# Patient Record
Sex: MALE | Race: WHITE | NOT HISPANIC OR LATINO | Employment: OTHER | URBAN - NONMETROPOLITAN AREA
[De-identification: names, ages, dates, MRNs, and addresses within clinical notes are randomized per-mention and may not be internally consistent; named-entity substitution may affect disease eponyms.]

---

## 2017-01-31 ENCOUNTER — COMMUNICATION - GICH (OUTPATIENT)
Dept: FAMILY MEDICINE | Facility: OTHER | Age: 62
End: 2017-01-31

## 2017-01-31 DIAGNOSIS — F51.01 PRIMARY INSOMNIA: ICD-10-CM

## 2017-02-24 ENCOUNTER — HISTORY (OUTPATIENT)
Dept: EMERGENCY MEDICINE | Facility: OTHER | Age: 62
End: 2017-02-24

## 2017-03-01 ENCOUNTER — COMMUNICATION - GICH (OUTPATIENT)
Dept: FAMILY MEDICINE | Facility: OTHER | Age: 62
End: 2017-03-01

## 2017-03-01 DIAGNOSIS — F51.01 PRIMARY INSOMNIA: ICD-10-CM

## 2017-03-25 ENCOUNTER — COMMUNICATION - GICH (OUTPATIENT)
Dept: FAMILY MEDICINE | Facility: OTHER | Age: 62
End: 2017-03-25

## 2017-03-25 DIAGNOSIS — I10 ESSENTIAL (PRIMARY) HYPERTENSION: ICD-10-CM

## 2017-03-30 ENCOUNTER — HISTORY (OUTPATIENT)
Dept: FAMILY MEDICINE | Facility: OTHER | Age: 62
End: 2017-03-30

## 2017-03-30 ENCOUNTER — OFFICE VISIT - GICH (OUTPATIENT)
Dept: FAMILY MEDICINE | Facility: OTHER | Age: 62
End: 2017-03-30

## 2017-03-30 DIAGNOSIS — M10.9 GOUT: ICD-10-CM

## 2017-03-30 DIAGNOSIS — G51.0 BELL'S PALSY: ICD-10-CM

## 2017-03-30 LAB — ERYTHROCYTE [SEDIMENTATION RATE] IN BLOOD: 12 MM/HR

## 2017-04-13 ENCOUNTER — HISTORY (OUTPATIENT)
Dept: FAMILY MEDICINE | Facility: OTHER | Age: 62
End: 2017-04-13

## 2017-04-13 ENCOUNTER — OFFICE VISIT - GICH (OUTPATIENT)
Dept: FAMILY MEDICINE | Facility: OTHER | Age: 62
End: 2017-04-13

## 2017-04-13 DIAGNOSIS — G51.0 BELL'S PALSY: ICD-10-CM

## 2017-06-28 ENCOUNTER — COMMUNICATION - GICH (OUTPATIENT)
Dept: FAMILY MEDICINE | Facility: OTHER | Age: 62
End: 2017-06-28

## 2017-07-05 ENCOUNTER — COMMUNICATION - GICH (OUTPATIENT)
Dept: FAMILY MEDICINE | Facility: OTHER | Age: 62
End: 2017-07-05

## 2017-07-05 DIAGNOSIS — F51.01 PRIMARY INSOMNIA: ICD-10-CM

## 2017-09-10 ENCOUNTER — HISTORY (OUTPATIENT)
Dept: EMERGENCY MEDICINE | Facility: OTHER | Age: 62
End: 2017-09-10

## 2017-09-11 ENCOUNTER — AMBULATORY - GICH (OUTPATIENT)
Dept: RESPIRATORY THERAPY | Facility: OTHER | Age: 62
End: 2017-09-11

## 2017-09-14 ENCOUNTER — OFFICE VISIT - GICH (OUTPATIENT)
Dept: FAMILY MEDICINE | Facility: OTHER | Age: 62
End: 2017-09-14

## 2017-09-14 ENCOUNTER — HISTORY (OUTPATIENT)
Dept: FAMILY MEDICINE | Facility: OTHER | Age: 62
End: 2017-09-14

## 2017-09-14 DIAGNOSIS — G51.0 BELL'S PALSY: ICD-10-CM

## 2017-09-14 DIAGNOSIS — R55 SYNCOPE AND COLLAPSE: ICD-10-CM

## 2017-09-14 DIAGNOSIS — Z23 ENCOUNTER FOR IMMUNIZATION: ICD-10-CM

## 2017-09-14 DIAGNOSIS — G47.00 INSOMNIA: ICD-10-CM

## 2017-09-14 DIAGNOSIS — I10 ESSENTIAL (PRIMARY) HYPERTENSION: ICD-10-CM

## 2017-09-14 DIAGNOSIS — C73 MALIGNANT NEOPLASM OF THYROID GLAND (H): ICD-10-CM

## 2017-09-14 LAB
ABSOLUTE BASOPHILS - HISTORICAL: 0 THOU/CU MM
ABSOLUTE EOSINOPHILS - HISTORICAL: 0 THOU/CU MM
ABSOLUTE IMMATURE GRANULOCYTES(METAS,MYELOS,PROS) - HISTORICAL: 0.2 THOU/CU MM
ABSOLUTE LYMPHOCYTES - HISTORICAL: 3.9 THOU/CU MM (ref 0.9–2.9)
ABSOLUTE MONOCYTES - HISTORICAL: 2.2 THOU/CU MM
ABSOLUTE NEUTROPHILS - HISTORICAL: 14.5 THOU/CU MM (ref 1.7–7)
ANION GAP - HISTORICAL: 16 (ref 5–18)
BASOPHILS # BLD AUTO: 0.1 %
BUN SERPL-MCNC: 29 MG/DL (ref 7–25)
BUN/CREAT RATIO - HISTORICAL: 25
CALCIUM SERPL-MCNC: 10.3 MG/DL (ref 8.6–10.3)
CHLORIDE SERPLBLD-SCNC: 99 MMOL/L (ref 98–107)
CO2 SERPL-SCNC: 23 MMOL/L (ref 21–31)
CREAT SERPL-MCNC: 1.16 MG/DL (ref 0.7–1.3)
EOSINOPHIL NFR BLD AUTO: 0 %
ERYTHROCYTE [DISTWIDTH] IN BLOOD BY AUTOMATED COUNT: 15 % (ref 11.5–15.5)
GFR IF NOT AFRICAN AMERICAN - HISTORICAL: >60 ML/MIN/1.73M2
GLUCOSE SERPL-MCNC: 96 MG/DL (ref 70–105)
HCT VFR BLD AUTO: 48 % (ref 37–53)
HEMOGLOBIN: 16.7 G/DL (ref 13.5–17.5)
IMMATURE GRANULOCYTES(METAS,MYELOS,PROS) - HISTORICAL: 0.9 %
LYMPHOCYTES NFR BLD AUTO: 18.8 % (ref 20–44)
MCH RBC QN AUTO: 31.6 PG (ref 26–34)
MCHC RBC AUTO-ENTMCNC: 34.8 G/DL (ref 32–36)
MCV RBC AUTO: 91 FL (ref 80–100)
MONOCYTES NFR BLD AUTO: 10.7 %
NEUTROPHILS NFR BLD AUTO: 69.5 % (ref 42–72)
PLATELET # BLD AUTO: 470 THOU/CU MM (ref 140–440)
PMV BLD: 9.5 FL (ref 6.5–11)
POTASSIUM SERPL-SCNC: 3.8 MMOL/L (ref 3.5–5.1)
RED BLOOD COUNT - HISTORICAL: 5.29 MIL/CU MM (ref 4.3–5.9)
SODIUM SERPL-SCNC: 138 MMOL/L (ref 133–143)
WHITE BLOOD COUNT - HISTORICAL: 20.9 THOU/CU MM (ref 4.5–11)

## 2017-09-19 ENCOUNTER — OFFICE VISIT - GICH (OUTPATIENT)
Dept: INTERNAL MEDICINE | Facility: OTHER | Age: 62
End: 2017-09-19

## 2017-09-19 ENCOUNTER — HISTORY (OUTPATIENT)
Dept: INTERNAL MEDICINE | Facility: OTHER | Age: 62
End: 2017-09-19

## 2017-09-19 DIAGNOSIS — R55 SYNCOPE AND COLLAPSE: ICD-10-CM

## 2017-09-19 DIAGNOSIS — C73 MALIGNANT NEOPLASM OF THYROID GLAND (H): ICD-10-CM

## 2017-09-19 DIAGNOSIS — E89.0 POSTPROCEDURAL HYPOTHYROIDISM: ICD-10-CM

## 2017-09-19 DIAGNOSIS — D72.829 ELEVATED WHITE BLOOD CELL COUNT: ICD-10-CM

## 2017-09-19 DIAGNOSIS — G47.00 INSOMNIA: ICD-10-CM

## 2017-09-19 DIAGNOSIS — G51.0 BELL'S PALSY: ICD-10-CM

## 2017-09-19 DIAGNOSIS — I10 ESSENTIAL (PRIMARY) HYPERTENSION: ICD-10-CM

## 2017-09-19 LAB
% RETIC: 2.6 % (ref 0.5–2)
ABSOLUTE BASOPHILS - HISTORICAL: 0 THOU/CU MM
ABSOLUTE EOSINOPHILS - HISTORICAL: 0 THOU/CU MM
ABSOLUTE IMMATURE GRANULOCYTES(METAS,MYELOS,PROS) - HISTORICAL: 0.4 THOU/CU MM
ABSOLUTE LYMPHOCYTES - HISTORICAL: 3.3 THOU/CU MM (ref 0.9–2.9)
ABSOLUTE MONOCYTES - HISTORICAL: 1.7 THOU/CU MM
ABSOLUTE NEUTROPHILS - HISTORICAL: 16.6 THOU/CU MM (ref 1.7–7)
BASOPHILS # BLD AUTO: 0.2 %
EOSINOPHIL NFR BLD AUTO: 0 %
ERYTHROCYTE [DISTWIDTH] IN BLOOD BY AUTOMATED COUNT: 15.7 % (ref 11.5–15.5)
ERYTHROCYTE [SEDIMENTATION RATE] IN BLOOD: 6 MM/HR
HCT VFR BLD AUTO: 46.3 % (ref 37–53)
HEMOGLOBIN: 16 G/DL (ref 13.5–17.5)
IMMATURE GRANULOCYTES(METAS,MYELOS,PROS) - HISTORICAL: 1.6 %
LYMPHOCYTES NFR BLD AUTO: 14.8 % (ref 20–44)
MCH RBC QN AUTO: 31.7 PG (ref 26–34)
MCHC RBC AUTO-ENTMCNC: 34.6 G/DL (ref 32–36)
MCV RBC AUTO: 92 FL (ref 80–100)
MONOCYTES NFR BLD AUTO: 7.7 %
NEUTROPHILS NFR BLD AUTO: 75.7 % (ref 42–72)
PLATELET # BLD AUTO: 490 THOU/CU MM (ref 140–440)
PMV BLD: 9.8 FL (ref 6.5–11)
RED BLOOD COUNT - HISTORICAL: 5.04 MIL/CU MM (ref 4.3–5.9)
RETIC (ABSOLUTE) GIH - HISTORICAL: 0.13 MIL/UL
T4 FREE SERPL-MCNC: 1.23 NG/DL (ref 0.58–1.64)
WHITE BLOOD COUNT - HISTORICAL: 21.9 THOU/CU MM (ref 4.5–11)

## 2017-09-19 ASSESSMENT — PATIENT HEALTH QUESTIONNAIRE - PHQ9: SUM OF ALL RESPONSES TO PHQ QUESTIONS 1-9: 0

## 2017-09-20 ENCOUNTER — COMMUNICATION - GICH (OUTPATIENT)
Dept: FAMILY MEDICINE | Facility: OTHER | Age: 62
End: 2017-09-20

## 2017-09-20 DIAGNOSIS — C73 MALIGNANT NEOPLASM OF THYROID GLAND (H): ICD-10-CM

## 2017-09-20 DIAGNOSIS — I10 ESSENTIAL (PRIMARY) HYPERTENSION: ICD-10-CM

## 2017-09-21 LAB
ANA INTERPRETATION: NEGATIVE
LYME SCREEN W/REFLEX WEST BLOT - HISTORICAL: NEGATIVE

## 2017-10-11 ENCOUNTER — COMMUNICATION - GICH (OUTPATIENT)
Dept: INTERNAL MEDICINE | Facility: OTHER | Age: 62
End: 2017-10-11

## 2017-10-11 DIAGNOSIS — M54.81 OCCIPITAL NEURALGIA: ICD-10-CM

## 2017-10-11 DIAGNOSIS — G51.0 BELL'S PALSY: ICD-10-CM

## 2017-10-13 ENCOUNTER — HOSPITAL ENCOUNTER (OUTPATIENT)
Dept: RADIOLOGY | Facility: OTHER | Age: 62
End: 2017-10-13
Attending: INTERNAL MEDICINE

## 2017-10-13 DIAGNOSIS — G51.0 BELL'S PALSY: ICD-10-CM

## 2017-10-13 DIAGNOSIS — M54.81 OCCIPITAL NEURALGIA: ICD-10-CM

## 2017-10-19 ENCOUNTER — COMMUNICATION - GICH (OUTPATIENT)
Dept: INTERNAL MEDICINE | Facility: OTHER | Age: 62
End: 2017-10-19

## 2017-10-19 DIAGNOSIS — G47.00 INSOMNIA: ICD-10-CM

## 2017-10-23 ENCOUNTER — COMMUNICATION - GICH (OUTPATIENT)
Dept: INTERNAL MEDICINE | Facility: OTHER | Age: 62
End: 2017-10-23

## 2017-10-23 DIAGNOSIS — G51.0 BELL'S PALSY: ICD-10-CM

## 2017-10-23 DIAGNOSIS — D72.829 ELEVATED WHITE BLOOD CELL COUNT: ICD-10-CM

## 2017-11-01 ENCOUNTER — COMMUNICATION - GICH (OUTPATIENT)
Dept: FAMILY MEDICINE | Facility: OTHER | Age: 62
End: 2017-11-01

## 2017-11-01 DIAGNOSIS — F51.01 PRIMARY INSOMNIA: ICD-10-CM

## 2017-11-07 ENCOUNTER — COMMUNICATION - GICH (OUTPATIENT)
Dept: INTERNAL MEDICINE | Facility: OTHER | Age: 62
End: 2017-11-07

## 2017-11-08 ENCOUNTER — COMMUNICATION - GICH (OUTPATIENT)
Dept: INTERNAL MEDICINE | Facility: OTHER | Age: 62
End: 2017-11-08

## 2017-11-10 ENCOUNTER — AMBULATORY - GICH (OUTPATIENT)
Dept: SCHEDULING | Facility: OTHER | Age: 62
End: 2017-11-10

## 2017-11-10 ENCOUNTER — AMBULATORY - GICH (OUTPATIENT)
Dept: INTERNAL MEDICINE | Facility: OTHER | Age: 62
End: 2017-11-10

## 2017-11-17 ENCOUNTER — AMBULATORY - GICH (OUTPATIENT)
Dept: INTERNAL MEDICINE | Facility: OTHER | Age: 62
End: 2017-11-17

## 2017-11-20 ENCOUNTER — COMMUNICATION - GICH (OUTPATIENT)
Dept: INTERNAL MEDICINE | Facility: OTHER | Age: 62
End: 2017-11-20

## 2017-12-04 ENCOUNTER — AMBULATORY - GICH (OUTPATIENT)
Dept: SCHEDULING | Facility: OTHER | Age: 62
End: 2017-12-04

## 2017-12-04 DIAGNOSIS — G52.9 CRANIAL NERVE DISORDER: ICD-10-CM

## 2017-12-12 ENCOUNTER — COMMUNICATION - GICH (OUTPATIENT)
Dept: NEUROLOGY | Facility: OTHER | Age: 62
End: 2017-12-12

## 2017-12-12 ENCOUNTER — AMBULATORY - GICH (OUTPATIENT)
Dept: LAB | Facility: OTHER | Age: 62
End: 2017-12-12

## 2017-12-12 ENCOUNTER — HOSPITAL ENCOUNTER (OUTPATIENT)
Dept: RADIOLOGY | Facility: OTHER | Age: 62
End: 2017-12-12
Attending: PSYCHIATRY & NEUROLOGY

## 2017-12-12 DIAGNOSIS — G52.9 CRANIAL NERVE DISORDER: ICD-10-CM

## 2017-12-12 LAB
CELLS COUNTED: 0
CLARITY, FLUID: CLEAR
COLOR CSF: COLORLESS
INR - HISTORICAL: 1
Lab: 0 /CU MM
Lab: 10 /CU MM
Lab: ABNORMAL
PLATELET # BLD AUTO: 355 THOU/CU MM (ref 140–440)
PMV BLD: 9.5 FL (ref 6.5–11)
PROTEIN TOTAL CSF: 64 MG/DL (ref 15–45)
PROTIME - HISTORICAL: 12.3 SEC (ref 11.9–15.2)

## 2017-12-13 LAB — Lab: NORMAL

## 2017-12-14 LAB
Lab: 3.86 MG/DL
Lab: 36.1 MG/DL (ref 14–35)
Lab: NEGATIVE
Lab: NORMAL
PROTEIN TOTAL CSF: 49 MG/DL (ref 15–40)
SPECIMEN SOURCE - HISTORICAL: NORMAL

## 2017-12-15 LAB
IGG - HISTORICAL: 1160 MG/DL (ref 767–1590)
Lab: 0.43 (ref 0.3–0.6)
Lab: 3.86 MG/DL
Lab: 36.1 MG/DL (ref 14–35)
Lab: 4710 MG/DL (ref 3500–5200)
Lab: <0 MG/DAY
Lab: ABNORMAL
Lab: ABNORMAL
PROTEIN TOTAL CSF: 49 MG/DL (ref 15–40)

## 2017-12-17 LAB
CULTURE - HISTORICAL: NORMAL
GRAM STAIN: NORMAL

## 2017-12-21 ENCOUNTER — COMMUNICATION - GICH (OUTPATIENT)
Dept: INTERNAL MEDICINE | Facility: OTHER | Age: 62
End: 2017-12-21

## 2017-12-21 ENCOUNTER — COMMUNICATION - GICH (OUTPATIENT)
Dept: FAMILY MEDICINE | Facility: OTHER | Age: 62
End: 2017-12-21

## 2017-12-21 DIAGNOSIS — G47.30 SLEEP APNEA: ICD-10-CM

## 2017-12-21 DIAGNOSIS — G47.00 INSOMNIA: ICD-10-CM

## 2017-12-28 ENCOUNTER — COMMUNICATION - GICH (OUTPATIENT)
Dept: FAMILY MEDICINE | Facility: OTHER | Age: 62
End: 2017-12-28

## 2017-12-28 DIAGNOSIS — I10 ESSENTIAL (PRIMARY) HYPERTENSION: ICD-10-CM

## 2017-12-28 NOTE — TELEPHONE ENCOUNTER
Patient Information     Patient Name MRN Sex Selvin Duenas [id number] Male 1955      Telephone Encounter by Mary Ellen Canales at 2017  9:11 AM     Author:  Mary Ellen Canales Service:  (none) Author Type:  (none)     Filed:  2017  9:13 AM Encounter Date:  2017 Status:  Signed     :  Mary Ellen Canales            Patient has had lyme since February . His primary is out . Would another provider place referral for Cantu /Lyme disease .

## 2017-12-28 NOTE — ADDENDUM NOTE
Patient Information     Patient Name MRN Selvin Love [id number] Male 1955      Addendum Note by Mitali Ramirez MD at 2017  9:37 AM     Author:  Mitali Ramirez MD Service:  (none) Author Type:  Physician     Filed:  2017  9:37 AM Encounter Date:  2017 Status:  Signed     :  Mitali Ramirez MD (Physician)       Addended by: MITALI RAMIREZ on: 2017 09:37 AM        Modules accepted: Orders

## 2017-12-28 NOTE — TELEPHONE ENCOUNTER
Patient Information     Patient Name MRN Selvin Love [id number] Male 1955      Telephone Encounter by Jael Rhodes RN at 2017  1:37 PM     Author:  Jael Rhodes RN Service:  (none) Author Type:  NURS- Registered Nurse     Filed:  2017  1:38 PM Encounter Date:  2017 Status:  Signed     :  Jael Rhodes RN (NURS- Registered Nurse)            Primary provider will be back on Monday. Will leave for him to address.  Jael Rhodes RN........2017 1:38 PM

## 2017-12-28 NOTE — TELEPHONE ENCOUNTER
Patient Information     Patient Name MRN Selvin Love [id number] Male 1955      Telephone Encounter by Milagro Mata RN at 11/3/2017 10:43 AM     Author:  Milagro Mata RN Service:  (none) Author Type:  NURS- Registered Nurse     Filed:  11/3/2017 10:50 AM Encounter Date:  2017 Status:  Signed     :  Milagro Mata RN (NURS- Registered Nurse)            [facility] and pt request a refill Rx for mirtazapine (Remerone).    Rx was discontinued on 17.  This RN is refusing / declining Rx refill request.    Unable to complete prescription refill per RN Medication Refill Policy.................... Milagro Mata RN ....................  11/3/2017   10:49 AM

## 2017-12-28 NOTE — TELEPHONE ENCOUNTER
Patient Information     Patient Name MRN Selvin Love [id number] Male 1955      Telephone Encounter by Tri Boudreaux at 2017 11:08 AM     Author:  Tri Boudreaux Service:  (none) Author Type:  (none)     Filed:  2017 11:12 AM Encounter Date:  2017 Status:  Signed     :  Tri Boudreaux            Call placed to patient re: referral will he call back.   Tri Bodureaux LPN..............2017 11:11 AM

## 2017-12-28 NOTE — PROGRESS NOTES
Patient Information     Patient Name MRN Sex Selvin Duenas [id number] Male 1955      Progress Notes by Uriel Ramirez MD at 2017  7:45 AM     Author:  Uriel Ramirez MD  Service:  (none) Author Type:  Physician     Filed:  2017  9:37 AM  Encounter Date:  2017 Status:  Addendum     :  Uriel Ramirez MD (Physician)        Related Notes: Original Note by Uriel Ramirez MD (Physician) filed at 2017  9:35 AM            Nursing Notes:   Aissatou Bryan  2017  8:04 AM  Signed  Patient presents to the clinic for a follow up from the ED, he had an episode of syncope.    Aissatou GRANADOChelsey Bryan LPN....................2017 7:50 AM    Selvin An is a 62 y.o. male who presents for   Chief Complaint     Patient presents with       Syncope      Follow up ED     HPI: Mr. An has complex history in that he had syncopal episode this past Saturday; he was out in the yard doing light activity that did involve bending some. Hehas HYPERTENSION and had nifedipine added to his medications to help control it late July at [place].He is also on atenolol 150 mg daily, spironalacton/ hyrdrochlorothiazide 25/25 , losartan 100 mg for HYPERTENSION/ BP has been in 140/92 range at home before and after syncope. His BP did improve some with addition of nifedipine. He has no prior history of syncope. His creatinine and BUN were high and have not been prior so this may have been BP med + dehydration related. No rhythm disturbance noted but he has ZIO patch on now. H ehas history of [place] palsy this past January and had noted some odd sensations the past week of tightening of the facial ms. This was thought to be a recurrance of [place] palsy and he was put on acyclovir and prednisone in EMERGENCY DEPARTMENT. He has felt fine since. Odd sensations in face continue.   Past Medical History:     Diagnosis  Date     Bilateral carpal tunnel syndrome      Hyperlipidemia      Hypertension       Hypothyroidism      Papillary carcinoma of thyroid (HC) 1994     Sleep apnea      Thyrotoxicosis without mention of goiter or other cause, without mention of thyrotoxic crisis or storm     patient had thyroid remove because of cancer      Unspecified essential hypertension      Past Surgical History:      Procedure  Laterality Date     CARPAL TUNNEL RELEASE  12/2011     pending R CTS        COLONOSCOPY  2004,2009    polyps both       COLONOSCOPY  9/2014    Normal - follow up 10 years       Cystoscopy with stent pull Left 10/14/14    Dr. Lana LEHMAN       EYE MUSCLE SURGERY  2010    strabismus        SPLENECTOMY       THYROIDECTOMY  1994    thyroid ca       TONSILLECTOMY  2004    T & A > 13yo       Ureteroscopy Laser Left 10/06/14    Dr. Lana LEHMAN       VASECTOMY       Family History       Problem   Relation Age of Onset     Heart Disease  Mother      MI - SCD       Heart Disease  Father      MI - SCD       Heart Disease  Other      Hypertension  Other      Current Outpatient Prescriptions       Medication  Sig Dispense Refill     acyclovir (ZOVIRAX) 800 mg tablet Take 1 tablet by mouth 5 times daily for 10 days. 50 tablet 0     allopurinol (ZYLOPRIM) 300 mg tablet Take 1 tablet by mouth once daily. 90 tablet 3     aspirin (ECOTRIN) 81 mg enteric coated tablet Take 81 mg by mouth once daily with a meal.       atenolol (TENORMIN) 100 mg tablet Take 1 tablet by mouth once daily. Take 1 1/4 tablet by using pil;l splitter 135 tablet 3     [DISCONTINUED] ATENOLOL 50 MG ORAL TAB take 1 tablet (50mg) by oral route once daily  0     atorvastatin (LIPITOR) 20 mg tablet Take 20 mg by mouth once daily.       clonazePAM (KLONOPIN) 0.5 mg tablet Take 1 tablet by mouth at bedtime. 30 tablet 3     doxycycline (VIBRAMYCIN) 100 mg tablet Take 1 tablet by mouth 2 times daily. 28 tablet 0     doxycycline monohydrate (MONODOX) 100 mg capsule Take 1 capsule by mouth 2 times daily for 14 days. 28 capsule 0     levothyroxine  "(SYNTHROID) 200 mcg tablet Take 1 tablet by mouth before breakfast. 90 tablet 3     [DISCONTINUED] LEVOTHYROXINE SODIUM 25 MCG ORAL TAB take 1 tablet (25mcg) by oral route once daily  0     losartan (COZAAR) 100 mg tablet TAKE ONE TABLET BY MOUTH ONE TIME DAILY 90 tablet 1     mirtazapine (REMERON) 30 mg tablet Take 1 tablet by mouth at bedtime. 30 tablet 11     NIFEdipine (PROCARDIA XL) 30 mg Extended-Release tablet Take 30 mg by mouth once daily before a meal.  3     predniSONE (DELTASONE) 10 mg tablet 60 mg po daily x 3 days, then 50 mg po daily x 3 days, then 40 mg po daily x 3 days, then 30 mg po daily x 3 days, then 20 mg po daily x 3 days and finally 10 mg po daily until finished 73 tablet 0     spironolacton-hydrochlorothiaze, 25-25 mg, (ALDACTAZIDE) tablet TAKE ONE TABLET BY MOUTH TWICE DAILY 180 tablet 2     No current facility-administered medications for this visit.      Medications have been reviewed by me and are current to the best of my knowledge and ability.    No Known Allergies    EXAM:   Vitals:      09/14/17 0750 09/14/17 0755   BP: 158/94 142/92   Temp: 98.2  F (36.8  C)    TempSrc: Temporal    Weight: 128.5 kg (283 lb 6.4 oz)    Height: 1.83 m (6' 0.05\")      General Appearance: Pleasant, alert, appropriate appearance for age. No acute distress  Head Exam: Normocephalic, without obvious abnormality.  Ear Exam: Normal TM's bilaterally. Normal auditory canals and external ears. Non-tender.  OroPharynx Exam: Dental hygiene adequate. Normal buccal mucosa. Normal pharynx.  Neck Exam: Normal.  Chest/Respiratory Exam: Normal chest wall and respirations. Clear to auscultation.  Cardiovascular Exam: Regular rate and rhythm. S1, S2, no murmur, click, gallop, or rubs.  Lymphatic Exam: Non-palpable nodes in neck, clavicular, axillary, or inguinal regions.  Neurologic Exam: Nonfocal; symmetric DTRs, normal gross motor movement, tone, and coordination. No tremor.  ASSESSMENT AND PLAN:  1. Essential " hypertension  syncope  - atenolol (TENORMIN) 100 mg tablet; Take 1 tablet by mouth once daily. Take 1 1/4 tablet by using pil;l splitter  Dispense: 135 tablet; Refill: 3  - spironolacton-hydrochlorothiaze, 25-25 mg, (ALDACTAZIDE) tablet; 1 tablet twice a day  Dispense: 180 tablet; Refill: 3  - losartan (COZAAR) 100 mg tablet; Take 1 tablet by mouth once daily.  Dispense: 90 tablet; Refill: 3    2. CARCINOMA, THYROID GLAND, PAPILLARY    - levothyroxine (SYNTHROID) 200 mcg tablet; Take 1 tablet by mouth before breakfast.  Dispense: 90 tablet; Refill: 3    3. HYPERTENSION  controlled  - atenolol (TENORMIN) 100 mg tablet; Take 1 tablet by mouth once daily. Take 1 1/4 tablet by using pil;l splitter  Dispense: 135 tablet; Refill: 3  - spironolacton-hydrochlorothiaze, 25-25 mg, (ALDACTAZIDE) tablet; 1 tablet twice a day  Dispense: 180 tablet; Refill: 3  - losartan (COZAAR) 100 mg tablet; Take 1 tablet by mouth once daily.  Dispense: 90 tablet; Refill: 3    4. Need for meningococcal vaccination  given  - OMNI MENINGOCOCCAL (MENACTRA) VACC IM  - [place] ADMIN VACC INITIAL  - [place] ADMIN EA ADDL VACC  - SEROGROUP B MENINGOCOCCAL (AGE 10 >= YEARS)    5. Bell's palsy  Having symptoms that seem to be related to it    6. Syncope, unspecified syncope type  Likely vagal  7 Elevated WBC- 10294 today with mild elevation in platelets will repeat and if consistent will need hematology opinion; was elevated in 2015 because of pneumonia     8 chronic insomnia- trial zolpidem

## 2017-12-28 NOTE — TELEPHONE ENCOUNTER
Patient Information     Patient Name MRN Selvin Love [id number] Male 1955      Telephone Encounter by Mikala Clarke at 2017  1:16 PM     Author:  Mikala Clarke Service:  (none) Author Type:  (none)     Filed:  2017  1:18 PM Encounter Date:  2017 Status:  Signed     :  Mikala Clarke            Spoke with patient.   Referral was sent down to [facility] on 10.25.2017.  They do their own scheduling.   He was given the contact information to call and have the appt set up.  Mikala Clarke ....................  2017   1:16 PM

## 2017-12-28 NOTE — TELEPHONE ENCOUNTER
Patient Information     Patient Name MRN Sex Selvin Duenas [id number] Male 1955      Telephone Encounter by Izabella Villavicencio LPN at 2017  1:39 PM     Author:  Izabella Villavicencio LPN Service:  (none) Author Type:  NURS- Licensed Practical Nurse     Filed:  2017  1:43 PM Encounter Date:  2017 Status:  Signed     :  Izabella Villavicencio LPN (NURS- Licensed Practical Nurse)            Contacted the patients wife and let her know that it was faxed but she was transferred up front to scheduling to have them refax the referral and paper work.  Izabella Villavicencio LPN......2017  1:42 PM

## 2017-12-28 NOTE — TELEPHONE ENCOUNTER
Patient Information     Patient Name MRN Selvin Love [id number] Male 1955      Telephone Encounter by Aissatou Bryan at 2017  8:15 AM     Author:  Aissatou Bryan Service:  (none) Author Type:  (none)     Filed:  2017  8:16 AM Encounter Date:  2017 Status:  Signed     :  Aissatou Bryan            Message left for patient to make an appointment.  Aissatou Bryan LPN....................2017 8:15 AM

## 2017-12-28 NOTE — TELEPHONE ENCOUNTER
Patient Information     Patient Name MRN Selvin Love [id number] Male 1955      Telephone Encounter by Uriel Ramirez MD at 7/3/2017  7:47 AM     Author:  Uriel Ramirez MD Service:  (none) Author Type:  Physician     Filed:  7/3/2017  7:48 AM Encounter Date:  2017 Status:  Signed     :  Uriel Ramirez MD (Physician)            He will need to be seen fro me to refer

## 2017-12-28 NOTE — TELEPHONE ENCOUNTER
Patient Information     Patient Name MRN Sex Selvin Duenas [id number] Male 1955      Telephone Encounter by Tabitha Jerry at 10/23/2017 11:07 AM     Author:  Tabitha Jerry Service:  (none) Author Type:  (none)     Filed:  10/23/2017 11:17 AM Encounter Date:  10/23/2017 Status:  Signed     :  Tabitha Jerry            Patient EC states wondering if prescription will be approved today. Notified is pending.      Also at appointment with Juan C Bess MD was discussed could have referrals placed. They wondering if can have both referrals to neurologist and rheumatologist.  Tabitha Jerry LPN .............10/23/2017  11:11 AM

## 2017-12-28 NOTE — TELEPHONE ENCOUNTER
"Patient Information     Patient Name MRN Selvin Love [id number] Male 1955      Telephone Encounter by Elza Cooley RN at 10/23/2017 11:12 AM     Author:  Elza Cooley RN Service:  (none) Author Type:  NURS- Registered Nurse     Filed:  10/23/2017 11:19 AM Encounter Date:  10/19/2017 Status:  Signed     :  Elza Cooley RN (NURS- Registered Nurse)            LORazepam (ATIVAN) 1 mg tablet  TAKE 1 TABLET BY MOUTH AT BEDTIME IF NEEDED       Disp: 30 tablet Refills: 0    Class: eRx Start: 10/19/2017    For: Insomnia, unspecified type  Originally ordered: 1 month ago by Kailey Moncada MD  Last refill:2017  To pharmacy: Not to exceed 5 additional fills before 2018  To be filled at: [facility] [address] [place] [address] [address].Phone: [phone]      Last visit with KAILEY MONCADA was on: 2017 in [facility]  PCP:  Uriel Ramirez MD  Controlled Substance Agreement:  None noted     Per LOV note on 17  \"Trial of lorazepam for his insomnia as the Ambien is not helping. He really thinks she'll feel a lot better if he is just able to get some sleep.\"     Called patient and patient states that he is taking the Lorazepam in addition the Ambien and this is working well.    Will route to Dr. Moncada for review.    Unable to complete prescription refill per RN Medication Refill Policy.................... ELZA COOLEY RN ....................  10/23/2017   11:12 AM            "

## 2017-12-28 NOTE — TELEPHONE ENCOUNTER
Patient Information     Patient Name MRN Sex Selvin Duenas [id number] Male 1955      Telephone Encounter by Milagro Mata RN at 2017  9:53 AM     Author:  Milagro Mata RN Service:  (none) Author Type:  NURS- Registered Nurse     Filed:  2017  9:57 AM Encounter Date:  2017 Status:  Signed     :  Milagro Mata RN (NURS- Registered Nurse)            [facility] and pt request refill Rxs for levothyroxine (Synthroid) and losartan (Cozaar).    Hypothyroidism    Office visit in the past 12 months or per provider note.    Last visit with MITALI JHA was on: 2017 in [facility]  Next visit with MITALI JHA is on: No future appointment listed with this provider  Next visit with Family Practice is on: No future appointment listed in this department    Lab testing requirements:  TSH annually  TSH (uIU/mL)    Date Value   09/10/2017 0.92       Max refill for 12 months from last office visit or per provider note.    Angiotensin Receptor Blockers (ARB)    Office visit in the past 12 months or per provider note.    Last visit with MITALI JHA was on: 2017 in [facility]  Next visit with MITALI JHA is on: No future appointment listed with this provider  Next visit with Family Practice is on: No future appointment listed in this department    Lab test requirements:  Creatinine and Potassium annually, if ordering lab, order BMP.  CREATININE (mg/dL)    Date Value   2017 1.16     POTASSIUM (mmol/L)    Date Value   2017 3.8       Max refill for 12 months from last office visit or per provider note    Prescription refilled per RN Medication Refill Policy.................... Milagro Mata RN ....................  2017   9:56 AM

## 2017-12-30 NOTE — NURSING NOTE
Patient Information     Patient Name MRN Selvin Love [id number] Male 1955      Nursing Note by Aissatou Bryan at 2017  7:45 AM     Author:  Aissatou Bryan Service:  (none) Author Type:  (none)     Filed:  2017  8:04 AM Encounter Date:  2017 Status:  Signed     :  Aissatou Bryan            Patient presents to the clinic for a follow up from the ED, he had an episode of syncope.    Aissatou Bryan LPN....................2017 7:50 AM

## 2017-12-30 NOTE — NURSING NOTE
Patient Information     Patient Name MRN Selvin Love [id number] Male 1955      Nursing Note by Izabella Villavicencio LPN at 2017  3:00 PM     Author:  Izabella Villavicencio LPN Service:  (none) Author Type:  NURS- Licensed Practical Nurse     Filed:  2017  3:00 PM Encounter Date:  2017 Status:  Signed     :  Izabella Villavicencio LPN (NURS- Licensed Practical Nurse)            The patient is here today to have a consult.  Izabella Villavicencio LPN......2017  2:54 PM

## 2018-01-03 NOTE — TELEPHONE ENCOUNTER
Patient Information     Patient Name MRN Selvin Love [id number] Male 1955      Telephone Encounter by Jael Rhodes RN at 2017  1:33 PM     Author:  Jael Rhodes RN Service:  (none) Author Type:  NURS- Registered Nurse     Filed:  2017  1:34 PM Encounter Date:  2017 Status:  Signed     :  Jael Rhodes RN (NURS- Registered Nurse)            In clinical absence of patient's primary, Uriel Ramirez MD, patient is requesting that this message be sent to the primary provider's Teamlet for consideration please.    Jael Rhodes RN........2017 1:33 PM

## 2018-01-03 NOTE — TELEPHONE ENCOUNTER
Patient Information     Patient Name MRN Selvin Love [id number] Male 1955      Telephone Encounter by Jael Rhodes RN at 3/1/2017 10:51 AM     Author:  Jael Rhodes RN Service:  (none) Author Type:  NURS- Registered Nurse     Filed:  3/1/2017 10:55 AM Encounter Date:  3/1/2017 Status:  Signed     :  Jael Rhodes RN (NURS- Registered Nurse)            This is a Refill request from: [facility]   Name of Medication: Clonazepam  Quantity requested: 30  Last fill date: 2017  Due for refill: 3/4/2017  Last visit with URIEL RAMIREZ was on: 10/10/2016 in [facility]  PCP:  Uriel Ramirez MD  Controlled Substance Agreement:  none   Diagnosis r/t this medication request: Primary insomnia     Unable to complete prescription refill per RN Medication Refill Policy.................... Jael Rhodes RN ....................  3/1/2017   10:52 AM          
no abrasions, no jaundice, no lesions, no pruritis, and no rashes.

## 2018-01-03 NOTE — TELEPHONE ENCOUNTER
Patient Information     Patient Name MRN Selvin Love [id number] Male 1955      Telephone Encounter by Jael Rhodes RN at 2017  3:45 PM     Author:  Jael Rhodes RN Service:  (none) Author Type:  NURS- Registered Nurse     Filed:  2017  3:47 PM Encounter Date:  2017 Status:  Signed     :  Jael Rhodes RN (NURS- Registered Nurse)            This is a Refill request from: [facility]   Name of Medication: Clonazepam  Quantity requested: 30  Last fill date: 2017  Due for refill: 2017  Last visit with URIEL RAMIREZ was on: 10/10/2016 in [facility]  PCP: Uriel Ramirez MD  Controlled Substance Agreement: none  Diagnosis r/t this medication request: Primary insomnia    Unable to complete prescription refill per RN Medication Refill Policy.................... Jael Rhodes RN ....................  2017   3:47 PM

## 2018-01-04 NOTE — NURSING NOTE
Patient Information     Patient Name MRN Selvin Love [id number] Male 1955      Nursing Note by Aissatou Brayn at 2017  7:45 AM     Author:  Aissatou Bryan Service:  (none) Author Type:  (none)     Filed:  2017  8:02 AM Encounter Date:  2017 Status:  Signed     :  Aisstaou Bryan            Patient presents to the clinic for a follow up on bells palsy.  Aissatou Bryan LPN....................2017 7:39 AM

## 2018-01-04 NOTE — NURSING NOTE
Patient Information     Patient Name MRN Sex Selvin Duenas [id number] Male 1955      Nursing Note by Aissatou Bryan at 3/30/2017  3:15 PM     Author:  Aissatou Bryan Service:  (none) Author Type:  (none)     Filed:  3/30/2017  3:18 PM Encounter Date:  3/30/2017 Status:  Signed     :  Aissatou Bryan            Patient presents to the clinic for a follow up on [place] Palsy.  He was in the ED recently.  Given Prednisone and an antibiotic.  He finished both.  Not sure if he needs refills.  Aissatou Bryan LPN....................3/30/2017 3:10 PM

## 2018-01-04 NOTE — PROGRESS NOTES
Patient Information     Patient Name MRN Sex Selvin Duenas [id number] Male 1955      Progress Notes by Uriel Ramirez MD at 2017  7:45 AM     Author:  Uriel Ramirez MD Service:  (none) Author Type:  Physician     Filed:  2017  8:10 AM Encounter Date:  2017 Status:  Signed     :  Uriel Ramirez MD (Physician)            Nursing Notes:   Aissatou Bryan  2017  8:02 AM  Signed  Patient presents to the clinic for a follow up on bells palsy.  Aissatou Bryan LPN....................2017 7:39 AM    Selvin An is a 61 y.o. male who presents for   Chief Complaint     Patient presents with       Infectious Disease      Lyme disease follow up, bells palsy     HPI: Mr. An comes in follow up to [place] palsy felt to be related to Lyme- not clear whether this may have been present longer term or this Spring but this Spring most likely  Past Medical History:     Diagnosis  Date     Bilateral carpal tunnel syndrome      Hyperlipidemia      Hypertension      Hypothyroidism      Papillary carcinoma of thyroid (HC)      Sleep apnea      Thyrotoxicosis without mention of goiter or other cause, without mention of thyrotoxic crisis or storm     patient had thyroid remove because of cancer      Unspecified essential hypertension      Past Surgical History:      Procedure  Laterality Date     CARPAL TUNNEL RELEASE  2011     pending R CTS        COLONOSCOPY  ,    polyps both       COLONOSCOPY  2014    Normal - follow up 10 years       Cystoscopy with stent pull Left 10/14/14    Dr. Lana LEHMAN       EYE MUSCLE SURGERY  2010    strabismus        SPLENECTOMY       THYROIDECTOMY      thyroid ca       TONSILLECTOMY  2004    T & A > 13yo       Ureteroscopy Laser Left 10/06/14    Dr. Lana LEHMAN       VASECTOMY       Family History       Problem   Relation Age of Onset     Heart Disease  Mother      MI - SCD       Heart Disease  Father      MI - SCD       Heart  "Disease  Other      Hypertension  Other      Current Outpatient Prescriptions       Medication  Sig Dispense Refill     allopurinol (ZYLOPRIM) 300 mg tablet Take 1 tablet by mouth once daily. 90 tablet 3     aspirin (ECOTRIN) 81 mg enteric coated tablet Take 81 mg by mouth once daily with a meal.       atenolol (TENORMIN) 100 mg tablet Take 1 tablet by mouth once daily. Take 1 1/4 tablet by using pil;l splitter 135 tablet 3     [DISCONTINUED] ATENOLOL 50 MG ORAL TAB take 1 tablet (50mg) by oral route once daily  0     clonazePAM (KLONOPIN) 0.5 mg tablet Take 1 tablet by mouth at bedtime. 30 tablet 3     doxycycline (VIBRAMYCIN) 100 mg tablet Take 1 tablet by mouth 2 times daily. 28 tablet 0     indomethacin (INDOCIN) 50 mg capsule Take 50 mg by mouth every 8 hours if needed for Gout Pain.       levothyroxine (SYNTHROID) 200 mcg tablet Take 1 tablet by mouth before breakfast. 90 tablet 3     [DISCONTINUED] LEVOTHYROXINE SODIUM 25 MCG ORAL TAB take 1 tablet (25mcg) by oral route once daily  0     losartan (COZAAR) 100 mg tablet TAKE ONE TABLET BY MOUTH ONE TIME DAILY 90 tablet 1     mirtazapine (REMERON) 30 mg tablet Take 1 tablet by mouth at bedtime. 30 tablet 11     spironolacton-hydrochlorothiaze, 25-25 mg, (ALDACTAZIDE) tablet TAKE ONE TABLET BY MOUTH TWICE DAILY 180 tablet 2     No current facility-administered medications for this visit.      Medications have been reviewed by me and are current to the best of my knowledge and ability.    No Known Allergies       EXAM:   Vitals:     04/13/17 0739   BP: 130/78   Weight: (!) 137.1 kg (302 lb 3.2 oz)   Height: 1.85 m (6' 0.83\")     General Appearance: Pleasant, alert, appropriate appearance for age. No acute distress  Neurologic Exam: left peripheral BElls palsy; ESR was back to normal  ASSESSMENT AND PLAN:  1. Bell's palsy  Improving slowly at this time                 "

## 2018-01-04 NOTE — TELEPHONE ENCOUNTER
Patient Information     Patient Name MRN Selvin Love [id number] Male 1955      Telephone Encounter by Jael Rhodes RN at 3/27/2017  9:25 AM     Author:  Jael Rhodes RN Service:  (none) Author Type:  NURS- Registered Nurse     Filed:  3/27/2017  9:30 AM Encounter Date:  3/25/2017 Status:  Signed     :  Jael Rhodes RN (NURS- Registered Nurse)            Angiotensin Receptor Blockers (ARB)    Office visit in the past 12 months or per provider note.    Last visit with MITALI JHA was on: 10/10/2016 in [facility]  Next visit with MITALI JHA is on: No future appointment listed with this provider  Next visit with Family Practice is on: No future appointment listed in this department    Lab test requirements:  Creatinine and Potassium annually, if ordering lab, order BMP.  CREATININE (mg/dL)    Date Value   2017 1.18     POTASSIUM (mmol/L)    Date Value   2017 3.6       Max refill for 12 months from last office visit or per provider note    Prescription refilled per RN Medication Refill Policy.................... Jael Rhodes RN ....................  3/27/2017   9:29 AM

## 2018-01-04 NOTE — PROGRESS NOTES
Patient Information     Patient Name MRN Sex Selvin Duenas [id number] Male 1955      Progress Notes by Uriel Ramirez MD at 3/30/2017  3:15 PM     Author:  Uriel Ramirez MD Service:  (none) Author Type:  Physician     Filed:  3/30/2017  3:43 PM Encounter Date:  3/30/2017 Status:  Signed     :  Uriel Ramirez MD (Physician)            Nursing Notes:   Aissatou Bryan  3/30/2017  3:18 PM  Signed  Patient presents to the clinic for a follow up on Parker Palsy.  He was in the ED recently.  Given Prednisone and an antibiotic.  He finished both.  Not sure if he needs refills.  Aissatou Bryan LPN....................3/30/2017 3:10 PM    Selvin An is a 61 y.o. male who presents for   Chief Complaint     Patient presents with       Facial Injury      Parker palsy     HPI: Mr. An comes in in follow up to EMERGENCY DEPARTMENT diagnosis of Parker palsy acute start on 17. H ewas dx'd after extensive work up. He had been ill he states with achy , tired, weak feeling since sometiime last Spring; at that time he was fouind to be Vit D deficiient and ESR was elevated- he felt a little better but followed up with me Oct when I stopped his statin with plan to follow up after stopping this; Lyme test was neg. JOID enext came in to Ed with symptoms of left facial droop an dwas started on doxycycline + prednisone with instructions to follow up for possible more med if not better after 2 weeks; he has had some minimal improvement- has not seen eye Dr as instructed; he thinks it is 40 %c improved and he can drink with straw and speak better. NNo improvement in generalized achiness; going off statin he reports did not help. No assoc rash prior to onset Parker Palsy   Past Medical History      Diagnosis   Date     Bilateral carpal tunnel syndrome       Hyperlipidemia       Hypertension       Hypothyroidism       Papillary carcinoma of thyroid (HC)       Sleep apnea       Thyrotoxicosis without mention  of goiter or other cause, without mention of thyrotoxic crisis or storm       patient had thyroid remove because of cancer      Unspecified essential hypertension       Past Surgical History       Procedure   Laterality Date     Splenectomy        Thyroidectomy   1994     thyroid ca       Tonsillectomy   2004     T & A > 13yo       Vasectomy        Eye muscle surgery   2010     strabismus        Carpal tunnel release   12/2011      pending R CTS        Colonoscopy   2004,2009     polyps both       Colonoscopy   9/2014     Normal - follow up 10 years       Ureteroscopy laser  Left 10/06/14     Dr. Lana LEHMAN       Cystoscopy with stent pull  Left 10/14/14     Dr. Lana LEHMAN       Family History       Problem   Relation Age of Onset     Heart Disease  Mother      MI - SCD       Heart Disease  Father      MI - SCD       Heart Disease  Other      Hypertension  Other      Current Outpatient Prescriptions       Medication  Sig Dispense Refill     allopurinol (ZYLOPRIM) 300 mg tablet Take 1 tablet by mouth once daily. 90 tablet 0     aspirin (ECOTRIN) 81 mg enteric coated tablet Take 81 mg by mouth once daily with a meal.       atenolol (TENORMIN) 100 mg tablet Take 1 tablet by mouth once daily. Take 1 1/4 tablet by using pil;l splitter 135 tablet 3     [DISCONTINUED] ATENOLOL 50 MG ORAL TAB take 1 tablet (50mg) by oral route once daily  0     clonazePAM (KLONOPIN) 0.5 mg tablet Take 1 tablet by mouth at bedtime. 30 tablet 3     indomethacin (INDOCIN) 50 mg capsule Take 50 mg by mouth every 8 hours if needed for Gout Pain.       levothyroxine (SYNTHROID) 200 mcg tablet Take 1 tablet by mouth before breakfast. 90 tablet 3     [DISCONTINUED] LEVOTHYROXINE SODIUM 25 MCG ORAL TAB take 1 tablet (25mcg) by oral route once daily  0     losartan (COZAAR) 100 mg tablet TAKE ONE TABLET BY MOUTH ONE TIME DAILY 90 tablet 1     mirtazapine (REMERON) 30 mg tablet Take 1 tablet by mouth at bedtime. 30 tablet 11      "spironolacton-hydrochlorothiaze, 25-25 mg, (ALDACTAZIDE) tablet TAKE ONE TABLET BY MOUTH TWICE DAILY 180 tablet 2     No current facility-administered medications for this visit.      Medications have been reviewed by me and are current to the best of my knowledge and ability.    No Known Allergies     EXAM:   Vitals:     03/30/17 1511   BP: 110/76   Weight: (!) 137.2 kg (302 lb 6.4 oz)   Height: 1.85 m (6' 0.83\")     General Appearance: Pleasant, alert, appropriate appearance for age. No acute distress  Neurologic Exam: Nonfocal; symmetric DTRs, normal gross motor movement, tone, and coordination. No tremor.Peripheral marked  &th CN palsy likely related to Lyme / history of elevated AESR that I will recehck now as statins were not cause of aches; may need Neurology follow up- stressd importance of follow up here 1-2 weeks  ASSESSMENT AND PLAN:  1. Gout, unspecified cause, unspecified chronicity, unspecified site    - allopurinol (ZYLOPRIM) 300 mg tablet; Take 1 tablet by mouth once daily.  Dispense: 90 tablet; Refill: 3    2. Bell's palsy  Refill doxy                 "

## 2018-01-11 ENCOUNTER — HISTORY (OUTPATIENT)
Dept: FAMILY MEDICINE | Facility: OTHER | Age: 63
End: 2018-01-11

## 2018-01-11 ENCOUNTER — OFFICE VISIT - GICH (OUTPATIENT)
Dept: FAMILY MEDICINE | Facility: OTHER | Age: 63
End: 2018-01-11

## 2018-01-11 DIAGNOSIS — G47.30 SLEEP APNEA: ICD-10-CM

## 2018-01-15 ENCOUNTER — AMBULATORY - GICH (OUTPATIENT)
Dept: SCHEDULING | Facility: OTHER | Age: 63
End: 2018-01-15

## 2018-01-17 PROBLEM — G51.0 BELL'S PALSY: Status: ACTIVE | Noted: 2017-03-30

## 2018-01-17 PROBLEM — G56.02 CARPAL TUNNEL SYNDROME OF LEFT WRIST: Status: ACTIVE | Noted: 2017-11-02

## 2018-01-17 RX ORDER — NIFEDIPINE 30 MG/1
30 TABLET, EXTENDED RELEASE ORAL
COMMUNITY
Start: 2017-08-19 | End: 2021-01-29

## 2018-01-17 RX ORDER — ASPIRIN 81 MG/1
81 TABLET ORAL
COMMUNITY
End: 2021-01-29

## 2018-01-17 RX ORDER — ZOLPIDEM TARTRATE 10 MG/1
10 TABLET ORAL
COMMUNITY
Start: 2017-09-14 | End: 2018-02-26

## 2018-01-17 RX ORDER — SPIRONOLACTONE AND HYDROCHLOROTHIAZIDE 25; 25 MG/1; MG/1
1 TABLET ORAL 2 TIMES DAILY
COMMUNITY
Start: 2017-09-14 | End: 2021-01-29

## 2018-01-17 RX ORDER — LOSARTAN POTASSIUM 100 MG/1
100 TABLET ORAL DAILY
COMMUNITY
Start: 2017-09-22 | End: 2021-01-29

## 2018-01-17 RX ORDER — ATENOLOL 100 MG/1
150 TABLET ORAL DAILY
COMMUNITY
Start: 2017-09-14 | End: 2021-01-29

## 2018-01-17 RX ORDER — LEVOTHYROXINE SODIUM 200 UG/1
150 TABLET ORAL
COMMUNITY
Start: 2017-09-22 | End: 2021-01-29

## 2018-01-17 RX ORDER — ALLOPURINOL 300 MG/1
300 TABLET ORAL DAILY
COMMUNITY
Start: 2017-03-30 | End: 2018-04-14

## 2018-01-17 RX ORDER — HYDROCODONE BITARTRATE AND ACETAMINOPHEN 5; 325 MG/1; MG/1
1-2 TABLET ORAL EVERY 4 HOURS PRN
COMMUNITY
Start: 2017-11-16 | End: 2021-01-29

## 2018-01-17 RX ORDER — LORAZEPAM 1 MG/1
1 TABLET ORAL
COMMUNITY
Start: 2017-12-26 | End: 2018-05-24

## 2018-01-17 RX ORDER — ATORVASTATIN CALCIUM 20 MG/1
20 TABLET, FILM COATED ORAL DAILY
COMMUNITY
Start: 2017-07-19 | End: 2021-01-29

## 2018-01-26 ENCOUNTER — HOSPITAL ENCOUNTER (OUTPATIENT)
Dept: RADIOLOGY | Facility: OTHER | Age: 63
End: 2018-01-26

## 2018-01-26 ENCOUNTER — AMBULATORY - GICH (OUTPATIENT)
Dept: RADIOLOGY | Facility: OTHER | Age: 63
End: 2018-01-26

## 2018-01-26 DIAGNOSIS — M54.9 DORSALGIA: ICD-10-CM

## 2018-01-27 VITALS
DIASTOLIC BLOOD PRESSURE: 88 MMHG | WEIGHT: 286 LBS | SYSTOLIC BLOOD PRESSURE: 134 MMHG | BODY MASS INDEX: 38.26 KG/M2 | HEART RATE: 64 BPM

## 2018-01-27 VITALS
HEIGHT: 72 IN | TEMPERATURE: 98.2 F | WEIGHT: 283.4 LBS | BODY MASS INDEX: 38.39 KG/M2 | SYSTOLIC BLOOD PRESSURE: 142 MMHG | DIASTOLIC BLOOD PRESSURE: 92 MMHG

## 2018-01-27 VITALS
HEIGHT: 73 IN | SYSTOLIC BLOOD PRESSURE: 110 MMHG | DIASTOLIC BLOOD PRESSURE: 76 MMHG | WEIGHT: 302.4 LBS | BODY MASS INDEX: 40.08 KG/M2

## 2018-01-27 VITALS
SYSTOLIC BLOOD PRESSURE: 130 MMHG | HEIGHT: 73 IN | WEIGHT: 302.2 LBS | BODY MASS INDEX: 40.05 KG/M2 | DIASTOLIC BLOOD PRESSURE: 78 MMHG

## 2018-01-30 ASSESSMENT — PATIENT HEALTH QUESTIONNAIRE - PHQ9: SUM OF ALL RESPONSES TO PHQ QUESTIONS 1-9: 0

## 2018-02-09 VITALS
DIASTOLIC BLOOD PRESSURE: 88 MMHG | HEIGHT: 72 IN | BODY MASS INDEX: 39.93 KG/M2 | SYSTOLIC BLOOD PRESSURE: 136 MMHG | WEIGHT: 294.8 LBS

## 2018-02-12 NOTE — PROGRESS NOTES
Patient Information     Patient Name MRN Sex Selvin Duenas [id number] Male 1955      Progress Notes by Corrine Hankins RN at 2017 11:06 AM     Author:  Corrine Hankins RN Service:  (none) Author Type:  NURS- Registered Nurse     Filed:  2017 11:07 AM Date of Service:  2017 11:06 AM Status:  Signed     :  Corrine Hankins RN (NURS- Registered Nurse)            Discharge Note    Data:  Selvin An has been discharged home at 1100 via ambulatory accompanied by Registered Nurse and Family.      Action:  Written discharge/follow-up instructions were provided to patient and Spouse. Prescriptions : None.  Belongings sent with patient. Medications from home sent with patient/family: Not Applicable  Equipment none .     Response:  Patient and spouse verbalized understanding of discharge instructions, reason for discharge, and necessary follow-up appointments.

## 2018-02-12 NOTE — TELEPHONE ENCOUNTER
Patient Information     Patient Name MRN Sex Selvin Duenas [id number] Male 1955      Telephone Encounter by Geri Mirza at 2017  3:02 PM     Author:  Geri Mirza Service:  (none) Author Type:  (none)     Filed:  2017  3:05 PM Encounter Date:  2017 Status:  Signed     :  Geri Mirza            Needs new C-pap machine.  Per [facility] they do not repair them.  Needs Rx for a new one sent to [facility].  Geri Mirza LPN..........2017  3:04 PM

## 2018-02-12 NOTE — TELEPHONE ENCOUNTER
Patient Information     Patient Name MRN Selvin Love [id number] Male 1955      Telephone Encounter by Milagro Baez RN at 2018  2:03 PM     Author:  Milagro Baez RN Service:  (none) Author Type:  NURS- Registered Nurse     Filed:  2018  2:06 PM Encounter Date:  2017 Status:  Signed     :  Milagro Baez RN (NURS- Registered Nurse)            Redundant Refill Request refused: Too Soon.    Medication:atenolol (TENORMIN) 100 mg tablet    Qty:135 tablet  Ref:3  Start:2017   End:              Route:Oral                  ANDREW:No   Class:eRx    Sig:Take 1.5 tablets by mouth once daily.    Pharmacy:[facility] [id number] [place] [address]    Unable to complete prescription refill per RN Medication Refill Policy.................... Milagro Baez RN ....................  2018   2:05 PM

## 2018-02-12 NOTE — TELEPHONE ENCOUNTER
Patient Information     Patient Name MRN Selvin Love [id number] Male 1955      Telephone Encounter by Aissatou Bryan at 2018 10:17 AM     Author:  Aissatou Bryan Service:  (none) Author Type:  (none)     Filed:  2018 10:17 AM Encounter Date:  2017 Status:  Signed     :  Aissatou Bryan            Order is ready for signature  Aissatou Bryan LPN....................2018 10:17 AM

## 2018-02-12 NOTE — TELEPHONE ENCOUNTER
Patient Information     Patient Name MRN Sex Selvin Duenas [id number] Male 1955      Telephone Encounter by Uriel Ramirez MD at 2017  3:32 PM     Author:  Uriel Ramirez MD Service:  (none) Author Type:  Physician     Filed:  2017  3:33 PM Encounter Date:  2017 Status:  Signed     :  Uriel Ramirez MD (Physician)            Please transesophogeal echocardiography up

## 2018-02-12 NOTE — TELEPHONE ENCOUNTER
Patient Information     Patient Name MRN Sex Selvin Duenas [id number] Male 1955      Telephone Encounter by Milagro Baez RN at 2017  4:17 PM     Author:  Milagro Baez RN Service:  (none) Author Type:  NURS- Registered Nurse     Filed:  2017  4:22 PM Encounter Date:  2017 Status:  Signed     :  Milagro Baez RN (NURS- Registered Nurse)            PLEASE REVIEW, SIGN AND SEND AS APPROPRIATE: THANK YOU.    This is a Refill request from: [facility]  Name of Medication: lorazepam (ATIVAN)  Quantity requested: 30 tabs  Last fill date: 10/23/2017 (#30, R-0)  Due for refill: 2017  Last visit with KAILEY MONCADA was on: 2017 in [facility]  PCP:  Uriel Ramirez MD  Controlled Substance Agreement:  None for this medication  Diagnosis r/t this medication request: Insomnia     Unable to complete prescription refill per RN Medication Refill Policy.................... Milagro Baez RN ....................  2017   4:20 PM

## 2018-02-12 NOTE — PROGRESS NOTES
Patient Information     Patient Name MRN Sex Selvin Duenas [id number] Male 1955      Progress Notes by Pat Pryor at 2017 11:09 AM     Author:  Pat Pryor Service:  (none) Author Type:  Other Clinical Staff     Filed:  2017 11:11 AM Date of Service:  2017 11:09 AM Status:  Signed     :  Pat Pryor (Other Clinical Staff)            Falls Risk Criteria:    Age 65 and older or under age 4        Sensory deficits    Poor vision    Use of ambulatory aides    Impaired judgment    Unable to walk independently    Meets High Risk criteria for falls:  No    Universal Protocol    A. Pre-procedure verification complete yes  1-relevant information / documentation available, reviewed and properly matched to the patient; 2-consent accurate and complete, 3-equipment and supplies available    B. Site marking complete N/A  Site marked if not in continuous attendance with patient    C. TIME OUT completed yes  Time Out was conducted just prior to starting procedure to verify the eight required elements: 1-patient identity, 2-consent accurate and complete, 3-position, 4-correct side/site marked (if applicable), 5-procedure, 6-relevant images / results properly labeled and displayed (if applicable), 7-antibiotics / irrigation fluids (if applicable), 8-safety precautions.      10 cc's of CSF collected and completed by 0945

## 2018-02-12 NOTE — TELEPHONE ENCOUNTER
Patient Information     Patient Name MRN Selvin Love [id number] Male 1955      Telephone Encounter by Uriel Ramirez MD at 2018 10:18 AM     Author:  Uriel Ramirez MD Service:  (none) Author Type:  Physician     Filed:  2018 10:18 AM Encounter Date:  2017 Status:  Signed     :  Uriel Ramirez MD (Physician)            I do not know his pressure setting

## 2018-02-13 NOTE — NURSING NOTE
Patient Information     Patient Name MRN Selvin Love [id number] Male 1955      Nursing Note by Aissatou Bryan at 2018 11:30 AM     Author:  Aissatou Bryan Service:  (none) Author Type:  (none)     Filed:  2018 11:44 AM Encounter Date:  2018 Status:  Signed     :  Aissatou Bryan            Patient presents to the clinic to get a written prescription for a CPAP machine to bring to [place].  Aissatou Bryan LPN....................2018 11:38 AM

## 2018-02-13 NOTE — PROGRESS NOTES
Patient Information     Patient Name MRN Sex Selvin Duenas [id number] Male 1955      Progress Notes by Uriel Ramirez MD at 2018 11:30 AM     Author:  Uriel Ramirez MD Service:  (none) Author Type:  Physician     Filed:  2018 12:02 PM Encounter Date:  2018 Status:  Signed     :  Uriel Ramirez MD (Physician)            Nursing Notes:   Aissatou Bryan  2018 11:44 AM  Signed  Patient presents to the clinic to get a written prescription for a CPAP machine to bring to [place].  Aissatou LOYChelsey Bryan LPN....................2018 11:38 AM    Selvin An is a 62 y.o. male who presents for   Chief Complaint     Patient presents with       Refill Request      CPAP     HPI: Mr. An comes in to discuss CPAP; he has sleep apnea and has for 18 years with diagnosis in  at [facility] where he had original sleep study that confirmed the diagnosis. He uses CPAP all night every night with excellent results. He wishes to continue it. He sleeps much better without stating he cannot stay asleep without it. He is having trouble with his insurance company related to rules set forth. He had a breakdown with his old machine and thus needs a new one.   Past Medical History:     Diagnosis  Date     Bilateral carpal tunnel syndrome      Hyperlipidemia      Hypertension      Hypothyroidism      Papillary carcinoma of thyroid (HC)      Sleep apnea      Past Surgical History:      Procedure  Laterality Date     CARPAL TUNNEL RELEASE Right 2011     COLONOSCOPY  2014    Normal - follow up 10 years       Cystoscopy with stent pull Left 10/14/14    Dr. Lana LEHMAN       ENDOSCOPIC RELEASE TRANSVERSE CARPAL LIGAMENT OF HAND Left 2017     EYE MUSCLE SURGERY Left     strabismus        SPLENECTOMY       THYROIDECTOMY      thyroid ca       TONSILLECTOMY  2004    T & A > 10yo       Ureteroscopy Laser Left 10/06/14    Dr. Lana LEHMAN       VASECTOMY       Family History        Problem   Relation Age of Onset     Heart Disease  Mother      MI - SCD       Heart Disease  Father      MI - SCD       Current Outpatient Prescriptions       Medication  Sig Dispense Refill     allopurinol (ZYLOPRIM) 300 mg tablet Take 1 tablet by mouth once daily. 90 tablet 3     aspirin (ECOTRIN) 81 mg enteric coated tablet Take 81 mg by mouth once daily with a meal.       atenolol (TENORMIN) 100 mg tablet Take 1.5 tablets by mouth once daily. 135 tablet 3     [DISCONTINUED] ATENOLOL 50 MG ORAL TAB take 1 tablet (50mg) by oral route once daily  0     atorvastatin (LIPITOR) 20 mg tablet Take 20 mg by mouth once daily.       CPAP CPAP machine for home use at pressure: 8, with supplies:  humidifier, chamber, mask, tubing 1 unit 0     HYDROcodone-acetaminophen, 5-325 mg, (NORCO) per tablet Take 1-2 tablets by mouth every 4 hours if needed  for Pain Max acetaminophen dose: 4000 mg in 24 hrs. 15 tablet 0     levothyroxine (SYNTHROID) 200 mcg tablet TAKE 1 TABLET BY MOUTH BEFORE BREAKFAST. 90 tablet 3     [DISCONTINUED] LEVOTHYROXINE SODIUM 25 MCG ORAL TAB take 1 tablet (25mcg) by oral route once daily  0     LORazepam (ATIVAN) 1 mg tablet TAKE 1 TABLET BY MOUTH AT BEDTIME IF NEEDED 30 tablet 4     losartan (COZAAR) 100 mg tablet TAKE ONE TABLET BY MOUTH ONE TIME DAILY 90 tablet 3     NIFEdipine (PROCARDIA XL) 30 mg Extended-Release tablet Take 30 mg by mouth once daily before a meal.  3     spironolacton-hydrochlorothiaze, 25-25 mg, (ALDACTAZIDE) tablet 1 tablet twice a day 180 tablet 3     zolpidem (AMBIEN) 10 mg tablet Take 1 tablet by mouth at bedtime if needed for Sleep. 30 tablet 5     No current facility-administered medications for this visit.      Medications have been reviewed by me and are current to the best of my knowledge and ability.    No Known Allergies    EXAM:   Vitals:     01/11/18 1138   BP: 136/88   Cuff Site: Right Arm   Position: Sitting   Cuff Size: Adult Large   Weight: 133.7 kg (294 lb  "12.8 oz)   Height: 1.83 m (6' 0.05\")     General Appearance: Pleasant, alert, appropriate appearance for age. No acute distress  Chest/Respiratory Exam: Normal chest wall and respirations. Clear to auscultation.  Cardiovascular Exam: Regular rate and rhythm. S1, S2, no murmur, click, gallop, or rubs.  ASSESSMENT AND PLAN:  1. Sleep apnea, unspecified type  He benefits from CPAP and uses it every night. He sleeps very poorly without it.   - CPAP; CPAP machine for home use at pressure: 10, with supplies:  humidifier, chamber, mask, tubing  Dispense: 1 unit; Refill: 0                 "

## 2018-02-26 ENCOUNTER — OFFICE VISIT (OUTPATIENT)
Dept: FAMILY MEDICINE | Facility: OTHER | Age: 63
End: 2018-02-26
Attending: FAMILY MEDICINE
Payer: COMMERCIAL

## 2018-02-26 VITALS
SYSTOLIC BLOOD PRESSURE: 140 MMHG | BODY MASS INDEX: 38.54 KG/M2 | DIASTOLIC BLOOD PRESSURE: 80 MMHG | HEIGHT: 73 IN | TEMPERATURE: 98.3 F | WEIGHT: 290.8 LBS | HEART RATE: 64 BPM

## 2018-02-26 DIAGNOSIS — G47.33 OBSTRUCTIVE SLEEP APNEA SYNDROME: Primary | ICD-10-CM

## 2018-02-26 DIAGNOSIS — M47.16 OSTEOARTHRITIS OF SPINE WITH MYELOPATHY, LUMBOSACRAL REGION: ICD-10-CM

## 2018-02-26 PROCEDURE — G0463 HOSPITAL OUTPT CLINIC VISIT: HCPCS

## 2018-02-26 PROCEDURE — 99213 OFFICE O/P EST LOW 20 MIN: CPT | Performed by: FAMILY MEDICINE

## 2018-02-26 RX ORDER — IBUPROFEN 800 MG/1
800 TABLET, FILM COATED ORAL EVERY 8 HOURS PRN
Qty: 90 TABLET | Refills: 1 | Status: SHIPPED | OUTPATIENT
Start: 2018-02-26 | End: 2021-01-29

## 2018-02-26 RX ORDER — ZOLPIDEM TARTRATE 10 MG/1
10 TABLET ORAL
Qty: 30 TABLET | Refills: 3 | Status: SHIPPED | OUTPATIENT
Start: 2018-02-26 | End: 2018-07-12

## 2018-02-26 ASSESSMENT — PAIN SCALES - GENERAL: PAINLEVEL: MODERATE PAIN (4)

## 2018-02-26 NOTE — PATIENT INSTRUCTIONS
Continue to use your CPAP every night/ take 1000 mg tylenol with the 800 mg ibuprofen 3 times daily as needed for back pain

## 2018-02-26 NOTE — PROGRESS NOTES
"  SUBJECTIVE:   Selvin An is a 62 year old male who presents to clinic today for the following health issues: comes in stating he has need of CPAP visit to discuss usage of his CPAP; he is using it every night for 8 1/2 hours w excellent improvement in sleep hygiene and level of well being. I received documentation that he is using it daily from [employer]. / he was told that              Problem list and histories reviewed & adjusted, as indicated.  Additional history: as documented        Reviewed and updated as needed this visit by clinical staff  Tobacco  Allergies  Meds  Med Hx  Surg Hx  Fam Hx  Soc Hx      Reviewed and updated as needed this visit by Provider             OBJECTIVE:     /80 (BP Location: Right arm, Patient Position: Sitting, Cuff Size: Adult Large)  Pulse 64  Temp 98.3  F (36.8  C) (Temporal)  Ht 6' 0.5\" (1.842 m)  Wt 290 lb 12.8 oz (131.9 kg)  BMI 38.9 kg/m2  Body mass index is 38.9 kg/(m^2).  GENERAL: healthy, alert and no distress  NECK: no adenopathy, no asymmetry, masses, or scars and thyroid normal to palpation  RESP: lungs clear to auscultation - no rales, rhonchi or wheezes  CV: regular rate and rhythm, normal S1 S2, no S3 or S4, no murmur, click or rub, no peripheral edema and peripheral pulses strong  ABDOMEN: soft, nontender, no hepatosplenomegaly, no masses and bowel sounds normal  MS: no gross musculoskeletal defects noted, no edema      ASSESSMENT/PLAN:       Obstructive Sleep Apnea/ copy of usage report sent to [phone] along w this progress note      See Patient Instructions    MITALI JHA MD  [facility] [place]  "

## 2018-02-26 NOTE — MR AVS SNAPSHOT
"              After Visit Summary   2018    Selvin An    MRN: [id number]           Patient Information     Date Of Birth          1955        Visit Information        Provider Department      2018 9:45 AM Uriel Ramirez MD [facility]        Today's Diagnoses     Obstructive sleep apnea syndrome    -  1    Osteoarthritis of spine with myelopathy, lumbosacral region          Care Instructions    Continue to use your CPAP every night          Follow-ups after your visit        Who to contact     If you have questions or need follow up information about today's clinic visit or your schedule please contact [facility] directly at [phone].  Normal or non-critical lab and imaging results will be communicated to you by TeamPatenthart, letter or phone within 4 business days after the clinic has received the results. If you do not hear from us within 7 days, please contact the clinic through TeamPatenthart or phone. If you have a critical or abnormal lab result, we will notify you by phone as soon as possible.  Submit refill requests through Byliner or call your pharmacy and they will forward the refill request to us. Please allow 3 business days for your refill to be completed.          Additional Information About Your Visit        MyChart Information     Byliner lets you send messages to your doctor, view your test results, renew your prescriptions, schedule appointments and more. To sign up, go to [email or web address] . Click on \"Log in\" on the left side of the screen, which will take you to the Welcome page. Then click on \"Sign up Now\" on the right side of the page.     You will be asked to enter the access code listed below, as well as some personal information. Please follow the directions to create your username and password.     Your access code is: [email or web address]  Expires: 2018 10:24 AM     Your access code will  in 90 days. If you need help or a " "new code, please call your [place] clinic or [phone].        Care EveryWhere ID     This is your Care EveryWhere ID. This could be used by other organizations to access your [place] medical records  [id number]        Your Vitals Were     Pulse Temperature Height BMI (Body Mass Index)          64 98.3  F (36.8  C) (Temporal) 6' 0.5\" (1.842 m) 38.9 kg/m2         Blood Pressure from Last 3 Encounters:   02/26/18 140/80   01/11/18 136/88   09/19/17 134/88    Weight from Last 3 Encounters:   02/26/18 290 lb 12.8 oz (131.9 kg)   01/11/18 294 lb 12.8 oz (133.7 kg)   09/19/17 286 lb (129.7 kg)              Today, you had the following     No orders found for display         Today's Medication Changes          These changes are accurate as of 2/26/18 10:24 AM.  If you have any questions, ask your nurse or doctor.               Start taking these medicines.        Dose/Directions    ibuprofen 800 MG tablet   Commonly known as:  ADVIL/MOTRIN   Used for:  Osteoarthritis of spine with myelopathy, lumbosacral region   Started by:  Uriel Ramirez MD        Dose:  800 mg   Take 1 tablet (800 mg) by mouth every 8 hours as needed for moderate pain   Quantity:  90 tablet   Refills:  1            Where to get your medicines      These medications were sent to [id number] - [place], [place] - [address]  [address], [facility] [address]     Phone:  [phone]     ibuprofen 800 MG tablet         Some of these will need a paper prescription and others can be bought over the counter.  Ask your nurse if you have questions.     Bring a paper prescription for each of these medications     zolpidem 10 MG tablet                Primary Care Provider Office Phone # Fax #    Uriel Ramirez [phone] [address] [facility] [address]        Equal Access to Services     JUICE DIAZ AH: Hadii olga ku hadasho Soomaali, waaxda luqadaha, qaybta kaalmada " aj solaresursula heardaan ah. So [facility] [phone].    ATENCIÓN: Si isabelle mott, tiene a torres disposición servicios gratuitos de asistencia lingüística. Anthony al [phone].    We comply with applicable federal civil rights laws and [place] laws. We do not discriminate on the basis of race, color, national origin, age, disability, sex, sexual orientation, or gender identity.            Thank you!     Thank you for choosing [facility]  for your care. Our goal is always to provide you with excellent care. Hearing back from our patients is one way we can continue to improve our services. Please take a few minutes to complete the written survey that you may receive in the mail after your visit with us. Thank you!             Your Updated Medication List - Protect others around you: Learn how to safely use, store and throw away your medicines at www.disposemymeds.org.          This list is accurate as of 2/26/18 10:24 AM.  Always use your most recent med list.                   Brand Name Dispense Instructions for use Diagnosis    allopurinol 300 MG tablet    ZYLOPRIM     Take 300 mg by mouth daily        aspirin EC 81 MG EC tablet      Take 81 mg by mouth daily with food        atenolol 100 MG tablet    TENORMIN     Take 150 mg by mouth daily        atorvastatin 20 MG tablet    LIPITOR     Take 20 mg by mouth daily        HYDROcodone-acetaminophen 5-325 MG per tablet    NORCO     Take 1-2 tablets by mouth every 4 hours as needed for pain Max acetaminophen dose: 4000mg in 24 hours.        ibuprofen 800 MG tablet    ADVIL/MOTRIN    90 tablet    Take 1 tablet (800 mg) by mouth every 8 hours as needed for moderate pain    Osteoarthritis of spine with myelopathy, lumbosacral region       levothyroxine 200 MCG tablet    SYNTHROID/LEVOTHROID     Take 200 mcg by mouth every morning (before breakfast)        LORazepam 1 MG tablet    ATIVAN     Take 1 tablet by mouth nightly as  needed        losartan 100 MG tablet    COZAAR     Take 100 mg by mouth daily        NIFEdipine ER osmotic 30 MG 24 hr tablet    PROCARDIA XL     Take 30 mg by mouth Once daily before a meal.        order for DME      CPAP machine for home use at pressure: 10, with supplies:  humidifier, chamber, mask, tubing        spironolactone-hydrochlorothiazide 25-25 MG per tablet    ALDACTAZIDE     Take 1 tablet by mouth 2 times daily        zolpidem 10 MG tablet    AMBIEN    30 tablet    Take 1 tablet (10 mg) by mouth nightly as needed for sleep    Obstructive sleep apnea syndrome

## 2018-03-14 ENCOUNTER — TRANSFERRED RECORDS (OUTPATIENT)
Dept: HEALTH INFORMATION MANAGEMENT | Facility: OTHER | Age: 63
End: 2018-03-14

## 2018-04-14 DIAGNOSIS — M10.9 GOUT: Primary | ICD-10-CM

## 2018-04-14 NOTE — LETTER
April 19, 2018      Selvin An  [address]  [facility] [address]        Dear Selvin,     This letter is to remind you that you are due for your updated uric acid level lab.    A LIMITED refill of     Allopurinol (ZYLOPRIM) 300 mg tablet    has been called into your pharmacy. Additional refills require you to complete this appointment.    Please call the clinic at [phone] to schedule your LAB ONLY appointment.    If you should require additional refills before your scheduled appointment, please contact your pharmacy and we will refill your medication until the date of your appointment.    If you are no longer seeing Uriel Ramirez for primary care, please call to let us know. Doing so will remove you from our call/contact list.      Thank you for choosing [facility] for your health care needs.    Sincerely,    Refill RN  [facility]

## 2018-04-19 RX ORDER — ALLOPURINOL 300 MG/1
TABLET ORAL
Qty: 90 TABLET | Refills: 0 | Status: SHIPPED | OUTPATIENT
Start: 2018-04-19 | End: 2021-01-29

## 2018-04-19 NOTE — TELEPHONE ENCOUNTER
Allopurinol failed the [facility] refill protocol for the following reasons:      No CBC on file in the past 12 months, however this was last done on 9/19/17.    No uric acid level on file in the past 12 months; last done 6/13/13.    No ALT on file in the past 12 months, however this was last checked on 9/10/17 and was WNL.    Uric acid level ordered and reminder letter sent to come in for lab only appointment.    Prescription approved per [facility] Refill Protocol for 90-day blanca refill.    Milagro Baez RN .............. 4/19/2018  9:16 AM

## 2018-07-12 DIAGNOSIS — G47.33 OBSTRUCTIVE SLEEP APNEA SYNDROME: ICD-10-CM

## 2018-07-16 RX ORDER — ZOLPIDEM TARTRATE 10 MG/1
TABLET ORAL
Qty: 30 TABLET | Refills: 0 | Status: SHIPPED | OUTPATIENT
Start: 2018-07-16 | End: 2021-01-29

## 2018-07-23 NOTE — PROGRESS NOTES
Patient Information     Patient Name  Selvin An MRN  [id number] Sex  Male   1955      Letter by Juan C Bess MD at      Author:  Juan C Bess MD Service:  (none) Author Type:  (none)    Filed:   Encounter Date:  2017 Status:  (Other)           Selvin An  [address] [place] [id number]          2017    Dear Mr. An:    Following are the tests completed during your last clinic visit:    Results for orders placed or performed in visit on 17      LYME SCREEN W/REFLEX      Result  Value Ref Range    LYME SCREEN W/REFLEX WEST BLOT Negative Negative   SEDIMENTATION RATE      Result  Value Ref Range    SEDIMENTATION RATE        6 <21 mm/hr   AVERY TEST      Result  Value Ref Range    ANTINUCLEAR ANTIBODY (AVERY) Negative Negative   T4,FREE      Result  Value Ref Range    T4,FREE 1.23 0.58 - 1.64 ng/dL   CBC WITH AUTO DIFFERENTIAL      Result  Value Ref Range    WHITE BLOOD COUNT         21.9 (H) 4.5 - 11.0 thou/cu mm    RED BLOOD COUNT           5.04 4.30 - 5.90 mil/cu mm    HEMOGLOBIN                16.0 13.5 - 17.5 g/dL    HEMATOCRIT                46.3 37.0 - 53.0 %    MCV                       92 80 - 100 fL    MCH                       31.7 26.0 - 34.0 pg    MCHC                      34.6 32.0 - 36.0 g/dL    RDW                       15.7 (H) 11.5 - 15.5 %    PLATELET COUNT            490 (H) 140 - 440 thou/cu mm    MPV                       9.8 6.5 - 11.0 fL    NEUTROPHILS               75.7 (H) 42.0 - 72.0 %    LYMPHOCYTES               14.8 (L) 20.0 - 44.0 %    MONOCYTES                 7.7 <12.0 %    EOSINOPHILS               0.0 <8.0 %    BASOPHILS                 0.2 <3.0 %    IMMATURE GRANULOCYTES(METAS,MYELOS,PROS) 1.6 %    ABSOLUTE NEUTROPHILS      16.6 (H) 1.7 - 7.0 thou/cu mm    ABSOLUTE LYMPHOCYTES      3.3 (H) 0.9 - 2.9 thou/cu mm    ABSOLUTE MONOCYTES        1.7 (H) <0.9 thou/cu mm    ABSOLUTE EOSINOPHILS      0.0 <0.5 thou/cu mm    ABSOLUTE BASOPHILS         0.0 <0.3 thou/cu mm    ABSOLUTE IMMATURE GRANULOCYTES(METAS,MYELOS,PROS) 0.4 (H) <=0.3 thou/cu mm   RETICULOCYTES      Result  Value Ref Range    RETIC%                    2.6 (H) 0.5 - 2.0 %    RETIC (ABSOLUTE) 0.131 >0.018 mil/uL         I am finding no abnormalities with your blood other than the fact that your white blood count is high. Part of this was due to the prednisone therapy that you were taking. Otherwise everything looks fine. I will let you know the result of your MRI when I get that report.    Sincerely,      Juan C Bess MD  Internal Medicine  [facility]

## 2018-07-24 NOTE — PROGRESS NOTES
Patient Information     Patient Name  Selvin An MRN  [id number] Sex  Male   1955      Letter by Juan C Bess MD at      Author:  Juan C Bess MD Service:  (none) Author Type:  (none)    Filed:   Encounter Date:  2017 Status:  (Other)           Selvin An  [address] [place] [address]          2017    Dear Mr. An:    Your heart monitor has been found. For some reason, this was never sent to us by the company. I apologize for that. Overall, your heart monitor was normal. You did have one episode of an irregular ventricular heartbeat that lasted for a couple of seconds, but the remainder of the monitor was really quite fine. I think it would be a good idea to have you come back for a visit with me so that we can review the findings of the neurologist and the rheumatologist and talk about whether or not any further heart testing is necessary for you.    Again, please accept my apologies for the substantial delay in getting these results to you.      Sincerely,      Juan C Bess MD  Internal Medicine  [facility]

## 2018-10-08 ENCOUNTER — TRANSFERRED RECORDS (OUTPATIENT)
Dept: HEALTH INFORMATION MANAGEMENT | Facility: OTHER | Age: 63
End: 2018-10-08

## 2018-10-16 RX ORDER — LEVOTHYROXINE SODIUM 200 UG/1
TABLET ORAL
Qty: 360 TABLET | Refills: 0 | OUTPATIENT
Start: 2018-10-16

## 2018-10-16 NOTE — TELEPHONE ENCOUNTER
Refill request from [facility] for:  levothyroxine (SYNTHROID/LEVOTHROID) 200 MCG tablet    Noted patient is now under care of Dr. Tracy Car at [place], [place].      Contacted Christine and they will send request to [facility] to complete prescription refill per RN Medication Refill Policy.................... Maria Luisa Delgado ....................  10/16/2018   10:13 AM

## 2018-11-23 ENCOUNTER — TELEPHONE (OUTPATIENT)
Dept: FAMILY MEDICINE | Facility: OTHER | Age: 63
End: 2018-11-23

## 2018-11-23 NOTE — TELEPHONE ENCOUNTER
Spoke to patient.  Let patient know that we had gotten forms for a CPAP/Bilevel CMN and let patient know that he had to be seen to complete the forms.  Patient stated he had those forms completed a year ago and he had already been approved.  May Su LPN 11/23/2018   2:17 PM

## 2020-08-06 NOTE — TELEPHONE ENCOUNTER
Patient Information     Patient Name MRN Selvin Love [id number] Male 1955      Telephone Encounter by Jael Rhodes RN at 2017  9:31 AM     Author:  Jael Rhodes RN Service:  (none) Author Type:  NURS- Registered Nurse     Filed:  2017  9:38 AM Encounter Date:  2017 Status:  Signed     :  Jael Rhodes RN (NURS- Registered Nurse)            This is a Refill request from: [facility]   Name of Medication: Clonazepam  Quantity requested: 30  Last fill date: 3/2/2017  Due for refill: 2017  Last visit with URIEL RAMIREZ was on: 2017 in [facility]  PCP:  Uriel Ramirez MD  Controlled Substance Agreement:  none   Diagnosis r/t this medication request: Primary insomnia    Unable to complete prescription refill per RN Medication Refill Policy.................... Jael Rhodes RN ....................  2017   9:33 AM                 [Sclera] : the sclera and conjunctiva were normal [Extraocular Movements] : extraocular movements were intact [Jugular Venous Distention Increased] : there was no jugular-venous distention [Neck Cervical Mass (___cm)] : no neck mass was observed [] : no respiratory distress [Respiration, Rhythm And Depth] : normal respiratory rhythm and effort [Auscultation Breath Sounds / Voice Sounds] : lungs were clear to auscultation bilaterally [Exaggerated Use Of Accessory Muscles For Inspiration] : no accessory muscle use [Heart Rate And Rhythm] : heart rate was normal and rhythm regular [Diminished Respiratory Excursion] : normal chest expansion [Bowel Sounds] : normal bowel sounds [Abdomen Tenderness] : non-tender [Abdomen Soft] : soft [Cervical Lymph Nodes Enlarged Posterior Bilaterally] : posterior cervical [Cervical Lymph Nodes Enlarged Anterior Bilaterally] : anterior cervical [Supraclavicular Lymph Nodes Enlarged Bilaterally] : supraclavicular [Abnormal Walk] : normal gait [Oriented To Time, Place, And Person] : oriented to person, place, and time [No Focal Deficits] : no focal deficits [Skin Color & Pigmentation] : normal skin color and pigmentation [Impaired Insight] : insight and judgment were intact [Mood] : the mood was normal [Affect] : the affect was normal [FreeTextEntry1] : Mediastinoscopy incision healing well. No sign of infection or bleeding.

## 2020-11-02 ENCOUNTER — OFFICE VISIT (OUTPATIENT)
Dept: FAMILY MEDICINE | Facility: OTHER | Age: 65
End: 2020-11-02
Attending: NURSE PRACTITIONER
Payer: MEDICARE

## 2020-11-02 VITALS
RESPIRATION RATE: 20 BRPM | WEIGHT: 297.5 LBS | OXYGEN SATURATION: 93 % | HEIGHT: 72 IN | SYSTOLIC BLOOD PRESSURE: 136 MMHG | BODY MASS INDEX: 40.29 KG/M2 | DIASTOLIC BLOOD PRESSURE: 82 MMHG | TEMPERATURE: 98.4 F | HEART RATE: 84 BPM

## 2020-11-02 DIAGNOSIS — R53.81 MALAISE AND FATIGUE: ICD-10-CM

## 2020-11-02 DIAGNOSIS — Z90.81 HISTORY OF SPLENECTOMY: ICD-10-CM

## 2020-11-02 DIAGNOSIS — E66.01 MORBID OBESITY (H): ICD-10-CM

## 2020-11-02 DIAGNOSIS — G51.0 BELL'S PALSY: ICD-10-CM

## 2020-11-02 DIAGNOSIS — L03.811 ABSCESS OR CELLULITIS OF SCALP: Primary | ICD-10-CM

## 2020-11-02 DIAGNOSIS — R53.83 MALAISE AND FATIGUE: ICD-10-CM

## 2020-11-02 PROCEDURE — 99214 OFFICE O/P EST MOD 30 MIN: CPT | Performed by: INTERNAL MEDICINE

## 2020-11-02 PROCEDURE — G0463 HOSPITAL OUTPT CLINIC VISIT: HCPCS

## 2020-11-02 RX ORDER — DICLOXACILLIN SODIUM 500 MG
500 CAPSULE ORAL 3 TIMES DAILY
Qty: 21 CAPSULE | Refills: 0 | Status: SHIPPED | OUTPATIENT
Start: 2020-11-02 | End: 2021-09-24

## 2020-11-02 RX ORDER — CEPHALEXIN 500 MG/1
500 CAPSULE ORAL 3 TIMES DAILY
Qty: 21 CAPSULE | Refills: 0 | Status: SHIPPED | OUTPATIENT
Start: 2020-11-02 | End: 2021-09-24

## 2020-11-02 ASSESSMENT — ENCOUNTER SYMPTOMS
SORE THROAT: 0
FATIGUE: 1
CONFUSION: 0
HEMATURIA: 0
COUGH: 0
CHILLS: 0
SHORTNESS OF BREATH: 0
TROUBLE SWALLOWING: 0
FEVER: 0
ABDOMINAL PAIN: 0

## 2020-11-02 ASSESSMENT — MIFFLIN-ST. JEOR: SCORE: 2167.45

## 2020-11-02 ASSESSMENT — PAIN SCALES - GENERAL: PAINLEVEL: MODERATE PAIN (5)

## 2020-11-02 NOTE — NURSING NOTE
Chief Complaint   Patient presents with     Ear Problem     Patient is here for swelling behind his left ear that started as a small lump above the ear a week ago and has since spread. Patient states he does have some pain in the ear and throat. Patient has been taking Tylenol with some relief of pain.     Initial /82   Pulse 84   Temp 98.4  F (36.9  C) (Tympanic)   Resp 20   Ht 1.829 m (6')   Wt 134.9 kg (297 lb 8 oz)   SpO2 93%   BMI 40.35 kg/m   Estimated body mass index is 40.35 kg/m  as calculated from the following:    Height as of this encounter: 1.829 m (6').    Weight as of this encounter: 134.9 kg (297 lb 8 oz).  Medication Reconciliation: complete    Yuliya Tee LPN

## 2020-11-02 NOTE — PROGRESS NOTES
Nursing Notes:   Yuliya Tee LPN  11/2/2020 10:21 AM  Signed  Chief Complaint   Patient presents with     Ear Problem     Patient is here for swelling behind his left ear that started as a small lump above the ear a week ago and has since spread. Patient states he does have some pain in the ear and throat. Patient has been taking Tylenol with some relief of pain.     Initial /82   Pulse 84   Temp 98.4  F (36.9  C) (Tympanic)   Resp 20   Ht 1.829 m (6')   Wt 134.9 kg (297 lb 8 oz)   SpO2 93%   BMI 40.35 kg/m   Estimated body mass index is 40.35 kg/m  as calculated from the following:    Height as of this encounter: 1.829 m (6').    Weight as of this encounter: 134.9 kg (297 lb 8 oz).  Medication Reconciliation: complete    uYliya Tee LPN    Nursing note reviewed with patient.  Accuracy and completeness verified.  Mr. An is a 65 year old male who presents to [facility]:  Patient presents with:  Ear Problem      ICD-10-CM    1. Abscess or cellulitis of scalp  L03.811 dicloxacillin (DYNAPEN) 500 MG capsule     cephALEXin (KEFLEX) 500 MG capsule   2. History of splenectomy  Z90.81    3. Morbid obesity (H)  E66.01    4. Bell's palsy  G51.0    5. Malaise and fatigue  R53.81     R53.83      HPI  Has about a 1 week history of gradually worsening symptoms.  Started as a small lump above his red ear and now has gotten much larger spreading redness and tenderness of the skin and lateral scalp on the right side.  Has gotten some relief with Tylenol.    He does not have a spleen.    Has been having some glandular tenderness in the preauricular area on the right face with some mild tenderness in the anterior cervical nodes on the right side.    No trismus.  No throat issues.  No neck rigidity.    Review of Systems   Constitutional: Positive for fatigue. Negative for chills and fever.   HENT: Negative for sore throat and trouble swallowing.    Respiratory: Negative for cough and shortness of breath.     Cardiovascular: Negative for chest pain.   Gastrointestinal: Negative for abdominal pain.   Genitourinary: Negative for hematuria.   Musculoskeletal: Negative for gait problem.   Skin: Positive for rash (With swelling redness and tenderness above the right ear).   Neurological: Negative for syncope.   Psychiatric/Behavioral: Negative for confusion.      Reviewed and updated as needed this visit by Provider   Tobacco  Allergies  Meds  Problems  Med Hx  Surg Hx  Fam Hx          EXAM:   Vitals:    11/02/20 1019   BP: 136/82   Pulse: 84   Resp: 20   Temp: 98.4  F (36.9  C)   TempSrc: Tympanic   SpO2: 93%   Weight: 134.9 kg (297 lb 8 oz)   Height: 1.829 m (6')       Current Pain Score: Moderate Pain (5)     BP Readings from Last 3 Encounters:   11/02/20 136/82   02/26/18 140/80   01/11/18 136/88      Wt Readings from Last 3 Encounters:   11/02/20 134.9 kg (297 lb 8 oz)   02/26/18 131.9 kg (290 lb 12.8 oz)   01/11/18 133.7 kg (294 lb 12.8 oz)      Estimated body mass index is 40.35 kg/m  as calculated from the following:    Height as of this encounter: 1.829 m (6').    Weight as of this encounter: 134.9 kg (297 lb 8 oz).     Physical Exam  Constitutional:       General: He is not in acute distress.     Appearance: He is obese.   HENT:      Head: Normocephalic and atraumatic.      Right Ear: External ear normal.      Left Ear: Tympanic membrane, ear canal and external ear normal.   Eyes:      General: No scleral icterus.     Conjunctiva/sclera: Conjunctivae normal.   Neck:      Musculoskeletal: No neck rigidity.   Cardiovascular:      Rate and Rhythm: Normal rate.      Pulses: Normal pulses.   Pulmonary:      Effort: Pulmonary effort is normal.   Abdominal:      Comments: Abdominal obesity   Musculoskeletal:         General: No deformity.   Skin:     Findings: Erythema (Above the right ear, has swelling with redness warmth and tenderness, appearance of a pustule over the middle of the elevated area, with some  preauricular adenopathy on the right) present.   Neurological:      Mental Status: He is alert.      Comments: Mild drooping of the right upper eyelid   Psychiatric:         Mood and Affect: Mood normal.         Behavior: Behavior normal.          Procedures   INVESTIGATIONS:  - Labs reviewed in Epic     ASSESSMENT AND PLAN:  Problem List Items Addressed This Visit        Nervous and Auditory    Bell's palsy       Digestive    Morbid obesity (H)      Other Visit Diagnoses     Abscess or cellulitis of scalp    -  Primary    Relevant Medications    dicloxacillin (DYNAPEN) 500 MG capsule    cephALEXin (KEFLEX) 500 MG capsule    History of splenectomy        Malaise and fatigue            -- Expected clinical course discussed    -- Medications and their side effects discussed    Patient Instructions   1. Abscess or cellulitis of scalp  START:   - dicloxacillin (DYNAPEN) 500 MG capsule; Take 1 capsule (500 mg) by mouth 3 times daily for 7 days  Dispense: 21 capsule; Refill: 0    After 2-3 days, if not getting better, add in:   - cephALEXin (KEFLEX) 500 MG capsule; Take 1 capsule (500 mg) by mouth 3 times daily for 7 days  Dispense: 21 capsule; Refill: 0      Start daily pro-biotic.. or active culture yogurt to help protect your intestinal bacteria.     -- Try a Super-B-Complex with B12 -- every other day -- to help energy / mood and balance.     -- Consider Under the tongue / Liquid. ([facility])     -- Take about 1000 mcg of B12 -- 3 times weekly.     Return as needed for follow-up for new / worsening symptoms.    Clinic : [phone]  Appointment line: [phone]      Matt Whitmore MD   Internal Medicine  [facility]     Portions of this note were dictated using speech recognition software. The note has been proofread but errors in the text may have been overlooked. Please contact me if there are any concerns regarding the accuracy of the dictation.

## 2020-11-02 NOTE — PATIENT INSTRUCTIONS
1. Abscess or cellulitis of scalp  START:   - dicloxacillin (DYNAPEN) 500 MG capsule; Take 1 capsule (500 mg) by mouth 3 times daily for 7 days  Dispense: 21 capsule; Refill: 0    After 2-3 days, if not getting better, add in:   - cephALEXin (KEFLEX) 500 MG capsule; Take 1 capsule (500 mg) by mouth 3 times daily for 7 days  Dispense: 21 capsule; Refill: 0      Start daily pro-biotic.. or active culture yogurt to help protect your intestinal bacteria.     -- Try a Super-B-Complex with B12 -- every other day -- to help energy / mood and balance.     -- Consider Under the tongue / Liquid. ([facility])     -- Take about 1000 mcg of B12 -- 3 times weekly.     Return as needed for follow-up for new / worsening symptoms.    Clinic : [phone]  Appointment line: [phone]

## 2021-01-20 ENCOUNTER — TRANSFERRED RECORDS (OUTPATIENT)
Dept: HEALTH INFORMATION MANAGEMENT | Facility: OTHER | Age: 66
End: 2021-01-20

## 2021-01-24 ENCOUNTER — TRANSFERRED RECORDS (OUTPATIENT)
Dept: HEALTH INFORMATION MANAGEMENT | Facility: OTHER | Age: 66
End: 2021-01-24

## 2021-01-29 ENCOUNTER — OFFICE VISIT (OUTPATIENT)
Dept: INTERNAL MEDICINE | Facility: OTHER | Age: 66
End: 2021-01-29
Attending: INTERNAL MEDICINE
Payer: MEDICARE

## 2021-01-29 VITALS
BODY MASS INDEX: 41.83 KG/M2 | TEMPERATURE: 99 F | WEIGHT: 308.4 LBS | DIASTOLIC BLOOD PRESSURE: 88 MMHG | HEART RATE: 66 BPM | RESPIRATION RATE: 18 BRPM | OXYGEN SATURATION: 96 % | SYSTOLIC BLOOD PRESSURE: 138 MMHG

## 2021-01-29 DIAGNOSIS — E89.0 POSTOPERATIVE HYPOTHYROIDISM: ICD-10-CM

## 2021-01-29 DIAGNOSIS — Z98.890 HISTORY OF CARPAL TUNNEL SURGERY: ICD-10-CM

## 2021-01-29 DIAGNOSIS — F51.01 PRIMARY INSOMNIA: ICD-10-CM

## 2021-01-29 DIAGNOSIS — G93.32 CHRONIC FATIGUE SYNDROME: ICD-10-CM

## 2021-01-29 DIAGNOSIS — E66.01 MORBID OBESITY (H): ICD-10-CM

## 2021-01-29 DIAGNOSIS — R73.9 STEROID-INDUCED HYPERGLYCEMIA: ICD-10-CM

## 2021-01-29 DIAGNOSIS — G47.33 OSA ON CPAP: ICD-10-CM

## 2021-01-29 DIAGNOSIS — R60.0 BILATERAL LOWER EXTREMITY EDEMA: ICD-10-CM

## 2021-01-29 DIAGNOSIS — Z86.19 HISTORY OF LYME DISEASE: ICD-10-CM

## 2021-01-29 DIAGNOSIS — I10 BENIGN ESSENTIAL HYPERTENSION: ICD-10-CM

## 2021-01-29 DIAGNOSIS — Z85.51 PERSONAL HISTORY OF MALIGNANT NEOPLASM OF BLADDER: ICD-10-CM

## 2021-01-29 DIAGNOSIS — Z87.39 HISTORY OF POLYMYALGIA RHEUMATICA: ICD-10-CM

## 2021-01-29 DIAGNOSIS — E53.8 VITAMIN B12 DEFICIENCY: ICD-10-CM

## 2021-01-29 DIAGNOSIS — E55.9 VITAMIN D DEFICIENCY: ICD-10-CM

## 2021-01-29 DIAGNOSIS — T38.0X5A STEROID-INDUCED HYPERGLYCEMIA: ICD-10-CM

## 2021-01-29 DIAGNOSIS — E78.2 MIXED HYPERLIPIDEMIA: Primary | ICD-10-CM

## 2021-01-29 PROBLEM — G51.0 BELL'S PALSY: Status: RESOLVED | Noted: 2017-03-30 | Resolved: 2021-01-29

## 2021-01-29 PROBLEM — R73.03 PREDIABETES: Status: ACTIVE | Noted: 2021-01-29

## 2021-01-29 PROBLEM — R73.03 PREDIABETES: Status: RESOLVED | Noted: 2021-01-29 | Resolved: 2021-01-29

## 2021-01-29 PROCEDURE — G0463 HOSPITAL OUTPT CLINIC VISIT: HCPCS

## 2021-01-29 PROCEDURE — 96372 THER/PROPH/DIAG INJ SC/IM: CPT | Performed by: INTERNAL MEDICINE

## 2021-01-29 PROCEDURE — 99215 OFFICE O/P EST HI 40 MIN: CPT | Performed by: INTERNAL MEDICINE

## 2021-01-29 PROCEDURE — G0463 HOSPITAL OUTPT CLINIC VISIT: HCPCS | Mod: 25

## 2021-01-29 PROCEDURE — 250N000011 HC RX IP 250 OP 636: Performed by: INTERNAL MEDICINE

## 2021-01-29 RX ORDER — CYANOCOBALAMIN 1000 UG/ML
1000 INJECTION, SOLUTION INTRAMUSCULAR; SUBCUTANEOUS ONCE
Status: COMPLETED | OUTPATIENT
Start: 2021-01-29 | End: 2021-01-29

## 2021-01-29 RX ORDER — SPIRONOLACTONE AND HYDROCHLOROTHIAZIDE 25; 25 MG/1; MG/1
1 TABLET ORAL 2 TIMES DAILY
Qty: 180 TABLET | Refills: 3 | Status: SHIPPED | OUTPATIENT
Start: 2021-01-29 | End: 2021-06-08

## 2021-01-29 RX ORDER — INFLUENZA A VIRUS A/MICHIGAN/45/2015 X-275 (H1N1) ANTIGEN (FORMALDEHYDE INACTIVATED), INFLUENZA A VIRUS A/SINGAPORE/INFIMH-16-0019/2016 IVR-186 (H3N2) ANTIGEN (FORMALDEHYDE INACTIVATED), INFLUENZA B VIRUS B/PHUKET/3073/2013 ANTIGEN (FORMALDEHYDE INACTIVATED), AND INFLUENZA B VIRUS B/MARYLAND/15/2016 BX-69A ANTIGEN (FORMALDEHYDE INACTIVATED) 60; 60; 60; 60 UG/.7ML; UG/.7ML; UG/.7ML; UG/.7ML
INJECTION, SUSPENSION INTRAMUSCULAR
COMMUNITY
Start: 2020-10-13 | End: 2021-01-29

## 2021-01-29 RX ORDER — LEVOTHYROXINE SODIUM 150 UG/1
150 TABLET ORAL DAILY
COMMUNITY
Start: 2020-12-08 | End: 2021-09-01

## 2021-01-29 RX ADMIN — CYANOCOBALAMIN 1000 MCG: 1000 INJECTION, SOLUTION INTRAMUSCULAR at 10:47

## 2021-01-29 ASSESSMENT — ENCOUNTER SYMPTOMS
DYSURIA: 0
AGITATION: 0
VOMITING: 0
NAUSEA: 0
MYALGIAS: 0
ARTHRALGIAS: 0
APNEA: 1
WOUND: 0
CONFUSION: 0
COUGH: 0
DIARRHEA: 0
FEVER: 0
LIGHT-HEADEDNESS: 0
HEMATURIA: 0
FATIGUE: 1
DIZZINESS: 0
ABDOMINAL PAIN: 0
BRUISES/BLEEDS EASILY: 0
WHEEZING: 0
CHILLS: 0
PALPITATIONS: 0

## 2021-01-29 ASSESSMENT — PAIN SCALES - GENERAL: PAINLEVEL: MODERATE PAIN (5)

## 2021-01-29 NOTE — NURSING NOTE
"Patient presents to clinic with wife looking to establish care. He is concerned with how many medications he has been prescribed over the years and wants a \"reset\". He has quit taking all but two of his medications on his list. He has complaints of fatigue and soreness with day to day activities.  Chief Complaint   Patient presents with     Establish Care       Initial BP (!) 142/90 (BP Location: Right arm, Patient Position: Sitting, Cuff Size: Adult Large)   Pulse 66   Temp 99  F (37.2  C) (Tympanic)   Resp 18   Wt 139.9 kg (308 lb 6.4 oz)   SpO2 96%   BMI 41.83 kg/m   Estimated body mass index is 41.83 kg/m  as calculated from the following:    Height as of 11/2/20: 1.829 m (6').    Weight as of this encounter: 139.9 kg (308 lb 6.4 oz).  Medication Reconciliation: complete    Estela Lange LPN    "

## 2021-01-29 NOTE — PROGRESS NOTES
Mr. An is a 65 year old male who presents to the clinic for evaluation of the following problems:      ICD-10-CM    1. Mixed hyperlipidemia  E78.2    2. Benign essential hypertension  I10 spironolactone-HCTZ (ALDACTAZIDE) 25-25 MG tablet   3. Postoperative hypothyroidism  E89.0    4. History of carpal tunnel surgery  Z98.890    5. KYLAH on CPAP -- Uses nightly, finds helpful and beneficial  G47.33     Z99.89    6. Steroid-induced hyperglycemia  R73.9     T38.0X5A    7. Vitamin D deficiency  E55.9 Cholecalciferol (VITAMIN D-3) 125 MCG (5000 UT) TABS   8. Vitamin B12 deficiency  E53.8 cyanocobalamin injection 1,000 mcg   9. Bilateral lower extremity edema  R60.0 spironolactone-HCTZ (ALDACTAZIDE) 25-25 MG tablet   10. Morbid obesity (H)  E66.01    11. Chronic fatigue syndrome  R53.82    12. History of Lyme disease  Z86.19    13. Primary insomnia  F51.01    14. History of polymyalgia rheumatica  Z87.39    15. Personal history of malignant neoplasm of bladder -- s/p TURBT x2 in 3/2018 and 4/2018 for high-grade non-muscle invasive bladder cancer, now s/p BCG treatment - last cystoscopy 1/20/2021 was normal  Z85.51      HPI  Patient presents for follow-up of multiple issues.  Patient is new to my clinic.  He was seen once in [facility] for a scalp infection.  He would like to have of care.    Mixed hyperlipidemia, had labs earlier this fall with [facility] in [place].  LDL was 98, HDL was 40, total cholesterol 187, triglycerides 246.  Currently diet controlled.  He was on statins but stopped taking most of the medications recently.  Previously Lipitor 20 mg daily.  Plan will be to recheck this again in the near future and consider restarting statins and labs.    Hypertension, blood pressures at home in 1 range.  Has only been taking 1 tablet acetaminophen hydrochlorothiazide daily.  At one point was previously told to take 2 tablets daily.  He is having considerable amount of leg swelling.  Recommend increasing dose up to  2 tablets daily 1 in the morning 1 in the afternoon.  His weight is also up, per his wife's report.    Leg swelling and shortness of breath and hypertension, see above.  Increase spironolactone-hydrochlorothiazide - up to 1 tablet twice daily.    History of carpal tunnel, status post surgery.    Sleep apnea, ongoing, chronic.  Continues to use CPAP nightly.  Finds helpful and beneficial.    Steroid-induced hyperglycemia.  He was on tapering high-dose steroids.  He had his hemoglobin A1c checked in October 2020 measuring 5.8%.  He is now off steroids.  He would like to get his labs rechecked in a few months.    Vitamin D deficiency, currently taking 4000 IU daily.  His vitamin D level was 43 when last checked.  Recommend increasing up to 5000 IU daily, this should hopefully increase his vitamin D level into the low 50 range.    Vitamin B12 deficiency, noted with previous lab work.  He did start some oral B complex and found this quite helpful for some of his fatigue and malaise however he is also not sleeping well.  He has insomnia issues.  Trial of B12 shot today.  Can continue B12 shots in the future if desired otherwise consider adding 2500 mcg sublingual B12 daily or every other day.    Obesity, discussed need for reduced oral caloric intake.  Regular exercise.  Reduce carbohydrate intake.  Information printed and reviewed.    Seems to have developed chronic fatigue syndrome since being diagnosed with Lyme disease.  His wife states that he has not been the same since that time.    Primary insomnia, recommend starting scheduled dosing of melatonin 5 to 10 mg -- 2 or 3 hours before bedtime and additional 5 mg at bedtime.    Functional Capacity: > 4 METS.   Review of Systems   Constitutional: Positive for fatigue. Negative for chills and fever.   HENT: Negative for congestion and hearing loss.    Eyes: Negative for visual disturbance.   Respiratory: Positive for apnea (uses CPAP nightly) and shortness of breath.  Negative for cough and wheezing.    Cardiovascular: Positive for leg swelling. Negative for chest pain and palpitations.   Gastrointestinal: Negative for abdominal pain, diarrhea, nausea and vomiting.   Endocrine: Negative for cold intolerance and heat intolerance.        + Diaphoretic with exertion, little energy   Genitourinary: Negative for dysuria and hematuria.   Musculoskeletal: Positive for back pain (Low back pain). Negative for arthralgias and myalgias.   Skin: Negative for rash and wound.   Allergic/Immunologic: Negative for immunocompromised state.   Neurological: Negative for dizziness and light-headedness.   Hematological: Does not bruise/bleed easily.   Psychiatric/Behavioral: Positive for sleep disturbance. Negative for agitation and confusion.      Reviewed and updated as needed this visit by Provider   Tobacco  Allergies  Meds  Problems  Med Hx  Surg Hx  Fam Hx          EXAM:   Vitals:    01/29/21 1007   BP: 138/88   BP Location: Right arm   Patient Position: Sitting   Cuff Size: Adult Large   Pulse: 66   Resp: 18   Temp: 99  F (37.2  C)   TempSrc: Tympanic   SpO2: 96%   Weight: 139.9 kg (308 lb 6.4 oz)       Current Pain Score: Moderate Pain (5)     BP Readings from Last 3 Encounters:   01/29/21 138/88   11/02/20 136/82   02/26/18 140/80      Wt Readings from Last 3 Encounters:   01/29/21 139.9 kg (308 lb 6.4 oz)   11/02/20 134.9 kg (297 lb 8 oz)   02/26/18 131.9 kg (290 lb 12.8 oz)      Estimated body mass index is 41.83 kg/m  as calculated from the following:    Height as of 11/2/20: 1.829 m (6').    Weight as of this encounter: 139.9 kg (308 lb 6.4 oz).     Physical Exam  Constitutional:       General: He is not in acute distress.     Appearance: He is well-developed. He is obese. He is not diaphoretic.   HENT:      Head: Normocephalic and atraumatic.   Eyes:      General: No scleral icterus.     Conjunctiva/sclera: Conjunctivae normal.   Neck:      Musculoskeletal: Neck supple.    Cardiovascular:      Rate and Rhythm: Normal rate and regular rhythm.   Pulmonary:      Effort: Pulmonary effort is normal.      Breath sounds: Normal breath sounds.   Abdominal:      Palpations: Abdomen is soft.      Tenderness: There is no abdominal tenderness.   Musculoskeletal:         General: No deformity.      Right lower le+ Pitting Edema present.      Left lower le+ Pitting Edema present.   Lymphadenopathy:      Cervical: No cervical adenopathy.   Skin:     General: Skin is warm and dry.      Findings: No rash.   Neurological:      Mental Status: He is alert and oriented to person, place, and time. Mental status is at baseline.   Psychiatric:         Mood and Affect: Mood normal.         Behavior: Behavior normal.        Procedures   INVESTIGATIONS:  - Labs reviewed in Epic   - Outside records requested / some labs brought with today and reviewed.     ASSESSMENT AND PLAN:  Problem List Items Addressed This Visit        Nervous and Auditory    Chronic fatigue syndrome       Respiratory    KYLAH on CPAP -- Uses nightly, finds helpful and beneficial       Digestive    Vitamin D deficiency    Relevant Medications    Cholecalciferol (VITAMIN D-3) 125 MCG (5000 UT) TABS    Morbid obesity (H)    Vitamin B12 deficiency       Endocrine    Mixed hyperlipidemia - Primary    Postoperative hypothyroidism    Relevant Medications    levothyroxine (SYNTHROID/LEVOTHROID) 150 MCG tablet    Steroid-induced hyperglycemia       Circulatory    Benign essential hypertension    Relevant Medications    spironolactone-HCTZ (ALDACTAZIDE) 25-25 MG tablet       Musculoskeletal and Integumentary    Bilateral lower extremity edema    Relevant Medications    spironolactone-HCTZ (ALDACTAZIDE) 25-25 MG tablet       Other    History of carpal tunnel surgery    History of Lyme disease    Primary insomnia    History of polymyalgia rheumatica    Personal history of malignant neoplasm of bladder -- s/p TURBT x2 in 3/2018 and 2018 for  high-grade non-muscle invasive bladder cancer, now s/p BCG treatment - last cystoscopy 1/20/2021 was normal        reviewed diet, exercise and weight control, recommended sodium restriction, cardiovascular risk and specific lipid/LDL goals reviewed  -- Expected clinical course discussed    -- Medications and their side effects discussed    Total time personally spent on the day of service: 40 minutes.      Patient Instructions   Increase Vitamin D3 -- up to 5000 international unit(s) daily (dose of 1000 more than currently)   -- [facility] Levels were at 36.5 on 10/05/2020.       Vitamin B12 deficiency:    Vitamin B12 deficiency can cause numerous symptoms.  Fatigue and malaise, low energy levels, low blood counts or anemia, poor mood or depression, neuropathy or pins and needles/burning sensation of the hands and feet.    -- trial of B12 shot today.    -- If B12 shot improves your energy and your blood counts, we can do B12 shots every 3-4 weeks up to every 2 or 3 months if needed.    -- Omeprazole (Proton Pump Inhibitors in general) and metformin can lower B12 levels (inhibits absorption).    -- Some people are able to take and absorb oral B12 or B complex tablets.   -- Some people are NOT able to absorb oral B12 very well.    If you decide to proceed with oral B12 replacement, but your B12 levels do not improve, you likely will need to use B12 shots instead.      -- Call with update on this B12 shot in 2 to 4 weeks.     -- If it didn't help, we won't do anymore.    -- If it was helpful, we can order more.     -- Consider trying a Super-B-Complex with B12 -- Daily or Every other day -- to help energy / mood and balance.     -- Consider Under the tongue / Liquid.  ([employer])      Sleep Difficulties:  -- No caffeine after 3pm   -- No screens the hour before bed (including TV, cell phone, tablets, E-readers, laptops, etc)   -- No TVs in bedroom   -- Keep the same bed time and wake time 7 days a week   -- No naps   --  Relaxing routine before bed   -- Lay down in bed when tired, and go to sleep   -- Exercise daily    -- Start Melatonin 10 mg tablet -- take 2 or 3 hours prior to bedtime for about 3 to 4 weeks, if still having sleep problems, can increase to 12 or 15 mg -- for another 3 to 4 weeks.       - maintain a regular bedtime, routine and wake up time  - include a warm beverage and/or hot bath 1-2 hours before bedtime  - avoid naps  - do not watch the clock  - avoid caffeine, alcohol and nicotine for at least 4-6 hours prior to bed  - exercise daily but avoid exercising right before bed  - no screen time within hour of bed  - keep your bedroom quiet, dark,comfortable and cool  - If you haven t been able to get to sleep after about 20 minutes or more, get up and do something calming or boring until you feel sleepy, then return to bed and try again. Avoid doing anything that is too stimulating or interesting, as this will wake you up even more.  - use bed only for sleep - do not use it as a place to watch TV, eat, read, work on your laptop, pay bills, and other things     Matt Whitmore MD   Internal Medicine  [facility] and [facility]     Portions of this note were dictated using speech recognition software. The note has been proofread but errors in the text may have been overlooked. Please contact me if there are any concerns regarding the accuracy of the dictation.

## 2021-01-29 NOTE — PATIENT INSTRUCTIONS
Increase Vitamin D3 -- up to 5000 international unit(s) daily (dose of 1000 more than currently)   -- [facility]'s Levels were at 36.5 on 10/05/2020.       Vitamin B12 deficiency:    Vitamin B12 deficiency can cause numerous symptoms.  Fatigue and malaise, low energy levels, low blood counts or anemia, poor mood or depression, neuropathy or pins and needles/burning sensation of the hands and feet.    -- trial of B12 shot today.    -- If B12 shot improves your energy and your blood counts, we can do B12 shots every 3-4 weeks up to every 2 or 3 months if needed.    -- Omeprazole (Proton Pump Inhibitors in general) and metformin can lower B12 levels (inhibits absorption).    -- Some people are able to take and absorb oral B12 or B complex tablets.   -- Some people are NOT able to absorb oral B12 very well.    If you decide to proceed with oral B12 replacement, but your B12 levels do not improve, you likely will need to use B12 shots instead.      -- Call with update on this B12 shot in 2 to 4 weeks.     -- If it didn't help, we won't do anymore.    -- If it was helpful, we can order more.     -- Consider trying a Super-B-Complex with B12 -- Daily or Every other day -- to help energy / mood and balance.     -- Consider Under the tongue / Liquid.  ([employer])      Sleep Difficulties:  -- No caffeine after 3pm   -- No screens the hour before bed (including TV, cell phone, tablets, E-readers, laptops, etc)   -- No TVs in bedroom   -- Keep the same bed time and wake time 7 days a week   -- No naps   -- Relaxing routine before bed   -- Lay down in bed when tired, and go to sleep   -- Exercise daily    -- Start Melatonin 10 mg tablet -- take 2 or 3 hours prior to bedtime for about 3 to 4 weeks, if still having sleep problems, can increase to 12 or 15 mg -- for another 3 to 4 weeks.       - maintain a regular bedtime, routine and wake up time  - include a warm beverage and/or hot bath 1-2 hours before bedtime  - avoid naps  - do  not watch the clock  - avoid caffeine, alcohol and nicotine for at least 4-6 hours prior to bed  - exercise daily but avoid exercising right before bed  - no screen time within hour of bed  - keep your bedroom quiet, dark,comfortable and cool  - If you haven t been able to get to sleep after about 20 minutes or more, get up and do something calming or boring until you feel sleepy, then return to bed and try again. Avoid doing anything that is too stimulating or interesting, as this will wake you up even more.  - use bed only for sleep - do not use it as a place to watch TV, eat, read, work on your laptop, pay bills, and other things

## 2021-01-31 PROBLEM — Z85.51 PERSONAL HISTORY OF MALIGNANT NEOPLASM OF BLADDER: Status: ACTIVE | Noted: 2021-01-31

## 2021-01-31 PROBLEM — Z87.39 HISTORY OF POLYMYALGIA RHEUMATICA: Status: ACTIVE | Noted: 2021-01-31

## 2021-01-31 PROBLEM — F51.01 PRIMARY INSOMNIA: Status: ACTIVE | Noted: 2021-01-31

## 2021-01-31 ASSESSMENT — ENCOUNTER SYMPTOMS
SHORTNESS OF BREATH: 1
BACK PAIN: 1
SLEEP DISTURBANCE: 1

## 2021-02-12 ENCOUNTER — OFFICE VISIT (OUTPATIENT)
Dept: INTERNAL MEDICINE | Facility: OTHER | Age: 66
End: 2021-02-12
Attending: INTERNAL MEDICINE
Payer: MEDICARE

## 2021-02-12 VITALS
TEMPERATURE: 97.3 F | SYSTOLIC BLOOD PRESSURE: 129 MMHG | BODY MASS INDEX: 41.23 KG/M2 | HEART RATE: 84 BPM | OXYGEN SATURATION: 94 % | WEIGHT: 304 LBS | RESPIRATION RATE: 16 BRPM | DIASTOLIC BLOOD PRESSURE: 85 MMHG

## 2021-02-12 DIAGNOSIS — E78.2 MIXED HYPERLIPIDEMIA: ICD-10-CM

## 2021-02-12 DIAGNOSIS — I10 BENIGN ESSENTIAL HYPERTENSION: Primary | ICD-10-CM

## 2021-02-12 DIAGNOSIS — Z12.5 SCREENING PSA (PROSTATE SPECIFIC ANTIGEN): ICD-10-CM

## 2021-02-12 DIAGNOSIS — E66.01 MORBID OBESITY (H): ICD-10-CM

## 2021-02-12 DIAGNOSIS — E53.8 VITAMIN B12 DEFICIENCY: ICD-10-CM

## 2021-02-12 DIAGNOSIS — R73.9 ELEVATED RANDOM BLOOD GLUCOSE LEVEL: ICD-10-CM

## 2021-02-12 PROCEDURE — 96372 THER/PROPH/DIAG INJ SC/IM: CPT | Performed by: INTERNAL MEDICINE

## 2021-02-12 PROCEDURE — 250N000011 HC RX IP 250 OP 636: Performed by: INTERNAL MEDICINE

## 2021-02-12 PROCEDURE — 99214 OFFICE O/P EST MOD 30 MIN: CPT | Performed by: INTERNAL MEDICINE

## 2021-02-12 PROCEDURE — G0463 HOSPITAL OUTPT CLINIC VISIT: HCPCS

## 2021-02-12 RX ORDER — CYANOCOBALAMIN 1000 UG/ML
1000 INJECTION, SOLUTION INTRAMUSCULAR; SUBCUTANEOUS ONCE
Status: COMPLETED | OUTPATIENT
Start: 2021-02-12 | End: 2021-02-12

## 2021-02-12 RX ORDER — PHENOL 1.4 %
20 AEROSOL, SPRAY (ML) MUCOUS MEMBRANE AT BEDTIME
COMMUNITY

## 2021-02-12 RX ORDER — UREA 10 %
500 LOTION (ML) TOPICAL DAILY
COMMUNITY
End: 2021-02-12

## 2021-02-12 RX ORDER — MULTIVITAMIN WITH IRON
1 TABLET ORAL DAILY
COMMUNITY

## 2021-02-12 RX ORDER — LORATADINE 10 MG/1
10 TABLET ORAL DAILY
COMMUNITY
End: 2023-01-07

## 2021-02-12 RX ADMIN — CYANOCOBALAMIN 1000 MCG: 1000 INJECTION, SOLUTION INTRAMUSCULAR at 14:51

## 2021-02-12 ASSESSMENT — ENCOUNTER SYMPTOMS
LIGHT-HEADEDNESS: 0
WOUND: 0
VOMITING: 0
ARTHRALGIAS: 0
PALPITATIONS: 0
WHEEZING: 0
ABDOMINAL PAIN: 0
BACK PAIN: 1
CHILLS: 0
DYSURIA: 0
FATIGUE: 1
AGITATION: 0
COUGH: 1
BRUISES/BLEEDS EASILY: 0
FEVER: 0
MYALGIAS: 0
DIARRHEA: 0
CONFUSION: 0
APNEA: 1
NAUSEA: 0
SHORTNESS OF BREATH: 1
DIZZINESS: 0
SLEEP DISTURBANCE: 1
HEMATURIA: 0

## 2021-02-12 ASSESSMENT — PAIN SCALES - GENERAL: PAINLEVEL: MODERATE PAIN (5)

## 2021-02-12 NOTE — NURSING NOTE
Chief Complaint   Patient presents with     Follow Up     hypertension and B12       Medication Reconciliation complete.   Marlen Soto LPN  ..................2/12/2021   1:37 PM

## 2021-02-12 NOTE — PATIENT INSTRUCTIONS
Blood pressure is well controlled.   Dose increase of the blood pressure medication has helped.     Immunization History   Administered Date(s) Administered     Flu, Unspecified 12/06/2007, 10/16/2009, 10/26/2010     Influenza (High Dose) 3 valent vaccine 10/28/2019     Influenza (IIV3) PF 11/14/2002, 09/26/2012, 11/12/2014     Influenza Vaccine IM > 6 months Valent IIV4 10/30/2017, 11/01/2017, 11/20/2017, 10/23/2018     Influenza, Quad, High Dose, Pf, 65yr + 10/13/2020     Meningococcal (Menactra ) 07/14/2017, 09/14/2017     Pedvax-hib 07/14/2017     Pneumo Conj 13-V (2010&after) 05/27/2016     Pneumococcal 23 valent 04/13/2004, 06/12/2017, 07/14/2017     Td (Adult), Adsorbed 04/13/2004     Tdap (Adult) Unspecified Formulation 08/13/2014     Zoster vaccine, live 06/12/2017, 07/14/2017        -- Get the 2 shot COVID-19 Vaccination series (1st shot when available and 2nd shot 17 to 21 days later) -- (No vaccines for 2 weeks prior to Covid Vaccine)    -- Get your tetanus shot updated - from one of the local pharmacies, at your convenience. --- After 8/12/2024.     -- Get the new shingles shot (2 in series) (Shingrix) - from one of the local pharmacies, at your convenience. -- Check cost/coverage. -- Get after done with COVID-19 vaccine.     Pneumococcal Pneumonia vaccines (PCV 23)   Pneumococcal 23 - Valent Vaccine -- Two doses (One before age 65 and One After)    -- repeat every 5 years in certain patient populations.

## 2021-02-12 NOTE — PROGRESS NOTES
Mr. An is a 65 year old male who presents to the clinic for evaluation of the following problems:      ICD-10-CM    1. Benign essential hypertension  I10 CBC with platelets     Comprehensive metabolic panel     TSH Reflex GH   2. Mixed hyperlipidemia  E78.2 Lipid Profile   3. Vitamin B12 deficiency  E53.8 cyanocobalamin 2500 MCG TABS     cyanocobalamin injection 1,000 mcg   4. Morbid obesity (H)  E66.01    5. Elevated random blood glucose level  R73.09 Hemoglobin A1c   6. Screening PSA (prostate specific antigen)  Z12.5 PSA Screen GH     HPI  Patient presents for follow-up of multiple issues.    Hypertension, initial blood pressure in the clinic today was 146/100.  3 blood pressure measurement average, up to 129/84 with pulse of 84.  Overall seems to be doing better with current medication regimen.  Currently taking spironolactone and hydrochlorothiazide 1 tablet twice daily.  Doing well with the dose increase.  Check labs.    Mixed hyperlipidemia, currently diet controlled.  Check lipid panel.    Vitamin B12 deficiency, feels that the vitamin B12 supplements has helped some however he had a significant improvement in energy and overall felt better after his last B12 shot.  Recommend dose increase of his oral B12 tablets.  Prescription updated.  B12 shot today as well.    Elevated random glucose, he would like to check for diabetes with next lab draw.  Orders placed.    Screening PSA, denies any significant urinary symptoms at this time.  Check PSA with next lab draw.    Functional Capacity: > 4 METS.   Review of Systems   Constitutional: Positive for fatigue. Negative for chills and fever.   HENT: Negative for congestion and hearing loss.    Eyes: Negative for visual disturbance.   Respiratory: Positive for apnea (uses CPAP nightly), cough (intermittent, dry) and shortness of breath. Negative for wheezing.    Cardiovascular: Positive for leg swelling. Negative for chest pain and palpitations.   Gastrointestinal:  Negative for abdominal pain, diarrhea, nausea and vomiting.   Endocrine: Negative for cold intolerance and heat intolerance.        + Diaphoretic with exertion, little energy   Genitourinary: Negative for dysuria and hematuria.   Musculoskeletal: Positive for back pain (Low back pain). Negative for arthralgias and myalgias.   Skin: Negative for rash and wound.   Allergic/Immunologic: Negative for immunocompromised state.   Neurological: Negative for dizziness and light-headedness.   Hematological: Does not bruise/bleed easily.   Psychiatric/Behavioral: Positive for sleep disturbance. Negative for agitation and confusion.        Reviewed and updated as needed this visit by Provider   Tobacco  Allergies  Meds  Problems  Med Hx  Surg Hx  Fam Hx          EXAM:   Vitals:    02/12/21 1346 02/12/21 1348 02/12/21 1349 02/12/21 1351   BP: 136/85 127/85 125/85 129/85   BP Location: Right arm Right arm Right arm Right arm   Patient Position: Sitting Sitting Sitting Sitting   Cuff Size: Adult Large Adult Large Adult Large Adult Large   Pulse: 85 83 83 84   Resp:       Temp:       TempSrc:       SpO2: 93% 94% 94%    Weight:           Current Pain Score: Moderate Pain (5)     BP Readings from Last 3 Encounters:   02/12/21 129/85   01/29/21 138/88   11/02/20 136/82      Wt Readings from Last 3 Encounters:   02/12/21 137.9 kg (304 lb)   01/29/21 139.9 kg (308 lb 6.4 oz)   11/02/20 134.9 kg (297 lb 8 oz)      Estimated body mass index is 41.23 kg/m  as calculated from the following:    Height as of 11/2/20: 1.829 m (6').    Weight as of this encounter: 137.9 kg (304 lb).     Physical Exam  Constitutional:       General: He is not in acute distress.     Appearance: He is well-developed. He is obese. He is not diaphoretic.   HENT:      Head: Normocephalic and atraumatic.   Eyes:      General: No scleral icterus.     Conjunctiva/sclera: Conjunctivae normal.   Neck:      Musculoskeletal: Neck supple.   Cardiovascular:      Rate  and Rhythm: Normal rate and regular rhythm.   Pulmonary:      Effort: Pulmonary effort is normal.      Breath sounds: Normal breath sounds.   Abdominal:      Palpations: Abdomen is soft.      Tenderness: There is no abdominal tenderness.   Musculoskeletal:         General: No deformity.      Right lower le+ Pitting Edema present.      Left lower le+ Pitting Edema present.   Lymphadenopathy:      Cervical: No cervical adenopathy.   Skin:     General: Skin is warm and dry.      Findings: No rash.   Neurological:      Mental Status: He is alert and oriented to person, place, and time. Mental status is at baseline.   Psychiatric:         Mood and Affect: Mood normal.         Behavior: Behavior normal.        Procedures   INVESTIGATIONS:  - Labs reviewed in [facility]     ASSESSMENT AND PLAN:  Problem List Items Addressed This Visit        Digestive    Morbid obesity (H)    Vitamin B12 deficiency    Relevant Medications    cyanocobalamin 2500 MCG TABS    cyanocobalamin injection 1,000 mcg (Completed)       Endocrine    Mixed hyperlipidemia    Relevant Orders    Lipid Profile       Circulatory    Benign essential hypertension - Primary    Relevant Orders    CBC with platelets    Comprehensive metabolic panel    TSH Reflex       Other Visit Diagnoses     Elevated random blood glucose level        Relevant Orders    Hemoglobin A1c    Screening PSA (prostate specific antigen)        Relevant Orders    PSA Screen         reviewed diet, exercise and weight control, recommended sodium restriction, cardiovascular risk and specific lipid/LDL goals reviewed  -- Expected clinical course discussed    -- Medications and their side effects discussed    Patient Instructions     Blood pressure is well controlled.   Dose increase of the blood pressure medication has helped.     Immunization History   Administered Date(s) Administered     Flu, Unspecified 2007, 10/16/2009, 10/26/2010     Influenza (High Dose) 3 valent vaccine  10/28/2019     Influenza (IIV3) PF 11/14/2002, 09/26/2012, 11/12/2014     Influenza Vaccine IM > 6 months Valent IIV4 10/30/2017, 11/01/2017, 11/20/2017, 10/23/2018     Influenza, Quad, High Dose, Pf, 65yr + 10/13/2020     Meningococcal (Menactra ) 07/14/2017, 09/14/2017     Pedvax-hib 07/14/2017     Pneumo Conj 13-V (2010&after) 05/27/2016     Pneumococcal 23 valent 04/13/2004, 06/12/2017, 07/14/2017     Td (Adult), Adsorbed 04/13/2004     Tdap (Adult) Unspecified Formulation 08/13/2014     Zoster vaccine, live 06/12/2017, 07/14/2017        -- Get the 2 shot COVID-19 Vaccination series (1st shot when available and 2nd shot 17 to 21 days later) -- (No vaccines for 2 weeks prior to Covid Vaccine)    -- Get your tetanus shot updated - from one of the local pharmacies, at your convenience. --- After 8/12/2024.     -- Get the new shingles shot (2 in series) (Shingrix) - from one of the local pharmacies, at your convenience. -- Check cost/coverage. -- Get after done with COVID-19 vaccine.     Pneumococcal Pneumonia vaccines (PCV 23)   Pneumococcal 23 - Valent Vaccine -- Two doses (One before age 65 and One After)    -- repeat every 5 years in certain patient populations.          Matt Whitmore MD   Internal Medicine  [facility] and [facility]     Portions of this note were dictated using speech recognition software. The note has been proofread but errors in the text may have been overlooked. Please contact me if there are any concerns regarding the accuracy of the dictation.

## 2021-03-12 DIAGNOSIS — I10 BENIGN ESSENTIAL HYPERTENSION: ICD-10-CM

## 2021-03-12 DIAGNOSIS — M54.16 LUMBAR RADICULOPATHY: Primary | ICD-10-CM

## 2021-03-12 DIAGNOSIS — E83.52 HYPERCALCEMIA: Primary | ICD-10-CM

## 2021-03-12 DIAGNOSIS — E78.2 MIXED HYPERLIPIDEMIA: ICD-10-CM

## 2021-03-12 DIAGNOSIS — R73.9 ELEVATED RANDOM BLOOD GLUCOSE LEVEL: ICD-10-CM

## 2021-03-12 DIAGNOSIS — Z12.5 SCREENING PSA (PROSTATE SPECIFIC ANTIGEN): ICD-10-CM

## 2021-03-12 LAB
ALBUMIN SERPL-MCNC: 4.1 G/DL (ref 3.5–5.7)
ALP SERPL-CCNC: 65 U/L (ref 34–104)
ALT SERPL W P-5'-P-CCNC: 26 U/L (ref 7–52)
ANION GAP SERPL CALCULATED.3IONS-SCNC: 12 MMOL/L (ref 3–14)
AST SERPL W P-5'-P-CCNC: 26 U/L (ref 13–39)
BILIRUB SERPL-MCNC: 0.5 MG/DL (ref 0.3–1)
BUN SERPL-MCNC: 20 MG/DL (ref 7–25)
CALCIUM SERPL-MCNC: 10.6 MG/DL (ref 8.6–10.3)
CHLORIDE SERPL-SCNC: 98 MMOL/L (ref 98–107)
CHOLEST SERPL-MCNC: 224 MG/DL
CO2 SERPL-SCNC: 29 MMOL/L (ref 21–31)
CREAT SERPL-MCNC: 1.18 MG/DL (ref 0.7–1.3)
ERYTHROCYTE [DISTWIDTH] IN BLOOD BY AUTOMATED COUNT: 14 % (ref 10–15)
GFR SERPL CREATININE-BSD FRML MDRD: 62 ML/MIN/{1.73_M2}
GLUCOSE SERPL-MCNC: 141 MG/DL (ref 70–105)
HBA1C MFR BLD: 5.9 % (ref 4–6)
HCT VFR BLD AUTO: 48.4 % (ref 40–53)
HDLC SERPL-MCNC: 33 MG/DL (ref 23–92)
HGB BLD-MCNC: 16.7 G/DL (ref 13.3–17.7)
LDLC SERPL CALC-MCNC: ABNORMAL MG/DL
MCH RBC QN AUTO: 31.6 PG (ref 26.5–33)
MCHC RBC AUTO-ENTMCNC: 34.5 G/DL (ref 31.5–36.5)
MCV RBC AUTO: 92 FL (ref 78–100)
NONHDLC SERPL-MCNC: 191 MG/DL
PLATELET # BLD AUTO: 424 10E9/L (ref 150–450)
POTASSIUM SERPL-SCNC: 3.7 MMOL/L (ref 3.5–5.1)
PROT SERPL-MCNC: 7.9 G/DL (ref 6.4–8.9)
PSA SERPL-ACNC: 1.01 NG/ML
PTH-INTACT SERPL-MCNC: 25 PG/ML (ref 12–88)
RBC # BLD AUTO: 5.28 10E12/L (ref 4.4–5.9)
SODIUM SERPL-SCNC: 139 MMOL/L (ref 134–144)
TRIGL SERPL-MCNC: 410 MG/DL
TSH SERPL DL<=0.05 MIU/L-ACNC: 4.59 IU/ML (ref 0.34–5.6)
WBC # BLD AUTO: 11 10E9/L (ref 4–11)

## 2021-03-12 PROCEDURE — G0103 PSA SCREENING: HCPCS | Mod: ZL | Performed by: INTERNAL MEDICINE

## 2021-03-12 PROCEDURE — 83036 HEMOGLOBIN GLYCOSYLATED A1C: CPT | Mod: ZL | Performed by: INTERNAL MEDICINE

## 2021-03-12 PROCEDURE — 85027 COMPLETE CBC AUTOMATED: CPT | Mod: ZL | Performed by: INTERNAL MEDICINE

## 2021-03-12 PROCEDURE — 80053 COMPREHEN METABOLIC PANEL: CPT | Mod: ZL | Performed by: INTERNAL MEDICINE

## 2021-03-12 PROCEDURE — 36415 COLL VENOUS BLD VENIPUNCTURE: CPT | Mod: ZL | Performed by: INTERNAL MEDICINE

## 2021-03-12 PROCEDURE — 84443 ASSAY THYROID STIM HORMONE: CPT | Mod: ZL | Performed by: INTERNAL MEDICINE

## 2021-03-12 PROCEDURE — 83970 ASSAY OF PARATHORMONE: CPT | Mod: ZL | Performed by: INTERNAL MEDICINE

## 2021-03-12 PROCEDURE — 80061 LIPID PANEL: CPT | Mod: ZL | Performed by: INTERNAL MEDICINE

## 2021-04-14 ENCOUNTER — OFFICE VISIT (OUTPATIENT)
Dept: INTERNAL MEDICINE | Facility: OTHER | Age: 66
End: 2021-04-14
Attending: INTERNAL MEDICINE
Payer: MEDICARE

## 2021-04-14 VITALS
OXYGEN SATURATION: 96 % | RESPIRATION RATE: 16 BRPM | TEMPERATURE: 98.2 F | WEIGHT: 298.4 LBS | HEART RATE: 79 BPM | BODY MASS INDEX: 40.47 KG/M2 | DIASTOLIC BLOOD PRESSURE: 78 MMHG | SYSTOLIC BLOOD PRESSURE: 126 MMHG

## 2021-04-14 DIAGNOSIS — M54.9 TRIGGER POINT WITH BACK PAIN: Primary | ICD-10-CM

## 2021-04-14 DIAGNOSIS — M53.3 SACROILIAC JOINT PAIN: ICD-10-CM

## 2021-04-14 DIAGNOSIS — M47.816 OSTEOARTHRITIS OF LUMBAR SPINE, UNSPECIFIED SPINAL OSTEOARTHRITIS COMPLICATION STATUS: ICD-10-CM

## 2021-04-14 DIAGNOSIS — M25.511 TRIGGER POINT OF RIGHT SHOULDER REGION: ICD-10-CM

## 2021-04-14 PROCEDURE — 99214 OFFICE O/P EST MOD 30 MIN: CPT | Performed by: INTERNAL MEDICINE

## 2021-04-14 PROCEDURE — G0463 HOSPITAL OUTPT CLINIC VISIT: HCPCS

## 2021-04-14 RX ORDER — CYCLOBENZAPRINE HCL 10 MG
5-10 TABLET ORAL 3 TIMES DAILY PRN
Qty: 90 TABLET | Refills: 1 | Status: SHIPPED | OUTPATIENT
Start: 2021-04-14 | End: 2021-10-27

## 2021-04-14 ASSESSMENT — ENCOUNTER SYMPTOMS
COUGH: 1
FATIGUE: 1
BRUISES/BLEEDS EASILY: 0
MYALGIAS: 0
NAUSEA: 0
VOMITING: 0
ARTHRALGIAS: 0
DYSURIA: 0
WHEEZING: 0
SLEEP DISTURBANCE: 1
FEVER: 0
SHORTNESS OF BREATH: 1
CHILLS: 0
LIGHT-HEADEDNESS: 0
APNEA: 1
AGITATION: 0
DIARRHEA: 0
ABDOMINAL PAIN: 0
BACK PAIN: 1
DIZZINESS: 0
HEMATURIA: 0
PALPITATIONS: 0
WOUND: 0
CONFUSION: 0

## 2021-04-14 ASSESSMENT — PAIN SCALES - GENERAL: PAINLEVEL: SEVERE PAIN (7)

## 2021-04-14 NOTE — PATIENT INSTRUCTIONS
Paraspinal Muscle Spasm Instructions  Heat packs to affected areas 20 min on, 20 min off as tolerated.  Take ibuprofen and tylenol as needed for mild-moderate pain.  Consider Muscle Relaxer -- such as Flexeril at bedtime as needed for muscle spasms.     Start massage to the affected areas.    Consider home TENS unit (by [employer]) -- sold at [employer].     Consider Topical muscle rubs - Icy Hot, Aspercreme (Advanced) with or without lidocaine, Biofreeze, Lidocaine gel, Diclofenac gel / Voltaren gel, Giovanni Emu from [employer], or TheraWorks spray.    Consider Capsaicin cream -- for arthritis  Consider warm water soaks / paraffin wax dips for your hands.     Can also consider trying the Two Old Goats - Essential Oil Lotion (from Online or from [employer]).     Get a Theracane -- or other muscle massage tool to massage the trigger points (painful muscle spots).     Trigger Point Therapy  Your exam shows your pain is coming from one or more trigger points. These points are places where the muscles of the neck, shoulder, back, and legs are tender totouch and cause pain, spasm, and fatigue. This condition can cause headaches, painful neck spasms, and low back pain. Trigger points can be thought of as weak areas of muscle that spasm and form hard knots when under chronic strain or stress. Treatment of trigger points may include medicines to reduce pain and inflammation and relaxthe muscles.  Physical therapy can include several techniques. Deep friction massage over the tender areas can often help relieve pain and stiffness. Relaxation exercises and stretching may be helpful; cervical and lumbartraction can be used in severe cases. Cold therapy and hot packs have both proven helpful. Injection therapy with saline, anesthetics, or cortisone medicines, has also been very successful at reducing symptoms. Two or moreinjections are often needed over several weeks to gain the greatest benefit. Call your doctor to arrange for further  treatment as recommended.       1. Trigger point with back pain  - PHYSICAL THERAPY REFERRAL; Future    Physical therapy referral sent  - they will call with date/time of appointment.    -- Start Graston / Myofascial therapy.      2. Trigger point of right shoulder region    START:   - cyclobenzaprine (FLEXERIL) 10 MG tablet; Take 0.5-1 tablets (5-10 mg) by mouth 3 times daily as needed for muscle spasms (-- No driving after use)  Dispense: 90 tablet; Refill: 1    3. Sacroiliac joint pain - left  - PHYSICAL THERAPY REFERRAL; Future    - XR Sacroiliac Therapeutic Injection Left; Future  - they will call with date/time of appointment.      - MR Lumbar Spine w/o Contrast; Future   - MR Sacrum and Coccyx w/o Contrast; Future  - Orthopedic & Spine  Referral; Future -- Dr. Armenta.     4. Osteoarthritis of lumbar spine, unspecified spinal osteoarthritis complication status  - MR Lumbar Spine w/o Contrast; Future    Return as needed for follow-up with Dr. Whitmore.    Clinic : [phone]  Appointment line: [phone]

## 2021-04-14 NOTE — PROGRESS NOTES
Mr. An is a 65 year old male who presents to the clinic for evaluation of the following problems:      ICD-10-CM    1. Trigger point with back pain  M54.9 PHYSICAL THERAPY REFERRAL     cyclobenzaprine (FLEXERIL) 10 MG tablet   2. Trigger point of right shoulder region  M25.511 PHYSICAL THERAPY REFERRAL     cyclobenzaprine (FLEXERIL) 10 MG tablet   3. Sacroiliac joint pain - left  M53.3 PHYSICAL THERAPY REFERRAL     XR Sacroiliac Therapeutic Injection Left     MR Lumbar Spine w/o Contrast     MR Sacrum and Coccyx w/o Contrast     Orthopedic & Spine  Referral   4. Osteoarthritis of lumbar spine, unspecified spinal osteoarthritis complication status  M47.816 MR Lumbar Spine w/o Contrast     HPI  Patient comes in with his wife for follow-up of multiple issues.    Patient was previously on prednisone to help with polymyalgia rheumatica.  He did take 1 or 2 tablets yesterday and it did provide some relief for some of his pain but he does not wish to go back on that and really does not have polymyalgia at this time.    On examination he has trigger points in a couple of locations with back pain.  Mostly right-sided shoulder and upper back muscles with rather severe trigger points with pain and some range of motion issues because of this.  Direct pressure in a couple of the trigger points did not significantly improve his shoulder pain.  Recommend physical therapy, [place] technique.  Start some Flexeril as needed.  No driving after use.    Left-sided sacroiliac pain.  Rather painful.  Periodically will be very sharp and stabbing almost takes his leg strength away.  Does have some muscle spasms almost a charley horse type process in his posterior left leg.  At times will most forced him to have to sit down.  Does occasionally have some pain that radiates from his left low back down to the ankle.    [facility]'s specialty providers in the past and was told he had osteoarthritis, does not have rheumatoid arthritis,  according to the paperwork his wife brought with him today.    He has been dealing with sacroiliac joint pain for years.    He would like to proceed with a left-sided sacroiliac joint steroid injection.  Would also like to meet with spine surgery, Dr. Armenta.  Lumbar MRI and sacral MRI ordered.  Orthopedic referral sent.   - they will call with date/time of appointment.      Functional Capacity: about 4 METS. + limited due to left low back / SI joint pain.   Review of Systems   Constitutional: Positive for fatigue. Negative for chills and fever.   HENT: Negative for congestion and hearing loss.    Eyes: Negative for visual disturbance.   Respiratory: Positive for apnea (uses CPAP nightly), cough (intermittent, dry) and shortness of breath. Negative for wheezing.    Cardiovascular: Positive for leg swelling. Negative for chest pain and palpitations.   Gastrointestinal: Negative for abdominal pain, diarrhea, nausea and vomiting.   Endocrine: Negative for cold intolerance and heat intolerance.        + Diaphoretic with exertion, little energy   Genitourinary: Negative for dysuria and hematuria.   Musculoskeletal: Positive for back pain (Low back pain). Negative for arthralgias and myalgias.   Skin: Negative for rash and wound.   Allergic/Immunologic: Negative for immunocompromised state.   Neurological: Negative for dizziness and light-headedness.   Hematological: Does not bruise/bleed easily.   Psychiatric/Behavioral: Positive for sleep disturbance. Negative for agitation and confusion.      Reviewed and updated as needed this visit by Provider   Tobacco  Allergies  Meds  Problems  Med Hx  Surg Hx  Fam Hx          EXAM:   Vitals:    04/14/21 0755   BP: 126/78   BP Location: Right arm   Patient Position: Sitting   Cuff Size: Adult Large   Pulse: 79   Resp: 16   Temp: 98.2  F (36.8  C)   TempSrc: Tympanic   SpO2: 96%   Weight: 135.4 kg (298 lb 6.4 oz)       Current Pain Score: Severe Pain (7)     BP Readings from  Last 3 Encounters:   21 126/78   21 129/85   21 138/88      Wt Readings from Last 3 Encounters:   21 135.4 kg (298 lb 6.4 oz)   21 137.9 kg (304 lb)   21 139.9 kg (308 lb 6.4 oz)      Estimated body mass index is 40.47 kg/m  as calculated from the following:    Height as of 20: 1.829 m (6').    Weight as of this encounter: 135.4 kg (298 lb 6.4 oz).     Physical Exam  Constitutional:       General: He is not in acute distress.     Appearance: He is well-developed. He is obese. He is not diaphoretic.   HENT:      Head: Normocephalic and atraumatic.   Eyes:      General: No scleral icterus.     Conjunctiva/sclera: Conjunctivae normal.   Neck:      Musculoskeletal: Neck supple.   Cardiovascular:      Rate and Rhythm: Normal rate and regular rhythm.   Pulmonary:      Effort: Pulmonary effort is normal.      Breath sounds: Normal breath sounds.   Abdominal:      Palpations: Abdomen is soft.      Tenderness: There is no abdominal tenderness.   Musculoskeletal:         General: Tenderness (right upper back/shoulder and left SI joint) present. No deformity.      Right lower le+ Pitting Edema present.      Left lower le+ Pitting Edema present.   Lymphadenopathy:      Cervical: No cervical adenopathy.   Skin:     General: Skin is warm and dry.      Findings: No rash.   Neurological:      Mental Status: He is alert and oriented to person, place, and time. Mental status is at baseline.   Psychiatric:         Mood and Affect: Mood normal.         Behavior: Behavior normal.        Procedures   INVESTIGATIONS:  - Labs reviewed in Epic     ASSESSMENT AND PLAN:  Problem List Items Addressed This Visit        Nervous and Auditory    Trigger point with back pain - Primary    Relevant Medications    cyclobenzaprine (FLEXERIL) 10 MG tablet    Other Relevant Orders    PHYSICAL THERAPY REFERRAL    Trigger point of right shoulder region    Relevant Medications    cyclobenzaprine (FLEXERIL) 10  MG tablet    Other Relevant Orders    PHYSICAL THERAPY REFERRAL    Sacroiliac joint pain - left    Relevant Medications    cyclobenzaprine (FLEXERIL) 10 MG tablet    Other Relevant Orders    PHYSICAL THERAPY REFERRAL    XR Sacroiliac Therapeutic Injection Left    MR Lumbar Spine w/o Contrast    MR Sacrum and Coccyx w/o Contrast    Orthopedic & Spine  Referral       Musculoskeletal and Integumentary    Osteoarthritis of lumbar spine, unspecified spinal osteoarthritis complication status    Relevant Medications    cyclobenzaprine (FLEXERIL) 10 MG tablet    Other Relevant Orders    MR Lumbar Spine w/o Contrast        reviewed diet, exercise and weight control  -- Expected clinical course discussed    -- Medications and their side effects discussed    The 10-year ASCVD risk score (Gaeldebora DARNELL JrChelsey, et al., 2013) is: 17.9%    Values used to calculate the score:      Age: 65 years      Sex: Male      Is Non- : No      Diabetic: No      Tobacco smoker: No      Systolic Blood Pressure: 126 mmHg      Is BP treated: No      HDL Cholesterol: 33 mg/dL      Total Cholesterol: 224 mg/dL    Patient Instructions   Paraspinal Muscle Spasm Instructions  Heat packs to affected areas 20 min on, 20 min off as tolerated.  Take ibuprofen and tylenol as needed for mild-moderate pain.  Consider Muscle Relaxer -- such as Flexeril at bedtime as needed for muscle spasms.     Start massage to the affected areas.    Consider home TENS unit (by [employer]) -- sold at [employer].     Consider Topical muscle rubs - Icy Hot, Aspercreme (Advanced) with or without lidocaine, Biofreeze, Lidocaine gel, Diclofenac gel / Voltaren gel, Giovanni Emu from [employer], or TheraWorks spray.    Consider Capsaicin cream -- for arthritis  Consider warm water soaks / paraffin wax dips for your hands.     Can also consider trying the Two Old Goats - Essential Oil Lotion (from Online or from [employer]).     Get a Theracane -- or other muscle massage  tool to massage the trigger points (painful muscle spots).     Trigger Point Therapy  Your exam shows your pain is coming from one or more trigger points. These points are places where the muscles of the neck, shoulder, back, and legs are tender totouch and cause pain, spasm, and fatigue. This condition can cause headaches, painful neck spasms, and low back pain. Trigger points can be thought of as weak areas of muscle that spasm and form hard knots when under chronic strain or stress. Treatment of trigger points may include medicines to reduce pain and inflammation and relaxthe muscles.  Physical therapy can include several techniques. Deep friction massage over the tender areas can often help relieve pain and stiffness. Relaxation exercises and stretching may be helpful; cervical and lumbartraction can be used in severe cases. Cold therapy and hot packs have both proven helpful. Injection therapy with saline, anesthetics, or cortisone medicines, has also been very successful at reducing symptoms. Two or moreinjections are often needed over several weeks to gain the greatest benefit. Call your doctor to arrange for further treatment as recommended.       1. Trigger point with back pain  - PHYSICAL THERAPY REFERRAL; Future    Physical therapy referral sent  - they will call with date/time of appointment.    -- Start Graston / Myofascial therapy.      2. Trigger point of right shoulder region    START:   - cyclobenzaprine (FLEXERIL) 10 MG tablet; Take 0.5-1 tablets (5-10 mg) by mouth 3 times daily as needed for muscle spasms (-- No driving after use)  Dispense: 90 tablet; Refill: 1    3. Sacroiliac joint pain - left  - PHYSICAL THERAPY REFERRAL; Future    - XR Sacroiliac Therapeutic Injection Left; Future  - they will call with date/time of appointment.      - MR Lumbar Spine w/o Contrast; Future   - MR Sacrum and Coccyx w/o Contrast; Future  - Orthopedic & Spine  Referral; Future -- Dr. Armenta.     4.  Osteoarthritis of lumbar spine, unspecified spinal osteoarthritis complication status  - MR Lumbar Spine w/o Contrast; Future    Return as needed for follow-up with Dr. Whitmore.    Clinic : [phone]  Appointment line: [phone]     Matt Whitmore MD   Internal Medicine  [facility]

## 2021-04-14 NOTE — Clinical Note
Just wanted to double check that I got everything Dr. Armenta requires before seeing people.... Lumbar MRI, sacral MRI. I put in a referral.

## 2021-04-16 ENCOUNTER — HOSPITAL ENCOUNTER (OUTPATIENT)
Dept: MRI IMAGING | Facility: OTHER | Age: 66
End: 2021-04-16
Attending: INTERNAL MEDICINE
Payer: MEDICARE

## 2021-04-16 DIAGNOSIS — M53.3 SACROILIAC JOINT PAIN: ICD-10-CM

## 2021-04-16 DIAGNOSIS — M47.816 OSTEOARTHRITIS OF LUMBAR SPINE, UNSPECIFIED SPINAL OSTEOARTHRITIS COMPLICATION STATUS: ICD-10-CM

## 2021-04-16 PROCEDURE — 72148 MRI LUMBAR SPINE W/O DYE: CPT | Mod: ME

## 2021-04-16 PROCEDURE — 72195 MRI PELVIS W/O DYE: CPT | Mod: MG

## 2021-04-16 PROCEDURE — G1004 CDSM NDSC: HCPCS

## 2021-04-21 ENCOUNTER — HOSPITAL ENCOUNTER (OUTPATIENT)
Dept: PHYSICAL THERAPY | Facility: OTHER | Age: 66
Setting detail: THERAPIES SERIES
End: 2021-04-21
Attending: INTERNAL MEDICINE
Payer: MEDICARE

## 2021-04-21 DIAGNOSIS — M25.511 TRIGGER POINT OF RIGHT SHOULDER REGION: ICD-10-CM

## 2021-04-21 DIAGNOSIS — M53.3 SACROILIAC JOINT PAIN: ICD-10-CM

## 2021-04-21 DIAGNOSIS — M54.9 TRIGGER POINT WITH BACK PAIN: ICD-10-CM

## 2021-04-21 PROCEDURE — 97110 THERAPEUTIC EXERCISES: CPT | Mod: GP

## 2021-04-21 PROCEDURE — 97161 PT EVAL LOW COMPLEX 20 MIN: CPT | Mod: GP

## 2021-04-22 ENCOUNTER — HOSPITAL ENCOUNTER (OUTPATIENT)
Dept: GENERAL RADIOLOGY | Facility: OTHER | Age: 66
Discharge: HOME | End: 2021-04-22
Attending: INTERNAL MEDICINE | Admitting: INTERNAL MEDICINE
Payer: MEDICARE

## 2021-04-22 DIAGNOSIS — M53.3 SACROILIAC JOINT PAIN: ICD-10-CM

## 2021-04-22 PROCEDURE — 27096 INJECT SACROILIAC JOINT: CPT | Mod: LT

## 2021-04-22 PROCEDURE — 250N000011 HC RX IP 250 OP 636: Performed by: RADIOLOGY

## 2021-04-22 PROCEDURE — 255N000002 HC RX 255 OP 636: Performed by: RADIOLOGY

## 2021-04-22 PROCEDURE — 250N000009 HC RX 250: Performed by: RADIOLOGY

## 2021-04-22 RX ORDER — LIDOCAINE HYDROCHLORIDE 10 MG/ML
2 INJECTION, SOLUTION INFILTRATION; PERINEURAL ONCE
Status: COMPLETED | OUTPATIENT
Start: 2021-04-22 | End: 2021-04-22

## 2021-04-22 RX ORDER — BUPIVACAINE HYDROCHLORIDE 5 MG/ML
3 INJECTION, SOLUTION EPIDURAL; INTRACAUDAL ONCE
Status: COMPLETED | OUTPATIENT
Start: 2021-04-22 | End: 2021-04-22

## 2021-04-22 RX ORDER — TRIAMCINOLONE ACETONIDE 40 MG/ML
40 INJECTION, SUSPENSION INTRA-ARTICULAR; INTRAMUSCULAR ONCE
Status: COMPLETED | OUTPATIENT
Start: 2021-04-22 | End: 2021-04-22

## 2021-04-22 RX ADMIN — TRIAMCINOLONE ACETONIDE 40 MG: 40 INJECTION, SUSPENSION INTRA-ARTICULAR; INTRAMUSCULAR at 16:16

## 2021-04-22 RX ADMIN — BUPIVACAINE HYDROCHLORIDE 3 ML: 5 INJECTION, SOLUTION EPIDURAL; INTRACAUDAL at 16:16

## 2021-04-22 RX ADMIN — IOHEXOL 2 ML: 240 INJECTION, SOLUTION INTRATHECAL; INTRAVASCULAR; INTRAVENOUS; ORAL at 16:16

## 2021-04-22 RX ADMIN — LIDOCAINE HYDROCHLORIDE 2 ML: 10 INJECTION, SOLUTION INFILTRATION; PERINEURAL at 16:16

## 2021-04-22 NOTE — PROGRESS NOTES
[facility]          OUTPATIENT PHYSICAL THERAPY ORTHOPEDIC EVALUATION  PLAN OF TREATMENT FOR OUTPATIENT REHABILITATION  (COMPLETE FOR INITIAL CLAIMS ONLY)  Patient's Last Name, First Name, M.I.  YOB: 1955  Selvin An    Provider s Name:  [facility]   Medical Record No.  [id number]   Start of Care Date:  04/21/21   Onset Date:  04/14/21   Type:     _X__PT   ___OT   ___SLP Medical Diagnosis:  Trigger point with back pain M54.9,  Trigger point of right shoulder region M25.511,  Sacroiliac joint pain - left M53.3      PT Diagnosis:  Impaired mobility, decreased strength and endurance   Visits from SOC:  1      _________________________________________________________________________________  Plan of Treatment/Functional Goals:  gait training, joint mobilization, manual therapy, neuromuscular re-education, ROM, strengthening, stretching     Cryotherapy, Hot packs, Electrical stimulation, Traction, Ultrasound     Goals  Goal Identifier: Reaching  Goal Description: Patient will be able to complete all overhead and out to the side movements with no increase in shoulder pain in order to improve his overall mobility  Target Date: 06/16/21    Goal Identifier: Walking  Goal Description: Patient will be able to ambulate for longer than 15 minutes with no LE symptoms and no pain greater than 3/10 consistently in order to improve his mobility in the community  Target Date: 06/16/21    Goal Identifier: Household tasks  Goal Description: Patient will be able to complete all household tasks, such as laundry, dishes, and sweeping/vacuuming, with pain no great than 3/10 in his shoulder and low back in order to improve his function at home  Target Date: 06/16/21    Goal Identifier: Standing  Goal Description: Patient will be able to  one spot for longer than 10 minutes with pain no greater than 3/10 consistently in order to improve his overall  function  Target Date: 06/16/21    Therapy Frequency:  other (see comments)(1-2 times per week)  Predicted Duration of Therapy Intervention:  8 weeks    Carmine Long, AKIKO                 I CERTIFY THE NEED FOR THESE SERVICES FURNISHED UNDER        THIS PLAN OF TREATMENT AND WHILE UNDER MY CARE     (Physician co-signature of this document indicates review and certification of the therapy plan).                       Certification Date From:  04/21/21   Certification Date To:  06/16/21    Referring Provider:  Matt Whitmore MD    Initial Assessment        See Epic Evaluation Start of Care Date: 04/21/21

## 2021-04-22 NOTE — PROGRESS NOTES
"   04/21/21 1500   General Information   Type of Visit Initial OP Ortho PT Evaluation   Start of Care Date 04/21/21   Referring Physician Matt Whitmore MD   Patient/Family Goals Statement to reduce his pain   Orders Evaluate and Treat   Orders Comment myofascial therapy - Elisa   Date of Order 04/14/21   Certification Required? Yes   Medical Diagnosis Trigger point with back pain M54.9,  Trigger point of right shoulder region M25.511,  Sacroiliac joint pain - left M53.3    Surgical/Medical history reviewed Yes   Precautions/Limitations no known precautions/limitations       Present No   Body Part(s)   Body Part(s) Lumbar Spine/SI;Shoulder   Presentation and Etiology   Pertinent history of current problem (include personal factors and/or comorbidities that impact the POC) Patient is a 65 year old male referred to physical therapy with low back and right shoulder pain. He reports taht there was no injury with either body area but have gotten worse over time. In regards to the right shoulder, he has pain in the upper back area/shoulder that aches down the arm. He denies numbness or tingling. Did [facility]. Surgery was done in [place]. He reports recovering pretty well from that without much issue. He got pretty much full function back in the shoulder. In regards to the back, his pain is in more of the left side. Standing and walking are the two most difficult things. Standing still is difficult and he can walk but when he walks too much everything will \"lock up\" and gets miserable. This feels like a lesser version of the sciatica that he would normally get. He has had that a few times in the past years. His pain goes down the left leg and into the thigh just above the knee. The pain is more in the back. He is scheduled for an injection tomorrow. He also has an appointment with Dr. Armenta at [facility] in a few weeks.    Impairments A. Pain;E. " Decreased flexibility   Functional Limitations perform activities of daily living;perform desired leisure / sports activities   Symptom Location right shoulder and left low back   How/Where did it occur From insidious onset   Onset date of current episode/exacerbation 04/14/21   Chronicity Chronic   Pain rating (0-10 point scale) Best (/10);Worst (/10)   Best (/10) 3   Worst (/10) 7   Pain quality A. Sharp;E. Shooting;G. Cramping   Frequency of pain/symptoms A. Constant   Pain/symptoms are: Worse during the day   Pain/symptoms exacerbated by B. Walking;K. Home tasks   Pain/symptoms eased by A. Sitting   Progression of symptoms since onset: Unchanged   Prior Level of Function   Prior Level of Function-Mobility Independent   Prior Level of Function-ADLs Independent   Current Level of Function   Current Community Support Family/friend caregiver   Patient role/employment history F. Retired   Living environment House/townhome   Home/community accessibility Denies issues getting up and downs stairs   Current equipment-Gait/Locomotion None   Current equipment-ADL None   Fall Risk Screen   Fall screen completed by PT   Have you fallen 2 or more times in the past year? No   Have you fallen and had an injury in the past year? No   Is patient a fall risk? No   Abuse Screen (yes response referral indicated)   Feels Unsafe at Home or Work/School no   Feels Threatened by Someone no   Does Anyone Try to Keep You From Having Contact with Others or Doing Things Outside Your Home? no   Physical Signs of Abuse Present no   System Outcome Measures   Outcome Measures Low Back Pain (see Oswestry and Landon)   Lumbar Spine/SI Objective Findings   Observation Patient resting comfortably in chair.  No apparent distress.   Integumentary No significant findings   Posture Slight protraction of shoulders   Gait/Locomotion Slight antalgic gait when initiating walking out of a chair.  This improves as he gets going   Flexion ROM WFL with pulling  on the left side   Extension ROM WFL   Right Side Bending ROM WFL   Left Side Bending ROM Min limited secondary to pain on the left   Lumbar ROM Comment Rotation: WFL   Hip Flexion (L2) Strength 4/5    Hip Abduction Strength 5/5   Hip Adduction Strength 5/5   Knee Flexion Strength 5/5   Knee Extension (L3) Strength 3/5 - unable to achieve full knee extension due to dural tighness   Ankle Dorsiflexion (L4) Strength 5/5   Hamstring Flexibility Min limited   Hip Flexor Flexibility Min limited   Piriformis Flexibility Min limited   SLR Not tested   Slump Test Positive - increased symptoms on left   Segmental Mobility Tightness noted in lower lumbar spine with PA glides   Sensation Testing Intact to light touch   Neurological Testing Comments Sciatic symptoms present with tensioning on left   Palpation Discomfort noted in lower lumbar paraspinals and gluteus medius on left   Shoulder Objective Findings   Side (if bilateral, select both right and left) Right   Observation See above   Integumentary  No significant findings   Posture See above   Cervical Screen (ROM, quadrant) WFL   Neer's Test Negative   Ling-Luis Test Slightly psitive   Elkhart's Test Negative   Sulcus Test Negative   Crossover Test Slight discomfort   Palpation Discomfort noted with palpation to supraspinatus, teres minor and infraspinatus   Right Shoulder Flexion AROM WFL   Right Shoulder Flexion PROM WFL   Right Shoulder Abduction AROM WFL   Right Shoulder Abduction PROM WFL   Right Shoulder ER AROM Able to reach behind his head   Right Shoulder IR AROM Able to reach his belt loop   Right Shoulder Flexion Strength 5/5   Right Shoulder Abduction Strength 5/5   Right Shoulder ER Strength 4/5   Right Shoulder IR Strength 5/5   Planned Therapy Interventions   Planned Therapy Interventions gait training;joint mobilization;manual therapy;neuromuscular re-education;ROM;strengthening;stretching   Planned Modality Interventions   Planned Modality  Interventions Cryotherapy;Hot packs;Electrical stimulation;Traction;Ultrasound   Clinical Impression   Criteria for Skilled Therapeutic Interventions Met yes, treatment indicated   PT Diagnosis Impaired mobility, decreased strength and endurance   Influenced by the following impairments Tightness/stiffness, pain, weakness   Functional limitations due to impairments BProlonged walking, prolonged standing, lifting   Clinical Presentation Stable/Uncomplicated   Clinical Presentation Rationale Clinical judgment   Clinical Decision Making (Complexity) Low complexity   Therapy Frequency other (see comments)  (1-2 times per week)   Predicted Duration of Therapy Intervention (days/wks) 8 weeks   Risk & Benefits of therapy have been explained Yes   Patient, Family & other staff in agreement with plan of care Yes   Clinical Impression Comments Signs and symptoms consistent with right shoulder pain and left low back pain with radicular symptoms.  In regards to his shoulder it appears to have some rotator cuff involvement.  Most discomfort with strength testing to the rotational muscles.  Palpation elicits discomfort in the supraspinatus and infraspinatus.  Has near full range of motion.  In regards to his low back he has most discomfort on the left side in the paraspinal muscles.  Complains of pain down the left leg that appears to be sciatic pattern.  Positive slump test.  He would benefit from skilled physical therapy services in order to reduce his pain and improve his overall mobility, strength, and endurance.   Education Assessment   Barriers to Learning No barriers   ORTHO GOALS   PT Ortho Eval Goals 1;2;3;4   Ortho Goal 1   Goal Identifier Reaching   Goal Description Patient will be able to complete all overhead and out to the side movements with no increase in shoulder pain in order to improve his overall mobility   Target Date 06/16/21   Ortho Goal 2   Goal Identifier Walking   Goal Description Patient will be able to  ambulate for longer than 15 minutes with no LE symptoms and no pain greater than 3/10 consistently in order to improve his mobility in the community   Target Date 06/16/21   Ortho Goal 3   Goal Identifier Household tasks   Goal Description Patient will be able to complete all household tasks, such as laundry, dishes, and sweeping/vacuuming, with pain no great than 3/10 in his shoulder and low back in order to improve his function at home   Target Date 06/16/21   Ortho Goal 4   Goal Identifier Standing   Goal Description Patient will be able to  one spot for longer than 10 minutes with pain no greater than 3/10 consistently in order to improve his overall function   Target Date 06/16/21   Total Evaluation Time   PT Eval, Low Complexity Minutes (32119) 40   Therapy Certification   Certification date from 04/21/21   Certification date to 06/16/21   Medical Diagnosis Trigger point with back pain M54.9,  Trigger point of right shoulder region M25.511,  Sacroiliac joint pain - left M53.3

## 2021-04-27 DIAGNOSIS — M48.061 NEUROFORAMINAL STENOSIS OF LUMBAR SPINE: ICD-10-CM

## 2021-04-27 DIAGNOSIS — M54.10 RADICULAR LOW BACK PAIN: Primary | ICD-10-CM

## 2021-04-28 ENCOUNTER — HOSPITAL ENCOUNTER (OUTPATIENT)
Dept: PHYSICAL THERAPY | Facility: OTHER | Age: 66
Setting detail: THERAPIES SERIES
End: 2021-04-28
Attending: INTERNAL MEDICINE
Payer: MEDICARE

## 2021-04-28 PROCEDURE — 97140 MANUAL THERAPY 1/> REGIONS: CPT | Mod: GP

## 2021-04-28 PROCEDURE — 97110 THERAPEUTIC EXERCISES: CPT | Mod: GP

## 2021-05-04 ENCOUNTER — HOSPITAL ENCOUNTER (OUTPATIENT)
Dept: PHYSICAL THERAPY | Facility: OTHER | Age: 66
Setting detail: THERAPIES SERIES
End: 2021-05-04
Attending: INTERNAL MEDICINE
Payer: MEDICARE

## 2021-05-04 PROCEDURE — 97140 MANUAL THERAPY 1/> REGIONS: CPT | Mod: GP

## 2021-05-04 PROCEDURE — 97110 THERAPEUTIC EXERCISES: CPT | Mod: GP

## 2021-05-06 ENCOUNTER — HOSPITAL ENCOUNTER (OUTPATIENT)
Dept: GENERAL RADIOLOGY | Facility: OTHER | Age: 66
End: 2021-05-06
Attending: STUDENT IN AN ORGANIZED HEALTH CARE EDUCATION/TRAINING PROGRAM
Payer: MEDICARE

## 2021-05-06 ENCOUNTER — OFFICE VISIT (OUTPATIENT)
Dept: ORTHOPEDICS | Facility: OTHER | Age: 66
End: 2021-05-06
Attending: INTERNAL MEDICINE
Payer: MEDICARE

## 2021-05-06 ENCOUNTER — HOSPITAL ENCOUNTER (OUTPATIENT)
Dept: PHYSICAL THERAPY | Facility: OTHER | Age: 66
Setting detail: THERAPIES SERIES
End: 2021-05-06
Attending: INTERNAL MEDICINE
Payer: MEDICARE

## 2021-05-06 VITALS — WEIGHT: 286 LBS | HEIGHT: 72 IN | BODY MASS INDEX: 38.74 KG/M2

## 2021-05-06 DIAGNOSIS — M54.16 LUMBAR RADICULOPATHY: ICD-10-CM

## 2021-05-06 DIAGNOSIS — M53.3 SACROILIAC JOINT PAIN: ICD-10-CM

## 2021-05-06 PROCEDURE — 99203 OFFICE O/P NEW LOW 30 MIN: CPT | Performed by: STUDENT IN AN ORGANIZED HEALTH CARE EDUCATION/TRAINING PROGRAM

## 2021-05-06 PROCEDURE — G0463 HOSPITAL OUTPT CLINIC VISIT: HCPCS | Mod: 25 | Performed by: STUDENT IN AN ORGANIZED HEALTH CARE EDUCATION/TRAINING PROGRAM

## 2021-05-06 PROCEDURE — 97140 MANUAL THERAPY 1/> REGIONS: CPT | Mod: GP

## 2021-05-06 PROCEDURE — 97110 THERAPEUTIC EXERCISES: CPT | Mod: GP

## 2021-05-06 PROCEDURE — 72100 X-RAY EXAM L-S SPINE 2/3 VWS: CPT

## 2021-05-06 ASSESSMENT — MIFFLIN-ST. JEOR: SCORE: 2115.29

## 2021-05-06 ASSESSMENT — PAIN SCALES - GENERAL: PAINLEVEL: MILD PAIN (3)

## 2021-05-06 NOTE — NURSING NOTE
Chief Complaint   Patient presents with     Consult     Left Lower Back       Patient presents to the clinic today for left lower back pain that radiates down his left leg.     Initial Ht 1.829 m (6')   Wt 129.7 kg (286 lb)   BMI 38.79 kg/m   Estimated body mass index is 38.79 kg/m  as calculated from the following:    Height as of this encounter: 1.829 m (6').    Weight as of this encounter: 129.7 kg (286 lb).  Medication Reconciliation: complete    Liv Guajardo LPN

## 2021-05-06 NOTE — PROGRESS NOTES
Visit Date: 05/06/2021    HISTORY OF PRESENT ILLNESS:  Henry is a 65-year-old male who presents for evaluation of left-sided SI joint dysfunction.  He has been dealing with this for some time and has been recently started in Physical Therapy and received his first SI injection approximately 1 week ago.  This has taken his pain scale from approximately an 8-9 down to a 3, and his pain is controllable in clinic today.  He reports that his pain is localized to the left posterior superior iliac spine, which confirms with the Leona finger test, and this occasionally radiates into the hip and down the left leg in more of an SI distribution than a true radicular distribution.    PHYSICAL EXAMINATION:    GENERAL:  Well-appearing, well-nourished.    MUSCULOSKELETAL/NEUROLOGIC:  He has 5/5 strength in bilateral upper and lower extremities, including deltoid, biceps, triceps, wrist extension/flexion, hand , hand intrinsics, hip flexion, knee extension/flexion, ankle dorsiflexion, plantarflexion, EHL.  He has normal sensation to light touch throughout bilateral upper and lower extremities.  He has a tender to palpation left posterior superior iliac spine that is positive for 2/5 provocative SI joint testing procedures today given his recent injection including compression and Gaenslen's, but absent for worsening pain with thigh thrust, compression and ROBERT's.      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  He has flexion/extension films of the lumbar spine, which show a grade 1 retrolisthesis of the L5 on S1  vertebral bodies in the extended position.    ASSESSMENT AND PLAN:  Henry is a 65-year-old male with what appears to be a left-sided sacroiliac joint dysfunction as opposed to a left-sided sacroiliac radiculopathy, given his significant improvement in pain with an injection and beginning physical therapy.  He is going to continue 6 weeks of physical therapy and get his second sacroiliac injection should his pain return and return to clinic for more further discussions about permanent treatment with an sacroiliac fusion should his pain return after the aforementioned treatment strategies.    This clinic visit took approximately 30 minutes.    Vic Armenta MD        D: 2021   T: 2021   MT: VANIA    Name:     PITER ROTHMAN  MRN:      [id number]        Account:    [id number]   :      1955           Visit Date: 2021     Document: [id number]

## 2021-05-10 ENCOUNTER — HOSPITAL ENCOUNTER (OUTPATIENT)
Dept: PHYSICAL THERAPY | Facility: OTHER | Age: 66
Setting detail: THERAPIES SERIES
End: 2021-05-10
Attending: INTERNAL MEDICINE
Payer: MEDICARE

## 2021-05-10 ENCOUNTER — HOSPITAL ENCOUNTER (OUTPATIENT)
Dept: GENERAL RADIOLOGY | Facility: OTHER | Age: 66
Discharge: HOME | End: 2021-05-10
Attending: INTERNAL MEDICINE | Admitting: INTERNAL MEDICINE
Payer: MEDICARE

## 2021-05-10 DIAGNOSIS — M54.10 RADICULAR LOW BACK PAIN: ICD-10-CM

## 2021-05-10 DIAGNOSIS — M48.061 NEUROFORAMINAL STENOSIS OF LUMBAR SPINE: ICD-10-CM

## 2021-05-10 PROCEDURE — 255N000002 HC RX 255 OP 636: Performed by: RADIOLOGY

## 2021-05-10 PROCEDURE — 250N000009 HC RX 250: Performed by: RADIOLOGY

## 2021-05-10 PROCEDURE — 62323 NJX INTERLAMINAR LMBR/SAC: CPT

## 2021-05-10 PROCEDURE — 250N000011 HC RX IP 250 OP 636: Performed by: RADIOLOGY

## 2021-05-10 PROCEDURE — 97140 MANUAL THERAPY 1/> REGIONS: CPT | Mod: GP

## 2021-05-10 RX ORDER — METHYLPREDNISOLONE ACETATE 80 MG/ML
80 INJECTION, SUSPENSION INTRA-ARTICULAR; INTRALESIONAL; INTRAMUSCULAR; SOFT TISSUE ONCE
Status: COMPLETED | OUTPATIENT
Start: 2021-05-10 | End: 2021-05-10

## 2021-05-10 RX ORDER — LIDOCAINE HYDROCHLORIDE 10 MG/ML
2 INJECTION, SOLUTION INFILTRATION; PERINEURAL ONCE
Status: COMPLETED | OUTPATIENT
Start: 2021-05-10 | End: 2021-05-10

## 2021-05-10 RX ORDER — LIDOCAINE HYDROCHLORIDE 10 MG/ML
2 INJECTION, SOLUTION EPIDURAL; INFILTRATION; INTRACAUDAL; PERINEURAL ONCE
Status: COMPLETED | OUTPATIENT
Start: 2021-05-10 | End: 2021-05-10

## 2021-05-10 RX ADMIN — IOHEXOL 2 ML: 240 INJECTION, SOLUTION INTRATHECAL; INTRAVASCULAR; INTRAVENOUS; ORAL at 12:19

## 2021-05-10 RX ADMIN — LIDOCAINE HYDROCHLORIDE 2 ML: 10 INJECTION, SOLUTION INFILTRATION; PERINEURAL at 12:19

## 2021-05-10 RX ADMIN — LIDOCAINE HYDROCHLORIDE 2 ML: 10 INJECTION, SOLUTION EPIDURAL; INFILTRATION; INTRACAUDAL; PERINEURAL at 12:15

## 2021-05-10 RX ADMIN — METHYLPREDNISOLONE ACETATE 80 MG: 80 INJECTION, SUSPENSION INTRA-ARTICULAR; INTRALESIONAL; INTRAMUSCULAR; SOFT TISSUE at 12:20

## 2021-05-26 ENCOUNTER — HOSPITAL ENCOUNTER (OUTPATIENT)
Dept: PHYSICAL THERAPY | Facility: OTHER | Age: 66
Setting detail: THERAPIES SERIES
End: 2021-05-26
Attending: INTERNAL MEDICINE
Payer: MEDICARE

## 2021-05-26 PROCEDURE — 97140 MANUAL THERAPY 1/> REGIONS: CPT | Mod: GP

## 2021-05-26 PROCEDURE — 97110 THERAPEUTIC EXERCISES: CPT | Mod: GP

## 2021-06-02 ENCOUNTER — HOSPITAL ENCOUNTER (OUTPATIENT)
Dept: PHYSICAL THERAPY | Facility: OTHER | Age: 66
Setting detail: THERAPIES SERIES
End: 2021-06-02
Attending: INTERNAL MEDICINE
Payer: MEDICARE

## 2021-06-02 PROCEDURE — 97110 THERAPEUTIC EXERCISES: CPT | Mod: GP

## 2021-06-08 ENCOUNTER — OFFICE VISIT (OUTPATIENT)
Dept: INTERNAL MEDICINE | Facility: OTHER | Age: 66
End: 2021-06-08
Attending: INTERNAL MEDICINE
Payer: MEDICARE

## 2021-06-08 VITALS
HEART RATE: 96 BPM | OXYGEN SATURATION: 97 % | SYSTOLIC BLOOD PRESSURE: 138 MMHG | DIASTOLIC BLOOD PRESSURE: 86 MMHG | WEIGHT: 293.6 LBS | TEMPERATURE: 97.4 F | RESPIRATION RATE: 20 BRPM | BODY MASS INDEX: 39.82 KG/M2

## 2021-06-08 DIAGNOSIS — E55.9 VITAMIN D DEFICIENCY: ICD-10-CM

## 2021-06-08 DIAGNOSIS — E83.52 HYPERCALCEMIA: ICD-10-CM

## 2021-06-08 DIAGNOSIS — G93.32 CHRONIC FATIGUE SYNDROME WITH FIBROMYALGIA: ICD-10-CM

## 2021-06-08 DIAGNOSIS — I10 BENIGN ESSENTIAL HYPERTENSION: Primary | ICD-10-CM

## 2021-06-08 DIAGNOSIS — R60.0 BILATERAL LOWER EXTREMITY EDEMA: ICD-10-CM

## 2021-06-08 DIAGNOSIS — E53.8 VITAMIN B12 DEFICIENCY: ICD-10-CM

## 2021-06-08 DIAGNOSIS — M79.7 CHRONIC FATIGUE SYNDROME WITH FIBROMYALGIA: ICD-10-CM

## 2021-06-08 LAB
ANION GAP SERPL CALCULATED.3IONS-SCNC: 13 MMOL/L (ref 3–14)
BUN SERPL-MCNC: 38 MG/DL (ref 7–25)
CALCIUM SERPL-MCNC: 10 MG/DL (ref 8.6–10.3)
CHLORIDE SERPL-SCNC: 99 MMOL/L (ref 98–107)
CO2 SERPL-SCNC: 25 MMOL/L (ref 21–31)
CREAT SERPL-MCNC: 1.65 MG/DL (ref 0.7–1.3)
DEPRECATED CALCIDIOL+CALCIFEROL SERPL-MC: 52.4 NG/ML
GFR SERPL CREATININE-BSD FRML MDRD: 42 ML/MIN/{1.73_M2}
GLUCOSE SERPL-MCNC: 98 MG/DL (ref 70–105)
POTASSIUM SERPL-SCNC: 3.9 MMOL/L (ref 3.5–5.1)
SODIUM SERPL-SCNC: 137 MMOL/L (ref 134–144)
VIT B12 SERPL-MCNC: >1500 PG/ML (ref 180–914)

## 2021-06-08 PROCEDURE — 82306 VITAMIN D 25 HYDROXY: CPT | Mod: ZL | Performed by: INTERNAL MEDICINE

## 2021-06-08 PROCEDURE — 82607 VITAMIN B-12: CPT | Mod: ZL | Performed by: INTERNAL MEDICINE

## 2021-06-08 PROCEDURE — 36415 COLL VENOUS BLD VENIPUNCTURE: CPT | Mod: ZL | Performed by: INTERNAL MEDICINE

## 2021-06-08 PROCEDURE — G0463 HOSPITAL OUTPT CLINIC VISIT: HCPCS | Performed by: INTERNAL MEDICINE

## 2021-06-08 PROCEDURE — 80048 BASIC METABOLIC PNL TOTAL CA: CPT | Mod: ZL | Performed by: INTERNAL MEDICINE

## 2021-06-08 PROCEDURE — 99214 OFFICE O/P EST MOD 30 MIN: CPT | Performed by: INTERNAL MEDICINE

## 2021-06-08 RX ORDER — SPIRONOLACTONE AND HYDROCHLOROTHIAZIDE 25; 25 MG/1; MG/1
1 TABLET ORAL 2 TIMES DAILY
Qty: 180 TABLET | Refills: 3 | Status: SHIPPED | OUTPATIENT
Start: 2021-06-08 | End: 2021-08-04

## 2021-06-08 ASSESSMENT — ENCOUNTER SYMPTOMS
BACK PAIN: 1
WHEEZING: 0
DYSURIA: 0
ARTHRALGIAS: 0
WOUND: 0
APNEA: 1
SHORTNESS OF BREATH: 1
FEVER: 0
DIZZINESS: 0
NAUSEA: 0
CHILLS: 0
BRUISES/BLEEDS EASILY: 0
LIGHT-HEADEDNESS: 0
ABDOMINAL PAIN: 0
SLEEP DISTURBANCE: 1
DIARRHEA: 0
MYALGIAS: 0
VOMITING: 0
CONFUSION: 0
HEMATURIA: 0
AGITATION: 0
FATIGUE: 1
COUGH: 1
PALPITATIONS: 0

## 2021-06-08 ASSESSMENT — PAIN SCALES - GENERAL: PAINLEVEL: NO PAIN (1)

## 2021-06-08 NOTE — PROGRESS NOTES
Mr. An is a 66 year old male who presents to the clinic for evaluation of the following problems:      ICD-10-CM    1. Benign essential hypertension  I10 spironolactone-HCTZ (ALDACTAZIDE) 25-25 MG tablet   2. Vitamin D deficiency  E55.9 Vitamin D Total   3. Chronic fatigue syndrome with fibromyalgia  R53.82     M79.7    4. Bilateral lower extremity edema  R60.0 spironolactone-HCTZ (ALDACTAZIDE) 25-25 MG tablet   5. Vitamin B12 deficiency  E53.8 Vitamin B12     Vitamin B12   6. Hypercalcemia  E83.52 Basic Metabolic Panel     Basic Metabolic Panel     HPI  Hypertension, now well controlled. Previously on 3 anti-hypertensives... now just on BID dosing of spironolactone and hydrochlorothiazide.  Doing well with current dosing.  Needs refills.    Vitamin D and vitamin B12 deficiency, check labs.  Continues on oral replacement.    Bilateral lower extremity edema, improved with twice daily spironolactone-hydrochlorothiazide.  No changes for now.  Check labs.    Hypercalcemia, noted with previous lab work.  Recheck labs.    Functional Capacity: > or about 4 METS.   Review of Systems   Constitutional: Positive for fatigue. Negative for chills and fever.        + Chronic Fatigue Syndrome since Lyme Disease - in Fall 2016.    HENT: Negative for congestion and hearing loss.    Eyes: Negative for visual disturbance.   Respiratory: Positive for apnea (uses CPAP nightly), cough (intermittent, dry) and shortness of breath (Fine for 1st 10-15 minutes, then chronic fatigue, needs to take a break). Negative for wheezing.    Cardiovascular: Positive for leg swelling. Negative for chest pain and palpitations.   Gastrointestinal: Negative for abdominal pain, diarrhea, nausea and vomiting.   Endocrine: Negative for cold intolerance and heat intolerance.        + Diaphoretic with exertion, little energy   Genitourinary: Negative for dysuria and hematuria.   Musculoskeletal: Positive for back pain (Low back pain - SI joint pain was very  helpful). Negative for arthralgias and myalgias.   Skin: Negative for rash and wound.   Allergic/Immunologic: Negative for immunocompromised state.   Neurological: Negative for dizziness and light-headedness.   Hematological: Does not bruise/bleed easily.   Psychiatric/Behavioral: Positive for sleep disturbance. Negative for agitation and confusion.      Reviewed and updated as needed this visit by Provider   Tobacco  Allergies  Meds  Problems  Med Hx  Surg Hx  Fam Hx          EXAM:   Vitals:    06/08/21 1113   BP: 138/86   Pulse: 96   Resp: 20   Temp: 97.4  F (36.3  C)   TempSrc: Tympanic   SpO2: 97%   Weight: 133.2 kg (293 lb 9.6 oz)       Current Pain Score: No Pain (1)     BP Readings from Last 3 Encounters:   06/08/21 138/86   04/14/21 126/78   02/12/21 129/85      Wt Readings from Last 3 Encounters:   06/08/21 133.2 kg (293 lb 9.6 oz)   05/06/21 129.7 kg (286 lb)   04/14/21 135.4 kg (298 lb 6.4 oz)      Estimated body mass index is 39.82 kg/m  as calculated from the following:    Height as of 5/6/21: 1.829 m (6').    Weight as of this encounter: 133.2 kg (293 lb 9.6 oz).     Physical Exam     Procedures   INVESTIGATIONS:  - Labs reviewed in [facility]     ASSESSMENT AND PLAN:  Problem List Items Addressed This Visit        Nervous and Auditory    Chronic fatigue syndrome with fibromyalgia       Digestive    Vitamin D deficiency    Relevant Orders    Vitamin D Total    Vitamin B12 deficiency    Relevant Orders    Vitamin B12 (Completed)       Endocrine    Hypercalcemia    Relevant Orders    Basic Metabolic Panel (Completed)       Circulatory    Benign essential hypertension - Primary    Relevant Medications    spironolactone-HCTZ (ALDACTAZIDE) 25-25 MG tablet       Musculoskeletal and Integumentary    Bilateral lower extremity edema    Relevant Medications    spironolactone-HCTZ (ALDACTAZIDE) 25-25 MG tablet        reviewed diet, exercise and weight control, recommended sodium restriction, cardiovascular risk  and specific lipid/LDL goals reviewed  -- Expected clinical course discussed    -- Medications and their side effects discussed    The 10-year ASCVD risk score (Gael DARNELL Jr., et al., 2013) is: 21.8%    Values used to calculate the score:      Age: 66 years      Sex: Male      Is Non- : No      Diabetic: No      Tobacco smoker: No      Systolic Blood Pressure: 138 mmHg      Is BP treated: No      HDL Cholesterol: 33 mg/dL      Total Cholesterol: 224 mg/dL    Patient Instructions     Blood pressure is well controlled.     Your Body mass index (BMI) is:  Estimated body mass index is 39.82 kg/m  as calculated from the following:    Height as of 5/6/21: 1.829 m (6').    Weight as of this encounter: 133.2 kg (293 lb 9.6 oz).   (BMI ranges: Normal 18.5 - 25, Overweight 25 - 30, Obesity greater than 30)     Facts about losing weight:   -- Overweight and Obesity increase your risk for developing diabetes, high blood pressure and stroke, and shorten your life.   -- 90% of weight loss comes from dietary changes, only 10% from exercise   -- For every 1 pound of of weight loss -- this is equal to about 8 to 10 pounds of weight off of your knees.   -- there are about 3500 calories in 1 pound of body weight.     -- Goal Caloric intake -- 1200 to 1500 calories daily.     Get out and exercise, bike ride, walk for 10 to 15 minutes after each meal -- this can significantly lowers the spike in blood sugar after eating.     To help with weight loss and improve blood sugar control....    -- Try to avoid Carbohydrates as much as possible -- breads, pasta, baked goods, cakes, oatmeal, cold cereal, potatoes.   -- Eat more lean meats, proteins, eggs, nuts, vegetables.    -- Start or Continue regular daily exercise.      -- Eat more lean meats, proteins, eggs, nuts, vegetables.      What have successful people done to lose large amounts of weight, and keep it off?   -- Used both diet and exercise to lose weight   -- Ate  a healthy breakfast every day   -- Exercised an hour per day   -- Watched less than 10 hours of TV per week   -- Weighed themselves weekly   -- Drink a large glass of water 10-15 minutes before your meals.   -- Use smaller dinner plates and don't go back for seconds.     What should I do?   -- Work on 5-10% weight loss   -- Focus on a few healthy dietary changes   -- Eat more fresh fruits and vegetables, and fewer carbohydrates (http://myplate.gov/)   -- Exercise by some means -- every day   -- Weigh yourself once a week    Weight Management   Weight Watchers     Meets Mondays 9:30, 11:45, or 5:30    [facility], [address], [place], [place]     Contact Sara [phone] [email or web address]  Weight Watchers [email or web address]    -- Consider My Fitness Pal on your smart phone  [email or web address]          Immunization History   Administered Date(s) Administered     COVID-19,PF,Moderna 02/17/2021, 03/17/2021     Flu, Unspecified 12/06/2007, 10/16/2009, 10/26/2010     Influenza (High Dose) 3 valent vaccine 10/28/2019     Influenza (IIV3) PF 11/14/2002, 09/26/2012, 11/12/2014     Influenza Vaccine IM > 6 months Valent IIV4 10/30/2017, 11/01/2017, 11/20/2017, 10/23/2018     Influenza, Quad, High Dose, Pf, 65yr + 10/13/2020     Meningococcal (Menactra ) 07/14/2017, 09/14/2017     Pedvax-hib 07/14/2017     Pneumo Conj 13-V (2010&after) 05/27/2016     Pneumococcal 23 valent 04/13/2004, 06/12/2017, 07/14/2017     Td (Adult), Adsorbed 04/13/2004     Tdap (Adult) Unspecified Formulation 08/13/2014     Zoster vaccine, live 06/12/2017, 07/14/2017        -- Get the 2 shot COVID-19 Vaccination series (1st shot when available and 2nd shot 17 to 21 days later)    -- Consider the **Pfizer** vaccine --    -- Consider Annual Flu / Influenza vaccination - Every Fall (Starting about October 1st)    -- Get your tetanus shot updated - from one of the local pharmacies, at your convenience. -- September 2024.     -- Get the  new shingles shot (2 in series) (Shingrix) - from one of the local pharmacies, at your convenience. -- Check cost/coverage.   -- or -- If these are very expensive, go to the Local Public Health Department       Sleep Difficulties:  -- No caffeine after 3pm   -- No screens the hour before bed (including TV, cell phone, tablets, E-readers, laptops, etc)   -- No TVs in bedroom   -- Keep the same bed time and wake time 7 days a week   -- No naps   -- Relaxing routine before bed   -- Lay down in bed when tired, and go to sleep   -- Exercise daily    -- Adjust Melatonin dose:   -- to help initiate sleep -- take anywhere from 30 minutes to 3 hours prior to bedtime for about 3 to 4 weeks.     -- take at bedtime, to help maintain sleep.     - maintain a regular bedtime, routine and wake up time  - include a warm beverage and/or hot bath 1-2 hours before bedtime  - avoid naps  - do not watch the clock  - avoid caffeine, alcohol and nicotine for at least 4-6 hours prior to bed  - exercise daily but avoid exercising right before bed  - no screen time within hour of bed  - keep your bedroom quiet, dark,comfortable and cool  - If you haven t been able to get to sleep after about 20 minutes or more, get up and do something calming or boring until you feel sleepy, then return to bed and try again. Avoid doing anything that is too stimulating or interesting, as this will wake you up even more.  - use bed only for sleep - do not use it as a place to watch TV, eat, read, work on your laptop, pay bills, and other things       Return as needed for follow-up with Dr. Whitmore.    Clinic : [phone]  Appointment line: [phone]    Electronically signed by:  Matt Whitmore MD on 6/8/2021  Internal Medicine  [facility]

## 2021-06-08 NOTE — PATIENT INSTRUCTIONS
Blood pressure is well controlled.     Your Body mass index (BMI) is:  Estimated body mass index is 39.82 kg/m  as calculated from the following:    Height as of 5/6/21: 1.829 m (6').    Weight as of this encounter: 133.2 kg (293 lb 9.6 oz).   (BMI ranges: Normal 18.5 - 25, Overweight 25 - 30, Obesity greater than 30)     Facts about losing weight:   -- Overweight and Obesity increase your risk for developing diabetes, high blood pressure and stroke, and shorten your life.   -- 90% of weight loss comes from dietary changes, only 10% from exercise   -- For every 1 pound of of weight loss -- this is equal to about 8 to 10 pounds of weight off of your knees.   -- there are about 3500 calories in 1 pound of body weight.     -- Goal Caloric intake -- 1200 to 1500 calories daily.     Get out and exercise, bike ride, walk for 10 to 15 minutes after each meal -- this can significantly lowers the spike in blood sugar after eating.     To help with weight loss and improve blood sugar control....    -- Try to avoid Carbohydrates as much as possible -- breads, pasta, baked goods, cakes, oatmeal, cold cereal, potatoes.   -- Eat more lean meats, proteins, eggs, nuts, vegetables.    -- Start or Continue regular daily exercise.      -- Eat more lean meats, proteins, eggs, nuts, vegetables.      What have successful people done to lose large amounts of weight, and keep it off?   -- Used both diet and exercise to lose weight   -- Ate a healthy breakfast every day   -- Exercised an hour per day   -- Watched less than 10 hours of TV per week   -- Weighed themselves weekly   -- Drink a large glass of water 10-15 minutes before your meals.   -- Use smaller dinner plates and don't go back for seconds.     What should I do?   -- Work on 5-10% weight loss   -- Focus on a few healthy dietary changes   -- Eat more fresh fruits and vegetables, and fewer carbohydrates (http://myplate.gov/)   -- Exercise by some means -- every day   -- Weigh  yourself once a week    Weight Management   Weight Watchers     Meets Mondays 9:30, 11:45, or 5:30    [facility], [address], [place], [place]     Contact Sara [phone] [email or web address]  Weight Watchers [email or web address]    -- Consider My Fitness Pal on your smart phone  [email or web address]          Immunization History   Administered Date(s) Administered     COVID-19,PF,Moderna 02/17/2021, 03/17/2021     Flu, Unspecified 12/06/2007, 10/16/2009, 10/26/2010     Influenza (High Dose) 3 valent vaccine 10/28/2019     Influenza (IIV3) PF 11/14/2002, 09/26/2012, 11/12/2014     Influenza Vaccine IM > 6 months Valent IIV4 10/30/2017, 11/01/2017, 11/20/2017, 10/23/2018     Influenza, Quad, High Dose, Pf, 65yr + 10/13/2020     Meningococcal (Menactra ) 07/14/2017, 09/14/2017     Pedvax-hib 07/14/2017     Pneumo Conj 13-V (2010&after) 05/27/2016     Pneumococcal 23 valent 04/13/2004, 06/12/2017, 07/14/2017     Td (Adult), Adsorbed 04/13/2004     Tdap (Adult) Unspecified Formulation 08/13/2014     Zoster vaccine, live 06/12/2017, 07/14/2017        -- Get the 2 shot COVID-19 Vaccination series (1st shot when available and 2nd shot 17 to 21 days later)    -- Consider the **Pfizer** vaccine --    -- Consider Annual Flu / Influenza vaccination - Every Fall (Starting about October 1st)    -- Get your tetanus shot updated - from one of the local pharmacies, at your convenience. -- September 2024.     -- Get the new shingles shot (2 in series) (Shingrix) - from one of the local pharmacies, at your convenience. -- Check cost/coverage.   -- or -- If these are very expensive, go to the Local Public Health Department       Sleep Difficulties:  -- No caffeine after 3pm   -- No screens the hour before bed (including TV, cell phone, tablets, E-readers, laptops, etc)   -- No TVs in bedroom   -- Keep the same bed time and wake time 7 days a week   -- No naps   -- Relaxing routine before bed   -- Lay down in bed when  tired, and go to sleep   -- Exercise daily    -- Adjust Melatonin dose:   -- to help initiate sleep -- take anywhere from 30 minutes to 3 hours prior to bedtime for about 3 to 4 weeks.     -- take at bedtime, to help maintain sleep.     - maintain a regular bedtime, routine and wake up time  - include a warm beverage and/or hot bath 1-2 hours before bedtime  - avoid naps  - do not watch the clock  - avoid caffeine, alcohol and nicotine for at least 4-6 hours prior to bed  - exercise daily but avoid exercising right before bed  - no screen time within hour of bed  - keep your bedroom quiet, dark,comfortable and cool  - If you haven t been able to get to sleep after about 20 minutes or more, get up and do something calming or boring until you feel sleepy, then return to bed and try again. Avoid doing anything that is too stimulating or interesting, as this will wake you up even more.  - use bed only for sleep - do not use it as a place to watch TV, eat, read, work on your laptop, pay bills, and other things       Return as needed for follow-up with Dr. Whitmore.    Clinic : [phone]  Appointment line: [phone]

## 2021-06-09 ENCOUNTER — MYC MEDICAL ADVICE (OUTPATIENT)
Dept: INTERNAL MEDICINE | Facility: OTHER | Age: 66
End: 2021-06-09

## 2021-07-19 ENCOUNTER — MYC MEDICAL ADVICE (OUTPATIENT)
Dept: INTERNAL MEDICINE | Facility: OTHER | Age: 66
End: 2021-07-19

## 2021-07-21 NOTE — PROGRESS NOTES
Outpatient Physical Therapy Discharge Note     Patient: Selvin An  : 1955    Beginning/End Dates of Reporting Period:  2021 to 2021    Referring Provider: Matt Whitmore MD    Therapy Diagnosis: Impaired mobility, decreased strength and endurance     Client Self Report: Doing well today. Feels that he hasn't had any flare ups recently and is doing well at home with all activities. He is wanting to cancel the remaining visits that he has because he feels he does not need them. Will hold chart open a few weeks while patient trials independent management    Objective Measurements:  Objective Measure: Subjective pain rating  Details: 1/10       Goals:  Goal Identifier Reaching   Goal Description Patient will be able to complete all overhead and out to the side movements with no increase in shoulder pain in order to improve his overall mobility   Target Date 21   Date Met      Progress (detail required for progress note):     Goal Identifier Walking   Goal Description Patient will be able to ambulate for longer than 15 minutes with no LE symptoms and no pain greater than 3/10 consistently in order to improve his mobility in the community   Target Date 21   Date Met      Progress (detail required for progress note):     Goal Identifier Household tasks   Goal Description Patient will be able to complete all household tasks, such as laundry, dishes, and sweeping/vacuuming, with pain no great than 3/10 in his shoulder and low back in order to improve his function at home   Target Date 21   Date Met      Progress (detail required for progress note):     Goal Identifier Standing   Goal Description Patient will be able to  one spot for longer than 10 minutes with pain no greater than 3/10 consistently in order to improve his overall function   Target Date 21   Date Met      Progress (detail required for progress note):     *Unable to assess progress towards goals as  discharge is unplanned.     Plan:  Discharge from therapy.    Discharge:    Reason for Discharge: Did not call to schedule more visits.     Equipment Issued: none    Discharge Plan: Follow up with physician as needed.

## 2021-08-04 ENCOUNTER — OFFICE VISIT (OUTPATIENT)
Dept: INTERNAL MEDICINE | Facility: OTHER | Age: 66
End: 2021-08-04
Attending: INTERNAL MEDICINE
Payer: MEDICARE

## 2021-08-04 VITALS
HEART RATE: 88 BPM | DIASTOLIC BLOOD PRESSURE: 88 MMHG | WEIGHT: 293.4 LBS | SYSTOLIC BLOOD PRESSURE: 126 MMHG | TEMPERATURE: 98.4 F | OXYGEN SATURATION: 94 % | BODY MASS INDEX: 39.79 KG/M2 | RESPIRATION RATE: 22 BRPM

## 2021-08-04 DIAGNOSIS — M35.3 POLYMYALGIA RHEUMATICA (H): Primary | ICD-10-CM

## 2021-08-04 DIAGNOSIS — M79.7 CHRONIC FATIGUE SYNDROME WITH FIBROMYALGIA: ICD-10-CM

## 2021-08-04 DIAGNOSIS — R73.9 ELEVATED RANDOM BLOOD GLUCOSE LEVEL: ICD-10-CM

## 2021-08-04 DIAGNOSIS — E66.01 MORBID OBESITY (H): ICD-10-CM

## 2021-08-04 DIAGNOSIS — E89.0 POSTOPERATIVE HYPOTHYROIDISM: ICD-10-CM

## 2021-08-04 DIAGNOSIS — Z90.81 POST-SPLENECTOMY: ICD-10-CM

## 2021-08-04 DIAGNOSIS — E78.2 MIXED HYPERLIPIDEMIA: ICD-10-CM

## 2021-08-04 DIAGNOSIS — R79.89 LOW SERUM TESTOSTERONE LEVEL IN MALE: ICD-10-CM

## 2021-08-04 DIAGNOSIS — N52.9 ERECTILE DYSFUNCTION, UNSPECIFIED ERECTILE DYSFUNCTION TYPE: ICD-10-CM

## 2021-08-04 DIAGNOSIS — I10 BENIGN ESSENTIAL HYPERTENSION: ICD-10-CM

## 2021-08-04 DIAGNOSIS — R53.83 MALAISE AND FATIGUE: ICD-10-CM

## 2021-08-04 DIAGNOSIS — G93.32 CHRONIC FATIGUE SYNDROME WITH FIBROMYALGIA: ICD-10-CM

## 2021-08-04 DIAGNOSIS — R68.82 DECREASED LIBIDO: ICD-10-CM

## 2021-08-04 DIAGNOSIS — R53.81 MALAISE AND FATIGUE: ICD-10-CM

## 2021-08-04 LAB
ALBUMIN SERPL-MCNC: 4.6 G/DL (ref 3.5–5.7)
ALP SERPL-CCNC: 49 U/L (ref 34–104)
ALT SERPL W P-5'-P-CCNC: 23 U/L (ref 7–52)
ANION GAP SERPL CALCULATED.3IONS-SCNC: 13 MMOL/L (ref 3–14)
AST SERPL W P-5'-P-CCNC: 26 U/L (ref 13–39)
BILIRUB SERPL-MCNC: 0.4 MG/DL (ref 0.3–1)
BUN SERPL-MCNC: 29 MG/DL (ref 7–25)
CALCIUM SERPL-MCNC: 10.6 MG/DL (ref 8.6–10.3)
CHLORIDE BLD-SCNC: 99 MMOL/L (ref 98–107)
CHOLEST SERPL-MCNC: 268 MG/DL
CO2 SERPL-SCNC: 26 MMOL/L (ref 21–31)
CREAT SERPL-MCNC: 1.4 MG/DL (ref 0.7–1.3)
ERYTHROCYTE [DISTWIDTH] IN BLOOD BY AUTOMATED COUNT: 15.7 % (ref 10–15)
FASTING STATUS PATIENT QL REPORTED: ABNORMAL
GFR SERPL CREATININE-BSD FRML MDRD: 52 ML/MIN/1.73M2
GLUCOSE BLD-MCNC: 107 MG/DL (ref 70–105)
HBA1C MFR BLD: 5.6 % (ref 4–6.2)
HCT VFR BLD AUTO: 44.4 % (ref 40–53)
HDLC SERPL-MCNC: 35 MG/DL (ref 23–92)
HGB BLD-MCNC: 15.4 G/DL (ref 13.3–17.7)
LDLC SERPL CALC-MCNC: ABNORMAL MG/DL
MCH RBC QN AUTO: 33.3 PG (ref 26.5–33)
MCHC RBC AUTO-ENTMCNC: 34.7 G/DL (ref 31.5–36.5)
MCV RBC AUTO: 96 FL (ref 78–100)
NONHDLC SERPL-MCNC: 233 MG/DL
PLATELET # BLD AUTO: 465 10E3/UL (ref 150–450)
POTASSIUM BLD-SCNC: 3.9 MMOL/L (ref 3.5–5.1)
PROT SERPL-MCNC: 7.9 G/DL (ref 6.4–8.9)
RBC # BLD AUTO: 4.62 10E6/UL (ref 4.4–5.9)
SODIUM SERPL-SCNC: 138 MMOL/L (ref 134–144)
TESTOST SERPL-MCNC: 143 NG/DL (ref 240–950)
TRIGL SERPL-MCNC: 540 MG/DL
TSH SERPL DL<=0.005 MIU/L-ACNC: 3.61 MU/L (ref 0.4–4)
WBC # BLD AUTO: 13.3 10E3/UL (ref 4–11)

## 2021-08-04 PROCEDURE — 82040 ASSAY OF SERUM ALBUMIN: CPT | Mod: ZL | Performed by: INTERNAL MEDICINE

## 2021-08-04 PROCEDURE — 84443 ASSAY THYROID STIM HORMONE: CPT | Mod: ZL | Performed by: INTERNAL MEDICINE

## 2021-08-04 PROCEDURE — 84403 ASSAY OF TOTAL TESTOSTERONE: CPT | Mod: ZL | Performed by: INTERNAL MEDICINE

## 2021-08-04 PROCEDURE — 85027 COMPLETE CBC AUTOMATED: CPT | Mod: ZL | Performed by: INTERNAL MEDICINE

## 2021-08-04 PROCEDURE — 99214 OFFICE O/P EST MOD 30 MIN: CPT | Performed by: INTERNAL MEDICINE

## 2021-08-04 PROCEDURE — 83036 HEMOGLOBIN GLYCOSYLATED A1C: CPT | Mod: ZL | Performed by: INTERNAL MEDICINE

## 2021-08-04 PROCEDURE — G0463 HOSPITAL OUTPT CLINIC VISIT: HCPCS

## 2021-08-04 PROCEDURE — 80061 LIPID PANEL: CPT | Mod: ZL | Performed by: INTERNAL MEDICINE

## 2021-08-04 PROCEDURE — 36415 COLL VENOUS BLD VENIPUNCTURE: CPT | Mod: ZL | Performed by: INTERNAL MEDICINE

## 2021-08-04 RX ORDER — PREDNISONE 5 MG/1
TABLET ORAL
Qty: 310 TABLET | Refills: 0 | Status: SHIPPED | OUTPATIENT
Start: 2021-08-04 | End: 2021-10-27

## 2021-08-04 RX ORDER — DEXTROAMPHETAMINE SACCHARATE, AMPHETAMINE ASPARTATE MONOHYDRATE, DEXTROAMPHETAMINE SULFATE AND AMPHETAMINE SULFATE 5; 5; 5; 5 MG/1; MG/1; MG/1; MG/1
20 CAPSULE, EXTENDED RELEASE ORAL DAILY
Qty: 30 CAPSULE | Refills: 0 | Status: SHIPPED | OUTPATIENT
Start: 2021-08-04 | End: 2021-09-01

## 2021-08-04 ASSESSMENT — ENCOUNTER SYMPTOMS
DYSURIA: 0
ARTHRALGIAS: 0
DIZZINESS: 0
SLEEP DISTURBANCE: 1
LIGHT-HEADEDNESS: 0
CONFUSION: 0
BRUISES/BLEEDS EASILY: 0
MYALGIAS: 0
FEVER: 0
NAUSEA: 0
WHEEZING: 0
APNEA: 1
FATIGUE: 1
ABDOMINAL PAIN: 0
SHORTNESS OF BREATH: 1
WOUND: 0
PALPITATIONS: 0
BACK PAIN: 1
VOMITING: 0
AGITATION: 0
CHILLS: 0
HEMATURIA: 0
DIARRHEA: 0
COUGH: 1

## 2021-08-04 ASSESSMENT — PAIN SCALES - GENERAL: PAINLEVEL: MODERATE PAIN (4)

## 2021-08-04 NOTE — LETTER
August 7, 2021      Selvin An  [address]   [facility] [id number]        Dear ,    We are writing to inform you of your test results.    Testosterone levels are low.  Urology referral placed with Dr. Schwartz  - they will call with date/time of appointment.     Cholesterol levels are quite high.  Goal LDL is less than 100.  --We should consider starting or increasing dose of statin therapy to help reduce heart attack risk.    There are a few other minor lab abnormalities that we can discuss at your follow-up appointment.    Otherwise - continue current medications.     Electronically signed by:  Matt Whitmore MD  on August 7, 2021     Resulted Orders   Testosterone, Total   Result Value Ref Range    Testosterone Total 143 (L) 240 - 950 ng/dL      Comment:        MALE:  0 to 5 months:  ng/dL  6 months to 9 years: <2-20 ng/dL  10 to 11 years: <2-130 ng/dL  12 to 13 years: <2-800 ng/dL  14 years: <2-1200 ng/dL  15 to 16 years: 100-1200 ng/dL  17 to 18 years: 300-1200 ng/dL  19 years and older: 240-950 ng/dL    FEMALE:  0 to 5 months: 20-80 ng/dL  6 months to 9 years: <2-20 ng/dL  10 to 11 years: <2-44 ng/dL  12 to 16 years: <2-75 ng/dL  17 to 18 years: 20-75 ng/dL  19 years and older: 8-60 ng/dL    Values by Roque Stage:  Stage I (prepubertal)  Male: <2 to 20 ng/dL  Female: <2 to 20 ng/dL    Stage II  Male: 8 to 66 ng/dL  Female: <2 to 47 ng/dL    Stage III  Male: 26 to 800 ng/dL  Female: 17 to 75 ng/dL    Stage IV  Male: 85 to 1200 ng/dL  Female: 20 to 75 ng/dL    Stage V (young adult)  Male: 300 to 950 ng/dL  Female: 12 to 60 ng/dL    Puberty onset (transition from Roque Stage I to Roque Stage II) occurs for boys at a median age of 11.5 years and for girls at median age of 10.5 years. There is evidence that it may occur up to 1 year  earlier in obese girls and in  girls. For boys there is no definite proven relationship between puberty onset and body weight or ethnic origin. Progression  through Roque stages is variable. Roque Stage V (young adult) should be reached by age 18.       Comprehensive metabolic panel   Result Value Ref Range    Sodium 138 134 - 144 mmol/L    Potassium 3.9 3.5 - 5.1 mmol/L    Chloride 99 98 - 107 mmol/L    Carbon Dioxide (CO2) 26 21 - 31 mmol/L    Anion Gap 13 3 - 14 mmol/L    Urea Nitrogen 29 (H) 7 - 25 mg/dL    Creatinine 1.40 (H) 0.70 - 1.30 mg/dL    Calcium 10.6 (H) 8.6 - 10.3 mg/dL    Glucose 107 (H) 70 - 105 mg/dL    Alkaline Phosphatase 49 34 - 104 U/L    AST 26 13 - 39 U/L    ALT 23 7 - 52 U/L    Protein Total 7.9 6.4 - 8.9 g/dL    Albumin 4.6 3.5 - 5.7 g/dL    Bilirubin Total 0.4 0.3 - 1.0 mg/dL    GFR Estimate 52 (L) >60 mL/min/1.73m2      Comment:      As of July 11, 2021, eGFR is calculated by the CKD-EPI creatinine equation, without race adjustment. eGFR can be influenced by muscle mass, exercise, and diet. The reported eGFR is an estimation only and is only applicable if the renal function is stable.   CBC with platelets   Result Value Ref Range    WBC Count 13.3 (H) 4.0 - 11.0 10e3/uL    RBC Count 4.62 4.40 - 5.90 10e6/uL    Hemoglobin 15.4 13.3 - 17.7 g/dL    Hematocrit 44.4 40.0 - 53.0 %    MCV 96 78 - 100 fL    MCH 33.3 (H) 26.5 - 33.0 pg    MCHC 34.7 31.5 - 36.5 g/dL    RDW 15.7 (H) 10.0 - 15.0 %    Platelet Count 465 (H) 150 - 450 10e3/uL   Lipid Profile   Result Value Ref Range    Cholesterol 268 (H) <200 mg/dL      Comment:      Age 0-19 years  Desirable: <170 mg/dL  Borderline high:  170-199 mg/dl  High:            >199 mg/dl    Age 20 years and older  Desirable: <200 mg/dL    Triglycerides 540 (H) <150 mg/dL      Comment:      0-9 years:  Normal:    Less than 75 mg/dL  Borderline high:  75-99 mg/dL  High:             Greater than or equal to 100 mg/dL    0-19 years:  Normal:    Less than 90 mg/dL  Borderline high:   mg/dL  High:             Greater than or equal to 130 mg/dL    20 years and older:  Normal:    Less than 150 mg/dL  Borderline  high:  150-199 mg/dL  High:             200-499 mg/dL  Very high:   Greater than or equal to 500 mg/dL    Direct Measure HDL 35 23 - 92 mg/dL      Comment:      0-19 years:       Greater than or equal to 45 mg/dL   Low: Less than 40 mg/dL   Borderline low: 40-44 mg/dL     20 years and older:   Female: Greater than or equal to 50 mg/dL   Male:   Greater than or equal to 40 mg/dL         LDL Cholesterol Calculated        Comment:      Cannot estimate LDL when triglyceride exceeds 400 mg/dL  Age 0-19 years:  Desirable: 0-110 mg/dL   Borderline high: 110-129 mg/dL   High: >= 130 mg/dL    Age 20 years and older:  Desirable: <100mg/dL  Above desirable: 100-129 mg/dL   Borderline high: 130-159 mg/dL   High: 160-189 mg/dL   Very high: >= 190 mg/dL    Non HDL Cholesterol 233 (H) <130 mg/dL      Comment:      0-19 years:  Desirable:          Less than 120 mg/dL  Borderline high:   120-144 mg/dL  High:                   Greater than or equal to 145 mg/dL    20 years and older:  Desirable:          130 mg/dL  Above Desirable: 130-159 mg/dL  Borderline high:   160-189 mg/dL  High:               190-219 mg/dL  Very high:     Greater than or equal to 220 mg/dL    Patient Fasting > 8hrs? Unknown    Hemoglobin A1c   Result Value Ref Range    Hemoglobin A1C 5.6 4.0 - 6.2 %   TSH with free T4 reflex   Result Value Ref Range    TSH 3.61 0.40 - 4.00 mU/L       If you have any questions or concerns, please call the clinic at the number listed above.       Sincerely,      Matt Whitmore MD

## 2021-08-04 NOTE — PROGRESS NOTES
Mr. An is a 66 year old male who presents to the clinic for evaluation of the following problems:      ICD-10-CM    1. Polymyalgia rheumatica (H)  M35.3 predniSONE (DELTASONE) 5 MG tablet     amphetamine-dextroamphetamine (ADDERALL XR) 20 MG 24 hr capsule     TSH with free T4 reflex     TSH with free T4 reflex     TSH with free T4 reflex   2. Malaise and fatigue  R53.81 amphetamine-dextroamphetamine (ADDERALL XR) 20 MG 24 hr capsule    R53.83 TSH with free T4 reflex     TSH with free T4 reflex     TSH with free T4 reflex   3. Postoperative hypothyroidism  E89.0 TSH with free T4 reflex     TSH with free T4 reflex   4. Post-splenectomy  Z90.81    5. Morbid obesity (H)  E66.01 amphetamine-dextroamphetamine (ADDERALL XR) 20 MG 24 hr capsule     TSH with free T4 reflex     TSH with free T4 reflex     TSH with free T4 reflex   6. Chronic fatigue syndrome with fibromyalgia  R53.82 amphetamine-dextroamphetamine (ADDERALL XR) 20 MG 24 hr capsule    M79.7 TSH with free T4 reflex     TSH with free T4 reflex     TSH with free T4 reflex   7. Decreased libido  R68.82 Testosterone, Total     Testosterone, Total     Adult Urology Referral   8. Erectile dysfunction, unspecified erectile dysfunction type  N52.9 Testosterone, Total     Testosterone, Total     Adult Urology Referral   9. Benign essential hypertension  I10 Comprehensive metabolic panel     CBC with platelets     TSH with free T4 reflex     TSH with free T4 reflex     TSH with free T4 reflex     CANCELED: TSH Reflex GH   10. Mixed hyperlipidemia  E78.2 Lipid Profile   11. Elevated random blood glucose level  R73.09 Hemoglobin A1c   12. Low serum testosterone level in male  R79.89 Adult Urology Referral     HPI  Patient presents for follow-up of multiple issues.  He is having a lot of systemic symptoms and is worried there may be some underlying systemic issue to his parents.    Initially, he is having significant amounts of shoulder and pelvic girdle pain.  A lot of  proximal muscle pain.  Previously diagnosed with polymyalgia rheumatica.  Has never really had elevated inflammatory markers.  Prednisone made a significant improvement in essentially took away his pain in the past.  He was unfortunately treated with very high prednisone dosing, 60 mg daily, plus Cymbalta.  Ended up with a GI bleed.  Medications were stopped after that.  For the last 5 years or so he has been having a lot of similar pain symptoms.  He restarted prednisone but at a much lower dose, now at 10 mg daily.  He reports symptoms are about 50% better in regards to pain but he is still having significant amount of the penile reduction, + erectile dysfunction.  Testosterone levels were checked today and him back low.   Urology referral placed.    Fatigue and malaise, recommend further evaluation, check lab work.  Testosterone, TSH.      He is morbidly obese, with his obesity and fatigue and malaise, discussed the possibility of a 1 month trial of 20 mg Adderall.  He is willing to try this.  30 tablets sent to pharmacy.    Previously diagnosed with chronic fatigue syndrome with fibromyalgia.  This seems to occur after getting Lyme disease.    Hypertension, blood pressure is currently controlled.  Continue current medications.  Check labs.    Mixed hyperlipidemia, currently diet controlled.  Check labs.    Elevated glucose, check hemoglobin.     Low testosterone, urology referral placed.  PSA was normal earlier this year.     Close follow-up advised.     Functional Capacity: less than 4 METS.   Review of Systems   Constitutional: Positive for fatigue. Negative for chills and fever.        + Chronic Fatigue Syndrome since Lyme Disease - in Fall 2016.    HENT: Negative for congestion and hearing loss.    Eyes: Negative for visual disturbance.   Respiratory: Positive for apnea (uses CPAP nightly), cough (intermittent, dry) and shortness of breath (Fine for 1st 10-15 minutes, then chronic fatigue, needs to take a  break). Negative for wheezing.    Cardiovascular: Positive for leg swelling. Negative for chest pain and palpitations.   Gastrointestinal: Negative for abdominal pain, diarrhea, nausea and vomiting.   Endocrine: Negative for cold intolerance and heat intolerance.        + Diaphoretic with exertion, little energy   Genitourinary: Negative for dysuria and hematuria.        + ED and reduced libido   Musculoskeletal: Positive for back pain (Low back pain - SI joint pain was very helpful). Negative for arthralgias and myalgias.   Skin: Negative for rash and wound.   Allergic/Immunologic: Negative for immunocompromised state.   Neurological: Negative for dizziness and light-headedness.   Hematological: Does not bruise/bleed easily.   Psychiatric/Behavioral: Positive for sleep disturbance. Negative for agitation and confusion.      Reviewed and updated as needed this visit by Provider   Tobacco  Allergies  Meds  Problems  Med Hx  Surg Hx  Fam Hx          EXAM:   Vitals:    08/04/21 1444   BP: 126/88   Pulse: 88   Resp: 22   Temp: 98.4  F (36.9  C)   TempSrc: Tympanic   SpO2: 94%   Weight: 133.1 kg (293 lb 6.4 oz)       Current Pain Score: Moderate Pain (4)     BP Readings from Last 3 Encounters:   08/04/21 126/88   06/08/21 138/86   04/14/21 126/78      Wt Readings from Last 3 Encounters:   08/04/21 133.1 kg (293 lb 6.4 oz)   06/08/21 133.2 kg (293 lb 9.6 oz)   05/06/21 129.7 kg (286 lb)      Estimated body mass index is 39.79 kg/m  as calculated from the following:    Height as of 5/6/21: 1.829 m (6').    Weight as of this encounter: 133.1 kg (293 lb 6.4 oz).     Physical Exam  Constitutional:       General: He is not in acute distress.     Appearance: He is well-developed. He is obese. He is not diaphoretic.   HENT:      Head: Normocephalic and atraumatic.   Eyes:      General: No scleral icterus.     Conjunctiva/sclera: Conjunctivae normal.   Cardiovascular:      Rate and Rhythm: Normal rate and regular rhythm.       Pulses: Normal pulses.   Pulmonary:      Effort: Pulmonary effort is normal.      Breath sounds: Normal breath sounds.   Abdominal:      Palpations: Abdomen is soft.      Tenderness: There is no abdominal tenderness.   Musculoskeletal:         General: Tenderness (right upper back/shoulder and left SI joint) present. No deformity.      Cervical back: Neck supple.      Right lower le+ Pitting Edema present.      Left lower le+ Pitting Edema present.   Lymphadenopathy:      Cervical: No cervical adenopathy.   Skin:     General: Skin is warm and dry.      Findings: No rash.   Neurological:      Mental Status: He is alert and oriented to person, place, and time. Mental status is at baseline.   Psychiatric:         Mood and Affect: Mood normal.         Behavior: Behavior normal.        Procedures   INVESTIGATIONS:  - Labs reviewed in Epic   Results for orders placed or performed in visit on 21   Testosterone, Total     Status: Abnormal   Result Value Ref Range    Testosterone Total 143 (L) 240 - 950 ng/dL   Comprehensive metabolic panel     Status: Abnormal   Result Value Ref Range    Sodium 138 134 - 144 mmol/L    Potassium 3.9 3.5 - 5.1 mmol/L    Chloride 99 98 - 107 mmol/L    Carbon Dioxide (CO2) 26 21 - 31 mmol/L    Anion Gap 13 3 - 14 mmol/L    Urea Nitrogen 29 (H) 7 - 25 mg/dL    Creatinine 1.40 (H) 0.70 - 1.30 mg/dL    Calcium 10.6 (H) 8.6 - 10.3 mg/dL    Glucose 107 (H) 70 - 105 mg/dL    Alkaline Phosphatase 49 34 - 104 U/L    AST 26 13 - 39 U/L    ALT 23 7 - 52 U/L    Protein Total 7.9 6.4 - 8.9 g/dL    Albumin 4.6 3.5 - 5.7 g/dL    Bilirubin Total 0.4 0.3 - 1.0 mg/dL    GFR Estimate 52 (L) >60 mL/min/1.73m2   CBC with platelets     Status: Abnormal   Result Value Ref Range    WBC Count 13.3 (H) 4.0 - 11.0 10e3/uL    RBC Count 4.62 4.40 - 5.90 10e6/uL    Hemoglobin 15.4 13.3 - 17.7 g/dL    Hematocrit 44.4 40.0 - 53.0 %    MCV 96 78 - 100 fL    MCH 33.3 (H) 26.5 - 33.0 pg    MCHC 34.7 31.5 -  36.5 g/dL    RDW 15.7 (H) 10.0 - 15.0 %    Platelet Count 465 (H) 150 - 450 10e3/uL   Lipid Profile     Status: Abnormal   Result Value Ref Range    Cholesterol 268 (H) <200 mg/dL    Triglycerides 540 (H) <150 mg/dL    Direct Measure HDL 35 23 - 92 mg/dL    LDL Cholesterol Calculated      Non HDL Cholesterol 233 (H) <130 mg/dL    Patient Fasting > 8hrs? Unknown    Hemoglobin A1c     Status: Normal   Result Value Ref Range    Hemoglobin A1C 5.6 4.0 - 6.2 %   TSH with free T4 reflex     Status: Normal   Result Value Ref Range    TSH 3.61 0.40 - 4.00 mU/L      ASSESSMENT AND PLAN:  Problem List Items Addressed This Visit        Nervous and Auditory    Chronic fatigue syndrome with fibromyalgia    Relevant Medications    predniSONE (DELTASONE) 5 MG tablet    amphetamine-dextroamphetamine (ADDERALL XR) 20 MG 24 hr capsule    Other Relevant Orders    TSH with free T4 reflex    TSH with free T4 reflex (Completed)    Polymyalgia rheumatica (H) - Primary    Relevant Medications    predniSONE (DELTASONE) 5 MG tablet    amphetamine-dextroamphetamine (ADDERALL XR) 20 MG 24 hr capsule    Other Relevant Orders    TSH with free T4 reflex    TSH with free T4 reflex (Completed)       Digestive    Morbid obesity (H)    Relevant Medications    amphetamine-dextroamphetamine (ADDERALL XR) 20 MG 24 hr capsule    Other Relevant Orders    TSH with free T4 reflex    TSH with free T4 reflex (Completed)       Endocrine    Mixed hyperlipidemia    Postoperative hypothyroidism    Relevant Orders    TSH with free T4 reflex (Completed)       Circulatory    Benign essential hypertension    Relevant Orders    TSH with free T4 reflex    TSH with free T4 reflex (Completed)       Other    Post-splenectomy    Malaise and fatigue    Relevant Medications    amphetamine-dextroamphetamine (ADDERALL XR) 20 MG 24 hr capsule    Other Relevant Orders    TSH with free T4 reflex    TSH with free T4 reflex (Completed)      Other Visit Diagnoses     Decreased  libido        Relevant Medications    amphetamine-dextroamphetamine (ADDERALL XR) 20 MG 24 hr capsule    Other Relevant Orders    Testosterone, Total (Completed)    Adult Urology Referral    Erectile dysfunction, unspecified erectile dysfunction type        Relevant Orders    Testosterone, Total (Completed)    Adult Urology Referral    Elevated random blood glucose level        Low serum testosterone level in male        Relevant Orders    Adult Urology Referral        reviewed diet, exercise and weight control, recommended sodium restriction  -- Expected clinical course discussed    -- Medications and their side effects discussed    The 10-year ASCVD risk score (Gael CARIE Jr., et al., 2013) is: 20.4%    Values used to calculate the score:      Age: 66 years      Sex: Male      Is Non- : No      Diabetic: No      Tobacco smoker: No      Systolic Blood Pressure: 126 mmHg      Is BP treated: No      HDL Cholesterol: 35 mg/dL      Total Cholesterol: 268 mg/dL    Patient Instructions   1. Polymyalgia rheumatica (H)    Use Prednisone -- lowest effective dose -- for PMR pain.   START:   - predniSONE (DELTASONE) 5 MG tablet; Take 4 tablets (20 mg) by mouth daily for 10 days, THEN 3 tablets (15 mg) daily.  Dispense: 310 tablet; Refill: 0      2. Malaise and fatigue  5. Morbid obesity (H)  6. Chronic fatigue syndrome with fibromyalgia    START:   - amphetamine-dextroamphetamine (ADDERALL XR) 20 MG 24 hr capsule; Take 1 capsule (20 mg) by mouth daily  Dispense: 30 capsule; Refill: 0      Return in approximately 1 month(s), or sooner as needed for follow-up with Dr. Whitmore.  -- follow-up PMR and Fatigue    Clinic : [phone]  Appointment line: [phone]     Electronically signed by:  Matt Whitmore MD on 8/4/2021  Internal Medicine  [facility]

## 2021-08-04 NOTE — PATIENT INSTRUCTIONS
1. Polymyalgia rheumatica (H)    Use Prednisone -- lowest effective dose -- for PMR pain.   START:   - predniSONE (DELTASONE) 5 MG tablet; Take 4 tablets (20 mg) by mouth daily for 10 days, THEN 3 tablets (15 mg) daily.  Dispense: 310 tablet; Refill: 0      2. Malaise and fatigue  5. Morbid obesity (H)  6. Chronic fatigue syndrome with fibromyalgia    START:   - amphetamine-dextroamphetamine (ADDERALL XR) 20 MG 24 hr capsule; Take 1 capsule (20 mg) by mouth daily  Dispense: 30 capsule; Refill: 0      Return in approximately 1 month(s), or sooner as needed for follow-up with Dr. Whitmore.  -- follow-up PMR and Fatigue    Clinic : [phone]  Appointment line: [phone]

## 2021-08-04 NOTE — NURSING NOTE
Chief Complaint   Patient presents with     Pain     Generalized       Initial /88   Pulse 88   Temp 98.4  F (36.9  C) (Tympanic)   Resp 22   Wt 133.1 kg (293 lb 6.4 oz)   SpO2 94%   BMI 39.79 kg/m   Estimated body mass index is 39.79 kg/m  as calculated from the following:    Height as of 5/6/21: 1.829 m (6').    Weight as of this encounter: 133.1 kg (293 lb 6.4 oz).  FOOD SECURITY SCREENING QUESTIONS  Hunger Vital Signs:  Within the past 12 months we worried whether our food would run out before we got money to buy more. Never  Within the past 12 months the food we bought just didn't last and we didn't have money to get more. Never        Thad Mai, GENIAN

## 2021-08-11 ENCOUNTER — OFFICE VISIT (OUTPATIENT)
Dept: UROLOGY | Facility: OTHER | Age: 66
End: 2021-08-11
Attending: INTERNAL MEDICINE
Payer: MEDICARE

## 2021-08-11 VITALS
DIASTOLIC BLOOD PRESSURE: 88 MMHG | BODY MASS INDEX: 40.69 KG/M2 | HEART RATE: 83 BPM | SYSTOLIC BLOOD PRESSURE: 124 MMHG | RESPIRATION RATE: 22 BRPM | WEIGHT: 300 LBS | OXYGEN SATURATION: 94 %

## 2021-08-11 DIAGNOSIS — R68.82 DECREASED LIBIDO: ICD-10-CM

## 2021-08-11 DIAGNOSIS — R79.89 LOW SERUM TESTOSTERONE LEVEL IN MALE: ICD-10-CM

## 2021-08-11 DIAGNOSIS — N52.9 ERECTILE DYSFUNCTION, UNSPECIFIED ERECTILE DYSFUNCTION TYPE: ICD-10-CM

## 2021-08-11 PROCEDURE — 99203 OFFICE O/P NEW LOW 30 MIN: CPT | Performed by: UROLOGY

## 2021-08-11 PROCEDURE — G0463 HOSPITAL OUTPT CLINIC VISIT: HCPCS | Performed by: UROLOGY

## 2021-08-11 ASSESSMENT — PAIN SCALES - GENERAL: PAINLEVEL: MILD PAIN (3)

## 2021-08-11 NOTE — NURSING NOTE
Pt presents to clinic for decreased libido/ED follow up    Review of Systems:    Weight loss:    No     Recent fever/chills:  No   Night sweats:   No  Current skin rash:  No   Recent hair loss:  No  Heat intolerance:  No   Cold intolerance:  No  Chest pain:   No   Palpitations:   No  Shortness of breath:  No   Wheezing:   No  Constipation:    No   Diarrhea:   No   Nausea:   No   Vomiting:   No   Kidney/side pain:  No   Back pain:   Yes  Frequent headaches:  No   Dizziness:     No  Leg swelling:   No   Calf pain:    Yes

## 2021-08-11 NOTE — PROGRESS NOTES
Type of Visit  NPV    Chief Complaint  Erectile dysfunction  Low testosterone    HPI  Mr. An is a 66 year old male with low testosterone and ED.  The patient has a 3 to 5-year history of diminishing exercise tolerance.  He states that he is unable to complete the tasks that he sets out to due to diminished energy and focus.  He is concerned about his testosterone level and underwent a lab draw.  He has never received testosterone in the past.    He also has ED.      Past Medical History  He  has a past medical history of Bell's palsy (3/30/2017), Bilateral carpal tunnel syndrome, Essential (primary) hypertension, Gout (6/3/2013), Hyperlipidemia, Hypothyroidism, Malignant neoplasm of thyroid gland (H), and Sleep apnea.  Patient Active Problem List   Diagnosis     History of carpal tunnel surgery     Psychosexual dysfunction with inhibited sexual excitement     Mixed hyperlipidemia     Benign essential hypertension     Postoperative hypothyroidism     Post-splenectomy     History of kidney stones     KYLAH on CPAP -- Uses nightly, finds helpful and beneficial     Vitamin D deficiency     Morbid obesity (H)     Steroid-induced hyperglycemia     Bilateral lower extremity edema     Vitamin B12 deficiency     Chronic fatigue syndrome with fibromyalgia     History of Lyme disease     Primary insomnia     Polymyalgia rheumatica (H)     Personal history of malignant neoplasm of bladder -- s/p TURBT x2 in 3/2018 and 4/2018 for high-grade non-muscle invasive bladder cancer, now s/p BCG treatment - last cystoscopy 1/20/2021 was normal     Hypercalcemia     Trigger point with back pain     Trigger point of right shoulder region     Sacroiliac joint pain - left     Osteoarthritis of lumbar spine, unspecified spinal osteoarthritis complication status     Radicular low back pain     Neuroforaminal stenosis of lumbar spine     Malaise and fatigue       Past Surgical History  He  has a past surgical history that includes other  surgical history; Thyroidectomy; Tonsillectomy; Vasectomy; other surgical history; Release carpal tunnel; Colonoscopy (09/09/2014); other surgical history; other surgical history; and other surgical history.    Medications  He has a current medication list which includes the following prescription(s): amphetamine-dextroamphetamine, vitamin b complex w/vitamin c, vitamin d-3, cyanocobalamin, cyclobenzaprine, levothyroxine, loratadine, magnesium, melatonin, multiple vitamins-minerals, order for dme, prednisone, and probiotic product.    Allergies  Allergies   Allergen Reactions     Valsartan Cough     Started with losartan, worsened with high dose valsartan       Social History  He  reports that he has never smoked. He has never used smokeless tobacco. He reports that he does not drink alcohol and does not use drugs.  No drug abuse.    Family History  Family History   Problem Relation Age of Onset     Heart Disease Mother         Heart Disease,MI - SCD     Heart Disease Father         Heart Disease,MI - SCD       Review of Systems  I personally reviewed the ROS with the patient.    Nursing Notes:   Jahaira Galvez LPN  8/11/2021  2:03 PM  Addendum  Pt presents to clinic for decreased libido/ED follow up    Review of Systems:    Weight loss:    No     Recent fever/chills:  No   Night sweats:   No  Current skin rash:  No   Recent hair loss:  No  Heat intolerance:  No   Cold intolerance:  No  Chest pain:   No   Palpitations:   No  Shortness of breath:  No   Wheezing:   No  Constipation:    No   Diarrhea:   No   Nausea:   No   Vomiting:   No   Kidney/side pain:  No   Back pain:   Yes  Frequent headaches:  No   Dizziness:     No  Leg swelling:   No   Calf pain:    Yes            Physical Exam  Vitals:    08/11/21 1403   BP: 124/88   BP Location: Right arm   Patient Position: Sitting   Cuff Size: Adult Large   Pulse: 83   Resp: 22   SpO2: 94%   Weight: 136.1 kg (300 lb)     Constitutional: No acute distress.  Alert and  cooperative   Head: NCAT  Eyes: Conjunctivae normal  Cardiovascular: Regular rate  Pulmonary/Chest: Respirations are even and non-labored bilaterally, no audible wheezing  Abdominal: Soft. No distension, tenderness, masses or guarding.   Neurological: A + O x 3.  Cranial Nerves II-XII grossly intact.  Extremities: SANAZ x 4, Warm. No clubbing.  No cyanosis.    Skin: Pink, warm and dry.  No visible rashes noted.  Psychiatric:  Normal mood and affect  Back:  No left CVA tenderness.  No right CVA tenderness.  Genitourinary:  Nonpalpable bladder  Normal male phallus without discharge or lesions, normal pubic hair distribution.  Testicles descended bilaterally.     Labs  Results for RANDALL AN (MRN [id number]) as of 8/11/2021 14:09   8/4/2021 15:23   Testosterone Total 143 (L)     Results for RANDALL AN (MRN [id number]) as of 8/11/2021 14:09   3/12/2021 10:44   PSA 1.013     Assessment & Plan  Mr. An is a 66 year old male with turns regarding low testosterone.  I recommended a repeat draw in the morning before 9 AM.  We discussed the risks and pitfalls of testosterone supplementation.  He will consider these limitations and risks and after the next lab draw he will follow-up in clinic to continue the conversation about testosterone replacement.

## 2021-08-13 ENCOUNTER — LAB (OUTPATIENT)
Dept: LAB | Facility: OTHER | Age: 66
End: 2021-08-13
Attending: UROLOGY
Payer: MEDICARE

## 2021-08-13 DIAGNOSIS — E66.01 MORBID OBESITY (H): ICD-10-CM

## 2021-08-13 DIAGNOSIS — M79.7 CHRONIC FATIGUE SYNDROME WITH FIBROMYALGIA: ICD-10-CM

## 2021-08-13 DIAGNOSIS — I10 BENIGN ESSENTIAL HYPERTENSION: ICD-10-CM

## 2021-08-13 DIAGNOSIS — R79.89 LOW SERUM TESTOSTERONE LEVEL IN MALE: ICD-10-CM

## 2021-08-13 DIAGNOSIS — R53.81 MALAISE AND FATIGUE: ICD-10-CM

## 2021-08-13 DIAGNOSIS — M35.3 POLYMYALGIA RHEUMATICA (H): ICD-10-CM

## 2021-08-13 DIAGNOSIS — G93.32 CHRONIC FATIGUE SYNDROME WITH FIBROMYALGIA: ICD-10-CM

## 2021-08-13 DIAGNOSIS — R53.83 MALAISE AND FATIGUE: ICD-10-CM

## 2021-08-13 DIAGNOSIS — R68.82 DECREASED LIBIDO: ICD-10-CM

## 2021-08-13 LAB
HOLD SPECIMEN: NORMAL
TESTOST SERPL-MCNC: 170 NG/DL (ref 240–950)

## 2021-08-13 PROCEDURE — 36415 COLL VENOUS BLD VENIPUNCTURE: CPT | Mod: ZL

## 2021-08-13 PROCEDURE — 84403 ASSAY OF TOTAL TESTOSTERONE: CPT | Mod: ZL

## 2021-08-18 ENCOUNTER — OFFICE VISIT (OUTPATIENT)
Dept: INTERNAL MEDICINE | Facility: OTHER | Age: 66
End: 2021-08-18
Attending: INTERNAL MEDICINE
Payer: MEDICARE

## 2021-08-18 VITALS
WEIGHT: 300 LBS | HEIGHT: 72 IN | OXYGEN SATURATION: 95 % | SYSTOLIC BLOOD PRESSURE: 138 MMHG | TEMPERATURE: 98.9 F | BODY MASS INDEX: 40.63 KG/M2 | RESPIRATION RATE: 18 BRPM | HEART RATE: 84 BPM | DIASTOLIC BLOOD PRESSURE: 88 MMHG

## 2021-08-18 DIAGNOSIS — Z01.818 PREOP GENERAL PHYSICAL EXAM: Primary | ICD-10-CM

## 2021-08-18 DIAGNOSIS — I10 BENIGN ESSENTIAL HYPERTENSION: ICD-10-CM

## 2021-08-18 DIAGNOSIS — E66.01 MORBID OBESITY (H): ICD-10-CM

## 2021-08-18 DIAGNOSIS — H25.013 CORTICAL AGE-RELATED CATARACT OF BOTH EYES: ICD-10-CM

## 2021-08-18 DIAGNOSIS — E78.1 HIGH BLOOD TRIGLYCERIDES: ICD-10-CM

## 2021-08-18 DIAGNOSIS — G47.33 OSA ON CPAP: ICD-10-CM

## 2021-08-18 DIAGNOSIS — E78.2 MIXED HYPERLIPIDEMIA: ICD-10-CM

## 2021-08-18 DIAGNOSIS — R79.89 LOW TESTOSTERONE LEVEL IN MALE: ICD-10-CM

## 2021-08-18 DIAGNOSIS — M35.3 POLYMYALGIA RHEUMATICA (H): ICD-10-CM

## 2021-08-18 DIAGNOSIS — E89.0 POSTOPERATIVE HYPOTHYROIDISM: ICD-10-CM

## 2021-08-18 PROBLEM — R53.81 MALAISE AND FATIGUE: Status: RESOLVED | Noted: 2021-08-04 | Resolved: 2021-08-18

## 2021-08-18 PROBLEM — R53.83 MALAISE AND FATIGUE: Status: RESOLVED | Noted: 2021-08-04 | Resolved: 2021-08-18

## 2021-08-18 PROCEDURE — G0463 HOSPITAL OUTPT CLINIC VISIT: HCPCS

## 2021-08-18 PROCEDURE — 99214 OFFICE O/P EST MOD 30 MIN: CPT | Performed by: INTERNAL MEDICINE

## 2021-08-18 RX ORDER — FENOFIBRATE 145 MG/1
145 TABLET, COATED ORAL DAILY
Qty: 90 TABLET | Refills: 3 | Status: SHIPPED | OUTPATIENT
Start: 2021-08-18 | End: 2022-06-03

## 2021-08-18 ASSESSMENT — ENCOUNTER SYMPTOMS
FEVER: 0
BRUISES/BLEEDS EASILY: 0
DIZZINESS: 0
VOMITING: 0
BACK PAIN: 1
ARTHRALGIAS: 0
ABDOMINAL PAIN: 0
APNEA: 1
MYALGIAS: 0
LIGHT-HEADEDNESS: 0
WOUND: 0
AGITATION: 0
COUGH: 1
DIARRHEA: 0
DYSURIA: 0
HEMATURIA: 0
WHEEZING: 0
CHILLS: 0
PALPITATIONS: 0
SHORTNESS OF BREATH: 1
CONFUSION: 0
FATIGUE: 1
NAUSEA: 0
SLEEP DISTURBANCE: 1

## 2021-08-18 ASSESSMENT — MIFFLIN-ST. JEOR: SCORE: 2173.79

## 2021-08-18 ASSESSMENT — PAIN SCALES - GENERAL: PAINLEVEL: MILD PAIN (3)

## 2021-08-18 NOTE — PROGRESS NOTES
[facility] AND [facility]  [address]  [place] [address]  Phone: [phone]  Primary Provider: Martha Liang  Pre-op Performing Provider: MARTHA LIANG    PREOPERATIVE EVALUATION:  Today's date: 8/18/2021    Selvin An is a 66 year old male who presents for a preoperative evaluation.    Surgical Information:  Surgery/Procedure: bilateral cataract  Surgery Location: [facility]  Surgeon: Kevin  Surgery Date: 8/24/21, 9/15/21  Time of Surgery: TBD  Where patient plans to recover: At home with family  Fax number for surgical facility: [phone]    Type of Anesthesia Anticipated: to be determined    Assessment & Plan     The proposed surgical procedure is considered LOW risk.      ICD-10-CM    1. Preop general physical exam  Z01.818    2. Cortical age-related cataract of both eyes  H25.013    3. Mixed hyperlipidemia  E78.2 fenofibrate (TRICOR) 145 MG tablet   4. High blood triglycerides  E78.1 fenofibrate (TRICOR) 145 MG tablet   5. Benign essential hypertension  I10    6. KYLAH on CPAP -- Uses nightly, finds helpful and beneficial  G47.33     Z99.89    7. Morbid obesity (H)  E66.01    8. Polymyalgia rheumatica (H)  M35.3    9. Postoperative hypothyroidism  E89.0    10. Low testosterone level in male  R79.89    HPI related to upcoming procedure: Patient has been found to have bilateral cataracts and is now scheduled for staged cataract surgery.  When I followed by the next eye later.    Mixed hyperlipidemia, mostly elevated triglyceride levels.  We discussed treatment options.  Start TriCor/fenofibrate 145 mg daily.  Plan to check labs again in 2 to 3 months.    Hypertension, blood pressure initially was elevated.  Recheck improved.  We did stop 50 mg of spironolactone at his last appointment.  Advised to monitor at home.  If blood pressure remains elevated we will need to restart spironolactone at lower dose.  He was having some symptoms of low blood pressure or  overdiuresis.  Needs ongoing monitoring.    Sleep apnea, chronic.  Still using regularly.  Finds helpful and beneficial.  Encouraged continued use.    Obesity, discussed need for reduced oral caloric intake.  Regular exercise.  Reduce carbohydrate intake.  Information printed and reviewed.    Polymyalgia rheumatica, slowly tapering prednisone.  Now down to 15 mg daily.  Continue current dosing.    Postoperative hypothyroidism, taking oral replacement.  Seems to be doing well with current dosing.  No changes for now.    Low testosterone in male.  Saw urology recently.  Repeat a.m. testosterone was checked and came back still low.  They have follow-up with urology in the next week or 2 to discuss replacement options.    -- Surgical team will need to coordinate the appropriate COVID-19 testing prior to surgery --      Risks and Recommendations:  The patient has the following additional risks and recommendations for perioperative complications:   - No identified additional risk factors other than previously addressed    Medication Instructions:  Patient is to take all scheduled medications on the day of surgery    RECOMMENDATION:  APPROVAL GIVEN to proceed with proposed procedure, without further diagnostic evaluation.    Subjective     Preop Questions 8/18/2021   1. Have you ever had a heart attack or stroke? No   2. Have you ever had surgery on your heart or blood vessels, such as a stent placement, a coronary artery bypass, or surgery on an artery in your head, neck, heart, or legs? No   3. Do you have chest pain with activity? No   4. Do you have a history of  heart failure? No   5. Do you currently have a cold, bronchitis or symptoms of other infection? No   6. Do you have a cough, shortness of breath, or wheezing? No   7. Do you or anyone in your family have previous history of blood clots? No   8. Do you or does anyone in your family have a serious bleeding problem such as prolonged bleeding following surgeries  or cuts? No   9. Have you ever had problems with anemia or been told to take iron pills? No   10. Have you had any abnormal blood loss such as black, tarry or bloody stools? No   11. Have you ever had a blood transfusion? YES -    11a. Have you ever had a transfusion reaction? No   12. Are you willing to have a blood transfusion if it is medically needed before, during, or after your surgery? Yes   13. Have you or any of your relatives ever had problems with anesthesia? No   14. Do you have sleep apnea, excessive snoring or daytime drowsiness? YES - Uses CPAP   14a. Do you have a CPAP machine? Yes   15. Do you have any artifical heart valves or other implanted medical devices like a pacemaker, defibrillator, or continuous glucose monitor? No   16. Do you have artificial joints? No   17. Are you allergic to latex? No       Health Care Directive:  Patient does not have a Health Care Directive or Living Will: Discussed advance care planning with patient; information given to patient to review.    Preoperative Review of :   reviewed - controlled substances reflected in medication list.    Review of Systems   Constitutional: Positive for fatigue. Negative for chills and fever.        + Chronic Fatigue Syndrome since Lyme Disease - in Fall 2016.    HENT: Negative for congestion and hearing loss.    Eyes: Negative for visual disturbance.   Respiratory: Positive for apnea (uses CPAP nightly), cough (intermittent, dry) and shortness of breath (+ improving, doing more physical activity). Negative for wheezing.    Cardiovascular: Positive for leg swelling. Negative for chest pain and palpitations.   Gastrointestinal: Negative for abdominal pain, diarrhea, nausea and vomiting.   Endocrine: Negative for cold intolerance and heat intolerance.        + Diaphoretic with exertion, little energy   Genitourinary: Negative for dysuria and hematuria.        + ED and reduced libido   Musculoskeletal: Positive for back pain (Low  back pain - SI joint pain was very helpful). Negative for arthralgias and myalgias.   Skin: Negative for rash and wound.   Allergic/Immunologic: Negative for immunocompromised state.   Neurological: Negative for dizziness and light-headedness.   Hematological: Does not bruise/bleed easily.   Psychiatric/Behavioral: Positive for sleep disturbance. Negative for agitation and confusion.      Patient Active Problem List    Diagnosis Date Noted     Low testosterone level in male 08/18/2021     Priority: Medium     Radicular low back pain 04/27/2021     Priority: Medium     Neuroforaminal stenosis of lumbar spine 04/27/2021     Priority: Medium     Trigger point with back pain 04/14/2021     Priority: Medium     Trigger point of right shoulder region 04/14/2021     Priority: Medium     Sacroiliac joint pain - left 04/14/2021     Priority: Medium     Osteoarthritis of lumbar spine, unspecified spinal osteoarthritis complication status 04/14/2021     Priority: Medium     Hypercalcemia 03/12/2021     Priority: Medium     Primary insomnia 01/31/2021     Priority: Medium     Polymyalgia rheumatica (H) 01/31/2021     Priority: Medium     Personal history of malignant neoplasm of bladder -- s/p TURBT x2 in 3/2018 and 4/2018 for high-grade non-muscle invasive bladder cancer, now s/p BCG treatment - last cystoscopy 1/20/2021 was normal 01/31/2021     Priority: Medium     Steroid-induced hyperglycemia 01/29/2021     Priority: Medium     Bilateral lower extremity edema 01/29/2021     Priority: Medium     Vitamin B12 deficiency 01/29/2021     Priority: Medium     Chronic fatigue syndrome with fibromyalgia 01/29/2021     Priority: Medium     History of Lyme disease 01/29/2021     Priority: Medium     Morbid obesity (H) 11/02/2020     Priority: Medium     History of carpal tunnel surgery 11/02/2017     Priority: Medium     Vitamin D deficiency 10/10/2016     Priority: Medium     Postoperative hypothyroidism 01/11/2011     Priority:  Medium     Mixed hyperlipidemia 07/12/2010     Priority: Medium     Psychosexual dysfunction with inhibited sexual excitement 02/17/2010     Priority: Medium     Benign essential hypertension 02/17/2010     Priority: Medium     History of kidney stones 02/17/2010     Priority: Medium     KYLAH on CPAP -- Uses nightly, finds helpful and beneficial 02/17/2010     Priority: Medium     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     Priority: Medium      Past Medical History:   Diagnosis Date     Bell's palsy 3/30/2017     Bilateral carpal tunnel syndrome     No Comments Provided     Essential (primary) hypertension     No Comments Provided     Gout 6/3/2013     Hyperlipidemia     No Comments Provided     Hypothyroidism     No Comments Provided     Malignant neoplasm of thyroid gland (H)     1994     Sleep apnea     No Comments Provided     Past Surgical History:   Procedure Laterality Date     COLONOSCOPY  09/09/2014 9/2014,Normal - follow up 10 years     OTHER SURGICAL HISTORY      ,SPLENECTOMY     OTHER SURGICAL HISTORY      2010,66103,EYE MUSCLE SURGERY,Left,strabismus     OTHER SURGICAL HISTORY      10/06/14,[id number],OTHER,Left,Dr. Lana LEHMAN     OTHER SURGICAL HISTORY      10/14/14,[id number],OTHER,Left,Dr. Lana LEHMAN     OTHER SURGICAL HISTORY      11/16/2017,[id number],ENDOSCOPIC RELEASE TRANSVERSE CARPAL LIGAMENT OF HAND,Left     RELEASE CARPAL TUNNEL      12/2011     THYROIDECTOMY      1994,thyroid ca     TONSILLECTOMY      2004,T & A > 11yo     VASECTOMY      No Comments Provided     Current Outpatient Medications   Medication Sig Dispense Refill     amphetamine-dextroamphetamine (ADDERALL XR) 20 MG 24 hr capsule Take 1 capsule (20 mg) by mouth daily 30 capsule 0     B Complex-C (VITAMIN B COMPLEX W/VITAMIN C) TABS tablet Take 1 tablet by mouth daily       Cholecalciferol (VITAMIN D-3) 125 MCG (5000 UT) TABS Take 5,000 Units by mouth daily 100 tablet 3     cyanocobalamin 2500 MCG TABS Take 2,500 mcg by mouth daily -- or  every other day -- OTC okay 90 tablet 3     cyclobenzaprine (FLEXERIL) 10 MG tablet Take 0.5-1 tablets (5-10 mg) by mouth 3 times daily as needed for muscle spasms (-- No driving after use) 90 tablet 1     fenofibrate (TRICOR) 145 MG tablet Take 1 tablet (145 mg) by mouth daily - for high triglycerides 90 tablet 3     levothyroxine (SYNTHROID/LEVOTHROID) 150 MCG tablet Take 150 mcg by mouth daily       loratadine (CLARITIN) 10 MG tablet Take 10 mg by mouth daily       magnesium 250 MG tablet Take 1 tablet by mouth daily       Melatonin 10 MG TABS tablet Take 20 mg by mouth 2 times daily        Multiple Vitamins-Minerals (CENTRUM ADULTS PO) Take 1 tablet by mouth daily       order for DME CPAP machine for home use at pressure: 10, with supplies:  humidifier, chamber, mask, tubing       predniSONE (DELTASONE) 5 MG tablet Take 4 tablets (20 mg) by mouth daily for 10 days, THEN 3 tablets (15 mg) daily. 310 tablet 0     Probiotic Product (PROBIOTIC-10 PO) Take 1 tablet by mouth daily         Allergies   Allergen Reactions     Valsartan Cough     Started with losartan, worsened with high dose valsartan        Social History     Tobacco Use     Smoking status: Never Smoker     Smokeless tobacco: Never Used   Substance Use Topics     Alcohol use: No     Comment: Alcoholic Drinks/day: socially     Family History   Problem Relation Age of Onset     Heart Disease Mother         Heart Disease,MI - SCD     Heart Disease Father         Heart Disease,MI - SCD     History   Drug Use No         Objective     /88 (BP Location: Left arm, Patient Position: Sitting, Cuff Size: Adult Large)   Pulse 84   Temp 98.9  F (37.2  C) (Tympanic)   Resp 18   Ht 1.829 m (6')   Wt 136.1 kg (300 lb)   SpO2 95%   BMI 40.69 kg/m      BP Readings from Last 6 Encounters:   08/18/21 138/88   08/11/21 124/88   08/04/21 126/88   06/08/21 138/86   04/14/21 126/78   02/12/21 129/85      Physical Exam  Constitutional:       General: He is not in  acute distress.     Appearance: He is well-developed. He is obese. He is not diaphoretic.   HENT:      Head: Normocephalic and atraumatic.   Eyes:      General: No scleral icterus.     Conjunctiva/sclera: Conjunctivae normal.   Cardiovascular:      Rate and Rhythm: Normal rate and regular rhythm.      Pulses: Normal pulses.   Pulmonary:      Effort: Pulmonary effort is normal.      Breath sounds: Normal breath sounds.   Abdominal:      Palpations: Abdomen is soft.      Tenderness: There is no abdominal tenderness.   Musculoskeletal:         General: Tenderness (right upper back/shoulder and left SI joint) present. No deformity.      Cervical back: Neck supple.      Right lower leg: Edema (trace) present.      Left lower leg: Edema (trace) present.   Lymphadenopathy:      Cervical: No cervical adenopathy.   Skin:     General: Skin is warm and dry.      Findings: No rash.   Neurological:      Mental Status: He is alert and oriented to person, place, and time. Mental status is at baseline.   Psychiatric:         Mood and Affect: Mood normal.         Behavior: Behavior normal.        Recent Labs   Lab Test 08/04/21  1523 06/08/21  1147 03/12/21  1044   HGB 15.4  --  16.7   *  --  424    137 139   POTASSIUM 3.9 3.9 3.7   CR 1.40* 1.65* 1.18   A1C 5.6  --  5.9        Diagnostics:  No labs were ordered during this visit.   No EKG required for low risk surgery (cataract, skin procedure, breast biopsy, etc).    Revised Cardiac Risk Index (RCRI):  The patient has the following serious cardiovascular risks for perioperative complications:   - No serious cardiac risks = 0 points     RCRI Interpretation: 0 points: Class I (very low risk - 0.4% complication rate)           Signed Electronically by: Matt Whitmore MD  Copy of this evaluation report is provided to requesting physician.

## 2021-08-18 NOTE — PATIENT INSTRUCTIONS
"  Before Your Procedure or Hospital Admission  Testing for COVID-19 (Coronavirus)  Thank you for choosing [facility] for your health care needs. This is a very challenging time for everyone. The World Health Organization and the [place] of [place] have declared the COVID-19 virus a pandemic.   Our goal is to keep you and our team here at [facility] safe and healthy. We've taken several steps to make this happen. For example:    We screen our staff, care teams and patients for COVID-19.    Everyone at [facility] must wear a mask and stay 6 feet apart.    We are limiting hospital and clinic visitors.  Before you come in  All patients must get tested for COVID-19. Your test needs to happen 2 to 4 days before you check in to the hospital or surgery site.   A clinic scheduler will call about a week in advance to set up a testing time at one of our labs where we'll take a swab of your nose or throat.  Note: If you go to a clinic or pharmacy like [facility] or [facility] for your test, make sure it's a \"RT-PCR\" test, not a \"rapid\" COVID-19 test. (See Questions and Answers below.)  After the test, please stay at home and stay out of contact with other people. It will be harder for you to recover if you get COVID-19 before your treatment.  Please follow all current safety guidelines, including:    Limit trips outside your home.    Limit the number of people you see.    Always wear a mask outside your home.    Use social distancing. (Stay 6 feet away from others whenever you can.)    Wash your hands often.  If your test shows you have COVID-19  If your test is positive, we'll let you know. A positive test means that you have the virus.   We'll probably have to postpone your admission, surgery or procedure. Your doctor will discuss this with you. After that, we'll let you know what to do and when you can reschedule.   We may need to cancel your treatment on short notice for other reasons, too.  If your test shows " you DON'T have COVID-19  Even if your test is negative, you may still get COVID-19. It's rare but, sometimes, the test result is wrong. You could also catch the virus after taking the test.   There's a very small chance that you could catch COVID-19 in the hospital or surgery center. [facility] has taken many steps to prevent this from happening.   Day of your surgery or procedure    Please come wearing a mask or something else that covers both your nose and mouth.    When you arrive, we'll ask you some questions to find out if you have any signs or symptoms of COVID-19.    Ask your care team if you can have visitors. All visitors must wear masks and will be screened for signs of COVID-19.  ? Even if no visitors are allowed, you can still have with you:    Your legal guardian or legal decision maker    A parent and one other visitor, if you are younger than 18 years old    A partner and a , if you are in labor  ? We might need to teach you about taking care of yourself after surgery. If so, a visitor can come into the hospital to learn about it, too.  ? The rules for visitors change often, depending on how much the virus is spreading. To learn more, see Visiting a Loved One in the Hospital during the COVID-19 Outbreak.  Please call your care team, hospital or surgery center if you have any questions. We thank you for your understanding and for choosing [facility] for your care.   Questions and Answers  Does it matter where I get tested for COVID-19?  Yes. We urge you to get tested at one of our [facility] COVID-19 testing sites. We process these tests in our lab and can get the results quickly. Your [facility] care team needs to get your results before you check in.  What should I do if I can't get tested at [facility]?  You can get tested somewhere else, but you'll need to take these extra steps:  1. Contact your family doctor or clinic to arrange your test.  2. Take the test  "within 4 days of your surgery or procedure. We can't accept tests older than 4 days.  3. Make sure your doctor or clinic faxes your results to [facility] at [phone].  If we don't get your results in time, we may have to postpone or cancel your treatment.  Ask if you're getting a \"RT-PCR\" COVID-19 test. It should NOT be a \"rapid\" COVID-19 test. Many drug stores use \"rapid\" tests, but they may not be as accurate. We don't accept the results of \"rapid\" tests.  For informational purposes only. Not to replace the advice of your health care provider. Copyright   2020 [facility]. All rights reserved. Clinically reviewed by Infection Prevention and the [facility] COVID-19 Clinical Team. i[employer] 761972 - REV 10/20.    "

## 2021-08-18 NOTE — NURSING NOTE
Patient presents to clinic today for pre-op exam for bilateral cataracts.  Medication reconciliation completed.    ACP on file? No, form provided    FOOD SECURITY SCREENING QUESTIONS  Hunger Vital Signs:  Within the past 12 months we worried whether our food would run out before we got money to buy more. Never  Within the past 12 months the food we bought just didn't last and we didn't have money to get more. Never    Jen Trammell CMA([facility])..................8/18/2021   2:10 PM

## 2021-08-23 ENCOUNTER — OFFICE VISIT (OUTPATIENT)
Dept: UROLOGY | Facility: OTHER | Age: 66
End: 2021-08-23
Attending: UROLOGY
Payer: MEDICARE

## 2021-08-23 VITALS
HEART RATE: 87 BPM | BODY MASS INDEX: 40.9 KG/M2 | SYSTOLIC BLOOD PRESSURE: 138 MMHG | RESPIRATION RATE: 16 BRPM | DIASTOLIC BLOOD PRESSURE: 84 MMHG | WEIGHT: 301.6 LBS | OXYGEN SATURATION: 94 %

## 2021-08-23 DIAGNOSIS — N52.9 ERECTILE DYSFUNCTION, UNSPECIFIED ERECTILE DYSFUNCTION TYPE: Primary | ICD-10-CM

## 2021-08-23 DIAGNOSIS — R68.82 DECREASED LIBIDO: ICD-10-CM

## 2021-08-23 DIAGNOSIS — E29.1 HYPOGONADISM IN MALE: ICD-10-CM

## 2021-08-23 PROCEDURE — 99214 OFFICE O/P EST MOD 30 MIN: CPT | Performed by: UROLOGY

## 2021-08-23 PROCEDURE — G0463 HOSPITAL OUTPT CLINIC VISIT: HCPCS

## 2021-08-23 RX ORDER — TESTOSTERONE 1.62 MG/G
2 GEL TRANSDERMAL EVERY MORNING
Qty: 75 G | Refills: 5 | Status: SHIPPED | OUTPATIENT
Start: 2021-08-23 | End: 2022-03-16

## 2021-08-23 RX ORDER — SILDENAFIL 100 MG/1
TABLET, FILM COATED ORAL
Qty: 10 TABLET | Refills: 11 | Status: SHIPPED | OUTPATIENT
Start: 2021-08-23 | End: 2022-08-17

## 2021-08-23 ASSESSMENT — PAIN SCALES - GENERAL: PAINLEVEL: MILD PAIN (2)

## 2021-08-23 NOTE — PROGRESS NOTES
Type of Visit  EST    Chief Complaint  Erectile dysfunction  Low testosterone    HPI  Mr. An is a 66 year old male with low testosterone and ED.  The patient has a 3 to 5-year history of diminishing exercise tolerance.  He states that he is unable to complete the tasks that he sets out to due to diminished energy and focus.  He is concerned about his testosterone level and underwent a lab draw.  He has never received testosterone in the past.  Because of the symptoms which are stable from the last visit I had recommended a repeat set of labs completed before 9 AM.  He underwent those labs earlier today and follows up to discuss results.    In addition the patient has ED.  He has issues with both achieving and maintaining an erection.  He has never been treated before in the past.  He is motivated for treatment.  He denies taking nitroglycerin based products.      Review of Systems  I personally reviewed the ROS with the patient.    Nursing Notes:   Kenia Tovar LPN  8/23/2021  2:22 PM  Signed  Chief Complaint   Patient presents with     Follow Up     decreased libido/ erectile dysfunction   Patient presents to the clinic today for a follow up for decreased libido and erectile dysfunction.    Review of Systems:    Weight loss:    No     Recent fever/chills:  No   Night sweats:   No  Current skin rash:  No   Recent hair loss:  No  Heat intolerance:  No   Cold intolerance:  No  Chest pain:   No   Palpitations:   No  Shortness of breath:  No   Wheezing:   No  Constipation:    No   Diarrhea:   No   Nausea:   No   Vomiting:   No   Kidney/side pain:  No   Back pain:   yes  Frequent headaches:  No   Dizziness:     No  Leg swelling:   No   Calf pain:    No          Medication Reconciliation: completed   Kenia Tovar LPN  8/23/2021 2:14 PM       Physical Exam  Vitals:    08/23/21 1415   BP: 138/84   BP Location: Right arm   Patient Position: Sitting   Cuff Size: Adult Large   Pulse: 87   Resp: 16   SpO2: 94%   Weight:  136.8 kg (301 lb 9.6 oz)     Constitutional: No acute distress.  Alert and cooperative   Head: NCAT  Eyes: Conjunctivae normal  Cardiovascular: Regular rate  Pulmonary/Chest: Respirations are even and non-labored bilaterally, no audible wheezing  Abdominal: Soft. No distension, tenderness, masses or guarding.   Neurological: A + O x 3.  Cranial Nerves II-XII grossly intact.  Extremities: SANAZ x 4, Warm. No clubbing.  No cyanosis.    Skin: Pink, warm and dry.  No visible rashes noted.  Psychiatric:  Normal mood and affect  Back:  No left CVA tenderness.  No right CVA tenderness.  Genitourinary:  Nonpalpable bladder  Normal male phallus without discharge or lesions, normal pubic hair distribution.  Testicles descended bilaterally.     Labs  Results for RANDALL AN (MRN [id number]) as of 8/23/2021 14:22   8/13/2021 08:40   Testosterone Total 170 (L)     Results for RANDALL AN (MRN [id number]) as of 8/11/2021 14:09   8/4/2021 15:23   Testosterone Total 143 (L)     Results for RANDALL AN (MRN [id number]) as of 8/11/2021 14:09   3/12/2021 10:44   PSA 1.013     Assessment  Mr. An is a 66 year old male with turns regarding low testosterone and ED    Regarding testosterone we discussed the potential value for supplementation.  We discussed the risks and benefits of testosterone supplementation for patients with symptomatic hypogonadism.  We discussed the potential for suppression of endogenous production.  In spite of the risks we discussed he would like to proceed with a trial.    He was informed of the most common side effects of Viagra such as headache, flushing, nausea and visual changes (such as blurred vision).    Plan  Start sildenafil 50-100mg by mouth 1 hour prior to sexual activity (empty stomach).  Start AndroGel 1.62%, 2 pumps every morning  Follow-up in 2 to 3 weeks with labs, T and HCT

## 2021-08-23 NOTE — NURSING NOTE
Chief Complaint   Patient presents with     Follow Up     decreased libido/ erectile dysfunction   Patient presents to the clinic today for a follow up for decreased libido and erectile dysfunction.    Review of Systems:    Weight loss:    No     Recent fever/chills:  No   Night sweats:   No  Current skin rash:  No   Recent hair loss:  No  Heat intolerance:  No   Cold intolerance:  No  Chest pain:   No   Palpitations:   No  Shortness of breath:  No   Wheezing:   No  Constipation:    No   Diarrhea:   No   Nausea:   No   Vomiting:   No   Kidney/side pain:  No   Back pain:   yes  Frequent headaches:  No   Dizziness:     No  Leg swelling:   No   Calf pain:    No          Medication Reconciliation: completed   Kenia Tovar LPN  8/23/2021 2:14 PM

## 2021-08-24 ENCOUNTER — TELEPHONE (OUTPATIENT)
Dept: UROLOGY | Facility: OTHER | Age: 66
End: 2021-08-24

## 2021-08-24 DIAGNOSIS — E29.1 HYPOGONADISM IN MALE: Primary | ICD-10-CM

## 2021-08-24 NOTE — TELEPHONE ENCOUNTER
Nati, patient's spouse called and asked that the script for Androgel should be sent to [facility] pharmacy on [address] [facility].      Thank you,  Magaly Sierra on 8/24/2021 at 2:08 PM

## 2021-08-25 RX ORDER — TESTOSTERONE 1.62 MG/G
2 GEL TRANSDERMAL EVERY MORNING
Qty: 75 G | Refills: 5 | Status: SHIPPED | OUTPATIENT
Start: 2021-08-25 | End: 2022-03-16

## 2021-09-01 ENCOUNTER — OFFICE VISIT (OUTPATIENT)
Dept: INTERNAL MEDICINE | Facility: OTHER | Age: 66
End: 2021-09-01
Attending: INTERNAL MEDICINE
Payer: MEDICARE

## 2021-09-01 VITALS
TEMPERATURE: 98.8 F | OXYGEN SATURATION: 95 % | SYSTOLIC BLOOD PRESSURE: 138 MMHG | WEIGHT: 290 LBS | HEART RATE: 84 BPM | DIASTOLIC BLOOD PRESSURE: 88 MMHG | BODY MASS INDEX: 39.33 KG/M2 | RESPIRATION RATE: 18 BRPM

## 2021-09-01 DIAGNOSIS — E89.0 POSTOPERATIVE HYPOTHYROIDISM: ICD-10-CM

## 2021-09-01 DIAGNOSIS — M35.3 POLYMYALGIA RHEUMATICA (H): Primary | ICD-10-CM

## 2021-09-01 DIAGNOSIS — G93.32 CHRONIC FATIGUE SYNDROME WITH FIBROMYALGIA: ICD-10-CM

## 2021-09-01 DIAGNOSIS — M79.7 CHRONIC FATIGUE SYNDROME WITH FIBROMYALGIA: ICD-10-CM

## 2021-09-01 DIAGNOSIS — Z71.85 VACCINE COUNSELING: ICD-10-CM

## 2021-09-01 DIAGNOSIS — I10 BENIGN ESSENTIAL HYPERTENSION: ICD-10-CM

## 2021-09-01 DIAGNOSIS — E66.01 MORBID OBESITY (H): ICD-10-CM

## 2021-09-01 PROCEDURE — G0463 HOSPITAL OUTPT CLINIC VISIT: HCPCS

## 2021-09-01 PROCEDURE — 99214 OFFICE O/P EST MOD 30 MIN: CPT | Performed by: INTERNAL MEDICINE

## 2021-09-01 RX ORDER — DEXTROAMPHETAMINE SACCHARATE, AMPHETAMINE ASPARTATE MONOHYDRATE, DEXTROAMPHETAMINE SULFATE AND AMPHETAMINE SULFATE 5; 5; 5; 5 MG/1; MG/1; MG/1; MG/1
20 CAPSULE, EXTENDED RELEASE ORAL DAILY
Qty: 30 CAPSULE | Refills: 0 | Status: SHIPPED | OUTPATIENT
Start: 2021-09-01 | End: 2021-10-27

## 2021-09-01 RX ORDER — DEXTROAMPHETAMINE SACCHARATE, AMPHETAMINE ASPARTATE MONOHYDRATE, DEXTROAMPHETAMINE SULFATE AND AMPHETAMINE SULFATE 5; 5; 5; 5 MG/1; MG/1; MG/1; MG/1
20 CAPSULE, EXTENDED RELEASE ORAL DAILY
Qty: 30 CAPSULE | Refills: 0 | Status: SHIPPED | OUTPATIENT
Start: 2021-09-29 | End: 2021-10-27

## 2021-09-01 RX ORDER — PREDNISONE 1 MG/1
TABLET ORAL
Qty: 300 TABLET | Refills: 0 | Status: SHIPPED | OUTPATIENT
Start: 2021-09-01 | End: 2021-10-27

## 2021-09-01 RX ORDER — LEVOTHYROXINE SODIUM 150 UG/1
150 TABLET ORAL DAILY
Qty: 90 TABLET | Refills: 3 | Status: SHIPPED | OUTPATIENT
Start: 2021-09-01 | End: 2021-10-27

## 2021-09-01 ASSESSMENT — PAIN SCALES - GENERAL: PAINLEVEL: MILD PAIN (2)

## 2021-09-01 ASSESSMENT — ENCOUNTER SYMPTOMS
APNEA: 1
MYALGIAS: 0
AGITATION: 0
WOUND: 0
FEVER: 0
WHEEZING: 0
LIGHT-HEADEDNESS: 0
DYSURIA: 0
NAUSEA: 0
CHILLS: 0
CONFUSION: 0
DIARRHEA: 0
BACK PAIN: 1
HEMATURIA: 0
COUGH: 1
SHORTNESS OF BREATH: 1
PALPITATIONS: 0
SLEEP DISTURBANCE: 1
DIZZINESS: 0
VOMITING: 0
ABDOMINAL PAIN: 0
BRUISES/BLEEDS EASILY: 0
ARTHRALGIAS: 0
FATIGUE: 1

## 2021-09-01 NOTE — PATIENT INSTRUCTIONS
Immunization History   Administered Date(s) Administered     COVID-19,PF,Moderna 02/17/2021, 03/17/2021     Flu, Unspecified 12/06/2007, 10/16/2009, 10/26/2010     Influenza (High Dose) 3 valent vaccine 10/28/2019     Influenza (IIV3) PF 11/14/2002, 09/26/2012, 11/12/2014     Influenza Vaccine IM > 6 months Valent IIV4 10/30/2017, 11/01/2017, 11/20/2017, 10/23/2018     Influenza, Quad, High Dose, Pf, 65yr + 10/13/2020     Meningococcal (Menactra ) 07/14/2017, 09/14/2017     Pedvax-hib 07/14/2017     Pneumo Conj 13-V (2010&after) 05/27/2016     Pneumococcal 23 valent 04/13/2004, 06/12/2017, 07/14/2017     Td (Adult), Adsorbed 04/13/2004     Tdap (Adult) Unspecified Formulation 08/13/2014     Zoster vaccine, live 06/12/2017, 07/14/2017      -- Booster shots for COVID-19 are coming.    -- Moderna and Pfizer booster shot   - If immunocompromised, can get 3rd shot (booster shot) 28+ days from date of 2nd shot.   - Otherwise - (Tentative) vaccine schedule is to get 3rd shot (booster shot) 8+ months from date of 2nd shot.   -- Dante and Dante booster shot   - Likely on the way, but people should not get a Booster from Moderna or Pfizer if they got Dante and Dante shot.     -- Consider Annual Flu / Influenza vaccination - Every Fall (Starting about October 1st)      -- Get the new shingles shot (2 in series) (Shingrix) - from one of the local pharmacies, at your convenience. -- Check cost/coverage.   -- or -- If these are very expensive, go to the Local Public Health Department     [facility] -- [address] [address]    ---- Shot clinic is the FIRST Thursday of Each Month -- from 2 to 6 PM, by Appointment only.     Call for an appointment -- [phone]         3rd COVID shot -- check on dosing today.     Slow taper of prednisone... try drop to 12.5 mg once daily for now... and then slowly down to 10 mg daily... with goal of about 1mg drop of  prednisone monthly.       Return in approximately 8 weeks from today, or sooner as needed for follow-up with Dr. Whitmore.  -- Med Refills - Controlled RX    - Bring your remaining pills / pill bottles with to: Each of your Med Management/refill  appointments for pill counts.   - Urine drug screens for controlled medications will be completed every 1 to 12 months.   - Random pill counts and urine drug testing may occur.   - No illicit drug use or pill sharing will be tolerated.     Clinic : [phone]  Appointment line: [phone]

## 2021-09-01 NOTE — NURSING NOTE
Patient is here for a follow up and concerns of fatigue.     Medication Reconciliation: complete    Tabitha Jerry LPN  9/1/2021 3:12 PM    FOOD SECURITY SCREENING QUESTIONS  Hunger Vital Signs:  Within the past 12 months we worried whether our food would run out before we got money to buy more. Never  Within the past 12 months the food we bought just didn't last and we didn't have money to get more. Never  Tabitha Jerry LPN 9/1/2021 3:12 PM

## 2021-09-01 NOTE — PROGRESS NOTES
Mr. An is a 66 year old male who presents to the clinic for evaluation of the following problems:      ICD-10-CM    1. Polymyalgia rheumatica (H)  M35.3 amphetamine-dextroamphetamine (ADDERALL XR) 20 MG 24 hr capsule     predniSONE (DELTASONE) 1 MG tablet     amphetamine-dextroamphetamine (ADDERALL XR) 20 MG 24 hr capsule   2. Morbid obesity (H)  E66.01 amphetamine-dextroamphetamine (ADDERALL XR) 20 MG 24 hr capsule     predniSONE (DELTASONE) 1 MG tablet     amphetamine-dextroamphetamine (ADDERALL XR) 20 MG 24 hr capsule   3. Chronic fatigue syndrome with fibromyalgia  R53.82 amphetamine-dextroamphetamine (ADDERALL XR) 20 MG 24 hr capsule    M79.7 predniSONE (DELTASONE) 1 MG tablet     amphetamine-dextroamphetamine (ADDERALL XR) 20 MG 24 hr capsule   4. Postoperative hypothyroidism  E89.0 levothyroxine (SYNTHROID/LEVOTHROID) 150 MCG tablet   5. Benign essential hypertension  I10    6. Vaccine counseling  Z71.89      HPI  Patient comes in with his wife for follow-up of multiple issues.  He was having very significant diffuse aches and pains especially in the shoulder and pelvic girdle.  Polymyalgia rheumatica was identified and he is now on tapering dosing of prednisone.  Was at 20 mg daily about 2 weeks ago he dropped down to 15 mg daily.  Did initially notice increasing aches and pains but this seems to have improved.  Going forward recommend trying to drop down to 12.5 mg daily for 2 to 4 weeks and then 10 mg daily if possible.  Thereafter recommend dose reduction of prednisone by approximately 1 mg/day, dropping about every 2 to 4 weeks.  Prednisone 10 mg daily for a month and 9 mg daily for a month, etc.    Chronic fatigue syndrome with fibromyalgia.  Morbid obesity.  BMI was greater than 40.  He has lost many pounds since starting on Adderall 20 mg daily.  He is feeling much better he is eating less and getting out and moving more.  His pain is also much better due to the prednisone for his polymyalgia.  Wife  is quite happy with things improving as they are.  Continue current medications for another 2 months.   website reviewed, no chronic opioid use.  No benzodiazepine use.  Prescriptions refilled as noted.    Postoperative hypothyroidism, taking oral replacement.  Needs refills.    Hypertension, blood pressure initially elevated, recheck was improved.  He does check his blood pressures at home and they have also been a little high.  Recommend monitoring closely.  We may need to restart some blood pressure medications if they remain elevated.    Vaccine counseling completed.  Recommend flu vaccine this fall and consider shingles vaccine anytime.  COVID-19 booster shot when available.    Functional Capacity: about 4 METS.   Review of Systems   Constitutional: Positive for fatigue. Negative for chills and fever.        + Chronic Fatigue Syndrome since Lyme Disease - in Fall 2016.    HENT: Negative for congestion and hearing loss.    Eyes: Negative for visual disturbance.   Respiratory: Positive for apnea (uses CPAP nightly), cough (intermittent, dry) and shortness of breath (+ improving, doing more physical activity). Negative for wheezing.    Cardiovascular: Positive for leg swelling. Negative for chest pain and palpitations.   Gastrointestinal: Negative for abdominal pain, diarrhea, nausea and vomiting.   Endocrine: Negative for cold intolerance and heat intolerance.        + Diaphoretic with exertion, little energy   Genitourinary: Negative for dysuria and hematuria.        + ED and reduced libido   Musculoskeletal: Positive for back pain (Low back pain - SI joint pain was very helpful). Negative for arthralgias and myalgias.   Skin: Negative for rash and wound.   Allergic/Immunologic: Negative for immunocompromised state.   Neurological: Negative for dizziness and light-headedness.   Hematological: Does not bruise/bleed easily.   Psychiatric/Behavioral: Positive for sleep disturbance. Negative for agitation and  confusion.      Reviewed and updated as needed this visit by Provider   Tobacco  Allergies  Meds  Problems  Med Hx  Surg Hx  Fam Hx          EXAM:   Vitals:    09/01/21 1509 09/01/21 1544   BP: (!) 178/108 138/88   BP Location:  Left arm   Patient Position:  Sitting   Cuff Size:  Adult Large   Pulse: 84    Resp: 18    Temp: 98.8  F (37.1  C)    TempSrc: Tympanic    SpO2: 95%    Weight: 131.5 kg (290 lb)        Current Pain Score: Mild Pain (2)     BP Readings from Last 3 Encounters:   09/01/21 138/88   08/23/21 138/84   08/18/21 138/88      Wt Readings from Last 3 Encounters:   09/01/21 131.5 kg (290 lb)   08/23/21 136.8 kg (301 lb 9.6 oz)   08/18/21 136.1 kg (300 lb)      Estimated body mass index is 39.33 kg/m  as calculated from the following:    Height as of 8/18/21: 1.829 m (6').    Weight as of this encounter: 131.5 kg (290 lb).     Physical Exam  Constitutional:       General: He is not in acute distress.     Appearance: He is well-developed. He is obese. He is not diaphoretic.   HENT:      Head: Normocephalic and atraumatic.   Eyes:      General: No scleral icterus.     Conjunctiva/sclera: Conjunctivae normal.   Cardiovascular:      Rate and Rhythm: Normal rate and regular rhythm.      Pulses: Normal pulses.   Pulmonary:      Effort: Pulmonary effort is normal.      Breath sounds: Normal breath sounds.   Abdominal:      Palpations: Abdomen is soft.      Tenderness: There is no abdominal tenderness.   Musculoskeletal:         General: Tenderness (right upper back/shoulder and left SI joint) present. No deformity.      Cervical back: Neck supple.      Right lower leg: Edema (trace) present.      Left lower leg: Edema (trace) present.   Lymphadenopathy:      Cervical: No cervical adenopathy.   Skin:     General: Skin is warm and dry.      Findings: No rash.   Neurological:      Mental Status: He is alert and oriented to person, place, and time. Mental status is at baseline.   Psychiatric:         Mood  and Affect: Mood normal.         Behavior: Behavior normal.        Procedures   INVESTIGATIONS:  - Labs reviewed in Epic     ASSESSMENT AND PLAN:  Problem List Items Addressed This Visit        Nervous and Auditory    Chronic fatigue syndrome with fibromyalgia    Relevant Medications    amphetamine-dextroamphetamine (ADDERALL XR) 20 MG 24 hr capsule    predniSONE (DELTASONE) 1 MG tablet    amphetamine-dextroamphetamine (ADDERALL XR) 20 MG 24 hr capsule (Start on 9/29/2021)    Polymyalgia rheumatica (H) - Primary    Relevant Medications    amphetamine-dextroamphetamine (ADDERALL XR) 20 MG 24 hr capsule    predniSONE (DELTASONE) 1 MG tablet    amphetamine-dextroamphetamine (ADDERALL XR) 20 MG 24 hr capsule (Start on 9/29/2021)       Digestive    Morbid obesity (H)    Relevant Medications    amphetamine-dextroamphetamine (ADDERALL XR) 20 MG 24 hr capsule    predniSONE (DELTASONE) 1 MG tablet    amphetamine-dextroamphetamine (ADDERALL XR) 20 MG 24 hr capsule (Start on 9/29/2021)       Endocrine    Postoperative hypothyroidism    Relevant Medications    levothyroxine (SYNTHROID/LEVOTHROID) 150 MCG tablet       Circulatory    Benign essential hypertension      Other Visit Diagnoses     Vaccine counseling            reviewed diet, exercise and weight control, recommended sodium restriction  -- Expected clinical course discussed    -- Medications and their side effects discussed    The 10-year ASCVD risk score (Suitland DC Jr., et al., 2013) is: 23.5%    Values used to calculate the score:      Age: 66 years      Sex: Male      Is Non- : No      Diabetic: No      Tobacco smoker: No      Systolic Blood Pressure: 138 mmHg      Is BP treated: No      HDL Cholesterol: 35 mg/dL      Total Cholesterol: 268 mg/dL    Patient Instructions     Immunization History   Administered Date(s) Administered     COVID-19,PF,Moderna 02/17/2021, 03/17/2021     Flu, Unspecified 12/06/2007, 10/16/2009, 10/26/2010     Influenza  (High Dose) 3 valent vaccine 10/28/2019     Influenza (IIV3) PF 11/14/2002, 09/26/2012, 11/12/2014     Influenza Vaccine IM > 6 months Valent IIV4 10/30/2017, 11/01/2017, 11/20/2017, 10/23/2018     Influenza, Quad, High Dose, Pf, 65yr + 10/13/2020     Meningococcal (Menactra ) 07/14/2017, 09/14/2017     Pedvax-hib 07/14/2017     Pneumo Conj 13-V (2010&after) 05/27/2016     Pneumococcal 23 valent 04/13/2004, 06/12/2017, 07/14/2017     Td (Adult), Adsorbed 04/13/2004     Tdap (Adult) Unspecified Formulation 08/13/2014     Zoster vaccine, live 06/12/2017, 07/14/2017      -- Booster shots for COVID-19 are coming.    -- Moderna and Pfizer booster shot   - If immunocompromised, can get 3rd shot (booster shot) 28+ days from date of 2nd shot.   - Otherwise - (Tentative) vaccine schedule is to get 3rd shot (booster shot) 8+ months from date of 2nd shot.   -- Dante and Dante booster shot   - Likely on the way, but people should not get a Booster from Moderna or Pfizer if they got Dante and Dante shot.     -- Consider Annual Flu / Influenza vaccination - Every Fall (Starting about October 1st)      -- Get the new shingles shot (2 in series) (Shingrix) - from one of the local pharmacies, at your convenience. -- Check cost/coverage.   -- or -- If these are very expensive, go to the Local Public Health Department     [facility] -- [facility]   [address] [place] [id number]    ---- Shot clinic is the FIRST Thursday of Each Month -- from 2 to 6 PM, by Appointment only.     Call for an appointment -- [phone]         3rd COVID shot -- check on dosing today.     Slow taper of prednisone... try drop to 12.5 mg once daily for now... and then slowly down to 10 mg daily... with goal of about 1mg drop of prednisone monthly.       Return in approximately 8 weeks from today, or sooner as needed for follow-up with Dr. Whitmore.  -- Med Refills - Controlled RX    - Bring your  remaining pills / pill bottles with to: Each of your Med Management/refill  appointments for pill counts.   - Urine drug screens for controlled medications will be completed every 1 to 12 months.   - Random pill counts and urine drug testing may occur.   - No illicit drug use or pill sharing will be tolerated.     Clinic : [phone]  Appointment line: [phone]     Electronically signed by:  Matt Whitmore MD on 9/1/2021  Internal Medicine  [facility]

## 2021-09-02 ENCOUNTER — OFFICE VISIT (OUTPATIENT)
Dept: FAMILY MEDICINE | Facility: OTHER | Age: 66
End: 2021-09-02
Attending: FAMILY MEDICINE
Payer: MEDICARE

## 2021-09-02 DIAGNOSIS — Z23 HIGH PRIORITY FOR 2019 NOVEL CORONAVIRUS VACCINATION: Primary | ICD-10-CM

## 2021-09-02 PROCEDURE — 0003A PR COVID VAC PFIZER DIL RECON 30 MCG/0.3 ML IM: CPT

## 2021-09-15 ENCOUNTER — OFFICE VISIT (OUTPATIENT)
Dept: UROLOGY | Facility: OTHER | Age: 66
End: 2021-09-15
Attending: UROLOGY
Payer: MEDICARE

## 2021-09-15 ENCOUNTER — LAB (OUTPATIENT)
Dept: LAB | Facility: OTHER | Age: 66
End: 2021-09-15
Attending: UROLOGY
Payer: MEDICARE

## 2021-09-15 ENCOUNTER — MYC MEDICAL ADVICE (OUTPATIENT)
Dept: INTERNAL MEDICINE | Facility: OTHER | Age: 66
End: 2021-09-15

## 2021-09-15 VITALS
WEIGHT: 294.2 LBS | RESPIRATION RATE: 16 BRPM | BODY MASS INDEX: 39.9 KG/M2 | HEART RATE: 80 BPM | DIASTOLIC BLOOD PRESSURE: 110 MMHG | SYSTOLIC BLOOD PRESSURE: 180 MMHG | OXYGEN SATURATION: 97 %

## 2021-09-15 DIAGNOSIS — R73.9 ELEVATED RANDOM BLOOD GLUCOSE LEVEL: ICD-10-CM

## 2021-09-15 DIAGNOSIS — E29.1 HYPOGONADISM IN MALE: Primary | ICD-10-CM

## 2021-09-15 DIAGNOSIS — I10 BENIGN ESSENTIAL HYPERTENSION: ICD-10-CM

## 2021-09-15 DIAGNOSIS — G93.32 CHRONIC FATIGUE SYNDROME WITH FIBROMYALGIA: ICD-10-CM

## 2021-09-15 DIAGNOSIS — M79.7 CHRONIC FATIGUE SYNDROME WITH FIBROMYALGIA: ICD-10-CM

## 2021-09-15 DIAGNOSIS — M35.3 POLYMYALGIA RHEUMATICA (H): ICD-10-CM

## 2021-09-15 DIAGNOSIS — R53.83 MALAISE AND FATIGUE: ICD-10-CM

## 2021-09-15 DIAGNOSIS — E78.2 MIXED HYPERLIPIDEMIA: ICD-10-CM

## 2021-09-15 DIAGNOSIS — R53.81 MALAISE AND FATIGUE: ICD-10-CM

## 2021-09-15 DIAGNOSIS — E66.01 MORBID OBESITY (H): ICD-10-CM

## 2021-09-15 DIAGNOSIS — E29.1 HYPOGONADISM IN MALE: ICD-10-CM

## 2021-09-15 DIAGNOSIS — R60.0 BILATERAL LOWER EXTREMITY EDEMA: ICD-10-CM

## 2021-09-15 LAB
ALBUMIN SERPL-MCNC: 4.2 G/DL (ref 3.5–5.7)
ALP SERPL-CCNC: 42 U/L (ref 34–104)
ALT SERPL W P-5'-P-CCNC: 18 U/L (ref 7–52)
ANION GAP SERPL CALCULATED.3IONS-SCNC: 8 MMOL/L (ref 3–14)
AST SERPL W P-5'-P-CCNC: 16 U/L (ref 13–39)
BILIRUB SERPL-MCNC: 0.5 MG/DL (ref 0.3–1)
BUN SERPL-MCNC: 22 MG/DL (ref 7–25)
CALCIUM SERPL-MCNC: 10.1 MG/DL (ref 8.6–10.3)
CHLORIDE BLD-SCNC: 103 MMOL/L (ref 98–107)
CHOLEST SERPL-MCNC: 214 MG/DL
CO2 SERPL-SCNC: 29 MMOL/L (ref 21–31)
CREAT SERPL-MCNC: 1.34 MG/DL (ref 0.7–1.3)
ERYTHROCYTE [DISTWIDTH] IN BLOOD BY AUTOMATED COUNT: 14.4 % (ref 10–15)
FASTING STATUS PATIENT QL REPORTED: ABNORMAL
GFR SERPL CREATININE-BSD FRML MDRD: 54 ML/MIN/1.73M2
GLUCOSE BLD-MCNC: 83 MG/DL (ref 70–105)
HBA1C MFR BLD: 5.5 % (ref 4–6.2)
HCT VFR BLD AUTO: 45.5 % (ref 40–53)
HDLC SERPL-MCNC: 43 MG/DL (ref 23–92)
HGB BLD-MCNC: 15.2 G/DL (ref 13.3–17.7)
LDLC SERPL CALC-MCNC: 143 MG/DL
MCH RBC QN AUTO: 32.5 PG (ref 26.5–33)
MCHC RBC AUTO-ENTMCNC: 33.4 G/DL (ref 31.5–36.5)
MCV RBC AUTO: 97 FL (ref 78–100)
NONHDLC SERPL-MCNC: 171 MG/DL
PLATELET # BLD AUTO: 459 10E3/UL (ref 150–450)
POTASSIUM BLD-SCNC: 3.9 MMOL/L (ref 3.5–5.1)
PROT SERPL-MCNC: 7.6 G/DL (ref 6.4–8.9)
RBC # BLD AUTO: 4.68 10E6/UL (ref 4.4–5.9)
SODIUM SERPL-SCNC: 140 MMOL/L (ref 134–144)
T4 FREE SERPL-MCNC: 0.77 NG/DL (ref 0.6–1.6)
TESTOST SERPL-MCNC: 300 NG/DL (ref 240–950)
TRIGL SERPL-MCNC: 140 MG/DL
TSH SERPL DL<=0.005 MIU/L-ACNC: 11.84 MU/L (ref 0.4–4)
WBC # BLD AUTO: 16.1 10E3/UL (ref 4–11)

## 2021-09-15 PROCEDURE — 84403 ASSAY OF TOTAL TESTOSTERONE: CPT | Mod: ZL

## 2021-09-15 PROCEDURE — G0463 HOSPITAL OUTPT CLINIC VISIT: HCPCS

## 2021-09-15 PROCEDURE — 84439 ASSAY OF FREE THYROXINE: CPT | Mod: ZL

## 2021-09-15 PROCEDURE — 85014 HEMATOCRIT: CPT | Mod: ZL

## 2021-09-15 PROCEDURE — 80053 COMPREHEN METABOLIC PANEL: CPT | Mod: ZL

## 2021-09-15 PROCEDURE — 83036 HEMOGLOBIN GLYCOSYLATED A1C: CPT | Mod: ZL

## 2021-09-15 PROCEDURE — 99214 OFFICE O/P EST MOD 30 MIN: CPT | Performed by: UROLOGY

## 2021-09-15 PROCEDURE — 80061 LIPID PANEL: CPT | Mod: ZL

## 2021-09-15 PROCEDURE — 36415 COLL VENOUS BLD VENIPUNCTURE: CPT | Mod: ZL

## 2021-09-15 PROCEDURE — 84443 ASSAY THYROID STIM HORMONE: CPT | Mod: ZL

## 2021-09-15 RX ORDER — SPIRONOLACTONE AND HYDROCHLOROTHIAZIDE 25; 25 MG/1; MG/1
1 TABLET ORAL DAILY
Qty: 180 TABLET | Refills: 3 | COMMUNITY
Start: 2021-09-15 | End: 2021-10-27

## 2021-09-15 ASSESSMENT — PAIN SCALES - GENERAL: PAINLEVEL: NO PAIN (0)

## 2021-09-15 NOTE — TELEPHONE ENCOUNTER
Please see [employer] message. I did send him a reply back requesting more blood pressure reading with the days and times.    Orquidea Sawyer LPN on 9/15/2021 at 12:11 PM

## 2021-09-15 NOTE — PROGRESS NOTES
Type of Visit  EST    Chief Complaint  Erectile dysfunction  Hypogonadism    HPI  Mr. An is a 66 year old male with hypogonadism and ED.    The patient has a 3 to 5-year history of diminishing exercise tolerance.  He states that he is unable to complete the tasks that he sets out to due to diminished energy and focus.  At the last visit 3 weeks ago I had recommended the patient start topical testosterone, 2 pumps daily.  He follows up today with labs.  He did apply the medication as instructed on a daily basis.  He denies any side effects.  He does not feel improvement in symptoms at this point.  His wife also is present today and she voices concern about possible exposure to her.    In addition the patient has ED.  He has issues with both achieving and maintaining an erection.  He was prescribed sildenafil at the last visit.  He states he has not had a chance to try the medication yet.      Review of Systems  I personally reviewed the ROS with the patient.    Nursing Notes:   Kenia Tovar LPN  9/15/2021 10:21 AM  Signed  Chief Complaint   Patient presents with     Follow Up     hypogonadism   Patient presents to the clinic today for a follow up for hypogonadism    Review of Systems:    Weight loss:    No     Recent fever/chills:  No   Night sweats:   No  Current skin rash:  No   Recent hair loss:  No  Heat intolerance:  No   Cold intolerance:  No  Chest pain:   No   Palpitations:   No  Shortness of breath:  No   Wheezing:   No  Constipation:    No   Diarrhea:   No   Nausea:   No   Vomiting:   No   Kidney/side pain:  No   Back pain:   No  Frequent headaches:  No   Dizziness:     No  Leg swelling:   No   Calf pain:    No        Medication Reconciliation: completed   Kenia Tovar LPN  9/15/2021 10:21 AM       Physical Exam  Vitals:    09/15/21 1024 09/15/21 1030   BP: (!) 180/120 (!) 180/110   BP Location: Right arm Right arm   Patient Position: Sitting Sitting   Cuff Size: Adult Large Adult Large   Pulse: 80     Resp: 16    SpO2: 97%    Weight: 133.4 kg (294 lb 3.2 oz)      Constitutional: No acute distress.  Alert and cooperative   Head: NCAT  Eyes: Conjunctivae normal  Cardiovascular: Regular rate  Pulmonary/Chest: Respirations are even and non-labored bilaterally, no audible wheezing  Abdominal: Soft. No distension, tenderness, masses or guarding.   Neurological: A + O x 3.  Cranial Nerves II-XII grossly intact.  Extremities: SANAZ x 4, Warm. No clubbing.  No cyanosis.    Skin: Pink, warm and dry.  No visible rashes noted.  Psychiatric:  Normal mood and affect  Back:  No left CVA tenderness.  No right CVA tenderness.  Genitourinary:  Nonpalpable bladder  Normal male phallus without discharge or lesions, normal pubic hair distribution.  Testicles descended bilaterally.     Labs  Results for RANDALL AN (MRN [id number]) as of 9/15/2021 12:05   9/15/2021 08:19   Testosterone Total 300     Results for RANDALL AN (MRN [id number]) as of 9/15/2021 10:37   9/15/2021 08:24   Hematocrit 45.5     ^^^^^^^^^^^^^^^^^^^^^^^^^^^^^^^^^^^^    Results for RANDALL AN (MRN [id number]) as of 8/23/2021 14:22   8/13/2021 08:40   Testosterone Total 170 (L)     Results for RANDALL AN (MRN [id number]) as of 8/11/2021 14:09   8/4/2021 15:23   Testosterone Total 143 (L)     Results for RANDALL AN (MRN [id number]) as of 8/11/2021 14:09   3/12/2021 10:44   PSA 1.013     Assessment  Mr. An is a 66 year old male with hypogonadism and ED.  He recently started topical testosterone and his value from today reflects the fact that he is absorbing the testosterone.  It is a bit early to assess for symptomatic improvement.    Regarding ED he has not attempted the sildenafil that was previously prescribed.    In addition he presents today with his wife who is greatly concerned about her potential exposure of topical testosterone.  I reviewed the appropriate way to apply testosterone in the morning after a shower on skin that is typically covered  by his shirt.  Following application of the medication he should wash his hands then wait for the skin to dry.  I explained that there is minimal to no exposure when dry.    Plan  Start sildenafil 50-100mg by mouth 1 hour prior to sexual activity (empty stomach).  Continue AndroGel 1.62%, 2 pumps every morning  Follow-up in 6 months with labs: T and hematocrit        A total of 30 minutes was spent with the patient, reviewing records, tests, ordering medications, tests or procedures, counseling regarding the above described medical concern, recommendations regarding management and documenting clinical information in the EHR.

## 2021-09-15 NOTE — Clinical Note
Please let the patient know that his testosterone returned at 300 which is a significant increase from before the medication.  He should continue the topical product as we discussed

## 2021-09-20 ENCOUNTER — TRANSFERRED RECORDS (OUTPATIENT)
Dept: HEALTH INFORMATION MANAGEMENT | Facility: OTHER | Age: 66
End: 2021-09-20

## 2021-09-23 ENCOUNTER — MYC MEDICAL ADVICE (OUTPATIENT)
Dept: INTERNAL MEDICINE | Facility: OTHER | Age: 66
End: 2021-09-23

## 2021-09-23 DIAGNOSIS — L03.811 ABSCESS OR CELLULITIS OF SCALP: ICD-10-CM

## 2021-09-24 ENCOUNTER — MEDICAL CORRESPONDENCE (OUTPATIENT)
Dept: HEALTH INFORMATION MANAGEMENT | Facility: OTHER | Age: 66
End: 2021-09-24

## 2021-09-24 RX ORDER — CEPHALEXIN 500 MG/1
500 CAPSULE ORAL 3 TIMES DAILY
Qty: 21 CAPSULE | Refills: 0 | Status: SHIPPED | OUTPATIENT
Start: 2021-09-24 | End: 2021-10-27

## 2021-09-24 RX ORDER — DICLOXACILLIN SODIUM 500 MG
500 CAPSULE ORAL 3 TIMES DAILY
Qty: 21 CAPSULE | Refills: 0 | Status: SHIPPED | OUTPATIENT
Start: 2021-09-24 | End: 2021-09-29

## 2021-09-28 DIAGNOSIS — L03.811 ABSCESS OR CELLULITIS OF SCALP: ICD-10-CM

## 2021-09-29 DIAGNOSIS — G93.32 CHRONIC FATIGUE SYNDROME WITH FIBROMYALGIA: ICD-10-CM

## 2021-09-29 DIAGNOSIS — M35.3 POLYMYALGIA RHEUMATICA (H): ICD-10-CM

## 2021-09-29 DIAGNOSIS — M79.7 CHRONIC FATIGUE SYNDROME WITH FIBROMYALGIA: ICD-10-CM

## 2021-09-29 DIAGNOSIS — E66.01 MORBID OBESITY (H): ICD-10-CM

## 2021-09-29 RX ORDER — DICLOXACILLIN SODIUM 500 MG
500 CAPSULE ORAL 3 TIMES DAILY
Qty: 21 CAPSULE | Refills: 0 | Status: SHIPPED | OUTPATIENT
Start: 2021-09-29 | End: 2021-10-03

## 2021-09-29 NOTE — TELEPHONE ENCOUNTER
Routing refill request to provider for review/approval because:  Drug not on the [facility] refill protocol     LOV: 9/1/2021    Elza Howell RN on 9/29/2021 at 2:05 PM

## 2021-10-03 DIAGNOSIS — L03.811 ABSCESS OR CELLULITIS OF SCALP: ICD-10-CM

## 2021-10-03 RX ORDER — DEXTROAMPHETAMINE SACCHARATE, AMPHETAMINE ASPARTATE MONOHYDRATE, DEXTROAMPHETAMINE SULFATE AND AMPHETAMINE SULFATE 5; 5; 5; 5 MG/1; MG/1; MG/1; MG/1
20 CAPSULE, EXTENDED RELEASE ORAL DAILY
Qty: 30 CAPSULE | Refills: 0 | OUTPATIENT
Start: 2021-10-03

## 2021-10-03 RX ORDER — PENICILLIN V POTASSIUM 500 MG/1
500 TABLET, FILM COATED ORAL 3 TIMES DAILY
Qty: 21 TABLET | Refills: 0 | Status: SHIPPED | OUTPATIENT
Start: 2021-10-03 | End: 2021-10-10

## 2021-10-04 NOTE — TELEPHONE ENCOUNTER
There was a separate prescription for Adderall -- for 9/29.     Please contact patient / pharmacy.     These are not able to be refilled like other prescriptions.   They need separate prescriptions each month.     Electronically signed by:  Matt Whitmore MD  on October 3, 2021 7:47 PM

## 2021-10-05 NOTE — TELEPHONE ENCOUNTER
Spoke to pharmacy and they can fill the 9/29/21 prescription.   Jen Trammell CMA([facility])..................10/5/2021   10:32 AM

## 2021-10-27 ENCOUNTER — OFFICE VISIT (OUTPATIENT)
Dept: INTERNAL MEDICINE | Facility: OTHER | Age: 66
End: 2021-10-27
Attending: INTERNAL MEDICINE
Payer: MEDICARE

## 2021-10-27 VITALS
SYSTOLIC BLOOD PRESSURE: 138 MMHG | WEIGHT: 294.4 LBS | OXYGEN SATURATION: 93 % | BODY MASS INDEX: 39.93 KG/M2 | RESPIRATION RATE: 16 BRPM | TEMPERATURE: 98.6 F | DIASTOLIC BLOOD PRESSURE: 88 MMHG | HEART RATE: 95 BPM

## 2021-10-27 DIAGNOSIS — R60.0 BILATERAL LOWER EXTREMITY EDEMA: ICD-10-CM

## 2021-10-27 DIAGNOSIS — E89.0 POSTOPERATIVE HYPOTHYROIDISM: ICD-10-CM

## 2021-10-27 DIAGNOSIS — G93.32 CHRONIC FATIGUE SYNDROME WITH FIBROMYALGIA: ICD-10-CM

## 2021-10-27 DIAGNOSIS — I10 BENIGN ESSENTIAL HYPERTENSION: ICD-10-CM

## 2021-10-27 DIAGNOSIS — Z71.85 VACCINE COUNSELING: ICD-10-CM

## 2021-10-27 DIAGNOSIS — Z00.00 ENCOUNTER FOR MEDICARE ANNUAL WELLNESS EXAM: ICD-10-CM

## 2021-10-27 DIAGNOSIS — M79.7 CHRONIC FATIGUE SYNDROME WITH FIBROMYALGIA: ICD-10-CM

## 2021-10-27 DIAGNOSIS — E66.01 MORBID OBESITY (H): ICD-10-CM

## 2021-10-27 DIAGNOSIS — M35.3 POLYMYALGIA RHEUMATICA (H): Primary | ICD-10-CM

## 2021-10-27 DIAGNOSIS — N18.31 STAGE 3A CHRONIC KIDNEY DISEASE (H): ICD-10-CM

## 2021-10-27 PROBLEM — N18.30 CHRONIC KIDNEY DISEASE, STAGE 3 (H): Status: ACTIVE | Noted: 2021-10-27

## 2021-10-27 PROCEDURE — 99214 OFFICE O/P EST MOD 30 MIN: CPT | Mod: 25 | Performed by: INTERNAL MEDICINE

## 2021-10-27 PROCEDURE — G0463 HOSPITAL OUTPT CLINIC VISIT: HCPCS

## 2021-10-27 PROCEDURE — G0439 PPPS, SUBSEQ VISIT: HCPCS | Performed by: INTERNAL MEDICINE

## 2021-10-27 RX ORDER — PREDNISONE 5 MG/1
TABLET ORAL
Qty: 118 TABLET | Refills: 0 | Status: SHIPPED | OUTPATIENT
Start: 2021-10-27 | End: 2022-03-24

## 2021-10-27 RX ORDER — PREDNISONE 1 MG/1
TABLET ORAL
Qty: 140 TABLET | Refills: 1 | Status: SHIPPED | OUTPATIENT
Start: 2021-10-27 | End: 2022-03-24

## 2021-10-27 RX ORDER — LEVOTHYROXINE SODIUM 200 UG/1
200 TABLET ORAL DAILY
Qty: 90 TABLET | Refills: 1 | Status: SHIPPED | OUTPATIENT
Start: 2021-10-27 | End: 2021-12-23

## 2021-10-27 RX ORDER — SPIRONOLACTONE AND HYDROCHLOROTHIAZIDE 25; 25 MG/1; MG/1
1 TABLET ORAL
Qty: 180 TABLET | Refills: 3 | Status: SHIPPED | OUTPATIENT
Start: 2021-10-27 | End: 2022-10-12

## 2021-10-27 ASSESSMENT — ENCOUNTER SYMPTOMS
APNEA: 1
ARTHRALGIAS: 0
WHEEZING: 0
HEMATURIA: 0
COUGH: 0
CONFUSION: 0
DIZZINESS: 0
BACK PAIN: 1
ABDOMINAL PAIN: 0
DIARRHEA: 0
NAUSEA: 0
LIGHT-HEADEDNESS: 0
CHILLS: 0
BRUISES/BLEEDS EASILY: 0
WOUND: 0
SHORTNESS OF BREATH: 0
MYALGIAS: 0
DYSURIA: 0
SLEEP DISTURBANCE: 1
FATIGUE: 1
FEVER: 0
VOMITING: 0
PALPITATIONS: 0
AGITATION: 0

## 2021-10-27 ASSESSMENT — PAIN SCALES - GENERAL: PAINLEVEL: NO PAIN (0)

## 2021-10-27 NOTE — PROGRESS NOTES
Medication Reconciliation: complete   Advance Care Directive Reviewed    FOOD SECURITY SCREENING QUESTIONS  Hunger Vital Signs:  Within the past 12 months we worried whether our food would run out before we got money to buy more. Never  Within the past 12 months the food we bought just didn't last and we didn't have money to get more. Never  Marlen Soto LPN .............10/27/2021  3:16 PM

## 2021-10-27 NOTE — PROGRESS NOTES
"SUBJECTIVE:   Selvin An is a 66 year old male who presents for Preventive Visit.  Patient has been advised of split billing requirements and indicates understanding: Yes  Are you in the first 12 months of your Medicare Part B coverage?  No    Physical Health:    In general, how would you rate your overall physical health? good    Outside of work, how many days during the week do you exercise? none    Outside of work, approximately how many minutes a day do you exercise?not applicable    If you drink alcohol do you typically have >3 drinks per day or >7 drinks per week? No    Do you usually eat at least 4 servings of fruit and vegetables a day, include whole grains & fiber and avoid regularly eating high fat or \"junk\" foods? Yes    Do you have any problems taking medications regularly?  No    Do you have any side effects from medications? none    Needs assistance for the following daily activities: no assistance needed    Which of the following safety concerns are present in your home?  lack of grab bars in the bathroom     Hearing impairment: Yes, Has hearing aides    In the past 6 months, have you been bothered by leaking of urine? no    Mental Health:    In general, how would you rate your overall mental or emotional health? good  PHQ-2 Score: 0    Do you feel safe in your environment? Yes    Have you ever done Advance Care Planning? (For example, a Health Directive, POLST, or a discussion with a medical provider or your loved ones about your wishes): No, advance care planning information given to patient to review.  Patient plans to discuss their wishes with loved ones or provider.      Fall risk:  Fallen 2 or more times in the past year?: No  Any fall with injury in the past year?: No    Cognitive Screenin) Repeat 3 items (Leader, Season, Table)    2) Clock draw: NORMAL  3) 3 item recall: Recalls 2 objects   Results: NORMAL clock, 1-2 items recalled: COGNITIVE IMPAIRMENT LESS LIKELY    Mini-CogTM " Copyright ESTEFANÍA Corbin. Licensed by the author for use in [facility]; reprinted with permission (tisha@[facility]). All rights reserved.      Do you have sleep apnea, excessive snoring or daytime drowsiness?: yes  -- uses CPAP nightly. Finds helpful and beneficial    Reviewed and updated as needed this visit by clinical staff  Tobacco  Allergies  Meds  Problems  Med Hx  Surg Hx  Fam Hx  Soc Hx          Reviewed and updated as needed this visit by Provider  Tobacco  Allergies  Meds  Problems  Med Hx  Surg Hx  Fam Hx         Social History     Tobacco Use     Smoking status: Never Smoker     Smokeless tobacco: Never Used   Substance Use Topics     Alcohol use: No     Comment: Alcoholic Drinks/day: socially                           Current providers sharing in care for this patient include:   Patient Care Team:  Matt Whitmore MD as PCP - General (Internal Medicine)  Matt Whitmore MD as Assigned PCP  Maximo Schwartz MD as Assigned Surgical Provider    The following health maintenance items are reviewed in Epic and correct as of today:  Health Maintenance   Topic Date Due     ZOSTER IMMUNIZATION (2 of 3) 09/08/2017     Pneumococcal Vaccine: 65+ Years (2 of 2 - PPSV23) 07/14/2022     MEDICARE ANNUAL WELLNESS VISIT  10/27/2022     FALL RISK ASSESSMENT  10/27/2022     DTAP/TDAP/TD IMMUNIZATION (2 - Td or Tdap) 08/13/2024     COLORECTAL CANCER SCREENING  09/09/2024     LIPID  09/15/2026     ADVANCE CARE PLANNING  10/27/2026     PHQ-2  Completed     INFLUENZA VACCINE  Completed     AORTIC ANEURYSM SCREENING (SYSTEM ASSIGNED)  Completed     COVID-19 Vaccine  Completed     HEPATITIS C SCREENING  Addressed     IPV IMMUNIZATION  Aged Out     MENINGITIS IMMUNIZATION  Aged Out     HEPATITIS B IMMUNIZATION  Aged Out     Review of Systems   Constitutional: Positive for fatigue. Negative for chills and fever.        + Chronic Fatigue Syndrome since Lyme Disease - in Fall 2016.    HENT: Negative for congestion  and hearing loss.    Eyes: Negative for visual disturbance.   Respiratory: Positive for apnea (uses CPAP nightly). Negative for cough (intermittent, dry), shortness of breath and wheezing.    Cardiovascular: Negative for chest pain and palpitations.   Gastrointestinal: Negative for abdominal pain, diarrhea, nausea and vomiting.   Endocrine: Negative for cold intolerance and heat intolerance.        + Diaphoretic with exertion, little energy   Genitourinary: Negative for dysuria and hematuria.        + ED and reduced libido - following with Dr. Schwartz / Urology   Musculoskeletal: Positive for back pain (Hx of Low back pain - SI joint injection was very helpful ). Negative for arthralgias and myalgias.   Skin: Negative for rash and wound.   Allergic/Immunologic: Negative for immunocompromised state.   Neurological: Negative for dizziness and light-headedness.   Hematological: Does not bruise/bleed easily.   Psychiatric/Behavioral: Positive for sleep disturbance. Negative for agitation and confusion.      OBJECTIVE:   /88 (BP Location: Left arm, Patient Position: Sitting, Cuff Size: Adult Large)   Pulse 95   Temp 98.6  F (37  C) (Tympanic)   Resp 16   Wt 133.5 kg (294 lb 6.4 oz)   SpO2 93%   BMI 39.93 kg/m   Estimated body mass index is 39.93 kg/m  as calculated from the following:    Height as of 8/18/21: 1.829 m (6').    Weight as of this encounter: 133.5 kg (294 lb 6.4 oz).    Wt Readings from Last 4 Encounters:   10/27/21 133.5 kg (294 lb 6.4 oz)   09/15/21 133.4 kg (294 lb 3.2 oz)   09/01/21 131.5 kg (290 lb)   08/23/21 136.8 kg (301 lb 9.6 oz)      Physical Exam  Constitutional:       General: He is not in acute distress.     Appearance: He is well-developed. He is obese. He is not diaphoretic.   HENT:      Head: Normocephalic and atraumatic.   Eyes:      General: No scleral icterus.     Conjunctiva/sclera: Conjunctivae normal.   Cardiovascular:      Rate and Rhythm: Normal rate and regular rhythm.       Pulses: Normal pulses.   Pulmonary:      Effort: Pulmonary effort is normal.      Breath sounds: Normal breath sounds.   Abdominal:      Palpations: Abdomen is soft.      Tenderness: There is no abdominal tenderness.   Musculoskeletal:         General: Tenderness (right upper back/shoulder and left SI joint) present. No deformity.      Cervical back: Neck supple.      Right lower leg: Edema (trace) present.      Left lower leg: Edema (trace) present.   Lymphadenopathy:      Cervical: No cervical adenopathy.   Skin:     General: Skin is warm and dry.      Findings: No rash.   Neurological:      Mental Status: He is alert and oriented to person, place, and time. Mental status is at baseline.   Psychiatric:         Mood and Affect: Mood normal.         Behavior: Behavior normal.       Diagnostic Test Results:  Labs reviewed in [facility]  Recent Labs   Lab Test 09/15/21  0824 08/04/21  1523 06/08/21  1147 03/12/21  1044 03/12/21  1044   A1C 5.5 5.6  --   --  5.9   *  --   --   --  Cannot estimate LDL when triglyceride exceeds 400 mg/dL   HDL 43 35  --   --  33   TRIG 140 540*  --   --  410*   ALT 18 23  --   --  26   CR 1.34* 1.40* 1.65*   < > 1.18   GFRESTIMATED 54* 52* 42*   < > 62   GFRESTBLACK  --   --  51*  --  75   POTASSIUM 3.9 3.9 3.9   < > 3.7   TSH 11.84* 3.61  --   --   --     < > = values in this interval not displayed.   Hemoglobin A1c is normal.  LDL is not at goal.  Creatinine has improved slightly.  TSH is not at goal.  Potassium is normal.  HDL is at goal.  ALT is normal.    ASSESSMENT / PLAN:       ICD-10-CM    1. Polymyalgia rheumatica (H)  M35.3 predniSONE (DELTASONE) 5 MG tablet     predniSONE (DELTASONE) 1 MG tablet   2. Stage 3a chronic kidney disease (H)  N18.31 CBC W PLT No Diff     Comprehensive Metabolic Panel   3. Encounter for Medicare annual wellness exam  Z00.00    4. Postoperative hypothyroidism  E89.0 levothyroxine (SYNTHROID/LEVOTHROID) 200 MCG tablet     TSH with free T4 reflex   5.  Morbid obesity (H)  E66.01 predniSONE (DELTASONE) 1 MG tablet   6. Chronic fatigue syndrome with fibromyalgia  R53.82 predniSONE (DELTASONE) 1 MG tablet    M79.7    7. Benign essential hypertension  I10 spironolactone-HCTZ (ALDACTAZIDE) 25-25 MG tablet     CBC W PLT No Diff     Comprehensive Metabolic Panel   8. Bilateral lower extremity edema  R60.0 spironolactone-HCTZ (ALDACTAZIDE) 25-25 MG tablet   9. Vaccine counseling  Z71.85    Patient presents for annual Medicare wellness visit as well as follow-up multiple issues.    Polymyalgia rheumatica.  Currently taking 13 mg of prednisone daily.  Pain is now resolved/gone.  Starting on Monday we will drop down to 10 mg daily and then reduce prednisone by 1 mg every 2 weeks rather than 1 mg monthly.  Prescription sent to pharmacy.    Stage III chronic kidney disease.  Recheck lab work.  Orders placed.    Postoperative hypothyroidism, taking oral replacement.  TSH was recently checked and was quite high.  Previously on 200 mg of Synthroid daily for approximately 15 years.  He would like to go back to previous dosing.  Plan to recheck labs in 2 to 3 months.  Orders placed.    Chronic fatigue syndrome with fibromyalgia.  Reports a lot of his symptoms have improved.  His pain currently is rated at 0.  Taper off prednisone as noted above.    Hypertension, blood pressure initially was high.  Recheck improved.  Advised to monitor at home.  Plan to increase spironolactone-hydrochlorothiazide up to 2 tablets daily.  Updated prescription sent to pharmacy.  Recheck lab work, orders placed.    Bilateral lower extremity edema.  Likely associated with the reduced dose of spironolactone.  Increase dose as noted above.  Check labs.    -- will try stopping Adderall at this time. Has 5 days left. Advised to watch for lethargy, weight gain. Call with updates as needed.     Obesity, discussed need for reduced oral caloric intake.  Regular exercise.  Reduce carbohydrate intake.  Information  printed and reviewed.    Vaccine counseling completed. Consider Shingles vaccine.     Patient has been advised of split billing requirements and indicates understanding: Yes    COUNSELING:  Reviewed preventive health counseling, as reflected in patient instructions  Special attention given to:       Consider AAA screening for ages 65-75 and smoking history       Regular exercise       Healthy diet/nutrition       Vision screening       Hearing screening       Dental care       Bladder control       Fall risk prevention       Immunizations    Estimated body mass index is 39.93 kg/m  as calculated from the following:    Height as of 8/18/21: 1.829 m (6').    Weight as of this encounter: 133.5 kg (294 lb 6.4 oz).    Weight management plan: Discussed healthy diet and exercise guidelines    He reports that he has never smoked. He has never used smokeless tobacco.    Appropriate preventive services were discussed with this patient, including applicable screening as appropriate for cardiovascular disease, diabetes, osteopenia/osteoporosis, and glaucoma.  As appropriate for age/gender, discussed screening for colorectal cancer, prostate cancer, breast cancer, and cervical cancer. Checklist reviewing preventive services available has been given to the patient.    Reviewed patients plan of care and provided an AVS. The Complex Care Plan (for patients with higher acuity and needing more deliberate coordination of services) for Selvin meets the Care Plan requirement. This Care Plan has been established and reviewed with the Patient and spouse.    Counseling Resources:  ATP IV Guidelines  Pooled Cohorts Equation Calculator  Breast Cancer Risk Calculator  BRCA-Related Cancer Risk Assessment: FHS-7 Tool  FRAX Risk Assessment  ICSI Preventive Guidelines  Dietary Guidelines for Americans, 2010  USDA's MyPlate  ASA Prophylaxis  Lung CA Screening    Matt Whitmore MD  [facility]

## 2021-10-27 NOTE — PATIENT INSTRUCTIONS
Patient Education   Personalized Prevention Plan  You are due for the preventive services outlined below.  Your care team is available to assist you in scheduling these services.  If you have already completed any of these items, please share that information with your care team to update in your medical record.  Health Maintenance Due   Topic Date Due     Zoster (Shingles) Vaccine (2 of 3) 09/08/2017     Preventive Health Recommendations  See your health care provider every year to    Review health changes.     Discuss preventive care.      Review your medicines if your doctor has prescribed any.    Talk with your health care provider about whether you should have a test to screen for prostate cancer (PSA).    Every 3 years, have a diabetes test (fasting glucose). If you are at risk for diabetes, you should have this test more often.    Every 5 years, have a cholesterol test. Have this test more often if you are at risk for high cholesterol or heart disease.     Every 10 years, have a colonoscopy. Or, have a yearly FIT test (stool test). These exams will check for colon cancer.    Talk to with your health care provider about screening for Abdominal Aortic Aneurysm if you have a family history of AAA or have a history of smoking.    Shots:     Get a flu shot each year.     Get a tetanus shot every 10 years.     Talk to your doctor about your pneumonia vaccines. There are now two you should receive - Pneumovax (PPSV 23) and Prevnar (PCV 13).    Talk to your pharmacist about a shingles vaccine.     Talk to your doctor about the hepatitis B vaccine.    Nutrition:     Eat at least 5 servings of fruits and vegetables each day.     Eat whole-grain bread, whole-wheat pasta and brown rice instead of white grains and rice.     Get adequate Calcium and Vitamin D.     Lifestyle    Exercise for at least 150 minutes a week (30 minutes a day, 5 days a week). This will help you control your weight and prevent disease.     Limit  alcohol to one drink per day.     No smoking.     Wear sunscreen to prevent skin cancer.     See your dentist every six months for an exam and cleaning.     See your eye doctor every 1 to 2 years to screen for conditions such as glaucoma, macular degeneration and cataracts.    Personalized Prevention Plan  You are due for the preventive services outlined below.  Your care team is available to assist you in scheduling these services.  If you have already completed any of these items, please share that information with your care team to update in your medical record.  Health Maintenance   Topic Date Due     ZOSTER IMMUNIZATION (2 of 3) 09/08/2017     Pneumococcal Vaccine: 65+ Years (2 of 2 - PPSV23) 07/14/2022     FALL RISK ASSESSMENT  08/18/2022     MEDICARE ANNUAL WELLNESS VISIT  10/27/2022     DTAP/TDAP/TD IMMUNIZATION (2 - Td or Tdap) 08/13/2024     COLORECTAL CANCER SCREENING  09/09/2024     ADVANCE CARE PLANNING  02/12/2026     LIPID  09/15/2026     PHQ-2  Completed     INFLUENZA VACCINE  Completed     AORTIC ANEURYSM SCREENING (SYSTEM ASSIGNED)  Completed     COVID-19 Vaccine  Completed     HEPATITIS C SCREENING  Addressed     IPV IMMUNIZATION  Aged Out     MENINGITIS IMMUNIZATION  Aged Out     HEPATITIS B IMMUNIZATION  Aged Out         Immunization History   Administered Date(s) Administered     COVID-19,PF,Moderna 02/17/2021, 03/17/2021     COVID-19,PF,Pfizer 09/02/2021     FLUAD -HD 65+ QUAD ([facility] ONLY) 10/12/2021     Flu, Unspecified 12/06/2007, 10/16/2009, 10/26/2010     Influenza (High Dose) 3 valent vaccine 10/28/2019     Influenza (IIV3) PF 11/14/2002, 09/26/2012, 11/12/2014     Influenza Vaccine IM > 6 months Valent IIV4 (Alfuria,Fluzone) 10/30/2017, 11/01/2017, 11/20/2017, 10/23/2018     Influenza, Quad, High Dose, Pf, 65yr+ (Fluzone HD) 10/13/2020     Meningococcal (Menactra ) 07/14/2017, 09/14/2017     Pedvax-hib 07/14/2017     Pneumo Conj 13-V (2010&after) 05/27/2016     Pneumococcal 23  valent 04/13/2004, 06/12/2017, 07/14/2017     Td (Adult), Adsorbed 04/13/2004     Tdap (Adult) Unspecified Formulation 08/13/2014     Zoster vaccine, live 06/12/2017, 07/14/2017      -- Consider Annual Flu / Influenza vaccination - Every Fall (Starting about October 1st)    -- Pneumonia / Pneumococcal PCV 23 vaccines are done / completed.     -- Get your tetanus shot updated - from one of the local pharmacies, at your convenience or the Local Public Health Department. --- After 8/13/2024.     -- Get the new shingles shot (2 in series) (Shingrix) - from one of the local pharmacies, at your convenience. -- Check cost/coverage.   -- or -- If these are very expensive, go to the Local Public Health Department     [facility] -- [place] [address]    ---- Shot clinic is the FIRST Thursday of Each Month -- from 2 to 6 PM, by Appointment only.     Call for an appointment -- [phone]      Blood pressure was a little high initially, recheck was better.    -- see med changes.     Medications refilled.     Check labs prior clinic follow-up.     Return in approximately 2 month(s), or sooner as needed for follow-up with Dr. Whitmore.  --- Follow-up Thyroid and PMR    Clinic : [phone]  Appointment line: [phone]

## 2021-12-14 DIAGNOSIS — E29.1 HYPOGONADISM IN MALE: Primary | ICD-10-CM

## 2021-12-14 NOTE — PROGRESS NOTES
"Per last office visit  9/15/21 with Maximo Schwartz MD \"Plan  Start sildenafil 50-100mg by mouth 1 hour prior to sexual activity (empty stomach).  Continue AndroGel 1.62%, 2 pumps every morning  Follow-up in 6 months with labs: T and hematocrit\"        Orders Placed    "

## 2021-12-22 ENCOUNTER — LAB (OUTPATIENT)
Dept: LAB | Facility: OTHER | Age: 66
End: 2021-12-22
Attending: INTERNAL MEDICINE
Payer: MEDICARE

## 2021-12-22 DIAGNOSIS — N18.31 STAGE 3A CHRONIC KIDNEY DISEASE (H): ICD-10-CM

## 2021-12-22 DIAGNOSIS — I10 BENIGN ESSENTIAL HYPERTENSION: ICD-10-CM

## 2021-12-22 DIAGNOSIS — E89.0 POSTOPERATIVE HYPOTHYROIDISM: ICD-10-CM

## 2021-12-22 LAB
ALBUMIN SERPL-MCNC: 4.4 G/DL (ref 3.5–5.7)
ALP SERPL-CCNC: 39 U/L (ref 34–104)
ALT SERPL W P-5'-P-CCNC: 25 U/L (ref 7–52)
ANION GAP SERPL CALCULATED.3IONS-SCNC: 8 MMOL/L (ref 3–14)
AST SERPL W P-5'-P-CCNC: 23 U/L (ref 13–39)
BILIRUB SERPL-MCNC: 0.5 MG/DL (ref 0.3–1)
BUN SERPL-MCNC: 29 MG/DL (ref 7–25)
CALCIUM SERPL-MCNC: 10.8 MG/DL (ref 8.6–10.3)
CHLORIDE BLD-SCNC: 99 MMOL/L (ref 98–107)
CO2 SERPL-SCNC: 30 MMOL/L (ref 21–31)
CREAT SERPL-MCNC: 1.63 MG/DL (ref 0.7–1.3)
ERYTHROCYTE [DISTWIDTH] IN BLOOD BY AUTOMATED COUNT: 15.2 % (ref 10–15)
GFR SERPL CREATININE-BSD FRML MDRD: 46 ML/MIN/1.73M2
GLUCOSE BLD-MCNC: 113 MG/DL (ref 70–105)
HCT VFR BLD AUTO: 49.8 % (ref 40–53)
HGB BLD-MCNC: 17.2 G/DL (ref 13.3–17.7)
MCH RBC QN AUTO: 31.8 PG (ref 26.5–33)
MCHC RBC AUTO-ENTMCNC: 34.5 G/DL (ref 31.5–36.5)
MCV RBC AUTO: 92 FL (ref 78–100)
PLATELET # BLD AUTO: 464 10E3/UL (ref 150–450)
POTASSIUM BLD-SCNC: 3.9 MMOL/L (ref 3.5–5.1)
PROT SERPL-MCNC: 7.7 G/DL (ref 6.4–8.9)
RBC # BLD AUTO: 5.41 10E6/UL (ref 4.4–5.9)
SODIUM SERPL-SCNC: 137 MMOL/L (ref 134–144)
TSH SERPL DL<=0.005 MIU/L-ACNC: 1.2 MU/L (ref 0.4–4)
WBC # BLD AUTO: 12.7 10E3/UL (ref 4–11)

## 2021-12-22 PROCEDURE — 36415 COLL VENOUS BLD VENIPUNCTURE: CPT | Mod: ZL

## 2021-12-22 PROCEDURE — 84443 ASSAY THYROID STIM HORMONE: CPT | Mod: ZL

## 2021-12-22 PROCEDURE — 85027 COMPLETE CBC AUTOMATED: CPT | Mod: ZL

## 2021-12-22 PROCEDURE — 80053 COMPREHEN METABOLIC PANEL: CPT | Mod: ZL

## 2021-12-23 ENCOUNTER — OFFICE VISIT (OUTPATIENT)
Dept: INTERNAL MEDICINE | Facility: OTHER | Age: 66
End: 2021-12-23
Attending: INTERNAL MEDICINE
Payer: MEDICARE

## 2021-12-23 VITALS
OXYGEN SATURATION: 95 % | RESPIRATION RATE: 16 BRPM | DIASTOLIC BLOOD PRESSURE: 88 MMHG | WEIGHT: 304.2 LBS | SYSTOLIC BLOOD PRESSURE: 138 MMHG | HEART RATE: 85 BPM | TEMPERATURE: 98.1 F | BODY MASS INDEX: 41.26 KG/M2

## 2021-12-23 DIAGNOSIS — E79.0 HYPERURICEMIA: ICD-10-CM

## 2021-12-23 DIAGNOSIS — E89.0 POSTOPERATIVE HYPOTHYROIDISM: ICD-10-CM

## 2021-12-23 DIAGNOSIS — E66.01 MORBID OBESITY (H): ICD-10-CM

## 2021-12-23 DIAGNOSIS — M79.7 CHRONIC FATIGUE SYNDROME WITH FIBROMYALGIA: ICD-10-CM

## 2021-12-23 DIAGNOSIS — G93.32 CHRONIC FATIGUE SYNDROME WITH FIBROMYALGIA: ICD-10-CM

## 2021-12-23 DIAGNOSIS — M35.3 POLYMYALGIA RHEUMATICA (H): Primary | ICD-10-CM

## 2021-12-23 PROCEDURE — 99214 OFFICE O/P EST MOD 30 MIN: CPT | Performed by: INTERNAL MEDICINE

## 2021-12-23 PROCEDURE — G0463 HOSPITAL OUTPT CLINIC VISIT: HCPCS

## 2021-12-23 RX ORDER — LEVOTHYROXINE SODIUM 200 UG/1
200 TABLET ORAL DAILY
Qty: 90 TABLET | Refills: 1 | Status: SHIPPED | OUTPATIENT
Start: 2021-12-23 | End: 2022-03-17

## 2021-12-23 RX ORDER — ALLOPURINOL 300 MG/1
1 TABLET ORAL DAILY
Qty: 90 TABLET | Refills: 3 | Status: SHIPPED | OUTPATIENT
Start: 2021-12-23 | End: 2022-11-11

## 2021-12-23 RX ORDER — DEXTROAMPHETAMINE SACCHARATE, AMPHETAMINE ASPARTATE MONOHYDRATE, DEXTROAMPHETAMINE SULFATE AND AMPHETAMINE SULFATE 5; 5; 5; 5 MG/1; MG/1; MG/1; MG/1
20 CAPSULE, EXTENDED RELEASE ORAL DAILY
Qty: 30 CAPSULE | Refills: 0 | Status: SHIPPED | OUTPATIENT
Start: 2022-01-20 | End: 2022-03-17

## 2021-12-23 RX ORDER — DEXTROAMPHETAMINE SACCHARATE, AMPHETAMINE ASPARTATE MONOHYDRATE, DEXTROAMPHETAMINE SULFATE AND AMPHETAMINE SULFATE 5; 5; 5; 5 MG/1; MG/1; MG/1; MG/1
20 CAPSULE, EXTENDED RELEASE ORAL DAILY
Qty: 30 CAPSULE | Refills: 0 | Status: SHIPPED | OUTPATIENT
Start: 2021-12-23 | End: 2022-03-17

## 2021-12-23 RX ORDER — DEXTROAMPHETAMINE SACCHARATE, AMPHETAMINE ASPARTATE MONOHYDRATE, DEXTROAMPHETAMINE SULFATE AND AMPHETAMINE SULFATE 5; 5; 5; 5 MG/1; MG/1; MG/1; MG/1
20 CAPSULE, EXTENDED RELEASE ORAL DAILY
Qty: 30 CAPSULE | Refills: 0 | Status: SHIPPED | OUTPATIENT
Start: 2022-02-17 | End: 2022-03-17

## 2021-12-23 ASSESSMENT — PAIN SCALES - GENERAL: PAINLEVEL: NO PAIN (0)

## 2021-12-23 NOTE — NURSING NOTE
Chief Complaint   Patient presents with     Follow Up     labs       FOOD SECURITY SCREENING QUESTIONS  Hunger Vital Signs:  Within the past 12 months we worried whether our food would run out before we got money to buy more. Never  Within the past 12 months the food we bought just didn't last and we didn't have money to get more. Never  Emeli Chaney LPN 12/23/2021 11:33 AM      Initial BP (!) 158/90 (BP Location: Right arm, Patient Position: Sitting, Cuff Size: Adult Large)   Pulse 85   Temp 98.1  F (36.7  C) (Tympanic)   Resp 16   Wt 138 kg (304 lb 3.2 oz)   SpO2 95%   BMI 41.26 kg/m   Estimated body mass index is 41.26 kg/m  as calculated from the following:    Height as of 8/18/21: 1.829 m (6').    Weight as of this encounter: 138 kg (304 lb 3.2 oz).  Medication Reconciliation: complete    Emeli Chaney LPN

## 2021-12-23 NOTE — PATIENT INSTRUCTIONS
1. Polymyalgia rheumatica (H)  2. Morbid obesity (H)  3. Chronic fatigue syndrome with fibromyalgia    Restart:  - amphetamine-dextroamphetamine (ADDERALL XR) 20 MG 24 hr capsule; Take 1 capsule (20 mg) by mouth daily  Dispense: 30 capsule; Refill: 0  - amphetamine-dextroamphetamine (ADDERALL XR) 20 MG 24 hr capsule; Take 1 capsule (20 mg) by mouth daily  Dispense: 30 capsule; Refill: 0  - amphetamine-dextroamphetamine (ADDERALL XR) 20 MG 24 hr capsule; Take 1 capsule (20 mg) by mouth daily  Dispense: 30 capsule; Refill: 0    4. Hyperuricemia    Continue:   - allopurinol (ZYLOPRIM) 300 MG tablet; Take 1 tablet (300 mg) by mouth daily - for gout prevention  Dispense: 90 tablet; Refill: 3    5. Postoperative hypothyroidism    Continue:   - levothyroxine (SYNTHROID/LEVOTHROID) 200 MCG tablet; Take 1 tablet (200 mcg) by mouth daily  Dispense: 90 tablet; Refill: 1      Return in approximately 3 month(s), or sooner as needed for follow-up with Dr. Whitmore.  - controlled med refills    Clinic : [phone]  Appointment line: [phone]

## 2021-12-23 NOTE — PROGRESS NOTES
Assessment & Plan       ICD-10-CM    1. Polymyalgia rheumatica (H)  M35.3 amphetamine-dextroamphetamine (ADDERALL XR) 20 MG 24 hr capsule     amphetamine-dextroamphetamine (ADDERALL XR) 20 MG 24 hr capsule     amphetamine-dextroamphetamine (ADDERALL XR) 20 MG 24 hr capsule   2. Morbid obesity (H)  E66.01 amphetamine-dextroamphetamine (ADDERALL XR) 20 MG 24 hr capsule     amphetamine-dextroamphetamine (ADDERALL XR) 20 MG 24 hr capsule     amphetamine-dextroamphetamine (ADDERALL XR) 20 MG 24 hr capsule   3. Chronic fatigue syndrome with fibromyalgia  R53.82 amphetamine-dextroamphetamine (ADDERALL XR) 20 MG 24 hr capsule    M79.7 amphetamine-dextroamphetamine (ADDERALL XR) 20 MG 24 hr capsule     amphetamine-dextroamphetamine (ADDERALL XR) 20 MG 24 hr capsule   4. Hyperuricemia  E79.0 allopurinol (ZYLOPRIM) 300 MG tablet   5. Postoperative hypothyroidism  E89.0 levothyroxine (SYNTHROID/LEVOTHROID) 200 MCG tablet     Patient comes in with his wife for follow-up of multiple issues.    Polymyalgia rheumatica.  Reports pain on 30 mg of prednisone daily was about a 0.  He dropped it down to 10 mg daily and pain increased to about a 2 out of 10.  Has been using 1/2 weeks.  Currently at 7 mg daily he plans to drop down to 6 mg daily on Monday 12/27.  Advised if his pain starts to escalate significantly then we will need to try reducing dose more slowly, possibly 1 mg monthly.  Otherwise seems to be doing quite well.  Pain is minimal at this time.  No changes for now.  Continue tapering prednisone 1 mg every 2 weeks, adjust dose as noted if needed.    Obesity, discussed need for reduced oral caloric intake.  Regular exercise.  Reduce carbohydrate intake.  Information printed and reviewed.    Chronic fatigue syndrome with fibromyalgia.  Significant reduction in functional capacity and alertness over the last month since stopping Adderall.  His wife is with him states that he has really dragging, much more than he was when  taking the Adderall.  He had been doing very well and with his pain being very minimal at this time it is frustrating to him that he is still fatigued and tired.  His B12 levels were low and are now replaced but he remains tired.  His testosterone levels were very low and is now replaced and yet remains very tired.  He is not anemic.  Treatment options reviewed and discussed.  He would like to restart Adderall.  Continue previous dosing at 20 mg once daily.  1 month prescription with 2 additional refill sent to pharmacy.   website reviewed.  No opiate use.  No benzodiazepine use.  Proper medication use and misuse reviewed, patient has been using medications properly.    Hyperuricemia.  Chronic.  Doing well with allopurinol.  Taking 300 mg daily.  No recent gout attacks since starting regular use.  Needs refills.    Hypothyroidism, postoperative.  Doing well with current dosing of Synthroid.  We increased his dose at his last appointment up to 200 mcg daily.  Do much better with this.  TSH is now very close to goal range of 1.0.  Continue current dosing and plan to recheck in 2 to 4 months.     Work on weight loss and regular exercise    Return in about 12 weeks (around 3/17/2022) for - controlled med refills.    Matt Whitmore MD  [facility] AND [facility]   Henry is a 66 year old who presents for the following health issues     HPI     Review of Systems   Constitutional: Positive for fatigue (+ much more fatigue since stopping adderall). Negative for chills and fever.        + Chronic Fatigue Syndrome since Lyme Disease - in Fall 2016.    HENT: Negative for congestion and hearing loss.    Eyes: Negative for visual disturbance.   Respiratory: Positive for apnea (uses CPAP nightly). Negative for cough (intermittent, dry), shortness of breath and wheezing.    Cardiovascular: Negative for chest pain and palpitations.   Gastrointestinal: Negative for abdominal pain, diarrhea, nausea and vomiting.    Endocrine: Negative for cold intolerance and heat intolerance.        + Diaphoretic with exertion, little energy   Genitourinary: Negative for dysuria and hematuria.        + ED and reduced libido - following with Dr. Schwartz / Urology   Musculoskeletal: Positive for back pain (Hx of Low back pain - SI joint injection was very helpful ) and myalgias (Mild, pelvic/shoulder girdle 2/10). Negative for arthralgias.   Skin: Negative for rash and wound.   Allergic/Immunologic: Negative for immunocompromised state.   Neurological: Negative for dizziness and light-headedness.   Hematological: Does not bruise/bleed easily.   Psychiatric/Behavioral: Positive for sleep disturbance. Negative for agitation and confusion.          Objective    /88 (BP Location: Left arm, Patient Position: Sitting, Cuff Size: Adult Large)   Pulse 85   Temp 98.1  F (36.7  C) (Tympanic)   Resp 16   Wt 138 kg (304 lb 3.2 oz)   SpO2 95%   BMI 41.26 kg/m    Body mass index is 41.26 kg/m .  Physical Exam  Constitutional:       General: He is not in acute distress.     Appearance: He is well-developed. He is obese. He is not diaphoretic.   HENT:      Head: Normocephalic and atraumatic.   Eyes:      General: No scleral icterus.     Conjunctiva/sclera: Conjunctivae normal.   Cardiovascular:      Rate and Rhythm: Normal rate and regular rhythm.      Pulses: Normal pulses.   Pulmonary:      Effort: Pulmonary effort is normal.      Breath sounds: Normal breath sounds.   Abdominal:      Palpations: Abdomen is soft.      Tenderness: There is no abdominal tenderness.   Musculoskeletal:         General: Tenderness (upper back/shoulders and left SI joint ) present. No deformity.      Cervical back: Neck supple.      Right lower leg: Edema (trace) present.      Left lower leg: Edema (trace) present.   Lymphadenopathy:      Cervical: No cervical adenopathy.   Skin:     General: Skin is warm and dry.      Findings: No rash.   Neurological:      Mental  Status: He is alert and oriented to person, place, and time. Mental status is at baseline.   Psychiatric:         Mood and Affect: Mood normal.         Behavior: Behavior normal.

## 2021-12-24 ASSESSMENT — ENCOUNTER SYMPTOMS
CONFUSION: 0
ARTHRALGIAS: 0
SHORTNESS OF BREATH: 0
WHEEZING: 0
MYALGIAS: 1
AGITATION: 0
DIZZINESS: 0
DIARRHEA: 0
FATIGUE: 1
BRUISES/BLEEDS EASILY: 0
COUGH: 0
SLEEP DISTURBANCE: 1
FEVER: 0
DYSURIA: 0
HEMATURIA: 0
NAUSEA: 0
CHILLS: 0
APNEA: 1
VOMITING: 0
LIGHT-HEADEDNESS: 0
BACK PAIN: 1
ABDOMINAL PAIN: 0
PALPITATIONS: 0
WOUND: 0

## 2022-01-10 ENCOUNTER — TRANSFERRED RECORDS (OUTPATIENT)
Dept: HEALTH INFORMATION MANAGEMENT | Facility: OTHER | Age: 67
End: 2022-01-10
Payer: MEDICARE

## 2022-01-19 ENCOUNTER — TRANSFERRED RECORDS (OUTPATIENT)
Dept: HEALTH INFORMATION MANAGEMENT | Facility: OTHER | Age: 67
End: 2022-01-19
Payer: MEDICARE

## 2022-02-01 ENCOUNTER — TRANSFERRED RECORDS (OUTPATIENT)
Dept: HEALTH INFORMATION MANAGEMENT | Facility: OTHER | Age: 67
End: 2022-02-01
Payer: MEDICARE

## 2022-02-15 ENCOUNTER — MYC MEDICAL ADVICE (OUTPATIENT)
Dept: INTERNAL MEDICINE | Facility: OTHER | Age: 67
End: 2022-02-15
Payer: MEDICARE

## 2022-02-15 DIAGNOSIS — L03.811 ABSCESS OR CELLULITIS OF SCALP: ICD-10-CM

## 2022-02-15 RX ORDER — DICLOXACILLIN SODIUM 500 MG
500 CAPSULE ORAL 3 TIMES DAILY
Qty: 21 CAPSULE | Refills: 0 | Status: SHIPPED | OUTPATIENT
Start: 2022-02-15 | End: 2022-03-17

## 2022-02-15 RX ORDER — CEPHALEXIN 500 MG/1
500 CAPSULE ORAL 3 TIMES DAILY
Qty: 21 CAPSULE | Refills: 0 | Status: SHIPPED | OUTPATIENT
Start: 2022-02-15 | End: 2022-03-17

## 2022-02-15 NOTE — TELEPHONE ENCOUNTER
Pended refill of previous prescriptions.  Jen Trammell CMA([facility])..................2/15/2022   12:07 PM

## 2022-03-02 ENCOUNTER — OFFICE VISIT (OUTPATIENT)
Dept: FAMILY MEDICINE | Facility: OTHER | Age: 67
End: 2022-03-02
Attending: FAMILY MEDICINE
Payer: MEDICARE

## 2022-03-02 VITALS
BODY MASS INDEX: 40.63 KG/M2 | TEMPERATURE: 97.7 F | HEART RATE: 84 BPM | DIASTOLIC BLOOD PRESSURE: 88 MMHG | WEIGHT: 300 LBS | HEIGHT: 72 IN | RESPIRATION RATE: 18 BRPM | OXYGEN SATURATION: 94 % | SYSTOLIC BLOOD PRESSURE: 136 MMHG

## 2022-03-02 DIAGNOSIS — G47.33 OSA ON CPAP: ICD-10-CM

## 2022-03-02 DIAGNOSIS — Z01.818 PREOP GENERAL PHYSICAL EXAM: Primary | ICD-10-CM

## 2022-03-02 DIAGNOSIS — N18.31 STAGE 3A CHRONIC KIDNEY DISEASE (H): ICD-10-CM

## 2022-03-02 DIAGNOSIS — Z86.69 HX OF BELL'S PALSY: ICD-10-CM

## 2022-03-02 DIAGNOSIS — E89.0 POSTOPERATIVE HYPOTHYROIDISM: ICD-10-CM

## 2022-03-02 DIAGNOSIS — I10 BENIGN ESSENTIAL HYPERTENSION: ICD-10-CM

## 2022-03-02 PROBLEM — M75.101 ROTATOR CUFF SYNDROME OF RIGHT SHOULDER: Status: ACTIVE | Noted: 2018-04-23

## 2022-03-02 PROBLEM — C67.9 BLADDER CARCINOMA (H): Status: ACTIVE | Noted: 2018-03-15

## 2022-03-02 PROBLEM — E66.9 OBESITY (BMI 30-39.9): Status: ACTIVE | Noted: 2018-03-15

## 2022-03-02 PROBLEM — H81.03 MENIERE DISEASE, BILATERAL: Status: ACTIVE | Noted: 2018-06-01

## 2022-03-02 PROBLEM — R91.8 LUNG MASS: Status: ACTIVE | Noted: 2018-03-15

## 2022-03-02 LAB
ANION GAP SERPL CALCULATED.3IONS-SCNC: 7 MMOL/L (ref 3–14)
ATRIAL RATE - MUSE: 73 BPM
BUN SERPL-MCNC: 28 MG/DL (ref 7–25)
CALCIUM SERPL-MCNC: 10.5 MG/DL (ref 8.6–10.3)
CHLORIDE BLD-SCNC: 101 MMOL/L (ref 98–107)
CO2 SERPL-SCNC: 30 MMOL/L (ref 21–31)
CREAT SERPL-MCNC: 1.51 MG/DL (ref 0.7–1.3)
DIASTOLIC BLOOD PRESSURE - MUSE: NORMAL MMHG
ERYTHROCYTE [DISTWIDTH] IN BLOOD BY AUTOMATED COUNT: 15.1 % (ref 10–15)
GFR SERPL CREATININE-BSD FRML MDRD: 50 ML/MIN/1.73M2
GLUCOSE BLD-MCNC: 101 MG/DL (ref 70–105)
HCT VFR BLD AUTO: 49.1 % (ref 40–53)
HGB BLD-MCNC: 16.6 G/DL (ref 13.3–17.7)
INTERPRETATION ECG - MUSE: NORMAL
MCH RBC QN AUTO: 31.3 PG (ref 26.5–33)
MCHC RBC AUTO-ENTMCNC: 33.8 G/DL (ref 31.5–36.5)
MCV RBC AUTO: 93 FL (ref 78–100)
P AXIS - MUSE: 29 DEGREES
PLATELET # BLD AUTO: 495 10E3/UL (ref 150–450)
POTASSIUM BLD-SCNC: 3.6 MMOL/L (ref 3.5–5.1)
PR INTERVAL - MUSE: 222 MS
QRS DURATION - MUSE: 100 MS
QT - MUSE: 378 MS
QTC - MUSE: 416 MS
R AXIS - MUSE: 18 DEGREES
RBC # BLD AUTO: 5.3 10E6/UL (ref 4.4–5.9)
SODIUM SERPL-SCNC: 138 MMOL/L (ref 134–144)
SYSTOLIC BLOOD PRESSURE - MUSE: NORMAL MMHG
T AXIS - MUSE: 43 DEGREES
VENTRICULAR RATE- MUSE: 73 BPM
WBC # BLD AUTO: 13.3 10E3/UL (ref 4–11)

## 2022-03-02 PROCEDURE — 85027 COMPLETE CBC AUTOMATED: CPT | Mod: ZL | Performed by: FAMILY MEDICINE

## 2022-03-02 PROCEDURE — 93010 ELECTROCARDIOGRAM REPORT: CPT | Performed by: INTERNAL MEDICINE

## 2022-03-02 PROCEDURE — G0463 HOSPITAL OUTPT CLINIC VISIT: HCPCS

## 2022-03-02 PROCEDURE — 36415 COLL VENOUS BLD VENIPUNCTURE: CPT | Mod: ZL | Performed by: FAMILY MEDICINE

## 2022-03-02 PROCEDURE — 99214 OFFICE O/P EST MOD 30 MIN: CPT | Performed by: FAMILY MEDICINE

## 2022-03-02 PROCEDURE — 93005 ELECTROCARDIOGRAM TRACING: CPT | Performed by: FAMILY MEDICINE

## 2022-03-02 PROCEDURE — 80048 BASIC METABOLIC PNL TOTAL CA: CPT | Mod: ZL | Performed by: FAMILY MEDICINE

## 2022-03-02 PROCEDURE — G0463 HOSPITAL OUTPT CLINIC VISIT: HCPCS | Mod: 25

## 2022-03-02 ASSESSMENT — PAIN SCALES - GENERAL: PAINLEVEL: NO PAIN (0)

## 2022-03-02 NOTE — NURSING NOTE
Patient presents to the clinic for pre-op exam.    FOOD SECURITY SCREENING QUESTIONS:     The next two questions are to help us understand your food security.  If you are feeling you need any assistance in this area, we have resources available to support you today.    Hunger Vital Signs:  Within the past 12 months we worried whether our food would run out before we got money to buy more. Never  Within the past 12 months the food we bought just didn't last and we didn't have money to get more. Never    Advance Care Directive on file? no  Advance Care Directive provided to patient? Declined.  Chief Complaint   Patient presents with     Pre-Op Exam       Initial /88 (BP Location: Right arm, Patient Position: Sitting, Cuff Size: Adult Large)   Pulse 84   Temp 97.7  F (36.5  C) (Temporal)   Resp 18   Ht 1.829 m (6')   Wt 136.1 kg (300 lb)   SpO2 94%   BMI 40.69 kg/m   Estimated body mass index is 40.69 kg/m  as calculated from the following:    Height as of this encounter: 1.829 m (6').    Weight as of this encounter: 136.1 kg (300 lb).  Medication Reconciliation: complete        Radha George LPN

## 2022-03-02 NOTE — PROGRESS NOTES
[facility] AND \[facility]\""  [address]  [place] [address]  Phone: [phone]  Fax: [phone]  Primary Provider: Matt Whitmore  Pre-op Performing Provider: BALJINDER VAZ      PREOPERATIVE EVALUATION:  Today's date: 3/2/2022    Selvin An is a 66 year old male who presents for a preoperative evaluation.    Surgical Information:  Surgery/Procedure: Cataract Removal  Surgery Location: [facility]  Surgeon: Dr. Mil Coronado  Surgery Date: 3-8-2022 (left) and 3- (right)  Time of Surgery: unknown  Where patient plans to recover: At home with family  Fax number for surgical facility: [phone]    Type of Anesthesia Anticipated: Local and Topical    Assessment & Plan     The proposed surgical procedure is considered LOW risk.      ICD-10-CM    1. Preop general physical exam  Z01.818    2. Benign essential hypertension  I10 Basic Metabolic Panel     CBC W PLT No Diff     EKG 12-lead, tracing only     Basic Metabolic Panel     CBC W PLT No Diff   3. Stage 3a chronic kidney disease (H)  N18.31    4. Postoperative hypothyroidism  E89.0               Risks and Recommendations:  The patient has the following additional risks and recommendations for perioperative complications:   - Morbid obesity (BMI >40)  Obstructive Sleep Apnea:   On CPAP    Medication Instructions:  Hold spironolactone/hctz morning of surgery    RECOMMENDATION:  APPROVAL GIVEN to proceed with proposed procedure, without further diagnostic evaluation.      Baljinder Vaz MD       Subjective     HPI related to upcoming procedure: 66 year old male with KYLAH, hypertension, CKD, hypothyroidism, stimulant use here for preop clearance prior to cataract surgeries    On Adderall for chronic fatigue from polymyalgia rheumatica. Off prednisone. Overall status is stable. He can climb a flight of stairs without SOB.    Preop Questions 3/2/2022   1. Have you ever had a heart attack or stroke? No   2. Have you ever had surgery  on your heart or blood vessels, such as a stent placement, a coronary artery bypass, or surgery on an artery in your head, neck, heart, or legs? No   3. Do you have chest pain with activity? No   4. Do you have a history of  heart failure? No   5. Do you currently have a cold, bronchitis or symptoms of other infection? No   6. Do you have a cough, shortness of breath, or wheezing? No   7. Do you or anyone in your family have previous history of blood clots? No   8. Do you or does anyone in your family have a serious bleeding problem such as prolonged bleeding following surgeries or cuts? No   9. Have you ever had problems with anemia or been told to take iron pills? No   10. Have you had any abnormal blood loss such as black, tarry or bloody stools? No   11. Have you ever had a blood transfusion? YES    11a. Have you ever had a transfusion reaction? No   12. Are you willing to have a blood transfusion if it is medically needed before, during, or after your surgery? Yes   13. Have you or any of your relatives ever had problems with anesthesia? No   14. Do you have sleep apnea, excessive snoring or daytime drowsiness? YES    14a. Do you have a CPAP machine? Yes   15. Do you have any artifical heart valves or other implanted medical devices like a pacemaker, defibrillator, or continuous glucose monitor? No   16. Do you have artificial joints? No   17. Are you allergic to latex? No     Health Care Directive:  Patient does not have a Health Care Directive or Living Will: Discussed advance care planning with patient; information given to patient to review.    Preoperative Review of :   reviewed - controlled substances reflected in medication list.      Review of Systems  General: Denies general constitutional problems  Ears/Nose/Throat: Denies problems  Cardiovascular: Denies problems  Respiratory: Denies problems  Gastrointestinal: Denies problems  Neurologic: Denies problems  Psychiatric: Denies  problems      Patient Active Problem List    Diagnosis Date Noted     Hyperuricemia 12/23/2021     Priority: Medium     Stage 3a chronic kidney disease (H) 10/27/2021     Priority: Medium     Low testosterone level in male 08/18/2021     Priority: Medium     Radicular low back pain 04/27/2021     Priority: Medium     Neuroforaminal stenosis of lumbar spine 04/27/2021     Priority: Medium     Trigger point with back pain 04/14/2021     Priority: Medium     Trigger point of right shoulder region 04/14/2021     Priority: Medium     Sacroiliac joint pain - left 04/14/2021     Priority: Medium     Osteoarthritis of lumbar spine, unspecified spinal osteoarthritis complication status 04/14/2021     Priority: Medium     Hypercalcemia 03/12/2021     Priority: Medium     Primary insomnia 01/31/2021     Priority: Medium     Polymyalgia rheumatica (H) 01/31/2021     Priority: Medium     Personal history of malignant neoplasm of bladder -- s/p TURBT x2 in 3/2018 and 4/2018 for high-grade non-muscle invasive bladder cancer, now s/p BCG treatment - last cystoscopy 1/20/2021 was normal 01/31/2021     Priority: Medium     Steroid-induced hyperglycemia 01/29/2021     Priority: Medium     Bilateral lower extremity edema 01/29/2021     Priority: Medium     Vitamin B12 deficiency 01/29/2021     Priority: Medium     Chronic fatigue syndrome with fibromyalgia 01/29/2021     Priority: Medium     History of Lyme disease 01/29/2021     Priority: Medium     Morbid obesity (H) 11/02/2020     Priority: Medium     Meniere disease, bilateral 06/01/2018     Priority: Medium     Rotator cuff syndrome of right shoulder 04/23/2018     Priority: Medium     Bladder carcinoma (H) 03/15/2018     Priority: Medium     Formatting of this note might be different from the original.  s/after resection with positive margins, s/after second resection with negative margins 4/2018 at [facility] in [place], [place].  He will be doing 6 weeks of chemotherapy.       Lung  mass 03/15/2018     Priority: Medium     Obesity (BMI 30-39.9) 03/15/2018     Priority: Medium     History of carpal tunnel surgery 11/02/2017     Priority: Medium     Vitamin D deficiency 10/10/2016     Priority: Medium     Postoperative hypothyroidism 01/11/2011     Priority: Medium     Hypothyroidism 01/11/2011     Priority: Medium     Formatting of this note might be different from the original.  s/after thyroidectomy for carcinoma.       Mixed hyperlipidemia 07/12/2010     Priority: Medium     Psychosexual dysfunction with inhibited sexual excitement 02/17/2010     Priority: Medium     Benign essential hypertension 02/17/2010     Priority: Medium     History of kidney stones 02/17/2010     Priority: Medium     KYLAH on CPAP -- Uses nightly, finds helpful and beneficial 02/17/2010     Priority: Medium     Resistant hypertension 02/17/2010     Priority: Medium     History of thyroid cancer 02/17/1994     Priority: Medium     Formatting of this note might be different from the original.  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     Priority: Medium      Past Medical History:   Diagnosis Date     Bell's palsy 3/30/2017     Bilateral carpal tunnel syndrome     No Comments Provided     Essential (primary) hypertension     No Comments Provided     Gout 6/3/2013     Hyperlipidemia     No Comments Provided     Hypothyroidism     No Comments Provided     Malignant neoplasm of thyroid gland (H)     1994     Sleep apnea     No Comments Provided     Past Surgical History:   Procedure Laterality Date     COLONOSCOPY  09/09/2014 9/2014,Normal - follow up 10 years     CYSTOSCOPY      bladder cancer     OTHER SURGICAL HISTORY      ,SPLENECTOMY     OTHER SURGICAL HISTORY      2010,[id number],EYE MUSCLE SURGERY,Left,strabismus     OTHER SURGICAL HISTORY      10/06/14,[id number],OTHER,Left,Dr. Lana LEHMAN     OTHER SURGICAL HISTORY      10/14/14,527202,OTHER,Left,Dr. Lana LEHMAN     OTHER SURGICAL HISTORY       11/16/2017,APS116,ENDOSCOPIC RELEASE TRANSVERSE CARPAL LIGAMENT OF HAND,Left     RELEASE CARPAL TUNNEL      12/2011     RETINAL DETACHMENT SURGERY Right      STRABISMUS SURGERY Left      THYROIDECTOMY      1994,thyroid ca     TONSILLECTOMY      2004,T & A > 11yo     VASECTOMY      No Comments Provided     Current Outpatient Medications   Medication Sig Dispense Refill     allopurinol (ZYLOPRIM) 300 MG tablet Take 1 tablet (300 mg) by mouth daily - for gout prevention 90 tablet 3     amphetamine-dextroamphetamine (ADDERALL XR) 20 MG 24 hr capsule Take 1 capsule (20 mg) by mouth daily 30 capsule 0     amphetamine-dextroamphetamine (ADDERALL XR) 20 MG 24 hr capsule Take 1 capsule (20 mg) by mouth daily 30 capsule 0     amphetamine-dextroamphetamine (ADDERALL XR) 20 MG 24 hr capsule Take 1 capsule (20 mg) by mouth daily 30 capsule 0     B Complex-C (VITAMIN B COMPLEX W/VITAMIN C) TABS tablet Take 1 tablet by mouth daily       cephALEXin (KEFLEX) 500 MG capsule Take 1 capsule (500 mg) by mouth 3 times daily 21 capsule 0     Cholecalciferol (VITAMIN D-3) 125 MCG (5000 UT) TABS Take 5,000 Units by mouth daily 100 tablet 3     cyanocobalamin 2500 MCG TABS Take 2,500 mcg by mouth daily -- or every other day -- OTC okay 90 tablet 3     dicloxacillin (DYNAPEN) 500 MG capsule Take 1 capsule (500 mg) by mouth 3 times daily 21 capsule 0     fenofibrate (TRICOR) 145 MG tablet Take 1 tablet (145 mg) by mouth daily - for high triglycerides 90 tablet 3     levothyroxine (SYNTHROID/LEVOTHROID) 200 MCG tablet Take 1 tablet (200 mcg) by mouth daily 90 tablet 1     loratadine (CLARITIN) 10 MG tablet Take 10 mg by mouth daily       magnesium 250 MG tablet Take 1 tablet by mouth daily       Melatonin 10 MG TABS tablet Take 20 mg by mouth 2 times daily        Multiple Vitamins-Minerals (CENTRUM ADULTS PO) Take 1 tablet by mouth daily       order for DME CPAP machine for home use at pressure: 10, with supplies:  humidifier, chamber, mask,  tubing       Probiotic Product (PROBIOTIC-10 PO) Take 1 tablet by mouth daily       sildenafil (VIAGRA) 100 MG tablet Take 1/2 to 1 tablet 1 hour prior to sexual activity 10 tablet 11     spironolactone-HCTZ (ALDACTAZIDE) 25-25 MG tablet Take 1 tablet by mouth 2 times daily -- AM and Afternoon -- for hypertension 180 tablet 3     testosterone (ANDROGEL 1.62% PUMP) 20.25 MG/ACT gel Place 2 Pump onto the skin every morning 75 g 5     testosterone (ANDROGEL 1.62% PUMP) 20.25 MG/ACT gel Place 2 Pump onto the skin every morning 75 g 5       Allergies   Allergen Reactions     Losartan Cough     Omeprazole Diarrhea     Pantoprazole Diarrhea     Valsartan Cough     Started with losartan, worsened with high dose valsartan        Social History     Tobacco Use     Smoking status: Never Smoker     Smokeless tobacco: Never Used   Substance Use Topics     Alcohol use: No     Comment: socially     Family History   Problem Relation Age of Onset     Heart Disease Mother         Heart Disease,MI - SCD     Heart Disease Father         Heart Disease,MI - SCD     History   Drug Use No         Objective     /88 (BP Location: Right arm, Patient Position: Sitting, Cuff Size: Adult Large)   Pulse 84   Temp 97.7  F (36.5  C) (Temporal)   Resp 18   Ht 1.829 m (6')   Wt 136.1 kg (300 lb)   SpO2 94%   BMI 40.69 kg/m      Physical Exam  General Appearance: Pleasant, alert, appropriate appearance for age. No acute distress  Head; Left facial droop from history of Bell's palsy  Ear Exam: Normal TM's bilaterally.   OroPharynx Exam: Normal buccal mucosa. Normal pharynx. Mallampati 4/4.  Neck Exam: Supple, no masses or nodes.  Chest/Respiratory Exam: Normal chest wall and respirations. Clear to auscultation.  Cardiovascular Exam: Regular rate and rhythm. S1, S2, no murmur, click, gallop, or rubs.  Extremities: 2 + pedal pulses.  No lower extremity edema.  Neurologic Exam: Nonfocal; symmetric DTRs, normal gross motor movement, tone, and  coordination. No tremor.   Psychiatric: Normal affect and mentation      Recent Labs   Lab Test 12/22/21  1035 09/15/21  0824 08/04/21  1523   HGB 17.2 15.2 15.4   * 459* 465*    140 138   POTASSIUM 3.9 3.9 3.9   CR 1.63* 1.34* 1.40*   A1C  --  5.5 5.6        Diagnostics:  Results for orders placed or performed in visit on 03/02/22   Basic Metabolic Panel     Status: Abnormal   Result Value Ref Range    Sodium 138 134 - 144 mmol/L    Potassium 3.6 3.5 - 5.1 mmol/L    Chloride 101 98 - 107 mmol/L    Carbon Dioxide (CO2) 30 21 - 31 mmol/L    Anion Gap 7 3 - 14 mmol/L    Urea Nitrogen 28 (H) 7 - 25 mg/dL    Creatinine 1.51 (H) 0.70 - 1.30 mg/dL    Calcium 10.5 (H) 8.6 - 10.3 mg/dL    Glucose 101 70 - 105 mg/dL    GFR Estimate 50 (L) >60 mL/min/1.73m2   CBC W PLT No Diff     Status: Abnormal   Result Value Ref Range    WBC Count 13.3 (H) 4.0 - 11.0 10e3/uL    RBC Count 5.30 4.40 - 5.90 10e6/uL    Hemoglobin 16.6 13.3 - 17.7 g/dL    Hematocrit 49.1 40.0 - 53.0 %    MCV 93 78 - 100 fL    MCH 31.3 26.5 - 33.0 pg    MCHC 33.8 31.5 - 36.5 g/dL    RDW 15.1 (H) 10.0 - 15.0 %    Platelet Count 495 (H) 150 - 450 10e3/uL   EKG 12-lead, tracing only     Status: None   Result Value Ref Range    Systolic Blood Pressure  mmHg    Diastolic Blood Pressure  mmHg    Ventricular Rate 73 BPM    Atrial Rate 73 BPM    WY Interval 222 ms    QRS Duration 100 ms     ms    QTc 416 ms    P Axis 29 degrees    R AXIS 18 degrees    T Axis 43 degrees    Interpretation ECG       Sinus rhythm with 1st degree A-V block  Otherwise normal ECG  No previous ECGs available  Confirmed by MD ANTWAN, KAILEY ([id number]) on 3/2/2022 12:30:48 PM           Revised Cardiac Risk Index (RCRI):  The patient has the following serious cardiovascular risks for perioperative complications:   - No serious cardiac risks = 0 points     RCRI Interpretation: 0 points: Class I (very low risk - 0.4% complication rate)           Signed Electronically by: Baljinder GTZ  MD Milind  Copy of this evaluation report is provided to requesting physician.

## 2022-03-08 ENCOUNTER — TRANSFERRED RECORDS (OUTPATIENT)
Dept: HEALTH INFORMATION MANAGEMENT | Facility: OTHER | Age: 67
End: 2022-03-08
Payer: MEDICARE

## 2022-03-14 ENCOUNTER — TRANSFERRED RECORDS (OUTPATIENT)
Dept: HEALTH INFORMATION MANAGEMENT | Facility: OTHER | Age: 67
End: 2022-03-14
Payer: MEDICARE

## 2022-03-16 ENCOUNTER — OFFICE VISIT (OUTPATIENT)
Dept: UROLOGY | Facility: OTHER | Age: 67
End: 2022-03-16
Attending: UROLOGY
Payer: MEDICARE

## 2022-03-16 ENCOUNTER — LAB (OUTPATIENT)
Dept: LAB | Facility: OTHER | Age: 67
End: 2022-03-16
Attending: UROLOGY
Payer: MEDICARE

## 2022-03-16 VITALS
HEART RATE: 103 BPM | DIASTOLIC BLOOD PRESSURE: 88 MMHG | RESPIRATION RATE: 16 BRPM | OXYGEN SATURATION: 94 % | SYSTOLIC BLOOD PRESSURE: 138 MMHG | WEIGHT: 300.2 LBS | BODY MASS INDEX: 40.71 KG/M2

## 2022-03-16 DIAGNOSIS — N52.9 ED (ERECTILE DYSFUNCTION) OF ORGANIC ORIGIN: ICD-10-CM

## 2022-03-16 DIAGNOSIS — E29.1 HYPOGONADISM IN MALE: Primary | ICD-10-CM

## 2022-03-16 DIAGNOSIS — E29.1 HYPOGONADISM IN MALE: ICD-10-CM

## 2022-03-16 DIAGNOSIS — R68.82 DECREASED LIBIDO: ICD-10-CM

## 2022-03-16 LAB
HCT VFR BLD AUTO: 50.1 % (ref 40–53)
TESTOST SERPL-MCNC: 234 NG/DL (ref 175–781)

## 2022-03-16 PROCEDURE — 36415 COLL VENOUS BLD VENIPUNCTURE: CPT | Mod: ZL

## 2022-03-16 PROCEDURE — 85014 HEMATOCRIT: CPT | Mod: ZL

## 2022-03-16 PROCEDURE — 84403 ASSAY OF TOTAL TESTOSTERONE: CPT | Mod: ZL

## 2022-03-16 PROCEDURE — G0463 HOSPITAL OUTPT CLINIC VISIT: HCPCS

## 2022-03-16 PROCEDURE — 99213 OFFICE O/P EST LOW 20 MIN: CPT | Performed by: UROLOGY

## 2022-03-16 RX ORDER — TESTOSTERONE 1.62 MG/G
2 GEL TRANSDERMAL EVERY MORNING
Qty: 75 G | Refills: 5 | Status: SHIPPED | OUTPATIENT
Start: 2022-03-16 | End: 2022-08-17

## 2022-03-16 ASSESSMENT — PAIN SCALES - GENERAL: PAINLEVEL: NO PAIN (0)

## 2022-03-16 NOTE — NURSING NOTE
Chief Complaint   Patient presents with     Follow Up     6 month follow up for hypogonadism   Patient presents to the clinic today for a 6 month follow up for hypogonadism    Review of Systems:    Weight loss:    Yes     Recent fever/chills:  No   Night sweats:   No  Current skin rash:  No   Recent hair loss:  No  Heat intolerance:  No   Cold intolerance:  No  Chest pain:   No   Palpitations:   No  Shortness of breath:  No   Wheezing:   No  Constipation:    No   Diarrhea:   No   Nausea:   No   Vomiting:   No   Kidney/side pain:  No   Back pain:   No  Frequent headaches:  No   Dizziness:     No  Leg swelling:   No   Calf pain:    No        Medication Reconciliation: completed   Kenia Tovar LPN  3/16/2022 10:27 AM

## 2022-03-16 NOTE — PROGRESS NOTES
Type of Visit  EST    Chief Complaint  Erectile dysfunction  Hypogonadism    HPI  Mr. An is a 66 year old male with hypogonadism and ED.    The patient had a 5-year history of diminishing exercise tolerance, poor focus, low energy.  His testosterone was significantly low upon work-up.  Because of this he started topical testosterone 1.62% 2 pumps daily.  Symptomatically he has noticed significant improvement but estimates he is about at 50% of his target.  He underwent labs earlier today however he ran out of medication about a week ago so the labs today do not reflect replacement values.    In addition the patient has ED.  He has been using sildenafil with moderate success.      Review of Systems  I personally reviewed the ROS with the patient.    Nursing Notes:   Kenia Tovar LPN  3/16/2022 10:31 AM  Addendum  Chief Complaint   Patient presents with     Follow Up     6 month follow up for hypogonadism   Patient presents to the clinic today for a 6 month follow up for hypogonadism    Review of Systems:    Weight loss:    Yes     Recent fever/chills:  No   Night sweats:   No  Current skin rash:  No   Recent hair loss:  No  Heat intolerance:  No   Cold intolerance:  No  Chest pain:   No   Palpitations:   No  Shortness of breath:  No   Wheezing:   No  Constipation:    No   Diarrhea:   No   Nausea:   No   Vomiting:   No   Kidney/side pain:  No   Back pain:   No  Frequent headaches:  No   Dizziness:     No  Leg swelling:   No   Calf pain:    No        Medication Reconciliation: completed   Kenia Tovar LPN  3/16/2022 10:27 AM       Physical Exam  Vitals:    03/16/22 1030   BP: 138/88   BP Location: Right arm   Patient Position: Sitting   Cuff Size: Adult Large   Pulse: 103   Resp: 16   SpO2: 94%   Weight: 136.2 kg (300 lb 3.2 oz)     Constitutional: No acute distress.  Alert and cooperative   Head: NCAT  Eyes: Conjunctivae normal  Cardiovascular: Regular rate  Pulmonary/Chest: Respirations are even and non-labored  bilaterally, no audible wheezing  Abdominal: Soft. No distension, tenderness, masses or guarding.   Neurological: A + O x 3.  Cranial Nerves II-XII grossly intact.  Extremities: SANAZ x 4, Warm. No clubbing.  No cyanosis.    Skin: Pink, warm and dry.  No visible rashes noted.  Psychiatric:  Normal mood and affect  Back:  No left CVA tenderness.  No right CVA tenderness.  Genitourinary:  Nonpalpable bladder  Normal male phallus without discharge or lesions, normal pubic hair distribution.  Testicles descended bilaterally.     Labs  Results for RANDALL AN (MRN [id number]) as of 3/16/2022 10:20   3/16/2022 08:33   Testosterone Total 234   Hematocrit 50.1     ^^^^^^^^^^^^^^^^^^^^^^^^^^^^^^^^^^^^    Results for RANDALL AN (MRN [id number]) as of 9/15/2021 12:05   9/15/2021 08:19   Testosterone Total 300     Results for RANDALL AN (MRN [id number]) as of 9/15/2021 10:37   9/15/2021 08:24   Hematocrit 45.5     ^^^^^^^^^^^^^^^^^^^^^^^^^^^^^^^^^^^^    Results for RANDALL AN (MRN [id number]) as of 8/23/2021 14:22   8/13/2021 08:40   Testosterone Total 170 (L)     Results for RANDALL AN (MRN [id number]) as of 8/11/2021 14:09   8/4/2021 15:23   Testosterone Total 143 (L)     Results for RANDALL AN (MRN [id number]) as of 8/11/2021 14:09   3/12/2021 10:44   PSA 1.013     Assessment  Mr. An is a 66 year old male with hypogonadism and ED.    I reviewed his labs today but explained that his testosterone level does not reflect the replacement value given he has not been using topical testosterone for the past 5 days or so.  He would like to increase the dose to 3 pumps per day which would be reasonable however I recommended against doing that at this time given we are unsure of his actual testosterone level at 2 pumps per day.    Plan  Continue sildenafil 50-100mg by mouth 1 hour prior to sexual activity (empty stomach).  Continue AndroGel 1.62%, 2 pumps every morning  Follow-up in 6 months with labs: T and  hematocrit

## 2022-03-17 ENCOUNTER — OFFICE VISIT (OUTPATIENT)
Dept: INTERNAL MEDICINE | Facility: OTHER | Age: 67
End: 2022-03-17
Attending: INTERNAL MEDICINE
Payer: MEDICARE

## 2022-03-17 VITALS
OXYGEN SATURATION: 95 % | TEMPERATURE: 98 F | BODY MASS INDEX: 40.82 KG/M2 | SYSTOLIC BLOOD PRESSURE: 138 MMHG | HEART RATE: 87 BPM | WEIGHT: 301 LBS | DIASTOLIC BLOOD PRESSURE: 88 MMHG | RESPIRATION RATE: 16 BRPM

## 2022-03-17 DIAGNOSIS — G93.32 CHRONIC FATIGUE SYNDROME WITH FIBROMYALGIA: ICD-10-CM

## 2022-03-17 DIAGNOSIS — E53.8 VITAMIN B12 DEFICIENCY: ICD-10-CM

## 2022-03-17 DIAGNOSIS — E66.01 MORBID OBESITY (H): ICD-10-CM

## 2022-03-17 DIAGNOSIS — M79.7 CHRONIC FATIGUE SYNDROME WITH FIBROMYALGIA: ICD-10-CM

## 2022-03-17 DIAGNOSIS — F51.01 PRIMARY INSOMNIA: ICD-10-CM

## 2022-03-17 DIAGNOSIS — M35.3 POLYMYALGIA RHEUMATICA (H): Primary | ICD-10-CM

## 2022-03-17 DIAGNOSIS — Z79.899 OTHER LONG TERM (CURRENT) DRUG THERAPY: ICD-10-CM

## 2022-03-17 DIAGNOSIS — Z71.85 VACCINE COUNSELING: ICD-10-CM

## 2022-03-17 DIAGNOSIS — E89.0 POSTOPERATIVE HYPOTHYROIDISM: ICD-10-CM

## 2022-03-17 PROBLEM — C67.9 BLADDER CARCINOMA (H): Status: RESOLVED | Noted: 2018-03-15 | Resolved: 2022-03-17

## 2022-03-17 LAB — CREAT UR-MCNC: 150 MG/DL

## 2022-03-17 PROCEDURE — 80307 DRUG TEST PRSMV CHEM ANLYZR: CPT | Mod: ZL | Performed by: INTERNAL MEDICINE

## 2022-03-17 PROCEDURE — 99214 OFFICE O/P EST MOD 30 MIN: CPT | Performed by: INTERNAL MEDICINE

## 2022-03-17 PROCEDURE — G0463 HOSPITAL OUTPT CLINIC VISIT: HCPCS

## 2022-03-17 RX ORDER — DEXTROAMPHETAMINE SACCHARATE, AMPHETAMINE ASPARTATE MONOHYDRATE, DEXTROAMPHETAMINE SULFATE AND AMPHETAMINE SULFATE 5; 5; 5; 5 MG/1; MG/1; MG/1; MG/1
20 CAPSULE, EXTENDED RELEASE ORAL DAILY
Qty: 30 CAPSULE | Refills: 0 | Status: SHIPPED | OUTPATIENT
Start: 2022-03-17 | End: 2022-06-03

## 2022-03-17 RX ORDER — DEXTROAMPHETAMINE SACCHARATE, AMPHETAMINE ASPARTATE MONOHYDRATE, DEXTROAMPHETAMINE SULFATE AND AMPHETAMINE SULFATE 5; 5; 5; 5 MG/1; MG/1; MG/1; MG/1
20 CAPSULE, EXTENDED RELEASE ORAL DAILY
Qty: 30 CAPSULE | Refills: 0 | Status: SHIPPED | OUTPATIENT
Start: 2022-04-14 | End: 2022-06-03

## 2022-03-17 RX ORDER — LEVOTHYROXINE SODIUM 200 UG/1
200 TABLET ORAL DAILY
Qty: 90 TABLET | Refills: 4 | Status: SHIPPED | OUTPATIENT
Start: 2022-03-17 | End: 2022-11-11

## 2022-03-17 RX ORDER — DEXTROAMPHETAMINE SACCHARATE, AMPHETAMINE ASPARTATE MONOHYDRATE, DEXTROAMPHETAMINE SULFATE AND AMPHETAMINE SULFATE 5; 5; 5; 5 MG/1; MG/1; MG/1; MG/1
20 CAPSULE, EXTENDED RELEASE ORAL DAILY
Qty: 30 CAPSULE | Refills: 0 | Status: SHIPPED | OUTPATIENT
Start: 2022-05-12 | End: 2022-06-03

## 2022-03-17 ASSESSMENT — ENCOUNTER SYMPTOMS
WHEEZING: 0
DYSURIA: 0
WOUND: 0
FATIGUE: 0
AGITATION: 0
LIGHT-HEADEDNESS: 0
CONFUSION: 0
SHORTNESS OF BREATH: 0
MYALGIAS: 0
CHILLS: 0
COUGH: 0
SLEEP DISTURBANCE: 1
BRUISES/BLEEDS EASILY: 0
HEMATURIA: 0
BACK PAIN: 1
DIARRHEA: 0
PALPITATIONS: 0
NAUSEA: 0
ABDOMINAL PAIN: 0
DIZZINESS: 0
VOMITING: 0
APNEA: 1
FEVER: 0
ARTHRALGIAS: 0

## 2022-03-17 ASSESSMENT — ANXIETY QUESTIONNAIRES
GAD7 TOTAL SCORE: 0
5. BEING SO RESTLESS THAT IT IS HARD TO SIT STILL: NOT AT ALL
3. WORRYING TOO MUCH ABOUT DIFFERENT THINGS: NOT AT ALL
1. FEELING NERVOUS, ANXIOUS, OR ON EDGE: NOT AT ALL
2. NOT BEING ABLE TO STOP OR CONTROL WORRYING: NOT AT ALL
7. FEELING AFRAID AS IF SOMETHING AWFUL MIGHT HAPPEN: NOT AT ALL
GAD7 TOTAL SCORE: 0
7. FEELING AFRAID AS IF SOMETHING AWFUL MIGHT HAPPEN: NOT AT ALL
GAD7 TOTAL SCORE: 0
6. BECOMING EASILY ANNOYED OR IRRITABLE: NOT AT ALL
4. TROUBLE RELAXING: NOT AT ALL

## 2022-03-17 ASSESSMENT — PATIENT HEALTH QUESTIONNAIRE - PHQ9
SUM OF ALL RESPONSES TO PHQ QUESTIONS 1-9: 1
SUM OF ALL RESPONSES TO PHQ QUESTIONS 1-9: 1
10. IF YOU CHECKED OFF ANY PROBLEMS, HOW DIFFICULT HAVE THESE PROBLEMS MADE IT FOR YOU TO DO YOUR WORK, TAKE CARE OF THINGS AT HOME, OR GET ALONG WITH OTHER PEOPLE: SOMEWHAT DIFFICULT

## 2022-03-17 ASSESSMENT — PAIN SCALES - GENERAL: PAINLEVEL: NO PAIN (0)

## 2022-03-17 NOTE — LETTER
[facility]  03/17/22  Patient: Selvin An  YOB: 1955  Medical Record Number: [id number]                                                                                  Non-Opioid Controlled Substance Agreement    This is an agreement between you and your provider regarding safe and appropriate use of controlled substances prescribed by your care team. Controlled substances are?medicines that can cause physical and mental dependence (abuse).     There are strict laws about having and using these medicines. We here at [facility] are  committed to working with you in your efforts to get better. To support you in this work, we'll help you schedule regular office appointments for medicine refills. If we must cancel or change your appointment for any reason, we'll make sure you have enough medicine to last until your next appointment.     As a Provider, I will:     Listen carefully to your concerns while treating you with respect.     Recommend a treatment plan that I believe is in your best interest and may involve therapies other than medicine.      Talk with you often about the possible benefits and the risk of harm of any medicine that we prescribe for you.    Assess the safety of this medicine and check how well it works.      Provide a plan on how to taper (discontinue or go off) using this medicine if the decision is made to stop its use.      ::  As a Patient, I understand controlled substances:       Are prescribed by my care provider to help me function or work and manage my condition(s).?    Are strong medicines and can cause serious side effects.       Need to be taken exactly as prescribed.?Combining controlled substances with certain medicines or chemicals (such as illegal drugs, alcohol, sedatives, sleeping pills, and benzodiazepines) can be dangerous or even fatal.? If I stop taking my medicines suddenly, I may have severe withdrawal symptoms.     The risks,  benefits, and side effects of these medicine(s) were explained to me. I agree that:    1. I will take part in other treatments as advised by my care team. This may be psychiatry or counseling, physical therapy, behavioral therapy, group treatment or a referral to specialist.    2. I will keep all my appointments and understand this is part of the monitoring of controlled substances.?My care team may require an office visit for EVERY controlled substance refill. If I miss appointments or don t follow instructions, my care team may stop my medicine    3. I will take my medicines as prescribed. I will not change the dose or schedule unless my care team tells me to. There will be no refills if I run out early.      4. I may be asked to come to the clinic and complete a urine drug test or complete a pill count. If I don t give a urine sample or participate in a pill count, the care team may stop my medicine.    5. I will only receive controlled substance prescriptions from this clinic. If I am treated by another provider, I will tell them that I am taking controlled substances and that I have a treatment agreement with this provider. I will inform my [facility] care team within one business day if I am given a prescription for any controlled substance by another healthcare provider. My [facility] care team can contact other providers and pharmacists about my use of any medicines.    6. It is up to me to make sure that I don't run out of my medicines on weekends or holidays.?If my care team is willing to refill my prescription without a visit, I must request refills only during office hours. Refills may take up to 3 business days to process. I will use one pharmacy to fill all my controlled substance prescriptions. I will notify the clinic about any changes to my insurance or medicine availability.    7. I am responsible for my prescriptions. If the medicine/prescription is lost, stolen or destroyed, it will  not be replaced.?I also agree not to share controlled substance medicines with anyone.     8. I am aware I should not use any illegal or recreational drugs. I agree not to drink alcohol unless my care team says I can.     9. If I enroll in the [place] Medical Cannabis program, I will tell my care team before my next refill.    10. I will tell my care team right away if I become pregnant, have a new medical problem treated outside of my regular clinic, or have a change in my medicines.     11. I understand that this medicine can affect my thinking, judgment and reaction time.? Alcohol and drugs affect the brain and body, which can affect the safety of my driving. Being under the influence of alcohol or drugs can affect my decision-making, behaviors, personal safety and the safety of others. Driving while impaired (DWI) can occur if a person is driving, operating or in physical control of a car, motorcycle, boat, snowmobile, ATV, motorbike, off-road vehicle or any other motor vehicle ([place] Statute 169A.20). I understand the risk if I choose to drive or operate any vehicle or machinery.    I understand that if I do not follow any of the conditions above, my prescriptions or treatment may be stopped or changed.   I agree that my provider, clinic care team and pharmacy may work with any city, state or federal law enforcement agency that investigates the misuse, sale or other diversion of my controlled medicine. I will allow my provider to discuss my care with, or share a copy of, this agreement with any other treating provider, pharmacy or emergency room where I receive care.     I have read this agreement and have asked questions about anything I did not understand.    ________________________________________________________  Patient Signature - Selvin An     ___________________                   Date     ________________________________________________________  Provider Signature - Matt Whitmore MD        ___________________                   Date     ________________________________________________________  Witness Signature (required if provider not present while patient signing)          ___________________                   Date

## 2022-03-17 NOTE — PROGRESS NOTES
Assessment & Plan       ICD-10-CM    1. Polymyalgia rheumatica (H)  M35.3 amphetamine-dextroamphetamine (ADDERALL XR) 20 MG 24 hr capsule     amphetamine-dextroamphetamine (ADDERALL XR) 20 MG 24 hr capsule     amphetamine-dextroamphetamine (ADDERALL XR) 20 MG 24 hr capsule   2. Chronic fatigue syndrome with fibromyalgia  R53.82 amphetamine-dextroamphetamine (ADDERALL XR) 20 MG 24 hr capsule    M79.7 amphetamine-dextroamphetamine (ADDERALL XR) 20 MG 24 hr capsule     amphetamine-dextroamphetamine (ADDERALL XR) 20 MG 24 hr capsule   3. Primary insomnia  F51.01    4. Other long term (current) drug therapy - Adderall - 3/17/2022  Z79.899 Drug Confirmation Panel Urine with Creatinine     Drug Confirmation Panel Urine with Creatinine   5. Morbid obesity (H)  E66.01 amphetamine-dextroamphetamine (ADDERALL XR) 20 MG 24 hr capsule     amphetamine-dextroamphetamine (ADDERALL XR) 20 MG 24 hr capsule     amphetamine-dextroamphetamine (ADDERALL XR) 20 MG 24 hr capsule   6. Vitamin B12 deficiency  E53.8 Cyanocobalamin 2500 MCG TABS   7. Postoperative hypothyroidism  E89.0 levothyroxine (SYNTHROID/LEVOTHROID) 200 MCG tablet   8. Vaccine counseling  Z71.85    Patient presents for follow-up of multiple issues.    Polymyalgia rheumatica.  Feels as though his pain is slowly improving but then once he got down to 10 mg of prednisone daily his pain flared up quite bad again.  He went back up to 30 mg daily.  Encouraged him to start working on dose reduction once again.  If he ends up having problems trying to reduce dose down to or below 10 mg prednisone daily again we will need to consider rheumatology consult and consider some type of steroid sparing agent.    Chronic fatigue syndrome with fibromyalgia.  Ongoing but has found significant improvement with 20 mg of Adderall daily.  Urine drug screen today.  Controlled substance agreement updated today.  Proper medication use and misuse reviewed.  Patient has been using medications  appropriately.  [facility] website reviewed.  No abnormal findings noted.  No benzodiazepine use.  No opiate use.  Prescriptions refilled x1 month with 2 refills.    Primary insomnia.    Chronic.  Encouraged consideration of melatonin at bedtime.    Vitamin B12 deficiency.  Continues with B12 replacement.  Needs refills.    Postoperative hypothyroidism.  Last labs were stable.  Continue current dosing.  Needs refills.    Vaccine counseling completed.  Encouraged consideration of shingles vaccination.    Encouraged weight loss and regular exercise.     Return in about 12 weeks (around 6/9/2022) for -- controlled med refills.    Matt Whitmore MD  [facility] AND [facility]      Subjective   Henry is a 66 year old who presents for the following health issues medication refills    History of Present Illness       Mental Health Follow-up:                    Today's PHQ-9         PHQ-9 Total Score: 1  PHQ-9 Q9 Thoughts of better off dead/self-harm past 2 weeks :   (P) Not at all    How difficult have these problems made it for you to do your work, take care of things at home, or get along with other people: Somewhat difficult    Today's AJITH-7 Score: 0    Reason for visit:  Med check  Symptom onset:  More than a month  Symptoms include:  Sleeplessness  Symptom intensity:  Mild  Symptom progression:  Staying the same  Had these symptoms before:  Yes  Has tried/received treatment for these symptoms:  Yes  Previous treatment was successful:  Yes  Prior treatment description:  Meds  What makes it worse:  No  What makes it better:  No    He eats 2-3 servings of fruits and vegetables daily.He consumes 1 sweetened beverage(s) daily.He exercises with enough effort to increase his heart rate 9 or less minutes per day.  He exercises with enough effort to increase his heart rate 3 or less days per week.   He is taking medications regularly.     Adderall use: CSA completed 3/17/2022   Last UDS: 3/17/2022     Review of Systems    Constitutional: Negative for chills, fatigue (Much better with Adderall and Prednisone for PMR) and fever.        + Chronic Fatigue Syndrome since Lyme Disease - in Fall 2016.    HENT: Negative for congestion and hearing loss.    Eyes: Negative for visual disturbance.   Respiratory: Positive for apnea (uses CPAP nightly). Negative for cough (intermittent, dry), shortness of breath and wheezing.    Cardiovascular: Negative for chest pain and palpitations.   Gastrointestinal: Negative for abdominal pain, diarrhea, nausea and vomiting.   Endocrine: Negative for cold intolerance and heat intolerance.        + Diaphoretic with exertion, little energy   Genitourinary: Negative for dysuria and hematuria.        + ED and reduced libido - following with Dr. Schwartz / Urology   Musculoskeletal: Positive for back pain (Hx of Low back pain - SI joint injection was very helpful ). Negative for arthralgias and myalgias (pelvic/shoulder girdle - much improved with Prednisone for PMR - Tapering dose, at 13 mg/day as of 3/17/2022).   Skin: Negative for rash and wound.   Allergic/Immunologic: Negative for immunocompromised state.   Neurological: Negative for dizziness and light-headedness.   Hematological: Does not bruise/bleed easily.   Psychiatric/Behavioral: Positive for sleep disturbance. Negative for agitation and confusion.          Objective    /88   Pulse 87   Temp 98  F (36.7  C) (Tympanic)   Resp 16   Wt 136.5 kg (301 lb)   SpO2 95%   BMI 40.82 kg/m    Body mass index is 40.82 kg/m .  Physical Exam  Constitutional:       General: He is not in acute distress.     Appearance: He is well-developed. He is obese. He is not diaphoretic.   HENT:      Head: Normocephalic and atraumatic.   Eyes:      General: No scleral icterus.     Conjunctiva/sclera: Conjunctivae normal.   Cardiovascular:      Rate and Rhythm: Normal rate and regular rhythm.      Pulses: Normal pulses.   Pulmonary:      Effort: Pulmonary effort is  normal.      Breath sounds: Normal breath sounds.   Abdominal:      Palpations: Abdomen is soft.      Tenderness: There is no abdominal tenderness.   Musculoskeletal:         General: Tenderness (upper back/shoulders and left SI joint ) present. No deformity.      Cervical back: Neck supple.      Right lower leg: Edema (trace) present.      Left lower leg: Edema (trace) present.   Lymphadenopathy:      Cervical: No cervical adenopathy.   Skin:     General: Skin is warm and dry.      Findings: No rash.   Neurological:      Mental Status: He is alert and oriented to person, place, and time. Mental status is at baseline.   Psychiatric:         Mood and Affect: Mood normal.         Behavior: Behavior normal.

## 2022-03-17 NOTE — PATIENT INSTRUCTIONS
Medications refilled.     UTOX today.     Check Blood pressure at home periodically.     Medications refilled.     Restart Prednisone taper... for PMR.   If needed, we can send consult for rheumatology to consider treatment with steroid sparing agents/medications.     Return in approximately 3 month(s), or sooner as needed for follow-up with Dr. Whitmore.  -- controlled med refills    Clinic : [phone]  Appointment line: [phone]

## 2022-03-18 ASSESSMENT — PATIENT HEALTH QUESTIONNAIRE - PHQ9: SUM OF ALL RESPONSES TO PHQ QUESTIONS 1-9: 1

## 2022-03-18 ASSESSMENT — ANXIETY QUESTIONNAIRES: GAD7 TOTAL SCORE: 0

## 2022-03-22 ENCOUNTER — TRANSFERRED RECORDS (OUTPATIENT)
Dept: HEALTH INFORMATION MANAGEMENT | Facility: OTHER | Age: 67
End: 2022-03-22
Payer: MEDICARE

## 2022-03-22 LAB
AMPHET UR CFM-MCNC: 2360 NG/ML
AMPHET/CREAT UR: 1573 NG/MG {CREAT}

## 2022-03-24 DIAGNOSIS — G93.32 CHRONIC FATIGUE SYNDROME WITH FIBROMYALGIA: ICD-10-CM

## 2022-03-24 DIAGNOSIS — E66.01 MORBID OBESITY (H): ICD-10-CM

## 2022-03-24 DIAGNOSIS — M35.3 POLYMYALGIA RHEUMATICA (H): Primary | ICD-10-CM

## 2022-03-24 DIAGNOSIS — M79.7 CHRONIC FATIGUE SYNDROME WITH FIBROMYALGIA: ICD-10-CM

## 2022-03-24 RX ORDER — PREDNISONE 1 MG/1
TABLET ORAL
Qty: 140 TABLET | Refills: 1 | Status: SHIPPED | OUTPATIENT
Start: 2022-03-24 | End: 2022-05-19

## 2022-03-24 RX ORDER — PREDNISONE 5 MG/1
5 TABLET ORAL DAILY
Qty: 90 TABLET | Refills: 1 | Status: SHIPPED | OUTPATIENT
Start: 2022-03-24 | End: 2022-06-03

## 2022-04-08 ENCOUNTER — TRANSFERRED RECORDS (OUTPATIENT)
Dept: HEALTH INFORMATION MANAGEMENT | Facility: OTHER | Age: 67
End: 2022-04-08
Payer: MEDICARE

## 2022-04-19 DIAGNOSIS — E78.2 MIXED HYPERLIPIDEMIA: ICD-10-CM

## 2022-04-19 DIAGNOSIS — E78.1 HIGH BLOOD TRIGLYCERIDES: ICD-10-CM

## 2022-04-20 RX ORDER — FENOFIBRATE 145 MG/1
TABLET, COATED ORAL
Qty: 90 TABLET | Refills: 3 | OUTPATIENT
Start: 2022-04-20

## 2022-04-20 NOTE — TELEPHONE ENCOUNTER
[facility] #[id number] sent Rx request for the following:      FENOFIBRATE 145MG TABLET      Last Prescription Date:   8/18/2021  Last Fill Qty/Refills:         90, R-3      Redundant refill request refused: Too soon:    Garrick Castrejon RN, BSN  ....................  4/20/2022   10:15 AM

## 2022-06-03 ENCOUNTER — OFFICE VISIT (OUTPATIENT)
Dept: INTERNAL MEDICINE | Facility: OTHER | Age: 67
End: 2022-06-03
Attending: INTERNAL MEDICINE
Payer: MEDICARE

## 2022-06-03 ENCOUNTER — MEDICAL CORRESPONDENCE (OUTPATIENT)
Dept: HEALTH INFORMATION MANAGEMENT | Facility: CLINIC | Age: 67
End: 2022-06-03

## 2022-06-03 VITALS
TEMPERATURE: 98.4 F | DIASTOLIC BLOOD PRESSURE: 74 MMHG | RESPIRATION RATE: 20 BRPM | HEIGHT: 73 IN | BODY MASS INDEX: 39.02 KG/M2 | WEIGHT: 294.4 LBS | OXYGEN SATURATION: 92 % | HEART RATE: 87 BPM | SYSTOLIC BLOOD PRESSURE: 136 MMHG

## 2022-06-03 DIAGNOSIS — E78.1 HIGH BLOOD TRIGLYCERIDES: ICD-10-CM

## 2022-06-03 DIAGNOSIS — E89.0 POSTOPERATIVE HYPOTHYROIDISM: ICD-10-CM

## 2022-06-03 DIAGNOSIS — M79.7 CHRONIC FATIGUE SYNDROME WITH FIBROMYALGIA: ICD-10-CM

## 2022-06-03 DIAGNOSIS — Z79.899 OTHER LONG TERM (CURRENT) DRUG THERAPY: ICD-10-CM

## 2022-06-03 DIAGNOSIS — Z86.19 HISTORY OF LYME DISEASE: ICD-10-CM

## 2022-06-03 DIAGNOSIS — N18.31 STAGE 3A CHRONIC KIDNEY DISEASE (H): ICD-10-CM

## 2022-06-03 DIAGNOSIS — E66.01 MORBID OBESITY (H): ICD-10-CM

## 2022-06-03 DIAGNOSIS — E78.2 MIXED HYPERLIPIDEMIA: ICD-10-CM

## 2022-06-03 DIAGNOSIS — M35.3 POLYMYALGIA RHEUMATICA (H): Primary | ICD-10-CM

## 2022-06-03 DIAGNOSIS — G47.33 OSA ON CPAP: ICD-10-CM

## 2022-06-03 DIAGNOSIS — G93.32 CHRONIC FATIGUE SYNDROME WITH FIBROMYALGIA: ICD-10-CM

## 2022-06-03 DIAGNOSIS — E66.9 OBESITY (BMI 30-39.9): ICD-10-CM

## 2022-06-03 PROCEDURE — G0463 HOSPITAL OUTPT CLINIC VISIT: HCPCS

## 2022-06-03 PROCEDURE — 99214 OFFICE O/P EST MOD 30 MIN: CPT | Performed by: INTERNAL MEDICINE

## 2022-06-03 RX ORDER — FENOFIBRATE 145 MG/1
145 TABLET, COATED ORAL DAILY
Qty: 90 TABLET | Refills: 4 | Status: SHIPPED | OUTPATIENT
Start: 2022-06-03 | End: 2022-09-23

## 2022-06-03 RX ORDER — PREDNISONE 5 MG/1
10 TABLET ORAL DAILY
Qty: 180 TABLET | Refills: 1 | Status: ON HOLD | OUTPATIENT
Start: 2022-06-03 | End: 2023-02-06

## 2022-06-03 RX ORDER — DEXTROAMPHETAMINE SACCHARATE, AMPHETAMINE ASPARTATE MONOHYDRATE, DEXTROAMPHETAMINE SULFATE AND AMPHETAMINE SULFATE 5; 5; 5; 5 MG/1; MG/1; MG/1; MG/1
20 CAPSULE, EXTENDED RELEASE ORAL DAILY
Qty: 30 CAPSULE | Refills: 0 | Status: SHIPPED | OUTPATIENT
Start: 2022-06-03 | End: 2022-08-30

## 2022-06-03 RX ORDER — DEXTROAMPHETAMINE SACCHARATE, AMPHETAMINE ASPARTATE MONOHYDRATE, DEXTROAMPHETAMINE SULFATE AND AMPHETAMINE SULFATE 5; 5; 5; 5 MG/1; MG/1; MG/1; MG/1
20 CAPSULE, EXTENDED RELEASE ORAL DAILY
Qty: 30 CAPSULE | Refills: 0 | Status: SHIPPED | OUTPATIENT
Start: 2022-07-29 | End: 2022-08-30

## 2022-06-03 RX ORDER — DEXTROAMPHETAMINE SACCHARATE, AMPHETAMINE ASPARTATE MONOHYDRATE, DEXTROAMPHETAMINE SULFATE AND AMPHETAMINE SULFATE 5; 5; 5; 5 MG/1; MG/1; MG/1; MG/1
20 CAPSULE, EXTENDED RELEASE ORAL DAILY
Qty: 30 CAPSULE | Refills: 0 | Status: SHIPPED | OUTPATIENT
Start: 2022-07-01 | End: 2022-08-30

## 2022-06-03 ASSESSMENT — ENCOUNTER SYMPTOMS
LIGHT-HEADEDNESS: 0
FEVER: 0
ABDOMINAL PAIN: 0
AGITATION: 0
DIZZINESS: 0
NAUSEA: 0
ARTHRALGIAS: 0
MYALGIAS: 0
APNEA: 1
SHORTNESS OF BREATH: 0
SLEEP DISTURBANCE: 1
VOMITING: 0
PALPITATIONS: 0
CHILLS: 0
FATIGUE: 0
WHEEZING: 0
COUGH: 0
WOUND: 0
DIARRHEA: 0
BACK PAIN: 1
HEMATURIA: 0
CONFUSION: 0
DYSURIA: 0
BRUISES/BLEEDS EASILY: 0

## 2022-06-03 ASSESSMENT — ANXIETY QUESTIONNAIRES
3. WORRYING TOO MUCH ABOUT DIFFERENT THINGS: NOT AT ALL
4. TROUBLE RELAXING: NOT AT ALL
5. BEING SO RESTLESS THAT IT IS HARD TO SIT STILL: NOT AT ALL
7. FEELING AFRAID AS IF SOMETHING AWFUL MIGHT HAPPEN: NOT AT ALL
7. FEELING AFRAID AS IF SOMETHING AWFUL MIGHT HAPPEN: NOT AT ALL
GAD7 TOTAL SCORE: 0
GAD7 TOTAL SCORE: 0
1. FEELING NERVOUS, ANXIOUS, OR ON EDGE: NOT AT ALL
2. NOT BEING ABLE TO STOP OR CONTROL WORRYING: NOT AT ALL
6. BECOMING EASILY ANNOYED OR IRRITABLE: NOT AT ALL
8. IF YOU CHECKED OFF ANY PROBLEMS, HOW DIFFICULT HAVE THESE MADE IT FOR YOU TO DO YOUR WORK, TAKE CARE OF THINGS AT HOME, OR GET ALONG WITH OTHER PEOPLE?: NOT DIFFICULT AT ALL
GAD7 TOTAL SCORE: 0

## 2022-06-03 ASSESSMENT — PAIN SCALES - GENERAL: PAINLEVEL: MILD PAIN (3)

## 2022-06-03 ASSESSMENT — PATIENT HEALTH QUESTIONNAIRE - PHQ9
10. IF YOU CHECKED OFF ANY PROBLEMS, HOW DIFFICULT HAVE THESE PROBLEMS MADE IT FOR YOU TO DO YOUR WORK, TAKE CARE OF THINGS AT HOME, OR GET ALONG WITH OTHER PEOPLE: VERY DIFFICULT
SUM OF ALL RESPONSES TO PHQ QUESTIONS 1-9: 4
SUM OF ALL RESPONSES TO PHQ QUESTIONS 1-9: 4

## 2022-06-03 NOTE — PROGRESS NOTES
Assessment & Plan       ICD-10-CM    1. Polymyalgia rheumatica (H)  M35.3 Adult Rheumatology  Referral     amphetamine-dextroamphetamine (ADDERALL XR) 20 MG 24 hr capsule     predniSONE (DELTASONE) 5 MG tablet     amphetamine-dextroamphetamine (ADDERALL XR) 20 MG 24 hr capsule     amphetamine-dextroamphetamine (ADDERALL XR) 20 MG 24 hr capsule   2. Postoperative hypothyroidism  E89.0    3. Obesity (BMI 30-39.9)  E66.9    4. KYLAH on CPAP -- Uses nightly, finds helpful and beneficial  G47.33     Z99.89    5. Stage 3a chronic kidney disease (H)  N18.31    6. History of Lyme disease  Z86.19    7. Chronic fatigue syndrome with fibromyalgia  R53.82 amphetamine-dextroamphetamine (ADDERALL XR) 20 MG 24 hr capsule    M79.7 amphetamine-dextroamphetamine (ADDERALL XR) 20 MG 24 hr capsule     amphetamine-dextroamphetamine (ADDERALL XR) 20 MG 24 hr capsule   8. Morbid obesity (H)  E66.01 amphetamine-dextroamphetamine (ADDERALL XR) 20 MG 24 hr capsule     amphetamine-dextroamphetamine (ADDERALL XR) 20 MG 24 hr capsule     amphetamine-dextroamphetamine (ADDERALL XR) 20 MG 24 hr capsule   9. Mixed hyperlipidemia  E78.2 fenofibrate (TRICOR) 145 MG tablet   10. High blood triglycerides  E78.1 fenofibrate (TRICOR) 145 MG tablet   11. Other long term (current) drug therapy - Adderall - 3/17/2022  Z79.899 amphetamine-dextroamphetamine (ADDERALL XR) 20 MG 24 hr capsule     amphetamine-dextroamphetamine (ADDERALL XR) 20 MG 24 hr capsule     amphetamine-dextroamphetamine (ADDERALL XR) 20 MG 24 hr capsule   Patient comes in with his wife for follow-up of multiple issues.    Polymyalgia rheumatica.  This is been worsening recently.  He was able to taper down from 12 or 13 mg of prednisone daily, dropping 1 mg every 2 weeks.  Now down to 8 mg/day and he is having a lot of problems.  He is not sleeping well again.  He is having a lot of aches and pains.  Was doing the best when he was on about 13 mg of prednisone daily.  He plans to go  back up to this dosing.  Recommend rheumatology consultation.  He may need alternative medication, possibly prednisone sparing agent to help with his polymyalgia.  He is starting to have some neuropathy with some pins-and-needles and burning in the feet.  Rheumatology referral requested.  Prednisone dosing increased back to 13 mg daily.  Prescription refilled.    Postoperative hypothyroidism.  Doing well with current dosing.  Last TSH was within goal range.  No changes for now.    Obesity, discussed need for reduced oral caloric intake.  Regular exercise.  Reduce carbohydrate intake.  Information printed and reviewed.    Obstructive sleep apnea.  Chronic, ongoing.  Uses CPAP nightly.  Finds helpful and beneficial.  Encouraged continued use.    Stage IIIa chronic kidney disease.  Kidney function had been slowly worsening but did improve slightly compared to last lab work.  Encouraged NSAID avoidance.    History of Lyme disease.  He is worried that he might have chronic Lyme disease or symptoms associated with post Lyme disease issues.  Has been having fibromyalgia type symptoms as well as some chronic fatigue.  Adderall initially was extremely effective and in combination with the prednisone for treatment of his polymyalgia, had significantly improved his quality of life.  Refill Adderall at previous dosing.  Controlled substance agreement is up-to-date.  Plan for follow-up in 3 months.  [facility] website reviewed.  No abnormal findings noted.    Mixed hyperlipidemia with high blood triglycerides, continues on Tricor.  Plan for lab recheck in about 3 months.  Refill sent to pharmacy.    Encouraged weight loss and regular exercise.     Return in about 3 months (around 8/30/2022) for -- Adderall refills.    Matt Whitmore MD  [facility] AND [facility]     Igor Figueroa is a 67 year old who presents for the following health issues Adderrall Refill  History of Present Illness       Reason for visit:  Follow  up  Symptom onset:  More than a month  Symptoms include:  Low energy  Symptom intensity:  Moderate  Symptom progression:  Staying the same  Had these symptoms before:  Yes  What makes it worse:  Working ,  doing things  What makes it better:  Rest    He eats 2-3 servings of fruits and vegetables daily.He consumes 1 sweetened beverage(s) daily.He exercises with enough effort to increase his heart rate 9 or less minutes per day.  He exercises with enough effort to increase his heart rate 3 or less days per week.   He is taking medications regularly.    Today's PHQ-9         PHQ-9 Total Score: 4    PHQ-9 Q9 Thoughts of better off dead/self-harm past 2 weeks :   Not at all    How difficult have these problems made it for you to do your work, take care of things at home, or get along with other people: Very difficult  Today's AJITH-7 Score: 0     Review of Systems   Constitutional: Negative for chills, fatigue (Was much better with Adderall and Prednisone for PMR - down to 8 mg prednisone and pain/suffering has worsened) and fever.        + Chronic Fatigue Syndrome since Lyme Disease - in Fall 2016.    HENT: Negative for congestion and hearing loss.    Eyes: Negative for visual disturbance (s/p cataract surgery).   Respiratory: Positive for apnea (uses CPAP nightly). Negative for cough (intermittent, dry), shortness of breath and wheezing.    Cardiovascular: Negative for chest pain and palpitations.   Gastrointestinal: Negative for abdominal pain, diarrhea, nausea and vomiting.   Endocrine: Negative for cold intolerance and heat intolerance.        + Diaphoretic with exertion, little energy   Genitourinary: Negative for dysuria and hematuria.        + ED and reduced libido - following with Dr. Schwartz / Urology   Musculoskeletal: Positive for back pain (Hx of Low back pain - SI joint injection was very helpful ). Negative for arthralgias and myalgias (pelvic/shoulder girdle - much improved with Prednisone for PMR - Tapering  "dose, at 13 mg/day as of 3/17/2022).   Skin: Negative for rash and wound.   Allergic/Immunologic: Negative for immunocompromised state.   Neurological: Negative for dizziness and light-headedness.   Hematological: Does not bruise/bleed easily.   Psychiatric/Behavioral: Positive for sleep disturbance. Negative for agitation and confusion.            Objective    /74 (BP Location: Right arm, Patient Position: Sitting, Cuff Size: Adult Large)   Pulse 87   Temp 98.4  F (36.9  C) (Temporal)   Resp 20   Ht 1.842 m (6' 0.5\")   Wt 133.5 kg (294 lb 6.4 oz)   SpO2 92%   BMI 39.38 kg/m    Body mass index is 39.38 kg/m .  Physical Exam  Constitutional:       General: He is not in acute distress.     Appearance: He is well-developed. He is obese. He is not diaphoretic.   HENT:      Head: Normocephalic and atraumatic.   Eyes:      General: No scleral icterus.     Conjunctiva/sclera: Conjunctivae normal.   Cardiovascular:      Rate and Rhythm: Normal rate and regular rhythm.      Pulses: Normal pulses.   Pulmonary:      Effort: Pulmonary effort is normal.      Breath sounds: Normal breath sounds.   Abdominal:      Palpations: Abdomen is soft.      Tenderness: There is no abdominal tenderness.   Musculoskeletal:         General: Tenderness (upper back/shoulders and left SI joint ) present. No deformity.      Cervical back: Neck supple.      Right lower leg: Edema (trace) present.      Left lower leg: Edema (trace) present.   Lymphadenopathy:      Cervical: No cervical adenopathy.   Skin:     General: Skin is warm and dry.      Findings: No rash.   Neurological:      Mental Status: He is alert and oriented to person, place, and time. Mental status is at baseline.   Psychiatric:         Mood and Affect: Mood normal.         Behavior: Behavior normal.          Recent Labs   Lab Test 03/02/22  1220 12/22/21  1035 09/15/21  0824 08/04/21  1523 08/04/21  1523 06/08/21  1147 03/12/21  1044   A1C  --   --  5.5  --  5.6  --  " 5.9   LDL  --   --  143*  --   --   --  Cannot estimate LDL when triglyceride exceeds 400 mg/dL   HDL  --   --  43  --  35  --  33   TRIG  --   --  140  --  540*  --  410*   ALT  --  25 18  --  23  --  26   CR 1.51* 1.63* 1.34*   < > 1.40* 1.65* 1.18   GFRESTIMATED 50* 46* 54*   < > 52* 42* 62   GFRESTBLACK  --   --   --   --   --  51* 75   POTASSIUM 3.6 3.9 3.9   < > 3.9 3.9 3.7   TSH  --  1.20 11.84*   < > 3.61  --   --     < > = values in this interval not displayed.   Last hemoglobin A1c was well controlled.  LDL was high and not at goal.  Creatinine had improved slightly.  Potassium was low normal.  TSH was normal.

## 2022-06-03 NOTE — PATIENT INSTRUCTIONS
Rheumatology referral  - they will call with date/time of appointment.      -- Go back up to 10 to 13 mg daily of prednisone.     Adderall refilled.     Return in approximately 3 month(s), or sooner as needed for follow-up with Dr. Whitmore.  -- Adderall refills    Clinic : [phone]  Appointment line: [phone]

## 2022-06-03 NOTE — NURSING NOTE
"Chief Complaint   Patient presents with     Adderall Refill / medication recheck         Initial /74 (BP Location: Right arm, Patient Position: Sitting, Cuff Size: Adult Large)   Pulse 87   Temp 98.4  F (36.9  C) (Temporal)   Resp 20   Ht 1.842 m (6' 0.5\")   Wt 133.5 kg (294 lb 6.4 oz)   SpO2 92%   BMI 39.38 kg/m   Estimated body mass index is 39.38 kg/m  as calculated from the following:    Height as of this encounter: 1.842 m (6' 0.5\").    Weight as of this encounter: 133.5 kg (294 lb 6.4 oz).       FOOD SECURITY SCREENING QUESTIONS:    The next two questions are to help us understand your food security.  If you are feeling you need any assistance in this area, we have resources available to support you today.    Hunger Vital Signs:  Within the past 12 months we worried whether our food would run out before we got money to buy more. Never  Within the past 12 months the food we bought just didn't last and we didn't have money to get more. Never    Advance Care Directive on file? no      Medication reconciliation complete.      Kevin Haynes,on 6/3/2022 at 10:57 AM        "

## 2022-06-03 NOTE — LETTER
[facility]  06/03/22  Patient: Selvin An  YOB: 1955  Medical Record Number: [id number]                                                                                  Non-Opioid Controlled Substance Agreement    This is an agreement between you and your provider regarding safe and appropriate use of controlled substances prescribed by your care team. Controlled substances are?medicines that can cause physical and mental dependence (abuse).     There are strict laws about having and using these medicines. We here at [facility] are  committed to working with you in your efforts to get better. To support you in this work, we'll help you schedule regular office appointments for medicine refills. If we must cancel or change your appointment for any reason, we'll make sure you have enough medicine to last until your next appointment.     As a Provider, I will:     Listen carefully to your concerns while treating you with respect.     Recommend a treatment plan that I believe is in your best interest and may involve therapies other than medicine.      Talk with you often about the possible benefits and the risk of harm of any medicine that we prescribe for you.    Assess the safety of this medicine and check how well it works.      Provide a plan on how to taper (discontinue or go off) using this medicine if the decision is made to stop its use.      ::  As a Patient, I understand controlled substances:       Are prescribed by my care provider to help me function or work and manage my condition(s).?    Are strong medicines and can cause serious side effects.       Need to be taken exactly as prescribed.?Combining controlled substances with certain medicines or chemicals (such as illegal drugs, alcohol, sedatives, sleeping pills, and benzodiazepines) can be dangerous or even fatal.? If I stop taking my medicines suddenly, I may have severe withdrawal symptoms.     The risks,  benefits, and side effects of these medicine(s) were explained to me. I agree that:    1. I will take part in other treatments as advised by my care team. This may be psychiatry or counseling, physical therapy, behavioral therapy, group treatment or a referral to specialist.    2. I will keep all my appointments and understand this is part of the monitoring of controlled substances.?My care team may require an office visit for EVERY controlled substance refill. If I miss appointments or don t follow instructions, my care team may stop my medicine    3. I will take my medicines as prescribed. I will not change the dose or schedule unless my care team tells me to. There will be no refills if I run out early.      4. I may be asked to come to the clinic and complete a urine drug test or complete a pill count. If I don t give a urine sample or participate in a pill count, the care team may stop my medicine.    5. I will only receive controlled substance prescriptions from this clinic. If I am treated by another provider, I will tell them that I am taking controlled substances and that I have a treatment agreement with this provider. I will inform my [facility] care team within one business day if I am given a prescription for any controlled substance by another healthcare provider. My [facility] care team can contact other providers and pharmacists about my use of any medicines.    6. It is up to me to make sure that I don't run out of my medicines on weekends or holidays.?If my care team is willing to refill my prescription without a visit, I must request refills only during office hours. Refills may take up to 3 business days to process. I will use one pharmacy to fill all my controlled substance prescriptions. I will notify the clinic about any changes to my insurance or medicine availability.    7. I am responsible for my prescriptions. If the medicine/prescription is lost, stolen or destroyed, it will  not be replaced.?I also agree not to share controlled substance medicines with anyone.     8. I am aware I should not use any illegal or recreational drugs. I agree not to drink alcohol unless my care team says I can.     9. If I enroll in the [place] Medical Cannabis program, I will tell my care team before my next refill.    10. I will tell my care team right away if I become pregnant, have a new medical problem treated outside of my regular clinic, or have a change in my medicines.     11. I understand that this medicine can affect my thinking, judgment and reaction time.? Alcohol and drugs affect the brain and body, which can affect the safety of my driving. Being under the influence of alcohol or drugs can affect my decision-making, behaviors, personal safety and the safety of others. Driving while impaired (DWI) can occur if a person is driving, operating or in physical control of a car, motorcycle, boat, snowmobile, ATV, motorbike, off-road vehicle or any other motor vehicle ([place] Statute 169A.20). I understand the risk if I choose to drive or operate any vehicle or machinery.    I understand that if I do not follow any of the conditions above, my prescriptions or treatment may be stopped or changed.   I agree that my provider, clinic care team and pharmacy may work with any city, state or federal law enforcement agency that investigates the misuse, sale or other diversion of my controlled medicine. I will allow my provider to discuss my care with, or share a copy of, this agreement with any other treating provider, pharmacy or emergency room where I receive care.     I have read this agreement and have asked questions about anything I did not understand.    ________________________________________________________  Patient Signature - Selvin An     ___________________                   Date     ________________________________________________________  Provider Signature - Matt Whitmore MD        ___________________                   Date     ________________________________________________________  Witness Signature (required if provider not present while patient signing)          ___________________                   Date

## 2022-06-13 ENCOUNTER — TRANSCRIBE ORDERS (OUTPATIENT)
Dept: OTHER | Age: 67
End: 2022-06-13
Payer: MEDICARE

## 2022-08-10 ENCOUNTER — LAB (OUTPATIENT)
Dept: LAB | Facility: OTHER | Age: 67
End: 2022-08-10
Attending: UROLOGY
Payer: MEDICARE

## 2022-08-10 DIAGNOSIS — E29.1 HYPOGONADISM IN MALE: ICD-10-CM

## 2022-08-10 DIAGNOSIS — R68.82 DECREASED LIBIDO: ICD-10-CM

## 2022-08-10 LAB — HCT VFR BLD AUTO: 50.8 % (ref 40–53)

## 2022-08-10 PROCEDURE — 85014 HEMATOCRIT: CPT | Mod: ZL

## 2022-08-10 PROCEDURE — 84403 ASSAY OF TOTAL TESTOSTERONE: CPT | Mod: ZL

## 2022-08-10 PROCEDURE — 36415 COLL VENOUS BLD VENIPUNCTURE: CPT | Mod: ZL

## 2022-08-12 LAB — TESTOST SERPL-MCNC: 476 NG/DL (ref 240–950)

## 2022-08-16 NOTE — TELEPHONE ENCOUNTER
NOTES Status Details   OFFICE NOTE from referring provider Internal 06.03.2022 Matt Whitmore MD   OFFICE NOTE from other specialist     DISCHARGE SUMMARY from hospital     DISCHARGE REPORT from the ER     MEDICATION LIST Internal    LABS (Any and all labs)      Internal    Biopsy/pathology (Anything related to diagnoses I.e. fluid aspirations, lip biopsy, muscle biopsy)               Imaging (All imaging related to diagnoses)     Echo     HRCT     CXR     EMG                    Scleroderma/Dermatomyositis diagnoses     Previous Cardiology notes      Previous Pulmonary notes     Previous Dermatology notes     Previous GI notes     Lupus diagnoses     Previous Nephrology notes     Previous Dermatology notes     Previous Cardiology notes

## 2022-08-17 ENCOUNTER — OFFICE VISIT (OUTPATIENT)
Dept: UROLOGY | Facility: OTHER | Age: 67
End: 2022-08-17
Attending: UROLOGY
Payer: MEDICARE

## 2022-08-17 VITALS
SYSTOLIC BLOOD PRESSURE: 142 MMHG | BODY MASS INDEX: 39.49 KG/M2 | RESPIRATION RATE: 16 BRPM | WEIGHT: 295.2 LBS | OXYGEN SATURATION: 93 % | HEART RATE: 88 BPM | DIASTOLIC BLOOD PRESSURE: 90 MMHG

## 2022-08-17 DIAGNOSIS — E29.1 HYPOGONADISM IN MALE: ICD-10-CM

## 2022-08-17 DIAGNOSIS — N40.1 BENIGN PROSTATIC HYPERPLASIA WITH URINARY FREQUENCY: Primary | ICD-10-CM

## 2022-08-17 DIAGNOSIS — R35.0 BENIGN PROSTATIC HYPERPLASIA WITH URINARY FREQUENCY: Primary | ICD-10-CM

## 2022-08-17 DIAGNOSIS — N52.9 ERECTILE DYSFUNCTION, UNSPECIFIED ERECTILE DYSFUNCTION TYPE: ICD-10-CM

## 2022-08-17 PROCEDURE — 99214 OFFICE O/P EST MOD 30 MIN: CPT | Performed by: UROLOGY

## 2022-08-17 PROCEDURE — G0463 HOSPITAL OUTPT CLINIC VISIT: HCPCS

## 2022-08-17 RX ORDER — TAMSULOSIN HYDROCHLORIDE 0.4 MG/1
0.4 CAPSULE ORAL EVERY EVENING
Qty: 90 CAPSULE | Refills: 3 | Status: SHIPPED | OUTPATIENT
Start: 2022-08-17 | End: 2022-12-23

## 2022-08-17 RX ORDER — TESTOSTERONE 1.62 MG/G
2 GEL TRANSDERMAL EVERY MORNING
Qty: 75 G | Refills: 5 | Status: SHIPPED | OUTPATIENT
Start: 2022-08-17 | End: 2023-02-24

## 2022-08-17 RX ORDER — SILDENAFIL 100 MG/1
TABLET, FILM COATED ORAL
Qty: 10 TABLET | Refills: 11 | Status: SHIPPED | OUTPATIENT
Start: 2022-08-17 | End: 2023-05-03

## 2022-08-17 ASSESSMENT — PAIN SCALES - GENERAL: PAINLEVEL: MODERATE PAIN (4)

## 2022-08-17 NOTE — PROGRESS NOTES
Type of Visit  EST    Chief Complaint  Erectile dysfunction  Hypogonadism  BPH with urinary frequency    HPI  Mr. An is a 66 year old male with hypogonadism, ED and urinary complaints.    The patient initially presented about 1 year ago with a 5-year history of diminishing exercise tolerance, poor focus and poor energy.  At that time he was found to have hypogonadism given his very low testosterone values in combination with his symptoms.  Because of this he started topical AndroGel 1.62%, 2 pumps per day.  Since starting this almost 1 year ago he reports improvement in focus and energy.  He denies side effects.    In addition the patient has ED.  He has been treated for a few years with sildenafil.  Sildenafil continues to be effective with minimal side effects.  He would like to continue the medication.    In addition he has new urinary complaints today.  He has developed frequency with weak stream progressively worsening over the last 5 to 10 years.  The bother has reached moderate and he is interested in treatment at this time.  He denies dysuria and gross hematuria.  He has never taken medication for urinary complaints in the past.      Review of Systems  I personally reviewed the ROS with the patient.    Nursing Notes:   Kenia Tovar LPN  8/17/2022  9:40 AM  Addendum  Chief Complaint   Patient presents with     Follow Up     6 month follow up for Hypogonadism     Patient presents to the clinic today for a 6 month follow up for hypogonadism    Review of Systems:    Weight loss:    No     Recent fever/chills:  No   Night sweats:   No  Current skin rash:  No   Recent hair loss:  No  Heat intolerance:  No   Cold intolerance:  No  Chest pain:   No   Palpitations:   No  Shortness of breath:  No   Wheezing:   No  Constipation:    No   Diarrhea:   No   Nausea:   No   Vomiting:   No   Kidney/side pain:  No   Back pain:   Yes  Frequent headaches:  No   Dizziness:     No  Leg swelling:   No   Calf pain:     No        Medication Reconciliation: completed   Kenia Tovar LPN  8/17/2022 9:35 AM       Physical Exam  Vitals:    08/17/22 0939 08/17/22 0941   BP: (!) 140/92 (!) 142/90   BP Location: Right arm Right arm   Patient Position: Sitting Sitting   Cuff Size: Adult Large Adult Large   Pulse: 88    Resp: 16    SpO2: 93%    Weight: 133.9 kg (295 lb 3.2 oz)      Constitutional: No acute distress.  Alert and cooperative   Head: NCAT  Eyes: Conjunctivae normal  Cardiovascular: Regular rate  Pulmonary/Chest: Respirations are even and non-labored bilaterally, no audible wheezing  Abdominal: Soft. No distension, tenderness, masses or guarding.   Extremities: SANAZ x 4, Warm. No clubbing.  No cyanosis.    Skin: Pink, warm and dry.  No visible rashes noted.  Back:  No left CVA tenderness.  No right CVA tenderness.  Genitourinary:  Nonpalpable bladder    Labs   03/16/22 08:33 08/10/22 08:21   Testosterone Total 234 476   Hematocrit 50.1 50.8     ^^^^^^^^^^^^^^^^^^^^^^^^^^^^^^^^^^^^    Results for RANDALL AN (MRN [id number]) as of 9/15/2021 12:05   9/15/2021 08:19   Testosterone Total 300     Results for RANDALL AN (MRN [id number]) as of 9/15/2021 10:37   9/15/2021 08:24   Hematocrit 45.5     ^^^^^^^^^^^^^^^^^^^^^^^^^^^^^^^^^^^^    Results for RANDALL AN (MRN [id number]) as of 8/23/2021 14:22   8/13/2021 08:40   Testosterone Total 170 (L)     Results for RANDALL AN (MRN [id number]) as of 8/11/2021 14:09   8/4/2021 15:23   Testosterone Total 143 (L)     Results for RANDALL AN (MRN [id number]) as of 8/11/2021 14:09   3/12/2021 10:44   PSA 1.013     Assessment  Mr. An is a 66 year old male with hypogonadism, ED and BPH with weak stream.  Reviewed labs and they are at goal regarding hypogonadism.    Discussed his ED treatment and he would like to continue with sildenafil.    Discussed his new diagnosis of BPH with weak stream.  His bother justify starting medication and I recommended Flomax given the low side effect  potential.    We discussed side effects of Flomax including, but not limited to, retrograde ejaculation, congestion and lightheadedness.    Plan  Start Flomax 0.4mg every evening  Continue sildenafil 50-100mg by mouth 1 hour prior to sexual activity (empty stomach).  Continue AndroGel 1.62%, 2 pumps every morning  Follow-up in 6 months with labs: T, hematocrit, PSA and PVR

## 2022-08-17 NOTE — NURSING NOTE
Chief Complaint   Patient presents with     Follow Up     6 month follow up for Hypogonadism     Patient presents to the clinic today for a 6 month follow up for hypogonadism    Review of Systems:    Weight loss:    No     Recent fever/chills:  No   Night sweats:   No  Current skin rash:  No   Recent hair loss:  No  Heat intolerance:  No   Cold intolerance:  No  Chest pain:   No   Palpitations:   No  Shortness of breath:  No   Wheezing:   No  Constipation:    No   Diarrhea:   No   Nausea:   No   Vomiting:   No   Kidney/side pain:  No   Back pain:   Yes  Frequent headaches:  No   Dizziness:     No  Leg swelling:   No   Calf pain:    No        Medication Reconciliation: completed   Kenia Tovar LPN  8/17/2022 9:35 AM

## 2022-08-19 ENCOUNTER — HOSPITAL ENCOUNTER (OUTPATIENT)
Dept: MRI IMAGING | Facility: OTHER | Age: 67
Discharge: HOME | End: 2022-08-19
Attending: STUDENT IN AN ORGANIZED HEALTH CARE EDUCATION/TRAINING PROGRAM | Admitting: STUDENT IN AN ORGANIZED HEALTH CARE EDUCATION/TRAINING PROGRAM
Payer: MEDICARE

## 2022-08-19 DIAGNOSIS — M54.50 LOW BACK PAIN: ICD-10-CM

## 2022-08-19 PROCEDURE — 72148 MRI LUMBAR SPINE W/O DYE: CPT

## 2022-08-30 ENCOUNTER — OFFICE VISIT (OUTPATIENT)
Dept: INTERNAL MEDICINE | Facility: OTHER | Age: 67
End: 2022-08-30
Attending: INTERNAL MEDICINE
Payer: MEDICARE

## 2022-08-30 VITALS
DIASTOLIC BLOOD PRESSURE: 70 MMHG | OXYGEN SATURATION: 96 % | SYSTOLIC BLOOD PRESSURE: 139 MMHG | WEIGHT: 297.6 LBS | RESPIRATION RATE: 20 BRPM | BODY MASS INDEX: 39.44 KG/M2 | TEMPERATURE: 97.3 F | HEIGHT: 73 IN | HEART RATE: 75 BPM

## 2022-08-30 DIAGNOSIS — M35.3 POLYMYALGIA RHEUMATICA (H): ICD-10-CM

## 2022-08-30 DIAGNOSIS — M48.061 NEUROFORAMINAL STENOSIS OF LUMBAR SPINE: ICD-10-CM

## 2022-08-30 DIAGNOSIS — M54.16 CHRONIC RADICULAR LOW BACK PAIN: Primary | ICD-10-CM

## 2022-08-30 DIAGNOSIS — N28.1 BILATERAL RENAL CYSTS: ICD-10-CM

## 2022-08-30 DIAGNOSIS — G89.29 CHRONIC RADICULAR LOW BACK PAIN: Primary | ICD-10-CM

## 2022-08-30 PROCEDURE — G0463 HOSPITAL OUTPT CLINIC VISIT: HCPCS

## 2022-08-30 PROCEDURE — 99214 OFFICE O/P EST MOD 30 MIN: CPT | Performed by: INTERNAL MEDICINE

## 2022-08-30 RX ORDER — GABAPENTIN 100 MG/1
100-200 CAPSULE ORAL 4 TIMES DAILY PRN
Qty: 240 CAPSULE | Refills: 1 | Status: SHIPPED | OUTPATIENT
Start: 2022-08-30 | End: 2022-11-04

## 2022-08-30 RX ORDER — DULOXETIN HYDROCHLORIDE 30 MG/1
CAPSULE, DELAYED RELEASE ORAL
Qty: 194 CAPSULE | Refills: 0 | Status: SHIPPED | OUTPATIENT
Start: 2022-08-30 | End: 2022-11-04

## 2022-08-30 ASSESSMENT — PATIENT HEALTH QUESTIONNAIRE - PHQ9
SUM OF ALL RESPONSES TO PHQ QUESTIONS 1-9: 6
SUM OF ALL RESPONSES TO PHQ QUESTIONS 1-9: 6
10. IF YOU CHECKED OFF ANY PROBLEMS, HOW DIFFICULT HAVE THESE PROBLEMS MADE IT FOR YOU TO DO YOUR WORK, TAKE CARE OF THINGS AT HOME, OR GET ALONG WITH OTHER PEOPLE: SOMEWHAT DIFFICULT

## 2022-08-30 ASSESSMENT — ENCOUNTER SYMPTOMS
PALPITATIONS: 0
NAUSEA: 0
WHEEZING: 0
SLEEP DISTURBANCE: 1
CONFUSION: 0
DIARRHEA: 0
FEVER: 0
COUGH: 0
BACK PAIN: 1
ABDOMINAL PAIN: 0
ARTHRALGIAS: 0
BRUISES/BLEEDS EASILY: 0
LIGHT-HEADEDNESS: 0
DIZZINESS: 0
HEMATURIA: 0
DYSURIA: 0
AGITATION: 0
WOUND: 0
FATIGUE: 1
SHORTNESS OF BREATH: 0
MYALGIAS: 0
VOMITING: 0
APNEA: 1
CHILLS: 0

## 2022-08-30 ASSESSMENT — PAIN SCALES - GENERAL: PAINLEVEL: MILD PAIN (3)

## 2022-08-30 NOTE — NURSING NOTE
"Chief Complaint   Patient presents with     Addreall Refill         Initial /70 (BP Location: Right arm, Patient Position: Sitting, Cuff Size: Adult Large)   Pulse 75   Temp 97.3  F (36.3  C) (Temporal)   Resp 20   Ht 1.842 m (6' 0.5\")   Wt 135 kg (297 lb 9.6 oz)   SpO2 96%   BMI 39.81 kg/m   Estimated body mass index is 39.81 kg/m  as calculated from the following:    Height as of this encounter: 1.842 m (6' 0.5\").    Weight as of this encounter: 135 kg (297 lb 9.6 oz).       FOOD SECURITY SCREENING QUESTIONS:    The next two questions are to help us understand your food security.  If you are feeling you need any assistance in this area, we have resources available to support you today.    Hunger Vital Signs:  Within the past 12 months we worried whether our food would run out before we got money to buy more. Never  Within the past 12 months the food we bought just didn't last and we didn't have money to get more. Never    Advance Care Directive on file? yes      Medication reconciliation complete.      Kevin Haynes,on 8/30/2022 at 10:36 AM        "

## 2022-08-30 NOTE — PROGRESS NOTES
Assessment & Plan   Selvin An is a 67 year old, presenting for the following health issues:      ICD-10-CM    1. Chronic radicular low back pain  M54.16 gabapentin (NEURONTIN) 100 MG capsule    G89.29 DULoxetine (CYMBALTA) 30 MG capsule   2. Bilateral renal cysts  N28.1 US Renal Complete   3. Neuroforaminal stenosis of lumbar spine  M48.061 gabapentin (NEURONTIN) 100 MG capsule     DULoxetine (CYMBALTA) 30 MG capsule   4. Polymyalgia rheumatica (H)  M35.3 gabapentin (NEURONTIN) 100 MG capsule     DULoxetine (CYMBALTA) 30 MG capsule     Patient presents for follow-up multiple issues.  He was having a lot of fatigue and malaise.  Was doing quite well with higher dosing of prednisone for his polymyalgia in combination with some Adderall.  Found the Adderall really was not doing anything anymore and subsequently stopped taking it.  These are now all removed from his medication list.  Initially we printed letter for updating his controlled substance agreement however he is no longer using and his med list was updated.  These were removed.  Continues with prednisone currently at 9 mg daily.  He has follow-up with rheumatology in about a month.  Advised that he continue at 9 mg daily for now.  He got down to 8 mg/day and he really started to have a lot of pain and mobility issues, it problems with ADLs.    To help with some of his chronic radicular low back pain, he does have follow-up with spine surgery clinic in a few weeks.  He is currently not too excited about the idea of any type of back surgery but is willing to try proceed with other interventions initially.  If necessary if surgery would be last on his list.  Advised that he follow-up with Dr. Armenta as scheduled or his anesthesia brother who also is able to help perform a number of different treatment options.  We discussed some treatment options currently including some medication options.  Start trial of gabapentin and Cymbalta.  Prescription sent to  pharmacy.  Titrate dosing as tolerated.    Bilateral renal cysts, partially noted on imaging of his low back recently.  He has history of benign cysts, he would like to get these repeat images completed.  Updated renal ultrasound orders placed.    Radicular low back pain, most of the left side.  Has neuroforaminal stenosis and nerve impingement.  Start gabapentin and duloxetine as noted.  Hopefully this will also help with some of his polymyalgia rheumatica pain.    Encouraged weight loss and regular exercise.     Return in about 2 months (around 10/30/2022) for -- follow-up back pain, new start Cymbalta and Gabapentin.    Matt Whitmore MD  [facility]     Subjective   Addreall Refill      History of Present Illness       Reason for visit:  Possible adderall refill  Symptom onset:  More than a month  Symptom intensity:  Mild  Symptom progression:  Improving    He eats 2-3 servings of fruits and vegetables daily.He consumes 1 sweetened beverage(s) daily.He exercises with enough effort to increase his heart rate 9 or less minutes per day.  He exercises with enough effort to increase his heart rate 3 or less days per week.   He is taking medications regularly.     Review of Systems   Constitutional: Positive for fatigue (Prednisone for PMR - down to 9 mg prednisone, was reducing every 2-4 weeks  ). Negative for chills and fever.        + Chronic Fatigue Syndrome since Lyme Disease - in Fall 2016.    HENT: Negative for congestion and hearing loss.    Eyes: Negative for visual disturbance (s/p cataract surgery).   Respiratory: Positive for apnea (uses CPAP nightly). Negative for cough (intermittent, dry), shortness of breath and wheezing.    Cardiovascular: Negative for chest pain and palpitations.   Gastrointestinal: Negative for abdominal pain, diarrhea, nausea and vomiting.   Endocrine: Negative for cold intolerance and heat intolerance.        + Diaphoretic with exertion, little energy  "  Genitourinary: Negative for dysuria and hematuria.        + ED and reduced libido - following with Dr. Schwartz / Urology   Musculoskeletal: Positive for back pain (Hx of Low back pain - SI joint injection was very helpful ). Negative for arthralgias and myalgias (pelvic/shoulder girdle - much improved with Prednisone for PMR - Tapering dose, at 13 mg/day as of 3/17/2022 --> down to 9 mg/day as of 8/30/2022).   Skin: Negative for rash and wound.   Allergic/Immunologic: Negative for immunocompromised state.   Neurological: Negative for dizziness and light-headedness.   Hematological: Does not bruise/bleed easily.   Psychiatric/Behavioral: Positive for sleep disturbance. Negative for agitation and confusion.          Objective    /70 (BP Location: Right arm, Patient Position: Sitting, Cuff Size: Adult Large)   Pulse 75   Temp 97.3  F (36.3  C) (Temporal)   Resp 20   Ht 1.842 m (6' 0.5\")   Wt 135 kg (297 lb 9.6 oz)   SpO2 96%   BMI 39.81 kg/m    Body mass index is 39.81 kg/m .  Physical Exam  Constitutional:       General: He is not in acute distress.     Appearance: He is well-developed. He is obese. He is not diaphoretic.   HENT:      Head: Normocephalic and atraumatic.   Eyes:      General: No scleral icterus.     Conjunctiva/sclera: Conjunctivae normal.   Cardiovascular:      Rate and Rhythm: Normal rate and regular rhythm.      Pulses: Normal pulses.   Pulmonary:      Effort: Pulmonary effort is normal.      Breath sounds: Normal breath sounds.   Abdominal:      Palpations: Abdomen is soft.      Tenderness: There is no abdominal tenderness.   Musculoskeletal:         General: No tenderness or deformity.      Cervical back: Neck supple.      Right lower leg: Edema (trace) present.      Left lower leg: Edema (trace) present.   Lymphadenopathy:      Cervical: No cervical adenopathy.   Skin:     General: Skin is warm and dry.      Findings: No rash.   Neurological:      Mental Status: He is alert and " oriented to person, place, and time. Mental status is at baseline.   Psychiatric:         Mood and Affect: Mood normal.         Behavior: Behavior normal.                  .  ..  Answers for HPI/ROS submitted by the patient on 8/30/2022  If you checked off any problems, how difficult have these problems made it for you to do your work, take care of things at home, or get along with other people?: Somewhat difficult  PHQ9 TOTAL SCORE: 6

## 2022-08-30 NOTE — PATIENT INSTRUCTIONS
1. Bilateral renal cysts  - US Renal Complete; Future    2. Neuroforaminal stenosis of lumbar spine  3. Chronic radicular low back pain  4. Polymyalgia rheumatica (H)    START:   - gabapentin (NEURONTIN) 100 MG capsule; Take 1-2 capsules (100-200 mg) by mouth 4 times daily as needed - for burning/shooting nerve pain  Dispense: 240 capsule; Refill: 1  - DULoxetine (CYMBALTA) 30 MG capsule; Take 1 capsule (30 mg) by mouth At Bedtime for 14 days, THEN 2 capsules (60 mg) At Bedtime for 90 days.  Dispense: 194 capsule; Refill: 0      Return in approximately 2 month(s), or sooner as needed for follow-up with Dr. Whitmore.  -- follow-up back pain, new start Cymbalta and Gabapentin    Clinic : [phone]  Appointment line: [phone]

## 2022-08-30 NOTE — LETTER
[facility]  08/30/22  Patient: Selvin An  YOB: 1955  Medical Record Number: [id number]                                                                                  Non-Opioid Controlled Substance Agreement    This is an agreement between you and your provider regarding safe and appropriate use of controlled substances prescribed by your care team. Controlled substances are?medicines that can cause physical and mental dependence (abuse).     There are strict laws about having and using these medicines. We here at [facility] are  committed to working with you in your efforts to get better. To support you in this work, we'll help you schedule regular office appointments for medicine refills. If we must cancel or change your appointment for any reason, we'll make sure you have enough medicine to last until your next appointment.     As a Provider, I will:     Listen carefully to your concerns while treating you with respect.     Recommend a treatment plan that I believe is in your best interest and may involve therapies other than medicine.      Talk with you often about the possible benefits and the risk of harm of any medicine that we prescribe for you.    Assess the safety of this medicine and check how well it works.      Provide a plan on how to taper (discontinue or go off) using this medicine if the decision is made to stop its use.      ::  As a Patient, I understand controlled substances:       Are prescribed by my care provider to help me function or work and manage my condition(s).?    Are strong medicines and can cause serious side effects.       Need to be taken exactly as prescribed.?Combining controlled substances with certain medicines or chemicals (such as illegal drugs, alcohol, sedatives, sleeping pills, and benzodiazepines) can be dangerous or even fatal.? If I stop taking my medicines suddenly, I may have severe withdrawal symptoms.     The risks,  benefits, and side effects of these medicine(s) were explained to me. I agree that:    1. I will take part in other treatments as advised by my care team. This may be psychiatry or counseling, physical therapy, behavioral therapy, group treatment or a referral to specialist.    2. I will keep all my appointments and understand this is part of the monitoring of controlled substances.?My care team may require an office visit for EVERY controlled substance refill. If I miss appointments or don t follow instructions, my care team may stop my medicine    3. I will take my medicines as prescribed. I will not change the dose or schedule unless my care team tells me to. There will be no refills if I run out early.      4. I may be asked to come to the clinic and complete a urine drug test or complete a pill count. If I don t give a urine sample or participate in a pill count, the care team may stop my medicine.    5. I will only receive controlled substance prescriptions from this clinic. If I am treated by another provider, I will tell them that I am taking controlled substances and that I have a treatment agreement with this provider. I will inform my [facility] care team within one business day if I am given a prescription for any controlled substance by another healthcare provider. My [facility] care team can contact other providers and pharmacists about my use of any medicines.    6. It is up to me to make sure that I don't run out of my medicines on weekends or holidays.?If my care team is willing to refill my prescription without a visit, I must request refills only during office hours. Refills may take up to 3 business days to process. I will use one pharmacy to fill all my controlled substance prescriptions. I will notify the clinic about any changes to my insurance or medicine availability.    7. I am responsible for my prescriptions. If the medicine/prescription is lost, stolen or destroyed, it will  not be replaced.?I also agree not to share controlled substance medicines with anyone.     8. I am aware I should not use any illegal or recreational drugs. I agree not to drink alcohol unless my care team says I can.     9. If I enroll in the [place] Medical Cannabis program, I will tell my care team before my next refill.    10. I will tell my care team right away if I become pregnant, have a new medical problem treated outside of my regular clinic, or have a change in my medicines.     11. I understand that this medicine can affect my thinking, judgment and reaction time.? Alcohol and drugs affect the brain and body, which can affect the safety of my driving. Being under the influence of alcohol or drugs can affect my decision-making, behaviors, personal safety and the safety of others. Driving while impaired (DWI) can occur if a person is driving, operating or in physical control of a car, motorcycle, boat, snowmobile, ATV, motorbike, off-road vehicle or any other motor vehicle ([place] Statute 169A.20). I understand the risk if I choose to drive or operate any vehicle or machinery.    I understand that if I do not follow any of the conditions above, my prescriptions or treatment may be stopped or changed.   I agree that my provider, clinic care team and pharmacy may work with any city, state or federal law enforcement agency that investigates the misuse, sale or other diversion of my controlled medicine. I will allow my provider to discuss my care with, or share a copy of, this agreement with any other treating provider, pharmacy or emergency room where I receive care.     I have read this agreement and have asked questions about anything I did not understand.    ________________________________________________________  Patient Signature - Selvin An     ___________________                   Date     ________________________________________________________  Provider Signature - Matt Whitmore MD        ___________________                   Date     ________________________________________________________  Witness Signature (required if provider not present while patient signing)          ___________________                   Date

## 2022-09-07 ENCOUNTER — TRANSFERRED RECORDS (OUTPATIENT)
Dept: HEALTH INFORMATION MANAGEMENT | Facility: OTHER | Age: 67
End: 2022-09-07

## 2022-09-15 ENCOUNTER — HOSPITAL ENCOUNTER (OUTPATIENT)
Dept: ULTRASOUND IMAGING | Facility: OTHER | Age: 67
Discharge: HOME | End: 2022-09-15
Attending: INTERNAL MEDICINE | Admitting: INTERNAL MEDICINE
Payer: MEDICARE

## 2022-09-15 DIAGNOSIS — N28.1 BILATERAL RENAL CYSTS: ICD-10-CM

## 2022-09-15 PROCEDURE — 76770 US EXAM ABDO BACK WALL COMP: CPT

## 2022-09-21 ENCOUNTER — LAB (OUTPATIENT)
Dept: LAB | Facility: CLINIC | Age: 67
End: 2022-09-21
Payer: MEDICARE

## 2022-09-21 ENCOUNTER — PRE VISIT (OUTPATIENT)
Dept: RHEUMATOLOGY | Facility: CLINIC | Age: 67
End: 2022-09-21

## 2022-09-21 ENCOUNTER — OFFICE VISIT (OUTPATIENT)
Dept: RHEUMATOLOGY | Facility: CLINIC | Age: 67
End: 2022-09-21
Attending: INTERNAL MEDICINE
Payer: MEDICARE

## 2022-09-21 VITALS
BODY MASS INDEX: 40.05 KG/M2 | DIASTOLIC BLOOD PRESSURE: 99 MMHG | SYSTOLIC BLOOD PRESSURE: 159 MMHG | WEIGHT: 295.7 LBS | HEIGHT: 72 IN | HEART RATE: 84 BPM

## 2022-09-21 DIAGNOSIS — M35.3 POLYMYALGIA RHEUMATICA (H): ICD-10-CM

## 2022-09-21 DIAGNOSIS — Z11.59 ENCOUNTER FOR SCREENING FOR OTHER VIRAL DISEASES: ICD-10-CM

## 2022-09-21 DIAGNOSIS — Z79.52 LONG TERM (CURRENT) USE OF SYSTEMIC STEROIDS: ICD-10-CM

## 2022-09-21 DIAGNOSIS — Z11.59 ENCOUNTER FOR SCREENING FOR OTHER VIRAL DISEASES: Primary | ICD-10-CM

## 2022-09-21 LAB
ALBUMIN SERPL BCG-MCNC: 4.5 G/DL (ref 3.5–5.2)
ALP SERPL-CCNC: 57 U/L (ref 40–129)
ALT SERPL W P-5'-P-CCNC: 23 U/L (ref 10–50)
ANION GAP SERPL CALCULATED.3IONS-SCNC: 16 MMOL/L (ref 7–15)
AST SERPL W P-5'-P-CCNC: 32 U/L (ref 10–50)
BASOPHILS # BLD AUTO: 0.1 10E3/UL (ref 0–0.2)
BASOPHILS NFR BLD AUTO: 1 %
BILIRUB SERPL-MCNC: 0.6 MG/DL
BUN SERPL-MCNC: 31.7 MG/DL (ref 8–23)
CALCIUM SERPL-MCNC: 11.2 MG/DL (ref 8.8–10.2)
CHLORIDE SERPL-SCNC: 101 MMOL/L (ref 98–107)
CK SERPL-CCNC: 417 U/L (ref 39–308)
CREAT SERPL-MCNC: 1.31 MG/DL (ref 0.67–1.17)
CRP SERPL-MCNC: 4.87 MG/L
DEPRECATED HCO3 PLAS-SCNC: 23 MMOL/L (ref 22–29)
EOSINOPHIL # BLD AUTO: 0.2 10E3/UL (ref 0–0.7)
EOSINOPHIL NFR BLD AUTO: 2 %
ERYTHROCYTE [DISTWIDTH] IN BLOOD BY AUTOMATED COUNT: 14.6 % (ref 10–15)
ERYTHROCYTE [SEDIMENTATION RATE] IN BLOOD BY WESTERGREN METHOD: 7 MM/HR (ref 0–20)
GFR SERPL CREATININE-BSD FRML MDRD: 59 ML/MIN/1.73M2
GLUCOSE SERPL-MCNC: 101 MG/DL (ref 70–99)
HBV CORE AB SERPL QL IA: NONREACTIVE
HBV SURFACE AG SERPL QL IA: NONREACTIVE
HCT VFR BLD AUTO: 53.5 % (ref 40–53)
HCV AB SERPL QL IA: NONREACTIVE
HGB BLD-MCNC: 18.2 G/DL (ref 13.3–17.7)
HIV 1+2 AB+HIV1 P24 AG SERPL QL IA: NONREACTIVE
IMM GRANULOCYTES # BLD: 0.1 10E3/UL
IMM GRANULOCYTES NFR BLD: 1 %
LYMPHOCYTES # BLD AUTO: 3.5 10E3/UL (ref 0.8–5.3)
LYMPHOCYTES NFR BLD AUTO: 25 %
MCH RBC QN AUTO: 31.4 PG (ref 26.5–33)
MCHC RBC AUTO-ENTMCNC: 34 G/DL (ref 31.5–36.5)
MCV RBC AUTO: 92 FL (ref 78–100)
MONOCYTES # BLD AUTO: 1.5 10E3/UL (ref 0–1.3)
MONOCYTES NFR BLD AUTO: 10 %
NEUTROPHILS # BLD AUTO: 8.5 10E3/UL (ref 1.6–8.3)
NEUTROPHILS NFR BLD AUTO: 61 %
NRBC # BLD AUTO: 0 10E3/UL
NRBC BLD AUTO-RTO: 0 /100
PLATELET # BLD AUTO: 464 10E3/UL (ref 150–450)
POTASSIUM SERPL-SCNC: 3.7 MMOL/L (ref 3.4–5.3)
PROT SERPL-MCNC: 8.3 G/DL (ref 6.4–8.3)
RBC # BLD AUTO: 5.79 10E6/UL (ref 4.4–5.9)
SODIUM SERPL-SCNC: 140 MMOL/L (ref 136–145)
WBC # BLD AUTO: 13.9 10E3/UL (ref 4–11)

## 2022-09-21 PROCEDURE — 86200 CCP ANTIBODY: CPT | Performed by: PATHOLOGY

## 2022-09-21 PROCEDURE — 82595 ASSAY OF CRYOGLOBULIN: CPT | Performed by: PATHOLOGY

## 2022-09-21 PROCEDURE — 87389 HIV-1 AG W/HIV-1&-2 AB AG IA: CPT | Performed by: PATHOLOGY

## 2022-09-21 PROCEDURE — 99000 SPECIMEN HANDLING OFFICE-LAB: CPT | Performed by: PATHOLOGY

## 2022-09-21 PROCEDURE — 82085 ASSAY OF ALDOLASE: CPT | Mod: 90 | Performed by: PATHOLOGY

## 2022-09-21 PROCEDURE — G0463 HOSPITAL OUTPT CLINIC VISIT: HCPCS

## 2022-09-21 PROCEDURE — 85025 COMPLETE CBC W/AUTO DIFF WBC: CPT | Performed by: PATHOLOGY

## 2022-09-21 PROCEDURE — 86481 TB AG RESPONSE T-CELL SUSP: CPT | Performed by: PATHOLOGY

## 2022-09-21 PROCEDURE — 86376 MICROSOMAL ANTIBODY EACH: CPT | Performed by: PATHOLOGY

## 2022-09-21 PROCEDURE — 36415 COLL VENOUS BLD VENIPUNCTURE: CPT | Performed by: PATHOLOGY

## 2022-09-21 PROCEDURE — 86704 HEP B CORE ANTIBODY TOTAL: CPT | Performed by: PATHOLOGY

## 2022-09-21 PROCEDURE — 86431 RHEUMATOID FACTOR QUANT: CPT | Performed by: PATHOLOGY

## 2022-09-21 PROCEDURE — 85652 RBC SED RATE AUTOMATED: CPT | Performed by: PATHOLOGY

## 2022-09-21 PROCEDURE — 86334 IMMUNOFIX E-PHORESIS SERUM: CPT | Performed by: PATHOLOGY

## 2022-09-21 PROCEDURE — 80053 COMPREHEN METABOLIC PANEL: CPT | Performed by: PATHOLOGY

## 2022-09-21 PROCEDURE — 86803 HEPATITIS C AB TEST: CPT | Performed by: PATHOLOGY

## 2022-09-21 PROCEDURE — 82784 ASSAY IGA/IGD/IGG/IGM EACH: CPT | Performed by: PATHOLOGY

## 2022-09-21 PROCEDURE — 82550 ASSAY OF CK (CPK): CPT | Performed by: PATHOLOGY

## 2022-09-21 PROCEDURE — 86140 C-REACTIVE PROTEIN: CPT | Performed by: PATHOLOGY

## 2022-09-21 PROCEDURE — 87340 HEPATITIS B SURFACE AG IA: CPT | Performed by: PATHOLOGY

## 2022-09-21 PROCEDURE — 99204 OFFICE O/P NEW MOD 45 MIN: CPT | Performed by: INTERNAL MEDICINE

## 2022-09-21 RX ORDER — PREDNISONE 5 MG/1
TABLET ORAL
Qty: 120 TABLET | Refills: 0 | Status: SHIPPED | OUTPATIENT
Start: 2022-09-21 | End: 2022-12-01

## 2022-09-21 RX ORDER — PREDNISONE 1 MG/1
TABLET ORAL
Qty: 300 TABLET | Refills: 0 | Status: SHIPPED | OUTPATIENT
Start: 2022-09-21 | End: 2023-01-31

## 2022-09-21 ASSESSMENT — PAIN SCALES - GENERAL: PAINLEVEL: MILD PAIN (3)

## 2022-09-21 NOTE — PROGRESS NOTES
"[facility] Outpatient Rheumatology Consultation  Date of service: September 21, 2022    Patient name: Selvin An  YOB: 1955  MRN: [id number]    Reason for consult: Evaluation and treatment of PMR    HPI: This is a 67 year old male with a past medical history of fibromyalgia, gout on allopurinol (2013), chronic radicular low back pain/SI joint pain, R rotator cuff repair 03/2019, chronic fatigue syndrome (2/2 Lyme disease diagnosed and treated spring 2017 after Bell's palsey), bladder cancer s/p TURBT 2018 for high grade non muscle invasive bladder cancer also s/p BCG following with urology, low testosterone, 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    In fall of 2016, had known tick bite. February 2017 had bell's palsy and treated with doxy for lyme. Around this time started to experience muscular aches and pains as well as extreme fatigue that he dealt with for years. On 06/26/2019 had sudden loss of vision later diagnosed as torn retina- out of concern for GCA, sounds like 60mg prednisone started. Patient remarks feeling \"like I was 9 years old again without any aches or pains,\" after that, treatment with prednisone tapers began. Initially did feel like prednisone was helping, though when he would taper to about 10mg, would start experiencing symptoms again, and would go back to on steroids to retaper. Most recently, started on 20mg about 6 months ago and has tapered down to 9mg per primary's instruction and told to hold there until seeing us. At this point, patient is not sure if prednisone is helping. Describes pain as aches, dull and diffuse. Located in all muscles, no one spot worse than another, not located in any joint. Not better or " worse throughout the day or with activity- just notes he gets more tired throughout the day. Nothing makes it better or worse. No trouble making a fist in the morning. Says fatigue is worse than the pains- will be able to do an activity (chopping wood, mowing lawn) for 5 min, then will need to sit down and rest. States his strength is the same, he just gets tired quickly. Able to get in and out of the car without problem, but thinks he would have a hard time if he was asked to do this many times in a row because of fatigue, not weakness. Denies SOB, CP. States he can easily rase his arms above his head.    Per note from Dr. Whitmore, he did mention that inflammatory markers and AVERY were negative in past, though unable to pull previous labs. Has been on several prednisone tapers and patient tends to notice symptoms around 10mg, has had several increases to dose with similar results when reaches ~10mg. Has not had ultrasound to look for subacromial/subdeltoid bursitis or tendosynovitis of long head of biceps tendon.    Does also sound like patient has chronic radicular low back pain and is set to have surgery soon, though these symptoms preexisted diffuse muscular aches.   On vitamin D, noted to be at sufficient level on 06/08/21.  Surgical hypothyroid 2/2 thyroid cancer and last TSH wnl on 03/12/2021 on synthroid.  B12 at one point was low and has since dima corrected.  Was recommended to try cymbalta by primary for possible fibromyalgia, has tried this in the past. Has not yet started this new prescription as he wanted to see if it was ok with us.  Low testosterone treated with testosterone gel, unclear if primary or secondary cause?  Is hypertensive today though he opted not to take his BP medication this morning because he came fasting for possible labs. Is normally  In 130s/80s at home while on spironolactone-hydrochlorothiazide.     14 point review of systems collected and negative if not documented  above.      Past Medical History:  Past Medical History:   Diagnosis Date     Bell's palsy 3/30/2017     Bilateral carpal tunnel syndrome     No Comments Provided     Essential (primary) hypertension     No Comments Provided     Gout 6/3/2013     Hyperlipidemia     No Comments Provided     Hypothyroidism     No Comments Provided     Malignant neoplasm of thyroid gland (H)     1994     Sleep apnea     No Comments Provided       Past Surgical History:  Past Surgical History:   Procedure Laterality Date     COLONOSCOPY  09/09/2014 9/2014,Normal - follow up 10 years     CYSTOSCOPY      bladder cancer     OTHER SURGICAL HISTORY      ,SPLENECTOMY     OTHER SURGICAL HISTORY      2010,59669,EYE MUSCLE SURGERY,Left,strabismus     OTHER SURGICAL HISTORY      10/06/14,[id number],OTHER,Left,Dr. Lana LEHMAN     OTHER SURGICAL HISTORY      10/14/14,[id number],OTHER,Left,Dr. Lana LEHMAN     OTHER SURGICAL HISTORY      11/16/2017,[id number],ENDOSCOPIC RELEASE TRANSVERSE CARPAL LIGAMENT OF HAND,Left     RELEASE CARPAL TUNNEL      12/2011     RETINAL DETACHMENT SURGERY Right      STRABISMUS SURGERY Left      THYROIDECTOMY      1994,thyroid ca     TONSILLECTOMY      2004,T & A > 13yo     VASECTOMY      No Comments Provided       Medications:  Current Outpatient Medications   Medication     allopurinol (ZYLOPRIM) 300 MG tablet     B Complex-C (VITAMIN B COMPLEX W/VITAMIN C) TABS tablet     Cholecalciferol (VITAMIN D-3) 125 MCG (5000 UT) TABS     Cyanocobalamin 2500 MCG TABS     DULoxetine (CYMBALTA) 30 MG capsule     fenofibrate (TRICOR) 145 MG tablet     gabapentin (NEURONTIN) 100 MG capsule     levothyroxine (SYNTHROID/LEVOTHROID) 200 MCG tablet     loratadine (CLARITIN) 10 MG tablet     magnesium 250 MG tablet     Melatonin 10 MG TABS tablet     Multiple Vitamins-Minerals (CENTRUM ADULTS PO)     order for DME     predniSONE (DELTASONE) 5 MG tablet     Probiotic Product (PROBIOTIC-10 PO)     sildenafil (VIAGRA) 100 MG tablet      spironolactone-HCTZ (ALDACTAZIDE) 25-25 MG tablet     tamsulosin (FLOMAX) 0.4 MG capsule     testosterone (ANDROGEL 1.62% PUMP) 20.25 MG/ACT gel     No current facility-administered medications for this visit.       Allergies:  Allergies   Allergen Reactions     Losartan Cough     Omeprazole Diarrhea     Pantoprazole Diarrhea     Valsartan Cough     Started with losartan, worsened with high dose valsartan       Family history:  No family history of autoimmune disease    Social History: , 2 kids,   ETOH: never  Smoking: never smoker  Drug use: none  Occupation: marketing, [employer], property management on [employer]    Objective:  BP (!) 159/99   Pulse 84   Ht 1.829 m (6')   Wt 134.1 kg (295 lb 11.2 oz)   BMI 40.10 kg/m    GEN: sitting up unassisted, NAD  HEENT: No facial rash, sclera clear, no oral or nasal ulcers, no inflammatory nasal bridge/external ear changes, good saliva pool  CV: RRR, no m/r/g  Pulm: CTAB no crackles wheezing ronchi  Abdomen: soft, non tender, not distended, no masses  Extremities: Full active and passive ROM of bilateral shoulders, elbows, wrists, MCPs, PIPs, DIPs, hips, knees, ankles. Able to make a full fist with good strength bilaterally. No synovitis of any joints. No dactylitis. No digital pitting. No nail changes. No sclerodactyly.  Skin: No acute cutaneous changes     WBC   Date Value Ref Range Status   03/12/2021 11.0 4.0 - 11.0 10e9/L Final     WBC Count   Date Value Ref Range Status   03/02/2022 13.3 (H) 4.0 - 11.0 10e3/uL Final     Hemoglobin   Date Value Ref Range Status   03/02/2022 16.6 13.3 - 17.7 g/dL Final   03/12/2021 16.7 13.3 - 17.7 g/dL Final     Platelet Count   Date Value Ref Range Status   03/02/2022 495 (H) 150 - 450 10e3/uL Final   03/12/2021 424 150 - 450 10e9/L Final     Creatinine   Date Value Ref Range Status   03/02/2022 1.51 (H) 0.70 - 1.30 mg/dL Final   06/08/2021 1.65 (H) 0.70 - 1.30 mg/dL Final     Lab Results   Component Value Date    ALKPHOS  39 12/22/2021    ALKPHOS 65 03/12/2021     AST   Date Value Ref Range Status   12/22/2021 23 13 - 39 U/L Final   03/12/2021 26 13 - 39 U/L Final     Lab Results   Component Value Date    ALT 25 12/22/2021    ALT 26 03/12/2021     Sed Rate   Date Value Ref Range Status   09/19/2017 6 <21 mm/hr      No results found for: CRP  UA RESULTS:  Recent Labs   Lab Test 09/10/17  2103   COLOR Yellow   URINEGLC Negative   URINEKETONE Trace*   SG 1.025   NITRITE Negative   LEUKEST Negative      No results found for: ANAIGG, ANAP1, DEVON, DNA, ENASMI, RNPIGG, ENASSA, ENASSB, C3COM, C4COM, CKTOTAL, ALDOLASE, RHF, CCPIGG, ANCA, MPOIGG, PR3IGG    A/P: 67 year old male is referred to rheumatology for evaluation and treatment of polymyalgia rheumatica. This is a somewhat cloudy picture that moving forward additional objective data (labs and, if still unclear, potentially imaging, may be necessary to tease out the underlying  of his symptoms). At this time I suspect that his PMR is currently quiet given 1. His exam 2. His current steroid dose 3. Normal/negative ESR and CRP drawn today.     Current prednisone dose is 9mg. Will continue for a total of 30 days on this dose then decrease by 1mg every 30 days. During taper, if he starts to experience symptoms consistent with prior diagnosis of PMR advised that our plan will be to repeat inflammatory markers to assess again for inflammation. Did discuss that my specialty is inflammatory diseases and thus if these labs are unremarkable, may need to consider other etiologies for non specific symptoms with steroid taper- adrenal insufficiency etc. I did consider that as combination of surgical hypothyroid with normal TSH on synthroid, low testosterone of unclear etiology, did have large GI bleed (question of jaimie's), also long term course of steroids with no labs for ACTH or cortisol, stage 3a CKD with normal sodium and potassium- overall a complex picture that obscures possible  hypopituitarism vs steroid included adrenal insuffiencey.    PLAN  1) Current prednisone dose is: 9mg  2) Will plan to decrease by 1mg per month, decreasing down to 8mg starting tomorrow  3) Will plan to redraw labs when patient experiences increase in symptoms during taper to assess for inflammatory origin  4) If determined inflammatory in origin and unable to taper well on steroids, will add on either HCQ or methotrexate  5) if not inflammatory will need to consider other etiologies of his non specific symptoms at that time  6) ESR, CRP, CBC, CMP, Hep B, C, HIV, quant gold today  7) If ongoing question, will consider US of bilateral upper extremities to eval for evidence of bilateral subacromial/subdeltoid bursitis and tenosynovitis of the long head of the biceps, especially if bilateral, can be supportive of the diagnosis of PMR   8) with list of many medications, will have patient do virtual visit with clinical pharm to assess for possible polypharmacy/side effects contributing to symptoms as well  9) follow-up in 4 months time. Will repeat labs again at that time or sooner with return of symptoms.     Patient was seen with attending, Dr Acosta. I acted as a scribe during this encounter.  Ita Mercado, MS4    Staff Addendum:  This patient was interviewed and examined in the presence of the medical student who was acting as a scribe, and this note personally edited by me reflects our mutual impression. I personally performed the history and physical exam. I personally reviewed available lab and imaging studies.      I spent a total of 45 minutes on the date of service on chart review, patient encounter, , documentation.     Phill Acosta MD  Rheumatology

## 2022-09-21 NOTE — LETTER
"9/21/2022       RE: Selvin An  [address]  [facility] [address]     Dear Colleague,    Thank you for referring your patient, Selvin An, to the [facility] [place] at [facility]. Please see a copy of my visit note below.    [facility] Outpatient Rheumatology Consultation  Date of service: September 21, 2022    Patient name: Selvin An  YOB: 1955  MRN: [id number]    Reason for consult: Evaluation and treatment of PMR    HPI: This is a 67 year old male with a past medical history of fibromyalgia, gout on allopurinol (2013), chronic radicular low back pain/SI joint pain, R rotator cuff repair 03/2019, chronic fatigue syndrome (2/2 Lyme disease diagnosed and treated spring 2017 after Bell's palsey), bladder cancer s/p TURBT 2018 for high grade non muscle invasive bladder cancer also s/p BCG following with urology, low testosterone, 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    In fall of 2016, had known tick bite. February 2017 had bell's palsy and treated with doxy for lyme. Around this time started to experience muscular aches and pains as well as extreme fatigue that he dealt with for years. On 06/26/2019 had sudden loss of vision later diagnosed as torn retina- out of concern for GCA, sounds like 60mg prednisone started. Patient remarks feeling \"like I was 9 years old again without any aches or pains,\" after that, treatment with prednisone tapers began. Initially did feel like prednisone was helping, though when he would taper to about 10mg, would start experiencing symptoms again, and would go back to on steroids to retaper. Most recently, started on 20mg about 6 " months ago and has tapered down to 9mg per primary's instruction and told to hold there until seeing us. At this point, patient is not sure if prednisone is helping. Describes pain as aches, dull and diffuse. Located in all muscles, no one spot worse than another, not located in any joint. Not better or worse throughout the day or with activity- just notes he gets more tired throughout the day. Nothing makes it better or worse. No trouble making a fist in the morning. Says fatigue is worse than the pains- will be able to do an activity (chopping wood, mowing lawn) for 5 min, then will need to sit down and rest. States his strength is the same, he just gets tired quickly. Able to get in and out of the car without problem, but thinks he would have a hard time if he was asked to do this many times in a row because of fatigue, not weakness. Denies SOB, CP. States he can easily rase his arms above his head.    Per note from Dr. Whitmore, he did mention that inflammatory markers and AVERY were negative in past, though unable to pull previous labs. Has been on several prednisone tapers and patient tends to notice symptoms around 10mg, has had several increases to dose with similar results when reaches ~10mg. Has not had ultrasound to look for subacromial/subdeltoid bursitis or tendosynovitis of long head of biceps tendon.    Does also sound like patient has chronic radicular low back pain and is set to have surgery soon, though these symptoms preexisted diffuse muscular aches.   On vitamin D, noted to be at sufficient level on 06/08/21.  Surgical hypothyroid 2/2 thyroid cancer and last TSH wnl on 03/12/2021 on synthroid.  B12 at one point was low and has since dima corrected.  Was recommended to try cymbalta by primary for possible fibromyalgia, has tried this in the past. Has not yet started this new prescription as he wanted to see if it was ok with us.  Low testosterone treated with testosterone gel, unclear if primary or  secondary cause?  Is hypertensive today though he opted not to take his BP medication this morning because he came fasting for possible labs. Is normally  In 130s/80s at home while on spironolactone-hydrochlorothiazide.     14 point review of systems collected and negative if not documented above.      Past Medical History:  Past Medical History:   Diagnosis Date     Bell's palsy 3/30/2017     Bilateral carpal tunnel syndrome     No Comments Provided     Essential (primary) hypertension     No Comments Provided     Gout 6/3/2013     Hyperlipidemia     No Comments Provided     Hypothyroidism     No Comments Provided     Malignant neoplasm of thyroid gland (H)     1994     Sleep apnea     No Comments Provided       Past Surgical History:  Past Surgical History:   Procedure Laterality Date     COLONOSCOPY  09/09/2014 9/2014,Normal - follow up 10 years     CYSTOSCOPY      bladder cancer     OTHER SURGICAL HISTORY      ,SPLENECTOMY     OTHER SURGICAL HISTORY      2010,08077,EYE MUSCLE SURGERY,Left,strabismus     OTHER SURGICAL HISTORY      10/06/14,[id number],OTHER,Left,Dr. Lana LEHMAN     OTHER SURGICAL HISTORY      10/14/14,[id number],OTHER,Left,Dr. Lana LEHMAN     OTHER SURGICAL HISTORY      11/16/2017,[id number],ENDOSCOPIC RELEASE TRANSVERSE CARPAL LIGAMENT OF HAND,Left     RELEASE CARPAL TUNNEL      12/2011     RETINAL DETACHMENT SURGERY Right      STRABISMUS SURGERY Left      THYROIDECTOMY      1994,thyroid ca     TONSILLECTOMY      2004,T & A > 13yo     VASECTOMY      No Comments Provided       Medications:  Current Outpatient Medications   Medication     allopurinol (ZYLOPRIM) 300 MG tablet     B Complex-C (VITAMIN B COMPLEX W/VITAMIN C) TABS tablet     Cholecalciferol (VITAMIN D-3) 125 MCG (5000 UT) TABS     Cyanocobalamin 2500 MCG TABS     DULoxetine (CYMBALTA) 30 MG capsule     fenofibrate (TRICOR) 145 MG tablet     gabapentin (NEURONTIN) 100 MG capsule     levothyroxine (SYNTHROID/LEVOTHROID) 200 MCG tablet      loratadine (CLARITIN) 10 MG tablet     magnesium 250 MG tablet     Melatonin 10 MG TABS tablet     Multiple Vitamins-Minerals (CENTRUM ADULTS PO)     order for DME     predniSONE (DELTASONE) 5 MG tablet     Probiotic Product (PROBIOTIC-10 PO)     sildenafil (VIAGRA) 100 MG tablet     spironolactone-HCTZ (ALDACTAZIDE) 25-25 MG tablet     tamsulosin (FLOMAX) 0.4 MG capsule     testosterone (ANDROGEL 1.62% PUMP) 20.25 MG/ACT gel     No current facility-administered medications for this visit.       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 Able to make a full fist with good strength bilaterally. No synovitis of any joints. No dactylitis. No digital pitting. No nail changes. No sclerodactyly.  Skin: No acute cutaneous changes     WBC   Date Value Ref Range Status   03/12/2021 11.0 4.0 - 11.0 10e9/L Final     WBC Count   Date Value Ref Range Status   03/02/2022 13.3 (H) 4.0 - 11.0 10e3/uL Final     Hemoglobin   Date Value Ref Range Status   03/02/2022 16.6 13.3 - 17.7 g/dL Final   03/12/2021 16.7 13.3 - 17.7 g/dL Final     Platelet Count   Date Value Ref  Range Status   03/02/2022 495 (H) 150 - 450 10e3/uL Final   03/12/2021 424 150 - 450 10e9/L Final     Creatinine   Date Value Ref Range Status   03/02/2022 1.51 (H) 0.70 - 1.30 mg/dL Final   06/08/2021 1.65 (H) 0.70 - 1.30 mg/dL Final     Lab Results   Component Value Date    ALKPHOS 39 12/22/2021    ALKPHOS 65 03/12/2021     AST   Date Value Ref Range Status   12/22/2021 23 13 - 39 U/L Final   03/12/2021 26 13 - 39 U/L Final     Lab Results   Component Value Date    ALT 25 12/22/2021    ALT 26 03/12/2021     Sed Rate   Date Value Ref Range Status   09/19/2017 6 <21 mm/hr      No results found for: CRP  UA RESULTS:  Recent Labs   Lab Test 09/10/17  2103   COLOR Yellow   URINEGLC Negative   URINEKETONE Trace*   SG 1.025   NITRITE Negative   LEUKEST Negative      No results found for: ANAIGG, ANAP1, DEVON, DNA, ENASMI, RNPIGG, ENASSA, ENASSB, C3COM, C4COM, CKTOTAL, ALDOLASE, RHF, CCPIGG, ANCA, MPOIGG, PR3IGG    A/P: 67 year old male is referred to rheumatology for evaluation and treatment of polymyalgia rheumatica. This is a somewhat cloudy picture that moving forward additional objective data (labs and, if still unclear, potentially imaging, may be necessary to tease out the underlying  of his symptoms). At this time I suspect that his PMR is currently quiet given 1. His exam 2. His current steroid dose 3. Normal/negative ESR and CRP drawn today.     Current prednisone dose is 9mg. Will continue for a total of 30 days on this dose then decrease by 1mg every 30 days. During taper, if he starts to experience symptoms consistent with prior diagnosis of PMR advised that our plan will be to repeat inflammatory markers to assess again for inflammation. Did discuss that my specialty is inflammatory diseases and thus if these labs are unremarkable, may need to consider other etiologies for non specific symptoms with steroid taper- adrenal insufficiency etc. I did consider that as combination of surgical hypothyroid with  normal TSH on synthroid, low testosterone of unclear etiology, did have large GI bleed (question of jiamie's), also long term course of steroids with no labs for ACTH or cortisol, stage 3a CKD with normal sodium and potassium- overall a complex picture that obscures possible hypopituitarism vs steroid included adrenal insuffiencey.    PLAN  1) Current prednisone dose is: 9mg  2) Will plan to decrease by 1mg per month, decreasing down to 8mg starting tomorrow  3) Will plan to redraw labs when patient experiences increase in symptoms during taper to assess for inflammatory origin  4) If determined inflammatory in origin and unable to taper well on steroids, will add on either HCQ or methotrexate  5) if not inflammatory will need to consider other etiologies of his non specific symptoms at that time  6) ESR, CRP, CBC, CMP, Hep B, C, HIV, quant gold today  7) If ongoing question, will consider US of bilateral upper extremities to eval for evidence of bilateral subacromial/subdeltoid bursitis and tenosynovitis of the long head of the biceps, especially if bilateral, can be supportive of the diagnosis of PMR   8) with list of many medications, will have patient do virtual visit with clinical pharm to assess for possible polypharmacy/side effects contributing to symptoms as well  9) follow-up in 4 months time. Will repeat labs again at that time or sooner with return of symptoms.     Patient was seen with attending, Dr Acosta. I acted as a scribe during this encounter.  Ita Mercado, MS4    Staff Addendum:  This patient was interviewed and examined in the presence of the medical student who was acting as a scribe, and this note personally edited by me reflects our mutual impression. I personally performed the history and physical exam. I personally reviewed available lab and imaging studies.      I spent a total of 45 minutes on the date of service on chart review, patient encounter, , documentation.      Phill Acosta MD  Rheumatology

## 2022-09-21 NOTE — NURSING NOTE
Chief Complaint   Patient presents with     Consult     Establish care with polymyalgia rheumatica     BP (!) 159/99   Pulse 84   Ht 1.829 m (6')   Wt 134.1 kg (295 lb 11.2 oz)   BMI 40.10 kg/m    Elza Escobedo CMA on 9/21/2022 at 8:57 AM

## 2022-09-21 NOTE — PATIENT INSTRUCTIONS
1) labs down stairs  2) follow-up in 4 months  3) Let me know if symptoms worsen as prednisone decreases.    Prednisone plan: starting tomorrow, decrease by 1mg every 30 days. Starting 8mg dose tomorrow.    Phill Acosta MD  Rheumatology

## 2022-09-22 LAB
CCP AB SER IA-ACNC: 1.3 U/ML
GAMMA INTERFERON BACKGROUND BLD IA-ACNC: 0.07 IU/ML
IGA SERPL-MCNC: 486 MG/DL (ref 84–499)
IGG SERPL-MCNC: 1183 MG/DL (ref 610–1616)
IGM SERPL-MCNC: 34 MG/DL (ref 35–242)
M TB IFN-G BLD-IMP: NEGATIVE
M TB IFN-G CD4+ BCKGRND COR BLD-ACNC: 9.93 IU/ML
MITOGEN IGNF BCKGRD COR BLD-ACNC: -0.01 IU/ML
MITOGEN IGNF BCKGRD COR BLD-ACNC: 0 IU/ML
PROT PATTERN SERPL IFE-IMP: NORMAL
QUANTIFERON MITOGEN: 10 IU/ML
QUANTIFERON NIL TUBE: 0.07 IU/ML
QUANTIFERON TB1 TUBE: 0.07 IU/ML
QUANTIFERON TB2 TUBE: 0.06
RHEUMATOID FACT SER NEPH-ACNC: <6 IU/ML
THYROPEROXIDASE AB SERPL-ACNC: 46 IU/ML

## 2022-09-23 ENCOUNTER — VIRTUAL VISIT (OUTPATIENT)
Dept: PHARMACY | Facility: CLINIC | Age: 67
End: 2022-09-23
Attending: INTERNAL MEDICINE
Payer: COMMERCIAL

## 2022-09-23 DIAGNOSIS — E89.0 POSTOPERATIVE HYPOTHYROIDISM: ICD-10-CM

## 2022-09-23 DIAGNOSIS — G93.32 CHRONIC FATIGUE SYNDROME WITH FIBROMYALGIA: ICD-10-CM

## 2022-09-23 DIAGNOSIS — I10 BENIGN ESSENTIAL HYPERTENSION: ICD-10-CM

## 2022-09-23 DIAGNOSIS — G44.209 TENSION-TYPE HEADACHE, NOT INTRACTABLE, UNSPECIFIED CHRONICITY PATTERN: ICD-10-CM

## 2022-09-23 DIAGNOSIS — M79.10 MUSCLE PAIN: ICD-10-CM

## 2022-09-23 DIAGNOSIS — Z78.9 TAKES DIETARY SUPPLEMENTS: ICD-10-CM

## 2022-09-23 DIAGNOSIS — R60.9 EDEMA, UNSPECIFIED TYPE: ICD-10-CM

## 2022-09-23 DIAGNOSIS — J30.2 SEASONAL ALLERGIC RHINITIS, UNSPECIFIED TRIGGER: ICD-10-CM

## 2022-09-23 DIAGNOSIS — E78.1 HYPERTRIGLYCERIDEMIA: ICD-10-CM

## 2022-09-23 DIAGNOSIS — R79.89 LOW TESTOSTERONE LEVEL IN MALE: ICD-10-CM

## 2022-09-23 DIAGNOSIS — M79.7 CHRONIC FATIGUE SYNDROME WITH FIBROMYALGIA: ICD-10-CM

## 2022-09-23 DIAGNOSIS — M10.9 GOUT, UNSPECIFIED CAUSE, UNSPECIFIED CHRONICITY, UNSPECIFIED SITE: ICD-10-CM

## 2022-09-23 DIAGNOSIS — N40.0 BENIGN PROSTATIC HYPERPLASIA, UNSPECIFIED WHETHER LOWER URINARY TRACT SYMPTOMS PRESENT: ICD-10-CM

## 2022-09-23 DIAGNOSIS — M79.7 FIBROMYALGIA: ICD-10-CM

## 2022-09-23 DIAGNOSIS — M35.3 POLYMYALGIA RHEUMATICA (H): Primary | ICD-10-CM

## 2022-09-23 DIAGNOSIS — G47.00 INSOMNIA, UNSPECIFIED TYPE: ICD-10-CM

## 2022-09-23 LAB — ALDOLASE SERPL-CCNC: 4.1 U/L

## 2022-09-23 PROCEDURE — 99605 MTMS BY PHARM NP 15 MIN: CPT

## 2022-09-23 PROCEDURE — 99607 MTMS BY PHARM ADDL 15 MIN: CPT

## 2022-09-23 RX ORDER — ICOSAPENT ETHYL 1 G/1
2 CAPSULE ORAL 2 TIMES DAILY
Qty: 120 CAPSULE | Refills: 11 | Status: SHIPPED | OUTPATIENT
Start: 2022-09-23 | End: 2022-11-04

## 2022-09-23 NOTE — PROGRESS NOTES
Medication Therapy Management (MTM) Encounter    ASSESSMENT:                            Medication Adherence/Access: No issues identified    Gout: Stable.    Polymyalgia rheumatica/Fibromyalgia/Muscle Pains: Provided education on duloxetine today including dosing, general administration, side effects (both common/serious), precautions, monitoring and time to efficacy. Recommended patient start duloxetine 30 mg x 14 days then 60 mg nightly as prescribed to help with ongoing nerve pain. Per [employer], medications that may contribute to muscle pains include: fenofibrate (myalgia (?3%)), allopurinol (rare), spironolactone (lower limb cramp), testosterone (musculoskeletal pain (2%)), and loratadine (myalgia (<2%)). Based on muscle pain worsening approximately a year ago fenofibrate may be contributing. See hyperlipidemia for recommendation.    Chronic fatigue: Per [employer], medications that may be contributing to fatigue include: gabapentin (11%), levothyroxine, and testosterone. Could consider reducing dose of gabapentin, however patient would prefer to wait until he starts duloxetine before considering reducing dose. See hypogonadism for testosterone.     Hyperlipidemia: Fenofibrate may be worsening muscle pains. Due to triglycerides being >500 mg/dL at diagnosis, would benefit from switching to a marine omega-3 like Vascepa that does not carry a side effect of muscle pain.    Hypertension/Edema: Unclear if elevated blood pressure readings are occurring at home or just at healthcare settings. Would benefit from taking blood pressure at home 1-2 times weekly until MTM follow up to ensure blood pressure medications are effective.    Hypothyroidism: Symptoms of hypo and hyperthyroidism likely coming from other conditions. Would benefit from repeat TSH and T4 to ensure dose is still effective.    BPH: Educated on mechanism, dosing, time to efficacy, and side effects (common and severe). Recommended patient start tamsulosin  0.4 mg daily to determine if increased urination is coming only from diuretics or if BPH symptoms are worsening urination symptoms. Patient would like to consider education information and possibly talk to other healthcare providers before starting.    Hypogonadism: Discussed several of patient's symptoms (muscle pain, fatigue, headaches) are all side effects of testosterone. Briefly gave overview of testosterone's effects. Would benefit from more in depth conversation with urologist to determine if testosterone is still providing expected benefit or if dose adjustment may be needed due to current symptoms/possible side effects.    Insomnia: Patient is still having insomnia symptoms with current medication. Would benefit from discontinuing melatonin due to ineffectiveness. Prednisone likely worsening insomnia symptoms currently. Per package information, duloxetine can cause drowsiness as a side effect and may aid in insomnia treatment when taken at night. Would benefit from taking 30-60 mg of duloxetine nightly as prescribed to determine if it causes drowsiness. If duloxetine is not effective, would benefit from starting diphenhydramine 12.5 mg nightly while on prednisone for sleep.    Chronic Headaches: Since headaches started fairly recently unlikely to be associated with any medication except prednisone. Headaches may resolve as prednisone dose decreases. If symptoms continue, would benefit from being evaluated by PCP or neurologist.    Allergies: Stable.    Supplements: Stable.      PLAN:                            1. I will talk to Dr. Whitmore about switching to Vascepa instead of fenofibrate to see if it improves your muscle pain.    2. Start duloxetine 30 mg daily x 2 weeks then 60 mg daily. This prescription was already sent into the pharmacy for you.     3. We discussed how tamsulosin works and the potential side effects. If you want to start this the prescription is at the pharmacy for you.    4. Discuss  your Testosterone with your urologist to see if it could be contributing to side effects or is improving your symptoms as expected.    5. Stop the melatonin. Try taking the duloxetine at night to see if it helps with sleep. If it does not you can take Benadryl 12.5 (half tablet) - 25 mg nightly to help with sleep while you are on the prednisone.    6. Take your blood pressure 1-2 times a week at home to see if your readings are elevated all the time or just at the clinic.    Follow-up: Return in about 4 weeks (around 10/21/2022) for MTM Pharmacist Visit.    SUBJECTIVE/OBJECTIVE:                          Selvin An is a 67 year old male called for an initial visit. He was referred to me from Dr. Acosta. Patient was accompanied by his wife today.     Reason for visit: Concerned medications may be contributing to fatigue/muscle aches    Allergies/ADRs: Reviewed in chart  Past Medical History: Reviewed in chart  Tobacco: He reports that he has never smoked. He has never used smokeless tobacco.  Alcohol: not currently using    Medication Adherence/Access:   Medication barriers: none.   The patient fills medications at [place]: NO, fills medications at [facility] in [place].    Gout: Currently taking allopurinol 300 mg daily. Patient reports no gout related pain concerns. Patient is experiencing the following medication side effects: none. Notes that his gout has not been an issue since he started taking this medication regularly.   Uric Acid   Date Value Ref Range Status   06/13/2013 6.2 3.5 - 7.2 mg/dL      Polymyalgia rheumatica/Fibromyalgia/Muscle Pains: Currently taking gabapentin 200 mg four times daily as needed (takes 200 mg three times daily), prednisone taper (currently on 8 mg daily - 8mg daily x30 days then 7mg daily x30 days then 6mg daily x30 days then 5mg daily x30 days). Was prescribed duloxetine 60 mg daily but never started due to concerns about possible side effects and not being  able to stop the medication if he started it. Reports he has flu like muscle pain all over his body and some pains that shoot and burn.     Chronic fatigue: Reports he has difficulty completing tasks throughout the entire day without feeling exhausted and needing a break. States he can work (chopping wood, yard work) for 5 minutes and then need a break. Reports this worsened about a year ago. Does note he had a tick bite in 2016 and Lyme disease that was treated with doxycycline in 2017 when the fatigue began.    Hyperlipidemia: Current therapy includes Fenofibrate 145 mg once daily.  Patient reports myalgias since on medication. Unsure if the muscle pains are from the medication but did seem to worsen about a year ago around when fenofibrate was started.  The 10-year ASCVD risk score (Gael DARNELL Jr., et al., 2013) is: 24.5%    Values used to calculate the score:      Age: 67 years      Sex: Male      Is Non- : No      Diabetic: No      Tobacco smoker: No      Systolic Blood Pressure: 158 mmHg      Is BP treated: No      HDL Cholesterol: 43 mg/dL      Total Cholesterol: 214 mg/dL  Recent Labs   Lab Test 09/15/21  0824 08/04/21  1523   CHOL 214* 268*   HDL 43 35   *  --    TRIG 140 540*     Hypertension/Edema: Current meds include spironolactone/hctz 25/25 mg twice daily. Side effects noted by patient include excessive urination and minor swelling in ankles. Does have a home BP cuff but rarely uses. Feels that his blood pressure readings are typically higher at the doctor's office.    Potassium   Date Value Ref Range Status   09/21/2022 3.7 3.4 - 5.3 mmol/L Final   03/02/2022 3.6 3.5 - 5.1 mmol/L Final   06/08/2021 3.9 3.5 - 5.1 mmol/L Final     BP Readings from Last 3 Encounters:   09/25/22 (!) 158/92   09/21/22 (!) 159/99   08/30/22 139/70     Hypothyroidism: Patient is taking levothyroxine 200 mcg daily. Patient is having the following symptoms: hypothyroidism -  myalgias, muscle  cramps, weakness and fatigue and hyperthyroidism -  insomnia.   TSH   Date Value Ref Range Status   12/22/2021 1.20 0.40 - 4.00 mU/L Final     BPH: Prescribed tamsulosin 0.4 mg nightly but has not been taking due to concerns about side effects. Feels the reason he urinates a lot is his blood pressure pill and is not sure that this will make it any better.    Hypogonadism: Currently taking testosterone 1.62% pump - 2 pumps daily. Feels this is working for his symptoms but is not really sure what he should be noticing to determine if it is effective or not. Unsure if any of his current symptoms may be side effects from this medication.    Insomnia: Current medications include: melatonin 40 mg daily. Patient reports trouble staying asleep and trouble falling asleep. Does not feel that the melatonin is helping at all. Reports he is fatigued all day long but when he goes to bed he can't sleep. Has gotten worse since being on prednisone.    Chronic Headaches: Reports he has headaches that feel like something is squeezing his head that have worsened over the last month. Unsure what could be causing these or if his medications could be contributing.    Allergies: Currently taking loratadine 10 mg daily as needed. No side effects or concerns.    Supplements: Currently taking Vitamin B/C complex daily, Vitamin D 5000 units daily, Vitamin B12 2500 mcg daily, magnesium 250 mg daily, multivitamin daily, probiotic daily. No reported issues at this time.    Today's Vitals: There were no vitals taken for this visit.  ----------------    I spent 58 minutes with this patient today. All changes were made via verbal approval with Phill Acosta MD and Matt Whitmore MD. A copy of the visit note was provided to the patient's provider(s).    The patient was sent via [employer] a summary of these recommendations.     Janet Quiles, Kalyan  Medication Therapy Management Pharmacist  [facility]  Phone:  [phone]    Telemedicine Visit Details  Type of service:  Telephone visit  Start Time: 10:00 AM  End Time: 10:58 AM  Originating Location (patient location): Home  Distant Location (provider location):  [facility]     Medication Therapy Recommendations  Benign prostatic hyperplasia, unspecified whether lower urinary tract symptoms present    Current Medication: tamsulosin (FLOMAX) 0.4 MG capsule   Rationale: Does not understand instructions - Adherence - Adherence   Recommendation: Provide Education   Status: Patient Agreed - Adherence/Education         Fibromyalgia    Current Medication: DULoxetine (CYMBALTA) 30 MG capsule   Rationale: Does not understand instructions - Adherence - Adherence   Recommendation: Provide Education   Status: Patient Agreed - Adherence/Education          Current Medication: gabapentin (NEURONTIN) 100 MG capsule   Rationale: Dose too high - Dosage too high - Safety   Recommendation: Decrease Frequency - gabapentin 100 MG capsule - 200 mg twice daily   Status: Declined per Patient         Hypertriglyceridemia    Current Medication: fenofibrate (TRICOR) 145 MG tablet (Discontinued)   Rationale: Undesirable effect - Adverse medication event - Safety   Recommendation: Change Medication - Vascepa 1 g Caps - 2 g twice daily   Status: Accepted per Provider         Insomnia, unspecified type    Current Medication: Melatonin 10 MG TABS tablet   Rationale: More effective medication available - Ineffective medication - Effectiveness   Recommendation: Discontinue Medication   Status: Accepted - no CPA Needed

## 2022-09-23 NOTE — PATIENT INSTRUCTIONS
"Recommendations from today's MTM visit:                                                    MTM (medication therapy management) is a service provided by a clinical pharmacist designed to help you get the most of out of your medicines.   Today we reviewed what your medicines are for, how to know if they are working, that your medicines are safe and how to make your medicine regimen as easy as possible.      1. I will talk to Dr. Whitmore about switching to Vascepa instead of fenofibrate to see if it improves your muscle pain.    2. Start duloxetine 30 mg daily x 2 weeks then 60 mg daily. This prescription was already sent into the pharmacy for you.     3. We discussed how tamsulosin works and the potential side effects. If you want to start this the prescription is at the pharmacy for you.    4. Discuss your Testosterone with your urologist to see if it could be contributing to side effects or is improving your symptoms as expected.    5. Stop the melatonin. Try taking the duloxetine at night to see if it helps with sleep. If it does not you can take Benadryl 12.5 (half tablet) - 25 mg nightly to help with sleep while you are on the prednisone.    6. Take your blood pressure 1-2 times a week at home to see if your readings are elevated all the time or just at the clinic.    Follow-up: Return in about 4 weeks (around 10/21/2022) for MTM Pharmacist Visit.    It was great speaking with you today.  I value your experience and would be very thankful for your time in providing feedback in our clinic survey. In the next few days, you may receive an email or text message from [employer] with a link to a survey related to your  clinical pharmacist.\"     To schedule another MTM appointment, please call the clinic directly or you may call the MTM scheduling line at [phone] or toll-free at [phone].     My Clinical Pharmacist's contact information:                                                      Please feel free to " contact me with any questions or concerns you have.      Janet Quiles, PharmD  Medication Therapy Management Pharmacist  [facility]  Phone: [phone]

## 2022-09-25 ENCOUNTER — OFFICE VISIT (OUTPATIENT)
Dept: FAMILY MEDICINE | Facility: OTHER | Age: 67
End: 2022-09-25
Attending: NURSE PRACTITIONER
Payer: MEDICARE

## 2022-09-25 ENCOUNTER — HOSPITAL ENCOUNTER (OUTPATIENT)
Dept: GENERAL RADIOLOGY | Facility: OTHER | Age: 67
Discharge: HOME | End: 2022-09-25
Attending: NURSE PRACTITIONER
Payer: MEDICARE

## 2022-09-25 ENCOUNTER — TELEPHONE (OUTPATIENT)
Dept: NURSING | Facility: CLINIC | Age: 67
End: 2022-09-25

## 2022-09-25 VITALS
BODY MASS INDEX: 40.4 KG/M2 | TEMPERATURE: 98.6 F | WEIGHT: 297.9 LBS | DIASTOLIC BLOOD PRESSURE: 92 MMHG | SYSTOLIC BLOOD PRESSURE: 158 MMHG | OXYGEN SATURATION: 95 % | RESPIRATION RATE: 20 BRPM | HEART RATE: 67 BPM

## 2022-09-25 DIAGNOSIS — R50.9 FEVER AND CHILLS: ICD-10-CM

## 2022-09-25 DIAGNOSIS — R05.9 COUGH IN ADULT: ICD-10-CM

## 2022-09-25 DIAGNOSIS — U07.1 INFECTION DUE TO 2019 NOVEL CORONAVIRUS: Primary | ICD-10-CM

## 2022-09-25 DIAGNOSIS — Z01.89 PATIENT REQUEST FOR DIAGNOSTIC TESTING: ICD-10-CM

## 2022-09-25 LAB
BASOPHILS # BLD AUTO: 0.1 10E3/UL (ref 0–0.2)
BASOPHILS NFR BLD AUTO: 1 %
EOSINOPHIL # BLD AUTO: 0 10E3/UL (ref 0–0.7)
EOSINOPHIL NFR BLD AUTO: 0 %
ERYTHROCYTE [DISTWIDTH] IN BLOOD BY AUTOMATED COUNT: 14.7 % (ref 10–15)
FLUAV RNA SPEC QL NAA+PROBE: NEGATIVE
FLUBV RNA RESP QL NAA+PROBE: NEGATIVE
HCT VFR BLD AUTO: 48.9 % (ref 40–53)
HGB BLD-MCNC: 16.7 G/DL (ref 13.3–17.7)
HOLD SPECIMEN: NORMAL
IMM GRANULOCYTES # BLD: 0 10E3/UL
IMM GRANULOCYTES NFR BLD: 0 %
LYMPHOCYTES # BLD AUTO: 2 10E3/UL (ref 0.8–5.3)
LYMPHOCYTES NFR BLD AUTO: 20 %
MCH RBC QN AUTO: 31.8 PG (ref 26.5–33)
MCHC RBC AUTO-ENTMCNC: 34.2 G/DL (ref 31.5–36.5)
MCV RBC AUTO: 93 FL (ref 78–100)
MONOCYTES # BLD AUTO: 1.4 10E3/UL (ref 0–1.3)
MONOCYTES NFR BLD AUTO: 14 %
NEUTROPHILS # BLD AUTO: 6.5 10E3/UL (ref 1.6–8.3)
NEUTROPHILS NFR BLD AUTO: 65 %
NRBC # BLD AUTO: 0 10E3/UL
NRBC BLD AUTO-RTO: 0 /100
PLATELET # BLD AUTO: 405 10E3/UL (ref 150–450)
RBC # BLD AUTO: 5.25 10E6/UL (ref 4.4–5.9)
RSV RNA SPEC NAA+PROBE: NEGATIVE
SARS-COV-2 RNA RESP QL NAA+PROBE: POSITIVE
WBC # BLD AUTO: 10 10E3/UL (ref 4–11)

## 2022-09-25 PROCEDURE — 71046 X-RAY EXAM CHEST 2 VIEWS: CPT

## 2022-09-25 PROCEDURE — G0463 HOSPITAL OUTPT CLINIC VISIT: HCPCS | Mod: 25

## 2022-09-25 PROCEDURE — 85025 COMPLETE CBC W/AUTO DIFF WBC: CPT | Mod: ZL | Performed by: NURSE PRACTITIONER

## 2022-09-25 PROCEDURE — G0463 HOSPITAL OUTPT CLINIC VISIT: HCPCS

## 2022-09-25 PROCEDURE — 36415 COLL VENOUS BLD VENIPUNCTURE: CPT | Mod: ZL | Performed by: NURSE PRACTITIONER

## 2022-09-25 PROCEDURE — C9803 HOPD COVID-19 SPEC COLLECT: HCPCS

## 2022-09-25 PROCEDURE — 99214 OFFICE O/P EST MOD 30 MIN: CPT | Mod: CS | Performed by: NURSE PRACTITIONER

## 2022-09-25 PROCEDURE — 87637 SARSCOV2&INF A&B&RSV AMP PRB: CPT | Mod: ZL | Performed by: NURSE PRACTITIONER

## 2022-09-25 ASSESSMENT — PAIN SCALES - GENERAL: PAINLEVEL: MODERATE PAIN (4)

## 2022-09-25 NOTE — NURSING NOTE
Pt here with wife for worsened body aches, fatigue since Thursday night.  Coughing started yesterday.  Yanely Colmenares CMA ([facility])......................9/25/2022  11:56 AM       Medication Reconciliation: complete    Yanely Colmneares CMA  9/25/2022 11:56 AM

## 2022-09-25 NOTE — TELEPHONE ENCOUNTER
Patient classified as COVID treatment eligible by Epic high risk algorithm:  Yes    Coronavirus (COVID-19) Notification    Reason for call  Notify of POSITIVE COVID-19 lab result, assess symptoms,  review [facility] recommendations    Lab Result   Lab test for 2019-nCoV rRt-PCR or SARS-COV-2 PCR  Oropharyngeal AND/OR nasopharyngeal swabs were POSITIVE for 2019-nCoV RNA [OR] SARS-COV-2 RNA (COVID-19) RNA     We have been unable to reach patient by phone at this time to notify of their Positive COVID-19 result.    Left voicemail message requesting a call back to [phone] [facility] for results.        A Positive COVID-19 letter will be sent via [employer] or the mail.    Amaya Giron

## 2022-09-25 NOTE — PROGRESS NOTES
ASSESSMENT/PLAN:     I have reviewed the nursing notes.  I have reviewed the findings, diagnosis, plan and need for follow up with the patient.      1. Patient request for diagnostic testing    - Symptomatic; Yes; 9/22/2022 Influenza A/B & SARS-CoV2 (COVID-19) Virus PCR Multiplex Nose    2. Fever and chills    - Symptomatic; Yes; 9/22/2022 Influenza A/B & SARS-CoV2 (COVID-19) Virus PCR Multiplex Nose  - CBC and Differential  - XR Chest 2 Views    May use over-the-counter Tylenol PRN    3. Cough in adult    - Symptomatic; Yes; 9/22/2022 Influenza A/B & SARS-CoV2 (COVID-19) Virus PCR Multiplex Nose  - CBC and Differential  - XR Chest 2 Views          4. Infection due to 2019 novel coronavirus    - nirmatrelvir and ritonavir (PAXLOVID) therapy pack; Take 2 tablets by mouth 2 times daily for 5 days  Dispense: 20 tablet; Refill: 0    Positive Covid PCR test  Negative RSV PCR test  Negative influenza PCR test  CBC - normal  CXR completed and reviewed, no obvious infiltrate, radiologist over read:  No acute cardiopulmonary disease.      Discussed with patient that symptoms and exam are consistent with viral illness.    No clinical indications for antibiotic treatment at this time.    Patient is interested in antiviral treatment.  Patient is an excellent candidate for antiviral treatment due to complex medical hx with multiple co-morbidities and hx of splenectomy.      Symptomatic treatment - Encouraged fluids, salt water gargles, honey, elevation, humidifier, saline nasal spray, sinus rinse/netti pot, lozenges, tea, soup, smoothies, popsicles, topical vapor rub, rest, etc     May use Coricidin HBP and Tylenol PRN    Paxlovid dosing renally reduced due to chronic mild renal insuffiencey with GFR of 42-59 and creatinine of 1.31 -1.63    Self quarantine for 5 days and until free fever and symptoms improving or longer if fevers or symptoms persisting.    Continue to mask for total of 10 days.    Instructed to limit movements  outside of home as much as possible, social distance, mask in public spaces, and monitor closely for COVID-19 symptoms     Recommend use of home oximeter, present to ER if oxygen saturation < 90%, breathing difficulty, persistent fevers, or any concerns.  Discussed warning signs/symptoms indicative of need to f/u    Follow up if symptoms persist or worsen or concerns      I explained my diagnostic considerations and recommendations to the patient, who voiced understanding and agreement with the treatment plan. All questions were answered. We discussed potential side effects of any prescribed or recommended therapies, as well as expectations for response to treatments.    Glenna Leal NP on 9/25/2022 at 1:06 PM    [facility] AND [facility]      SUBJECTIVE:   Selvin An is a 67 year old male who presents to clinic today for the following health issues:  Covid testing    HPI  Started 3 days ago with headaches, body aches, and chills.  Worsening fatigue and weakness the past 3 days (chronic but worsening).  Cough started yesterday.  Non productive cough.  Chest congestion.  No chest tightness or heaviness.  No shortness of breath.  No sore throat.  Mild nasal congestion, no drainage.  Headaches are generalized and persistent.  Mild nausea, no vomiting.  Decreased appetite.    He just seen rheumologist on 9/21/22 for extensive work up for chronic fatigue, chronic generalized body pain (chronic fatigue with fibromyalgia, polymyalgia rheumatic, hx of splenectomy, still awaiting results.   Taking Tylenol, Gabapentin, prednisone, Cymbalta.  Accompanied today by his spouse.          Past Medical History:   Diagnosis Date     Bell's palsy 3/30/2017     Bilateral carpal tunnel syndrome     No Comments Provided     Essential (primary) hypertension     No Comments Provided     Gout 6/3/2013     Hyperlipidemia     No Comments Provided     Hypothyroidism     No Comments Provided     Malignant neoplasm of thyroid  gland (H)     1994     Sleep apnea     No Comments Provided     Past Surgical History:   Procedure Laterality Date     COLONOSCOPY  09/09/2014 9/2014,Normal - follow up 10 years     CYSTOSCOPY      bladder cancer     OTHER SURGICAL HISTORY      ,SPLENECTOMY     OTHER SURGICAL HISTORY      2010,[id number],EYE MUSCLE SURGERY,Left,strabismus     OTHER SURGICAL HISTORY      10/06/14,[id number],OTHER,Left,Dr. Lana LEHMAN     OTHER SURGICAL HISTORY      10/14/14,[id number],OTHER,Left,Dr. Lana LEHMAN     OTHER SURGICAL HISTORY      11/16/2017,[id number],ENDOSCOPIC RELEASE TRANSVERSE CARPAL LIGAMENT OF HAND,Left     RELEASE CARPAL TUNNEL      12/2011     RETINAL DETACHMENT SURGERY Right      STRABISMUS SURGERY Left      THYROIDECTOMY      1994,thyroid ca     TONSILLECTOMY      2004,T & A > 13yo     VASECTOMY      No Comments Provided     Social History     Tobacco Use     Smoking status: Never Smoker     Smokeless tobacco: Never Used   Substance Use Topics     Alcohol use: No     Comment: none     Current Outpatient Medications   Medication Sig Dispense Refill     allopurinol (ZYLOPRIM) 300 MG tablet Take 1 tablet (300 mg) by mouth daily - for gout prevention 90 tablet 3     B Complex-C (VITAMIN B COMPLEX W/VITAMIN C) TABS tablet Take 1 tablet by mouth daily       Cholecalciferol (VITAMIN D-3) 125 MCG (5000 UT) TABS Take 5,000 Units by mouth daily 100 tablet 3     Cyanocobalamin 2500 MCG TABS Take 2,500 mcg by mouth daily -- or every other day -- OTC okay 90 tablet 4     gabapentin (NEURONTIN) 100 MG capsule Take 1-2 capsules (100-200 mg) by mouth 4 times daily as needed - for burning/shooting nerve pain 240 capsule 1     Icosapent Ethyl 1 g CAPS Take 2 g by mouth 2 times daily With meals 120 capsule 11     levothyroxine (SYNTHROID/LEVOTHROID) 200 MCG tablet Take 1 tablet (200 mcg) by mouth daily 90 tablet 4     magnesium 250 MG tablet Take 1 tablet by mouth daily       Melatonin 10 MG TABS tablet Take 20 mg by mouth 2 times daily         Multiple Vitamins-Minerals (CENTRUM ADULTS PO) Take 1 tablet by mouth daily       order for DME CPAP machine for home use at pressure: 10, with supplies:  humidifier, chamber, mask, tubing       predniSONE (DELTASONE) 1 MG tablet By mouth 8mg daily x30 days then 7mg daily x30 days then 6mg daily x30 days ten 5mg daily x30 days. Combine with 5mg tabs to make total daily dose. 300 tablet 0     predniSONE (DELTASONE) 5 MG tablet Take 2 tablets (10 mg) by mouth daily + 3 mg - total of 13 mg daily 180 tablet 1     Probiotic Product (PROBIOTIC-10 PO) Take 1 tablet by mouth daily       sildenafil (VIAGRA) 100 MG tablet Take 1/2 to 1 tablet 1 hour prior to sexual activity 10 tablet 11     spironolactone-HCTZ (ALDACTAZIDE) 25-25 MG tablet Take 1 tablet by mouth 2 times daily -- AM and Afternoon -- for hypertension 180 tablet 3     testosterone (ANDROGEL 1.62% PUMP) 20.25 MG/ACT gel Place 2 Pump onto the skin every morning 75 g 5     DULoxetine (CYMBALTA) 30 MG capsule Take 1 capsule (30 mg) by mouth At Bedtime for 14 days, THEN 2 capsules (60 mg) At Bedtime for 90 days. (Patient not taking: Reported on 9/21/2022) 194 capsule 0     loratadine (CLARITIN) 10 MG tablet Take 10 mg by mouth daily (Patient not taking: No sig reported)       predniSONE (DELTASONE) 5 MG tablet By mouth 8mg daily x30 days then 7mg daily x30 days then 6mg daily x30 days ten 5mg daily x30 days. Combine with 1mg tabs to make total daily dose. 120 tablet 0     tamsulosin (FLOMAX) 0.4 MG capsule Take 1 capsule (0.4 mg) by mouth every evening (Patient not taking: No sig reported) 90 capsule 3     Allergies   Allergen Reactions     Losartan Cough     Omeprazole Diarrhea     Pantoprazole Diarrhea     Valsartan Cough     Started with losartan, worsened with high dose valsartan         Past medical history, past surgical history, current medications and allergies reviewed and accurate to the best of my knowledge.        OBJECTIVE:     BP (!) 160/90 (BP  Location: Left arm, Patient Position: Sitting, Cuff Size: Adult Large)   Pulse 67   Temp 98.6  F (37  C) (Tympanic)   Resp 20   Wt 135.1 kg (297 lb 14.4 oz)   SpO2 95%   BMI 40.40 kg/m    Body mass index is 40.4 kg/m .     Physical Exam  General Appearance: Miserable/ill appearing adult male, non toxic, appropriate appearance for age. No acute distress  Eyes: conjunctivae normal without erythema or irritation, corneas clear, no drainage or crusting, no eyelid swelling, pupils equal   Orophayrnx: voice clear.    Nose:  Clear nasal drainage or congestion   Neck: supple without adenopathy  Respiratory: normal chest wall and respirations.  Normal effort.  Clear to auscultation bilaterally, no wheezing, crackles or rhonchi.  No increased work of breathing.  Occasional cough appreciated.  Cardiac: RRR with no murmurs.  No lower extremity edema.  Musculoskeletal:  Equal movement of bilateral upper extremities.  Equal movement of bilateral lower extremities.  Normal gait.    Psychological: normal affect, alert, oriented, and pleasant.       Imaging and Labs:  Results for orders placed or performed in visit on 09/25/22   XR Chest 2 Views     Status: None    Narrative    PROCEDURE:  XR CHEST 2 VIEWS    HISTORY:  Fever and chills; Cough in adult.     COMPARISON:  None.    FINDINGS:   The cardiac silhouette is normal in size. The pulmonary vasculature is  normal.  The lungs are clear. No pleural effusion or pneumothorax.      Impression    IMPRESSION:  No acute cardiopulmonary disease.      MYRA KATZ MD         SYSTEM ID:  [id number]   Symptomatic; Yes; 9/22/2022 Influenza A/B & SARS-CoV2 (COVID-19) Virus PCR Multiplex Nose     Status: Abnormal    Specimen: Nose; Swab   Result Value Ref Range    Influenza A PCR Negative Negative    Influenza B PCR Negative Negative    RSV PCR Negative Negative    SARS CoV2 PCR Positive (A) Negative    Narrative    Testing was performed using the Xpert Xpress CoV2/Flu/RSV Assay on the  [employer] GeneXpert Instrument. This test should be ordered for the detection of SARS-CoV-2 and influenza viruses in individuals who meet clinical and/or epidemiological criteria. Test performance is unknown in asymptomatic patients. This test is for in vitro diagnostic use under the FDA EUA for laboratories certified under CLIA to perform high or moderate complexity testing. This test has not been FDA cleared or approved. A negative result does not rule out the presence of PCR inhibitors in the specimen or target RNA in concentration below the limit of detection for the assay. If only one viral target is positive but coinfection with multiple targets is suspected, the sample should be re-tested with another FDA cleared, approved, or authorized test, if coinfection would change clinical management. This test was validated by the [facility] and [email or web address]. These laboratories are certified under the Clinical  Laboratory Improvement Amendments of 1988 (CLIA-88) as qualified to perform high complexity laboratory testing.   CBC with platelets and differential     Status: Abnormal   Result Value Ref Range    WBC Count 10.0 4.0 - 11.0 10e3/uL    RBC Count 5.25 4.40 - 5.90 10e6/uL    Hemoglobin 16.7 13.3 - 17.7 g/dL    Hematocrit 48.9 40.0 - 53.0 %    MCV 93 78 - 100 fL    MCH 31.8 26.5 - 33.0 pg    MCHC 34.2 31.5 - 36.5 g/dL    RDW 14.7 10.0 - 15.0 %    Platelet Count 405 150 - 450 10e3/uL    % Neutrophils 65 %    % Lymphocytes 20 %    % Monocytes 14 %    % Eosinophils 0 %    % Basophils 1 %    % Immature Granulocytes 0 %    NRBCs per 100 WBC 0 <1 /100    Absolute Neutrophils 6.5 1.6 - 8.3 10e3/uL    Absolute Lymphocytes 2.0 0.8 - 5.3 10e3/uL    Absolute Monocytes 1.4 (H) 0.0 - 1.3 10e3/uL    Absolute Eosinophils 0.0 0.0 - 0.7 10e3/uL    Absolute Basophils 0.1 0.0 - 0.2 10e3/uL    Absolute Immature Granulocytes 0.0 <=0.4 10e3/uL    Absolute NRBCs 0.0 10e3/uL   Extra Tube     Status: None    Narrative    The following  orders were created for panel order Extra Tube.  Procedure                               Abnormality         Status                     ---------                               -----------         ------                     Extra Serum Separator Tu...[[id number]]                      Final result                 Please view results for these tests on the individual orders.   Extra Serum Separator Tube (SST)     Status: None   Result Value Ref Range    Hold Specimen [facility]    CBC and Differential     Status: Abnormal    Narrative    The following orders were created for panel order CBC and Differential.  Procedure                               Abnormality         Status                     ---------                               -----------         ------                     CBC with platelets and d...[[id number]]  Abnormal            Final result                 Please view results for these tests on the individual orders.

## 2022-09-27 LAB — CRYOGLOB SER QL: NEGATIVE

## 2022-10-02 RX ORDER — FENOFIBRATE 145 MG/1
145 TABLET, COATED ORAL DAILY
COMMUNITY
End: 2022-10-02

## 2022-10-04 ENCOUNTER — NURSE TRIAGE (OUTPATIENT)
Dept: INTERNAL MEDICINE | Facility: OTHER | Age: 67
End: 2022-10-04

## 2022-10-04 NOTE — TELEPHONE ENCOUNTER
Patient has had covid for 2 weeks and took the medication right away---he is no better and has heavy chest the wife Nati is on acct to speak with --- any ideas what he can take or do please call today

## 2022-10-05 ENCOUNTER — OFFICE VISIT (OUTPATIENT)
Dept: FAMILY MEDICINE | Facility: OTHER | Age: 67
End: 2022-10-05
Attending: PHYSICIAN ASSISTANT
Payer: MEDICARE

## 2022-10-05 ENCOUNTER — TELEPHONE (OUTPATIENT)
Dept: FAMILY MEDICINE | Facility: OTHER | Age: 67
End: 2022-10-05

## 2022-10-05 ENCOUNTER — HOSPITAL ENCOUNTER (OUTPATIENT)
Dept: GENERAL RADIOLOGY | Facility: OTHER | Age: 67
Discharge: HOME | End: 2022-10-05
Attending: NURSE PRACTITIONER
Payer: MEDICARE

## 2022-10-05 VITALS
TEMPERATURE: 98.6 F | HEART RATE: 90 BPM | DIASTOLIC BLOOD PRESSURE: 110 MMHG | HEIGHT: 72 IN | RESPIRATION RATE: 16 BRPM | SYSTOLIC BLOOD PRESSURE: 145 MMHG | WEIGHT: 294 LBS | BODY MASS INDEX: 39.82 KG/M2 | OXYGEN SATURATION: 93 %

## 2022-10-05 DIAGNOSIS — J20.9 ACUTE BRONCHITIS, UNSPECIFIED ORGANISM: ICD-10-CM

## 2022-10-05 DIAGNOSIS — M79.10 MYALGIA: ICD-10-CM

## 2022-10-05 DIAGNOSIS — R05.1 ACUTE COUGH: ICD-10-CM

## 2022-10-05 DIAGNOSIS — I1A.0 RESISTANT HYPERTENSION: ICD-10-CM

## 2022-10-05 DIAGNOSIS — R53.83 OTHER FATIGUE: ICD-10-CM

## 2022-10-05 DIAGNOSIS — R06.09 DYSPNEA ON EXERTION: Primary | ICD-10-CM

## 2022-10-05 LAB
ANION GAP SERPL CALCULATED.3IONS-SCNC: 11 MMOL/L (ref 3–14)
BASOPHILS # BLD AUTO: 0.2 10E3/UL (ref 0–0.2)
BASOPHILS NFR BLD AUTO: 1 %
BUN SERPL-MCNC: 42 MG/DL (ref 7–25)
CALCIUM SERPL-MCNC: 11.5 MG/DL (ref 8.6–10.3)
CHLORIDE BLD-SCNC: 95 MMOL/L (ref 98–107)
CO2 SERPL-SCNC: 29 MMOL/L (ref 21–31)
CREAT SERPL-MCNC: 1.73 MG/DL (ref 0.7–1.3)
D DIMER PPP FEU-MCNC: 0.46 UG/ML FEU (ref 0–0.5)
EOSINOPHIL # BLD AUTO: 0.1 10E3/UL (ref 0–0.7)
EOSINOPHIL NFR BLD AUTO: 1 %
ERYTHROCYTE [DISTWIDTH] IN BLOOD BY AUTOMATED COUNT: 14 % (ref 10–15)
GFR SERPL CREATININE-BSD FRML MDRD: 42 ML/MIN/1.73M2
GLUCOSE BLD-MCNC: 128 MG/DL (ref 70–105)
HCT VFR BLD AUTO: 52.8 % (ref 40–53)
HGB BLD-MCNC: 18.7 G/DL (ref 13.3–17.7)
IMM GRANULOCYTES # BLD: 0.3 10E3/UL
IMM GRANULOCYTES NFR BLD: 2 %
LYMPHOCYTES # BLD AUTO: 3 10E3/UL (ref 0.8–5.3)
LYMPHOCYTES NFR BLD AUTO: 21 %
MCH RBC QN AUTO: 32.1 PG (ref 26.5–33)
MCHC RBC AUTO-ENTMCNC: 35.4 G/DL (ref 31.5–36.5)
MCV RBC AUTO: 91 FL (ref 78–100)
MONOCYTES # BLD AUTO: 1.7 10E3/UL (ref 0–1.3)
MONOCYTES NFR BLD AUTO: 12 %
NEUTROPHILS # BLD AUTO: 9.3 10E3/UL (ref 1.6–8.3)
NEUTROPHILS NFR BLD AUTO: 63 %
NRBC # BLD AUTO: 0 10E3/UL
NRBC BLD AUTO-RTO: 0 /100
NT-PROBNP SERPL-MCNC: 8 PG/ML (ref 0–100)
PLATELET # BLD AUTO: 507 10E3/UL (ref 150–450)
POTASSIUM BLD-SCNC: 4.1 MMOL/L (ref 3.5–5.1)
RBC # BLD AUTO: 5.82 10E6/UL (ref 4.4–5.9)
SODIUM SERPL-SCNC: 135 MMOL/L (ref 134–144)
TROPONIN I SERPL-MCNC: 16.4 PG/ML (ref 0–34)
WBC # BLD AUTO: 14.6 10E3/UL (ref 4–11)

## 2022-10-05 PROCEDURE — 80048 BASIC METABOLIC PNL TOTAL CA: CPT | Mod: ZL | Performed by: NURSE PRACTITIONER

## 2022-10-05 PROCEDURE — 36415 COLL VENOUS BLD VENIPUNCTURE: CPT | Mod: ZL | Performed by: NURSE PRACTITIONER

## 2022-10-05 PROCEDURE — 93010 ELECTROCARDIOGRAM REPORT: CPT | Performed by: INTERNAL MEDICINE

## 2022-10-05 PROCEDURE — 99214 OFFICE O/P EST MOD 30 MIN: CPT | Performed by: NURSE PRACTITIONER

## 2022-10-05 PROCEDURE — 85379 FIBRIN DEGRADATION QUANT: CPT | Mod: ZL | Performed by: NURSE PRACTITIONER

## 2022-10-05 PROCEDURE — 83880 ASSAY OF NATRIURETIC PEPTIDE: CPT | Mod: ZL | Performed by: NURSE PRACTITIONER

## 2022-10-05 PROCEDURE — G0463 HOSPITAL OUTPT CLINIC VISIT: HCPCS | Mod: 25

## 2022-10-05 PROCEDURE — 84484 ASSAY OF TROPONIN QUANT: CPT | Mod: ZL | Performed by: NURSE PRACTITIONER

## 2022-10-05 PROCEDURE — 71046 X-RAY EXAM CHEST 2 VIEWS: CPT

## 2022-10-05 PROCEDURE — 93005 ELECTROCARDIOGRAM TRACING: CPT | Performed by: NURSE PRACTITIONER

## 2022-10-05 PROCEDURE — G0463 HOSPITAL OUTPT CLINIC VISIT: HCPCS

## 2022-10-05 PROCEDURE — 85025 COMPLETE CBC W/AUTO DIFF WBC: CPT | Mod: ZL | Performed by: NURSE PRACTITIONER

## 2022-10-05 RX ORDER — AZITHROMYCIN 250 MG/1
TABLET, FILM COATED ORAL
Qty: 6 TABLET | Refills: 0 | Status: SHIPPED | OUTPATIENT
Start: 2022-10-05 | End: 2022-10-10

## 2022-10-05 RX ORDER — CARVEDILOL 3.12 MG/1
3.12 TABLET ORAL 2 TIMES DAILY WITH MEALS
Qty: 60 TABLET | Refills: 0 | Status: SHIPPED | OUTPATIENT
Start: 2022-10-05 | End: 2022-10-12

## 2022-10-05 RX ORDER — BENZONATATE 200 MG/1
200 CAPSULE ORAL 3 TIMES DAILY PRN
Qty: 30 CAPSULE | Refills: 0 | Status: SHIPPED | OUTPATIENT
Start: 2022-10-05 | End: 2022-11-04

## 2022-10-05 RX ORDER — NIRMATRELVIR AND RITONAVIR 150-100 MG
KIT ORAL
COMMUNITY
Start: 2022-09-25 | End: 2022-10-12

## 2022-10-05 ASSESSMENT — ENCOUNTER SYMPTOMS
SORE THROAT: 0
VERTIGO: 0
NUMBNESS: 0
JOINT SWELLING: 0
CHILLS: 0
COUGH: 1
VISUAL CHANGE: 0
NAUSEA: 0
WEAKNESS: 0
MYALGIAS: 1
FEVER: 0
CHANGE IN BOWEL HABIT: 0
NECK PAIN: 0
ANOREXIA: 0
FATIGUE: 1
SWOLLEN GLANDS: 0
ARTHRALGIAS: 1
DIAPHORESIS: 0
VOMITING: 0
HEADACHES: 1
ABDOMINAL PAIN: 0

## 2022-10-05 ASSESSMENT — PAIN SCALES - GENERAL: PAINLEVEL: MODERATE PAIN (4)

## 2022-10-05 NOTE — NURSING NOTE
No chief complaint on file.      Medication Review Completed: {Medication Reconciliation:116381}    FOOD SECURITY SCREENING QUESTIONS:    The next two questions are to help us understand your food security.  If you are feeling you need any assistance in this area, we have resources available to support you today.    Hunger Vital Signs:  Within the past 12 months we worried whether our food would run out before we got money to buy more. {food list:589548}  Within the past 12 months the food we bought just didn't last and we didn't have money to get more. {food list:832273}    Glory Shine LPN

## 2022-10-05 NOTE — PROGRESS NOTES
"Assessment & Plan   Selvin An is a 67 year old accompanied by his spouse, presenting for the following health issues:      ICD-10-CM    1. Dyspnea on exertion   R06.09 Brain Natriuretic Peptide (BNP)   2. Resistant hypertension  I10 Brain Natriuretic Peptide (BNP)     Troponin I     CBC and Differential     Basic Metabolic Panel     D dimer quantitative     EKG 12-lead complete w/read - Clinics     XR Chest 2 Views     carvedilol (COREG) 3.125 MG tablet     Adult Cardiology Eval  Referral   3. Other fatigue  R53.83    4. Myalgia  M79.10    5. Acute bronchitis, unspecified organism  J20.9 azithromycin (ZITHROMAX) 250 MG tablet   6. Acute cough  R05.1 benzonatate (TESSALON) 200 MG capsule     Patient presents with 2 week history of fatigue, chest pain, body aches, and headache. He tested positive for Covid-19 9/21/22 and completed a course of paxlovid. He felt better for \"a couple of days\" but then his symptoms came back, and worsened. He does have history of resistant hypertension and is currently taking Aldactazide 25-25 TWICE daily. He did not take this medication this morning, as he did not want to void frequently while waiting to be seen. He reports that his home BP's are usually in the 160's over 90's.  Diff diagnosis include: pneumonia, PE, rebound symptoms after paxlovid, or viral infection.        CXR is WNL, D dimer is WNL, EKG is abnormal, reviewed with IM and no acute infarct is seen. WBC's elevated with elevated neutrophils.     After discussion with IM, will treat with azithromycin as an acute bronchitis with close follow-up. If symptoms worsen, patient will need to follow up sooner.  Repeat /110 Cardiology consulted to discuss medication options start carvedilol 3.125 BID with meals. Check BP's in am. Cardiology would be happy to follow-up, patient was in agreement with cardiology referral.     Patient has follow-up after  appointment scheduled for 10/12/22. Next appointment with " PCP 11/4/22.     Encouraged weight loss and regular exercise.     Return in about 1 week (around 10/12/2022), or if symptoms worsen or fail to improve, for Return for follow up with PCP.    JOSE Meneses CNP  [facility] AND [facility]        Subjective   Henry is a 67 year old, presenting for the following health issues:  Cough and Fatigue (Follow up after covid - still sick)      Cough  Associated symptoms include chest pain, headaches and myalgias. Pertinent negatives include no chills and no sore throat.   Fatigue  This is a recurrent problem. Associated symptoms include arthralgias, chest pain, coughing, fatigue, headaches and myalgias. Pertinent negatives include no abdominal pain, anorexia, change in bowel habit, chills, congestion, diaphoresis, fever, joint swelling, nausea, neck pain, numbness, rash, sore throat, swollen glands, urinary symptoms, vertigo, visual change, vomiting or weakness. He has tried acetaminophen for the symptoms.   History of Present Illness                 Review of Systems   Constitutional: Positive for fatigue. Negative for chills, diaphoresis and fever.   HENT: Negative for congestion and sore throat.    Respiratory: Positive for cough.    Cardiovascular: Positive for chest pain.   Gastrointestinal: Negative for abdominal pain, anorexia, change in bowel habit, nausea and vomiting.   Musculoskeletal: Positive for arthralgias and myalgias. Negative for joint swelling and neck pain.   Skin: Negative for rash.   Neurological: Positive for headaches. Negative for vertigo, weakness and numbness.            Objective    BP (!) 145/110   Pulse 90   Temp 98.6  F (37  C) (Tympanic)   Resp 16   Ht 1.829 m (6')   Wt 133.4 kg (294 lb)   SpO2 93%   BMI 39.87 kg/m    Body mass index is 39.87 kg/m .  Physical Exam  Vitals and nursing note reviewed.   Constitutional:       Appearance: He is ill-appearing.   HENT:      Head: Normocephalic and atraumatic.      Nose: Nose normal.       Mouth/Throat:      Mouth: Mucous membranes are moist.      Pharynx: Oropharynx is clear.   Eyes:      Conjunctiva/sclera: Conjunctivae normal.   Cardiovascular:      Rate and Rhythm: Tachycardia present.   Pulmonary:      Effort: Pulmonary effort is normal. No respiratory distress.      Breath sounds: Normal breath sounds. No stridor. No wheezing or rhonchi.   Abdominal:      General: Abdomen is flat. Bowel sounds are normal.      Palpations: Abdomen is soft.   Musculoskeletal:         General: Normal range of motion.      Cervical back: Normal range of motion.   Lymphadenopathy:      Cervical: No cervical adenopathy.   Skin:     General: Skin is warm and dry.      Capillary Refill: Capillary refill takes less than 2 seconds.      Findings: No rash.   Neurological:      General: No focal deficit present.      Mental Status: He is oriented to person, place, and time.   Psychiatric:         Mood and Affect: Mood normal.         Behavior: Behavior normal.         Thought Content: Thought content normal.            Results for orders placed or performed in visit on 10/05/22   XR Chest 2 Views     Status: None    Narrative    PROCEDURE: XR CHEST 2 VIEWS 10/5/2022 1:41 PM    HISTORY: Resistant hypertension    COMPARISONS: 9/25/2022.    TECHNIQUE: 2 Views.    FINDINGS: Heart and pulmonary vasculature are normal. There is some  mild linear change at the left lung base, stable. There is no  confluent infiltrate or pleural effusion.    Degenerative changes seen in the thoracic spine.         Impression    IMPRESSION: No acute disease.    YAO JAIN MD         SYSTEM ID:  [id number]   Brain Natriuretic Peptide (BNP)     Status: Normal   Result Value Ref Range    N Terminal Pro BNP Outpatient 8 0 - 100 pg/mL   Troponin I     Status: Normal   Result Value Ref Range    Troponin I 16.4 0.0 - 34.0 pg/mL   Basic Metabolic Panel     Status: Abnormal   Result Value Ref Range    Sodium 135 134 - 144 mmol/L    Potassium 4.1  3.5 - 5.1 mmol/L    Chloride 95 (L) 98 - 107 mmol/L    Carbon Dioxide (CO2) 29 21 - 31 mmol/L    Anion Gap 11 3 - 14 mmol/L    Urea Nitrogen 42 (H) 7 - 25 mg/dL    Creatinine 1.73 (H) 0.70 - 1.30 mg/dL    Calcium 11.5 (H) 8.6 - 10.3 mg/dL    Glucose 128 (H) 70 - 105 mg/dL    GFR Estimate 42 (L) >60 mL/min/1.73m2   D dimer quantitative     Status: Normal   Result Value Ref Range    D-Dimer Quantitative 0.46 0.00 - 0.50 ug/mL FEU    Narrative    This D-dimer assay is intended for use in conjunction with a clinical pretest probability assessment model to exclude pulmonary embolism (PE) and deep venous thrombosis (DVT) in outpatients suspected of PE or DVT. The cut-off value is 0.50 ug/mL FEU.   CBC with platelets and differential     Status: Abnormal   Result Value Ref Range    WBC Count 14.6 (H) 4.0 - 11.0 10e3/uL    RBC Count 5.82 4.40 - 5.90 10e6/uL    Hemoglobin 18.7 (H) 13.3 - 17.7 g/dL    Hematocrit 52.8 40.0 - 53.0 %    MCV 91 78 - 100 fL    MCH 32.1 26.5 - 33.0 pg    MCHC 35.4 31.5 - 36.5 g/dL    RDW 14.0 10.0 - 15.0 %    Platelet Count 507 (H) 150 - 450 10e3/uL    % Neutrophils 63 %    % Lymphocytes 21 %    % Monocytes 12 %    % Eosinophils 1 %    % Basophils 1 %    % Immature Granulocytes 2 %    NRBCs per 100 WBC 0 <1 /100    Absolute Neutrophils 9.3 (H) 1.6 - 8.3 10e3/uL    Absolute Lymphocytes 3.0 0.8 - 5.3 10e3/uL    Absolute Monocytes 1.7 (H) 0.0 - 1.3 10e3/uL    Absolute Eosinophils 0.1 0.0 - 0.7 10e3/uL    Absolute Basophils 0.2 0.0 - 0.2 10e3/uL    Absolute Immature Granulocytes 0.3 <=0.4 10e3/uL    Absolute NRBCs 0.0 10e3/uL   EKG 12-lead complete w/read - Clinics     Status: None   Result Value Ref Range    Systolic Blood Pressure  mmHg    Diastolic Blood Pressure  mmHg    Ventricular Rate 77 BPM    Atrial Rate 77 BPM    SD Interval 198 ms    QRS Duration 100 ms     ms    QTc 411 ms    P Axis 17 degrees    R AXIS 18 degrees    T Axis 81 degrees    Interpretation ECG       Sinus rhythm  T wave  abnormality, consider lateral ischemia  Abnormal ECG  When compared with ECG of 02-MAR-2022 12:09,  T wave inversion now evident in Lateral leads  Confirmed by MD LIANG DARIN ([id number]) on 10/6/2022 6:16:28 PM     CBC and Differential     Status: Abnormal    Narrative    The following orders were created for panel order CBC and Differential.  Procedure                               Abnormality         Status                     ---------                               -----------         ------                     CBC with platelets and d...[[id number]]  Abnormal            Final result                 Please view results for these tests on the individual orders.

## 2022-10-05 NOTE — PATIENT INSTRUCTIONS
Results for orders placed or performed in visit on 10/05/22   XR Chest 2 Views     Status: None    Narrative    PROCEDURE: XR CHEST 2 VIEWS 10/5/2022 1:41 PM    HISTORY: Resistant hypertension    COMPARISONS: 9/25/2022.    TECHNIQUE: 2 Views.    FINDINGS: Heart and pulmonary vasculature are normal. There is some  mild linear change at the left lung base, stable. There is no  confluent infiltrate or pleural effusion.    Degenerative changes seen in the thoracic spine.         Impression    IMPRESSION: No acute disease.    YAO JAIN MD         SYSTEM ID:  [id number]   Brain Natriuretic Peptide (BNP)     Status: Normal   Result Value Ref Range    N Terminal Pro BNP Outpatient 8 0 - 100 pg/mL   Troponin I     Status: Normal   Result Value Ref Range    Troponin I 16.4 0.0 - 34.0 pg/mL   Basic Metabolic Panel     Status: Abnormal   Result Value Ref Range    Sodium 135 134 - 144 mmol/L    Potassium 4.1 3.5 - 5.1 mmol/L    Chloride 95 (L) 98 - 107 mmol/L    Carbon Dioxide (CO2) 29 21 - 31 mmol/L    Anion Gap 11 3 - 14 mmol/L    Urea Nitrogen 42 (H) 7 - 25 mg/dL    Creatinine 1.73 (H) 0.70 - 1.30 mg/dL    Calcium 11.5 (H) 8.6 - 10.3 mg/dL    Glucose 128 (H) 70 - 105 mg/dL    GFR Estimate 42 (L) >60 mL/min/1.73m2   D dimer quantitative     Status: Normal   Result Value Ref Range    D-Dimer Quantitative 0.46 0.00 - 0.50 ug/mL FEU    Narrative    This D-dimer assay is intended for use in conjunction with a clinical pretest probability assessment model to exclude pulmonary embolism (PE) and deep venous thrombosis (DVT) in outpatients suspected of PE or DVT. The cut-off value is 0.50 ug/mL FEU.   CBC with platelets and differential     Status: Abnormal   Result Value Ref Range    WBC Count 14.6 (H) 4.0 - 11.0 10e3/uL    RBC Count 5.82 4.40 - 5.90 10e6/uL    Hemoglobin 18.7 (H) 13.3 - 17.7 g/dL    Hematocrit 52.8 40.0 - 53.0 %    MCV 91 78 - 100 fL    MCH 32.1 26.5 - 33.0 pg    MCHC 35.4 31.5 - 36.5 g/dL    RDW 14.0 10.0 -  15.0 %    Platelet Count 507 (H) 150 - 450 10e3/uL    % Neutrophils 63 %    % Lymphocytes 21 %    % Monocytes 12 %    % Eosinophils 1 %    % Basophils 1 %    % Immature Granulocytes 2 %    NRBCs per 100 WBC 0 <1 /100    Absolute Neutrophils 9.3 (H) 1.6 - 8.3 10e3/uL    Absolute Lymphocytes 3.0 0.8 - 5.3 10e3/uL    Absolute Monocytes 1.7 (H) 0.0 - 1.3 10e3/uL    Absolute Eosinophils 0.1 0.0 - 0.7 10e3/uL    Absolute Basophils 0.2 0.0 - 0.2 10e3/uL    Absolute Immature Granulocytes 0.3 <=0.4 10e3/uL    Absolute NRBCs 0.0 10e3/uL   EKG 12-lead complete w/read - Clinics     Status: None (Preliminary result)   Result Value Ref Range    Systolic Blood Pressure  mmHg    Diastolic Blood Pressure  mmHg    Ventricular Rate 77 BPM    Atrial Rate 77 BPM    NE Interval 198 ms    QRS Duration 100 ms     ms    QTc 411 ms    P Axis 17 degrees    R AXIS 18 degrees    T Axis 81 degrees    Interpretation ECG       Sinus rhythm  T wave abnormality, consider lateral ischemia  Abnormal ECG  When compared with ECG of 02-MAR-2022 12:09,  T wave inversion now evident in Lateral leads     CBC and Differential     Status: Abnormal    Narrative    The following orders were created for panel order CBC and Differential.  Procedure                               Abnormality         Status                     ---------                               -----------         ------                     CBC with platelets and d...[[id number]]  Abnormal            Final result                 Please view results for these tests on the individual orders.         Monitor for any fevers or chills. Return in 7-10 days if not feeling better. Pleasecall clinic with any questions or concerns. Please take in a lot of fluids and get rest.     Push oral hydration - prevent dehydration.  Urine should be clear or light yellow.  Inhaler - use as needed for wheezing and shortness of breath.   -- take 1 to 2 puff every 4 hours as needed.   -- Pre-treat prior to  exposuresor exercise that have been exacerbating your cough/breathing problems.   -- Try to use 15 to 30 minutes before exposures.    Mucinex DM - helps thin the phlegm and settle the cough, without causing drowsiness.   -Maximum strength (buy the generic)  Use tessalon pearles as directed for cough.     May take tylenol as needed for sore throat. Treat symptomatically with warm salt water gargles. Lozenges, Tylenol, Advil orAleve as needed. Frequent swallows of cool liquid. Oatmeal coats the throat and some patients find it soothes the pain.     Follow up in clinic if:   You have a fever of at least 101 F or 38.4 C   Your throat pain is severe or does not start toimprove within 5 to 7 days  Call 9-1-1 or go to the emergency room if you:   Have trouble breathing   Are drooling because you cannot swallow your saliva   Have swelling of the neck or tongue   Cannot move your neck or have trouble opening your mouth    Maintain hydration by drinking small amounts of clear fluids frequently, then soft diet, and then advance diet as tolerated. May use OTC Immodium if desired for any diarrhea.  Call if symptoms worsen, high fever, severe weakness or fainting, increased abdominal pain, blood in stool or vomit, or failure to improve in 2-3 days.

## 2022-10-05 NOTE — Clinical Note
SAMI, patient seen in [facility]. Elevated WBCs, CXR negative. Started on carvedilol, as recommended by cardiology, and repeated high BP's in [facility].

## 2022-10-05 NOTE — TELEPHONE ENCOUNTER
S: Patient has had Covid symptoms over 2 weeks that are not improving.  B: Covid positive on 9/25/22 (9/24/22 [place]) Immune comprimised.   A: P: 71 at rest O2: 88%-92% on RA. SOB with dressing and showering, noticed SOB when conversing with patient. Continued fatigue.  Denies nausea, vomiting and diarrhea, a febrile.   R: Will go to rapid clinic. Encouraged fluids, and deep breathing with cough. Expected recovery course with Covid 19.   Reason for Disposition    MODERATE difficulty breathing (e.g., speaks in phrases, SOB even at rest, pulse 100-120)    Additional Information    Negative: Difficult to awaken or acting confused (e.g., disoriented, slurred speech)    Negative: SEVERE difficulty breathing (e.g., struggling for each breath, speaks in single words)    Negative: Bluish (or gray) lips or face now    Negative: Shock suspected (e.g., cold/pale/clammy skin, too weak to stand, low BP, rapid pulse)    Negative: Sounds like a life-threatening emergency to the triager    Negative: [1] Diagnosed or suspected COVID-19 AND [2] symptoms lasting 3 or more weeks    Negative: [1] COVID-19 exposure AND [2] no symptoms    Negative: COVID-19 vaccine reaction suspected (e.g., fever, headache, muscle aches) occurring 1 to 3 days after getting vaccine    Negative: COVID-19 vaccine, questions about    Negative: [1] Lives with someone known to have influenza (flu test positive) AND [2] flu-like symptoms (e.g., cough, runny nose, sore throat, SOB; with or without fever)    Negative: [1] Adult with possible COVID-19 symptoms AND [2] triager concerned about severity of symptoms or other causes    Negative: COVID-19 and breastfeeding, questions about    Negative: SEVERE or constant chest pain or pressure  (Exception: Mild central chest pain, present only when coughing.)    Negative: Oxygen level (e.g., pulse oximetry) 90 percent or lower    Negative: Headache and stiff neck (can't touch chin to chest)    Answer Assessment - Initial  "Assessment Questions  1. COVID-19 DIAGNOSIS: \"Who made your COVID-19 diagnosis?\" \"Was it confirmed by a positive lab test or self-test?\" If not diagnosed by a doctor (or NP/PA), ask \"Are there lots of cases (community spread) where you live?\" Note: See public health department website, if unsure.      9/25/22  2. COVID-19 EXPOSURE: \"Was there any known exposure to COVID before the symptoms began?\" CDC Definition of close contact: within 6 feet (2 meters) for a total of 15 minutes or more over a 24-hour period.       3. ONSET: \"When did the COVID-19 symptoms start?\"       Thursday 9/22/22  4. WORST SYMPTOM: \"What is your worst symptom?\" (e.g., cough, fever, shortness of breath, muscle aches)      SOB, muscle aches, fatigue, afebrile  5. COUGH: \"Do you have a cough?\" If Yes, ask: \"How bad is the cough?\"        Cough is mild  6. FEVER: \"Do you have a fever?\" If Yes, ask: \"What is your temperature, how was it measured, and when did it start?\"      None  7. RESPIRATORY STATUS: \"Describe your breathing?\" (e.g., shortness of breath, wheezing, unable to speak)       SOB with dressing and showering  8. BETTER-SAME-WORSE: \"Are you getting better, staying the same or getting worse compared to yesterday?\"  If getting worse, ask, \"In what way?\"      Staying the same  9. HIGH RISK DISEASE: \"Do you have any chronic medical problems?\" (e.g., asthma, heart or lung disease, weak immune system, obesity, etc.)      Yes, prednisone use,   10. VACCINE: \"Have you had the COVID-19 vaccine?\" If Yes, ask: \"Which one, how many shots, when did you get it?\"        Covid vaccine and 2 boosters  11. BOOSTER: \"Have you received your COVID-19 booster?\" If Yes, ask: \"Which one and when did you get it?\"        Last year  12. PREGNANCY: \"Is there any chance you are pregnant?\" \"When was your last menstrual period?\"        n/a  13. OTHER SYMPTOMS: \"Do you have any other symptoms?\"  (e.g., chills, fatigue, headache, loss of smell or taste, muscle pain, " "sore throat)        Chills, heahache, loss of taste and smell,   14. O2 SATURATION MONITOR:  \"Do you use an oxygen saturation monitor (pulse oximeter) at home?\" If Yes, ask \"What is your reading (oxygen level) today?\" \"What is your usual oxygen saturation reading?\" (e.g., 95%)        92-88% RA  Usual is 96%    Protocols used: CORONAVIRUS (COVID-19) DIAGNOSED OR [id number]  Arabella Matias RN on 10/5/2022 at 9:33 AM    "

## 2022-10-06 LAB
ATRIAL RATE - MUSE: 77 BPM
DIASTOLIC BLOOD PRESSURE - MUSE: NORMAL MMHG
INTERPRETATION ECG - MUSE: NORMAL
P AXIS - MUSE: 17 DEGREES
PR INTERVAL - MUSE: 198 MS
QRS DURATION - MUSE: 100 MS
QT - MUSE: 364 MS
QTC - MUSE: 411 MS
R AXIS - MUSE: 18 DEGREES
SYSTOLIC BLOOD PRESSURE - MUSE: NORMAL MMHG
T AXIS - MUSE: 81 DEGREES
VENTRICULAR RATE- MUSE: 77 BPM

## 2022-10-12 ENCOUNTER — OFFICE VISIT (OUTPATIENT)
Dept: INTERNAL MEDICINE | Facility: OTHER | Age: 67
End: 2022-10-12
Attending: NURSE PRACTITIONER
Payer: MEDICARE

## 2022-10-12 VITALS
RESPIRATION RATE: 16 BRPM | SYSTOLIC BLOOD PRESSURE: 138 MMHG | BODY MASS INDEX: 39.71 KG/M2 | OXYGEN SATURATION: 92 % | TEMPERATURE: 96.8 F | WEIGHT: 292.8 LBS | HEART RATE: 90 BPM | DIASTOLIC BLOOD PRESSURE: 100 MMHG

## 2022-10-12 DIAGNOSIS — E83.52 HYPERCALCEMIA: ICD-10-CM

## 2022-10-12 DIAGNOSIS — D58.2 ELEVATED HEMOGLOBIN (H): ICD-10-CM

## 2022-10-12 DIAGNOSIS — R60.0 BILATERAL LOWER EXTREMITY EDEMA: ICD-10-CM

## 2022-10-12 DIAGNOSIS — I1A.0 RESISTANT HYPERTENSION: ICD-10-CM

## 2022-10-12 DIAGNOSIS — N28.9 RENAL INSUFFICIENCY: Primary | ICD-10-CM

## 2022-10-12 DIAGNOSIS — I10 BENIGN ESSENTIAL HYPERTENSION: ICD-10-CM

## 2022-10-12 DIAGNOSIS — U07.1 INFECTION DUE TO 2019 NOVEL CORONAVIRUS: ICD-10-CM

## 2022-10-12 LAB
ANION GAP SERPL CALCULATED.3IONS-SCNC: 12 MMOL/L (ref 3–14)
BUN SERPL-MCNC: 34 MG/DL (ref 7–25)
CALCIUM SERPL-MCNC: 10.9 MG/DL (ref 8.6–10.3)
CHLORIDE BLD-SCNC: 99 MMOL/L (ref 98–107)
CO2 SERPL-SCNC: 26 MMOL/L (ref 21–31)
CREAT SERPL-MCNC: 1.84 MG/DL (ref 0.7–1.3)
ERYTHROCYTE [DISTWIDTH] IN BLOOD BY AUTOMATED COUNT: 14.5 % (ref 10–15)
GFR SERPL CREATININE-BSD FRML MDRD: 39 ML/MIN/1.73M2
GLUCOSE BLD-MCNC: 124 MG/DL (ref 70–105)
HCT VFR BLD AUTO: 50.6 % (ref 40–53)
HGB BLD-MCNC: 17.5 G/DL (ref 13.3–17.7)
MCH RBC QN AUTO: 32.1 PG (ref 26.5–33)
MCHC RBC AUTO-ENTMCNC: 34.6 G/DL (ref 31.5–36.5)
MCV RBC AUTO: 93 FL (ref 78–100)
PLATELET # BLD AUTO: 507 10E3/UL (ref 150–450)
POTASSIUM BLD-SCNC: 4.1 MMOL/L (ref 3.5–5.1)
RBC # BLD AUTO: 5.46 10E6/UL (ref 4.4–5.9)
SODIUM SERPL-SCNC: 137 MMOL/L (ref 134–144)
WBC # BLD AUTO: 14.7 10E3/UL (ref 4–11)

## 2022-10-12 PROCEDURE — 99214 OFFICE O/P EST MOD 30 MIN: CPT | Performed by: NURSE PRACTITIONER

## 2022-10-12 PROCEDURE — 82374 ASSAY BLOOD CARBON DIOXIDE: CPT | Mod: ZL | Performed by: NURSE PRACTITIONER

## 2022-10-12 PROCEDURE — 85027 COMPLETE CBC AUTOMATED: CPT | Mod: ZL | Performed by: NURSE PRACTITIONER

## 2022-10-12 PROCEDURE — 36415 COLL VENOUS BLD VENIPUNCTURE: CPT | Mod: ZL | Performed by: NURSE PRACTITIONER

## 2022-10-12 PROCEDURE — G0463 HOSPITAL OUTPT CLINIC VISIT: HCPCS

## 2022-10-12 RX ORDER — SPIRONOLACTONE AND HYDROCHLOROTHIAZIDE 25; 25 MG/1; MG/1
1 TABLET ORAL DAILY
Qty: 180 TABLET | Refills: 3
Start: 2022-10-12 | End: 2022-10-18

## 2022-10-12 RX ORDER — CARVEDILOL 3.12 MG/1
6.25 TABLET ORAL 2 TIMES DAILY WITH MEALS
Qty: 60 TABLET | Refills: 0
Start: 2022-10-12 | End: 2022-10-18

## 2022-10-12 ASSESSMENT — ENCOUNTER SYMPTOMS
HEMATURIA: 0
FATIGUE: 1
SHORTNESS OF BREATH: 0
DIFFICULTY URINATING: 0
FEVER: 0

## 2022-10-12 NOTE — PROGRESS NOTES
ICD-10-CM    1. Renal insufficiency  N28.9 CBC with platelets     Basic metabolic panel     Basic metabolic panel     CBC with platelets      2. Elevated hemoglobin (H)  D58.2 CBC with platelets     CBC with platelets      3. Hypercalcemia  E83.52 Basic metabolic panel     Basic metabolic panel      4. Infection due to 2019 novel coronavirus  U07.1       5. Benign essential hypertension  I10 spironolactone-HCTZ (ALDACTAZIDE) 25-25 MG tablet      6. Bilateral lower extremity edema  R60.0 spironolactone-HCTZ (ALDACTAZIDE) 25-25 MG tablet      7. Resistant hypertension  I10 carvedilol (COREG) 3.125 MG tablet        Plan:  Hemoglobin and hematocrit have improved.  White blood cell count is stable when compared to his baseline between 13-16 over the past year.  Respiratory symptoms improving since taking the Z-Brian.  Renal function has declined overall even from last week.  I suspect patient likely is dehydrated.  I am going to have him increase fluids and decrease Aldactazide from 2 pills daily down to 1 pill daily.  For hypertension control increase Coreg to 2 pills twice daily.  He will keep appointment with cardiology next week for follow-up of hypertension.  I would also ask that kidney function be rechecked at that visit.  All questions answered to their satisfactory.    Igor Figueroa is a 67 year old, presenting for the following health issues:  Follow Up (Covid /)      Patient is seen today to follow-up from urgent care evaluation of COVID-19 infection.  He was seen in urgent care center last week.  He has previous history of antiviral treatment.  At recent urgent care evaluation he was started on Z-Brian for acute bronchitis.  He tells me the antibiotic helped the cough, chest congestion and shortness of breath.  He tells me his main symptom at this time is that he is just so fatigued.  He has chronic fatigue but it has worsened since COVID-19 infection.  Does not seem to be any different than last week.   He has been seeing several different specialists including rheumatology to work-up the chronic fatigue.  By reviewing his lab work at urgent care visit hemoglobin and hematocrit were elevated, he had worsening stage III chronic kidney disease and worsening hypercalcemia that has been longstanding.  He had ultrasound of his kidneys earlier this year.  He has history of kidney stones in the past.  No current voiding symptoms.  He is voiding without difficulty.  No hematuria.  He has no worsening of chronic headache, COPD and chronic back pain.  He has KYLAH and wears CPAP nightly.  At urgent care appointment blood pressure was high.  He was started on Coreg twice daily.  He has an appoint with cardiology next week.  The fatigue is chronic but worse than COVID-19.  No carbon monoxide exposure.  They do have a carbon monoxide test in their home.    History of Present Illness       Back Pain:  He presents for follow up of back pain. Patient's back pain is a chronic problem.  Location of back pain:  Left lower back  Description of back pain: sharp and shooting  Back pain spreads: left thigh    Since patient first noticed back pain, pain is: unchanged  Does back pain interfere with his job:  Yes      COPD:  He presents for follow up of COPD.  Overall, COPD symptoms are stable since last visit. He has more than usual fatigue or shortness of breath with exertion and same as usual shortness of breath at rest.  He sometimes coughs and does not have change in sputum. Patient has had recent fever. He can walk less than 1 block without stopping to rest. He can not walk a flight of stairs without resting. The patient has had no ED, urgent care, or hospital admissions because of COPD since the last visit.     Hypertension: He presents for follow up of hypertension.  He does check blood pressure  regularly outside of the clinic. Outside blood pressures have been over 140/90. He does not follow a low salt diet.     Headaches:   Since  "the patient's last clinic visit, headaches are: worsened  The patient is getting headaches:  Daily  He is able to do normal daily activities when he has a migraine.  The patient is taking the following rescue/relief medications:  Tylenol and Excedrin   Patient states \"The relief is inconsistent\" from the rescue/relief medications.   The patient is taking the following medications to prevent migraines:  No medications to prevent migraines  In the past 4 weeks, the patient has gone to an Urgent Care or Emergency Room 0 times times due to headaches.              Review of Systems   Constitutional: Positive for fatigue. Negative for fever.        Appetite decreased since having COVID-19 infection.   Respiratory: Negative for shortness of breath.    Genitourinary: Negative for difficulty urinating and hematuria.            Objective    BP (!) 138/100   Pulse 90   Temp 96.8  F (36  C) (Tympanic)   Resp 16   Wt 132.8 kg (292 lb 12.8 oz)   SpO2 92%   BMI 39.71 kg/m    Body mass index is 39.71 kg/m .  Physical Exam   Pleasant morbidly obese gentleman no acute distress.  No respiratory distress noted.  Skin color pink.  Neck supple without adenopathy.  No thyromegaly.  Lung fields clear to auscultation throughout.  Cardiovascular regular rate and rhythm with no murmur or S3.    Recent urgent care visit notes, laboratory and diagnostic studies all reviewed and discussed.                      "

## 2022-10-12 NOTE — PATIENT INSTRUCTIONS
Decrease the Aldactazide to 1 pill daily due to reduced renal function.  Increase fluid intake.  Increase the coreg to 3.125 mg 2 pills twice daily  Recheck kidney tests next week at cardiology appt.

## 2022-10-12 NOTE — NURSING NOTE
Chief Complaint   Patient presents with     Follow Up     Covid          FOOD SECURITY SCREENING QUESTIONS  Hunger Vital Signs:  Within the past 12 months we worried whether our food would run out before we got money to buy more. Never  Within the past 12 months the food we bought just didn't last and we didn't have money to get more. Never  Emeli Chaney LPN 10/12/2022 2:36 PM      Initial BP (!) 152/90 (BP Location: Right arm, Patient Position: Sitting, Cuff Size: Adult Large)   Pulse 90   Temp 96.8  F (36  C) (Tympanic)   Resp 16   Wt 132.8 kg (292 lb 12.8 oz)   SpO2 92%   BMI 39.71 kg/m   Estimated body mass index is 39.71 kg/m  as calculated from the following:    Height as of 10/5/22: 1.829 m (6').    Weight as of this encounter: 132.8 kg (292 lb 12.8 oz).  Medication Reconciliation: complete    Emeli Chaney LPN

## 2022-10-18 ENCOUNTER — OFFICE VISIT (OUTPATIENT)
Dept: CARDIOLOGY | Facility: OTHER | Age: 67
End: 2022-10-18
Attending: NURSE PRACTITIONER
Payer: MEDICARE

## 2022-10-18 VITALS
OXYGEN SATURATION: 98 % | DIASTOLIC BLOOD PRESSURE: 84 MMHG | BODY MASS INDEX: 40.69 KG/M2 | SYSTOLIC BLOOD PRESSURE: 144 MMHG | HEART RATE: 78 BPM | WEIGHT: 300 LBS | RESPIRATION RATE: 17 BRPM

## 2022-10-18 DIAGNOSIS — R42 EPISODIC LIGHTHEADEDNESS: ICD-10-CM

## 2022-10-18 DIAGNOSIS — I25.119 ATHEROSCLEROSIS OF NATIVE CORONARY ARTERY OF NATIVE HEART WITH ANGINA PECTORIS (H): ICD-10-CM

## 2022-10-18 DIAGNOSIS — R06.09 DOE (DYSPNEA ON EXERTION): ICD-10-CM

## 2022-10-18 DIAGNOSIS — R61 DIAPHORESIS: ICD-10-CM

## 2022-10-18 DIAGNOSIS — I1A.0 RESISTANT HYPERTENSION: Primary | ICD-10-CM

## 2022-10-18 DIAGNOSIS — Z82.49 FAMILY HISTORY OF PREMATURE CAD: ICD-10-CM

## 2022-10-18 DIAGNOSIS — N18.32 STAGE 3B CHRONIC KIDNEY DISEASE (H): ICD-10-CM

## 2022-10-18 DIAGNOSIS — E78.2 MIXED DYSLIPIDEMIA: ICD-10-CM

## 2022-10-18 DIAGNOSIS — R68.89 ACTIVITY INTOLERANCE: ICD-10-CM

## 2022-10-18 DIAGNOSIS — M62.81 GENERALIZED MUSCLE WEAKNESS: ICD-10-CM

## 2022-10-18 DIAGNOSIS — Z82.41 FAMILY HISTORY OF SUDDEN CARDIAC DEATH (SCD): ICD-10-CM

## 2022-10-18 DIAGNOSIS — I25.10 CORONARY ARTERY CALCIFICATION: ICD-10-CM

## 2022-10-18 DIAGNOSIS — G47.33 OSA ON CPAP: ICD-10-CM

## 2022-10-18 LAB
ANION GAP SERPL CALCULATED.3IONS-SCNC: 7 MMOL/L (ref 3–14)
ATRIAL RATE - MUSE: 65 BPM
BUN SERPL-MCNC: 23 MG/DL (ref 7–25)
CALCIUM SERPL-MCNC: 10.4 MG/DL (ref 8.6–10.3)
CHLORIDE BLD-SCNC: 101 MMOL/L (ref 98–107)
CO2 SERPL-SCNC: 29 MMOL/L (ref 21–31)
CREAT SERPL-MCNC: 1.58 MG/DL (ref 0.7–1.3)
DIASTOLIC BLOOD PRESSURE - MUSE: NORMAL MMHG
GFR SERPL CREATININE-BSD FRML MDRD: 47 ML/MIN/1.73M2
GLUCOSE BLD-MCNC: 119 MG/DL (ref 70–105)
INTERPRETATION ECG - MUSE: NORMAL
P AXIS - MUSE: 43 DEGREES
POTASSIUM BLD-SCNC: 3.6 MMOL/L (ref 3.5–5.1)
PR INTERVAL - MUSE: 234 MS
QRS DURATION - MUSE: 98 MS
QT - MUSE: 392 MS
QTC - MUSE: 407 MS
R AXIS - MUSE: 17 DEGREES
SODIUM SERPL-SCNC: 137 MMOL/L (ref 134–144)
SYSTOLIC BLOOD PRESSURE - MUSE: NORMAL MMHG
T AXIS - MUSE: 26 DEGREES
VENTRICULAR RATE- MUSE: 65 BPM

## 2022-10-18 PROCEDURE — 99215 OFFICE O/P EST HI 40 MIN: CPT | Performed by: NURSE PRACTITIONER

## 2022-10-18 PROCEDURE — G0463 HOSPITAL OUTPT CLINIC VISIT: HCPCS | Mod: 25

## 2022-10-18 PROCEDURE — 93010 ELECTROCARDIOGRAM REPORT: CPT | Performed by: INTERNAL MEDICINE

## 2022-10-18 PROCEDURE — 80048 BASIC METABOLIC PNL TOTAL CA: CPT | Mod: ZL | Performed by: NURSE PRACTITIONER

## 2022-10-18 PROCEDURE — 93005 ELECTROCARDIOGRAM TRACING: CPT | Performed by: NURSE PRACTITIONER

## 2022-10-18 PROCEDURE — 36415 COLL VENOUS BLD VENIPUNCTURE: CPT | Mod: ZL | Performed by: NURSE PRACTITIONER

## 2022-10-18 RX ORDER — LIDOCAINE 40 MG/G
CREAM TOPICAL
Status: CANCELLED | OUTPATIENT
Start: 2022-10-18

## 2022-10-18 RX ORDER — AMLODIPINE BESYLATE 5 MG/1
5 TABLET ORAL DAILY
Qty: 90 TABLET | Refills: 3 | Status: SHIPPED | OUTPATIENT
Start: 2022-10-18 | End: 2022-11-04

## 2022-10-18 RX ORDER — ROSUVASTATIN CALCIUM 5 MG/1
5 TABLET, COATED ORAL AT BEDTIME
Qty: 90 TABLET | Refills: 3 | Status: SHIPPED | OUTPATIENT
Start: 2022-10-18 | End: 2022-11-11

## 2022-10-18 RX ORDER — CARVEDILOL 6.25 MG/1
6.25 TABLET ORAL 2 TIMES DAILY WITH MEALS
Qty: 180 TABLET | Refills: 1 | Status: SHIPPED | OUTPATIENT
Start: 2022-10-18 | End: 2022-11-11

## 2022-10-18 RX ORDER — POTASSIUM CHLORIDE 1500 MG/1
20 TABLET, EXTENDED RELEASE ORAL
Status: CANCELLED | OUTPATIENT
Start: 2022-10-18

## 2022-10-18 RX ORDER — FENOFIBRATE 145 MG/1
145 TABLET, COATED ORAL DAILY
COMMUNITY
Start: 2022-10-18 | End: 2022-11-11

## 2022-10-18 RX ORDER — POTASSIUM CHLORIDE 1500 MG/1
40 TABLET, EXTENDED RELEASE ORAL
Status: CANCELLED | OUTPATIENT
Start: 2022-10-18

## 2022-10-18 RX ORDER — SODIUM CHLORIDE 9 MG/ML
INJECTION, SOLUTION INTRAVENOUS CONTINUOUS
Status: CANCELLED | OUTPATIENT
Start: 2022-10-18

## 2022-10-18 ASSESSMENT — PAIN SCALES - GENERAL: PAINLEVEL: MILD PAIN (3)

## 2022-10-18 NOTE — NURSING NOTE
Chief Complaint   Patient presents with     Hypertension   Patient here for a consult for HTN.  Initial BP (!) 144/84   Pulse 78   Resp 17   Wt 136.1 kg (300 lb)   SpO2 98%   BMI 40.69 kg/m   Estimated body mass index is 40.69 kg/m  as calculated from the following:    Height as of 10/5/22: 1.829 m (6').    Weight as of this encounter: 136.1 kg (300 lb).  Medication Reconciliation: complete    FOOD SECURITY SCREENING QUESTIONS  Hunger Vital Signs:  Within the past 12 months we worried whether our food would run out before we got money to buy more. Never  Within the past 12 months the food we bought just didn't last and we didn't have money to get more. Never  Albina David LPN 10/18/2022 12:45 PM             Albina David LPN

## 2022-10-18 NOTE — PROGRESS NOTES
[facility] HEART CARE   CARDIOLOGY CONSULT     Selvin An   PO   [facility] [address]      Matt Whitmore     Chief Complaint   Patient presents with     Hypertension        HPI:   Mr. An is a 67-year-old male who presents for cardiology evaluation, referred for resistant hypertension.  Patient's past medical history includes hyperlipidemia, obesity, stage III CKD, insomnia, KYLAH on CPAP, polymyalgia rheumatica, history of malignant bladder tumor s/p TURBT x2 in 2018, fibromyalgia, B12 deficiency, history of thyroid cancer, postoperative hypothyroidism and history of splenectomy.    Patient reports a significant family history of premature CAD. His older brother had history of MI resulting in cardiac arrest, was resuscitated and underwent urgent 5V CABG at the age of 62. Both of his parents  as a result of sudden cardiac death MI in their early 70's yoa.     Patient had a CT of his chest in 2021 at [facility], noted findings of significant coronary calcification. He had been on Atorvastatin about 7 years ago and went off after late stage lyme infection with diffuse arthralgias and myalgias persisting. He never went back on statin following. He has been on Fenofibrate for triglycerides >500. Most recent lipid panel one year ago revealing total cholesterol 214, triglycerides improved to 140, HDL 43 and .    Patient admits to severe activity intolerance over the past couple of years which has been progressive. Patient describes minimal activity for about 15 minutes leads to feeling unwell, no energy, feeling weakness and lightheadedness. Admits that he will typically rest for 5-10 min and then can resume activity for a short while before he needs to rest again. Admits that this cycle continues with any minimal activity around his house, he had adjusted to slow progress on his projects around the house and feels that symptoms have worsened. He does not endorse any associated chest pain or  pressure, no pain in the arm, neck, jaw or back intrascapular region. Positive for chronic CAMPOS, does not exert himself much. This last summer he notes a new symptom, describes severe diaphoresis with any exertion or activity, describes dripping in sweat which is not typical for him. No associated nausea or vomiting. No palpitations. No near syncope or syncope. No increased edema, orthopnea or PND.    In regards to his longstanding HTN, patient was recently started on Carvedilol 3.125 mg BID on 10/5/22. At visit on 10/12/22 Carvedilol was increased to 6.25 mg BID.  He has continued on Spironolactone- hydrochlorothiazide, recently decreased to 25-25 mg once daily secondary to decline in renal function and concern for dehydration.       PAST MEDICAL HISTORY:   Past Medical History:   Diagnosis Date     Bell's palsy 3/30/2017     Bilateral carpal tunnel syndrome     No Comments Provided     Essential (primary) hypertension     No Comments Provided     Gout 6/3/2013     Hyperlipidemia     No Comments Provided     Hypothyroidism     No Comments Provided     Malignant neoplasm of thyroid gland (H)     1994     Sleep apnea     No Comments Provided          FAMILY HISTORY:   Family History   Problem Relation Age of Onset     Heart Disease Mother         Heart Disease,MI - SCD     Heart Disease Father         Heart Disease,MI - SCD          PAST SURGICAL HISTORY:   Past Surgical History:   Procedure Laterality Date     COLONOSCOPY  09/09/2014 9/2014,Normal - follow up 10 years     CYSTOSCOPY      bladder cancer     OTHER SURGICAL HISTORY      ,SPLENECTOMY     OTHER SURGICAL HISTORY      2010,05333,EYE MUSCLE SURGERY,Left,strabismus     OTHER SURGICAL HISTORY      10/06/14,[id number],OTHER,Left,Dr. Lana LEHMAN     OTHER SURGICAL HISTORY      10/14/14,[id number],OTHER,Left,Dr. Lana LEHMAN     OTHER SURGICAL HISTORY      11/16/2017,[id number],ENDOSCOPIC RELEASE TRANSVERSE CARPAL LIGAMENT OF HAND,Left     RELEASE CARPAL TUNNEL       12/2011     RETINAL DETACHMENT SURGERY Right      STRABISMUS SURGERY Left      THYROIDECTOMY      1994,thyroid ca     TONSILLECTOMY      2004,T & A > 13yo     VASECTOMY      No Comments Provided          SOCIAL HISTORY:   Social History     Socioeconomic History     Marital status:      Spouse name: None     Number of children: None     Years of education: None     Highest education level: None   Tobacco Use     Smoking status: Never     Smokeless tobacco: Never   Vaping Use     Vaping Use: Never used   Substance and Sexual Activity     Alcohol use: No     Comment: none     Drug use: No     Sexual activity: Yes     Partners: Female     Birth control/protection: None   Social History Narrative    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          CURRENT MEDICATIONS:   Prior to Admission medications    Medication Sig Start Date End Date Taking? Authorizing Provider   allopurinol (ZYLOPRIM) 300 MG tablet Take 1 tablet (300 mg) by mouth daily - for gout prevention 12/23/21  Yes Matt Whitmore MD   B Complex-C (VITAMIN B COMPLEX W/VITAMIN C) TABS tablet Take 1 tablet by mouth daily   Yes Reported, Patient   benzonatate (TESSALON) 200 MG capsule Take 1 capsule (200 mg) by mouth 3 times daily as needed for cough 10/5/22  Yes Carmen Lopez APRN CNP   carvedilol (COREG) 3.125 MG tablet Take 2 tablets (6.25 mg) by mouth 2 times daily (with meals) 10/12/22  Yes Lindsay Ronquillo, NP   Cholecalciferol (VITAMIN D-3) 125 MCG (5000 UT) TABS Take 5,000 Units by mouth daily 1/29/21  Yes Matt Whitmore MD   Cyanocobalamin 2500 MCG TABS Take 2,500 mcg by mouth daily -- or every other day -- OTC okay 3/17/22  Yes Matt Whitmore MD   DULoxetine (CYMBALTA) 30 MG capsule Take 1 capsule (30 mg) by mouth At Bedtime for 14 days, THEN 2 capsules (60 mg) At Bedtime for 90 days. 8/30/22 12/12/22 Yes Matt Whitmore MD   gabapentin (NEURONTIN) 100 MG capsule Take  1-2 capsules (100-200 mg) by mouth 4 times daily as needed - for burning/shooting nerve pain 8/30/22  Yes Matt Whitmore MD   Icosapent Ethyl 1 g CAPS Take 2 g by mouth 2 times daily With meals 9/23/22  Yes Matt Whitmore MD   levothyroxine (SYNTHROID/LEVOTHROID) 200 MCG tablet Take 1 tablet (200 mcg) by mouth daily 3/17/22  Yes Matt Whitmore MD   loratadine (CLARITIN) 10 MG tablet Take 10 mg by mouth daily   Yes Reported, Patient   magnesium 250 MG tablet Take 1 tablet by mouth daily   Yes Reported, Patient   Melatonin 10 MG TABS tablet Take 20 mg by mouth 2 times daily    Yes Reported, Patient   Multiple Vitamins-Minerals (CENTRUM ADULTS PO) Take 1 tablet by mouth daily   Yes Reported, Patient   order for DME CPAP machine for home use at pressure: 10, with supplies:  humidifier, chamber, mask, tubing 1/11/18  Yes Reported, Patient   predniSONE (DELTASONE) 1 MG tablet By mouth 8mg daily x30 days then 7mg daily x30 days then 6mg daily x30 days ten 5mg daily x30 days. Combine with 5mg tabs to make total daily dose. 9/21/22  Yes Phill Acosta MD   predniSONE (DELTASONE) 5 MG tablet By mouth 8mg daily x30 days then 7mg daily x30 days then 6mg daily x30 days ten 5mg daily x30 days. Combine with 1mg tabs to make total daily dose. 9/21/22  Yes Phill Acosta MD   predniSONE (DELTASONE) 5 MG tablet Take 2 tablets (10 mg) by mouth daily + 3 mg - total of 13 mg daily 6/3/22  Yes Matt Whitmore MD   Probiotic Product (PROBIOTIC-10 PO) Take 1 tablet by mouth daily   Yes Reported, Patient   sildenafil (VIAGRA) 100 MG tablet Take 1/2 to 1 tablet 1 hour prior to sexual activity 8/17/22  Yes Maximo Schwartz MD   spironolactone-HCTZ (ALDACTAZIDE) 25-25 MG tablet Take 1 tablet by mouth daily -- AM and Afternoon -- for hypertension 10/12/22  Yes Lindsay Ronquillo, NP   tamsulosin (FLOMAX) 0.4 MG capsule Take 1 capsule (0.4 mg) by mouth every evening 8/17/22  Yes Maximo Schwartz MD   testosterone (ANDROGEL  1.62% PUMP) 20.25 MG/ACT gel Place 2 Pump onto the skin every morning 8/17/22  Yes Maximo Schwartz MD          ALLERGIES:   Allergies   Allergen Reactions     Losartan Cough     Omeprazole Diarrhea     Pantoprazole Diarrhea     Valsartan Cough     Started with losartan, worsened with high dose valsartan          ROS:   CONSTITUTIONAL: No reported fever or chills. No changes in weight. Positive for activity intolerance.   ENT: No visual disturbance, ear ache, epistaxis or sore throat.   CARDIOVASCULAR: No chest pain, chest pressure or chest discomfort. No palpitations or lower extremity edema.   RESPIRATORY: Chronic dyspnea upon exertion, cough, wheezing or hemoptysis. No orthopnea or PND.   GI: No reported abdominal pain, melena or hematochezia.   : No reported hematuria or dysuria.  NEUROLOGICAL: Positive for exertional lightheadedness. No dizziness or syncope. Positive for generalized weakness.   HEMATOLOGIC: No history of anemia. No bleeding or excessive bruising. No history of blood clots.   MUSCULOSKELETAL: Positive for chronic joint pains and muscle aches following lyme disease (2017).  ENDOCRINOLOGIC: No temperature intolerance. No hair or skin changes.  SKIN: No abnormal rashes or sores, no unusual itching.  PSYCHIATRIC: No history of depression or anxiety. No changes in mood, feeling down or anxious. No changes in sleep.      PHYSICAL EXAM:   BP (!) 144/84   Pulse 78   Resp 17   Wt 136.1 kg (300 lb)   SpO2 98%   BMI 40.69 kg/m    GENERAL: The patient is a well-developed, well-nourished, in no apparent distress.  HEENT: Head is normocephalic and atraumatic. Eyes are symmetrical with normal visual tracking. No icterus, no xanthelasmas. Nares appeared normal without nasal drainage. Mucous membranes are moist, no cyanosis.  NECK: Supple, no cervical bruits, JVP not visible.   CHEST/ LUNGS: Lungs clear to auscultation, no rales, rhonchi or wheezes, no use of accessory muscles, no retractions, respirations  unlabored and normal respiratory rate.   CARDIO: Regular rate and rhythm normal with S1 and S2, no S3 or S4 and no murmur, click or rub. Unable to assess PMI.  ABD: Abdomen is distended.   EXTREMITIES: Trace LE edema present bilaterally.   MUSCULOSKELETAL: No visible joint swelling.   NEUROLOGIC: Alert and oriented X3. Normal speech, gait and affect. No focal neurologic deficits.   SKIN: No jaundice. No rashes or visible skin lesions present. No ecchymosis.     EKG:    SR with first degree AV block, otherwise normal ECG.     LAB RESULTS:   Office Visit on 10/12/2022   Component Date Value Ref Range Status     Sodium 10/12/2022 137  134 - 144 mmol/L Final     Potassium 10/12/2022 4.1  3.5 - 5.1 mmol/L Final     Chloride 10/12/2022 99  98 - 107 mmol/L Final     Carbon Dioxide (CO2) 10/12/2022 26  21 - 31 mmol/L Final     Anion Gap 10/12/2022 12  3 - 14 mmol/L Final     Urea Nitrogen 10/12/2022 34 (H)  7 - 25 mg/dL Final     Creatinine 10/12/2022 1.84 (H)  0.70 - 1.30 mg/dL Final     Calcium 10/12/2022 10.9 (H)  8.6 - 10.3 mg/dL Final     Glucose 10/12/2022 124 (H)  70 - 105 mg/dL Final     GFR Estimate 10/12/2022 39 (L)  >60 mL/min/1.73m2 Final     WBC Count 10/12/2022 14.7 (H)  4.0 - 11.0 10e3/uL Final     RBC Count 10/12/2022 5.46  4.40 - 5.90 10e6/uL Final     Hemoglobin 10/12/2022 17.5  13.3 - 17.7 g/dL Final     Hematocrit 10/12/2022 50.6  40.0 - 53.0 % Final     MCV 10/12/2022 93  78 - 100 fL Final     MCH 10/12/2022 32.1  26.5 - 33.0 pg Final     MCHC 10/12/2022 34.6  31.5 - 36.5 g/dL Final     RDW 10/12/2022 14.5  10.0 - 15.0 % Final     Platelet Count 10/12/2022 507 (H)  150 - 450 10e3/uL Final   Office Visit on 10/05/2022   Component Date Value Ref Range Status     N Terminal Pro BNP Outpatient 10/05/2022 8  0 - 100 pg/mL Final     Troponin I 10/05/2022 16.4  0.0 - 34.0 pg/mL Final     Sodium 10/05/2022 135  134 - 144 mmol/L Final     Potassium 10/05/2022 4.1  3.5 - 5.1 mmol/L Final     Chloride 10/05/2022  95 (L)  98 - 107 mmol/L Final     Carbon Dioxide (CO2) 10/05/2022 29  21 - 31 mmol/L Final     Anion Gap 10/05/2022 11  3 - 14 mmol/L Final     Urea Nitrogen 10/05/2022 42 (H)  7 - 25 mg/dL Final     Creatinine 10/05/2022 1.73 (H)  0.70 - 1.30 mg/dL Final     Calcium 10/05/2022 11.5 (H)  8.6 - 10.3 mg/dL Final     Glucose 10/05/2022 128 (H)  70 - 105 mg/dL Final     GFR Estimate 10/05/2022 42 (L)  >60 mL/min/1.73m2 Final     D-Dimer Quantitative 10/05/2022 0.46  0.00 - 0.50 ug/mL FEU Final     Ventricular Rate 10/05/2022 77  BPM Final     Atrial Rate 10/05/2022 77  BPM Final     MS Interval 10/05/2022 198  ms Final     QRS Duration 10/05/2022 100  ms Final     QT 10/05/2022 364  ms Final     QTc 10/05/2022 411  ms Final     P Axis 10/05/2022 17  degrees Final     R AXIS 10/05/2022 18  degrees Final     T Axis 10/05/2022 81  degrees Final     Interpretation ECG 10/05/2022    Final                    Value:Sinus rhythm  T wave abnormality, consider lateral ischemia  Abnormal ECG  When compared with ECG of 02-MAR-2022 12:09,  T wave inversion now evident in Lateral leads  Confirmed by MD GARTH, MARTHA ([id number]) on 10/6/2022 6:16:28 PM       WBC Count 10/05/2022 14.6 (H)  4.0 - 11.0 10e3/uL Final     RBC Count 10/05/2022 5.82  4.40 - 5.90 10e6/uL Final     Hemoglobin 10/05/2022 18.7 (H)  13.3 - 17.7 g/dL Final     Hematocrit 10/05/2022 52.8  40.0 - 53.0 % Final     MCV 10/05/2022 91  78 - 100 fL Final     MCH 10/05/2022 32.1  26.5 - 33.0 pg Final     MCHC 10/05/2022 35.4  31.5 - 36.5 g/dL Final     RDW 10/05/2022 14.0  10.0 - 15.0 % Final     Platelet Count 10/05/2022 507 (H)  150 - 450 10e3/uL Final     % Neutrophils 10/05/2022 63  % Final     % Lymphocytes 10/05/2022 21  % Final     % Monocytes 10/05/2022 12  % Final     % Eosinophils 10/05/2022 1  % Final     % Basophils 10/05/2022 1  % Final     % Immature Granulocytes 10/05/2022 2  % Final     NRBCs per 100 WBC 10/05/2022 0  <1 /100 Final     Absolute Neutrophils  10/05/2022 9.3 (H)  1.6 - 8.3 10e3/uL Final     Absolute Lymphocytes 10/05/2022 3.0  0.8 - 5.3 10e3/uL Final     Absolute Monocytes 10/05/2022 1.7 (H)  0.0 - 1.3 10e3/uL Final     Absolute Eosinophils 10/05/2022 0.1  0.0 - 0.7 10e3/uL Final     Absolute Basophils 10/05/2022 0.2  0.0 - 0.2 10e3/uL Final     Absolute Immature Granulocytes 10/05/2022 0.3  <=0.4 10e3/uL Final     Absolute NRBCs 10/05/2022 0.0  10e3/uL Final   Office Visit on 09/25/2022   Component Date Value Ref Range Status     Influenza A PCR 09/25/2022 Negative  Negative Final     Influenza B PCR 09/25/2022 Negative  Negative Final     RSV PCR 09/25/2022 Negative  Negative Final     SARS CoV2 PCR 09/25/2022 Positive (A)  Negative Final     WBC Count 09/25/2022 10.0  4.0 - 11.0 10e3/uL Final     RBC Count 09/25/2022 5.25  4.40 - 5.90 10e6/uL Final     Hemoglobin 09/25/2022 16.7  13.3 - 17.7 g/dL Final     Hematocrit 09/25/2022 48.9  40.0 - 53.0 % Final     MCV 09/25/2022 93  78 - 100 fL Final     MCH 09/25/2022 31.8  26.5 - 33.0 pg Final     MCHC 09/25/2022 34.2  31.5 - 36.5 g/dL Final     RDW 09/25/2022 14.7  10.0 - 15.0 % Final     Platelet Count 09/25/2022 405  150 - 450 10e3/uL Final     % Neutrophils 09/25/2022 65  % Final     % Lymphocytes 09/25/2022 20  % Final     % Monocytes 09/25/2022 14  % Final     % Eosinophils 09/25/2022 0  % Final     % Basophils 09/25/2022 1  % Final     % Immature Granulocytes 09/25/2022 0  % Final     NRBCs per 100 WBC 09/25/2022 0  <1 /100 Final     Absolute Neutrophils 09/25/2022 6.5  1.6 - 8.3 10e3/uL Final     Absolute Lymphocytes 09/25/2022 2.0  0.8 - 5.3 10e3/uL Final     Absolute Monocytes 09/25/2022 1.4 (H)  0.0 - 1.3 10e3/uL Final     Absolute Eosinophils 09/25/2022 0.0  0.0 - 0.7 10e3/uL Final     Absolute Basophils 09/25/2022 0.1  0.0 - 0.2 10e3/uL Final     Absolute Immature Granulocytes 09/25/2022 0.0  <=0.4 10e3/uL Final     Absolute NRBCs 09/25/2022 0.0  10e3/uL Final     Hold Specimen 09/25/2022 [facility]    Final   Lab on 09/21/2022   Component Date Value Ref Range Status     Erythrocyte Sedimentation Rate 09/21/2022 7  0 - 20 mm/hr Final     CRP Inflammation 09/21/2022 4.87  <5.00 mg/L Final     Sodium 09/21/2022 140  136 - 145 mmol/L Final     Potassium 09/21/2022 3.7  3.4 - 5.3 mmol/L Final     Chloride 09/21/2022 101  98 - 107 mmol/L Final     Carbon Dioxide (CO2) 09/21/2022 23  22 - 29 mmol/L Final     Anion Gap 09/21/2022 16 (H)  7 - 15 mmol/L Final     Urea Nitrogen 09/21/2022 31.7 (H)  8.0 - 23.0 mg/dL Final     Creatinine 09/21/2022 1.31 (H)  0.67 - 1.17 mg/dL Final     Calcium 09/21/2022 11.2 (H)  8.8 - 10.2 mg/dL Final     Glucose 09/21/2022 101 (H)  70 - 99 mg/dL Final     Alkaline Phosphatase 09/21/2022 57  40 - 129 U/L Final     AST 09/21/2022 32  10 - 50 U/L Final     ALT 09/21/2022 23  10 - 50 U/L Final     Protein Total 09/21/2022 8.3  6.4 - 8.3 g/dL Final     Albumin 09/21/2022 4.5  3.5 - 5.2 g/dL Final     Bilirubin Total 09/21/2022 0.6  <=1.2 mg/dL Final     GFR Estimate 09/21/2022 59 (L)  >60 mL/min/1.73m2 Final     Hepatitis B Core Antibody Total 09/21/2022 Nonreactive  Nonreactive Final     Hepatitis B Surface Antigen 09/21/2022 Nonreactive  Nonreactive Final     Hepatitis C Antibody 09/21/2022 Nonreactive  Nonreactive Final     HIV Antigen Antibody Combo 09/21/2022 Nonreactive  Nonreactive Final     Cryoglobulin Interpretation 09/21/2022 Negative  Negative Final     Thyroid Peroxidase Antibody 09/21/2022 46 (H)  <35 IU/mL Final     CK 09/21/2022 417 (H)  39 - 308 U/L Final     Aldolase 09/21/2022 4.1  1.2 - 7.6 U/L Final     Immunofixation ELP 09/21/2022 Small monoclonal IgG immunoglobulin of lambda light chain type. Pathologic significance requires clinical correlation. KIRIT Dumont M.D., Ph.D., Pathologist   Final     Rheumatoid Factor 09/21/2022 <6  <12 IU/mL Final     Cyclic Citrullinated Peptide Antib* 09/21/2022 1.3  <7.0 U/mL Final     Immunoglobulin A 09/21/2022 486  84 - 499 mg/dL  Final     Immunoglobulin G 09/21/2022 1,183  610 - 1,616 mg/dL Final     Immunoglobulin M 09/21/2022 34 (L)  35 - 242 mg/dL Final     WBC Count 09/21/2022 13.9 (H)  4.0 - 11.0 10e3/uL Final     RBC Count 09/21/2022 5.79  4.40 - 5.90 10e6/uL Final     Hemoglobin 09/21/2022 18.2 (H)  13.3 - 17.7 g/dL Final     Hematocrit 09/21/2022 53.5 (H)  40.0 - 53.0 % Final     MCV 09/21/2022 92  78 - 100 fL Final     MCH 09/21/2022 31.4  26.5 - 33.0 pg Final     MCHC 09/21/2022 34.0  31.5 - 36.5 g/dL Final     RDW 09/21/2022 14.6  10.0 - 15.0 % Final     Platelet Count 09/21/2022 464 (H)  150 - 450 10e3/uL Final     % Neutrophils 09/21/2022 61  % Final     % Lymphocytes 09/21/2022 25  % Final     % Monocytes 09/21/2022 10  % Final     % Eosinophils 09/21/2022 2  % Final     % Basophils 09/21/2022 1  % Final     % Immature Granulocytes 09/21/2022 1  % Final     NRBCs per 100 WBC 09/21/2022 0  <1 /100 Final     Absolute Neutrophils 09/21/2022 8.5 (H)  1.6 - 8.3 10e3/uL Final     Absolute Lymphocytes 09/21/2022 3.5  0.8 - 5.3 10e3/uL Final     Absolute Monocytes 09/21/2022 1.5 (H)  0.0 - 1.3 10e3/uL Final     Absolute Eosinophils 09/21/2022 0.2  0.0 - 0.7 10e3/uL Final     Absolute Basophils 09/21/2022 0.1  0.0 - 0.2 10e3/uL Final     Absolute Immature Granulocytes 09/21/2022 0.1  <=0.4 10e3/uL Final     Absolute NRBCs 09/21/2022 0.0  10e3/uL Final     Quantiferon Nil Tube 09/21/2022 0.07  IU/mL Final     Quantiferon TB1 Tube 09/21/2022 0.07  IU/mL Final     Quantiferon TB2 Tube 09/21/2022 0.06   Final     Quantiferon Mitogen 09/21/2022 10.00  IU/mL Final     Quantiferon-TB Gold Plus 09/21/2022 Negative  Negative Final     TB1 Ag minus Nil Value 09/21/2022 0.00  IU/mL Final     TB2 Ag minus Nil Value 09/21/2022 -0.01  IU/mL Final     Mitogen minus Nil Result 09/21/2022 9.93  IU/mL Final     Nil Result 09/21/2022 0.07  IU/mL Final   Lab on 08/10/2022   Component Date Value Ref Range Status     Testosterone Total 08/10/2022 381 286 -  950 ng/dL Final     Hematocrit 08/10/2022 50.8  40.0 - 53.0 % Final          ASSESSMENT:   Selvin An presents for cardiology evaluation, referred for resistant hypertension.  Patient's past medical history includes hyperlipidemia, obesity, stage III CKD, insomnia, KYLAH on CPAP, polymyalgia rheumatica, history of malignant bladder tumor s/p TURBT x2 in 2018, fibromyalgia, B12 deficiency, history of thyroid cancer, postoperative hypothyroidism and history of splenectomy.  He endorsed exertional activity intolerance, CAMPOS, episodic lightheadedness with minimal exertion and severe diaphoresis. Family history significant for premature CAD and sudden cardiac death MI suggestive of LM disease.     1. Resistant hypertension  2. Stage 3b chronic kidney disease (H)  3. Activity intolerance  4. CAMPOS (dyspnea on exertion)  5. Diaphoresis  6. Episodic lightheadedness  7. Generalized muscle weakness  8. Family history of premature CAD  9. Family history of sudden cardiac death (SCD)- fatal MI (both parents)  10. KYLAH on CPAP -- Uses nightly, finds helpful and beneficial  11. Mixed dyslipidemia  12. Coronary artery calcification    PLAN:   1. Patient with anginal equivalent symptoms and activity intolerance, high risk for CAD and significant family history. High risk for stress test, given his symptoms and FM history, he will proceed with coronary angiography at [facility]. He will start taking ASA 81mg daily at this time.   2. Start Crestor at 5 mg daily for dyslipidemia, increase to optimize LDL (<70mgdL) if stable without statin related myalgias- previously noted on Atorvastatin in the setting of lyme infection at that time.   3. Stop Spironolactone-hydrochlorothiazide at this time, renal function has mildly improved with recent dose reduction.   4. He will continue on carvedilol 6.25 mg BID and start Amlodipine 5 mg daily for HTN.   6. Return for repeat lab in one week to reassess renal function before cardiac cath.   Orders Placed  This Encounter   Procedures     Basic metabolic panel     EKG 12-lead complete w/read - Clinics     Case Request Cath Lab: Coronary Angiogram       Thank you for allowing me to participate in the care of your patient. Please do not hesitate to contact me if you have any questions.     Total time 86 min on date of ecounter spent reviewing records, face-to-face time obtaining HPI, physical exam, reviewing results of recent testing, angio teaching, counseling on the above diagnoses and recommended plan of care.    Sophia Chavira, APRN CNP [facility]

## 2022-10-18 NOTE — PATIENT INSTRUCTIONS
You were seen by  JOSE Thornton CNP      Stay on Carvedilol 6.25 mg twice daily.  Start Amlodipine 5 mg daily.   Stop Spironolactone- hydrochlorothiazide medication.   Start Crestor 5 mg every night.   Return for lab in one week ([facility])     Cardiology Providers you saw during your visit: JOSE Thornton CNP       Recommendations:    You will/have be scheduled for a coronary angiogram on at the [facility].     Address:   [address] [place] [id number]   Please arrive at the [facility] Waiting Room at 9:30 am on October 31st 2022    Instructions:   1. Please make arrangements to have a  as you will not be allowed to drive following the procedure.  is available at no additional cost in front of the building.   2. 8 hours prior to arrival stop all food, drink, milk and chewing tobacco.   3. Take a 81 mg aspirin starting today 10/18/22  and the day of your procedure  4. Make arrangements to have someone stay with you the night after your procedure.   5. You will need to take a home COVID test the day prior to your angiogram. Please take a picture of the results to show the staff.    You will be escorted back to the pre procedure area called 2A. Here they will insert an IV, draw labs, and obtain a short medical history. Please bring an updated list of your current medications.     A physician will come and talk with you about the procedure and obtain consent.     A nurse from the Cardiac Catheterization Lab will then escort you to the procedure area. You will be receiving sedation during the procedure so you will need someone to drive you to and from the hospital.     After the procedure you will recover for a short period (2 - 6 hours). You will be discharged with instructions for post procedure care.     However, depending on what the angiogram shows you may have to have stents placed. Most patient are able to go home the same day but sometimes it might require an  overnight stay. We ask that you bring a small bag of necessities for your comfort if you would need to stay overnight. DO NOT BRING ANY VALUABLES!          Thank you for your visit today!      Jen Kenny RN  [facility], Cardiology Department  [phone]     You will follow up with [facility]iology 1 week after angiogram, sooner if needed.       Please call the cardiology office with problems, questions, or concerns at [phone].    If you experience chest pain, chest pressure, chest tightness, shortness of breath, fainting, lightheadedness, nausea, vomiting, or other concerning symptoms, please report to the Emergency Department or call 911. These symptoms may be emergent, and best treated in the Emergency Department.     Cardiology Nurses  MONY Li LPN Amy, LPN  [facility] Cardiology (Unit 3C)  [phone]

## 2022-10-18 NOTE — NURSING NOTE
"Per JOSE Thornton CNP, pt to have COR Angiogram at [facility]. Reviewed allergies, labs, and medications.     -Pt will start Aspirin 81 mg daily today and take on morning of procedure.     -Pt will HOLD B-complex, vitamin D3, cyanocobalamin, probiotic, magnesium and multivitamin starting the day prior to procedure.    -Pt to do an at home Covid test 1-2 days prior to procedure.  If patient is positive they will notify [facility] and[facility]or the [facility]  as soon as possible.  If patient is negative they will take a picture of the test and show picture to staff at the [facility] when they arrive for their procedure.    Angiogram teaching done using the \"Coronary Angiogram, Angioplasty and Stent Placement\" patient guide provided by the [facility].  Packet given, instructions reviewed, questions answered.  Pt verbalizes understanding. Now scheduled for 10/31/22 with 9:30AM arrival.  Pt is agreeable to plan.    Jen Kenny RN......October 18, 2022...3:20 PM   "

## 2022-10-19 DIAGNOSIS — N18.32 STAGE 3B CHRONIC KIDNEY DISEASE (H): Primary | ICD-10-CM

## 2022-10-25 ENCOUNTER — LAB (OUTPATIENT)
Dept: LAB | Facility: OTHER | Age: 67
End: 2022-10-25
Attending: NURSE PRACTITIONER
Payer: MEDICARE

## 2022-10-25 DIAGNOSIS — N18.32 STAGE 3B CHRONIC KIDNEY DISEASE (H): ICD-10-CM

## 2022-10-25 LAB
ANION GAP SERPL CALCULATED.3IONS-SCNC: 7 MMOL/L (ref 3–14)
BUN SERPL-MCNC: 26 MG/DL (ref 7–25)
CALCIUM SERPL-MCNC: 9.5 MG/DL (ref 8.6–10.3)
CHLORIDE BLD-SCNC: 105 MMOL/L (ref 98–107)
CO2 SERPL-SCNC: 25 MMOL/L (ref 21–31)
CREAT SERPL-MCNC: 1.45 MG/DL (ref 0.7–1.3)
GFR SERPL CREATININE-BSD FRML MDRD: 52 ML/MIN/1.73M2
GLUCOSE BLD-MCNC: 117 MG/DL (ref 70–105)
POTASSIUM BLD-SCNC: 3.7 MMOL/L (ref 3.5–5.1)
SODIUM SERPL-SCNC: 137 MMOL/L (ref 134–144)

## 2022-10-25 PROCEDURE — 36415 COLL VENOUS BLD VENIPUNCTURE: CPT | Mod: ZL

## 2022-10-25 PROCEDURE — 80048 BASIC METABOLIC PNL TOTAL CA: CPT | Mod: ZL

## 2022-10-28 ENCOUNTER — TELEPHONE (OUTPATIENT)
Dept: CARDIOLOGY | Facility: OTHER | Age: 67
End: 2022-10-28

## 2022-10-28 ENCOUNTER — TELEPHONE (OUTPATIENT)
Dept: CARDIOLOGY | Facility: CLINIC | Age: 67
End: 2022-10-28

## 2022-10-28 RX ORDER — POTASSIUM CHLORIDE 1500 MG/1
40 TABLET, EXTENDED RELEASE ORAL
Status: CANCELLED | OUTPATIENT
Start: 2022-10-28

## 2022-10-28 RX ORDER — POTASSIUM CHLORIDE 1500 MG/1
20 TABLET, EXTENDED RELEASE ORAL
Status: CANCELLED | OUTPATIENT
Start: 2022-10-28

## 2022-10-28 RX ORDER — LIDOCAINE 40 MG/G
CREAM TOPICAL
Status: CANCELLED | OUTPATIENT
Start: 2022-10-28

## 2022-10-28 RX ORDER — ASPIRIN 81 MG/1
81 TABLET, CHEWABLE ORAL DAILY
COMMUNITY
End: 2023-01-07

## 2022-10-28 RX ORDER — SODIUM CHLORIDE 9 MG/ML
INJECTION, SOLUTION INTRAVENOUS CONTINUOUS
Status: CANCELLED | OUTPATIENT
Start: 2022-10-28

## 2022-10-28 NOTE — TELEPHONE ENCOUNTER
Called and spoke to Patient after verifying last name and date of birth. Called patient and verify that he was taking Aspirin 81 mg daily.  Patient states that he has been. JOSE Thornton CNP updated.     Mariana Servin RN on 10/28/2022 at 2:41 PM

## 2022-10-31 ENCOUNTER — APPOINTMENT (OUTPATIENT)
Dept: MEDSURG UNIT | Facility: CLINIC | Age: 67
End: 2022-10-31
Attending: INTERNAL MEDICINE
Payer: MEDICARE

## 2022-10-31 ENCOUNTER — HOSPITAL ENCOUNTER (OUTPATIENT)
Facility: CLINIC | Age: 67
Discharge: HOME | End: 2022-10-31
Attending: INTERNAL MEDICINE | Admitting: INTERNAL MEDICINE
Payer: MEDICARE

## 2022-10-31 ENCOUNTER — APPOINTMENT (OUTPATIENT)
Dept: LAB | Facility: CLINIC | Age: 67
End: 2022-10-31
Attending: INTERNAL MEDICINE
Payer: MEDICARE

## 2022-10-31 VITALS
WEIGHT: 300.93 LBS | OXYGEN SATURATION: 90 % | TEMPERATURE: 98.5 F | HEIGHT: 72 IN | RESPIRATION RATE: 16 BRPM | BODY MASS INDEX: 40.76 KG/M2 | DIASTOLIC BLOOD PRESSURE: 71 MMHG | SYSTOLIC BLOOD PRESSURE: 111 MMHG | HEART RATE: 65 BPM

## 2022-10-31 DIAGNOSIS — Z82.41 FAMILY HISTORY OF SUDDEN CARDIAC DEATH (SCD): ICD-10-CM

## 2022-10-31 DIAGNOSIS — R42 EPISODIC LIGHTHEADEDNESS: ICD-10-CM

## 2022-10-31 DIAGNOSIS — M62.81 GENERALIZED MUSCLE WEAKNESS: ICD-10-CM

## 2022-10-31 DIAGNOSIS — Z82.49 FAMILY HISTORY OF PREMATURE CAD: ICD-10-CM

## 2022-10-31 DIAGNOSIS — R06.09 DOE (DYSPNEA ON EXERTION): ICD-10-CM

## 2022-10-31 DIAGNOSIS — R61 DIAPHORESIS: ICD-10-CM

## 2022-10-31 DIAGNOSIS — E78.2 MIXED DYSLIPIDEMIA: ICD-10-CM

## 2022-10-31 DIAGNOSIS — N18.32 STAGE 3B CHRONIC KIDNEY DISEASE (H): ICD-10-CM

## 2022-10-31 DIAGNOSIS — R68.89 ACTIVITY INTOLERANCE: ICD-10-CM

## 2022-10-31 DIAGNOSIS — I51.89 OTHER ILL-DEFINED HEART DISEASES: ICD-10-CM

## 2022-10-31 DIAGNOSIS — I25.10 CORONARY ARTERY CALCIFICATION: ICD-10-CM

## 2022-10-31 DIAGNOSIS — I25.119 ATHEROSCLEROSIS OF NATIVE CORONARY ARTERY OF NATIVE HEART WITH ANGINA PECTORIS (H): ICD-10-CM

## 2022-10-31 DIAGNOSIS — I1A.0 RESISTANT HYPERTENSION: ICD-10-CM

## 2022-10-31 LAB
ANION GAP SERPL CALCULATED.3IONS-SCNC: 14 MMOL/L (ref 7–15)
BUN SERPL-MCNC: 22.3 MG/DL (ref 8–23)
CALCIUM SERPL-MCNC: 10.3 MG/DL (ref 8.8–10.2)
CHLORIDE SERPL-SCNC: 106 MMOL/L (ref 98–107)
CREAT SERPL-MCNC: 1.17 MG/DL (ref 0.67–1.17)
DEPRECATED HCO3 PLAS-SCNC: 22 MMOL/L (ref 22–29)
ERYTHROCYTE [DISTWIDTH] IN BLOOD BY AUTOMATED COUNT: 14.9 % (ref 10–15)
GFR SERPL CREATININE-BSD FRML MDRD: 68 ML/MIN/1.73M2
GLUCOSE SERPL-MCNC: 94 MG/DL (ref 70–99)
HCT VFR BLD AUTO: 46.7 % (ref 40–53)
HGB BLD-MCNC: 15.8 G/DL (ref 13.3–17.7)
MCH RBC QN AUTO: 31.2 PG (ref 26.5–33)
MCHC RBC AUTO-ENTMCNC: 33.8 G/DL (ref 31.5–36.5)
MCV RBC AUTO: 92 FL (ref 78–100)
PLATELET # BLD AUTO: 429 10E3/UL (ref 150–450)
POTASSIUM SERPL-SCNC: 3.6 MMOL/L (ref 3.4–5.3)
RBC # BLD AUTO: 5.06 10E6/UL (ref 4.4–5.9)
SODIUM SERPL-SCNC: 142 MMOL/L (ref 136–145)
WBC # BLD AUTO: 11.6 10E3/UL (ref 4–11)

## 2022-10-31 PROCEDURE — 999N000127 HC STATISTIC PERIPHERAL IV START W US GUIDANCE

## 2022-10-31 PROCEDURE — 250N000013 HC RX MED GY IP 250 OP 250 PS 637: Performed by: NURSE PRACTITIONER

## 2022-10-31 PROCEDURE — 999N000142 HC STATISTIC PROCEDURE PREP ONLY

## 2022-10-31 PROCEDURE — 93005 ELECTROCARDIOGRAM TRACING: CPT

## 2022-10-31 PROCEDURE — 999N000054 HC STATISTIC EKG NON-CHARGEABLE

## 2022-10-31 PROCEDURE — C1894 INTRO/SHEATH, NON-LASER: HCPCS | Performed by: INTERNAL MEDICINE

## 2022-10-31 PROCEDURE — 250N000009 HC RX 250: Performed by: INTERNAL MEDICINE

## 2022-10-31 PROCEDURE — 250N000011 HC RX IP 250 OP 636: Performed by: INTERNAL MEDICINE

## 2022-10-31 PROCEDURE — 36415 COLL VENOUS BLD VENIPUNCTURE: CPT | Performed by: INTERNAL MEDICINE

## 2022-10-31 PROCEDURE — 99152 MOD SED SAME PHYS/QHP 5/>YRS: CPT | Mod: GC | Performed by: INTERNAL MEDICINE

## 2022-10-31 PROCEDURE — 999N000134 HC STATISTIC PP CARE STAGE 3

## 2022-10-31 PROCEDURE — 93454 CORONARY ARTERY ANGIO S&I: CPT | Performed by: INTERNAL MEDICINE

## 2022-10-31 PROCEDURE — 258N000003 HC RX IP 258 OP 636: Performed by: NURSE PRACTITIONER

## 2022-10-31 PROCEDURE — 99152 MOD SED SAME PHYS/QHP 5/>YRS: CPT | Performed by: INTERNAL MEDICINE

## 2022-10-31 PROCEDURE — 93010 ELECTROCARDIOGRAM REPORT: CPT | Mod: 59 | Performed by: INTERNAL MEDICINE

## 2022-10-31 PROCEDURE — C1887 CATHETER, GUIDING: HCPCS | Performed by: INTERNAL MEDICINE

## 2022-10-31 PROCEDURE — 85027 COMPLETE CBC AUTOMATED: CPT | Performed by: INTERNAL MEDICINE

## 2022-10-31 PROCEDURE — 272N000001 HC OR GENERAL SUPPLY STERILE: Performed by: INTERNAL MEDICINE

## 2022-10-31 PROCEDURE — 93454 CORONARY ARTERY ANGIO S&I: CPT | Mod: 26 | Performed by: INTERNAL MEDICINE

## 2022-10-31 PROCEDURE — 99153 MOD SED SAME PHYS/QHP EA: CPT | Performed by: INTERNAL MEDICINE

## 2022-10-31 PROCEDURE — 82310 ASSAY OF CALCIUM: CPT | Performed by: INTERNAL MEDICINE

## 2022-10-31 RX ORDER — OXYCODONE HYDROCHLORIDE 5 MG/1
5 TABLET ORAL EVERY 4 HOURS PRN
Status: DISCONTINUED | OUTPATIENT
Start: 2022-10-31 | End: 2022-10-31 | Stop reason: HOSPADM

## 2022-10-31 RX ORDER — NALOXONE HYDROCHLORIDE 0.4 MG/ML
0.2 INJECTION, SOLUTION INTRAMUSCULAR; INTRAVENOUS; SUBCUTANEOUS
Status: DISCONTINUED | OUTPATIENT
Start: 2022-10-31 | End: 2022-10-31 | Stop reason: HOSPADM

## 2022-10-31 RX ORDER — NITROGLYCERIN 5 MG/ML
VIAL (ML) INTRAVENOUS
Status: DISCONTINUED | OUTPATIENT
Start: 2022-10-31 | End: 2022-10-31 | Stop reason: HOSPADM

## 2022-10-31 RX ORDER — FENTANYL CITRATE 50 UG/ML
INJECTION, SOLUTION INTRAMUSCULAR; INTRAVENOUS
Status: DISCONTINUED | OUTPATIENT
Start: 2022-10-31 | End: 2022-10-31 | Stop reason: HOSPADM

## 2022-10-31 RX ORDER — LIDOCAINE 40 MG/G
CREAM TOPICAL
Status: DISCONTINUED | OUTPATIENT
Start: 2022-10-31 | End: 2022-10-31 | Stop reason: HOSPADM

## 2022-10-31 RX ORDER — ONDANSETRON 2 MG/ML
INJECTION INTRAMUSCULAR; INTRAVENOUS
Status: DISCONTINUED | OUTPATIENT
Start: 2022-10-31 | End: 2022-10-31 | Stop reason: HOSPADM

## 2022-10-31 RX ORDER — HEPARIN SODIUM 1000 [USP'U]/ML
INJECTION, SOLUTION INTRAVENOUS; SUBCUTANEOUS
Status: DISCONTINUED | OUTPATIENT
Start: 2022-10-31 | End: 2022-10-31 | Stop reason: HOSPADM

## 2022-10-31 RX ORDER — FLUMAZENIL 0.1 MG/ML
0.2 INJECTION, SOLUTION INTRAVENOUS
Status: DISCONTINUED | OUTPATIENT
Start: 2022-10-31 | End: 2022-10-31 | Stop reason: HOSPADM

## 2022-10-31 RX ORDER — IOPAMIDOL 755 MG/ML
INJECTION, SOLUTION INTRAVASCULAR
Status: DISCONTINUED | OUTPATIENT
Start: 2022-10-31 | End: 2022-10-31 | Stop reason: HOSPADM

## 2022-10-31 RX ORDER — ATROPINE SULFATE 0.1 MG/ML
0.5 INJECTION INTRAVENOUS
Status: DISCONTINUED | OUTPATIENT
Start: 2022-10-31 | End: 2022-10-31 | Stop reason: HOSPADM

## 2022-10-31 RX ORDER — POTASSIUM CHLORIDE 750 MG/1
20 TABLET, EXTENDED RELEASE ORAL
Status: COMPLETED | OUTPATIENT
Start: 2022-10-31 | End: 2022-10-31

## 2022-10-31 RX ORDER — OXYCODONE HYDROCHLORIDE 10 MG/1
10 TABLET ORAL EVERY 4 HOURS PRN
Status: DISCONTINUED | OUTPATIENT
Start: 2022-10-31 | End: 2022-10-31 | Stop reason: HOSPADM

## 2022-10-31 RX ORDER — NALOXONE HYDROCHLORIDE 0.4 MG/ML
0.4 INJECTION, SOLUTION INTRAMUSCULAR; INTRAVENOUS; SUBCUTANEOUS
Status: DISCONTINUED | OUTPATIENT
Start: 2022-10-31 | End: 2022-10-31 | Stop reason: HOSPADM

## 2022-10-31 RX ORDER — POTASSIUM CHLORIDE 750 MG/1
40 TABLET, EXTENDED RELEASE ORAL
Status: DISCONTINUED | OUTPATIENT
Start: 2022-10-31 | End: 2022-10-31 | Stop reason: HOSPADM

## 2022-10-31 RX ORDER — SODIUM CHLORIDE 9 MG/ML
INJECTION, SOLUTION INTRAVENOUS CONTINUOUS
Status: DISCONTINUED | OUTPATIENT
Start: 2022-10-31 | End: 2022-10-31 | Stop reason: HOSPADM

## 2022-10-31 RX ORDER — FENTANYL CITRATE 50 UG/ML
25 INJECTION, SOLUTION INTRAMUSCULAR; INTRAVENOUS
Status: DISCONTINUED | OUTPATIENT
Start: 2022-10-31 | End: 2022-10-31 | Stop reason: HOSPADM

## 2022-10-31 RX ORDER — NICARDIPINE HYDROCHLORIDE 2.5 MG/ML
INJECTION INTRAVENOUS
Status: DISCONTINUED | OUTPATIENT
Start: 2022-10-31 | End: 2022-10-31 | Stop reason: HOSPADM

## 2022-10-31 RX ADMIN — SODIUM CHLORIDE: 9 INJECTION, SOLUTION INTRAVENOUS at 11:05

## 2022-10-31 RX ADMIN — POTASSIUM CHLORIDE 20 MEQ: 750 TABLET, EXTENDED RELEASE ORAL at 10:58

## 2022-10-31 ASSESSMENT — ACTIVITIES OF DAILY LIVING (ADL)
ADLS_ACUITY_SCORE: 35

## 2022-10-31 NOTE — PROGRESS NOTES
Patient arrived to , bay [address], via litter s/p coronary angiogram. TR band on right wrist with 12 ml of air in place with plan to begin deflation at 1445. He is alert and oriented x 4 and able to make needs known. Will continue to monitor.

## 2022-10-31 NOTE — PRE-PROCEDURE
GENERAL PRE-PROCEDURE:   Procedure:  Coronary angiogram with possible percutaneous coronary intervention  Date/Time:  10/31/2022 11:33 AM    Written consent obtained?: Yes    Risks and benefits: Risks, benefits and alternatives were discussed    Consent given by:  Patient  Patient states understanding of procedure being performed: Yes    Patient's understanding of procedure matches consent: Yes    Procedure consent matches procedure scheduled: Yes    Expected level of sedation:  Moderate  Appropriately NPO:  Yes  ASA Class:  2  Mallampati  :  Grade 2- soft palate, base of uvula, tonsillar pillars, and portion of posterior pharyngeal wall visible  Lungs:  Lungs clear with good breath sounds bilaterally  Heart:  Normal heart sounds and rate  History & Physical reviewed:  History and physical reviewed and no updates needed  Statement of review:  I have reviewed the lab findings, diagnostic data, medications, and the plan for sedation

## 2022-10-31 NOTE — PROGRESS NOTES
Arrived to Unit 2a for CORS procedure in CCL. AFVSS. Denies pain. Labs processing. Pt states he's been taking Aspirin 81mg for 7 days now; took a dose this AM. Pt states hx n/v with sedation/anesthesia. PIV attempted x3. Waiting for [facility] with US to place IV. ECG done. Contrast reviewed. Bilat pp & pt pulses +2, marked. Pt's wife, Nati, at bedside #[phone]. Ready for consent.

## 2022-10-31 NOTE — PROGRESS NOTES
Patient ready for discharge. He has tolerated a regular diet, fluids, ambulated, and voided without issue. He is alert and oriented x 4 and able to make needs known. Wife is at bedside. Right wrist site remains soft to palpation with no bleeding or hematoma. Patient and wife both verbalized understanding of all discharge instructions. PIV removed. Patient declined offer of wheelchair and ambulated to main entrance with staff, wife, and has all belongings. Patient and wife to take shuttle to hotel and then drive home tomorrow. Patient discharged to the care of his wife.

## 2022-10-31 NOTE — Clinical Note
dry, intact, no bleeding and no hematoma. RRA 6Fr- removed, TR band applied, 12cc air instilled, armboard.

## 2022-10-31 NOTE — DISCHARGE INSTRUCTIONS
Going Home after Coronary Angiogram  For 24 hours:        Have an adult stay with you for 24 hours.        Relax and take it easy.        Drink plenty of fluids.        You may eat your normal diet, unless your doctor tells you otherwise.        Do NOT make any important or legal decisions.        Do NOT drive or operate machines at home or at work.        Do NOT drink alcohol.   Do NOT smoke.     Medicines:        If you have begun Plavix (clopidogrel), Effient (prasugrel), or Brilinta (ticagrelor), do not stop taking it until you talk to your heart doctor (cardiologist).        If you are on metformin (Glucophage), do not restart it until you have blood tests (within 2 to 3 days after discharge). When your doctor tells you it is safe, you may restart the metformin.        If you have stopped any other medicines, check with your nurse or provider about when to restart them.    Care of wrist or arm site:        It is normal to have soreness at the puncture site and mild tingling in your hand for up to 3 days.        Remove the Band-Aid after 24 hours. If there is minor oozing, apply another Band-aid and remove it after 12 hours.         Do NOT take a bath, or use a hot tub or pool for the next 48 hours. You may shower tomorrow afternoon.        It is normal to have a small bruise.  There should not be a lump at the site.        Do not scrub the site.        Do not use lotion or powder near the puncture site for 3 days.        For 2 days, do not use your hand or arm to support your weight (such as rising from a chair) or bend your wrist (such as lifting a garage door).        For 2 days, do not lift more than 5 pounds or exercise your arm (tennis, golf or bowling).    If you start bleeding from the site in your arm: Sit down and press firmly on the site with your fingers for 10 minutes. Call your doctor as soon as you can.    Call 911 right away if you have bleeding that is heavy or does not stop.     Call your  doctor if:        You have a large or growing hard lump around the site.        The site is red, swollen, hot or tender.        Blood or fluid is draining from the site.        You have chills or a fever greater than 101 F (38 C).        Your arm turns bluish, feels numb or cool.        You have hives, a rash or unusual itching.       [facility] [place]:   [phone] (7 days a week)      Cardiology Fellow on call (24 hours per day) at [facility]:   [phone] (ask for Cardiology Fellow on call)

## 2022-11-02 LAB
ATRIAL RATE - MUSE: 66 BPM
DIASTOLIC BLOOD PRESSURE - MUSE: NORMAL MMHG
INTERPRETATION ECG - MUSE: NORMAL
P AXIS - MUSE: 27 DEGREES
PR INTERVAL - MUSE: 226 MS
QRS DURATION - MUSE: 94 MS
QT - MUSE: 400 MS
QTC - MUSE: 419 MS
R AXIS - MUSE: 14 DEGREES
SYSTOLIC BLOOD PRESSURE - MUSE: NORMAL MMHG
T AXIS - MUSE: 27 DEGREES
VENTRICULAR RATE- MUSE: 66 BPM

## 2022-11-04 ENCOUNTER — OFFICE VISIT (OUTPATIENT)
Dept: INTERNAL MEDICINE | Facility: OTHER | Age: 67
End: 2022-11-04
Attending: INTERNAL MEDICINE
Payer: MEDICARE

## 2022-11-04 VITALS
DIASTOLIC BLOOD PRESSURE: 78 MMHG | SYSTOLIC BLOOD PRESSURE: 126 MMHG | WEIGHT: 310.6 LBS | BODY MASS INDEX: 41.17 KG/M2 | HEIGHT: 73 IN | TEMPERATURE: 97.7 F | RESPIRATION RATE: 18 BRPM | HEART RATE: 70 BPM | OXYGEN SATURATION: 96 %

## 2022-11-04 DIAGNOSIS — E78.2 MIXED HYPERLIPIDEMIA: ICD-10-CM

## 2022-11-04 DIAGNOSIS — R73.9 ELEVATED RANDOM BLOOD GLUCOSE LEVEL: ICD-10-CM

## 2022-11-04 DIAGNOSIS — M48.061 NEUROFORAMINAL STENOSIS OF LUMBAR SPINE: ICD-10-CM

## 2022-11-04 DIAGNOSIS — R60.0 BILATERAL LOWER EXTREMITY EDEMA: ICD-10-CM

## 2022-11-04 DIAGNOSIS — M54.16 CHRONIC RADICULAR LOW BACK PAIN: Primary | ICD-10-CM

## 2022-11-04 DIAGNOSIS — R73.9 STEROID-INDUCED HYPERGLYCEMIA: ICD-10-CM

## 2022-11-04 DIAGNOSIS — I10 BENIGN ESSENTIAL HYPERTENSION: ICD-10-CM

## 2022-11-04 DIAGNOSIS — T38.0X5A STEROID-INDUCED HYPERGLYCEMIA: ICD-10-CM

## 2022-11-04 DIAGNOSIS — G89.29 CHRONIC RADICULAR LOW BACK PAIN: Primary | ICD-10-CM

## 2022-11-04 DIAGNOSIS — M35.3 POLYMYALGIA RHEUMATICA (H): ICD-10-CM

## 2022-11-04 PROCEDURE — G0463 HOSPITAL OUTPT CLINIC VISIT: HCPCS

## 2022-11-04 PROCEDURE — 99214 OFFICE O/P EST MOD 30 MIN: CPT | Performed by: INTERNAL MEDICINE

## 2022-11-04 RX ORDER — DULOXETIN HYDROCHLORIDE 30 MG/1
CAPSULE, DELAYED RELEASE ORAL
Qty: 194 CAPSULE | Refills: 0 | COMMUNITY
Start: 2022-11-04 | End: 2022-11-11

## 2022-11-04 RX ORDER — GABAPENTIN 100 MG/1
200 CAPSULE ORAL 2 TIMES DAILY
Qty: 360 CAPSULE | Refills: 1 | Status: SHIPPED | OUTPATIENT
Start: 2022-11-04 | End: 2022-12-23

## 2022-11-04 RX ORDER — SPIRONOLACTONE 25 MG/1
25 TABLET ORAL DAILY
Qty: 90 TABLET | Refills: 1 | Status: SHIPPED | OUTPATIENT
Start: 2022-11-04 | End: 2023-02-15

## 2022-11-04 ASSESSMENT — ENCOUNTER SYMPTOMS
BRUISES/BLEEDS EASILY: 0
APNEA: 1
AGITATION: 0
ARTHRALGIAS: 0
DYSURIA: 0
FATIGUE: 1
VOMITING: 0
BACK PAIN: 1
NAUSEA: 0
WHEEZING: 0
DIARRHEA: 0
SLEEP DISTURBANCE: 1
PALPITATIONS: 0
CONFUSION: 0
ABDOMINAL PAIN: 0
LIGHT-HEADEDNESS: 0
DIZZINESS: 0
COUGH: 0
CHILLS: 0
WOUND: 0
FEVER: 0
SHORTNESS OF BREATH: 0
MYALGIAS: 0
HEMATURIA: 0

## 2022-11-04 ASSESSMENT — PAIN SCALES - GENERAL: PAINLEVEL: MILD PAIN (3)

## 2022-11-04 ASSESSMENT — ANXIETY QUESTIONNAIRES
7. FEELING AFRAID AS IF SOMETHING AWFUL MIGHT HAPPEN: NOT AT ALL
5. BEING SO RESTLESS THAT IT IS HARD TO SIT STILL: NOT AT ALL
4. TROUBLE RELAXING: NEARLY EVERY DAY
GAD7 TOTAL SCORE: 3
IF YOU CHECKED OFF ANY PROBLEMS ON THIS QUESTIONNAIRE, HOW DIFFICULT HAVE THESE PROBLEMS MADE IT FOR YOU TO DO YOUR WORK, TAKE CARE OF THINGS AT HOME, OR GET ALONG WITH OTHER PEOPLE: VERY DIFFICULT
6. BECOMING EASILY ANNOYED OR IRRITABLE: NOT AT ALL
GAD7 TOTAL SCORE: 3
3. WORRYING TOO MUCH ABOUT DIFFERENT THINGS: NOT AT ALL
8. IF YOU CHECKED OFF ANY PROBLEMS, HOW DIFFICULT HAVE THESE MADE IT FOR YOU TO DO YOUR WORK, TAKE CARE OF THINGS AT HOME, OR GET ALONG WITH OTHER PEOPLE?: VERY DIFFICULT
7. FEELING AFRAID AS IF SOMETHING AWFUL MIGHT HAPPEN: NOT AT ALL
GAD7 TOTAL SCORE: 3
2. NOT BEING ABLE TO STOP OR CONTROL WORRYING: NOT AT ALL
1. FEELING NERVOUS, ANXIOUS, OR ON EDGE: NOT AT ALL

## 2022-11-04 ASSESSMENT — PATIENT HEALTH QUESTIONNAIRE - PHQ9
SUM OF ALL RESPONSES TO PHQ QUESTIONS 1-9: 6
10. IF YOU CHECKED OFF ANY PROBLEMS, HOW DIFFICULT HAVE THESE PROBLEMS MADE IT FOR YOU TO DO YOUR WORK, TAKE CARE OF THINGS AT HOME, OR GET ALONG WITH OTHER PEOPLE: VERY DIFFICULT
SUM OF ALL RESPONSES TO PHQ QUESTIONS 1-9: 6

## 2022-11-04 NOTE — PATIENT INSTRUCTIONS
1. Chronic radicular low back pain    Change to Morning:  - DULoxetine (CYMBALTA) 30 MG capsule; Take 1 capsule (30 mg) by mouth daily for 14 days, THEN 2 capsules (60 mg) daily for 90 days.  Dispense: 194 capsule; Refill: 0  - gabapentin (NEURONTIN) 100 MG capsule; Take 2 capsules (200 mg) by mouth 2 times daily - for burning/shooting nerve pain  Dispense: 360 capsule; Refill: 1    2. Neuroforaminal stenosis of lumbar spine  3. Polymyalgia rheumatica (H)    4. Steroid-induced hyperglycemia    5. Benign essential hypertension    START:   - spironolactone (ALDACTONE) 25 MG tablet; Take 1 tablet (25 mg) by mouth daily     -- STOP NORVASC --  Dispense: 90 tablet; Refill: 1      - Comprehensive Metabolic Panel; Future    6. Bilateral lower extremity edema  - spironolactone (ALDACTONE) 25 MG tablet; Take 1 tablet (25 mg) by mouth daily -- STOP NORVASC --  Dispense: 90 tablet; Refill: 1    7. Mixed hyperlipidemia  - Lipid Panel; Future    8. Elevated random blood glucose level  - Hemoglobin A1c; Future      -- Try some Tylenol PM, Melatonin and Gabapentin at bedtime.       Return in approximately 1 week(s), or sooner as needed for follow-up with Dr. Whitmore.  -- Follow-up Hypertension, get labs prior.     Clinic : [phone]  Appointment line: [phone]

## 2022-11-04 NOTE — NURSING NOTE
"Chief Complaint   Patient presents with     follow up back pain, medication         Initial /78 (BP Location: Right arm, Patient Position: Sitting, Cuff Size: Adult Large)   Pulse 70   Temp 97.7  F (36.5  C) (Temporal)   Resp 18   Ht 1.842 m (6' 0.5\")   Wt 140.9 kg (310 lb 9.6 oz)   SpO2 96%   BMI 41.55 kg/m   Estimated body mass index is 41.55 kg/m  as calculated from the following:    Height as of this encounter: 1.842 m (6' 0.5\").    Weight as of this encounter: 140.9 kg (310 lb 9.6 oz).       FOOD SECURITY SCREENING QUESTIONS:    The next two questions are to help us understand your food security.  If you are feeling you need any assistance in this area, we have resources available to support you today.    Hunger Vital Signs:  Within the past 12 months we worried whether our food would run out before we got money to buy more. Never  Within the past 12 months the food we bought just didn't last and we didn't have money to get more. Never    Advance Care Directive on file? no      Medication reconciliation complete.      Kevin Haynes,on 11/4/2022 at 10:24 AM        "

## 2022-11-04 NOTE — PROGRESS NOTES
Assessment & Plan   Selvin An is a 67 year old accompanied by his spouse, presenting for the following health issues:      ICD-10-CM    1. Chronic radicular low back pain  M54.16 DULoxetine (CYMBALTA) 30 MG capsule    G89.29 gabapentin (NEURONTIN) 100 MG capsule      2. Neuroforaminal stenosis of lumbar spine  M48.061 DULoxetine (CYMBALTA) 30 MG capsule     gabapentin (NEURONTIN) 100 MG capsule      3. Polymyalgia rheumatica (H)  M35.3 DULoxetine (CYMBALTA) 30 MG capsule     gabapentin (NEURONTIN) 100 MG capsule      4. Steroid-induced hyperglycemia  R73.9     T38.0X5A       5. Benign essential hypertension  I10 spironolactone (ALDACTONE) 25 MG tablet     Comprehensive Metabolic Panel      6. Bilateral lower extremity edema  R60.0 spironolactone (ALDACTONE) 25 MG tablet      7. Mixed hyperlipidemia  E78.2 Lipid Panel      8. Elevated random blood glucose level  R73.09 Hemoglobin A1c      Patient presents for follow-up of multiple issues.    Chronic radicular low back pain.  Took Cymbalta at night but it caused too much wakefulness.  Advised that he start trying in the morning.  Has found gabapentin extremely helpful.  Tolerating well.  Has problems with polymyalgia rheumatica, neuroforaminal stenosis of lumbar spine.  Needs refills.    Steroid-induced hyperglycemia, continues on prednisone due to his polymyalgia.  Check lab work.    Hypertension, blood pressure is controlled but he is not doing well with amlodipine that was recently started.  Currently taking carvedilol as well.  This is new.  Was taking spironolactone, he did not tolerate loop diuretics very well because he dropped his potassium levels so significantly.  He would like to do something to improve his fluid retention.  His weight is up almost 10 pounds.  He is very edematous.  Restart spironolactone but just 1 tablet or 25 mg daily rather than 25 mg twice daily like previous use.  Recheck metabolic panel in about a week.    - Stop  amlodipine.    Check lipid panel with next lab draw in about 1 week.    Encouraged regular exercise.      Return in about 1 week (around 11/11/2022) for Annual Medicare Wellness Visit, - Follow-up Hypertension.    Matt Whitmore MD  [facility]      Subjective   follow up back pain, medication      History of Present Illness       Back Pain:  He presents for follow up of back pain. Patient's back pain is a chronic problem.  Location of back pain:  Left lower back  Description of back pain: sharp, shooting and stabbing  Back pain spreads: left buttocks and left thigh    Since patient first noticed back pain, pain is: unchanged  Does back pain interfere with his job:  Not applicable      CKD: He uses over the counter pain medication, including tylenol, three times daily.    Hypertension: He presents for follow up of hypertension.  He does check blood pressure  regularly outside of the clinic. Outside blood pressures have been over 140/90. He does not follow a low salt diet.      Today's PHQ-9         PHQ-9 Total Score: 6    PHQ-9 Q9 Thoughts of better off dead/self-harm past 2 weeks :   Not at all    How difficult have these problems made it for you to do your work, take care of things at home, or get along with other people: Very difficult  Today's AJITH-7 Score: 3     Review of Systems   Constitutional: Positive for fatigue (Prednisone for PMR - down to 7 mg prednisone, was reducing every 2-4 weeks). Negative for chills and fever.        + Chronic Fatigue Syndrome since Lyme Disease - in Fall 2016.    HENT: Negative for congestion and hearing loss.    Eyes: Negative for visual disturbance (s/p cataract surgery).   Respiratory: Positive for apnea (uses CPAP nightly). Negative for cough (intermittent, dry), shortness of breath and wheezing.    Cardiovascular: Negative for chest pain and palpitations.   Gastrointestinal: Negative for abdominal pain, diarrhea, nausea and vomiting.   Endocrine:  "Negative for cold intolerance and heat intolerance.        + Diaphoretic with exertion, little energy   Genitourinary: Negative for dysuria and hematuria.        + ED and reduced libido - following with Dr. Schwartz / Urology   Musculoskeletal: Positive for back pain (Hx of Low back pain - SI joint injection was very helpful ). Negative for arthralgias and myalgias (pelvic/shoulder girdle - much improved with Prednisone for PMR - Tapering dose, at 13 mg/day as of 3/17/2022 --> down to 7 mg/day as of 11/4/2022).   Skin: Negative for rash and wound.   Allergic/Immunologic: Negative for immunocompromised state.   Neurological: Negative for dizziness and light-headedness.   Hematological: Does not bruise/bleed easily.   Psychiatric/Behavioral: Positive for sleep disturbance. Negative for agitation and confusion.          Objective    /78 (BP Location: Right arm, Patient Position: Sitting, Cuff Size: Adult Large)   Pulse 70   Temp 97.7  F (36.5  C) (Temporal)   Resp 18   Ht 1.842 m (6' 0.5\")   Wt 140.9 kg (310 lb 9.6 oz)   SpO2 96%   BMI 41.55 kg/m    Body mass index is 41.55 kg/m .  Physical Exam  Constitutional:       General: He is not in acute distress.     Appearance: He is well-developed. He is obese. He is not diaphoretic.   HENT:      Head: Normocephalic and atraumatic.   Eyes:      General: No scleral icterus.     Conjunctiva/sclera: Conjunctivae normal.   Cardiovascular:      Rate and Rhythm: Normal rate and regular rhythm.      Pulses: Normal pulses.   Pulmonary:      Effort: Pulmonary effort is normal.      Breath sounds: Normal breath sounds.   Abdominal:      Palpations: Abdomen is soft.      Tenderness: There is no abdominal tenderness.   Musculoskeletal:         General: No tenderness or deformity.      Cervical back: Neck supple.      Right lower leg: Edema (trace) present.      Left lower leg: Edema (trace) present.   Lymphadenopathy:      Cervical: No cervical adenopathy.   Skin:     General: " Skin is warm and dry.      Findings: No rash.   Neurological:      Mental Status: He is alert and oriented to person, place, and time. Mental status is at baseline.   Psychiatric:         Mood and Affect: Mood normal.         Behavior: Behavior normal.        Recent Labs   Lab Test 10/31/22  0929 10/25/22  1003 10/18/22  1347 10/12/22  1459 09/25/22  1235 09/21/22  1048 03/02/22  1220 12/22/21  1035 09/15/21  0824 08/04/21  1523 06/08/21  1147 03/12/21  1044   A1C  --   --   --   --   --   --   --   --  5.5 5.6  --  5.9   LDL  --   --   --   --   --   --   --   --  143*  --   --  Cannot estimate LDL when triglyceride exceeds 400 mg/dL   HDL  --   --   --   --   --   --   --   --  43 35  --  33   TRIG  --   --   --   --   --   --   --   --  140 540*  --  410*   ALT  --   --   --   --   --  23  --  25 18 23   < > 26   CR 1.17 1.45*   < > 1.84*   < > 1.31*   < > 1.63* 1.34* 1.40*   < > 1.18   GFRESTIMATED 68 52*   < > 39*   < > 59*   < > 46* 54* 52*   < > 62   POTASSIUM 3.6 3.7   < > 4.1   < > 3.7   < > 3.9 3.9 3.9   < > 3.7   TSH  --   --   --   --   --   --   --  1.20 11.84* 3.61   < >  --    T4  --   --   --   --   --   --   --   --  0.77  --   --   --    WBC 11.6*  --   --  14.7*   < > 13.9*   < > 12.7* 16.1* 13.3*   < > 11.0   HGB 15.8  --   --  17.5   < > 18.2*   < > 17.2 15.2 15.4   < > 16.7     --   --  507*   < > 464*   < > 464* 459* 465*   < > 424   ALBUMIN  --   --   --   --   --  4.5  --  4.4 4.2 4.6   < > 4.1    < > = values in this interval not displayed.     Creatinine has improved.  Potassium is low normal.  White blood cell count is minimally elevated.  Hemoglobin normal.  Platelet normal.

## 2022-11-08 ENCOUNTER — OFFICE VISIT (OUTPATIENT)
Dept: CARDIOLOGY | Facility: OTHER | Age: 67
End: 2022-11-08
Attending: NURSE PRACTITIONER
Payer: MEDICARE

## 2022-11-08 VITALS
HEIGHT: 72 IN | SYSTOLIC BLOOD PRESSURE: 144 MMHG | TEMPERATURE: 98.5 F | DIASTOLIC BLOOD PRESSURE: 90 MMHG | BODY MASS INDEX: 40.09 KG/M2 | WEIGHT: 296 LBS | HEART RATE: 81 BPM | OXYGEN SATURATION: 93 % | RESPIRATION RATE: 18 BRPM

## 2022-11-08 DIAGNOSIS — I25.10 CORONARY ARTERY CALCIFICATION: ICD-10-CM

## 2022-11-08 DIAGNOSIS — R68.89 ACTIVITY INTOLERANCE: ICD-10-CM

## 2022-11-08 DIAGNOSIS — R06.09 DOE (DYSPNEA ON EXERTION): ICD-10-CM

## 2022-11-08 DIAGNOSIS — Z86.19 HISTORY OF LYME DISEASE: ICD-10-CM

## 2022-11-08 DIAGNOSIS — G47.00 PERSISTENT INSOMNIA: ICD-10-CM

## 2022-11-08 DIAGNOSIS — E78.2 MIXED DYSLIPIDEMIA: ICD-10-CM

## 2022-11-08 DIAGNOSIS — Z85.850 HX OF THYROID CANCER: ICD-10-CM

## 2022-11-08 DIAGNOSIS — I10 ESSENTIAL HYPERTENSION: ICD-10-CM

## 2022-11-08 DIAGNOSIS — Z98.890 S/P CORONARY ANGIOGRAM: Primary | ICD-10-CM

## 2022-11-08 DIAGNOSIS — G47.33 OSA ON CPAP: ICD-10-CM

## 2022-11-08 DIAGNOSIS — M79.10 MYALGIA: ICD-10-CM

## 2022-11-08 LAB
ALBUMIN SERPL BCG-MCNC: 4.4 G/DL (ref 3.5–5.2)
ALP SERPL-CCNC: 58 U/L (ref 40–129)
ALT SERPL W P-5'-P-CCNC: 25 U/L (ref 10–50)
AST SERPL W P-5'-P-CCNC: 21 U/L (ref 10–50)
BILIRUB DIRECT SERPL-MCNC: <0.2 MG/DL (ref 0–0.3)
BILIRUB SERPL-MCNC: 0.4 MG/DL
CK SERPL-CCNC: 302 U/L (ref 39–308)
PROT SERPL-MCNC: 8.2 G/DL (ref 6.4–8.3)
TSH SERPL DL<=0.005 MIU/L-ACNC: 0.4 UIU/ML (ref 0.3–4.2)

## 2022-11-08 PROCEDURE — 84443 ASSAY THYROID STIM HORMONE: CPT | Mod: ZL | Performed by: NURSE PRACTITIONER

## 2022-11-08 PROCEDURE — 82550 ASSAY OF CK (CPK): CPT | Mod: ZL | Performed by: NURSE PRACTITIONER

## 2022-11-08 PROCEDURE — 99215 OFFICE O/P EST HI 40 MIN: CPT | Performed by: NURSE PRACTITIONER

## 2022-11-08 PROCEDURE — G0463 HOSPITAL OUTPT CLINIC VISIT: HCPCS

## 2022-11-08 PROCEDURE — 36415 COLL VENOUS BLD VENIPUNCTURE: CPT | Mod: ZL | Performed by: NURSE PRACTITIONER

## 2022-11-08 PROCEDURE — 80076 HEPATIC FUNCTION PANEL: CPT | Mod: ZL | Performed by: NURSE PRACTITIONER

## 2022-11-08 ASSESSMENT — PAIN SCALES - GENERAL: PAINLEVEL: MILD PAIN (3)

## 2022-11-08 NOTE — PATIENT INSTRUCTIONS
You were seen by  JOSE Thornton CNP      1. Echocardiogram has been ordered. You will be called to schedule this.  You will receive instructions for testing at that time.  You will be contacted with results.       2. Laboratory blood work has been ordered.  You will be notified by phone call or [employer] message when the results are available.      3. You will be called to schedule a treadmill stress ECG.    4. You were referred to infectious disease medicine and sleep study medicine.  You will get a call for appointments.      You will follow up with [facility] in as needed, sooner if needed.       Please call the cardiology office with problems, questions, or concerns at [phone].    If you experience chest pain, chest pressure, chest tightness, shortness of breath, fainting, lightheadedness, nausea, vomiting, or other concerning symptoms, please report to the Emergency Department or call 911. These symptoms may be emergent, and best treated in the Emergency Department.     Cardiology Nurses  MONY Li LPN Amy, LPN  [facility] (Unit 3C)  [phone]

## 2022-11-08 NOTE — NURSING NOTE
Patient come sin for follow up after angiogram.  Deborah Celeste LPN ....................11/8/2022   11:01 AM  Chief Complaint   Patient presents with     Follow Up     Angiogram-10-31-22       Initial BP (!) 150/98 (BP Location: Right arm, Patient Position: Sitting, Cuff Size: Adult Large)   Pulse 81   Temp 98.5  F (36.9  C) (Temporal)   Resp 18   Ht 1.829 m (6')   Wt 134.3 kg (296 lb)   SpO2 93%   BMI 40.14 kg/m   Estimated body mass index is 40.14 kg/m  as calculated from the following:    Height as of this encounter: 1.829 m (6').    Weight as of this encounter: 134.3 kg (296 lb).  Meds Reconciled: complete  Pt is on Aspirin  Pt is on a Statin  PHQ and/or AJITH reviewed. Pt referred to PCP/MH Provider as appropriate.    Deborah Celeste LPN    FOOD SECURITY SCREENING QUESTIONS  Hunger Vital Signs:  Within the past 12 months we worried whether our food would run out before we got money to buy more. Never  Within the past 12 months the food we bought just didn't last and we didn't have money to get more. Never  Deborah Celeste LPN 11/8/2022 11:02 AM

## 2022-11-08 NOTE — PROGRESS NOTES
[facility] HEART CARE   CARDIOLOGY PROGRESS NOTE    Selvin An   PO   [facility] [id number]      Matt Whitmore     Chief Complaint   Patient presents with     Follow Up     Angiogram-10-31-22        HPI:   Mr. An is a 67-year-old male who presents for cardiology follow-up s/p coronary angiography at [facility] on 10/31/22. Patient was evaluated by cardiology on 10/18/22 with resistant hypertension, elevated CT coronary calcium score, family hx significant for premature CAD and reports of CAMPOS and activity intolerance.  Patient's past medical history includes hyperlipidemia, obesity, stage III CKD, insomnia, KYLAH on CPAP, polymyalgia rheumatica, history of malignant bladder tumor s/p TURBT x2 in 2018, fibromyalgia, B12 deficiency, history of thyroid cancer, postoperative hypothyroidism and history of splenectomy.    Patient reports a significant family history of premature CAD. His older brother had history of MI resulting in cardiac arrest, was resuscitated and underwent urgent 5V CABG at the age of 62. Both of his parents  as a result of sudden cardiac death MI in their early 70's yoa.     Patient had a CT of his chest in 2021 at [facility], noted findings of significant coronary calcification. He had been on Atorvastatin about 7 years ago and went off after late stage lyme infection with diffuse arthralgias and myalgias persisting. He never went back on statin following. He has been on Fenofibrate for triglycerides >500. Most recent lipid panel one year ago revealing total cholesterol 214, triglycerides improved to 140, HDL 43 and .    At his last visit on 10/18/22, patient endorsed severe activity intolerance over the past couple of years which has been progressive. Patient described minimal activity after about 15 minutes leads to feeling unwell, no energy, feeling weakness and lightheadedness. Admits that he will typically rest for 5-10 min and then can resume activity for a short while  before he needs to rest again. Admits that this cycle continues with any minimal activity around his house, he had adjusted to slow progress on his projects around the house and feels that symptoms have worsened. He does not endorse any associated chest pain or pressure, no pain in the arm, neck, jaw or back intrascapular region. Positive for chronic CAMPOS, does not exert himself much. This last summer he notes a new symptom, describes severe diaphoresis with any exertion or activity, describes dripping in sweat which is not typical for him. No associated nausea or vomiting. No palpitations. No near syncope or syncope. No increased edema, orthopnea or PND.     In regards to his longstanding HTN, patient was recently started on Carvedilol 3.125 mg BID on 10/5/22. At visit on 10/12/22 Carvedilol was increased to 6.25 mg BID.  He has continued on Spironolactone- hydrochlorothiazide, recently decreased to 25-25 mg once daily secondary to decline in renal function and concern for dehydration.     Cardiac cath at [facility] on 10/31/22 was reviewed with patient today revealing mild non obstructive CAD. Distal RCA with mild 30% stenosis, no other arteries with measurable stenosis.    Patient is concerned largely due to continued activity intolerance, myalgias and arthralgias. Admits to history of lyme disease which was treated late. He presented with erythema migrans rash without treatment, later treated when he presented to ED with bells palsy. He suspects many of his symptoms including arthralgias and myalgias are related to chronic lyme syndrome. He is requesting referral to infectious disease. He does have a history of thyroid cancer with complete thyroidectomy. He has been evaluated by rheumatology due to concern for possible PMR given his symptoms. He is also concerned about his ongoing/persistent insomnia, requesting referral to sleep medicine for management of this.    PAST MEDICAL HISTORY:   Past Medical History:    Diagnosis Date     Bell's palsy 3/30/2017     Bilateral carpal tunnel syndrome     No Comments Provided     Essential (primary) hypertension     No Comments Provided     Gout 6/3/2013     Hyperlipidemia     No Comments Provided     Hypothyroidism     No Comments Provided     Malignant neoplasm of thyroid gland (H)     1994     Sleep apnea     No Comments Provided          FAMILY HISTORY:   Family History   Problem Relation Age of Onset     Heart Disease Mother         Heart Disease,MI - SCD     Heart Disease Father         Heart Disease,MI - SCD          PAST SURGICAL HISTORY:   Past Surgical History:   Procedure Laterality Date     COLONOSCOPY  09/09/2014 9/2014,Normal - follow up 10 years     CV CORONARY ANGIOGRAM N/A 10/31/2022    Procedure: Coronary Angiogram;  Surgeon: Ender Michaels MD;  Location: [facility] CARDIAC CATH LAB     CYSTOSCOPY      bladder cancer     OTHER SURGICAL HISTORY      ,SPLENECTOMY     OTHER SURGICAL HISTORY      2010,83729,EYE MUSCLE SURGERY,Left,strabismus     OTHER SURGICAL HISTORY      10/06/14,[id number],OTHER,Left,Dr. Lana LEHMAN     OTHER SURGICAL HISTORY      10/14/14,[id number],OTHER,Left,Dr. Lana LEHMAN     OTHER SURGICAL HISTORY      11/16/2017,[id number],ENDOSCOPIC RELEASE TRANSVERSE CARPAL LIGAMENT OF HAND,Left     RELEASE CARPAL TUNNEL      12/2011     RETINAL DETACHMENT SURGERY Right      STRABISMUS SURGERY Left      THYROIDECTOMY      1994,thyroid ca     TONSILLECTOMY      2004,T & A > 11yo     VASECTOMY      No Comments Provided          SOCIAL HISTORY:   Social History     Socioeconomic History     Marital status:      Spouse name: None     Number of children: None     Years of education: None     Highest education level: None   Tobacco Use     Smoking status: Never     Smokeless tobacco: Never   Vaping Use     Vaping Use: Never used   Substance and Sexual Activity     Alcohol use: No     Comment: none     Drug use: No     Sexual activity: Yes      Partners: Female     Birth control/protection: None   Social History Narrative    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          CURRENT MEDICATIONS:   Current Outpatient Medications   Medication     allopurinol (ZYLOPRIM) 300 MG tablet     aspirin (ASA) 81 MG chewable tablet     B Complex-C (VITAMIN B COMPLEX W/VITAMIN C) TABS tablet     carvedilol (COREG) 6.25 MG tablet     Cholecalciferol (VITAMIN D-3) 125 MCG (5000 UT) TABS     Cyanocobalamin 2500 MCG TABS     DULoxetine (CYMBALTA) 30 MG capsule     fenofibrate (TRICOR) 145 MG tablet     gabapentin (NEURONTIN) 100 MG capsule     levothyroxine (SYNTHROID/LEVOTHROID) 200 MCG tablet     loratadine (CLARITIN) 10 MG tablet     magnesium 250 MG tablet     Melatonin 10 MG TABS tablet     Multiple Vitamins-Minerals (CENTRUM ADULTS PO)     order for DME     predniSONE (DELTASONE) 1 MG tablet     predniSONE (DELTASONE) 5 MG tablet     predniSONE (DELTASONE) 5 MG tablet     Probiotic Product (PROBIOTIC-10 PO)     rosuvastatin (CRESTOR) 5 MG tablet     sildenafil (VIAGRA) 100 MG tablet     spironolactone (ALDACTONE) 25 MG tablet     tamsulosin (FLOMAX) 0.4 MG capsule     testosterone (ANDROGEL 1.62% PUMP) 20.25 MG/ACT gel     No current facility-administered medications for this visit.          ALLERGIES:   Allergies   Allergen Reactions     Losartan Cough     Omeprazole Diarrhea     Pantoprazole Diarrhea     Valsartan Cough     Started with losartan, worsened with high dose valsartan          ROS:   CONSTITUTIONAL: No reported fever or chills. No changes in weight. Positive for activity intolerance.   ENT: No visual disturbance, ear ache, epistaxis or sore throat.   CARDIOVASCULAR: No chest pain, chest pressure or chest discomfort. No palpitations or lower extremity edema.   RESPIRATORY: Chronic dyspnea upon exertion. No cough, wheezing or hemoptysis. No orthopnea or PND.   GI: No reported abdominal pain,  melena or hematochezia.   : No reported hematuria or dysuria.  NEUROLOGICAL: Positive for exertional lightheadedness. No dizziness or syncope. Positive for generalized weakness.   HEMATOLOGIC: No history of anemia. No bleeding or excessive bruising. No history of blood clots.   MUSCULOSKELETAL: Positive for chronic joint pains and muscle aches following lyme disease (2017).  ENDOCRINOLOGIC: No temperature intolerance. No hair or skin changes.  SKIN: No abnormal rashes or sores, no unusual itching.  PSYCHIATRIC: No history of depression or anxiety. No changes in mood, feeling down or anxious.  Positive for insomnia.    PHYSICAL EXAM:   BP (!) 144/90 (BP Location: Right arm, Patient Position: Sitting, Cuff Size: Adult Large)   Pulse 81   Temp 98.5  F (36.9  C) (Temporal)   Resp 18   Ht 1.829 m (6')   Wt 134.3 kg (296 lb)   SpO2 93%   BMI 40.14 kg/m    GENERAL: The patient is a well-developed, well-nourished, in no apparent distress.  HEENT: Head is normocephalic and atraumatic. Eyes are symmetrical with normal visual tracking. No icterus, no xanthelasmas. Nares appeared normal without nasal drainage. Mucous membranes are moist, no cyanosis.  NECK: Supple, no cervical bruits, JVP not visible.   CHEST/ LUNGS: Lungs clear to auscultation, no rales, rhonchi or wheezes, no use of accessory muscles, no retractions, respirations unlabored and normal respiratory rate.   CARDIO: Regular rate and rhythm normal with S1 and S2, no S3 or S4 and no murmur, click or rub.   ABD: Abdomen is distended.   EXTREMITIES: No LE edema.  MUSCULOSKELETAL: No visible joint swelling.   NEUROLOGIC: Alert and oriented X3. Normal speech, gait and affect. No focal neurologic deficits.   SKIN: No jaundice. No rashes or visible skin lesions present. No ecchymosis.  Right radial access site healed well with minimal bruising.      LAB RESULTS:   Admission on 10/31/2022, Discharged on 10/31/2022   Component Date Value Ref Range Status     WBC  Count 10/31/2022 11.6 (H)  4.0 - 11.0 10e3/uL Final     RBC Count 10/31/2022 5.06  4.40 - 5.90 10e6/uL Final     Hemoglobin 10/31/2022 15.8  13.3 - 17.7 g/dL Final     Hematocrit 10/31/2022 46.7  40.0 - 53.0 % Final     MCV 10/31/2022 92  78 - 100 fL Final     MCH 10/31/2022 31.2  26.5 - 33.0 pg Final     MCHC 10/31/2022 33.8  31.5 - 36.5 g/dL Final     RDW 10/31/2022 14.9  10.0 - 15.0 % Final     Platelet Count 10/31/2022 429  150 - 450 10e3/uL Final     Sodium 10/31/2022 142  136 - 145 mmol/L Final     Potassium 10/31/2022 3.6  3.4 - 5.3 mmol/L Final     Chloride 10/31/2022 106  98 - 107 mmol/L Final     Carbon Dioxide (CO2) 10/31/2022 22  22 - 29 mmol/L Final     Anion Gap 10/31/2022 14  7 - 15 mmol/L Final     Urea Nitrogen 10/31/2022 22.3  8.0 - 23.0 mg/dL Final     Creatinine 10/31/2022 1.17  0.67 - 1.17 mg/dL Final     Calcium 10/31/2022 10.3 (H)  8.8 - 10.2 mg/dL Final     Glucose 10/31/2022 94  70 - 99 mg/dL Final     GFR Estimate 10/31/2022 68  >60 mL/min/1.73m2 Final    Effective December 21, 2021 eGFRcr in adults is calculated using the 2021 CKD-EPI creatinine equation which includes age and gender (Karime et al., NEJ, DOI: 10.1056/QTLOwf1751884)     Ventricular Rate 10/31/2022 66  BPM Final     Atrial Rate 10/31/2022 66  BPM Final     OR Interval 10/31/2022 226  ms Final     QRS Duration 10/31/2022 94  ms Final     QT 10/31/2022 400  ms Final     QTc 10/31/2022 419  ms Final     P Axis 10/31/2022 27  degrees Final     R AXIS 10/31/2022 14  degrees Final     T Axis 10/31/2022 27  degrees Final     Interpretation ECG 10/31/2022    Final                    Value:Sinus rhythm with 1st degree A-V block  Otherwise normal ECG  When compared with ECG of 18-OCT-2022 12:54,  No significant change was found  Confirmed by MD JAVIER, KELSIE ([id number]) on 11/2/2022 3:02:30 PM           ASSESSMENT:   Selvin GRANADO Juve presents for cardiology follow-up s/p coronary angiography at [facility] on 10/31/22. Patient was evaluated by  cardiology on 10/18/22 with resistant hypertension, elevated CT coronary calcium score, family hx significant for premature CAD and reports of CAMPOS and activity intolerance.  Patient's past medical history includes hyperlipidemia, obesity, stage III CKD, insomnia, KYLAH on CPAP, polymyalgia rheumatica, history of malignant bladder tumor s/p TURBT x2 in 2018, fibromyalgia, B12 deficiency, history of thyroid cancer, postoperative hypothyroidism and history of splenectomy.  Patient is concerned largely due to continued activity intolerance, myalgias and arthralgias. Admits to history of lyme disease which was treated late. He presented with erythema migrans rash without treatment, later treated when he presented to ED with bells palsy. He suspects many of his symptoms including arthralgias and myalgias are related to chronic lyme syndrome. He is requesting referral to infectious disease. He does have a history of thyroid cancer with complete thyroidectomy. He has been evaluated by rheumatology due to concern for possible PMR given his symptoms. He is also concerned about his ongoing/persistent insomnia, requesting referral to sleep medicine for management of this.    1. S/P coronary angiogram (10/31/22 [facility])- no obstructing CAD  2. Activity intolerance  3. Mixed dyslipidemia  4. Essential hypertension  5. KYLAH on CPAP -- Uses nightly, finds helpful and beneficial  6. Coronary artery calcification  7. Myalgia  8. Hx of thyroid cancer  9. History of Lyme disease  10. CAMPOS (dyspnea on exertion)  11. Persistent insomnia    PLAN:   1. S/p cardiac cath at [facility] on 10/31/22 revealing mild non obstructive CAD. Distal RCA with mild 30% stenosis, no other coronary arteries with measurable stenosis.  2. Continue Crestor at 5 mg daily for dyslipidemia, increase to optimize LDL (<70mgdL). CK level and LFT's today.   3. Concern for continued activity intolerance despite stable coronary angiography. Recommended further evaluation with  echocardiogram and treadmill stress ECG.  3. Was started back on Spironolactone and taken off Amlodipine due to SE of extremity edema, this improved with going off Amlodipine. BP borderline today, continue to monitor.   4. He will continue on carvedilol 6.25 mg BID and Spironolactone 25 mg daily.   5. Requesting referral to infectious disease, to be reviewed with PCP. Additional referral to sleep medicine for insomnia. He is scheduled to see his PCP this Friday and will further discuss.     Total time 56 minutes on date of encounter spent reviewing records, face-to-face time obtaining HPI, physical exam, reviewing results of recent testing and counseling on the above diagnoses and recommended plan of care.    Sophia Chavira, JOSE CNP [facility]

## 2022-11-10 ENCOUNTER — TRANSCRIBE ORDERS (OUTPATIENT)
Dept: OTHER | Age: 67
End: 2022-11-10

## 2022-11-10 DIAGNOSIS — M79.10 MYALGIA: Primary | ICD-10-CM

## 2022-11-10 DIAGNOSIS — Z86.19 HISTORY OF LYME DISEASE: ICD-10-CM

## 2022-11-11 ENCOUNTER — OFFICE VISIT (OUTPATIENT)
Dept: INTERNAL MEDICINE | Facility: OTHER | Age: 67
End: 2022-11-11
Attending: INTERNAL MEDICINE
Payer: MEDICARE

## 2022-11-11 ENCOUNTER — LAB (OUTPATIENT)
Dept: LAB | Facility: OTHER | Age: 67
End: 2022-11-11
Attending: INTERNAL MEDICINE
Payer: MEDICARE

## 2022-11-11 VITALS
TEMPERATURE: 96.8 F | RESPIRATION RATE: 20 BRPM | WEIGHT: 298.2 LBS | HEIGHT: 73 IN | OXYGEN SATURATION: 93 % | DIASTOLIC BLOOD PRESSURE: 78 MMHG | HEART RATE: 69 BPM | BODY MASS INDEX: 39.52 KG/M2 | SYSTOLIC BLOOD PRESSURE: 138 MMHG

## 2022-11-11 DIAGNOSIS — E89.0 POSTOPERATIVE HYPOTHYROIDISM: ICD-10-CM

## 2022-11-11 DIAGNOSIS — G89.29 CHRONIC RADICULAR LOW BACK PAIN: ICD-10-CM

## 2022-11-11 DIAGNOSIS — N18.31 STAGE 3A CHRONIC KIDNEY DISEASE (H): ICD-10-CM

## 2022-11-11 DIAGNOSIS — Z00.00 ENCOUNTER FOR MEDICARE ANNUAL WELLNESS EXAM: ICD-10-CM

## 2022-11-11 DIAGNOSIS — M54.16 CHRONIC RADICULAR LOW BACK PAIN: ICD-10-CM

## 2022-11-11 DIAGNOSIS — R73.9 ELEVATED RANDOM BLOOD GLUCOSE LEVEL: ICD-10-CM

## 2022-11-11 DIAGNOSIS — E79.0 HYPERURICEMIA: ICD-10-CM

## 2022-11-11 DIAGNOSIS — E78.2 MIXED HYPERLIPIDEMIA: ICD-10-CM

## 2022-11-11 DIAGNOSIS — M48.061 NEUROFORAMINAL STENOSIS OF LUMBAR SPINE: ICD-10-CM

## 2022-11-11 DIAGNOSIS — G47.33 OSA ON CPAP: ICD-10-CM

## 2022-11-11 DIAGNOSIS — I50.32 CHRONIC HEART FAILURE WITH PRESERVED EJECTION FRACTION (HFPEF) (H): ICD-10-CM

## 2022-11-11 DIAGNOSIS — I10 BENIGN ESSENTIAL HYPERTENSION: ICD-10-CM

## 2022-11-11 DIAGNOSIS — Z23 NEED FOR 23-POLYVALENT PNEUMOCOCCAL POLYSACCHARIDE VACCINE: ICD-10-CM

## 2022-11-11 DIAGNOSIS — E66.9 OBESITY (BMI 30-39.9): ICD-10-CM

## 2022-11-11 DIAGNOSIS — E78.1 HYPERTRIGLYCERIDEMIA: ICD-10-CM

## 2022-11-11 DIAGNOSIS — E78.2 MIXED DYSLIPIDEMIA: ICD-10-CM

## 2022-11-11 DIAGNOSIS — Z71.85 VACCINE COUNSELING: ICD-10-CM

## 2022-11-11 DIAGNOSIS — M35.3 POLYMYALGIA RHEUMATICA (H): ICD-10-CM

## 2022-11-11 DIAGNOSIS — Z85.850 HISTORY OF THYROID CANCER: ICD-10-CM

## 2022-11-11 DIAGNOSIS — F51.01 PRIMARY INSOMNIA: ICD-10-CM

## 2022-11-11 DIAGNOSIS — I10 BENIGN ESSENTIAL HYPERTENSION: Primary | ICD-10-CM

## 2022-11-11 LAB
ALBUMIN SERPL BCG-MCNC: 4.2 G/DL (ref 3.5–5.2)
ALP SERPL-CCNC: 55 U/L (ref 40–129)
ALT SERPL W P-5'-P-CCNC: 23 U/L (ref 10–50)
ANION GAP SERPL CALCULATED.3IONS-SCNC: 9 MMOL/L (ref 7–15)
AST SERPL W P-5'-P-CCNC: 23 U/L (ref 10–50)
BILIRUB SERPL-MCNC: 0.6 MG/DL
BUN SERPL-MCNC: 20.8 MG/DL (ref 8–23)
CALCIUM SERPL-MCNC: 10.2 MG/DL (ref 8.8–10.2)
CHLORIDE SERPL-SCNC: 102 MMOL/L (ref 98–107)
CHOLEST SERPL-MCNC: 163 MG/DL
CREAT SERPL-MCNC: 1.27 MG/DL (ref 0.67–1.17)
DEPRECATED HCO3 PLAS-SCNC: 27 MMOL/L (ref 22–29)
GFR SERPL CREATININE-BSD FRML MDRD: 62 ML/MIN/1.73M2
GLUCOSE SERPL-MCNC: 86 MG/DL (ref 70–99)
HBA1C MFR BLD: 6 % (ref 4–6.2)
HDLC SERPL-MCNC: 41 MG/DL
LDLC SERPL CALC-MCNC: 105 MG/DL
NONHDLC SERPL-MCNC: 122 MG/DL
POTASSIUM SERPL-SCNC: 4 MMOL/L (ref 3.4–5.3)
PROT SERPL-MCNC: 7.5 G/DL (ref 6.4–8.3)
SODIUM SERPL-SCNC: 138 MMOL/L (ref 136–145)
TRIGL SERPL-MCNC: 87 MG/DL

## 2022-11-11 PROCEDURE — G0463 HOSPITAL OUTPT CLINIC VISIT: HCPCS | Mod: 25

## 2022-11-11 PROCEDURE — G0439 PPPS, SUBSEQ VISIT: HCPCS | Performed by: INTERNAL MEDICINE

## 2022-11-11 PROCEDURE — 36415 COLL VENOUS BLD VENIPUNCTURE: CPT | Mod: ZL

## 2022-11-11 PROCEDURE — 83036 HEMOGLOBIN GLYCOSYLATED A1C: CPT | Mod: ZL

## 2022-11-11 PROCEDURE — 99214 OFFICE O/P EST MOD 30 MIN: CPT | Mod: 25 | Performed by: INTERNAL MEDICINE

## 2022-11-11 PROCEDURE — 80061 LIPID PANEL: CPT | Mod: ZL

## 2022-11-11 PROCEDURE — 80053 COMPREHEN METABOLIC PANEL: CPT | Mod: ZL

## 2022-11-11 PROCEDURE — 90732 PPSV23 VACC 2 YRS+ SUBQ/IM: CPT

## 2022-11-11 PROCEDURE — G0009 ADMIN PNEUMOCOCCAL VACCINE: HCPCS

## 2022-11-11 RX ORDER — ROSUVASTATIN CALCIUM 5 MG/1
5-10 TABLET, COATED ORAL DAILY
Qty: 180 TABLET | Refills: 4 | Status: SHIPPED | OUTPATIENT
Start: 2022-11-11 | End: 2023-10-06

## 2022-11-11 RX ORDER — GABAPENTIN 800 MG/1
400-800 TABLET ORAL AT BEDTIME
Qty: 90 TABLET | Refills: 1 | Status: SHIPPED | OUTPATIENT
Start: 2022-11-11 | End: 2022-12-23

## 2022-11-11 RX ORDER — FENOFIBRATE 145 MG/1
145 TABLET, COATED ORAL DAILY
Qty: 90 TABLET | Refills: 4 | Status: SHIPPED | OUTPATIENT
Start: 2022-11-11 | End: 2023-11-09

## 2022-11-11 RX ORDER — ALLOPURINOL 300 MG/1
1 TABLET ORAL DAILY
Qty: 90 TABLET | Refills: 4 | Status: SHIPPED | OUTPATIENT
Start: 2022-11-11 | End: 2023-11-18

## 2022-11-11 RX ORDER — LEVOTHYROXINE SODIUM 200 UG/1
200 TABLET ORAL DAILY
Qty: 90 TABLET | Refills: 4 | Status: SHIPPED | OUTPATIENT
Start: 2022-11-11 | End: 2023-12-01

## 2022-11-11 RX ORDER — CARVEDILOL 6.25 MG/1
6.25-12.5 TABLET ORAL 2 TIMES DAILY WITH MEALS
Qty: 360 TABLET | Refills: 1 | Status: SHIPPED | OUTPATIENT
Start: 2022-11-11 | End: 2023-01-31

## 2022-11-11 ASSESSMENT — ENCOUNTER SYMPTOMS
WEAKNESS: 1
HEMATURIA: 0
JOINT SWELLING: 0
HEMATOCHEZIA: 0
COUGH: 0
MYALGIAS: 1
SORE THROAT: 0
CHILLS: 0
DYSURIA: 0
ARTHRALGIAS: 0
PALPITATIONS: 0
PARESTHESIAS: 0
EYE PAIN: 0
FREQUENCY: 0
DIZZINESS: 0
CONSTIPATION: 0
SHORTNESS OF BREATH: 0
HEADACHES: 0
NAUSEA: 0
FEVER: 0
NERVOUS/ANXIOUS: 0
HEARTBURN: 0
DIARRHEA: 0
ABDOMINAL PAIN: 0

## 2022-11-11 ASSESSMENT — ACTIVITIES OF DAILY LIVING (ADL): CURRENT_FUNCTION: NO ASSISTANCE NEEDED

## 2022-11-11 ASSESSMENT — PAIN SCALES - GENERAL: PAINLEVEL: MILD PAIN (3)

## 2022-11-11 NOTE — PATIENT INSTRUCTIONS
1. Benign essential hypertension  - carvedilol (COREG) 6.25 MG tablet; Take 1-2 tablets (6.25-12.5 mg) by mouth 2 times daily (with meals)  Dispense: 360 tablet; Refill: 1    2. Postoperative hypothyroidism  - levothyroxine (SYNTHROID/LEVOTHROID) 200 MCG tablet; Take 1 tablet (200 mcg) by mouth daily  Dispense: 90 tablet; Refill: 4    3. History of thyroid cancer    4. Mixed dyslipidemia  - rosuvastatin (CRESTOR) 5 MG tablet; Take 1-2 tablets (5-10 mg) by mouth daily -- Dose Increase 11/11/2022  Dispense: 180 tablet; Refill: 4  - fenofibrate (TRICOR) 145 MG tablet; Take 1 tablet (145 mg) by mouth daily  Dispense: 90 tablet; Refill: 4    5. Hyperuricemia  - allopurinol (ZYLOPRIM) 300 MG tablet; Take 1 tablet (300 mg) by mouth daily - for gout prevention  Dispense: 90 tablet; Refill: 4    6. Hypertriglyceridemia  - fenofibrate (TRICOR) 145 MG tablet; Take 1 tablet (145 mg) by mouth daily  Dispense: 90 tablet; Refill: 4    7. KYLAH on CPAP -- Uses nightly, finds helpful and beneficial  -- continue regular use.     8. Obesity (BMI 30-39.9)  - semaglutide (OZEMPIC) 2 MG/1.5ML SOPN pen; Inject 0.25 mg Subcutaneous every 7 days for 30 days, THEN 0.5 mg every 7 days for 30 days, THEN 1 mg every 7 days for 90 days.  Dispense: 6 mL; Refill: 4    9. Stage 3a chronic kidney disease (H)  - semaglutide (OZEMPIC) 2 MG/1.5ML SOPN pen; Inject 0.25 mg Subcutaneous every 7 days for 30 days, THEN 0.5 mg every 7 days for 30 days, THEN 1 mg every 7 days for 90 days.  Dispense: 6 mL; Refill: 4    10. Need for 23-polyvalent pneumococcal polysaccharide vaccine  - GH IMM-  PNEUMOCOCCAL VACCINE,ADULT,SQ OR IM    11. Neuroforaminal stenosis of lumbar spine  12. Chronic radicular low back pain  13. Polymyalgia rheumatica (H)  14. Primary insomnia  START:   - gabapentin (NEURONTIN) 800 MG tablet; Take 0.5-1 tablets (400-800 mg) by mouth At Bedtime  Dispense: 90 tablet; Refill: 1    15. Elevated random blood glucose level    16. Chronic heart  failure with preserved ejection fraction (HFpEF) (H)  START:   - semaglutide (OZEMPIC) 2 MG/1.5ML SOPN pen; Inject 0.25 mg Subcutaneous every 7 days for 30 days, THEN 0.5 mg every 7 days for 30 days, THEN 1 mg every 7 days for 90 days.  Dispense: 6 mL; Refill: 4    Return in approximately 6 week(s), or sooner as needed for follow-up with Dr. Whitmore.  -- follow-up Semaglutide / Pain / Insomnia / Hypertension    Clinic : [phone]  Appointment line: [phone]   Patient Education   Personalized Prevention Plan  You are due for the preventive services outlined below.  Your care team is available to assist you in scheduling these services.  If you have already completed any of these items, please share that information with your care team to update in your medical record.  Health Maintenance Due   Topic Date Due     Kidney Microalbumin Urine Test  Never done     COVID-19 Vaccine (4 - Booster for Moderna series) 10/28/2021     Your Health Risk Assessment indicates you feel you are not in good health    A healthy lifestyle helps keep the body fit and the mind alert. It helps protect you from disease, helps you fight disease, and helps prevent chronic disease (disease that doesn't go away) from getting worse. This is important as you get older and begin to notice twinges in muscles and joints and a decline in the strength and stamina you once took for granted. A healthy lifestyle includes good healthcare, good nutrition, weight control, recreation, and regular exercise. Avoid harmful substances and do what you can to keep safe. Another part of a healthy lifestyle is stay mentally active and socially involved.    Good healthcare     Have a wellness visit every year.     If you have new symptoms, let us know right away. Don't wait until the next checkup.     Take medicines exactly as prescribed and keep your medicines in a safe place. Tell us if your medicine causes problems.   Healthy diet and weight control      Eat 3 or 4 small, nutritious, low-fat, high-fiber meals a day. Include a variety of fruits, vegetables, and whole-grain foods.     Make sure you get enough calcium in your diet. Calcium, vitamin D, and exercise help prevent osteoporosis (bone thinning).     If you live alone, try eating with others when you can. That way you get a good meal and have company while you eat it.     Try to keep a healthy weight. If you eat more calories than your body uses for energy, it will be stored as fat and you will gain weight.     Recreation   Recreation is not limited to sports and team events. It includes any activity that provides relaxation, interest, enjoyment, and exercise. Recreation provides an outlet for physical, mental, and social energy. It can give a sense of worth and achievement. It can help you stay healthy.    Mental Exercise and Social Involvement  Mental and emotional health is as important as physical health. Keep in touch with friends and family. Stay as active as possible. Continue to learn and challenge yourself.   Things you can do to stay mentally active are:    Learn something new, like a foreign language or musical instrument.     Play SCRABBLE or do crossword puzzles. If you cannot find people to play these games with you at home, you can play them with others on your computer through the Internet.     Join a games club--anything from card games to chess or checkers or lawn bowling.     Start a new hobby.     Go back to school.     Volunteer.     Read.   Keep up with world events.    Exercise for a Healthier Heart  You may wonder how you can improve the health of your heart. If you re thinking about exercise, you re on the right track. You don t need to become an athlete. But you do need a certain amount of brisk exercise to help strengthen your heart. If you have been diagnosed with a heart condition, your healthcare provider may advise exercise to help stabilize your condition. To help make  exercise a habit, choose safe, fun activities.      Exercise with a friend. When activity is fun, you're more likely to stick with it.   Before you start  Check with your healthcare provider before starting an exercise program. This is especially important if you have not been active for a while. It's also important if you have a long-term (chronic) health problem such as heart disease, diabetes, or obesity. Or if you are at high risk for having these problems.   Why exercise?  Exercising regularly offers many healthy rewards. It can help you do all of the following:     Improve your blood cholesterol level to help prevent further heart trouble    Lower your blood pressure to help prevent a stroke or heart attack    Control diabetes, or reduce your risk of getting this disease    Improve your heart and lung function    Reach and stay at a healthy weight    Make your muscles stronger so you can stay active    Prevent falls and fractures by slowing the loss of bone mass (osteoporosis)    Manage stress better    Reduce your blood pressure    Improve your sense of self and your body image  Exercise tips      Ease into your routine. Set small goals. Then build on them. If you are not sure what your activity level should be, talk with your healthcare provider first before starting an exercise routine.    Exercise on most days. Aim for a total of 150 minutes (2 hours and 30 minutes) or more of moderate-intensity aerobic activity each week. Or 75 minutes (1 hour and 15 minutes) or more of vigorous-intensity aerobic activity each week. Or try for a combination of both. Moderate activity means that you breathe heavier and your heart rate increases but you can still talk. Think about doing 40 minutes of moderate exercise, 3 to 4 times a week. For best results, activity should last for about 40 minutes to lower blood pressure and cholesterol. It's OK to work up to the 40-minute period over time. Examples of moderate-intensity  activity are walking 1 mile in 15 minutes. Or doing 30 to 45 minutes of yard work.    Step up your daily activity level.  Along with your exercise program, try being more active the whole day. Walk instead of drive. Or park further away so that you take more steps each day. Do more household tasks or yard work. You may not be able to meet the advised mount of physical activity. But doing some moderate- or vigorous-intensity aerobic activity can help reduce your risk for heart disease. Your healthcare provider can help you figure out what is best for you.    Choose 1 or more activities you enjoy.  Walking is one of the easiest things you can do. You can also try swimming, riding a bike, dancing, or taking an exercise class.    When to call your healthcare provider  Call your healthcare provider if you have any of these:     Chest pain or feel dizzy or lightheaded    Burning, tightness, pressure, or heaviness in your chest, neck, shoulders, back, or arms    Abnormal shortness of breath    More joint or muscle pain    A very fast or irregular heartbeat (palpitations)  [employer] last reviewed this educational content on 7/1/2019 2000-2021 [employer]. All rights reserved. This information is not intended as a substitute for professional medical care. Always follow your healthcare professional's instructions.          Understanding USDA MyPlate  The USDA has guidelines to help you make healthy food choices. These are called MyPlate. MyPlate shows the food groups that make up healthy meals using the image of a place setting. Before you eat, think about the healthiest choices for what to put on your plate or in your cup or bowl. To learn more about building a healthy plate, visit www.choosemyplate.gov.    The food groups    Fruits. Any fruit or 100% fruit juice counts as part of the Fruit Group. Fruits may be fresh, canned, frozen, or dried, and may be whole, cut-up, or pureed. Make 1/2 of your plate fruits  and vegetables.    Vegetables. Any vegetable or 100% vegetable juice counts as a member of the Vegetable Group. Vegetables may be fresh, frozen, canned, or dried. They can be served raw or cooked and may be whole, cut-up, or mashed. Make 1/2 of your plate fruits and vegetables.    Grains. All foods made from grains are part of the Grains Group. These include wheat, rice, oats, cornmeal, and barley. Grains are often used to make foods such as bread, pasta, oatmeal, cereal, tortillas, and grits. Grains should be no more than 1/4 of your plate. At least half of your grains should be whole grains.    Protein. This group includes meat, poultry, seafood, beans and peas, eggs, processed soy products (such as tofu), nuts (including nut butters), and seeds. Make protein choices no more than 1/4 of your plate. Meat and poultry choices should be lean or low fat.    Dairy. The Dairy Group includes all fluid milk products and foods made from milk that contain calcium, such as yogurt and cheese. (Foods that have little calcium, such as cream, butter, and cream cheese, are not part of this group.) Most dairy choices should be low-fat or fat-free.    Oils. Oils aren't a food group, but they do contain essential nutrients. However it's important to watch your intake of oils. These are fats that are liquid at room temperature. They include canola, corn, olive, soybean, vegetable, and sunflower oil. Foods that are mainly oil include mayonnaise, certain salad dressings, and soft margarines. You likely already get your daily oil allowance from the foods you eat.  Things to limit  Eating healthy also means limiting these things in your diet:       Salt (sodium). Many processed foods have a lot of sodium. To keep sodium intake down, eat fresh vegetables, meats, poultry, and seafood when possible. Purchase low-sodium, reduced-sodium, or no-salt-added food products at the store. And don't add salt to your meals at home. Instead, season them  with herbs and spices such as dill, oregano, cumin, and paprika. Or try adding flavor with lemon or lime zest and juice.    Saturated fat. Saturated fats are most often found in animal products such as beef, pork, and chicken. They are often solid at room temperature, such as butter. To reduce your saturated fat intake, choose leaner cuts of meat and poultry. And try healthier cooking methods such as grilling, broiling, roasting, or baking. For a simple lower-fat swap, use plain nonfat yogurt instead of mayonnaise when making potato salad or macaroni salad.    Added sugars. These are sugars added to foods. They are in foods such as ice cream, candy, soda, fruit drinks, sports drinks, energy drinks, cookies, pastries, jams, and syrups. Cut down on added sugars by sharing sweet treats with a family member or friend. You can also choose fruit for dessert, and drink water or other unsweetened beverages.     [employer] last reviewed this educational content on 6/1/2020 2000-2021 [employer]. All rights reserved. This information is not intended as a substitute for professional medical care. Always follow your healthcare professional's instructions.          Signs of Hearing Loss      Hearing much better with one ear can be a sign of hearing loss.   Hearing loss is a problem shared by many people. In fact, it is one of the most common health problems, particularly as people age. Most people age 65 and older have some hearing loss. By age 80, almost everyone does. Hearing loss often occurs slowly over the years. So you may not realize your hearing has gotten worse.  Have your hearing checked  Call your healthcare provider if you:    Have to strain to hear normal conversation    Have to watch other people s faces very carefully to follow what they re saying    Need to ask people to repeat what they ve said    Often misunderstand what people are saying    Turn the volume of the television or radio up so high  that others complain    Feel that people are mumbling when they re talking to you    Find that the effort to hear leaves you feeling tired and irritated    Notice, when using the phone, that you hear better with one ear than the other  [employer] last reviewed this educational content on 1/1/2020 2000-2021 [employer]. All rights reserved. This information is not intended as a substitute for professional medical care. Always follow your healthcare professional's instructions.        Your Health Risk Assessment indicates you feel you are not in good emotional health.    Recreation   Recreation is not limited to sports and team events. It includes any activity that provides relaxation, interest, enjoyment, and exercise. Recreation provides an outlet for physical, mental, and social energy. It can give a sense of worth and achievement. It can help you stay healthy.    Mental Exercise and Social Involvement  Mental and emotional health is as important as physical health. Keep in touch with friends and family. Stay as active as possible. Continue to learn and challenge yourself.   Things you can do to stay mentally active are:    Learn something new, like a foreign language or musical instrument.     Play SCRABBLE or do crossword puzzles. If you cannot find people to play these games with you at home, you can play them with others on your computer through the Internet.     Join a games club--anything from card games to chess or checkers or lawn bowling.     Start a new hobby.     Go back to school.     Volunteer.     Read.   Keep up with world events.

## 2022-11-11 NOTE — PROGRESS NOTES
"SUBJECTIVE:   Henry is a 67 year old who presents for Preventive Visit.    Patient has been advised of split billing requirements and indicates understanding: Yes  Are you in the first 12 months of your Medicare coverage?  No    Healthy Habits:     In general, how would you rate your overall health?  Fair    Frequency of exercise:  None    Do you usually eat at least 4 servings of fruit and vegetables a day, include whole grains    & fiber and avoid regularly eating high fat or \"junk\" foods?  No    Taking medications regularly:  Yes    Barriers to taking medications:  None    Medication side effects:  None    Ability to successfully perform activities of daily living:  No assistance needed    Home Safety:  No safety concerns identified    Hearing Impairment:  Need to ask people to speak up or repeat themselves and difficulty understanding soft or whispered speech    In the past 6 months, have you been bothered by leaking of urine?  No    In general, how would you rate your overall mental or emotional health?  Fair      PHQ-2 Total Score: 0    Additional concerns today:  Yes    Do you feel safe in your environment? Yes    Have you ever done Advance Care Planning? (For example, a Health Directive, POLST, or a discussion with a medical provider or your loved ones about your wishes): No, advance care planning information given to patient to review.  Patient declined advance care planning discussion at this time.     Fall risk  Fallen 2 or more times in the past year?: No  Any fall with injury in the past year?: No    Cognitive Screening   1) Repeat 3 items (Leader, Season, Table)    2) Clock draw: NORMAL  3) 3 item recall: Recalls 3 objects  Results: 3 items recalled: COGNITIVE IMPAIRMENT LESS LIKELY    Mini-CogTM Copyright ESTEFANÍA Corbin. Licensed by the author for use in [facility]; reprinted with permission ([email or web address].[email or web address]). All rights reserved.      Do you have sleep apnea, excessive snoring or daytime " drowsiness?: yes    Reviewed and updated as needed this visit by clinical staff   Tobacco  Allergies  Meds  Problems  Med Hx  Surg Hx  Fam Hx  Soc   Hx      Review current opioid prescriptions    For a patient with a current opioid prescription:    Reviewed potential Opioid Use Disorder (OUD) risk factors: Yes     Evaluate their pain severity and current treatment plan:     Provide information on non-opioid treatment options:    Refer to a specialist, as appropriate:    Get more information on pain management in the [facility] Pain Management Best Practices Inter-agency Task Force Report    Screen for potential Substance Use Disorders (SUDs)    Reviewed the patient s potential risk factors for SUDs: Yes     Refer to treatment or specialist, as appropriate:     A screening tool isn t required but you may use one:  Find more information in the [employer] [place] on Drug Abuse Screening and Assessment Tools Chart    Reviewed and updated as needed this visit by Provider   Tobacco  Allergies  Meds  Problems  Med Hx  Surg Hx  Fam Hx         Social History     Tobacco Use     Smoking status: Never     Smokeless tobacco: Never   Substance Use Topics     Alcohol use: No     Comment: none     Alcohol Use 11/11/2022   Prescreen: >3 drinks/day or >7 drinks/week? Not Applicable     Current providers sharing in care for this patient include:   Patient Care Team:  Matt Whitmore MD as PCP - General (Internal Medicine)  Matt Whitmore MD as Assigned PCP  Maximo Schwartz MD as Assigned Surgical Provider  Janet Quiles RPH as Pharmacist (Pharmacist)  Janet Quiles RPH as Assigned MTM Pharmacist  Phill Acosta MD as Assigned Rheumatology Provider  Sophia Chavira APRN CNP as Assigned Heart and Vascular Provider    The following health maintenance items are reviewed in Epic and correct as of today:  Health Maintenance   Topic Date Due     MICROALBUMIN  Never done     COVID-19 Vaccine (4 - Booster for  "Moderna series) 10/28/2021     HEMOGLOBIN  10/31/2023     MEDICARE ANNUAL WELLNESS VISIT  11/11/2023     BMP  11/11/2023     LIPID  11/11/2023     FALL RISK ASSESSMENT  11/11/2023     DTAP/TDAP/TD IMMUNIZATION (2 - Td or Tdap) 08/13/2024     COLORECTAL CANCER SCREENING  09/09/2024     ADVANCE CARE PLANNING  11/11/2027     HEPATITIS C SCREENING  Completed     PHQ-2 (once per calendar year)  Completed     INFLUENZA VACCINE  Completed     Pneumococcal Vaccine: 65+ Years  Completed     URINALYSIS  Completed     ZOSTER IMMUNIZATION  Completed     AORTIC ANEURYSM SCREENING (SYSTEM ASSIGNED)  Completed     IPV IMMUNIZATION  Aged Out     MENINGITIS IMMUNIZATION  Aged Out     Review of Systems   Constitutional: Negative for chills and fever.   HENT: Positive for hearing loss. Negative for congestion, ear pain and sore throat.    Eyes: Negative for pain and visual disturbance.   Respiratory: Negative for cough and shortness of breath.    Cardiovascular: Negative for chest pain, palpitations and peripheral edema.   Gastrointestinal: Negative for abdominal pain, constipation, diarrhea, heartburn, hematochezia and nausea.   Genitourinary: Positive for impotence. Negative for dysuria, frequency, genital sores, hematuria, penile discharge and urgency.   Musculoskeletal: Positive for myalgias. Negative for arthralgias and joint swelling.   Skin: Negative for rash.   Allergic/Immunologic: Positive for immunocompromised state.   Neurological: Positive for weakness. Negative for dizziness, headaches and paresthesias.   Psychiatric/Behavioral: Negative for mood changes. The patient is not nervous/anxious.        OBJECTIVE:   /78   Pulse 69   Temp 96.8  F (36  C) (Temporal)   Resp 20   Ht 1.842 m (6' 0.5\")   Wt 135.3 kg (298 lb 3.2 oz)   SpO2 93%   BMI 39.89 kg/m   Estimated body mass index is 39.89 kg/m  as calculated from the following:    Height as of this encounter: 1.842 m (6' 0.5\").    Weight as of this encounter: " 135.3 kg (298 lb 3.2 oz).  Physical Exam  Constitutional:       General: He is not in acute distress.     Appearance: He is well-developed. He is obese. He is not diaphoretic.   HENT:      Head: Normocephalic and atraumatic.   Eyes:      General: No scleral icterus.     Conjunctiva/sclera: Conjunctivae normal.   Cardiovascular:      Rate and Rhythm: Normal rate and regular rhythm.      Pulses: Normal pulses.   Pulmonary:      Effort: Pulmonary effort is normal.      Breath sounds: Normal breath sounds.   Abdominal:      Palpations: Abdomen is soft.      Tenderness: There is no abdominal tenderness.   Musculoskeletal:         General: No tenderness or deformity.      Cervical back: Neck supple.      Right lower leg: No edema.      Left lower leg: No edema.   Lymphadenopathy:      Cervical: No cervical adenopathy.   Skin:     General: Skin is warm and dry.      Findings: No rash.   Neurological:      Mental Status: He is alert and oriented to person, place, and time. Mental status is at baseline.   Psychiatric:         Mood and Affect: Mood normal.         Behavior: Behavior normal.       Results for orders placed or performed in visit on 11/11/22   Hemoglobin A1c     Status: Normal   Result Value Ref Range    Hemoglobin A1C 6.0 4.0 - 6.2 %   Comprehensive Metabolic Panel     Status: Abnormal   Result Value Ref Range    Sodium 138 136 - 145 mmol/L    Potassium 4.0 3.4 - 5.3 mmol/L    Chloride 102 98 - 107 mmol/L    Carbon Dioxide (CO2) 27 22 - 29 mmol/L    Anion Gap 9 7 - 15 mmol/L    Urea Nitrogen 20.8 8.0 - 23.0 mg/dL    Creatinine 1.27 (H) 0.67 - 1.17 mg/dL    Calcium 10.2 8.8 - 10.2 mg/dL    Glucose 86 70 - 99 mg/dL    Alkaline Phosphatase 55 40 - 129 U/L    AST 23 10 - 50 U/L    ALT 23 10 - 50 U/L    Protein Total 7.5 6.4 - 8.3 g/dL    Albumin 4.2 3.5 - 5.2 g/dL    Bilirubin Total 0.6 <=1.2 mg/dL    GFR Estimate 62 >60 mL/min/1.73m2   Lipid Panel     Status: Abnormal   Result Value Ref Range    Cholesterol 163  <200 mg/dL    Triglycerides 87 <150 mg/dL    Direct Measure HDL 41 >=40 mg/dL    LDL Cholesterol Calculated 105 (H) <=100 mg/dL    Non HDL Cholesterol 122 <130 mg/dL    Narrative    Cholesterol  Desirable:  <200 mg/dL    Triglycerides  Normal:  Less than 150 mg/dL  Borderline High:  150-199 mg/dL  High:  200-499 mg/dL  Very High:  Greater than or equal to 500 mg/dL    Direct Measure HDL  Female:  Greater than or equal to 50 mg/dL   Male:  Greater than or equal to 40 mg/dL    LDL Cholesterol  Desirable:  <100mg/dL  Above Desirable:  100-129 mg/dL   Borderline High:  130-159 mg/dL   High:  160-189 mg/dL   Very High:  >= 190 mg/dL    Non HDL Cholesterol  Desirable:  130 mg/dL  Above Desirable:  130-159 mg/dL  Borderline High:  160-189 mg/dL  High:  190-219 mg/dL  Very High:  Greater than or equal to 220 mg/dL   LDL is high and not at goal.  Triglycerides are at goal.  HDL is at goal.  Creatinine is elevated.  Liver enzymes are normal.  Electrolyte levels are normal.  Hemoglobin A1c shows prediabetes.    ASSESSMENT / PLAN:       ICD-10-CM    1. Benign essential hypertension  I10 carvedilol (COREG) 6.25 MG tablet      2. Postoperative hypothyroidism  E89.0 levothyroxine (SYNTHROID/LEVOTHROID) 200 MCG tablet      3. History of thyroid cancer  Z85.850       4. Mixed dyslipidemia  E78.2 rosuvastatin (CRESTOR) 5 MG tablet     fenofibrate (TRICOR) 145 MG tablet      5. Hyperuricemia  E79.0 allopurinol (ZYLOPRIM) 300 MG tablet      6. Hypertriglyceridemia  E78.1 fenofibrate (TRICOR) 145 MG tablet      7. KYLAH on CPAP -- Uses nightly, finds helpful and beneficial  G47.33     Z99.89       8. Obesity (BMI 30-39.9)  E66.9 semaglutide (OZEMPIC) 2 MG/1.5ML SOPN pen      9. Stage 3a chronic kidney disease (H)  N18.31 semaglutide (OZEMPIC) 2 MG/1.5ML SOPN pen      10. Need for 23-polyvalent pneumococcal polysaccharide vaccine  Z23 GH IMM-  PNEUMOCOCCAL VACCINE,ADULT,SQ OR IM      11. Neuroforaminal stenosis of lumbar spine  M48.061        12. Chronic radicular low back pain  M54.16 gabapentin (NEURONTIN) 800 MG tablet    G89.29       13. Polymyalgia rheumatica (H)  M35.3 gabapentin (NEURONTIN) 800 MG tablet      14. Primary insomnia  F51.01 gabapentin (NEURONTIN) 800 MG tablet      15. Elevated random blood glucose level  R73.09       16. Chronic heart failure with preserved ejection fraction (HFpEF) (H)  I50.32 semaglutide (OZEMPIC) 2 MG/1.5ML SOPN pen      17. Vaccine counseling  Z71.85       18. Encounter for Medicare annual wellness exam  Z00.00       Patient presents for Medicare annual wellness visit as well as follow-up multiple issues.    Hypertension, blood pressure still remains elevated at home.  We will increase his carvedilol.  Recently has had a number of different medication dose adjustments.  See last clinic visit for details.  Blood pressures at home have been in the 140s systolic sometimes higher.  Continue monitoring at home.  Dose adjusted and increased today.    Postoperative hypothyroidism.  Continues with oral thyroid replacement.  History of thyroid cancer.  Continue with current medications.  Needs refills.    Mixed hyperlipidemia.  Currently taking combination of Crestor and fenofibrate for cholesterol management.  He did not tolerate higher dosing of Crestor.  Doing quite well with fenofibrate.  Goal will be to try increasing Crestor dosing if tolerated.  LDL is high and not at goal.  May need to consider alternating Crestor with alternative statin therapy.    Hyperuricemia, doing well with allopurinol.  No recent contacts.  Needs refills    Hypertriglyceridemia, see above.  Continues on fenofibrate.  Needs refills.  Sleep apnea, chronic, ongoing.  Uses CPAP nightly.  Finds helpful and beneficial.  Encouraged continued use.    Heart failure with preserved ejection fraction, recent fluid overload.  Possibly due to amlodipine.  After stopping amlodipine, restarting spironolactone, fluid retention issues have improved.  Also  "has stage IIIa chronic kidney disease.  We discussed some treatment options.  Start Ozempic injections.  Pneumococcal vaccination is due.  He would like this today.  Orders placed    Low back pain, chronic, ongoing.  Has neuroforaminal stenosis.  Encouraged healthy diet exercise and weight loss.    Chronic low back pain with radiculopathy, polymyalgia rheumatica.  Chronic insomnia.  Currently using low-dose gabapentin during the day but he is having a hard time sleeping at night due to the neuropathy pain.  Discussed treatment options.  Start 400 to 800 mg of gabapentin at bedtime.  Prescription sent to pharmacy.    Elevated random glucose, prediabetes and with lab work today.    Vaccine counseling completed.  Encouraged COVID vaccination, flu shot.  Consider hepatitis vaccination.    Patient has been advised of split billing requirements and indicates understanding: Yes      COUNSELING:  Reviewed preventive health counseling, as reflected in patient instructions  Special attention given to:       Consider AAA screening for ages 65-75 and smoking history       Regular exercise       Healthy diet/nutrition       Vision screening       Hearing screening       Dental care       Bladder control       Fall risk prevention       Immunizations    Estimated body mass index is 39.89 kg/m  as calculated from the following:    Height as of this encounter: 1.842 m (6' 0.5\").    Weight as of this encounter: 135.3 kg (298 lb 3.2 oz).    Weight management plan: Discussed healthy diet and exercise guidelines    He reports that he has never smoked. He has never used smokeless tobacco.      Appropriate preventive services were discussed with this patient, including applicable screening as appropriate for cardiovascular disease, diabetes, osteopenia/osteoporosis, and glaucoma.  As appropriate for age/gender, discussed screening for colorectal cancer, prostate cancer, breast cancer, and cervical cancer. Checklist reviewing preventive " services available has been given to the patient.    Reviewed patients plan of care and provided an AVS. The Complex Care Plan (for patients with higher acuity and needing more deliberate coordination of services) for Selvin meets the Care Plan requirement. This Care Plan has been established and reviewed with the Patient and spouse.    Counseling Resources:  ATP IV Guidelines  Pooled Cohorts Equation Calculator  Breast Cancer Risk Calculator  Breast Cancer: Medication to Reduce Risk  FRAX Risk Assessment  ICSI Preventive Guidelines  Dietary Guidelines for Americans, 2010  USDA's MyPlate  ASA Prophylaxis  Lung CA Screening    Matt Whitmore MD  [facility]    Identified Health Risks:    The patient was provided with suggestions to help him develop a healthy physical lifestyle.  He is at risk for lack of exercise and has been provided with information to increase physical activity for the benefit of his well-being.  The patient was counseled and encouraged to consider modifying their diet and eating habits. He was provided with information on recommended healthy diet options.  The patient was provided with written information regarding signs of hearing loss.  The patient was provided with suggestions to help him develop a healthy emotional lifestyle.

## 2022-11-11 NOTE — NURSING NOTE
"Chief Complaint   Patient presents with     Medicare Wellness Visit     Hypertension         Initial BP (!) 140/78 (BP Location: Right arm, Patient Position: Sitting, Cuff Size: Adult Large)   Pulse 69   Temp 96.8  F (36  C) (Temporal)   Resp 20   Ht 1.842 m (6' 0.5\")   Wt 135.3 kg (298 lb 3.2 oz)   SpO2 93%   BMI 39.89 kg/m   Estimated body mass index is 39.89 kg/m  as calculated from the following:    Height as of this encounter: 1.842 m (6' 0.5\").    Weight as of this encounter: 135.3 kg (298 lb 3.2 oz).       FOOD SECURITY SCREENING QUESTIONS:    The next two questions are to help us understand your food security.  If you are feeling you need any assistance in this area, we have resources available to support you today.    Hunger Vital Signs:  Within the past 12 months we worried whether our food would run out before we got money to buy more. Never  Within the past 12 months the food we bought just didn't last and we didn't have money to get more. Never    Advance Care Directive on file? no      Medication reconciliation complete.      Kevin Haynes,on 11/11/2022 at 9:05 AM        "

## 2022-11-15 ENCOUNTER — HOSPITAL ENCOUNTER (OUTPATIENT)
Dept: CARDIOLOGY | Facility: OTHER | Age: 67
Discharge: HOME | End: 2022-11-15
Attending: NURSE PRACTITIONER | Admitting: NURSE PRACTITIONER
Payer: MEDICARE

## 2022-11-15 DIAGNOSIS — R68.89 ACTIVITY INTOLERANCE: ICD-10-CM

## 2022-11-15 LAB — STRESS ECHO TARGET HR: 152

## 2022-11-15 PROCEDURE — 93017 CV STRESS TEST TRACING ONLY: CPT

## 2022-11-15 PROCEDURE — 93018 CV STRESS TEST I&R ONLY: CPT | Performed by: INTERNAL MEDICINE

## 2022-11-15 PROCEDURE — 93016 CV STRESS TEST SUPVJ ONLY: CPT | Performed by: INTERNAL MEDICINE

## 2022-11-15 NOTE — PROGRESS NOTES
0925 Patient arrived for an exercise stress test. The procedure was explained, medications, allergies and history reviewed. The patient was prepped for the stress test. Dr. August arrived, and the patient walked 6 minutes and 0 seconds. The patient tolerated the procedure. The patient left in stable condition. The patient was instructed that the ordering MD will call within one to two days with test results. Please see the test results, for a full report.

## 2022-11-20 ENCOUNTER — MEDICAL CORRESPONDENCE (OUTPATIENT)
Dept: HEALTH INFORMATION MANAGEMENT | Facility: OTHER | Age: 67
End: 2022-11-20

## 2022-11-21 ENCOUNTER — DOCUMENTATION ONLY (OUTPATIENT)
Dept: SLEEP MEDICINE | Facility: HOSPITAL | Age: 67
End: 2022-11-21

## 2022-11-22 NOTE — PROGRESS NOTES
Chart review prior to sleep testing.    Patient Summary:  67 year old yo male who is referred for history of KYLAH, sleep onset and maintenance insomnia.    Patient Active Problem List    Diagnosis Date Noted     Hypertriglyceridemia 11/11/2022     Priority: Medium     Coronary artery calcification 10/18/2022     Priority: Medium     Bilateral renal cysts 08/30/2022     Priority: Medium     Chronic radicular low back pain 08/30/2022     Priority: Medium     Hyperuricemia 12/23/2021     Priority: Medium     Stage 3a chronic kidney disease (H) 10/27/2021     Priority: Medium     Low testosterone level in male 08/18/2021     Priority: Medium     Radicular low back pain 04/27/2021     Priority: Medium     Neuroforaminal stenosis of lumbar spine 04/27/2021     Priority: Medium     Trigger point with back pain 04/14/2021     Priority: Medium     Trigger point of right shoulder region 04/14/2021     Priority: Medium     Sacroiliac joint pain - left 04/14/2021     Priority: Medium     Osteoarthritis of lumbar spine, unspecified spinal osteoarthritis complication status 04/14/2021     Priority: Medium     Hypercalcemia 03/12/2021     Priority: Medium     Primary insomnia 01/31/2021     Priority: Medium     Polymyalgia rheumatica (H) 01/31/2021     Priority: Medium     Personal history of malignant neoplasm of bladder -- s/p TURBT x2 in 3/2018 and 4/2018 for high-grade non-muscle invasive bladder cancer, now s/p BCG treatment - last cystoscopy 1/20/2021 was normal 01/31/2021     Priority: Medium     Steroid-induced hyperglycemia 01/29/2021     Priority: Medium     Bilateral lower extremity edema 01/29/2021     Priority: Medium     Vitamin B12 deficiency 01/29/2021     Priority: Medium     Chronic fatigue syndrome with fibromyalgia 01/29/2021     Priority: Medium     History of Lyme disease 01/29/2021     Priority: Medium     Morbid obesity (H) 11/02/2020     Priority: Medium     Meniere disease, bilateral 06/01/2018      Priority: Medium     Rotator cuff syndrome of right shoulder 04/23/2018     Priority: Medium     Lung mass 03/15/2018     Priority: Medium     Obesity (BMI 30-39.9) 03/15/2018     Priority: Medium     History of carpal tunnel surgery 11/02/2017     Priority: Medium     Vitamin D deficiency 10/10/2016     Priority: Medium     Postoperative hypothyroidism 01/11/2011     Priority: Medium     Mixed dyslipidemia 07/12/2010     Priority: Medium     Psychosexual dysfunction with inhibited sexual excitement 02/17/2010     Priority: Medium     Benign essential hypertension 02/17/2010     Priority: Medium     History of kidney stones 02/17/2010     Priority: Medium     KYLAH on CPAP -- Uses nightly, finds helpful and beneficial 02/17/2010     Priority: Medium     History of thyroid cancer 02/17/1994     Priority: Medium     Formatting of this note might be different from the original.  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     Priority: Medium       Current Outpatient Medications   Medication     allopurinol (ZYLOPRIM) 300 MG tablet     aspirin (ASA) 81 MG chewable tablet     B Complex-C (VITAMIN B COMPLEX W/VITAMIN C) TABS tablet     carvedilol (COREG) 6.25 MG tablet     Cholecalciferol (VITAMIN D-3) 125 MCG (5000 UT) TABS     Cyanocobalamin 2500 MCG TABS     fenofibrate (TRICOR) 145 MG tablet     gabapentin (NEURONTIN) 100 MG capsule     gabapentin (NEURONTIN) 800 MG tablet     levothyroxine (SYNTHROID/LEVOTHROID) 200 MCG tablet     loratadine (CLARITIN) 10 MG tablet     magnesium 250 MG tablet     Melatonin 10 MG TABS tablet     Multiple Vitamins-Minerals (CENTRUM ADULTS PO)     order for DME     predniSONE (DELTASONE) 1 MG tablet     predniSONE (DELTASONE) 5 MG tablet     predniSONE (DELTASONE) 5 MG tablet     Probiotic Product (PROBIOTIC-10 PO)     rosuvastatin (CRESTOR) 5 MG tablet     semaglutide (OZEMPIC) 2 MG/1.5ML SOPN pen     sildenafil (VIAGRA) 100 MG tablet     spironolactone (ALDACTONE) 25 MG  "tablet     tamsulosin (FLOMAX) 0.4 MG capsule     testosterone (ANDROGEL 1.62% PUMP) 20.25 MG/ACT gel     No current facility-administered medications for this visit.       Pertinent PMHx of chronic fatigue syndrome, fibromyalgia, KYLAH, lung mass, obesity, vitamin D and B12 deficiency, hypothyroidism with prior thyroid cancer, HLD, HTN, CKD stage 3a.    Recent reassuring cardiac stress testing, cardiac cath with mild non-obstructive CAD.    STOP-BANG score of 7, with unknown neck circumference.  Be[place] score of 0.  BMI of Estimated body mass index is 39.89 kg/m  as calculated from the following:    Height as of 11/11/22: 1.842 m (6' 0.5\").    Weight as of 11/11/22: 135.3 kg (298 lb 3.2 oz).     Per questionnaire: \"Unable to fall asleep, stay asleep\"    Yes for prior sleep testing at [facility], unknown date.  I could not find results for review.    Caffeine use:  No for 3+ per day.  No for within 6 hours of bed.    Tobacco use: No    Sleep pattern:  9pm - 7am, total sleep time 3 hours.  Time to fall asleep: ~3+ minutes.  Awakenings: 5+ times per night, 2 hours to return to sleep, awake for total of 5 hours per night.  Napping.  0 days per week, - hours per nap.    Sleep Habits:   Do you read in bed? No  Do you eat in bed? No  Do you watch TV in bed? No  Do you work in bed? No  Do you use a phone or computer in bed? No    No for RLS screen.  No for sleep walking.  No for dream enactment behavior.  No for bruxism.    No for morning headaches.  No for snoring.  Yes for observed apnea.  Yes for FHx of KYLAH.    SHx:  , lives with wife.  Retired, previously worked in management.    A/P:    1.)  History of KYLAH, unknown severity  - If unable to obtain prior sleep testing, would appear to be candidate for either home sleep testing or in-lab PSG.  - Co-morbid HTN, chronic fatigue, obesity    2.)  Sleep onset and maintenance insomnia  - Recommend maintaining sleep diaries for 2 weeks prior to visit.  - Did not report RLS " symptoms, unclear behavior / activity during reported prolonged awakenings.  - Strong suspicion for sleep-state misperception given reported total sleep time of 3 hours on average and Pag[place] score of 0.      ---  This note was written with the assistance of the Dragon voice-dictation technology software. The final document, although reviewed, may contain errors. For corrections, please contact the office.    Pino Gustafson MD    Sleep Medicine    [place], [place]  o Main Office: [phone]    [facility] [place], [place]  o [address], [address]  o Schedule visits: [phone]  o Main Office: [phone]  o Fax: [phone]

## 2022-11-22 NOTE — PROGRESS NOTES
STOP BANG       Name: Selvin An MRN# [id number]   Age: 67 year old YOB: 1955     Stop Bang questionnaire completed with a score of >3 to allow for HST     Have you been told you snore loudly (louder than talking or loud enough to be heard through doors)? NO    Do you often feel tired, fatigued, or sleepy during the daytime? YES    Has anyone observed you stop breathing during your sleep? YES    Do you have or are you being treated for high blood pressure? YES    Is your BMI greater than 35? YES    Is your neck size circumference 16 inches or greater? YES    Are you over 50 years old? YES    Stop Bang Score (# of yes): 7

## 2022-11-22 NOTE — PROGRESS NOTES
SLEEP HISTORY QUESTIONNAIRE    Please describe the main reason for your sleep appointment? Unable to fall asleep, stay asleep    How long has this been a problem? ?    Have you been diagnosed with a sleep problem in the past? YES    If so, what? Sleep apnea    What treatment was recommended? cpap    Have you had a sleep study in the past? YES    If yes, where and when? [facility]    Sleep Habits:   Do you read in bed? No  Do you eat in bed? No  Do you watch TV in bed? No  Do you work in bed? No  Do you use a phone or computer in bed? No    Is you sleep disturbed by:   Bed partner: No  Children: No  Noise: No   Pets: No  Other: no      On two or more nights per week, do you drink alcohol to help you fall asleep?NO    On two or more nights per week, do you take melatonin to help you fall asleep? YES    On two or more nights per week, do you take over the counter medicine to fall asleep?  YES    Do you take drinks with caffeine (coffee, tea, soda, energy drinks)? YES    Do you have 3 or more caffeine drinks in a day? NO    Do you have caffeine drinks within 6 hours of bedtime? NO    Do you smoke or use tobacco? NO    Do you exercise? NO    Sleep Routine:   Using a 24 Hour Clock    What time do you usually get into bed on workdays? 9pm    Weekend/non work days? 9pm    What time do you get out of bed on workdays? 7am      Weekend/non work days?7am    Do you work the evening or night shift or do your shifts rotate? ?    How long does it usually take to fall to sleep? 3+    How many times do you wake during the night? 5+    How much time do you feel that you are awake during the entire night? 5 hours    How long does it take for you to fall back to sleep after you wake up? 2 hours    Why do you think you wake up? No idea    What do you do when you wake up? Try to go back to sleep    How much sleep do you think you get on work nights? 3    How much sleep do you think you get on weekends/non work days? 3    How  much sleep do you think you need to feel your best? 8    How many days during a week do you take a nap on average? 0    What is the average length of your naps? n/a    Do you feel better after taking a nap? DOES NOT APPLY    If you could chose the best sleep schedule for you, what time would you go to bed? 9pm  What time would you get up? 7am    Do you read in bed? NO    Do you eat in bed? NO    Do you watch TV in bed? NO    Do you do work in bed? NO    Do you use a computer or phone in bed? NO    Sleep Disruptions?   Leg movements:  Do you ever have restless, crawling, aching or other unusual feelings in your legs? NO    Do you ever wake yourself by kicking your legs during the night? NO    Are the sheets and blankets messed up or tossed about when you get up? NO    Night-time behaviors:   Do you have nightmares or night terrors? NO   How often? n/a    Have you had times when you were sleep walking? NO    Have you been seen doing anything unusual while you sleep at nights? NO  What? n/a  How often? n/a    Have you ever hurt yourself or someone else while you were sleeping? NO  Please describe: n/a    Do you clench or grind your teeth during the night? no    Sleep Apnea (pauses in breathing during sleep):  Do you wake with a headache in the morning? NO  How often? n/a    Does your bed partner, family or friends ever say that you snore? NO  How many nights per week do you snore? n/a  Can snoring be heard outside the bedroom? n/a    Do you ever wake yourself up from snoring, gasping or choking? NO    Have you ever been told that you stop breathing or have pauses in your breathing? YES    Do you wake in the morning with a dry throat or mouth? NO    Do you have trouble breathing through your nose? NO    Do you have problems with heartburn, reflux or a hiatal hernia? NO    Which positions do you usually sleep in? (stomach, back, sides, all) back, sides    Do you use oxygen or any other medical equipment when you sleep?  YES    Do members of your family (related by blood) snore? YES    Have any members of your family been diagnosed with with sleep apnea? YES    Do other members of your family have restless leg? NO    Do other members of your family have sleep walking? NO    Have you ever had an accident, or near accident due to sleepiness while driving? NO    Does your sleepiness affect your work on the job or at school? NO    Do you ever fall asleep by accident while doing a task? NO    Have you had sudden muscle weakness when laughing, angry or surprised? NO    Have you ever been unable to move your body when falling asleep or waking up? NO    Do you ever have trouble  your dreams from real life events? NO  Please describe: n/a    Physical Health: (including illness and injury): During the past 30 days, on how many days was your physical health not good? 0/30 days     Mental Health: (including stress, depression, and problems with emotions): During the last 30 days, how may days was your mental health not good? 0/30 days.     During the past 30 days, on how many days did poor physical or mental health keep you from doing your usual activities? This might be self-care, work, or play? 0/30 days.     Social History:   Marital status:     Who lives in your home with you? wife    Mother (alive or dead)? dead If has , from what? Heart attack  Father (alive or dead)? dead If has , from what? Heart attack    Siblings: YES  Have any ? NO  If so, from what? n/a    Currently working? NO  If yes, work: n/a  Former jobs: mgmt     Sleepiness Scale:   Sitting and reading 0   Watching TV 0   Sitting in a public place 0   Riding in a car 0   Lying down to rest in the afternoon 0   Sitting and talking to someone 0   Sitting quietly after a lunch without alcohol 0   In a car, stopping for a few minutes in traffic 0       Surgical History:   Past Surgical History:   Procedure Laterality Date     COLONOSCOPY  2014     9/2014,Normal - follow up 10 years     CV CORONARY ANGIOGRAM N/A 10/31/2022    Procedure: Coronary Angiogram;  Surgeon: Ender Michaels MD;  Location:  [facility]     CYSTOSCOPY      bladder cancer     OTHER SURGICAL HISTORY      ,SPLENECTOMY     OTHER SURGICAL HISTORY      2010,76994,EYE MUSCLE SURGERY,Left,strabismus     OTHER SURGICAL HISTORY      10/06/14,[id number],OTHER,Left,Dr. Lana LEHMAN     OTHER SURGICAL HISTORY      10/14/14,[id number],OTHER,Left,Dr. Lana LEHMAN     OTHER SURGICAL HISTORY      11/16/2017,[id number],ENDOSCOPIC RELEASE TRANSVERSE CARPAL LIGAMENT OF HAND,Left     RELEASE CARPAL TUNNEL      12/2011     RETINAL DETACHMENT SURGERY Right      STRABISMUS SURGERY Left      THYROIDECTOMY      1994,thyroid ca     TONSILLECTOMY      2004,T & A > 12yo     VASECTOMY      No Comments Provided       Medical Conditions:   Past Medical History:   Diagnosis Date     Bell's palsy 3/30/2017     Bilateral carpal tunnel syndrome     No Comments Provided     Essential (primary) hypertension     No Comments Provided     Gout 6/3/2013     Hyperlipidemia     No Comments Provided     Hypothyroidism     No Comments Provided     Malignant neoplasm of thyroid gland (H)     1994     Sleep apnea     No Comments Provided       Medications:   Current Outpatient Medications   Medication Sig     allopurinol (ZYLOPRIM) 300 MG tablet Take 1 tablet (300 mg) by mouth daily - for gout prevention     aspirin (ASA) 81 MG chewable tablet Take 81 mg by mouth daily     B Complex-C (VITAMIN B COMPLEX W/VITAMIN C) TABS tablet Take 1 tablet by mouth daily     carvedilol (COREG) 6.25 MG tablet Take 1-2 tablets (6.25-12.5 mg) by mouth 2 times daily (with meals)     Cholecalciferol (VITAMIN D-3) 125 MCG (5000 UT) TABS Take 5,000 Units by mouth daily     Cyanocobalamin 2500 MCG TABS Take 2,500 mcg by mouth daily -- or every other day -- OTC okay     fenofibrate (TRICOR) 145 MG tablet Take 1 tablet (145 mg) by mouth  daily     gabapentin (NEURONTIN) 100 MG capsule Take 2 capsules (200 mg) by mouth 2 times daily - for burning/shooting nerve pain     gabapentin (NEURONTIN) 800 MG tablet Take 0.5-1 tablets (400-800 mg) by mouth At Bedtime     levothyroxine (SYNTHROID/LEVOTHROID) 200 MCG tablet Take 1 tablet (200 mcg) by mouth daily     loratadine (CLARITIN) 10 MG tablet Take 10 mg by mouth daily     magnesium 250 MG tablet Take 1 tablet by mouth daily     Melatonin 10 MG TABS tablet Take 20 mg by mouth 2 times daily      Multiple Vitamins-Minerals (CENTRUM ADULTS PO) Take 1 tablet by mouth daily     order for DME CPAP machine for home use at pressure: 10, with supplies:  humidifier, chamber, mask, tubing     predniSONE (DELTASONE) 1 MG tablet By mouth 8mg daily x30 days then 7mg daily x30 days then 6mg daily x30 days ten 5mg daily x30 days. Combine with 5mg tabs to make total daily dose.     predniSONE (DELTASONE) 5 MG tablet By mouth 8mg daily x30 days then 7mg daily x30 days then 6mg daily x30 days ten 5mg daily x30 days. Combine with 1mg tabs to make total daily dose.     predniSONE (DELTASONE) 5 MG tablet Take 2 tablets (10 mg) by mouth daily + 3 mg - total of 13 mg daily     Probiotic Product (PROBIOTIC-10 PO) Take 1 tablet by mouth daily     rosuvastatin (CRESTOR) 5 MG tablet Take 1-2 tablets (5-10 mg) by mouth daily -- Dose Increase 11/11/2022     semaglutide (OZEMPIC) 2 MG/1.5ML SOPN pen Inject 0.25 mg Subcutaneous every 7 days for 30 days, THEN 0.5 mg every 7 days for 30 days, THEN 1 mg every 7 days for 90 days.     sildenafil (VIAGRA) 100 MG tablet Take 1/2 to 1 tablet 1 hour prior to sexual activity     spironolactone (ALDACTONE) 25 MG tablet Take 1 tablet (25 mg) by mouth daily -- STOP NORVASC --     tamsulosin (FLOMAX) 0.4 MG capsule Take 1 capsule (0.4 mg) by mouth every evening     testosterone (ANDROGEL 1.62% PUMP) 20.25 MG/ACT gel Place 2 Pump onto the skin every morning     No current facility-administered  medications for this visit.       Are you currently having any of the following symptoms?   General:   Obvious weight gain or loss YES  Fever, chills or sweats NO  Drug allergies: no    Eyes:   Changes in vision NO  Blind spots NO  Double vision NO  Other no    Ear, Nose and Throat:   Ear pain NO  Sore throat NO  Sinus pain NO  Post-nasal drip NO  Runny nose NO  Bloody nose NO    Heart:   Rapid or irregular heart beat NO  Chest pain or pressure NO  Out of breath when lying down NO  Swelling in feet or legs NO  High blood pressure YES  Heart disease NO    Nervous system   Headaches NO  Weakness in arms or legs NO  Numbness in arms of legs NO  Other: no    Skin  Rashes NO  New moles or skin changes NO  Other no    Lungs  Shortness of breath at rest NO  Shortness of breath with activity NO  Dry cough NO  Coughing up mucous or phlegm NO  Coughing up blood NO  Wheezing when breathing NO    Lymph System  Swollen lymph nodes NO  New lumps or bumps NO  Changes in breasts or discharge NO    Digestive System   Nausea or vomiting NO  Loose or watery stools NO  Hard, dry stools (constipation) NO  Fat or grease in stools NO  Blood in stools NO  Stools are black or bloody NO  Abdominal (belly) pain NO    Urinary Tract   Pain when you urinate (pee) NO  Blood in your urine NO  Urinate (pee) more than normal NO  Irregular periods NO    Muscles and bones   Muscle pain YES  Joint or bone pain YES  Swollen joints NO  Other no    Glands  Increased thirst or urination NO  Diabetes NO  Morning glucose: no  Afternoon glucose: no    Mental Health  Depression NO  Anxiety NO  Other mental health issues: no

## 2022-11-29 ENCOUNTER — MYC MEDICAL ADVICE (OUTPATIENT)
Dept: RHEUMATOLOGY | Facility: CLINIC | Age: 67
End: 2022-11-29

## 2022-11-29 DIAGNOSIS — M35.3 POLYMYALGIA RHEUMATICA (H): ICD-10-CM

## 2022-11-29 DIAGNOSIS — M35.3 POLYMYALGIA RHEUMATICA (H): Primary | ICD-10-CM

## 2022-11-29 DIAGNOSIS — Z79.52 LONG TERM (CURRENT) USE OF SYSTEMIC STEROIDS: ICD-10-CM

## 2022-11-30 DIAGNOSIS — M35.3 POLYMYALGIA RHEUMATICA (H): ICD-10-CM

## 2022-11-30 RX ORDER — PREDNISONE 5 MG/1
10 TABLET ORAL DAILY
Qty: 180 TABLET | Refills: 1 | OUTPATIENT
Start: 2022-11-30

## 2022-11-30 NOTE — TELEPHONE ENCOUNTER
MyC message response to patient who had previously  updated he is updating on his symptoms starting to return (muscle aches/extreme fatigue from previous note) after decreasing Prednisone 6 mg and will be moving it down to 5 mg in 3 weeks and was questioning the start of medication with non prednisone side effects.    Message update to Dr. Acosta.

## 2022-11-30 NOTE — TELEPHONE ENCOUNTER
Spoke with patient who updates he has not requested a refill of the Prednisone, he continues on the taper dose, currently taking 5mg x30 days.

## 2022-12-01 RX ORDER — PREDNISONE 5 MG/1
TABLET ORAL
Qty: 120 TABLET | Refills: 0 | Status: ON HOLD | OUTPATIENT
Start: 2022-12-01 | End: 2023-02-06

## 2022-12-01 NOTE — TELEPHONE ENCOUNTER
MyC message update to patient per Dr. Acosta's message to repeat the labs and we will wait for the results on plan moving forward.    Nuria Gama RN  [facility]

## 2022-12-01 NOTE — TELEPHONE ENCOUNTER
[id number] of [place] sent Rx request for the following:      Requested Prescriptions   Pending Prescriptions Disp Refills     predniSONE (DELTASONE) 5 MG tablet [Pharmacy Med Name: PREDNISONE 5MG TABLET] 120 tablet 0     Sig: TAKE BY MOUTH 8MG DAILY X 30 DAYS THEN 7MG DAILY X 30DAYS THEN 6MG DAILY X 30 DAYS THEN 5MG DAILY X 30 DAYS. TAKE WITH 1MG TABS TOMAKE TOTAL DAILY DOSE   Last Prescription Date:   9/21/22  Last Fill Qty/Refills:         120, R-0    Last Office Visit:              11/11/22   Future Office visit:             Dec 23, 2022 11:20 AM  Office Visit with Matt Whitmore MD  [facility] ([facility] ) [address]  [place] [address]  [phone]     Per LOV note:  Return in about 6 weeks (around 12/23/2022) for -- follow-up Semaglutide / Pain / Insomnia / Hypertension.    Milagro Baez RN .............. 12/1/2022  3:45 PM

## 2022-12-02 ENCOUNTER — APPOINTMENT (OUTPATIENT)
Dept: LAB | Facility: OTHER | Age: 67
End: 2022-12-02
Attending: NURSE PRACTITIONER
Payer: MEDICARE

## 2022-12-02 ENCOUNTER — HOSPITAL ENCOUNTER (OUTPATIENT)
Dept: CARDIOLOGY | Facility: OTHER | Age: 67
Discharge: HOME | End: 2022-12-02
Attending: NURSE PRACTITIONER
Payer: MEDICARE

## 2022-12-02 DIAGNOSIS — R68.89 ACTIVITY INTOLERANCE: ICD-10-CM

## 2022-12-02 DIAGNOSIS — R06.09 DOE (DYSPNEA ON EXERTION): ICD-10-CM

## 2022-12-02 LAB
ALBUMIN SERPL BCG-MCNC: 3.9 G/DL (ref 3.5–5.2)
ALP SERPL-CCNC: 61 U/L (ref 40–129)
ALT SERPL W P-5'-P-CCNC: 27 U/L (ref 10–50)
ANION GAP SERPL CALCULATED.3IONS-SCNC: 8 MMOL/L (ref 7–15)
AST SERPL W P-5'-P-CCNC: 31 U/L (ref 10–50)
BASOPHILS # BLD AUTO: 0.1 10E3/UL (ref 0–0.2)
BASOPHILS NFR BLD AUTO: 1 %
BILIRUB SERPL-MCNC: 0.6 MG/DL
BUN SERPL-MCNC: 17.1 MG/DL (ref 8–23)
CALCIUM SERPL-MCNC: 10 MG/DL (ref 8.8–10.2)
CHLORIDE SERPL-SCNC: 103 MMOL/L (ref 98–107)
CREAT SERPL-MCNC: 1.23 MG/DL (ref 0.67–1.17)
CRP SERPL-MCNC: 3.64 MG/L
DEPRECATED HCO3 PLAS-SCNC: 27 MMOL/L (ref 22–29)
EOSINOPHIL # BLD AUTO: 0.3 10E3/UL (ref 0–0.7)
EOSINOPHIL NFR BLD AUTO: 2 %
ERYTHROCYTE [DISTWIDTH] IN BLOOD BY AUTOMATED COUNT: 15.4 % (ref 10–15)
ERYTHROCYTE [SEDIMENTATION RATE] IN BLOOD BY WESTERGREN METHOD: 5 MM/HR (ref 0–20)
GFR SERPL CREATININE-BSD FRML MDRD: 64 ML/MIN/1.73M2
GLUCOSE SERPL-MCNC: 87 MG/DL (ref 70–99)
HCT VFR BLD AUTO: 49.8 % (ref 40–53)
HGB BLD-MCNC: 16.7 G/DL (ref 13.3–17.7)
IMM GRANULOCYTES # BLD: 0.1 10E3/UL
IMM GRANULOCYTES NFR BLD: 1 %
LVEF ECHO: NORMAL
LYMPHOCYTES # BLD AUTO: 3.9 10E3/UL (ref 0.8–5.3)
LYMPHOCYTES NFR BLD AUTO: 29 %
MCH RBC QN AUTO: 31.7 PG (ref 26.5–33)
MCHC RBC AUTO-ENTMCNC: 33.5 G/DL (ref 31.5–36.5)
MCV RBC AUTO: 95 FL (ref 78–100)
MONOCYTES # BLD AUTO: 1.4 10E3/UL (ref 0–1.3)
MONOCYTES NFR BLD AUTO: 10 %
NEUTROPHILS # BLD AUTO: 7.6 10E3/UL (ref 1.6–8.3)
NEUTROPHILS NFR BLD AUTO: 57 %
NRBC # BLD AUTO: 0 10E3/UL
NRBC BLD AUTO-RTO: 0 /100
PLATELET # BLD AUTO: 392 10E3/UL (ref 150–450)
POTASSIUM SERPL-SCNC: 4.5 MMOL/L (ref 3.4–5.3)
PROT SERPL-MCNC: 7.6 G/DL (ref 6.4–8.3)
RBC # BLD AUTO: 5.26 10E6/UL (ref 4.4–5.9)
SODIUM SERPL-SCNC: 138 MMOL/L (ref 136–145)
WBC # BLD AUTO: 13.5 10E3/UL (ref 4–11)

## 2022-12-02 PROCEDURE — 93306 TTE W/DOPPLER COMPLETE: CPT

## 2022-12-02 PROCEDURE — 93306 TTE W/DOPPLER COMPLETE: CPT | Mod: 26 | Performed by: STUDENT IN AN ORGANIZED HEALTH CARE EDUCATION/TRAINING PROGRAM

## 2022-12-02 PROCEDURE — 82040 ASSAY OF SERUM ALBUMIN: CPT | Performed by: INTERNAL MEDICINE

## 2022-12-02 PROCEDURE — 85652 RBC SED RATE AUTOMATED: CPT | Performed by: INTERNAL MEDICINE

## 2022-12-02 PROCEDURE — 36415 COLL VENOUS BLD VENIPUNCTURE: CPT | Performed by: INTERNAL MEDICINE

## 2022-12-02 PROCEDURE — 85025 COMPLETE CBC W/AUTO DIFF WBC: CPT | Performed by: INTERNAL MEDICINE

## 2022-12-02 PROCEDURE — 80053 COMPREHEN METABOLIC PANEL: CPT | Performed by: INTERNAL MEDICINE

## 2022-12-02 PROCEDURE — 86140 C-REACTIVE PROTEIN: CPT | Performed by: INTERNAL MEDICINE

## 2022-12-07 NOTE — TELEPHONE ENCOUNTER
"Sent MyC message update to patient with Dr. Acosta's response and he is in agreement with the  orders from the PCP, Dr. Whitmore from yesterday for the Prednisone taper.    Also updating per Dr. Acosta's thoughts going forward;  \"with his normal inflammatory markers at this time, I would prefer that we increase his current dose (currently on 6mg daily) up toIf we hit a \"snag\" again, where symptoms return as taper down, then yes we will start a steroid sparing therapy. However, this is the first time he has run into any issues so I don't think we need to start anything just yet because he may be successful in tapering down without the need to start a long term therapy in addition to his prednisone.\"     Nuria Gama RN  [facility]  "

## 2022-12-12 ENCOUNTER — TRANSFERRED RECORDS (OUTPATIENT)
Dept: HEALTH INFORMATION MANAGEMENT | Facility: OTHER | Age: 67
End: 2022-12-12

## 2022-12-23 ENCOUNTER — OFFICE VISIT (OUTPATIENT)
Dept: INTERNAL MEDICINE | Facility: OTHER | Age: 67
End: 2022-12-23
Attending: INTERNAL MEDICINE
Payer: MEDICARE

## 2022-12-23 VITALS
WEIGHT: 306.2 LBS | TEMPERATURE: 97.1 F | SYSTOLIC BLOOD PRESSURE: 138 MMHG | HEIGHT: 73 IN | BODY MASS INDEX: 40.58 KG/M2 | RESPIRATION RATE: 20 BRPM | HEART RATE: 66 BPM | DIASTOLIC BLOOD PRESSURE: 78 MMHG | OXYGEN SATURATION: 93 %

## 2022-12-23 DIAGNOSIS — R60.0 BILATERAL LOWER EXTREMITY EDEMA: ICD-10-CM

## 2022-12-23 DIAGNOSIS — M53.3 SACROILIAC JOINT PAIN: ICD-10-CM

## 2022-12-23 DIAGNOSIS — Z01.818 PREOP GENERAL PHYSICAL EXAM: Primary | ICD-10-CM

## 2022-12-23 DIAGNOSIS — I10 BENIGN ESSENTIAL HYPERTENSION: ICD-10-CM

## 2022-12-23 DIAGNOSIS — M48.061 NEUROFORAMINAL STENOSIS OF LUMBAR SPINE: ICD-10-CM

## 2022-12-23 DIAGNOSIS — E89.0 POSTOPERATIVE HYPOTHYROIDISM: ICD-10-CM

## 2022-12-23 DIAGNOSIS — E78.2 MIXED DYSLIPIDEMIA: ICD-10-CM

## 2022-12-23 DIAGNOSIS — F51.01 PRIMARY INSOMNIA: ICD-10-CM

## 2022-12-23 DIAGNOSIS — G47.33 OSA ON CPAP: ICD-10-CM

## 2022-12-23 DIAGNOSIS — M35.3 POLYMYALGIA RHEUMATICA (H): ICD-10-CM

## 2022-12-23 DIAGNOSIS — M54.16 CHRONIC RADICULAR LOW BACK PAIN: ICD-10-CM

## 2022-12-23 DIAGNOSIS — N18.31 STAGE 3A CHRONIC KIDNEY DISEASE (H): ICD-10-CM

## 2022-12-23 DIAGNOSIS — E66.01 MORBID OBESITY (H): ICD-10-CM

## 2022-12-23 DIAGNOSIS — G89.29 CHRONIC RADICULAR LOW BACK PAIN: ICD-10-CM

## 2022-12-23 PROCEDURE — 99214 OFFICE O/P EST MOD 30 MIN: CPT | Performed by: INTERNAL MEDICINE

## 2022-12-23 PROCEDURE — G0463 HOSPITAL OUTPT CLINIC VISIT: HCPCS

## 2022-12-23 RX ORDER — GABAPENTIN 800 MG/1
400-800 TABLET ORAL AT BEDTIME
Qty: 90 TABLET | Refills: 1 | Status: SHIPPED | OUTPATIENT
Start: 2022-12-23 | End: 2023-01-30

## 2022-12-23 RX ORDER — GABAPENTIN 100 MG/1
200 CAPSULE ORAL 2 TIMES DAILY
Qty: 360 CAPSULE | Refills: 1 | Status: SHIPPED | OUTPATIENT
Start: 2022-12-23 | End: 2023-01-30

## 2022-12-23 ASSESSMENT — ENCOUNTER SYMPTOMS
BACK PAIN: 1
PARESTHESIAS: 0
DIZZINESS: 0
ARTHRALGIAS: 0
HEMATURIA: 0
SHORTNESS OF BREATH: 0
PALPITATIONS: 0
JOINT SWELLING: 0
FREQUENCY: 0
ABDOMINAL PAIN: 0
COUGH: 0
MYALGIAS: 1
DYSURIA: 0
HEMATOCHEZIA: 0
EYE PAIN: 0
HEADACHES: 0
HEARTBURN: 0
CHILLS: 0
DIARRHEA: 0
FEVER: 0
CONSTIPATION: 0
SORE THROAT: 0
NERVOUS/ANXIOUS: 0
WEAKNESS: 1
NAUSEA: 0

## 2022-12-23 ASSESSMENT — ANXIETY QUESTIONNAIRES
2. NOT BEING ABLE TO STOP OR CONTROL WORRYING: NOT AT ALL
4. TROUBLE RELAXING: NOT AT ALL
3. WORRYING TOO MUCH ABOUT DIFFERENT THINGS: NOT AT ALL
7. FEELING AFRAID AS IF SOMETHING AWFUL MIGHT HAPPEN: NOT AT ALL
7. FEELING AFRAID AS IF SOMETHING AWFUL MIGHT HAPPEN: NOT AT ALL
GAD7 TOTAL SCORE: 0
IF YOU CHECKED OFF ANY PROBLEMS ON THIS QUESTIONNAIRE, HOW DIFFICULT HAVE THESE PROBLEMS MADE IT FOR YOU TO DO YOUR WORK, TAKE CARE OF THINGS AT HOME, OR GET ALONG WITH OTHER PEOPLE: SOMEWHAT DIFFICULT
GAD7 TOTAL SCORE: 0
1. FEELING NERVOUS, ANXIOUS, OR ON EDGE: NOT AT ALL
8. IF YOU CHECKED OFF ANY PROBLEMS, HOW DIFFICULT HAVE THESE MADE IT FOR YOU TO DO YOUR WORK, TAKE CARE OF THINGS AT HOME, OR GET ALONG WITH OTHER PEOPLE?: SOMEWHAT DIFFICULT
6. BECOMING EASILY ANNOYED OR IRRITABLE: NOT AT ALL
5. BEING SO RESTLESS THAT IT IS HARD TO SIT STILL: NOT AT ALL
GAD7 TOTAL SCORE: 0

## 2022-12-23 ASSESSMENT — PATIENT HEALTH QUESTIONNAIRE - PHQ9
SUM OF ALL RESPONSES TO PHQ QUESTIONS 1-9: 4
10. IF YOU CHECKED OFF ANY PROBLEMS, HOW DIFFICULT HAVE THESE PROBLEMS MADE IT FOR YOU TO DO YOUR WORK, TAKE CARE OF THINGS AT HOME, OR GET ALONG WITH OTHER PEOPLE: SOMEWHAT DIFFICULT
SUM OF ALL RESPONSES TO PHQ QUESTIONS 1-9: 4

## 2022-12-23 ASSESSMENT — PAIN SCALES - GENERAL: PAINLEVEL: MILD PAIN (3)

## 2022-12-23 NOTE — NURSING NOTE
"Chief Complaint   Patient presents with     Pre-Op Exam     SI Joint  Fusion 01/03/2023 , [facility]         Initial /78 (BP Location: Right arm, Patient Position: Sitting, Cuff Size: Adult Large)   Pulse 66   Temp 97.1  F (36.2  C) (Temporal)   Resp 20   Ht 1.842 m (6' 0.5\")   Wt 138.9 kg (306 lb 3.2 oz)   SpO2 93%   BMI 40.96 kg/m   Estimated body mass index is 40.96 kg/m  as calculated from the following:    Height as of this encounter: 1.842 m (6' 0.5\").    Weight as of this encounter: 138.9 kg (306 lb 3.2 oz).       FOOD SECURITY SCREENING QUESTIONS:    The next two questions are to help us understand your food security.  If you are feeling you need any assistance in this area, we have resources available to support you today.    Hunger Vital Signs:  Within the past 12 months we worried whether our food would run out before we got money to buy more. Never  Within the past 12 months the food we bought just didn't last and we didn't have money to get more. Never    Advance Care Directive on file? yes      Medication reconciliation complete.      Kevin Haynes,on 12/23/2022 at 11:30 AM        "

## 2022-12-23 NOTE — PROGRESS NOTES
[facility] AND [facility]  [address]  [place] [address]  Phone: [phone]  Primary Provider: Martha Liang  Pre-op Performing Provider: MARTHA LIANG  PREOPERATIVE EVALUATION:  Today's date: 12/23/2022    Selvin An is a 67 year old male who presents for a preoperative evaluation.    Surgical Information:  Surgery/Procedure: SI joint fusion - Left  Surgery Location: [facility]  Surgeon: Dr. Armenta  Surgery Date: 01/03/2023  Time of Surgery: TBD  Where patient plans to recover: At home with family  Fax number for surgical facility: [facility]    Type of Anesthesia Anticipated: Choice    Assessment & Plan     The proposed surgical procedure is considered INTERMEDIATE risk.      ICD-10-CM    1. Preop general physical exam  Z01.818       2. Sacroiliac joint pain - left  M53.3       3. Stage 3a chronic kidney disease (H)  N18.31       4. KYLAH on CPAP -- Uses nightly, finds helpful and beneficial  G47.33     Z99.89       5. Morbid obesity (H)  E66.01       6. Bilateral lower extremity edema  R60.0       7. Benign essential hypertension  I10       8. Mixed dyslipidemia  E78.2       9. Polymyalgia rheumatica (H)  M35.3 gabapentin (NEURONTIN) 800 MG tablet     gabapentin (NEURONTIN) 100 MG capsule      10. Postoperative hypothyroidism  E89.0       11. Chronic radicular low back pain  M54.16 gabapentin (NEURONTIN) 800 MG tablet    G89.29 gabapentin (NEURONTIN) 100 MG capsule      12. Primary insomnia  F51.01 gabapentin (NEURONTIN) 800 MG tablet      13. Neuroforaminal stenosis of lumbar spine  M48.061 gabapentin (NEURONTIN) 100 MG capsule      HPI related to upcoming procedure: Progressive sacroiliac joint pain, left greater than right.  Now scheduled for sacroiliac joint fusion -left side.    Stage IIIa chronic kidney disease.  Kidney function has been slowly declining.  Encouraged NSAID avoidance.    Sleep apnea, chronic, ongoing.  Uses CPAP nightly.  Finds helpful  and beneficial.  Encouraged continued use.    Obesity, discussed need for reduced oral caloric intake.  Regular exercise.  Reduce carbohydrate intake.  Information printed and reviewed.  -Continues with semaglutide injections.  Currently on 0.25 mg once weekly on Tuesdays.    Hypertension, blood pressure is currently well controlled.  Lower extremity edema is stable.  Continue current medications.    Mixed hyperlipidemia.  Currently taking Crestor 5 mg.  Seems to be tolerating well.  No changes for now.    Polymyalgia rheumatica, ongoing.  Has chronic back pain and insomnia.  Combination of low-dose gabapentin during the day and 800 mg tablet at bedtime has been quite effective for his insomnia and radicular low back pain.  Needs refills.    Hypothyroidism, postsurgical.  Continues with oral thyroid replacement.  No changes for now.    Risks and Recommendations:  The patient has the following additional risks and recommendations for perioperative complications:   - No identified additional risk factors other than previously addressed    Medication Instructions:  Patient is to take all scheduled medications on the day of surgery    RECOMMENDATION:  APPROVAL GIVEN to proceed with proposed procedure, without further diagnostic evaluation.    Subjective     Preop Questions 12/23/2022   1. Have you ever had a heart attack or stroke? No   2. Have you ever had surgery on your heart or blood vessels, such as a stent placement, a coronary artery bypass, or surgery on an artery in your head, neck, heart, or legs? No   3. Do you have chest pain with activity? No   4. Do you have a history of  heart failure? No   5. Do you currently have a cold, bronchitis or symptoms of other infection? No   6. Do you have a cough, shortness of breath, or wheezing? No   7. Do you or anyone in your family have previous history of blood clots? No   8. Do you or does anyone in your family have a serious bleeding problem such as prolonged bleeding  following surgeries or cuts? No   9. Have you ever had problems with anemia or been told to take iron pills? No   10. Have you had any abnormal blood loss such as black, tarry or bloody stools? YES - 2-3 years ago with GI bleed   11. Have you ever had a blood transfusion? YES - 2-3 years ago with GI bleed   11a. Have you ever had a transfusion reaction? No   12. Are you willing to have a blood transfusion if it is medically needed before, during, or after your surgery? Yes   13. Have you or any of your relatives ever had problems with anesthesia? No   14. Do you have sleep apnea, excessive snoring or daytime drowsiness? YES - Uses CPAP   14a. Do you have a CPAP machine? Yes   15. Do you have any artifical heart valves or other implanted medical devices like a pacemaker, defibrillator, or continuous glucose monitor? No   16. Do you have artificial joints? No   17. Are you allergic to latex? No       Health Care Directive:  Patient does not have a Health Care Directive or Living Will: Discussed advance care planning with patient; however, patient declined at this time.    Preoperative Review of :   reviewed - controlled substances reflected in medication list.    Review of Systems   Constitutional: Negative for chills and fever.   HENT: Positive for hearing loss. Negative for congestion, ear pain and sore throat.    Eyes: Negative for pain and visual disturbance.   Respiratory: Negative for cough and shortness of breath.    Cardiovascular: Negative for chest pain, palpitations and peripheral edema.   Gastrointestinal: Negative for abdominal pain, constipation, diarrhea, heartburn, hematochezia and nausea.   Genitourinary: Positive for impotence. Negative for dysuria, frequency, genital sores, hematuria, penile discharge and urgency.   Musculoskeletal: Positive for back pain (Left SI joint pain) and myalgias. Negative for arthralgias and joint swelling.   Skin: Negative for rash.   Allergic/Immunologic: Positive  for immunocompromised state.   Neurological: Positive for weakness. Negative for dizziness, headaches and paresthesias.   Psychiatric/Behavioral: Negative for mood changes. The patient is not nervous/anxious.      Patient Active Problem List    Diagnosis Date Noted     Hypertriglyceridemia 11/11/2022     Priority: Medium     Coronary artery calcification 10/18/2022     Priority: Medium     Bilateral renal cysts 08/30/2022     Priority: Medium     Chronic radicular low back pain 08/30/2022     Priority: Medium     Hyperuricemia 12/23/2021     Priority: Medium     Stage 3a chronic kidney disease (H) 10/27/2021     Priority: Medium     Low testosterone level in male 08/18/2021     Priority: Medium     Radicular low back pain 04/27/2021     Priority: Medium     Neuroforaminal stenosis of lumbar spine 04/27/2021     Priority: Medium     Trigger point with back pain 04/14/2021     Priority: Medium     Trigger point of right shoulder region 04/14/2021     Priority: Medium     Sacroiliac joint pain - left 04/14/2021     Priority: Medium     Osteoarthritis of lumbar spine, unspecified spinal osteoarthritis complication status 04/14/2021     Priority: Medium     Hypercalcemia 03/12/2021     Priority: Medium     Primary insomnia 01/31/2021     Priority: Medium     Polymyalgia rheumatica (H) 01/31/2021     Priority: Medium     Personal history of malignant neoplasm of bladder -- s/p TURBT x2 in 3/2018 and 4/2018 for high-grade non-muscle invasive bladder cancer, now s/p BCG treatment - last cystoscopy 1/20/2021 was normal 01/31/2021     Priority: Medium     Steroid-induced hyperglycemia 01/29/2021     Priority: Medium     Bilateral lower extremity edema 01/29/2021     Priority: Medium     Vitamin B12 deficiency 01/29/2021     Priority: Medium     Chronic fatigue syndrome with fibromyalgia 01/29/2021     Priority: Medium     History of Lyme disease 01/29/2021     Priority: Medium     Morbid obesity (H) 11/02/2020     Priority:  Medium     Meniere disease, bilateral 06/01/2018     Priority: Medium     Rotator cuff syndrome of right shoulder 04/23/2018     Priority: Medium     Lung mass 03/15/2018     Priority: Medium     History of carpal tunnel surgery 11/02/2017     Priority: Medium     Vitamin D deficiency 10/10/2016     Priority: Medium     Postoperative hypothyroidism 01/11/2011     Priority: Medium     Mixed dyslipidemia 07/12/2010     Priority: Medium     Psychosexual dysfunction with inhibited sexual excitement 02/17/2010     Priority: Medium     Benign essential hypertension 02/17/2010     Priority: Medium     History of kidney stones 02/17/2010     Priority: Medium     KYLAH on CPAP -- Uses nightly, finds helpful and beneficial 02/17/2010     Priority: Medium     History of thyroid cancer 02/17/1994     Priority: Medium     Formatting of this note might be different from the original.  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     Priority: Medium      Past Medical History:   Diagnosis Date     Bell's palsy 3/30/2017     Bilateral carpal tunnel syndrome     No Comments Provided     Essential (primary) hypertension     No Comments Provided     Gout 6/3/2013     Hyperlipidemia     No Comments Provided     Hypothyroidism     No Comments Provided     Malignant neoplasm of thyroid gland (H)     1994     Sleep apnea     No Comments Provided     Past Surgical History:   Procedure Laterality Date     COLONOSCOPY  09/09/2014 9/2014,Normal - follow up 10 years     CV CORONARY ANGIOGRAM N/A 10/31/2022    Procedure: Coronary Angiogram;  Surgeon: Ender Michaels MD;  Location:  [facility]     CYSTOSCOPY      bladder cancer     OTHER SURGICAL HISTORY      ,SPLENECTOMY     OTHER SURGICAL HISTORY      2010,60696,EYE MUSCLE SURGERY,Left,strabismus     OTHER SURGICAL HISTORY      10/06/14,[id number],OTHER,Left,Dr. Lana LEHMAN     OTHER SURGICAL HISTORY      10/14/14,379935,OTHER,Left,Dr. Lana Smith  [facility]     OTHER SURGICAL HISTORY      11/16/2017,[id number],ENDOSCOPIC RELEASE TRANSVERSE CARPAL LIGAMENT OF HAND,Left     RELEASE CARPAL TUNNEL      12/2011     RETINAL DETACHMENT SURGERY Right      STRABISMUS SURGERY Left      THYROIDECTOMY      1994,thyroid ca     TONSILLECTOMY      2004,T & A > 13yo     VASECTOMY      No Comments Provided     Current Outpatient Medications   Medication Sig Dispense Refill     allopurinol (ZYLOPRIM) 300 MG tablet Take 1 tablet (300 mg) by mouth daily - for gout prevention 90 tablet 4     aspirin (ASA) 81 MG chewable tablet Take 81 mg by mouth daily       B Complex-C (VITAMIN B COMPLEX W/VITAMIN C) TABS tablet Take 1 tablet by mouth daily       carvedilol (COREG) 6.25 MG tablet Take 1-2 tablets (6.25-12.5 mg) by mouth 2 times daily (with meals) 360 tablet 1     Cholecalciferol (VITAMIN D-3) 125 MCG (5000 UT) TABS Take 5,000 Units by mouth daily 100 tablet 3     Cyanocobalamin 2500 MCG TABS Take 2,500 mcg by mouth daily -- or every other day -- OTC okay 90 tablet 4     fenofibrate (TRICOR) 145 MG tablet Take 1 tablet (145 mg) by mouth daily 90 tablet 4     gabapentin (NEURONTIN) 100 MG capsule Take 2 capsules (200 mg) by mouth 2 times daily - for burning/shooting nerve pain 360 capsule 1     gabapentin (NEURONTIN) 800 MG tablet Take 0.5-1 tablets (400-800 mg) by mouth At Bedtime 90 tablet 1     levothyroxine (SYNTHROID/LEVOTHROID) 200 MCG tablet Take 1 tablet (200 mcg) by mouth daily 90 tablet 4     loratadine (CLARITIN) 10 MG tablet Take 10 mg by mouth daily       magnesium 250 MG tablet Take 1 tablet by mouth daily       Melatonin 10 MG TABS tablet Take 20 mg by mouth 2 times daily        Multiple Vitamins-Minerals (CENTRUM ADULTS PO) Take 1 tablet by mouth daily       order for DME CPAP machine for home use at pressure: 10, with supplies:  humidifier, chamber, mask, tubing       predniSONE (DELTASONE) 1 MG tablet By mouth 8mg daily x30 days then 7mg daily x30 days then 6mg daily x30  "days ten 5mg daily x30 days. Combine with 5mg tabs to make total daily dose. 300 tablet 0     predniSONE (DELTASONE) 5 MG tablet TAKE BY MOUTH 8MG DAILY X 30 DAYS THEN 7MG DAILY X 30DAYS THEN 6MG DAILY X 30 DAYS THEN 5MG DAILY X 30 DAYS. TAKE WITH 1MG TABS TOMAKE TOTAL DAILY DOSE 120 tablet 0     rosuvastatin (CRESTOR) 5 MG tablet Take 1-2 tablets (5-10 mg) by mouth daily -- Dose Increase 11/11/2022 180 tablet 4     semaglutide (OZEMPIC) 2 MG/1.5ML SOPN pen Inject 0.25 mg Subcutaneous every 7 days for 30 days, THEN 0.5 mg every 7 days for 30 days, THEN 1 mg every 7 days for 90 days. 6 mL 4     spironolactone (ALDACTONE) 25 MG tablet Take 1 tablet (25 mg) by mouth daily -- STOP NORVASC -- 90 tablet 1     testosterone (ANDROGEL 1.62% PUMP) 20.25 MG/ACT gel Place 2 Pump onto the skin every morning 75 g 5     predniSONE (DELTASONE) 5 MG tablet Take 2 tablets (10 mg) by mouth daily + 3 mg - total of 13 mg daily 180 tablet 1     Probiotic Product (PROBIOTIC-10 PO) Take 1 tablet by mouth daily       sildenafil (VIAGRA) 100 MG tablet Take 1/2 to 1 tablet 1 hour prior to sexual activity 10 tablet 11       Allergies   Allergen Reactions     Losartan Cough     Omeprazole Diarrhea     Pantoprazole Diarrhea     Valsartan Cough     Started with losartan, worsened with high dose valsartan        Social History     Tobacco Use     Smoking status: Never     Smokeless tobacco: Never   Substance Use Topics     Alcohol use: No     Comment: none     Family History   Problem Relation Age of Onset     Heart Disease Mother         Heart Disease,MI - SCD     Heart Disease Father         Heart Disease,MI - SCD     History   Drug Use No         Objective     /78 (BP Location: Right arm, Patient Position: Sitting, Cuff Size: Adult Large)   Pulse 66   Temp 97.1  F (36.2  C) (Temporal)   Resp 20   Ht 1.842 m (6' 0.5\")   Wt 138.9 kg (306 lb 3.2 oz)   SpO2 93%   BMI 40.96 kg/m      Physical Exam  Constitutional:       General: He is " not in acute distress.     Appearance: He is well-developed. He is obese. He is not diaphoretic.   HENT:      Head: Normocephalic and atraumatic.   Eyes:      General: No scleral icterus.     Conjunctiva/sclera: Conjunctivae normal.   Cardiovascular:      Rate and Rhythm: Normal rate and regular rhythm.      Pulses: Normal pulses.   Pulmonary:      Effort: Pulmonary effort is normal.      Breath sounds: Normal breath sounds.   Abdominal:      Palpations: Abdomen is soft.      Tenderness: There is no abdominal tenderness.   Musculoskeletal:         General: No tenderness or deformity.      Cervical back: Neck supple.      Right lower leg: No edema.      Left lower leg: No edema.   Lymphadenopathy:      Cervical: No cervical adenopathy.   Skin:     General: Skin is warm and dry.      Findings: No rash.   Neurological:      Mental Status: He is alert and oriented to person, place, and time. Mental status is at baseline.   Psychiatric:         Mood and Affect: Mood normal.         Behavior: Behavior normal.       Recent Labs   Lab Test 12/02/22  1015 11/11/22  0755 10/31/22  0929 12/22/21  1035 09/15/21  0824   HGB 16.7  --  15.8   < > 15.2     --  429   < > 459*    138 142   < > 140   POTASSIUM 4.5 4.0 3.6   < > 3.9   CR 1.23* 1.27* 1.17   < > 1.34*   A1C  --  6.0  --   --  5.5    < > = values in this interval not displayed.        Diagnostics:  No labs were ordered during this visit.   EKG 10/31/2022 - sinus rhythm, rate 66, 1st degree AV block, otherwise normal EKG.     Revised Cardiac Risk Index (RCRI):  The patient has the following serious cardiovascular risks for perioperative complications:   - No serious cardiac risks = 0 points     RCRI Interpretation: 0 points: Class I (very low risk - 0.4% complication rate)           Signed Electronically by: Matt Whitmore MD  Copy of this evaluation report is provided to requesting physician.        Answers for HPI/ROS submitted by the patient on  12/23/2022  If you checked off any problems, how difficult have these problems made it for you to do your work, take care of things at home, or get along with other people?: Somewhat difficult  PHQ9 TOTAL SCORE: 4  AJITH 7 TOTAL SCORE: 0

## 2022-12-23 NOTE — PATIENT INSTRUCTIONS
For informational purposes only. Not to replace the advice of your health care provider. Copyright   2003,  [facility]. All rights reserved. Clinically reviewed by Nae Tam MD. [employer] [id number] - REV .  Preparing for Your Surgery  Getting started  A nurse will call you to review your health history and instructions. They will give you an arrival time based on your scheduled surgery time. Please be ready to share:  Your doctor's clinic name and phone number  Your medical, surgical, and anesthesia history  A list of allergies and sensitivities  A list of medicines, including herbal treatments and over-the-counter drugs  Whether the patient has a legal guardian (ask how to send us the papers in advance)  Please tell us if you're pregnant--or if there's any chance you might be pregnant. Some surgeries may injure a fetus (unborn baby), so they require a pregnancy test. Surgeries that are safe for a fetus don't always need a test, and you can choose whether to have one.   If you have a child who's having surgery, please ask for a copy of Preparing for Your Child's Surgery.    Preparing for surgery  Within 10 to 30 days of surgery: Have a pre-op exam (sometimes called an H&P, or History and Physical). This can be done at a clinic or pre-operative center.  If you're having a , you may not need this exam. Talk to your care team.  At your pre-op exam, talk to your care team about all medicines you take. If you need to stop any medicines before surgery, ask when to start taking them again.  We do this for your safety. Many medicines can make you bleed too much during surgery. Some change how well surgery (anesthesia) drugs work.  Call your insurance company to let them know you're having surgery. (If you don't have insurance, call [phone].)  Call your clinic if there's any change in your health. This includes signs of a cold or flu (sore throat, runny nose, cough, rash, fever). It  Mi's brother, Grupo Storey returned your call in regards to pt blood work he is requesting a call back at [phone] also includes a scrape or scratch near the surgery site.  If you have questions on the day of surgery, call your hospital or surgery center.  Eating and drinking guidelines  For your safety: Unless your surgeon tells you otherwise, follow the guidelines below.  Eat and drink as usual until 8 hours before you arrive for surgery. After that, no food or milk.  Drink clear liquids until 2 hours before you arrive. These are liquids you can see through, like water, Gatorade, and Propel Water. They also include plain black coffee and tea (no cream or milk), candy, and breath mints. You can spit out gum when you arrive.  If you drink alcohol: Stop drinking it the night before surgery.  If your care team tells you to take medicine on the morning of surgery, it's okay to take it with a sip of water.  Preventing infection  Shower or bathe the night before and morning of your surgery. Follow the instructions your clinic gave you. (If no instructions, use regular soap.)  Don't shave or clip hair near your surgery site. We'll remove the hair if needed.  Don't smoke or vape the morning of surgery. You may chew nicotine gum up to 2 hours before surgery. A nicotine patch is okay.  Note: Some surgeries require you to completely quit smoking and nicotine. Check with your surgeon.  Your care team will make every effort to keep you safe from infection. We will:  Clean our hands often with soap and water (or an alcohol-based hand rub).  Clean the skin at your surgery site with a special soap that kills germs.  Give you a special gown to keep you warm. (Cold raises the risk of infection.)  Wear special hair covers, masks, gowns and gloves during surgery.  Give antibiotic medicine, if prescribed. Not all surgeries need antibiotics.  What to bring on the day of surgery  Photo ID and insurance card  Copy of your health care directive, if you have one  Glasses and hearing aids (bring cases)  You can't wear contacts during surgery  Inhaler and  eye drops, if you use them (tell us about these when you arrive)  CPAP machine or breathing device, if you use them  A few personal items, if spending the night  If you have . . .  A pacemaker, ICD (cardiac defibrillator) or other implant: Bring the ID card.  An implanted stimulator: Bring the remote control.  A legal guardian: Bring a copy of the certified (court-stamped) guardianship papers.  Please remove any jewelry, including body piercings. Leave jewelry and other valuables at home.  If you're going home the day of surgery  You must have a responsible adult drive you home. They should stay with you overnight as well.  If you don't have someone to stay with you, and you aren't safe to go home alone, we may keep you overnight. Insurance often won't pay for this.  After surgery  If it's hard to control your pain or you need more pain medicine, please call your surgeon's office.  Questions?   If you have any questions for your care team, list them here: _________________________________________________________________________________________________________________________________________________________________________ ____________________________________ ____________________________________ ____________________________________    How to Take Your Medication Before Surgery  - Take all of your medications before surgery as usual

## 2022-12-23 NOTE — PROGRESS NOTES
Pre-op  Answers for HPI/ROS submitted by the patient on 12/23/2022  If you checked off any problems, how difficult have these problems made it for you to do your work, take care of things at home, or get along with other people?: Somewhat difficult  PHQ9 TOTAL SCORE: 4  AJITH 7 TOTAL SCORE: 0

## 2023-01-03 ENCOUNTER — TRANSFERRED RECORDS (OUTPATIENT)
Dept: HEALTH INFORMATION MANAGEMENT | Facility: OTHER | Age: 68
End: 2023-01-03

## 2023-01-04 ENCOUNTER — MYC MEDICAL ADVICE (OUTPATIENT)
Dept: INTERNAL MEDICINE | Facility: OTHER | Age: 68
End: 2023-01-04

## 2023-01-04 DIAGNOSIS — R79.81 LOW OXYGEN SATURATION: Primary | ICD-10-CM

## 2023-01-04 NOTE — TELEPHONE ENCOUNTER
He'll probably need PFTs and additional work-up.     PFTs ordered  - they will call with date/time of appointment.      Electronically signed by:  Matt Whitmore MD  on January 4, 2023 3:52 PM

## 2023-01-05 ENCOUNTER — TELEPHONE (OUTPATIENT)
Dept: ORTHOPEDICS | Facility: OTHER | Age: 68
End: 2023-01-05

## 2023-01-06 ENCOUNTER — HOSPITAL ENCOUNTER (INPATIENT)
Facility: OTHER | Age: 68
LOS: 2 days | Discharge: ANOTHER HOSPITAL | DRG: 907 | End: 2023-01-09
Attending: FAMILY MEDICINE | Admitting: SURGERY
Payer: MEDICARE

## 2023-01-06 ENCOUNTER — TRANSFERRED RECORDS (OUTPATIENT)
Dept: HEALTH INFORMATION MANAGEMENT | Facility: OTHER | Age: 68
End: 2023-01-06

## 2023-01-06 ENCOUNTER — APPOINTMENT (OUTPATIENT)
Dept: CT IMAGING | Facility: OTHER | Age: 68
DRG: 907 | End: 2023-01-06
Attending: FAMILY MEDICINE
Payer: MEDICARE

## 2023-01-06 DIAGNOSIS — M96.840 POSTOPERATIVE HEMATOMA OF MUSCULOSKELETAL STRUCTURE FOLLOWING MUSCULOSKELETAL PROCEDURE: Primary | ICD-10-CM

## 2023-01-06 DIAGNOSIS — L76.32 POSTOPERATIVE HEMATOMA OF SKIN FOLLOWING NON-DERMATOLOGIC PROCEDURE: ICD-10-CM

## 2023-01-06 DIAGNOSIS — S80.12XA HEMATOMA OF LEFT LOWER LEG: ICD-10-CM

## 2023-01-06 LAB
ABO/RH(D): NORMAL
ANION GAP SERPL CALCULATED.3IONS-SCNC: 11 MMOL/L (ref 7–15)
ANTIBODY SCREEN: NEGATIVE
BASOPHILS # BLD AUTO: 0.1 10E3/UL (ref 0–0.2)
BASOPHILS NFR BLD AUTO: 0 %
BUN SERPL-MCNC: 30.5 MG/DL (ref 8–23)
CALCIUM SERPL-MCNC: 9.3 MG/DL (ref 8.8–10.2)
CHLORIDE SERPL-SCNC: 103 MMOL/L (ref 98–107)
CREAT SERPL-MCNC: 1.19 MG/DL (ref 0.67–1.17)
DEPRECATED HCO3 PLAS-SCNC: 25 MMOL/L (ref 22–29)
EOSINOPHIL # BLD AUTO: 0.1 10E3/UL (ref 0–0.7)
EOSINOPHIL NFR BLD AUTO: 0 %
ERYTHROCYTE [DISTWIDTH] IN BLOOD BY AUTOMATED COUNT: 15.4 % (ref 10–15)
GFR SERPL CREATININE-BSD FRML MDRD: 67 ML/MIN/1.73M2
GLUCOSE SERPL-MCNC: 125 MG/DL (ref 70–99)
HCT VFR BLD AUTO: 30.7 % (ref 40–53)
HGB BLD-MCNC: 9.8 G/DL (ref 13.3–17.7)
HOLD SPECIMEN: NORMAL
IMM GRANULOCYTES # BLD: 0.2 10E3/UL
IMM GRANULOCYTES NFR BLD: 1 %
LYMPHOCYTES # BLD AUTO: 2.6 10E3/UL (ref 0.8–5.3)
LYMPHOCYTES NFR BLD AUTO: 13 %
MCH RBC QN AUTO: 32.2 PG (ref 26.5–33)
MCHC RBC AUTO-ENTMCNC: 31.9 G/DL (ref 31.5–36.5)
MCV RBC AUTO: 101 FL (ref 78–100)
MONOCYTES # BLD AUTO: 2.5 10E3/UL (ref 0–1.3)
MONOCYTES NFR BLD AUTO: 13 %
NEUTROPHILS # BLD AUTO: 14.2 10E3/UL (ref 1.6–8.3)
NEUTROPHILS NFR BLD AUTO: 73 %
NRBC # BLD AUTO: 0 10E3/UL
NRBC BLD AUTO-RTO: 0 /100
PLATELET # BLD AUTO: 290 10E3/UL (ref 150–450)
POTASSIUM SERPL-SCNC: 4.1 MMOL/L (ref 3.4–5.3)
RBC # BLD AUTO: 3.04 10E6/UL (ref 4.4–5.9)
SODIUM SERPL-SCNC: 139 MMOL/L (ref 136–145)
SPECIMEN EXPIRATION DATE: NORMAL
WBC # BLD AUTO: 19.6 10E3/UL (ref 4–11)

## 2023-01-06 PROCEDURE — 80048 BASIC METABOLIC PNL TOTAL CA: CPT | Performed by: FAMILY MEDICINE

## 2023-01-06 PROCEDURE — 99285 EMERGENCY DEPT VISIT HI MDM: CPT | Mod: 25 | Performed by: FAMILY MEDICINE

## 2023-01-06 PROCEDURE — 250N000011 HC RX IP 250 OP 636: Performed by: STUDENT IN AN ORGANIZED HEALTH CARE EDUCATION/TRAINING PROGRAM

## 2023-01-06 PROCEDURE — 86920 COMPATIBILITY TEST SPIN: CPT | Mod: 91 | Performed by: SURGERY

## 2023-01-06 PROCEDURE — 86901 BLOOD TYPING SEROLOGIC RH(D): CPT | Performed by: FAMILY MEDICINE

## 2023-01-06 PROCEDURE — 250N000009 HC RX 250: Performed by: STUDENT IN AN ORGANIZED HEALTH CARE EDUCATION/TRAINING PROGRAM

## 2023-01-06 PROCEDURE — 85025 COMPLETE CBC W/AUTO DIFF WBC: CPT | Performed by: FAMILY MEDICINE

## 2023-01-06 PROCEDURE — 86923 COMPATIBILITY TEST ELECTRIC: CPT | Performed by: SURGERY

## 2023-01-06 PROCEDURE — 10160 PNXR ASPIR ABSC HMTMA BULLA: CPT | Performed by: STUDENT IN AN ORGANIZED HEALTH CARE EDUCATION/TRAINING PROGRAM

## 2023-01-06 PROCEDURE — 86920 COMPATIBILITY TEST SPIN: CPT | Performed by: INTERNAL MEDICINE

## 2023-01-06 PROCEDURE — 258N000003 HC RX IP 258 OP 636: Performed by: FAMILY MEDICINE

## 2023-01-06 PROCEDURE — 36415 COLL VENOUS BLD VENIPUNCTURE: CPT | Performed by: FAMILY MEDICINE

## 2023-01-06 PROCEDURE — 96376 TX/PRO/DX INJ SAME DRUG ADON: CPT | Mod: XU | Performed by: FAMILY MEDICINE

## 2023-01-06 PROCEDURE — 250N000011 HC RX IP 250 OP 636: Performed by: FAMILY MEDICINE

## 2023-01-06 PROCEDURE — 96365 THER/PROPH/DIAG IV INF INIT: CPT | Mod: XU | Performed by: FAMILY MEDICINE

## 2023-01-06 PROCEDURE — 74177 CT ABD & PELVIS W/CONTRAST: CPT | Mod: MG

## 2023-01-06 PROCEDURE — 96375 TX/PRO/DX INJ NEW DRUG ADDON: CPT | Mod: XU | Performed by: FAMILY MEDICINE

## 2023-01-06 PROCEDURE — 0K9P3ZZ DRAINAGE OF LEFT HIP MUSCLE, PERCUTANEOUS APPROACH: ICD-10-PCS | Performed by: STUDENT IN AN ORGANIZED HEALTH CARE EDUCATION/TRAINING PROGRAM

## 2023-01-06 RX ORDER — KETOROLAC TROMETHAMINE 15 MG/ML
15 INJECTION, SOLUTION INTRAMUSCULAR; INTRAVENOUS ONCE
Status: COMPLETED | OUTPATIENT
Start: 2023-01-06 | End: 2023-01-06

## 2023-01-06 RX ORDER — ONDANSETRON 2 MG/ML
4 INJECTION INTRAMUSCULAR; INTRAVENOUS ONCE
Status: COMPLETED | OUTPATIENT
Start: 2023-01-06 | End: 2023-01-06

## 2023-01-06 RX ORDER — IOPAMIDOL 755 MG/ML
150 INJECTION, SOLUTION INTRAVASCULAR ONCE
Status: COMPLETED | OUTPATIENT
Start: 2023-01-06 | End: 2023-01-06

## 2023-01-06 RX ORDER — HYDROMORPHONE HYDROCHLORIDE 2 MG/ML
2 INJECTION, SOLUTION INTRAMUSCULAR; INTRAVENOUS; SUBCUTANEOUS ONCE
Status: COMPLETED | OUTPATIENT
Start: 2023-01-06 | End: 2023-01-06

## 2023-01-06 RX ORDER — SODIUM CHLORIDE 9 MG/ML
INJECTION, SOLUTION INTRAVENOUS CONTINUOUS
Status: DISCONTINUED | OUTPATIENT
Start: 2023-01-06 | End: 2023-01-07

## 2023-01-06 RX ORDER — LIDOCAINE HYDROCHLORIDE AND EPINEPHRINE 10; 10 MG/ML; UG/ML
1 INJECTION, SOLUTION INFILTRATION; PERINEURAL ONCE
Status: COMPLETED | OUTPATIENT
Start: 2023-01-06 | End: 2023-01-06

## 2023-01-06 RX ORDER — ACETAMINOPHEN 10 MG/ML
1000 INJECTION, SOLUTION INTRAVENOUS ONCE
Status: COMPLETED | OUTPATIENT
Start: 2023-01-06 | End: 2023-01-06

## 2023-01-06 RX ADMIN — ONDANSETRON 4 MG: 2 INJECTION INTRAMUSCULAR; INTRAVENOUS at 21:08

## 2023-01-06 RX ADMIN — ONDANSETRON 4 MG: 2 INJECTION INTRAMUSCULAR; INTRAVENOUS at 20:15

## 2023-01-06 RX ADMIN — KETOROLAC TROMETHAMINE 15 MG: 15 INJECTION, SOLUTION INTRAMUSCULAR; INTRAVENOUS at 21:58

## 2023-01-06 RX ADMIN — SODIUM CHLORIDE 1000 ML: 9 INJECTION, SOLUTION INTRAVENOUS at 19:02

## 2023-01-06 RX ADMIN — HYDROMORPHONE HYDROCHLORIDE 1 MG: 1 INJECTION, SOLUTION INTRAMUSCULAR; INTRAVENOUS; SUBCUTANEOUS at 23:58

## 2023-01-06 RX ADMIN — ACETAMINOPHEN 1000 MG: 10 INJECTION, SOLUTION INTRAVENOUS at 21:58

## 2023-01-06 RX ADMIN — IOPAMIDOL 150 ML: 755 INJECTION, SOLUTION INTRAVENOUS at 18:47

## 2023-01-06 RX ADMIN — HYDROMORPHONE HYDROCHLORIDE 2 MG: 2 INJECTION, SOLUTION INTRAMUSCULAR; INTRAVENOUS; SUBCUTANEOUS at 16:58

## 2023-01-06 RX ADMIN — HYDROMORPHONE HYDROCHLORIDE 1 MG: 1 INJECTION, SOLUTION INTRAMUSCULAR; INTRAVENOUS; SUBCUTANEOUS at 18:21

## 2023-01-06 RX ADMIN — LIDOCAINE HYDROCHLORIDE,EPINEPHRINE BITARTRATE 1 ML: 10; .01 INJECTION, SOLUTION INFILTRATION; PERINEURAL at 20:16

## 2023-01-06 ASSESSMENT — ACTIVITIES OF DAILY LIVING (ADL)
ADLS_ACUITY_SCORE: 35

## 2023-01-06 NOTE — ED PROVIDER NOTES
History     Chief Complaint   Patient presents with     Post-op Problem     The history is provided by the patient, the spouse and medical records.     Selvin An is a 67 year old male here with left sided low back pain. He had an SI fusion surgery done January 3 at [facility].  Operation note says that he had over 400 mL of blood loss during surgery due to a complication.  He has had persistent pain since surgery and has been in touch with both Dr Whitmore and with Dr Armenta about this. They finally recommended that he come to the ED.     Allergies:  Allergies   Allergen Reactions     Losartan Cough     Omeprazole Diarrhea     Pantoprazole Diarrhea     Valsartan Cough     Started with losartan, worsened with high dose valsartan       Problem List:    Patient Active Problem List    Diagnosis Date Noted     Low oxygen saturation - during surgery. 01/04/2023     Priority: Medium     Hypertriglyceridemia 11/11/2022     Priority: Medium     Coronary artery calcification 10/18/2022     Priority: Medium     Bilateral renal cysts 08/30/2022     Priority: Medium     Chronic radicular low back pain 08/30/2022     Priority: Medium     Hyperuricemia 12/23/2021     Priority: Medium     Stage 3a chronic kidney disease (H) 10/27/2021     Priority: Medium     Low testosterone level in male 08/18/2021     Priority: Medium     Radicular low back pain 04/27/2021     Priority: Medium     Neuroforaminal stenosis of lumbar spine 04/27/2021     Priority: Medium     Trigger point with back pain 04/14/2021     Priority: Medium     Trigger point of right shoulder region 04/14/2021     Priority: Medium     Sacroiliac joint pain - left 04/14/2021     Priority: Medium     Osteoarthritis of lumbar spine, unspecified spinal osteoarthritis complication status 04/14/2021     Priority: Medium     Hypercalcemia 03/12/2021     Priority: Medium     Primary insomnia 01/31/2021     Priority: Medium     Polymyalgia rheumatica (H) 01/31/2021      Priority: Medium     Personal history of malignant neoplasm of bladder -- s/p TURBT x2 in 3/2018 and 4/2018 for high-grade non-muscle invasive bladder cancer, now s/p BCG treatment - last cystoscopy 1/20/2021 was normal 01/31/2021     Priority: Medium     Steroid-induced hyperglycemia 01/29/2021     Priority: Medium     Bilateral lower extremity edema 01/29/2021     Priority: Medium     Vitamin B12 deficiency 01/29/2021     Priority: Medium     Chronic fatigue syndrome with fibromyalgia 01/29/2021     Priority: Medium     History of Lyme disease 01/29/2021     Priority: Medium     Morbid obesity (H) 11/02/2020     Priority: Medium     Meniere disease, bilateral 06/01/2018     Priority: Medium     Rotator cuff syndrome of right shoulder 04/23/2018     Priority: Medium     Lung mass 03/15/2018     Priority: Medium     History of carpal tunnel surgery 11/02/2017     Priority: Medium     Vitamin D deficiency 10/10/2016     Priority: Medium     Postoperative hypothyroidism 01/11/2011     Priority: Medium     Mixed dyslipidemia 07/12/2010     Priority: Medium     Psychosexual dysfunction with inhibited sexual excitement 02/17/2010     Priority: Medium     Benign essential hypertension 02/17/2010     Priority: Medium     History of kidney stones 02/17/2010     Priority: Medium     KYLAH on CPAP -- Uses nightly, finds helpful and beneficial 02/17/2010     Priority: Medium     History of thyroid cancer 02/17/1994     Priority: Medium     Formatting of this note might be different from the original.  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     Priority: Medium        Past Medical History:    Past Medical History:   Diagnosis Date     Bell's palsy 3/30/2017     Bilateral carpal tunnel syndrome      Essential (primary) hypertension      Gout 6/3/2013     Hyperlipidemia      Hypothyroidism      Malignant neoplasm of thyroid gland (H)      Sleep apnea        Past Surgical History:    Past Surgical History:    Procedure Laterality Date     COLONOSCOPY  09/09/2014 9/2014,Normal - follow up 10 years     CV CORONARY ANGIOGRAM N/A 10/31/2022    Procedure: Coronary Angiogram;  Surgeon: Ender Michaels MD;  Location:  [facility]ATH LAB     CYSTOSCOPY      bladder cancer     OTHER SURGICAL HISTORY      ,SPLENECTOMY     OTHER SURGICAL HISTORY      2010,18723,EYE MUSCLE SURGERY,Left,strabismus     OTHER SURGICAL HISTORY      10/06/14,[id number],OTHER,Left,Dr. Lana LEHMAN     OTHER SURGICAL HISTORY      10/14/14,[id number],OTHER,Left,Dr. Lana LEHMAN     OTHER SURGICAL HISTORY      11/16/2017,[id number],ENDOSCOPIC RELEASE TRANSVERSE CARPAL LIGAMENT OF HAND,Left     RELEASE CARPAL TUNNEL      12/2011     RETINAL DETACHMENT SURGERY Right      STRABISMUS SURGERY Left      THYROIDECTOMY      1994,thyroid ca     TONSILLECTOMY      2004,T & A > 11yo     VASECTOMY      No Comments Provided       Family History:    Family History   Problem Relation Age of Onset     Heart Disease Mother         Heart Disease,MI - SCD     Heart Disease Father         Heart Disease,MI - SCD       Social History:  Marital Status:   [2]  Social History     Tobacco Use     Smoking status: Never     Smokeless tobacco: Never   Vaping Use     Vaping Use: Never used   Substance Use Topics     Alcohol use: No     Comment: none     Drug use: No        Medications:    allopurinol (ZYLOPRIM) 300 MG tablet  aspirin (ASA) 81 MG chewable tablet  B Complex-C (VITAMIN B COMPLEX W/VITAMIN C) TABS tablet  carvedilol (COREG) 6.25 MG tablet  Cholecalciferol (VITAMIN D-3) 125 MCG (5000 UT) TABS  Cyanocobalamin 2500 MCG TABS  fenofibrate (TRICOR) 145 MG tablet  gabapentin (NEURONTIN) 100 MG capsule  gabapentin (NEURONTIN) 800 MG tablet  levothyroxine (SYNTHROID/LEVOTHROID) 200 MCG tablet  loratadine (CLARITIN) 10 MG tablet  magnesium 250 MG tablet  Melatonin 10 MG TABS tablet  Multiple Vitamins-Minerals (CENTRUM ADULTS PO)  order for DME  predniSONE  (DELTASONE) 1 MG tablet  predniSONE (DELTASONE) 5 MG tablet  predniSONE (DELTASONE) 5 MG tablet  Probiotic Product (PROBIOTIC-10 PO)  rosuvastatin (CRESTOR) 5 MG tablet  semaglutide (OZEMPIC) 2 MG/1.5ML SOPN pen  sildenafil (VIAGRA) 100 MG tablet  spironolactone (ALDACTONE) 25 MG tablet  testosterone (ANDROGEL 1.62% PUMP) 20.25 MG/ACT gel          Review of Systems   All other systems reviewed and are negative.      Physical Exam   BP: (!) 186/92  Pulse: 70  Temp: 97  F (36.1  C)  Resp: 20  Height: 182.9 cm (6')  Weight: 138.8 kg (306 lb)  SpO2: 94 %      Physical Exam  Vitals and nursing note reviewed.   Constitutional:       General: He is in acute distress.      Appearance: Normal appearance. He is ill-appearing. He is not toxic-appearing or diaphoretic.   Cardiovascular:      Rate and Rhythm: Normal rate and regular rhythm.      Pulses: Normal pulses.      Heart sounds: Normal heart sounds.      Comments: He has strong peripheral pulses in all four extremities  Pulmonary:      Effort: Pulmonary effort is normal.      Breath sounds: Normal breath sounds.   Skin:     General: Skin is warm and dry.      Findings: Bruising present.      Comments: Bruising of the left hip area is noted   Neurological:      General: No focal deficit present.      Mental Status: He is alert and oriented to person, place, and time.         Results for orders placed or performed during the hospital encounter of 01/06/23 (from the past 24 hour(s))   CBC with platelets differential    Narrative    The following orders were created for panel order CBC with platelets differential.  Procedure                               Abnormality         Status                     ---------                               -----------         ------                     CBC with platelets and d...[[id number]]  Abnormal            Final result                 Please view results for these tests on the individual orders.   Basic metabolic panel   Result Value  Ref Range    Sodium 139 136 - 145 mmol/L    Potassium 4.1 3.4 - 5.3 mmol/L    Chloride 103 98 - 107 mmol/L    Carbon Dioxide (CO2) 25 22 - 29 mmol/L    Anion Gap 11 7 - 15 mmol/L    Urea Nitrogen 30.5 (H) 8.0 - 23.0 mg/dL    Creatinine 1.19 (H) 0.67 - 1.17 mg/dL    Calcium 9.3 8.8 - 10.2 mg/dL    Glucose 125 (H) 70 - 99 mg/dL    GFR Estimate 67 >60 mL/min/1.73m2   CBC with platelets and differential   Result Value Ref Range    WBC Count 19.6 (H) 4.0 - 11.0 10e3/uL    RBC Count 3.04 (L) 4.40 - 5.90 10e6/uL    Hemoglobin 9.8 (L) 13.3 - 17.7 g/dL    Hematocrit 30.7 (L) 40.0 - 53.0 %     (H) 78 - 100 fL    MCH 32.2 26.5 - 33.0 pg    MCHC 31.9 31.5 - 36.5 g/dL    RDW 15.4 (H) 10.0 - 15.0 %    Platelet Count 290 150 - 450 10e3/uL    % Neutrophils 73 %    % Lymphocytes 13 %    % Monocytes 13 %    % Eosinophils 0 %    % Basophils 0 %    % Immature Granulocytes 1 %    NRBCs per 100 WBC 0 <1 /100    Absolute Neutrophils 14.2 (H) 1.6 - 8.3 10e3/uL    Absolute Lymphocytes 2.6 0.8 - 5.3 10e3/uL    Absolute Monocytes 2.5 (H) 0.0 - 1.3 10e3/uL    Absolute Eosinophils 0.1 0.0 - 0.7 10e3/uL    Absolute Basophils 0.1 0.0 - 0.2 10e3/uL    Absolute Immature Granulocytes 0.2 <=0.4 10e3/uL    Absolute NRBCs 0.0 10e3/uL   Extra Tube    Narrative    The following orders were created for panel order Extra Tube.  Procedure                               Abnormality         Status                     ---------                               -----------         ------                     Extra Green Top (Lithium...[[id number]]                      Final result                 Please view results for these tests on the individual orders.   Extra Tube    Narrative    The following orders were created for panel order Extra Tube.  Procedure                               Abnormality         Status                     ---------                               -----------         ------                     Extra Blue Top Tube[[id number]]              "                 Final result               Extra Serum Separator Tu...[[id number]]                      Final result                 Please view results for these tests on the individual orders.   Extra Green Top (Lithium Heparin) ON ICE   Result Value Ref Range    Hold Specimen JIC    Extra Blue Top Tube   Result Value Ref Range    Hold Specimen JIC    Extra Serum Separator Tube (SST)   Result Value Ref Range    Hold Specimen JIC    ABO/Rh type and screen    Narrative    The following orders were created for panel order ABO/Rh type and screen.  Procedure                               Abnormality         Status                     ---------                               -----------         ------                     Adult Type and Screen[[id number]]                            Edited Result - FINAL        Please view results for these tests on the individual orders.   Adult Type and Screen   Result Value Ref Range    ABO/RH(D) O POS     Antibody Screen Negative Negative    SPECIMEN EXPIRATION DATE [id number]    CT Abdomen Pelvis w Contrast    Narrative    CT ABDOMEN PELVIS W CONTRAST    CLINICAL HISTORY: Male, age 67 years,  pelvic pain after SI fusion  surgery- he had quite a bit of blood loss because of a \"knicked  artery\" in surgery;    Comparison:  MRI of the sacrum and coccyx 4/16/2021    TECHNIQUE:  CT was performed of the abdomen and pelvis with IV  contrast. Axial; sagittal and coronal MIP images were reviewed.     FINDINGS:  There are areas of atelectasis in the dependent portions of both  lungs. Visualized portions of the heart are unremarkable.    Stomach and duodenum: Unremarkable    Liver: Hepatic steatosis. No acute abnormality.    Gallbladder: Radiodense sludge. No acute abnormality.    Spleen: Small splenule. No acute abnormality.    Pancreas: Unremarkable.    Adrenal glands: Unremarkable    Kidneys: Bilateral cortical cysts.    Ureters: Unremarkable.    Urinary bladder: Unremarkable.    A large " and small bowel: No acute abnormality. Moderate volume of  stool in the proximal colon.    The appendix is not reliably identified. There are no secondary signs  of appendicitis.    The abdominal aorta and inferior vena cava demonstrate no acute  abnormality. There is no evidence of pathologic lymph node  enlargement.    Postoperative changes seen consistent with left SI joint fusion. There  is a large hematoma seen in the left sciatic region involving the left  gluteus medius muscle and the left piriformis muscle. In total, this  multiloculated hematoma measures approximately 16 cm in transverse  dimension by 8.5 to 9 cm in AP dimension by 10 cm in craniocaudal  dimension.    The hematoma and surrounding inflammatory changes obscure the left  sciatic nerve. There is no distinct evidence of active  extravasation/hemorrhage.    Bony structures demonstrate postoperative changes of the left SI joint  and mild degenerative changes of the lumbar spine.    Inguinal lymph nodes are normal.      Impression    IMPRESSION:   Large 16 x 10 x 9 cm intramuscular hematoma involving the left  gluteus medius and the left piriformis muscles, near the site of  recent SI joint fusion.    There is no evidence of active hemorrhage.    Inflammatory changes surrounding the hematoma appear to involve the  soft tissues surrounding the left sciatic nerve which is not well  seen.    Nonacute findings as described above including hepatic steatosis,  cholelithiasis and bilateral cortical cysts.    This facility minimizes radiation dose by adjusting the mA and/or kV  according to each patient size.    This CT scan was performed using one or more the following dose  reduction techniques:    -Automated exposure control,  -Adjustment of the mA and/or kV according to patient's size, and/or,  -Use of iterative reconstruction technique.    LORENZO HATCH MD         SYSTEM ID:  [id number]       Medications   0.9% sodium chloride BOLUS (1,000 mLs  Intravenous New Bag 1/6/23 1902)     Followed by   sodium chloride 0.9% infusion (has no administration in time range)   HYDROmorphone (DILAUDID) injection 1 mg (1 mg Intravenous Given 1/6/23 1821)   HYDROmorphone (DILAUDID) injection 2 mg (2 mg Intravenous Given 1/6/23 1658)   iopamidol (ISOVUE-370) solution 150 mL (150 mLs Intravenous Given 1/6/23 1847)       Assessments & Plan (with Medical Decision Making)  Selvin An is a 67 year old male here with left sided low back pain. He had an SI fusion surgery done January 3 at [facility].  Operation note says that he had over 400 mL of blood loss during surgery due to a complication.  He has had persistent pain since surgery and has been in touch with both Dr Whitmore and with Dr Armenta about this. They finally recommended that he come to the ED.  VS in the ED BP (!) 147/91   Pulse 60   Temp 97  F (36.1  C) (Tympanic)   Resp 20   Ht 1.829 m (6')   Wt 138.8 kg (306 lb)   SpO2 97%   BMI 41.50 kg/m    Exam shows a large area of bruising on the left hip area. We started an IV and gave dilaudid and a bolus of NS.  Labs show CBC with WBC 19,600, hgb 9.8, normal platelets, BMP with Cr 1.19 (improved). Type and screen done. CT shows a large 16 x 10 x 9 cm intramuscular hematoma involving the left gluteus medius and the left piriformis muscles, near the site of recent SI joint fusion.   7:20 PM  His pain is 8 of 10 right now.  I am signing out his care to Dr Lawson at change of shift.      I have reviewed the nursing notes.    Final diagnoses:   Hematoma of left lower leg - post surgical, left upper thigh       1/6/2023   [facility] AND [facility], Pedro Trevino MD  01/06/23 1922

## 2023-01-06 NOTE — ED TRIAGE NOTES
"Pt arrives via EMS. Pt has post-op pain. Pt had SI joint repair done, where \"they nicked and artery.\" Pt was observed. Pt on many home medications for pain and is not working.    Maine Alcaraz RN on 1/6/2023 at 4:34 PM.     Triage Assessment     Row Name 01/06/23 [id number]       Respiratory WDL    Respiratory WDL WDL       Skin Circulation/Temperature WDL    Skin Circulation/Temperature WDL WDL              "

## 2023-01-07 ENCOUNTER — ANESTHESIA EVENT (OUTPATIENT)
Dept: SURGERY | Facility: OTHER | Age: 68
DRG: 907 | End: 2023-01-07
Payer: MEDICARE

## 2023-01-07 ENCOUNTER — ANESTHESIA (OUTPATIENT)
Dept: SURGERY | Facility: OTHER | Age: 68
DRG: 907 | End: 2023-01-07
Payer: MEDICARE

## 2023-01-07 PROBLEM — S80.12XA HEMATOMA OF LEFT LOWER LEG: Status: ACTIVE | Noted: 2023-01-07

## 2023-01-07 PROBLEM — E66.01 MORBID OBESITY WITH BMI OF 40.0-44.9, ADULT (H): Status: ACTIVE | Noted: 2020-11-02

## 2023-01-07 PROBLEM — D62 ACUTE BLOOD LOSS ANEMIA: Status: ACTIVE | Noted: 2023-01-07

## 2023-01-07 PROBLEM — Q89.01 ASPLENIA: Status: ACTIVE | Noted: 2023-01-07

## 2023-01-07 PROBLEM — M96.840 POSTOPERATIVE HEMATOMA OF MUSCULOSKELETAL STRUCTURE FOLLOWING MUSCULOSKELETAL PROCEDURE: Status: ACTIVE | Noted: 2023-01-07

## 2023-01-07 LAB
BLD PROD TYP BPU: NORMAL
BLD PROD TYP BPU: NORMAL
BLOOD COMPONENT TYPE: NORMAL
BLOOD COMPONENT TYPE: NORMAL
CODING SYSTEM: NORMAL
CODING SYSTEM: NORMAL
CROSSMATCH: NORMAL
CROSSMATCH: NORMAL
ERYTHROCYTE [DISTWIDTH] IN BLOOD BY AUTOMATED COUNT: 15 % (ref 10–15)
GLUCOSE BLDC GLUCOMTR-MCNC: 154 MG/DL (ref 70–99)
HCT VFR BLD AUTO: 22.2 % (ref 40–53)
HGB BLD-MCNC: 7.4 G/DL (ref 13.3–17.7)
HGB BLD-MCNC: 8.9 G/DL (ref 13.3–17.7)
HGB BLD-MCNC: 9.6 G/DL (ref 13.3–17.7)
ISSUE DATE AND TIME: NORMAL
ISSUE DATE AND TIME: NORMAL
MCH RBC QN AUTO: 32 PG (ref 26.5–33)
MCHC RBC AUTO-ENTMCNC: 33.3 G/DL (ref 31.5–36.5)
MCV RBC AUTO: 96 FL (ref 78–100)
PLATELET # BLD AUTO: 233 10E3/UL (ref 150–450)
RBC # BLD AUTO: 2.31 10E6/UL (ref 4.4–5.9)
UNIT ABO/RH: NORMAL
UNIT ABO/RH: NORMAL
UNIT NUMBER: NORMAL
UNIT NUMBER: NORMAL
UNIT STATUS: NORMAL
UNIT STATUS: NORMAL
UNIT TYPE ISBT: 5100
UNIT TYPE ISBT: 5100
WBC # BLD AUTO: 19 10E3/UL (ref 4–11)

## 2023-01-07 PROCEDURE — 258N000001 HC RX 258: Performed by: SURGERY

## 2023-01-07 PROCEDURE — 200N000001 HC R&B ICU

## 2023-01-07 PROCEDURE — 272N000001 HC OR GENERAL SUPPLY STERILE: Performed by: SURGERY

## 2023-01-07 PROCEDURE — 36416 COLLJ CAPILLARY BLOOD SPEC: CPT | Performed by: SURGERY

## 2023-01-07 PROCEDURE — 258N000003 HC RX IP 258 OP 636: Performed by: NURSE ANESTHETIST, CERTIFIED REGISTERED

## 2023-01-07 PROCEDURE — 250N000013 HC RX MED GY IP 250 OP 250 PS 637: Performed by: STUDENT IN AN ORGANIZED HEALTH CARE EDUCATION/TRAINING PROGRAM

## 2023-01-07 PROCEDURE — 96376 TX/PRO/DX INJ SAME DRUG ADON: CPT | Performed by: FAMILY MEDICINE

## 2023-01-07 PROCEDURE — 36416 COLLJ CAPILLARY BLOOD SPEC: CPT | Performed by: STUDENT IN AN ORGANIZED HEALTH CARE EDUCATION/TRAINING PROGRAM

## 2023-01-07 PROCEDURE — 710N000010 HC RECOVERY PHASE 1, LEVEL 2, PER MIN: Performed by: SURGERY

## 2023-01-07 PROCEDURE — 99222 1ST HOSP IP/OBS MODERATE 55: CPT | Mod: 25 | Performed by: SURGERY

## 2023-01-07 PROCEDURE — 250N000011 HC RX IP 250 OP 636: Performed by: SURGERY

## 2023-01-07 PROCEDURE — 96375 TX/PRO/DX INJ NEW DRUG ADDON: CPT | Performed by: FAMILY MEDICINE

## 2023-01-07 PROCEDURE — 26990 DRAINAGE OF PELVIS LESION: CPT | Performed by: SURGERY

## 2023-01-07 PROCEDURE — 250N000011 HC RX IP 250 OP 636: Performed by: FAMILY MEDICINE

## 2023-01-07 PROCEDURE — 0K9P00Z DRAINAGE OF LEFT HIP MUSCLE WITH DRAINAGE DEVICE, OPEN APPROACH: ICD-10-PCS | Performed by: SURGERY

## 2023-01-07 PROCEDURE — 250N000009 HC RX 250: Performed by: NURSE ANESTHETIST, CERTIFIED REGISTERED

## 2023-01-07 PROCEDURE — P9016 RBC LEUKOCYTES REDUCED: HCPCS | Performed by: SURGERY

## 2023-01-07 PROCEDURE — 250N000013 HC RX MED GY IP 250 OP 250 PS 637: Performed by: SURGERY

## 2023-01-07 PROCEDURE — 30233N1 TRANSFUSION OF NONAUTOLOGOUS RED BLOOD CELLS INTO PERIPHERAL VEIN, PERCUTANEOUS APPROACH: ICD-10-PCS | Performed by: SURGERY

## 2023-01-07 PROCEDURE — 250N000011 HC RX IP 250 OP 636: Performed by: NURSE ANESTHETIST, CERTIFIED REGISTERED

## 2023-01-07 PROCEDURE — 85018 HEMOGLOBIN: CPT | Performed by: SURGERY

## 2023-01-07 PROCEDURE — 99223 1ST HOSP IP/OBS HIGH 75: CPT | Performed by: INTERNAL MEDICINE

## 2023-01-07 PROCEDURE — 272N000002 HC OR SUPPLY OTHER OPNP: Performed by: SURGERY

## 2023-01-07 PROCEDURE — 370N000017 HC ANESTHESIA TECHNICAL FEE, PER MIN: Performed by: SURGERY

## 2023-01-07 PROCEDURE — 36416 COLLJ CAPILLARY BLOOD SPEC: CPT | Performed by: NURSE ANESTHETIST, CERTIFIED REGISTERED

## 2023-01-07 PROCEDURE — 250N000011 HC RX IP 250 OP 636: Performed by: STUDENT IN AN ORGANIZED HEALTH CARE EDUCATION/TRAINING PROGRAM

## 2023-01-07 PROCEDURE — 26990 DRAINAGE OF PELVIS LESION: CPT | Performed by: NURSE ANESTHETIST, CERTIFIED REGISTERED

## 2023-01-07 PROCEDURE — 250N000009 HC RX 250: Performed by: SURGERY

## 2023-01-07 PROCEDURE — 85027 COMPLETE CBC AUTOMATED: CPT | Performed by: STUDENT IN AN ORGANIZED HEALTH CARE EDUCATION/TRAINING PROGRAM

## 2023-01-07 PROCEDURE — 99140 ANES COMP EMERGENCY COND: CPT | Performed by: NURSE ANESTHETIST, CERTIFIED REGISTERED

## 2023-01-07 PROCEDURE — 360N000075 HC SURGERY LEVEL 2, PER MIN: Performed by: SURGERY

## 2023-01-07 PROCEDURE — 250N000011 HC RX IP 250 OP 636: Performed by: INTERNAL MEDICINE

## 2023-01-07 PROCEDURE — 85018 HEMOGLOBIN: CPT | Performed by: NURSE ANESTHETIST, CERTIFIED REGISTERED

## 2023-01-07 RX ORDER — PREDNISONE 5 MG/1
5 TABLET ORAL DAILY
Status: DISCONTINUED | OUTPATIENT
Start: 2023-01-08 | End: 2023-01-09 | Stop reason: HOSPADM

## 2023-01-07 RX ORDER — LIDOCAINE 40 MG/G
CREAM TOPICAL
Status: DISCONTINUED | OUTPATIENT
Start: 2023-01-07 | End: 2023-01-08

## 2023-01-07 RX ORDER — ONDANSETRON 2 MG/ML
4 INJECTION INTRAMUSCULAR; INTRAVENOUS EVERY 6 HOURS PRN
Status: DISCONTINUED | OUTPATIENT
Start: 2023-01-07 | End: 2023-01-09 | Stop reason: HOSPADM

## 2023-01-07 RX ORDER — LIDOCAINE HYDROCHLORIDE 10 MG/ML
INJECTION, SOLUTION INFILTRATION; PERINEURAL PRN
Status: DISCONTINUED | OUTPATIENT
Start: 2023-01-07 | End: 2023-01-07

## 2023-01-07 RX ORDER — NALOXONE HYDROCHLORIDE 0.4 MG/ML
0.2 INJECTION, SOLUTION INTRAMUSCULAR; INTRAVENOUS; SUBCUTANEOUS
Status: DISCONTINUED | OUTPATIENT
Start: 2023-01-07 | End: 2023-01-07

## 2023-01-07 RX ORDER — ONDANSETRON 4 MG/1
4 TABLET, ORALLY DISINTEGRATING ORAL EVERY 6 HOURS PRN
Status: DISCONTINUED | OUTPATIENT
Start: 2023-01-07 | End: 2023-01-09 | Stop reason: HOSPADM

## 2023-01-07 RX ORDER — FENTANYL CITRATE 50 UG/ML
25 INJECTION, SOLUTION INTRAMUSCULAR; INTRAVENOUS EVERY 5 MIN PRN
Status: DISCONTINUED | OUTPATIENT
Start: 2023-01-07 | End: 2023-01-07 | Stop reason: HOSPADM

## 2023-01-07 RX ORDER — NALOXONE HYDROCHLORIDE 0.4 MG/ML
0.4 INJECTION, SOLUTION INTRAMUSCULAR; INTRAVENOUS; SUBCUTANEOUS
Status: DISCONTINUED | OUTPATIENT
Start: 2023-01-07 | End: 2023-01-09 | Stop reason: HOSPADM

## 2023-01-07 RX ORDER — HYDROMORPHONE HCL IN WATER/PF 6 MG/30 ML
0.4 PATIENT CONTROLLED ANALGESIA SYRINGE INTRAVENOUS EVERY 5 MIN PRN
Status: DISCONTINUED | OUTPATIENT
Start: 2023-01-07 | End: 2023-01-07 | Stop reason: HOSPADM

## 2023-01-07 RX ORDER — CEFAZOLIN SODIUM IN 0.9 % NACL 3 G/100 ML
3 INTRAVENOUS SOLUTION, PIGGYBACK (ML) INTRAVENOUS SEE ADMIN INSTRUCTIONS
Status: DISCONTINUED | OUTPATIENT
Start: 2023-01-07 | End: 2023-01-08

## 2023-01-07 RX ORDER — TRANEXAMIC ACID 10 MG/ML
1 INJECTION, SOLUTION INTRAVENOUS ONCE
Status: COMPLETED | OUTPATIENT
Start: 2023-01-07 | End: 2023-01-07

## 2023-01-07 RX ORDER — LEVOTHYROXINE SODIUM 100 UG/1
200 TABLET ORAL DAILY
Status: DISCONTINUED | OUTPATIENT
Start: 2023-01-08 | End: 2023-01-09 | Stop reason: HOSPADM

## 2023-01-07 RX ORDER — NALOXONE HYDROCHLORIDE 0.4 MG/ML
0.4 INJECTION, SOLUTION INTRAMUSCULAR; INTRAVENOUS; SUBCUTANEOUS
Status: DISCONTINUED | OUTPATIENT
Start: 2023-01-07 | End: 2023-01-07

## 2023-01-07 RX ORDER — ONDANSETRON 4 MG/1
4 TABLET, ORALLY DISINTEGRATING ORAL EVERY 30 MIN PRN
Status: DISCONTINUED | OUTPATIENT
Start: 2023-01-07 | End: 2023-01-07 | Stop reason: HOSPADM

## 2023-01-07 RX ORDER — ONDANSETRON 4 MG/1
4 TABLET, ORALLY DISINTEGRATING ORAL ONCE
Status: COMPLETED | OUTPATIENT
Start: 2023-01-07 | End: 2023-01-07

## 2023-01-07 RX ORDER — ONDANSETRON 2 MG/ML
4 INJECTION INTRAMUSCULAR; INTRAVENOUS EVERY 30 MIN PRN
Status: DISCONTINUED | OUTPATIENT
Start: 2023-01-07 | End: 2023-01-07 | Stop reason: HOSPADM

## 2023-01-07 RX ORDER — BUPIVACAINE HYDROCHLORIDE 7.5 MG/ML
INJECTION, SOLUTION INTRASPINAL
Status: COMPLETED | OUTPATIENT
Start: 2023-01-07 | End: 2023-01-07

## 2023-01-07 RX ORDER — GABAPENTIN 400 MG/1
800 CAPSULE ORAL AT BEDTIME
Status: DISCONTINUED | OUTPATIENT
Start: 2023-01-07 | End: 2023-01-09 | Stop reason: HOSPADM

## 2023-01-07 RX ORDER — NALOXONE HYDROCHLORIDE 0.4 MG/ML
0.2 INJECTION, SOLUTION INTRAMUSCULAR; INTRAVENOUS; SUBCUTANEOUS
Status: DISCONTINUED | OUTPATIENT
Start: 2023-01-07 | End: 2023-01-09 | Stop reason: HOSPADM

## 2023-01-07 RX ORDER — SODIUM CHLORIDE, SODIUM LACTATE, POTASSIUM CHLORIDE, CALCIUM CHLORIDE 600; 310; 30; 20 MG/100ML; MG/100ML; MG/100ML; MG/100ML
INJECTION, SOLUTION INTRAVENOUS CONTINUOUS PRN
Status: DISCONTINUED | OUTPATIENT
Start: 2023-01-07 | End: 2023-01-07

## 2023-01-07 RX ORDER — ACETAMINOPHEN 10 MG/ML
1000 INJECTION, SOLUTION INTRAVENOUS EVERY 6 HOURS
Status: DISCONTINUED | OUTPATIENT
Start: 2023-01-07 | End: 2023-01-09 | Stop reason: HOSPADM

## 2023-01-07 RX ORDER — BUPIVACAINE HYDROCHLORIDE 2.5 MG/ML
INJECTION, SOLUTION INFILTRATION; PERINEURAL PRN
Status: DISCONTINUED | OUTPATIENT
Start: 2023-01-07 | End: 2023-01-07 | Stop reason: HOSPADM

## 2023-01-07 RX ORDER — CEFAZOLIN SODIUM IN 0.9 % NACL 3 G/100 ML
3 INTRAVENOUS SOLUTION, PIGGYBACK (ML) INTRAVENOUS
Status: DISCONTINUED | OUTPATIENT
Start: 2023-01-07 | End: 2023-01-08

## 2023-01-07 RX ORDER — OXYCODONE HYDROCHLORIDE 5 MG/1
5 TABLET ORAL EVERY 4 HOURS PRN
Status: DISCONTINUED | OUTPATIENT
Start: 2023-01-07 | End: 2023-01-09

## 2023-01-07 RX ORDER — OXYCODONE HYDROCHLORIDE 5 MG/1
10 TABLET ORAL EVERY 4 HOURS PRN
Status: DISCONTINUED | OUTPATIENT
Start: 2023-01-07 | End: 2023-01-09

## 2023-01-07 RX ORDER — DIPHENHYDRAMINE HYDROCHLORIDE 50 MG/ML
50 INJECTION INTRAMUSCULAR; INTRAVENOUS ONCE
Status: COMPLETED | OUTPATIENT
Start: 2023-01-07 | End: 2023-01-07

## 2023-01-07 RX ORDER — HYDROMORPHONE HCL IN WATER/PF 6 MG/30 ML
0.4 PATIENT CONTROLLED ANALGESIA SYRINGE INTRAVENOUS EVERY 30 MIN PRN
Status: DISCONTINUED | OUTPATIENT
Start: 2023-01-07 | End: 2023-01-07

## 2023-01-07 RX ORDER — HYDROMORPHONE HCL IN WATER/PF 6 MG/30 ML
0.2 PATIENT CONTROLLED ANALGESIA SYRINGE INTRAVENOUS
Status: DISCONTINUED | OUTPATIENT
Start: 2023-01-07 | End: 2023-01-07

## 2023-01-07 RX ORDER — HYDROMORPHONE HCL IN WATER/PF 6 MG/30 ML
0.4 PATIENT CONTROLLED ANALGESIA SYRINGE INTRAVENOUS
Status: DISCONTINUED | OUTPATIENT
Start: 2023-01-07 | End: 2023-01-07

## 2023-01-07 RX ORDER — SODIUM CHLORIDE, SODIUM LACTATE, POTASSIUM CHLORIDE, CALCIUM CHLORIDE 600; 310; 30; 20 MG/100ML; MG/100ML; MG/100ML; MG/100ML
INJECTION, SOLUTION INTRAVENOUS CONTINUOUS
Status: DISCONTINUED | OUTPATIENT
Start: 2023-01-07 | End: 2023-01-07

## 2023-01-07 RX ORDER — FAMOTIDINE 20 MG/1
20 TABLET, FILM COATED ORAL 2 TIMES DAILY
Status: DISCONTINUED | OUTPATIENT
Start: 2023-01-07 | End: 2023-01-09 | Stop reason: HOSPADM

## 2023-01-07 RX ORDER — CEFAZOLIN SODIUM 1 G/3ML
INJECTION, POWDER, FOR SOLUTION INTRAMUSCULAR; INTRAVENOUS PRN
Status: DISCONTINUED | OUTPATIENT
Start: 2023-01-07 | End: 2023-01-07

## 2023-01-07 RX ORDER — HYDROMORPHONE HCL IN WATER/PF 6 MG/30 ML
0.5 PATIENT CONTROLLED ANALGESIA SYRINGE INTRAVENOUS
Status: DISCONTINUED | OUTPATIENT
Start: 2023-01-07 | End: 2023-01-07

## 2023-01-07 RX ORDER — CARVEDILOL 6.25 MG/1
6.25 TABLET ORAL 2 TIMES DAILY WITH MEALS
Status: DISCONTINUED | OUTPATIENT
Start: 2023-01-07 | End: 2023-01-09 | Stop reason: HOSPADM

## 2023-01-07 RX ORDER — HYDROMORPHONE HCL IN WATER/PF 6 MG/30 ML
0.2 PATIENT CONTROLLED ANALGESIA SYRINGE INTRAVENOUS EVERY 5 MIN PRN
Status: DISCONTINUED | OUTPATIENT
Start: 2023-01-07 | End: 2023-01-07 | Stop reason: HOSPADM

## 2023-01-07 RX ORDER — ACETAMINOPHEN 325 MG/1
650 TABLET ORAL EVERY 4 HOURS PRN
Status: DISCONTINUED | OUTPATIENT
Start: 2023-01-10 | End: 2023-01-08

## 2023-01-07 RX ORDER — MAGNESIUM HYDROXIDE 1200 MG/15ML
LIQUID ORAL PRN
Status: DISCONTINUED | OUTPATIENT
Start: 2023-01-07 | End: 2023-01-07 | Stop reason: HOSPADM

## 2023-01-07 RX ORDER — FENTANYL CITRATE-0.9 % NACL/PF 10 MCG/ML
100 PLASTIC BAG, INJECTION (ML) INTRAVENOUS EVERY 5 MIN PRN
Status: DISCONTINUED | OUTPATIENT
Start: 2023-01-07 | End: 2023-01-07

## 2023-01-07 RX ORDER — PROCHLORPERAZINE MALEATE 5 MG
5 TABLET ORAL EVERY 6 HOURS PRN
Status: DISCONTINUED | OUTPATIENT
Start: 2023-01-07 | End: 2023-01-09 | Stop reason: HOSPADM

## 2023-01-07 RX ORDER — KETAMINE HYDROCHLORIDE 10 MG/ML
INJECTION INTRAMUSCULAR; INTRAVENOUS PRN
Status: DISCONTINUED | OUTPATIENT
Start: 2023-01-07 | End: 2023-01-07

## 2023-01-07 RX ORDER — PROPOFOL 10 MG/ML
INJECTION, EMULSION INTRAVENOUS PRN
Status: DISCONTINUED | OUTPATIENT
Start: 2023-01-07 | End: 2023-01-07

## 2023-01-07 RX ORDER — FENTANYL CITRATE 50 UG/ML
50 INJECTION, SOLUTION INTRAMUSCULAR; INTRAVENOUS EVERY 5 MIN PRN
Status: DISCONTINUED | OUTPATIENT
Start: 2023-01-07 | End: 2023-01-07 | Stop reason: HOSPADM

## 2023-01-07 RX ORDER — AMOXICILLIN 250 MG
1 CAPSULE ORAL 2 TIMES DAILY
Status: DISCONTINUED | OUTPATIENT
Start: 2023-01-07 | End: 2023-01-09 | Stop reason: HOSPADM

## 2023-01-07 RX ORDER — ACETAMINOPHEN 325 MG/1
975 TABLET ORAL EVERY 8 HOURS
Status: DISCONTINUED | OUTPATIENT
Start: 2023-01-07 | End: 2023-01-07

## 2023-01-07 RX ORDER — GABAPENTIN 100 MG/1
200 CAPSULE ORAL 2 TIMES DAILY
Status: DISCONTINUED | OUTPATIENT
Start: 2023-01-07 | End: 2023-01-09

## 2023-01-07 RX ORDER — SODIUM CHLORIDE, SODIUM LACTATE, POTASSIUM CHLORIDE, CALCIUM CHLORIDE 600; 310; 30; 20 MG/100ML; MG/100ML; MG/100ML; MG/100ML
INJECTION, SOLUTION INTRAVENOUS CONTINUOUS
Status: DISCONTINUED | OUTPATIENT
Start: 2023-01-07 | End: 2023-01-07 | Stop reason: HOSPADM

## 2023-01-07 RX ORDER — HYDROMORPHONE HYDROCHLORIDE 2 MG/1
4 TABLET ORAL ONCE
Status: COMPLETED | OUTPATIENT
Start: 2023-01-07 | End: 2023-01-07

## 2023-01-07 RX ADMIN — HYDROMORPHONE HYDROCHLORIDE 0.4 MG: 0.2 INJECTION, SOLUTION INTRAMUSCULAR; INTRAVENOUS; SUBCUTANEOUS at 15:35

## 2023-01-07 RX ADMIN — ACETAMINOPHEN 1000 MG: 10 INJECTION, SOLUTION INTRAVENOUS at 18:14

## 2023-01-07 RX ADMIN — HYDROMORPHONE HYDROCHLORIDE 1 MG: 1 INJECTION, SOLUTION INTRAMUSCULAR; INTRAVENOUS; SUBCUTANEOUS at 08:02

## 2023-01-07 RX ADMIN — HYDROMORPHONE HYDROCHLORIDE 0.4 MG: 0.2 INJECTION, SOLUTION INTRAMUSCULAR; INTRAVENOUS; SUBCUTANEOUS at 15:43

## 2023-01-07 RX ADMIN — HYDROMORPHONE HYDROCHLORIDE 0.4 MG: 0.2 INJECTION, SOLUTION INTRAMUSCULAR; INTRAVENOUS; SUBCUTANEOUS at 19:08

## 2023-01-07 RX ADMIN — SODIUM CHLORIDE, SODIUM LACTATE, POTASSIUM CHLORIDE, AND CALCIUM CHLORIDE: 600; 310; 30; 20 INJECTION, SOLUTION INTRAVENOUS at 14:09

## 2023-01-07 RX ADMIN — HYDROMORPHONE HYDROCHLORIDE 0.4 MG: 0.2 INJECTION, SOLUTION INTRAMUSCULAR; INTRAVENOUS; SUBCUTANEOUS at 15:30

## 2023-01-07 RX ADMIN — CEFAZOLIN 3 G: 1 INJECTION, POWDER, FOR SOLUTION INTRAMUSCULAR; INTRAVENOUS at 12:42

## 2023-01-07 RX ADMIN — MIDAZOLAM HYDROCHLORIDE 2 MG: 1 INJECTION, SOLUTION INTRAMUSCULAR; INTRAVENOUS at 12:22

## 2023-01-07 RX ADMIN — PROPOFOL 20 MG: 10 INJECTION, EMULSION INTRAVENOUS at 14:13

## 2023-01-07 RX ADMIN — HYDROMORPHONE HYDROCHLORIDE 1 MG: 1 INJECTION, SOLUTION INTRAMUSCULAR; INTRAVENOUS; SUBCUTANEOUS at 22:05

## 2023-01-07 RX ADMIN — ONDANSETRON 4 MG: 4 TABLET, ORALLY DISINTEGRATING ORAL at 07:28

## 2023-01-07 RX ADMIN — SODIUM CHLORIDE, SODIUM LACTATE, POTASSIUM CHLORIDE, AND CALCIUM CHLORIDE: 600; 310; 30; 20 INJECTION, SOLUTION INTRAVENOUS at 12:21

## 2023-01-07 RX ADMIN — BUPIVACAINE HYDROCHLORIDE 2 ML: 7.5 INJECTION, SOLUTION INTRASPINAL at 07:45

## 2023-01-07 RX ADMIN — Medication 100 MCG: at 15:14

## 2023-01-07 RX ADMIN — TRANEXAMIC ACID 1 G: 10 INJECTION, SOLUTION INTRAVENOUS at 15:20

## 2023-01-07 RX ADMIN — PROPOFOL 20 MG: 10 INJECTION, EMULSION INTRAVENOUS at 13:54

## 2023-01-07 RX ADMIN — FAMOTIDINE 20 MG: 20 TABLET, FILM COATED ORAL at 21:00

## 2023-01-07 RX ADMIN — CARVEDILOL 6.25 MG: 6.25 TABLET, FILM COATED ORAL at 18:00

## 2023-01-07 RX ADMIN — HYDROMORPHONE HYDROCHLORIDE 0.5 MG: 0.2 INJECTION, SOLUTION INTRAMUSCULAR; INTRAVENOUS; SUBCUTANEOUS at 16:24

## 2023-01-07 RX ADMIN — PROPOFOL 20 MG: 10 INJECTION, EMULSION INTRAVENOUS at 13:49

## 2023-01-07 RX ADMIN — HYDROMORPHONE HYDROCHLORIDE 0.5 MG: 0.2 INJECTION, SOLUTION INTRAMUSCULAR; INTRAVENOUS; SUBCUTANEOUS at 16:35

## 2023-01-07 RX ADMIN — Medication 100 MCG: at 14:52

## 2023-01-07 RX ADMIN — HYDROMORPHONE HYDROCHLORIDE 1 MG: 1 INJECTION, SOLUTION INTRAMUSCULAR; INTRAVENOUS; SUBCUTANEOUS at 09:14

## 2023-01-07 RX ADMIN — LIDOCAINE HYDROCHLORIDE 3 ML: 10 INJECTION, SOLUTION INFILTRATION; PERINEURAL at 12:38

## 2023-01-07 RX ADMIN — Medication 20 MG: at 13:19

## 2023-01-07 RX ADMIN — HYDROMORPHONE HYDROCHLORIDE 4 MG: 2 TABLET ORAL at 07:29

## 2023-01-07 RX ADMIN — PROPOFOL 20 MG: 10 INJECTION, EMULSION INTRAVENOUS at 13:26

## 2023-01-07 RX ADMIN — PROPOFOL 15 MG: 10 INJECTION, EMULSION INTRAVENOUS at 13:42

## 2023-01-07 RX ADMIN — PROPOFOL 30 MG: 10 INJECTION, EMULSION INTRAVENOUS at 13:23

## 2023-01-07 RX ADMIN — SENNOSIDES AND DOCUSATE SODIUM 1 TABLET: 50; 8.6 TABLET ORAL at 21:00

## 2023-01-07 RX ADMIN — Medication 30 MG: at 13:22

## 2023-01-07 RX ADMIN — PROPOFOL 20 MG: 10 INJECTION, EMULSION INTRAVENOUS at 14:07

## 2023-01-07 RX ADMIN — PROPOFOL 10 MG: 10 INJECTION, EMULSION INTRAVENOUS at 14:19

## 2023-01-07 RX ADMIN — DEXTROSE AND SODIUM CHLORIDE 1000 ML: 5; 450 INJECTION, SOLUTION INTRAVENOUS at 18:03

## 2023-01-07 RX ADMIN — HYDROMORPHONE HYDROCHLORIDE 1 MG: 1 INJECTION, SOLUTION INTRAMUSCULAR; INTRAVENOUS; SUBCUTANEOUS at 03:56

## 2023-01-07 RX ADMIN — HYDROMORPHONE HYDROCHLORIDE 0.4 MG: 0.2 INJECTION, SOLUTION INTRAMUSCULAR; INTRAVENOUS; SUBCUTANEOUS at 15:54

## 2023-01-07 RX ADMIN — DIPHENHYDRAMINE HYDROCHLORIDE 50 MG: 50 INJECTION INTRAMUSCULAR; INTRAVENOUS at 00:18

## 2023-01-07 RX ADMIN — HYDROMORPHONE HYDROCHLORIDE 0.4 MG: 0.2 INJECTION, SOLUTION INTRAMUSCULAR; INTRAVENOUS; SUBCUTANEOUS at 15:25

## 2023-01-07 RX ADMIN — GABAPENTIN 800 MG: 400 CAPSULE ORAL at 21:00

## 2023-01-07 RX ADMIN — HYDROMORPHONE HYDROCHLORIDE 1 MG: 1 INJECTION, SOLUTION INTRAMUSCULAR; INTRAVENOUS; SUBCUTANEOUS at 21:00

## 2023-01-07 RX ADMIN — PROCHLORPERAZINE EDISYLATE 5 MG: 5 INJECTION INTRAMUSCULAR; INTRAVENOUS at 10:34

## 2023-01-07 RX ADMIN — HYDROMORPHONE HYDROCHLORIDE 0.5 MG: 0.2 INJECTION, SOLUTION INTRAMUSCULAR; INTRAVENOUS; SUBCUTANEOUS at 16:13

## 2023-01-07 RX ADMIN — SODIUM CHLORIDE 1000 ML: 9 INJECTION, SOLUTION INTRAVENOUS at 15:00

## 2023-01-07 RX ADMIN — HYDROMORPHONE HYDROCHLORIDE 0.4 MG: 0.2 INJECTION, SOLUTION INTRAMUSCULAR; INTRAVENOUS; SUBCUTANEOUS at 18:12

## 2023-01-07 RX ADMIN — HYDROMORPHONE HYDROCHLORIDE 1 MG: 1 INJECTION, SOLUTION INTRAMUSCULAR; INTRAVENOUS; SUBCUTANEOUS at 19:49

## 2023-01-07 RX ADMIN — PROPOFOL 15 MG: 10 INJECTION, EMULSION INTRAVENOUS at 13:43

## 2023-01-07 RX ADMIN — PROPOFOL 20 MG: 10 INJECTION, EMULSION INTRAVENOUS at 13:34

## 2023-01-07 RX ADMIN — Medication 100 MCG: at 14:57

## 2023-01-07 RX ADMIN — SODIUM CHLORIDE, POTASSIUM CHLORIDE, SODIUM LACTATE AND CALCIUM CHLORIDE 1000 ML: 600; 310; 30; 20 INJECTION, SOLUTION INTRAVENOUS at 15:47

## 2023-01-07 RX ADMIN — HYDROMORPHONE HYDROCHLORIDE 1 MG: 1 INJECTION, SOLUTION INTRAMUSCULAR; INTRAVENOUS; SUBCUTANEOUS at 10:36

## 2023-01-07 RX ADMIN — HYDROMORPHONE HYDROCHLORIDE 0.4 MG: 0.2 INJECTION, SOLUTION INTRAMUSCULAR; INTRAVENOUS; SUBCUTANEOUS at 17:27

## 2023-01-07 RX ADMIN — PROPOFOL 20 MG: 10 INJECTION, EMULSION INTRAVENOUS at 13:57

## 2023-01-07 RX ADMIN — HYDROMORPHONE HYDROCHLORIDE 1 MG: 1 INJECTION, SOLUTION INTRAMUSCULAR; INTRAVENOUS; SUBCUTANEOUS at 23:09

## 2023-01-07 RX ADMIN — ONDANSETRON HYDROCHLORIDE 4 MG: 2 SOLUTION INTRAMUSCULAR; INTRAVENOUS at 19:08

## 2023-01-07 ASSESSMENT — ACTIVITIES OF DAILY LIVING (ADL)
ADLS_ACUITY_SCORE: 35
ADLS_ACUITY_SCORE: 31
ADLS_ACUITY_SCORE: 31
ADLS_ACUITY_SCORE: 35
ADLS_ACUITY_SCORE: 35
ADLS_ACUITY_SCORE: 27
ADLS_ACUITY_SCORE: 35

## 2023-01-07 NOTE — ANESTHESIA PREPROCEDURE EVALUATION
Anesthesia Pre-Procedure Evaluation    Patient: Selvin An   MRN: [id number] : 1955        Procedure : Procedure(s):  INCISION AND DRAINAGE, LEFT BUTTOCK          Past Medical History:   Diagnosis Date     Bell's palsy 3/30/2017     Bilateral carpal tunnel syndrome     No Comments Provided     Essential (primary) hypertension     No Comments Provided     Gout 6/3/2013     Hyperlipidemia     No Comments Provided     Hypothyroidism     No Comments Provided     Malignant neoplasm of thyroid gland (H)          Sleep apnea     No Comments Provided      Past Surgical History:   Procedure Laterality Date     COLONOSCOPY  2014,Normal - follow up 10 years     CV CORONARY ANGIOGRAM N/A 10/31/2022    Procedure: Coronary Angiogram;  Surgeon: Ender Michaels MD;  Location:  HE[facility]ARD[facility] CATH LAB     CYSTOSCOPY      bladder cancer     OTHER SURGICAL HISTORY      ,SPLENECTOMY     OTHER SURGICAL HISTORY      ,77660,EYE MUSCLE SURGERY,Left,strabismus     OTHER SURGICAL HISTORY      10/06/14,[id number],OTHER,Left,Dr. Lana LEHMAN     OTHER SURGICAL HISTORY      10/14/14,521313,OTHER,Left,Dr. Lana LEHMAN     OTHER SURGICAL HISTORY      2017,[id number],ENDOSCOPIC RELEASE TRANSVERSE CARPAL LIGAMENT OF HAND,Left     RELEASE CARPAL TUNNEL      2011     RETINAL DETACHMENT SURGERY Right      STRABISMUS SURGERY Left      THYROIDECTOMY      ,thyroid ca     TONSILLECTOMY      ,T & A > 11yo     VASECTOMY      No Comments Provided      Allergies   Allergen Reactions     Losartan Cough     Omeprazole Diarrhea     Pantoprazole Diarrhea     Valsartan Cough     Started with losartan, worsened with high dose valsartan      Social History     Tobacco Use     Smoking status: Never     Smokeless tobacco: Never   Substance Use Topics     Alcohol use: No     Comment: none      Wt Readings from Last 1 Encounters:   23 138.8 kg (306 lb)        Anesthesia Evaluation   Pt has had prior  anesthetic. Type: General.    History of anesthetic complications   Low oxygen saturation with general anesthesia.    ROS/MED HX  ENT/Pulmonary:     (+) sleep apnea, uses CPAP,     Neurologic:  - neg neurologic ROS     Cardiovascular:  - neg cardiovascular ROS   (+) hypertension--CAD ---    METS/Exercise Tolerance: >4 METS    Hematologic:  - neg hematologic  ROS     Musculoskeletal:  - neg musculoskeletal ROS     GI/Hepatic:  - neg GI/hepatic ROS     Renal/Genitourinary:     (+) renal disease, type: CRI,     Endo:     (+) thyroid problem, hypothyroidism, Obesity,     Psychiatric/Substance Use:     (+) H/O chronic opiod use .     Infectious Disease:  - neg infectious disease ROS     Malignancy:  - neg malignancy ROS     Other:  - neg other ROS          Physical Exam    Airway        Mallampati: III   TM distance: > 3 FB   Neck ROM: limited   Mouth opening: > 3 cm    Respiratory Devices and Support         Dental  no notable dental history         Cardiovascular   cardiovascular exam normal       Rhythm and rate: regular and normal     Pulmonary   pulmonary exam normal        breath sounds clear to auscultation           OUTSIDE LABS:  CBC:   Lab Results   Component Value Date    WBC 19.6 (H) 01/06/2023    WBC 13.5 (H) 12/02/2022    HGB 9.6 (L) 01/07/2023    HGB 9.8 (L) 01/06/2023    HCT 30.7 (L) 01/06/2023    HCT 49.8 12/02/2022     01/06/2023     12/02/2022     BMP:   Lab Results   Component Value Date     01/06/2023     12/02/2022    POTASSIUM 4.1 01/06/2023    POTASSIUM 4.5 12/02/2022    CHLORIDE 103 01/06/2023    CHLORIDE 103 12/02/2022    CO2 25 01/06/2023    CO2 27 12/02/2022    BUN 30.5 (H) 01/06/2023    BUN 17.1 12/02/2022    CR 1.19 (H) 01/06/2023    CR 1.23 (H) 12/02/2022     (H) 01/06/2023    GLC 87 12/02/2022     COAGS:   Lab Results   Component Value Date    PTT 31 02/24/2017    INR 1.0 12/12/2017     POC: No results found for: BGM, HCG, HCGS  HEPATIC:   Lab Results    Component Value Date    ALBUMIN 3.9 12/02/2022    PROTTOTAL 7.6 12/02/2022    ALT 27 12/02/2022    AST 31 12/02/2022    ALKPHOS 61 12/02/2022    BILITOTAL 0.6 12/02/2022     OTHER:   Lab Results   Component Value Date    A1C 6.0 11/11/2022    THAI 9.3 01/06/2023    MAG 1.9 09/10/2017    TSH 0.40 11/08/2022    T4 0.77 09/15/2021    CRP 3.64 12/02/2022    SED 5 12/02/2022       Anesthesia Plan    ASA Status:  3, emergent    NPO Status:  NPO Appropriate    Anesthesia Type: Spinal.              Consents    Anesthesia Plan(s) and associated risks, benefits, and realistic alternatives discussed. Questions answered and patient/representative(s) expressed understanding.     - Discussed: Risks, Benefits and Alternatives for BOTH SEDATION and the PROCEDURE were discussed     - Discussed with:  Patient      - Extended Intubation/Ventilatory Support Discussed: No.      - Patient is DNR/DNI Status: No    Use of blood products discussed: Yes.     - Discussed with: Patient.     - Consented: consented to blood products            Reason for refusal: other.     Postoperative Care            Comments:                JOSE HALL CRNA

## 2023-01-07 NOTE — ANESTHESIA PROCEDURE NOTES
"Intrathecal injection Procedure Note    Pre-Procedure   Staff -        CRNA: Suzanne Son APRN CRNA       Performed By: CRNA       Location: OR       Procedure Start/Stop Times: 1/7/2023 12:35 PM and 1/7/2023 12:45 PM       Pre-Anesthestic Checklist: patient identified, IV checked, risks and benefits discussed, informed consent, monitors and equipment checked, pre-op evaluation, at physician/surgeon's request and post-op pain management  Timeout:       Correct Patient: Yes        Correct Procedure: Yes        Correct Site: Yes        Correct Position: Yes   Procedure Documentation  Procedure: intrathecal injection       Diagnosis: Primary Anesthesia       Patient Position: sitting       Patient Prep/Sterile Barriers: sterile gloves, mask, patient draped       Skin prep: Chloraprep       Insertion Site: L3-4. (midline approach).       Needle Gauge: 25.        Needle Length (Inches): 5        Spinal Needle Type: Yaron tip       Introducer used       Introducer: 20 G       # of attempts: 1 and  # of redirects:  3    Assessment/Narrative         Paresthesias: No.       CSF fluid: clear.       Opening pressure was cmH2O while  Sitting.      Medication(s) Administered   0.75% Hyperbaric Bupivacaine (Intrathecal) - Intrathecal   2 mL - 1/7/2023 7:45:00 AM  Medication Administration Time: 1/7/2023 12:35 PM      FOR [facility]) ONLY:   Pain Team Contact information: please page the Pain Team Via [employer]. Search \"Pain\". During daytime hours, please page the attending first. At night please page the resident first.    "

## 2023-01-07 NOTE — ED NOTES
Pt is laying in bed with tremors.  States that his pain is severe from standing at the side of the bed to use the urinal .  States that his left leg is cramping.    Charity Adrian RN on 1/7/2023 at 7:30 AM

## 2023-01-07 NOTE — ANESTHESIA CARE TRANSFER NOTE
Patient: Selvin An    Procedure: Procedure(s):  INCISION AND DRAINAGE, LEFT BUTTOCK (Drain Hematoma of Left Buttock)       Diagnosis: Postoperative hematoma of musculoskeletal structure following musculoskeletal procedure [M96.840]  Diagnosis Additional Information: No value filed.    Anesthesia Type:   Spinal     Note:    Oropharynx: oropharynx clear of all foreign objects  Level of Consciousness: awake  Oxygen Supplementation: face mask  Level of Supplemental Oxygen (L/min / FiO2): 8  Independent Airway: airway patency satisfactory and stable  Dentition: dentition unchanged  Vital Signs Stable: post-procedure vital signs reviewed and stable  Report to RN Given: handoff report given  Patient transferred to: PACU  Comments: Approximately 5 minutes after arrival in PACU the patients bed sheets were pulled back to reveal a large amount of blood soaking the dressing over the incision.  Upon examination of the dressing the patient had a significant drop in blood pressure and began feeling lightheaded and nauseated.  Pressure applied to surgical area.  A second large bore peripheral IV was placed and fluid resuscitation began.   Handoff Report: Identifed the Patient, Identified the Reponsible Provider, Reviewed the pertinent medical history, Discussed the surgical course, Reviewed Intra-OP anesthesia mangement and issues during anesthesia, Set expectations for post-procedure period and Allowed opportunity for questions and acknowledgement of understanding      Vitals:  Vitals Value Taken Time   BP     Temp     Pulse     Resp     SpO2         Electronically Signed By: JOSE HALL CRNA  January 7, 2023  2:38 PM

## 2023-01-07 NOTE — OR NURSING
"PACU Transfer Note    Selvin An was transferred to ICU via bed.  Equipment used for transport:  O2 2L NC.  Accompanied by:  RN  Prescriptions were: NA    PACU Respiratory Event Documentation     1) Episodes of Apnea greater than or equal to 10 seconds: no    2) Bradypnea - less than 8 breaths per minute: no    3) Pain score on 0 to 10 scale: \"coming back up\"    4) Pain-sedation mismatch (yes or no): no    5) Repeated 02 desaturation less than 90% (yes or no): no    Anesthesia notified? (yes or no): no    2nd unit PRBCs complete    Report to [facility]    Any of the above events occuring repeatedly in separate 30 minute intervals may be considered recurrent PACU respiratory events.    Patient stable and meets phase 1 discharge criteria for transport from PACU.    "

## 2023-01-07 NOTE — OR NURSING
Pt turned to back after being on R side in OR- within 5 minutes of arrival in PACU large (est 500 ml) puddle of blood noted by ERIC berg. Dr Mccall and anesthesia present. pressure being held on L hip.

## 2023-01-07 NOTE — ED PROVIDER NOTES
Transfer of care from previous shift provider. Sign out details:      ED Course as of 01/07/23 0808   Fri Jan 06, 2023   1856 Extra Tube   1917 Sign out: Recent SI joint fusion surgery with nicked artery now with hgb drop to 9. CT scan pending and likely appropriate for OP management if no concerning findings.   1924 POD3. Oxycodone, valium, steroids, muscle relaxer, lidocaine patch, tylenol, alleve not giving relief. Started on dilaudid today by their surgeon Dr Armenta and that not helping. Came in with pain 1000/10. Currently 8/10 s/p dilaudid. Dr Armenta f/up scheduled in 2 weeks.   1935 Consulted Dr. Davison will be contacting Dr. Armenta to get back to me regarding potential hematoma aspiration attempt here in the ED.   1942 Dr Davison talked to Dr Armenta who recommends aspiration attempt then compression (ace bandage or spica), transfuse if necessary if going wrong way, watch overnight. If has further signs of bleed may need IR embolization. IF re-accumulates radiology may be able to place drain.   2151 Hematoma aspiration attempt performed with only trace bloody aspirate.   Sat Jan 07, 2023   0659 Dr Davison said he will talk with Dr Armenta whose office should call him first thing Monday morning. If he doesn't hear from office, he should call himself to the office Monday morning.   0714 Patient wanting to stay until he is seen by orthopedic surgery.  This may not be for several days.  Plan will be to try a higher dose of p.o. Dilaudid to see if this equates to the IV Dilaudid that he has been getting some relief with.  He will need to be reassessed for response to p.o. Dilaudid after approximately 1 hour of taking.     [facility]    PROCEDURE: -Incision/Drainage    Date/Time: 1/6/2023 9:53 PM  Performed by: Mil Lawson MD  Authorized by: Mil Lawson MD     Risks, benefits and alternatives discussed.      LOCATION:      Type:  Hematoma    Location: Left  buttock.    PRE-PROCEDURE DETAILS:     Skin preparation:  Chloraprep (CT scan reviewed before hand.  Ultrasound guidance used to jane location.)    PROCEDURE TYPE:     Complexity:  Simple    ANESTHESIA (see MAR for exact dosages):     Anesthesia method:  Local infiltration    Local anesthetic:  Lidocaine 1% WITH epi    PROCEDURE DETAILS:     Needle aspiration: yes      Needle size:  20 G (By 2.5 inches long)    Incision depth:  Muscle    Drainage characteristics: Trace serosanguineous.    Drainage amount:  Scant    PROCEDURE  Describe Procedure: Bandage applied over needle access site      Assessment and Plan:  Final diagnoses:   Hematoma of left lower leg - post surgical, left upper thigh      Large hematoma with unsuccessful aspiration evacuation attempt. Patient in severe pain this morning. Reached out to on call ortho surgeon Dr. Davison who will reach out to Dr Armenta. They will be contacting Dr Kenny whom this patient was signed out to.     Mil Lawson MD   1/6/2023  [facility]       Mil Lawson MD  01/08/23 0667

## 2023-01-07 NOTE — CONSULTS
[facility]  Hospitalist Consultation  Date of Admission:  1/6/2023    Assessment & Plan   Selvin An is a 67 year old male who was admitted on 1/6/2023. I was asked to see the patient for sleep apnea.    Clinically Significant Risk Factors Present on Admission                    # Circulatory Shock: currently requiring pressors for blood pressure support  # Acute Respiratory Failure: Documented O2 saturation < 91%.  Continue supplemental oxygen as needed     # Severe Obesity: Estimated body mass index is 41.5 kg/m  as calculated from the following:    Height as of this encounter: 1.829 m (6').    Weight as of this encounter: 138.8 kg (306 lb).              > KYLAH on CPAP   -- Continue CPAP with home settings    > Postoperative hematoma of musculoskeletal structure following musculoskeletal procedure  > Acute blood loss anemia  > Morbid obesity  > Chronic prednisone use  > Asplenia  Only risk factor I have been able to identify regarding increased bleeding is long term prednisone use.  Seeing Rheumatology at the [facility], currently tapering to goal of discontinue.  Current dose is 5 mg daily.   -- Serial Hemoglobin   -- Appreciate General Surgery   -- Monitor for signs of infection given steroids, asplenia      DVT Prophylaxis: Defer to primary service  Code Status: Full Code    Lambert August MD    Reason for Consult   Reason for consult: I was asked by Ronald Mccall to evaluate this patient for sleep apnea.    Primary Care Physician   Matt Whitmore    Chief Complaint   Buttock hematoma    History is obtained from the patient's wife and chart review    History of Present Illness   Selvin An is a 67 year old male who presents with buttock hematoma.  He had surgery with Dr. Armenta of Neurosurgery on 1/3/2023 for minimally invasive SI fusion. Had 400 mL blood loss during surgery.  Has had ongoing pain in the buttocks.  Found to have significant drop in hemoglobin, with large hematoma in  buttocks on CT.  Dr. Mccall performed an in OR incision and evacuation of hematoma.  Admitted to ICU for further cares.    Past Medical History    I have reviewed this patient's medical history and updated it with pertinent information if needed.   Past Medical History:   Diagnosis Date     Bell's palsy 3/30/2017     Bilateral carpal tunnel syndrome     No Comments Provided     Essential (primary) hypertension     No Comments Provided     Gout 6/3/2013     Hyperlipidemia     No Comments Provided     Hypothyroidism     No Comments Provided     Malignant neoplasm of thyroid gland (H)     1994     Sleep apnea     No Comments Provided       Past Surgical History   I have reviewed this patient's surgical history and updated it with pertinent information if needed.  Past Surgical History:   Procedure Laterality Date     COLONOSCOPY  09/09/2014 9/2014,Normal - follow up 10 years     CV CORONARY ANGIOGRAM N/A 10/31/2022    Procedure: Coronary Angiogram;  Surgeon: Ender Michaels MD;  Location:  [facility]     CYSTOSCOPY      bladder cancer     OTHER SURGICAL HISTORY      ,SPLENECTOMY     OTHER SURGICAL HISTORY      2010,[id number],EYE MUSCLE SURGERY,Left,strabismus     OTHER SURGICAL HISTORY      10/06/14,[id number],OTHER,Left,Dr. Lana LEHMAN     OTHER SURGICAL HISTORY      10/14/14,[id number],OTHER,Left,Dr. Lana LEHMAN     OTHER SURGICAL HISTORY      11/16/2017,[id number],ENDOSCOPIC RELEASE TRANSVERSE CARPAL LIGAMENT OF HAND,Left     RELEASE CARPAL TUNNEL      12/2011     RETINAL DETACHMENT SURGERY Right      STRABISMUS SURGERY Left      THYROIDECTOMY      1994,thyroid ca     TONSILLECTOMY      2004,T & A > 13yo     VASECTOMY      No Comments Provided       Prior to Admission Medications   Prior to Admission Medications   Prescriptions Last Dose Informant Patient Reported? Taking?   B Complex-C (VITAMIN B COMPLEX W/VITAMIN C) TABS tablet Past Month at AM  Yes Yes   Sig: Take 1 tablet by mouth daily    Cholecalciferol (VITAMIN D-3) 125 MCG (5000 UT) TABS Past Month at AM  No Yes   Sig: Take 5,000 Units by mouth daily   Cyanocobalamin 2500 MCG TABS Past Month at AM  No Yes   Sig: Take 2,500 mcg by mouth daily -- or every other day -- OTC okay   Melatonin 10 MG TABS tablet 1/5/2023 at PM  Yes No   Sig: Take 20 mg by mouth At Bedtime   Multiple Vitamins-Minerals (CENTRUM ADULTS PO) Past Month at AM  Yes Yes   Sig: Take 1 tablet by mouth daily   Probiotic Product (PROBIOTIC-10 PO) Past Month at AM  Yes Yes   Sig: Take 1 tablet by mouth daily   allopurinol (ZYLOPRIM) 300 MG tablet 1/5/2023 at AM  No No   Sig: Take 1 tablet (300 mg) by mouth daily - for gout prevention   carvedilol (COREG) 6.25 MG tablet 1/5/2023 at AM  No No   Sig: Take 1-2 tablets (6.25-12.5 mg) by mouth 2 times daily (with meals)   fenofibrate (TRICOR) 145 MG tablet 1/5/2023 at AM  No No   Sig: Take 1 tablet (145 mg) by mouth daily   gabapentin (NEURONTIN) 100 MG capsule 1/5/2023 at PM  No No   Sig: Take 2 capsules (200 mg) by mouth 2 times daily - for burning/shooting nerve pain   gabapentin (NEURONTIN) 800 MG tablet 1/5/2023 at PM  No No   Sig: Take 0.5-1 tablets (400-800 mg) by mouth At Bedtime   levothyroxine (SYNTHROID/LEVOTHROID) 200 MCG tablet 1/5/2023 at AM  No No   Sig: Take 1 tablet (200 mcg) by mouth daily   magnesium 250 MG tablet Past Month at AM  Yes Yes   Sig: Take 1 tablet by mouth daily   order for DME NA  Yes No   Sig: CPAP machine for home use at pressure: 10, with supplies:  humidifier, chamber, mask, tubing   predniSONE (DELTASONE) 1 MG tablet 1/5/2023 at 5 mg daily  No No   Sig: By mouth 8mg daily x30 days then 7mg daily x30 days then 6mg daily x30 days ten 5mg daily x30 days. Combine with 5mg tabs to make total daily dose.   predniSONE (DELTASONE) 5 MG tablet 1/5/2023 at 5 mg daily  No No   Sig: Take 2 tablets (10 mg) by mouth daily + 3 mg - total of 13 mg daily   predniSONE (DELTASONE) 5 MG tablet 1/5/2023 at 5 mg daily  No  No   Sig: TAKE BY MOUTH 8MG DAILY X 30 DAYS THEN 7MG DAILY X 30DAYS THEN 6MG DAILY X 30 DAYS THEN 5MG DAILY X 30 DAYS. TAKE WITH 1MG TABS TOMAKE TOTAL DAILY DOSE   rosuvastatin (CRESTOR) 5 MG tablet 1/5/2023 at PM  No No   Sig: Take 1-2 tablets (5-10 mg) by mouth daily -- Dose Increase 11/11/2022   sildenafil (VIAGRA) 100 MG tablet Past Month at PM  No Yes   Sig: Take 1/2 to 1 tablet 1 hour prior to sexual activity   spironolactone (ALDACTONE) 25 MG tablet Past Week at AM  No Yes   Sig: Take 1 tablet (25 mg) by mouth daily -- STOP NORVASC --   testosterone (ANDROGEL 1.62% PUMP) 20.25 MG/ACT gel Past Month at AM  No Yes   Sig: Place 2 Pump onto the skin every morning      Facility-Administered Medications: None     Allergies   Allergies   Allergen Reactions     Losartan Cough     Omeprazole Diarrhea     Pantoprazole Diarrhea     Valsartan Cough     Started with losartan, worsened with high dose valsartan       Social History   I have reviewed this patient's social history and updated it with pertinent information if needed. Selvin An  reports that he has never smoked. He has never used smokeless tobacco. He reports that he does not drink alcohol and does not use drugs.    Family History   I have reviewed this patient's family history and updated it with pertinent information if needed.   Family History   Problem Relation Age of Onset     Heart Disease Mother         Heart Disease,MI - SCD     Heart Disease Father         Heart Disease,MI - SCD       Review of Systems   Unable to obtain from patient due to [place].  No falls  No trauma  No heavy alcohol use  No blood thinners    Physical Exam   Temp: 97.8  F (36.6  C) Temp src: Tympanic BP: (!) 143/83 Pulse: 65   Resp: 15 SpO2: 94 % O2 Device: Nasal cannula Oxygen Delivery: 3 LPM  Vital Signs with Ranges  Temp:  [96.9  F (36.1  C)-98.2  F (36.8  C)] 97.8  F (36.6  C)  Pulse:  [56-88] 65  Resp:  [10-26] 15  BP: ()/() 143/83  SpO2:  [83 %-99 %] 94 %  306 lbs  "0 oz    Gen: Alert, NAD.  HEENT: MMM  Neck: Supple  CV: RRR no m/r/g  Pulm: CTAB, no w/r/r. No increased work of breathing  Msk: No LE edema  Abd: Soft, NT/ND. Abdominal binder around low abd/pelvis.  Neuro: Grossly intact. Very [place].  Skin: No concerning lesions.  Psychiatric: Normal affect and insight. Does not appear anxious or depressed.        Data   -Data reviewed today: All pertinent laboratory and imaging results from this encounter were reviewed. I personally reviewed see below.  Recent Labs   Lab 01/07/23  1502 01/07/23  0550 01/06/23  1747   WBC 19.0*  --  19.6*   HGB 7.4* 9.6* 9.8*   MCV 96  --  101*     --  290   NA  --   --  139   POTASSIUM  --   --  4.1   CHLORIDE  --   --  103   CO2  --   --  25   BUN  --   --  30.5*   CR  --   --  1.19*   ANIONGAP  --   --  11   THAI  --   --  9.3   GLC  --   --  125*       Recent Results (from the past 24 hour(s))   CT Abdomen Pelvis w Contrast    Narrative    CT ABDOMEN PELVIS W CONTRAST    CLINICAL HISTORY: Male, age 67 years,  pelvic pain after SI fusion  surgery- he had quite a bit of blood loss because of a \"knicked  artery\" in surgery;    Comparison:  MRI of the sacrum and coccyx 4/16/2021    TECHNIQUE:  CT was performed of the abdomen and pelvis with IV  contrast. Axial; sagittal and coronal MIP images were reviewed.     FINDINGS:  There are areas of atelectasis in the dependent portions of both  lungs. Visualized portions of the heart are unremarkable.    Stomach and duodenum: Unremarkable    Liver: Hepatic steatosis. No acute abnormality.    Gallbladder: Radiodense sludge. No acute abnormality.    Spleen: Small splenule. No acute abnormality.    Pancreas: Unremarkable.    Adrenal glands: Unremarkable    Kidneys: Bilateral cortical cysts.    Ureters: Unremarkable.    Urinary bladder: Unremarkable.    A large and small bowel: No acute abnormality. Moderate volume of  stool in the proximal colon.    The appendix is not reliably identified. There are no " secondary signs  of appendicitis.    The abdominal aorta and inferior vena cava demonstrate no acute  abnormality. There is no evidence of pathologic lymph node  enlargement.    Postoperative changes seen consistent with left SI joint fusion. There  is a large hematoma seen in the left sciatic region involving the left  gluteus medius muscle and the left piriformis muscle. In total, this  multiloculated hematoma measures approximately 16 cm in transverse  dimension by 8.5 to 9 cm in AP dimension by 10 cm in craniocaudal  dimension.    The hematoma and surrounding inflammatory changes obscure the left  sciatic nerve. There is no distinct evidence of active  extravasation/hemorrhage.    Bony structures demonstrate postoperative changes of the left SI joint  and mild degenerative changes of the lumbar spine.    Inguinal lymph nodes are normal.      Impression    IMPRESSION:   Large 16 x 10 x 9 cm intramuscular hematoma involving the left  gluteus medius and the left piriformis muscles, near the site of  recent SI joint fusion.    There is no evidence of active hemorrhage.    Inflammatory changes surrounding the hematoma appear to involve the  soft tissues surrounding the left sciatic nerve which is not well  seen.    Nonacute findings as described above including hepatic steatosis,  cholelithiasis and bilateral cortical cysts.    This facility minimizes radiation dose by adjusting the mA and/or kV  according to each patient size.    This CT scan was performed using one or more the following dose  reduction techniques:    -Automated exposure control,  -Adjustment of the mA and/or kV according to patient's size, and/or,  -Use of iterative reconstruction technique.    LORENZO HATCH MD         SYSTEM ID:  [id number]

## 2023-01-07 NOTE — BRIEF OP NOTE
[facility]    Brief Operative Note    Pre-operative diagnosis: Postoperative hematoma of musculoskeletal structure following musculoskeletal procedure [M96.840]  Post-operative diagnosis Same as pre-operative diagnosis    Procedure: Procedure(s):  INCISION AND DRAINAGE, LEFT BUTTOCK (Drain Hematoma of Left Buttock)  Surgeon: Surgeon(s) and Role:     * Ronald Mccall MD - Primary  Anesthesia: Spinal   Estimated Blood Loss: 500 ml    Drains: 19 FR Zackery  Specimens: * No specimens in log *  Findings:   diffuse bleeding from cavity, controlled with pressure and SurgiFlo.  Complications: None.  Implants: * No implants in log *

## 2023-01-07 NOTE — ANESTHESIA POSTPROCEDURE EVALUATION
Patient: Selvin An    Procedure: Procedure(s):  INCISION AND DRAINAGE, LEFT BUTTOCK (Drain Hematoma of Left Buttock)       Anesthesia Type:  Spinal    Note:  Disposition: ICU            ICU Sign Out: Anesthesiologist/ICU physician sign out WAS performed   Postop Pain Control: Challenging            Challenges/Interventions: Acute Pain; Exacerbation of chronic pain            Sign Out: Pain at Preoperative BASELINE   PONV: No   Neuro/Psych: Uneventful            Sign Out: Acceptable/Baseline neuro status   Airway/Respiratory: Uneventful            Sign Out: Acceptable/Baseline resp. status   CV/Hemodynamics:             Sign Out: Acceptable CV status; No obvious hypovolemia; No obvious fluid overload (Fluid resuscitation and two units RBC given, VSS now.)   Other NRE:    DID A NON-ROUTINE EVENT OCCUR? No           Last vitals:  Vitals Value Taken Time   /79 01/07/23 1624   Temp 96.9  F (36.1  C) 01/07/23 1618   Pulse 66 01/07/23 1626   Resp 24 01/07/23 1626   SpO2 96 % 01/07/23 1626   Vitals shown include unvalidated device data.    Electronically Signed By: JOSE HALL CRNA  January 7, 2023  4:27 PM

## 2023-01-07 NOTE — ED PROVIDER NOTES
01/07/23   6:41 AM     I am accepting the care of this patient from Dr Lawson at change of shift.  Selvin An is a 66 yo male here with a painful post operative hematoma.  Dr Lawson spoke with ortho and they thought aspiration might improve his pain but it has been there days and despite ultrasound guided aspiration attempt there was only a trace of fluid that was aspirated from the hematoma which is 16 x 10 x 9 cm in size.     ED Course    He is absolutely miserable at this time. His pain is not well controlled despite getting 5 mg IV dilaudid in the past 15 hours. This is equivalent to 100 mg oxycodone in 24 hours.  Dr Lawson spoke with Dr Davison from orthopedics who is going to talk to Dr Armenta about this.     8:36 AM I spoke with Dr Lowry ([phone]), IR from [facility], and it looks like there is a large hematoma going through the sciatic foramen causing this pain.  I spoke with Dr Davison about this case.  They feel this case may need a larger facility.  The hgb was 16 preoperatively and 9.8 at our facility. They recommend that we send her to a larger facility- if they need embolization they would need a bed.  We talked this through a bit- there are no beds available at larger facilities. I have been on now three days in a row and we have not been able to transfer anyone besides a single ICU bed available at . There are no beds in [facility] or the [facility] and certainly no one is going to bump this patient up their wait list for what is likely a surgical evacuation of a hematoma.  Dr Davison is going to call Dr Mccall to ask if he would assist and I spoke with radiology but found out that our IR physicians are not on call.  Dr Mccall came to the ED to see the patient.     I called [facility] (on divert), [facility] (on divert), [facility] (on divert), [facility] (on divert), [facility] (on divert), [facility] (on divert).     11:28 AM  Dr Mccall is taking the patient to the OR.      Diagnosis    (M96.840) Postoperative hematoma of musculoskeletal structure following musculoskeletal procedure  (primary encounter diagnosis)  Plan: Case Request: INCISION AND DRAINAGE, LEFT       BUTTOCK        (S80.12XA) Hematoma of left lower leg  Comment: post surgical, left upper thigh    Plan: to the OR with Dr Cal Kenny, Pedro Trevino MD  01/07/23 5685

## 2023-01-07 NOTE — CONSULTS
Surgical  Consult  Referring physician:  RAMU Davison  Primary physician:     Matt Whitmore    Chief complaint:   Postoperative hematoma    History of present illness:  This is a 67 year old male I am seeing in consultation for a postoperative hematoma.  The patient underwent left SI joint pinning by Dr. Armenta at [facility] in [place] on 1/3/2023.  Intraoperatively had 400 mL of blood loss as well as difficulty maintaining oxygen saturation.  He came to the emergency room yesterday due to excruciating pain.  His hemoglobin is stable at 9.6.  CT scan shows a 16 x 9 cm x 10 cm intramuscular hematoma without active extravasation.  Multiple hospitals in [place] and the [facility] are on divert.     Past medical history:   Past Medical History:   Diagnosis Date     Bell's palsy 3/30/2017     Bilateral carpal tunnel syndrome     No Comments Provided     Essential (primary) hypertension     No Comments Provided     Gout 6/3/2013     Hyperlipidemia     No Comments Provided     Hypothyroidism     No Comments Provided     Malignant neoplasm of thyroid gland (H)     1994     Sleep apnea     No Comments Provided       Pastsurgical history:  Past Surgical History:   Procedure Laterality Date     COLONOSCOPY  09/09/2014 9/2014,Normal - follow up 10 years     CV CORONARY ANGIOGRAM N/A 10/31/2022    Procedure: Coronary Angiogram;  Surgeon: Ender Michaels MD;  Location:  [facility]     CYSTOSCOPY      bladder cancer     OTHER SURGICAL HISTORY      ,SPLENECTOMY     OTHER SURGICAL HISTORY      2010,30861,EYE MUSCLE SURGERY,Left,strabismus     OTHER SURGICAL HISTORY      10/06/14,[id number],OTHER,Left,Dr. Lana LEHMAN     OTHER SURGICAL HISTORY      10/14/14,[id number],OTHER,Left,Dr. Lana LEHMAN     OTHER SURGICAL HISTORY      11/16/2017,[id number],ENDOSCOPIC RELEASE TRANSVERSE CARPAL LIGAMENT OF HAND,Left     RELEASE CARPAL TUNNEL      12/2011     RETINAL DETACHMENT SURGERY Right      STRABISMUS SURGERY  Left      THYROIDECTOMY      1994,thyroid ca     TONSILLECTOMY      2004,T & A > 11yo     VASECTOMY      No Comments Provided       Current medications:  Current Outpatient Medications   Medication Sig Dispense Refill     B Complex-C (VITAMIN B COMPLEX W/VITAMIN C) TABS tablet Take 1 tablet by mouth daily       Cholecalciferol (VITAMIN D-3) 125 MCG (5000 UT) TABS Take 5,000 Units by mouth daily 100 tablet 3     Cyanocobalamin 2500 MCG TABS Take 2,500 mcg by mouth daily -- or every other day -- OTC okay 90 tablet 4     magnesium 250 MG tablet Take 1 tablet by mouth daily       Multiple Vitamins-Minerals (CENTRUM ADULTS PO) Take 1 tablet by mouth daily       Probiotic Product (PROBIOTIC-10 PO) Take 1 tablet by mouth daily       sildenafil (VIAGRA) 100 MG tablet Take 1/2 to 1 tablet 1 hour prior to sexual activity 10 tablet 11     spironolactone (ALDACTONE) 25 MG tablet Take 1 tablet (25 mg) by mouth daily -- STOP NORVASC -- 90 tablet 1     testosterone (ANDROGEL 1.62% PUMP) 20.25 MG/ACT gel Place 2 Pump onto the skin every morning 75 g 5     allopurinol (ZYLOPRIM) 300 MG tablet Take 1 tablet (300 mg) by mouth daily - for gout prevention 90 tablet 4     carvedilol (COREG) 6.25 MG tablet Take 1-2 tablets (6.25-12.5 mg) by mouth 2 times daily (with meals) 360 tablet 1     fenofibrate (TRICOR) 145 MG tablet Take 1 tablet (145 mg) by mouth daily 90 tablet 4     gabapentin (NEURONTIN) 100 MG capsule Take 2 capsules (200 mg) by mouth 2 times daily - for burning/shooting nerve pain 360 capsule 1     gabapentin (NEURONTIN) 800 MG tablet Take 0.5-1 tablets (400-800 mg) by mouth At Bedtime 90 tablet 1     levothyroxine (SYNTHROID/LEVOTHROID) 200 MCG tablet Take 1 tablet (200 mcg) by mouth daily 90 tablet 4     Melatonin 10 MG TABS tablet Take 20 mg by mouth At Bedtime       order for DME CPAP machine for home use at pressure: 10, with supplies:  humidifier, chamber, mask, tubing       predniSONE (DELTASONE) 1 MG tablet By mouth  8mg daily x30 days then 7mg daily x30 days then 6mg daily x30 days ten 5mg daily x30 days. Combine with 5mg tabs to make total daily dose. 300 tablet 0     predniSONE (DELTASONE) 5 MG tablet TAKE BY MOUTH 8MG DAILY X 30 DAYS THEN 7MG DAILY X 30DAYS THEN 6MG DAILY X 30 DAYS THEN 5MG DAILY X 30 DAYS. TAKE WITH 1MG TABS TOMAKE TOTAL DAILY DOSE 120 tablet 0     predniSONE (DELTASONE) 5 MG tablet Take 2 tablets (10 mg) by mouth daily + 3 mg - total of 13 mg daily 180 tablet 1     rosuvastatin (CRESTOR) 5 MG tablet Take 1-2 tablets (5-10 mg) by mouth daily -- Dose Increase 11/11/2022 180 tablet 4       Allergies:  Allergies   Allergen Reactions     Losartan Cough     Omeprazole Diarrhea     Pantoprazole Diarrhea     Valsartan Cough     Started with losartan, worsened with high dose valsartan       Family history:  Family History   Problem Relation Age of Onset     Heart Disease Mother         Heart Disease,MI - SCD     Heart Disease Father         Heart Disease,MI - SCD       Social history:  Social History     Socioeconomic History     Marital status:      Spouse name: Not on file     Number of children: Not on file     Years of education: Not on file     Highest education level: Not on file   Occupational History     Not on file   Tobacco Use     Smoking status: Never     Smokeless tobacco: Never   Vaping Use     Vaping Use: Never used   Substance and Sexual Activity     Alcohol use: No     Comment: none     Drug use: No     Sexual activity: Yes     Partners: Female     Birth control/protection: None   Other Topics Concern     Parent/sibling w/ CABG, MI or angioplasty before 65F 55M? 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  Lives at and manages [facility].     Social Determinants of Health     Financial Resource Strain: Not on file   Food Insecurity: Not on file   Transportation Needs: Not on file   Physical Activity: Not on file   Stress:  Not on file   Social Connections: Not on file   Intimate Partner Violence: Not on file   Housing Stability: Not on file       PROBLEM LIST:  Patient Active Problem List   Diagnosis     History of carpal tunnel surgery     Psychosexual dysfunction with inhibited sexual excitement     Mixed dyslipidemia     Benign essential hypertension     Postoperative hypothyroidism     Post-splenectomy     History of kidney stones     KYLAH on CPAP -- Uses nightly, finds helpful and beneficial     Vitamin D deficiency     Morbid obesity (H)     Steroid-induced hyperglycemia     Bilateral lower extremity edema     Vitamin B12 deficiency     Chronic fatigue syndrome with fibromyalgia     History of Lyme disease     Primary insomnia     Polymyalgia rheumatica (H)     Personal history of malignant neoplasm of bladder -- s/p TURBT x2 in 3/2018 and 4/2018 for high-grade non-muscle invasive bladder cancer, now s/p BCG treatment - last cystoscopy 1/20/2021 was normal     Hypercalcemia     Trigger point with back pain     Trigger point of right shoulder region     Sacroiliac joint pain - left     Osteoarthritis of lumbar spine, unspecified spinal osteoarthritis complication status     Radicular low back pain     Neuroforaminal stenosis of lumbar spine     Low testosterone level in male     Stage 3a chronic kidney disease (H)     Hyperuricemia     History of thyroid cancer     Lung mass     Meniere disease, bilateral     Rotator cuff syndrome of right shoulder     Bilateral renal cysts     Chronic radicular low back pain     Coronary artery calcification     Hypertriglyceridemia     Low oxygen saturation - during surgery.     Postoperative hematoma of musculoskeletal structure following musculoskeletal procedure           Physical exam: BP (!) 147/87   Pulse 75   Temp 98.2  F (36.8  C) (Tympanic)   Resp 20   Ht 1.829 m (6')   Wt 138.8 kg (306 lb)   SpO2 94%   BMI 41.50 kg/m      General: this is a pleasant male patient in  acute  distress.  Patient is awake alert and oriented x3 .   EXAM:  Chest/Respiratory Exam: Normal - Clear to auscultation without rales, rhonchi, or wheezing.  Cardiovascular Exam: normal, regular rate and rhythm  Left hip: Incision present.  Skin and subcutaneous tissue is firm and edematous with some ecchymosis.     Assessment:   Left buttock postoperative hematoma.  He is having excruciating pain as this is likely compressing his sciatic nerve.  He had respiratory difficulty after intubation and so we would like to avoid that.  This is an unfortunate situation as there are no beds at a hospital with orthopedic spine surgeon availability .     Plan:    Incision and drainage of left buttock hematoma under spinal anesthesia.  Risks of bleeding, infection (especially of hardware or bone), recurrence, incomplete evacuation, potential injury to nearby nerves and structures discussed and the patient wishes to proceed.       Ronald Mccall MD

## 2023-01-07 NOTE — ED NOTES
Wife is anxious asking why we are not helping her .  States that she can not take him home with her.  Heat pack given.    Charity Adrian RN on 1/7/2023 at 7:39 AM

## 2023-01-07 NOTE — ED NOTES
Pt appears more comfortable at this time.  Rates pain 7/10.  No longer has tremors.    Ice water given to patient.  No other needs at this time.   Charity Adrian RN on 1/7/2023 at 10:21 AM

## 2023-01-07 NOTE — ED NOTES
Patient throwing up X2 with little output.  Patient states that he has been trying to pee for three hours.  Unable to get any output.  Requesting a straight cath.  Spoke with MD about this, he gives verbal order for a straight cath.

## 2023-01-07 NOTE — PHARMACY-ADMISSION MEDICATION HISTORY
Pharmacy -- Admission Medication Reconciliation    Prior to admission (PTA) medications were reviewed and the patient's PTA medication list was updated.    Sources Consulted: patient's spouse Nati interview, sure scripts, chart review    The reliability of this Medication Reconciliation is: Reliability: Reliable    The following significant changes were made:    Removed asa    Removed loratadine    Removed ozempic    **patient on long prednisone taper--currently on 5 mg daily  **patient has been struggling with pain since surgery:  Recent meds prescribed by Dr Armenta (not included on list)   1/4:  Oxycodone 5 mg #20, lidocaine ointment       1/5:  Oxycodone 5 mg #20, diazepam #20   1/6:  Dilaudid 2 mg #20    In addition, the patient's allergies were reviewed with the patient and updated as follows:   Allergies: Losartan, Omeprazole, Pantoprazole, and Valsartan    The pharmacist has reviewed with the patient that all personal medications should be removed from the building or locked in the belongings safe.  Patient shall only take medications ordered by the physician and administered by the nursing staff.       Medication barriers identified: pain, medications not helping   Medication adherence concerns: none   Understanding of emergency medications: VILMA Peng RPH, 1/7/2023,  11:15 AM

## 2023-01-08 ENCOUNTER — APPOINTMENT (OUTPATIENT)
Dept: GENERAL RADIOLOGY | Facility: OTHER | Age: 68
DRG: 907 | End: 2023-01-08
Attending: INTERNAL MEDICINE
Payer: MEDICARE

## 2023-01-08 ENCOUNTER — ANESTHESIA (OUTPATIENT)
Dept: INTENSIVE CARE | Facility: OTHER | Age: 68
DRG: 907 | End: 2023-01-08
Payer: MEDICARE

## 2023-01-08 ENCOUNTER — ANESTHESIA EVENT (OUTPATIENT)
Dept: INTENSIVE CARE | Facility: OTHER | Age: 68
DRG: 907 | End: 2023-01-08
Payer: MEDICARE

## 2023-01-08 PROBLEM — R33.9 URINARY RETENTION: Status: ACTIVE | Noted: 2023-01-08

## 2023-01-08 PROBLEM — Z98.890 POSTOPERATIVE STATE: Status: ACTIVE | Noted: 2023-01-08

## 2023-01-08 LAB
ALLEN'S TEST: NO
ALLEN'S TEST: NO
ANION GAP SERPL CALCULATED.3IONS-SCNC: 10 MMOL/L (ref 7–15)
BASE EXCESS BLDA CALC-SCNC: 3.9 MMOL/L (ref -9–1.8)
BASE EXCESS BLDA CALC-SCNC: 4.6 MMOL/L (ref -9–1.8)
BLD PROD TYP BPU: NORMAL
BLOOD COMPONENT TYPE: NORMAL
BUN SERPL-MCNC: 33.6 MG/DL (ref 8–23)
CALCIUM SERPL-MCNC: 8.2 MG/DL (ref 8.8–10.2)
CHLORIDE SERPL-SCNC: 103 MMOL/L (ref 98–107)
CODING SYSTEM: NORMAL
CREAT SERPL-MCNC: 1.21 MG/DL (ref 0.67–1.17)
CROSSMATCH: NORMAL
DEPRECATED HCO3 PLAS-SCNC: 24 MMOL/L (ref 22–29)
ERYTHROCYTE [DISTWIDTH] IN BLOOD BY AUTOMATED COUNT: 15.9 % (ref 10–15)
GFR SERPL CREATININE-BSD FRML MDRD: 65 ML/MIN/1.73M2
GLUCOSE BLDC GLUCOMTR-MCNC: 130 MG/DL (ref 70–99)
GLUCOSE BLDC GLUCOMTR-MCNC: 136 MG/DL (ref 70–99)
GLUCOSE SERPL-MCNC: 140 MG/DL (ref 70–99)
HCO3 BLD-SCNC: 30 MMOL/L (ref 21–28)
HCO3 BLD-SCNC: 31 MMOL/L (ref 21–28)
HCT VFR BLD AUTO: 25.1 % (ref 40–53)
HGB BLD-MCNC: 6.9 G/DL (ref 13.3–17.7)
HGB BLD-MCNC: 7.5 G/DL (ref 13.3–17.7)
HGB BLD-MCNC: 8.2 G/DL (ref 13.3–17.7)
HOLD SPECIMEN: NORMAL
ISSUE DATE AND TIME: NORMAL
MCH RBC QN AUTO: 31.5 PG (ref 26.5–33)
MCHC RBC AUTO-ENTMCNC: 32.7 G/DL (ref 31.5–36.5)
MCV RBC AUTO: 97 FL (ref 78–100)
O2/TOTAL GAS SETTING VFR VENT: 35 %
O2/TOTAL GAS SETTING VFR VENT: 40 %
OXYHGB MFR BLD: 90 % (ref 92–100)
OXYHGB MFR BLD: 95 % (ref 92–100)
PCO2 BLD: 54 MM HG (ref 35–45)
PCO2 BLD: 54 MM HG (ref 35–45)
PH BLD: 7.35 [PH] (ref 7.35–7.45)
PH BLD: 7.37 [PH] (ref 7.35–7.45)
PLATELET # BLD AUTO: 210 10E3/UL (ref 150–450)
PO2 BLD: 63 MM HG (ref 80–105)
PO2 BLD: 83 MM HG (ref 80–105)
POTASSIUM SERPL-SCNC: 4.5 MMOL/L (ref 3.4–5.3)
RBC # BLD AUTO: 2.6 10E6/UL (ref 4.4–5.9)
SODIUM SERPL-SCNC: 137 MMOL/L (ref 136–145)
UNIT ABO/RH: NORMAL
UNIT NUMBER: NORMAL
UNIT STATUS: NORMAL
UNIT TYPE ISBT: 5100
WBC # BLD AUTO: 20.4 10E3/UL (ref 4–11)

## 2023-01-08 PROCEDURE — 36410 VNPNXR 3YR/> PHY/QHP DX/THER: CPT | Performed by: NURSE ANESTHETIST, CERTIFIED REGISTERED

## 2023-01-08 PROCEDURE — 99024 POSTOP FOLLOW-UP VISIT: CPT | Performed by: SURGERY

## 2023-01-08 PROCEDURE — 85027 COMPLETE CBC AUTOMATED: CPT | Performed by: SURGERY

## 2023-01-08 PROCEDURE — 85018 HEMOGLOBIN: CPT | Performed by: SURGERY

## 2023-01-08 PROCEDURE — 250N000013 HC RX MED GY IP 250 OP 250 PS 637: Performed by: INTERNAL MEDICINE

## 2023-01-08 PROCEDURE — 36600 WITHDRAWAL OF ARTERIAL BLOOD: CPT

## 2023-01-08 PROCEDURE — 36600 WITHDRAWAL OF ARTERIAL BLOOD: CPT | Performed by: SURGERY

## 2023-01-08 PROCEDURE — 99233 SBSQ HOSP IP/OBS HIGH 50: CPT | Performed by: INTERNAL MEDICINE

## 2023-01-08 PROCEDURE — 999N000157 HC STATISTIC RCP TIME EA 10 MIN

## 2023-01-08 PROCEDURE — 36415 COLL VENOUS BLD VENIPUNCTURE: CPT | Performed by: SURGERY

## 2023-01-08 PROCEDURE — P9016 RBC LEUKOCYTES REDUCED: HCPCS | Performed by: SURGERY

## 2023-01-08 PROCEDURE — 71045 X-RAY EXAM CHEST 1 VIEW: CPT | Mod: TC

## 2023-01-08 PROCEDURE — 5A09357 ASSISTANCE WITH RESPIRATORY VENTILATION, LESS THAN 24 CONSECUTIVE HOURS, CONTINUOUS POSITIVE AIRWAY PRESSURE: ICD-10-PCS | Performed by: SURGERY

## 2023-01-08 PROCEDURE — 250N000013 HC RX MED GY IP 250 OP 250 PS 637: Performed by: SURGERY

## 2023-01-08 PROCEDURE — 200N000001 HC R&B ICU

## 2023-01-08 PROCEDURE — 80048 BASIC METABOLIC PNL TOTAL CA: CPT | Performed by: SURGERY

## 2023-01-08 PROCEDURE — 82805 BLOOD GASES W/O2 SATURATION: CPT | Performed by: SURGERY

## 2023-01-08 PROCEDURE — 94660 CPAP INITIATION&MGMT: CPT

## 2023-01-08 PROCEDURE — 250N000011 HC RX IP 250 OP 636: Performed by: SURGERY

## 2023-01-08 PROCEDURE — 250N000012 HC RX MED GY IP 250 OP 636 PS 637: Performed by: SURGERY

## 2023-01-08 PROCEDURE — 258N000001 HC RX 258: Performed by: SURGERY

## 2023-01-08 RX ORDER — POLYETHYLENE GLYCOL 3350 17 G/17G
17 POWDER, FOR SOLUTION ORAL 2 TIMES DAILY
Status: DISCONTINUED | OUTPATIENT
Start: 2023-01-08 | End: 2023-01-09 | Stop reason: HOSPADM

## 2023-01-08 RX ORDER — LORAZEPAM 2 MG/ML
0.5 INJECTION INTRAMUSCULAR EVERY 4 HOURS PRN
Status: DISCONTINUED | OUTPATIENT
Start: 2023-01-08 | End: 2023-01-08

## 2023-01-08 RX ORDER — SPIRONOLACTONE 25 MG/1
25 TABLET ORAL DAILY
Status: DISCONTINUED | OUTPATIENT
Start: 2023-01-08 | End: 2023-01-09 | Stop reason: HOSPADM

## 2023-01-08 RX ORDER — KETOROLAC TROMETHAMINE 15 MG/ML
15 INJECTION, SOLUTION INTRAMUSCULAR; INTRAVENOUS EVERY 6 HOURS PRN
Status: DISCONTINUED | OUTPATIENT
Start: 2023-01-08 | End: 2023-01-08

## 2023-01-08 RX ORDER — CYCLOBENZAPRINE HYDROCHLORIDE 5 MG/1
2.5-5 TABLET, FILM COATED ORAL EVERY 8 HOURS PRN
Status: DISCONTINUED | OUTPATIENT
Start: 2023-01-08 | End: 2023-01-09 | Stop reason: HOSPADM

## 2023-01-08 RX ORDER — KETOROLAC TROMETHAMINE 15 MG/ML
15 INJECTION, SOLUTION INTRAMUSCULAR; INTRAVENOUS EVERY 6 HOURS
Status: DISCONTINUED | OUTPATIENT
Start: 2023-01-08 | End: 2023-01-09

## 2023-01-08 RX ORDER — HYDROMORPHONE HYDROCHLORIDE 1 MG/ML
0.5 INJECTION, SOLUTION INTRAMUSCULAR; INTRAVENOUS; SUBCUTANEOUS
Status: DISCONTINUED | OUTPATIENT
Start: 2023-01-08 | End: 2023-01-09 | Stop reason: HOSPADM

## 2023-01-08 RX ORDER — LORAZEPAM 2 MG/ML
0.5 INJECTION INTRAMUSCULAR EVERY 4 HOURS PRN
Status: DISCONTINUED | OUTPATIENT
Start: 2023-01-08 | End: 2023-01-09 | Stop reason: HOSPADM

## 2023-01-08 RX ADMIN — LEVOTHYROXINE SODIUM 200 MCG: 0.1 TABLET ORAL at 10:06

## 2023-01-08 RX ADMIN — ACETAMINOPHEN 1000 MG: 10 INJECTION, SOLUTION INTRAVENOUS at 05:58

## 2023-01-08 RX ADMIN — OXYCODONE HYDROCHLORIDE 10 MG: 5 TABLET ORAL at 02:23

## 2023-01-08 RX ADMIN — ACETAMINOPHEN 1000 MG: 10 INJECTION, SOLUTION INTRAVENOUS at 17:49

## 2023-01-08 RX ADMIN — CARVEDILOL 6.25 MG: 6.25 TABLET, FILM COATED ORAL at 08:28

## 2023-01-08 RX ADMIN — ONDANSETRON HYDROCHLORIDE 4 MG: 2 SOLUTION INTRAMUSCULAR; INTRAVENOUS at 01:01

## 2023-01-08 RX ADMIN — KETOROLAC TROMETHAMINE 15 MG: 15 INJECTION, SOLUTION INTRAMUSCULAR; INTRAVENOUS at 10:15

## 2023-01-08 RX ADMIN — ONDANSETRON HYDROCHLORIDE 4 MG: 2 SOLUTION INTRAMUSCULAR; INTRAVENOUS at 21:54

## 2023-01-08 RX ADMIN — FAMOTIDINE 20 MG: 20 TABLET, FILM COATED ORAL at 10:07

## 2023-01-08 RX ADMIN — POLYETHYLENE GLYCOL 3350 17 G: 17 POWDER, FOR SOLUTION ORAL at 10:06

## 2023-01-08 RX ADMIN — ACETAMINOPHEN 1000 MG: 10 INJECTION, SOLUTION INTRAVENOUS at 11:46

## 2023-01-08 RX ADMIN — KETOROLAC TROMETHAMINE 15 MG: 15 INJECTION, SOLUTION INTRAMUSCULAR; INTRAVENOUS at 15:42

## 2023-01-08 RX ADMIN — OXYCODONE HYDROCHLORIDE 10 MG: 5 TABLET ORAL at 13:53

## 2023-01-08 RX ADMIN — PROCHLORPERAZINE EDISYLATE 5 MG: 5 INJECTION INTRAMUSCULAR; INTRAVENOUS at 03:27

## 2023-01-08 RX ADMIN — HYDROMORPHONE HYDROCHLORIDE 1 MG: 1 INJECTION, SOLUTION INTRAMUSCULAR; INTRAVENOUS; SUBCUTANEOUS at 04:17

## 2023-01-08 RX ADMIN — LORAZEPAM 0.5 MG: 2 INJECTION INTRAMUSCULAR; INTRAVENOUS at 01:30

## 2023-01-08 RX ADMIN — KETOROLAC TROMETHAMINE 15 MG: 15 INJECTION, SOLUTION INTRAMUSCULAR; INTRAVENOUS at 03:34

## 2023-01-08 RX ADMIN — HYDROMORPHONE HYDROCHLORIDE 0.5 MG: 1 INJECTION, SOLUTION INTRAMUSCULAR; INTRAVENOUS; SUBCUTANEOUS at 21:58

## 2023-01-08 RX ADMIN — HYDROMORPHONE HYDROCHLORIDE 1 MG: 1 INJECTION, SOLUTION INTRAMUSCULAR; INTRAVENOUS; SUBCUTANEOUS at 05:59

## 2023-01-08 RX ADMIN — HYDROMORPHONE HYDROCHLORIDE 1 MG: 1 INJECTION, SOLUTION INTRAMUSCULAR; INTRAVENOUS; SUBCUTANEOUS at 02:07

## 2023-01-08 RX ADMIN — FAMOTIDINE 20 MG: 20 TABLET, FILM COATED ORAL at 21:59

## 2023-01-08 RX ADMIN — HYDROMORPHONE HYDROCHLORIDE 1 MG: 1 INJECTION, SOLUTION INTRAMUSCULAR; INTRAVENOUS; SUBCUTANEOUS at 01:01

## 2023-01-08 RX ADMIN — ACETAMINOPHEN 1000 MG: 10 INJECTION, SOLUTION INTRAVENOUS at 00:15

## 2023-01-08 RX ADMIN — SENNOSIDES AND DOCUSATE SODIUM 1 TABLET: 50; 8.6 TABLET ORAL at 22:00

## 2023-01-08 RX ADMIN — CYCLOBENZAPRINE 5 MG: 5 TABLET, FILM COATED ORAL at 18:16

## 2023-01-08 RX ADMIN — SPIRONOLACTONE 25 MG: 25 TABLET ORAL at 10:06

## 2023-01-08 RX ADMIN — HYDROMORPHONE HYDROCHLORIDE 0.5 MG: 1 INJECTION, SOLUTION INTRAMUSCULAR; INTRAVENOUS; SUBCUTANEOUS at 22:46

## 2023-01-08 RX ADMIN — GABAPENTIN 200 MG: 100 CAPSULE ORAL at 04:17

## 2023-01-08 RX ADMIN — CYCLOBENZAPRINE 2.5 MG: 5 TABLET, FILM COATED ORAL at 10:09

## 2023-01-08 RX ADMIN — ONDANSETRON HYDROCHLORIDE 4 MG: 2 SOLUTION INTRAMUSCULAR; INTRAVENOUS at 15:10

## 2023-01-08 RX ADMIN — DEXTROSE AND SODIUM CHLORIDE: 5; 450 INJECTION, SOLUTION INTRAVENOUS at 03:29

## 2023-01-08 RX ADMIN — HYDROMORPHONE HYDROCHLORIDE 0.5 MG: 1 INJECTION, SOLUTION INTRAMUSCULAR; INTRAVENOUS; SUBCUTANEOUS at 16:28

## 2023-01-08 RX ADMIN — GABAPENTIN 800 MG: 400 CAPSULE ORAL at 22:00

## 2023-01-08 RX ADMIN — SENNOSIDES AND DOCUSATE SODIUM 1 TABLET: 50; 8.6 TABLET ORAL at 10:07

## 2023-01-08 RX ADMIN — HYDROMORPHONE HYDROCHLORIDE 1 MG: 1 INJECTION, SOLUTION INTRAMUSCULAR; INTRAVENOUS; SUBCUTANEOUS at 02:58

## 2023-01-08 RX ADMIN — ONDANSETRON HYDROCHLORIDE 4 MG: 2 SOLUTION INTRAMUSCULAR; INTRAVENOUS at 07:56

## 2023-01-08 RX ADMIN — HYDROMORPHONE HYDROCHLORIDE 0.5 MG: 1 INJECTION, SOLUTION INTRAMUSCULAR; INTRAVENOUS; SUBCUTANEOUS at 20:12

## 2023-01-08 RX ADMIN — OXYCODONE HYDROCHLORIDE 10 MG: 5 TABLET ORAL at 18:16

## 2023-01-08 RX ADMIN — CARVEDILOL 6.25 MG: 6.25 TABLET, FILM COATED ORAL at 17:48

## 2023-01-08 RX ADMIN — HYDROMORPHONE HYDROCHLORIDE 1 MG: 1 INJECTION, SOLUTION INTRAMUSCULAR; INTRAVENOUS; SUBCUTANEOUS at 00:13

## 2023-01-08 RX ADMIN — KETOROLAC TROMETHAMINE 15 MG: 15 INJECTION, SOLUTION INTRAMUSCULAR; INTRAVENOUS at 21:57

## 2023-01-08 RX ADMIN — DEXTROSE AND SODIUM CHLORIDE: 5; 450 INJECTION, SOLUTION INTRAVENOUS at 11:46

## 2023-01-08 RX ADMIN — PREDNISONE 5 MG: 5 TABLET ORAL at 10:14

## 2023-01-08 RX ADMIN — GABAPENTIN 200 MG: 100 CAPSULE ORAL at 17:48

## 2023-01-08 ASSESSMENT — ACTIVITIES OF DAILY LIVING (ADL)
ADLS_ACUITY_SCORE: 31

## 2023-01-08 NOTE — PROGRESS NOTES
Surgical Progress Note     POD# 5/1 s/p L SI fusion/Drainage hematoma    Subjective: Very anxious and dislikes BiPap. Had pain and needed large amount of narcotics and then needed BiPap. Pain now adequately controlled. OR findings/postoperative plans discussed. Nausea, no vomiting. Feels weak and exhausted    Objective:  BP (!) 158/101   Pulse 69   Temp 97.2  F (36.2  C) (Tympanic)   Resp 10   Ht 1.829 m (6')   Wt 138.8 kg (306 lb)   SpO2 97%   BMI 41.50 kg/m      U/O: 425 mL last shift  Drains: 100 mL last shift dark blood/clots    ABDOMEN: soft and non-tender  Left Buttock: Dressing clean and dry. Some edema of buttock/thigh. Left DP pulse strong. Moving all extremities.     LABS  Recent Labs   Lab Test 01/08/23  0322 01/07/23  1755 01/07/23  1502 03/12/21  1044 12/12/17  0825 09/19/17  1609   WBC 20.4*  --  19.0*   < >  --   --    RBC 2.60*  --  2.31*   < >  --   --    HGB 8.2* 8.9* 7.4*   < >  --  16.0   HCT 25.1*  --  22.2*   < >  --  46.3   MCV 97  --  96   < >  --  92   MCH 31.5  --  32.0   < >  --  31.7   MCHC 32.7  --  33.3   < >  --  34.6     --  233   < > 355 490*   MPV  --   --   --   --  9.5 9.8    < > = values in this interval not displayed.       Recent Labs   Lab Test 01/08/23  0322 01/06/23  1747   POTASSIUM 4.5 4.1   CHLORIDE 103 103   BUN 33.6* 30.5*       Recent Labs   Lab Test 12/02/22  1015 11/11/22  0755   BILITOTAL 0.6 0.6   AST 31 23   ALT 27 23         ASSESSMENT  1. Stable after drainage left buttock hematoma. Still with oozing from cavity, but no brisk bleeding and Hgb stable.   2. Respiratory insufficiency needing BiPap. Most likely due to narcotics, as when off them ( as during spinal) sats were good.   3. Post-operative pain. Now controlled and will need to be controlled without narcotics so that BiPap can be removed.   4. Debilitated/deconditioned. Will need PT/OT/Rehab once respiratory status better.      PLAN  Wean off BiPap as able  Pain:  Ofirmev/Toradol/Neurontin/Ice  Nutrition: clears as able until off BiPap  Ulcer prophylaxis: Pepcid  DVT prophylaxis : SCD's only  Cohn: Continue for urinary retention    Ronald Mccall MD

## 2023-01-08 NOTE — PROVIDER NOTIFICATION
Pt anxious, restless , increased respiratory rate, and feeling claustrophobic. MD Mccall notified, TORB for 0.5 mg lorazepam IV every four hours PRN.

## 2023-01-08 NOTE — PLAN OF CARE
Problem: Anemia  Goal: Anemia Symptom Improvement  Outcome: Not Progressing  Intervention: Monitor and Manage Anemia  Recent Flowsheet Documentation  Taken 1/8/2023 1200 by Edwardo Sierra RN  Safety Promotion/Fall Prevention:   activity supervised   increased rounding and observation   nonskid shoes/slippers when out of bed  Taken 1/8/2023 0800 by Edwardo Sierra RN  Safety Promotion/Fall Prevention:   activity supervised   increased rounding and observation   nonskid shoes/slippers when out of bed     Problem: Hypertension Comorbidity  Goal: Blood Pressure in Desired Range  Outcome: Not Progressing  Intervention: Maintain Blood Pressure Management  Recent Flowsheet Documentation  Taken 1/8/2023 1200 by Edwardo Sierra RN  Medication Review/Management: medications reviewed  Taken 1/8/2023 0800 by Edwardo Sierra RN  Medication Review/Management: medications reviewed   Goal Outcome Evaluation:

## 2023-01-08 NOTE — PROGRESS NOTES
Patient having increased pain to left leg. Patient reported more pain in this area. Also noted that patient having minimal drainage out of VIKTORIA drain when previously drain was putting out drainage. VIKTORIA has been stripped several times over the past 4-5 hours, output remains the same. Writer notified Dr. Mccall of above. At this time Dr. Mccall told writer to notify him when 1500 hgb comes back.

## 2023-01-08 NOTE — PROGRESS NOTES
Md Mccall notified by MONY Vázquez of increased pain rating 11/10 with no relief from dilaudid , lower oxygen saturations due to pt disorientation and removing oxygen after lorazapam. New orders obtained and  put in by RN.      Large clot passed in VIKTORIA ,size of softball. Abdominal binder/dressing remains CDI.  Writer gave PRN 10 mg oxycodone and dilaudid, pt now resting comfortably and vital signs becoming WDL.            BP (!) 140/95   Pulse 73   Temp (!) 96.2  F (35.7  C) (Tympanic)   Resp 12   Ht 1.829 m (6')   Wt 138.8 kg (306 lb)   SpO2 95%   BMI 41.50 kg/m

## 2023-01-08 NOTE — PROGRESS NOTES
Lab called with critical Hgb of 6.9. No obvious signs of bleeding noted to leg dressing. Writer spoke to Dr. Mccall in regards to above critical Hgb. At this time Dr. Mccall gave writer telephone order to transfuse 1 unit of RBC's and recheck Hgb 1 hour post transfusion. This order was confirmed with TORB.

## 2023-01-08 NOTE — PROGRESS NOTES
Patients home CPAP settings is a CPAP of 10.  Call placed to [facility] [employer] about home settings for BiPAP settings.      Patient currently feeling nauseous, will place BiPAP when patient feeling less nauseous.

## 2023-01-08 NOTE — OP NOTE
Procedure Date: 01/07/2023    PREOPERATIVE DIAGNOSIS:  Postoperative left buttock hematoma.    POSTOPERATIVE DIAGNOSIS:  Postoperative left buttock hematoma.    PROCEDURE PERFORMED:  Incision and drainage.    SURGEON:  Ronald Mccall MD    ASSISTANT:  Melissa Giles RN    ANESTHESIA:  General.    ANESTHETIST:  Jessie Son CRNA    INDICATION FOR PROCEDURE:  The patient is a 67-year-old man who is postoperative day 4 after SI joint surgery that was done by Dr. Geovany Ho at [facility] in [place].  The patient had intraoperative bleeding that was controlled and then re-developed increased swelling and sciatic nerve pain.  Since there were no beds in the multi state area, after discussion with Dr. Davison who was covering for Dr. Armenta and Dr. Armenta himself, we decided to proceed with incision and drainage here.  The CT scan showed a 16 x 10 x 9 cm hematoma.  This was located near the gluteus medius.  There was no blush to suggest active bleeding.    DESCRIPTION OF PROCEDURE:  After adequate regional anesthesia, the patient was prepped and draped in the usual sterile fashion.  He was in the right lateral decubitus position. The spot on the CT scan, the hematoma appeared closest to the skin, was a couple of inches below the previous incision.  A transverse incision was made parallel to the previous incision inferior and the incision was carried down through the subcutaneous tissue, and the wound was infiltrated with 0.25% Marcaine plain.  Dissection was carried down through the subcutaneous tissue onto a layer of fascia, which was then incised and the cavity entered.  There was a large amount of gelatinous thrombus in the cavity.  The wound was big enough for both the 's index finger and a ring forceps and a large amount of the clot was extracted.  The wound was attempted to be irrigated with normal saline and then diffuse oozing came from the cavity.  The cavity was packed with a lap pad and pressure held  and the bleeding ceased.  The cavity was further explored and there was some residual clot deep, but that could not be extracted.  A 19-Uzbek Zackery drain was then placed via a separate stab incision and secured with a 2-0 silk suture.  It was then placed into the cavity.  During that process, again, there became diffuse oozing from the cavity.  The cavity was again packed and pressure held with good hemostasis.  Surgiflo was placed into the cavity and pressure held again with good hemostasis.  There was about 500 mL of blood loss, but it is unclear how much was fresh blood versus old blood.  The subcutaneous tissue was reapproximated with interrupted 0 Vicryl sutures.  The skin closed with a 4-0 Monocryl suture.  Proxy Strips, clean, dry dressings were applied and a compressive dressing was applied over the hip using an abdominal binder.  The patient was taken to the recovery room in stable condition.    Ronald Mccall MD        D: 2023   T: 2023   MT: [id number]    Name:     PITER ROTHMANChelsey  MRN:      [id number]        Account:        [id number]   :      1955           Procedure Date: 2023     Document: [id number]

## 2023-01-08 NOTE — PROGRESS NOTES
[facility]  Hospitalist Consultation Progress Note    Assessment & Plan   Selvin An is a 67 year old male who was admitted on 1/6/2023.     Clinically Significant Risk Factors Present on Admission                    # Circulatory Shock: currently requiring pressors for blood pressure support  # Non-Invasive mechanical ventilation: current O2 Device: BiPAP/CPAP  # Acute hypoxic respiratory failure: continue supplemental O2 as needed     # Severe Obesity: Estimated body mass index is 41.5 kg/m  as calculated from the following:    Height as of this encounter: 1.829 m (6').    Weight as of this encounter: 138.8 kg (306 lb).            > Respiratory failure, suspect narcotic associated  > KYLAH on CPAP   > Postoperative hematoma of musculoskeletal structure following musculoskeletal procedure  > Acute blood loss anemia  > Morbid obesity  > Chronic prednisone use  > Asplenia  With regards to breathing expect multifactorial reason for decline including significant drop in hemoglobin post-op, probable obesity hypoventilation syndrome, KYLAH, body habitus, etc.  Case discussed with Dr. Mccall of General Surgery.  I spoke at length with Mr. An and his wife about the clinical course as apparent, expected future clinical course and possible complications.  He expresses a desire to prolong life.    -- Appreciate General Surgery    -- BiPap   -- Serial Hgb   -- Monitor VIKTORIA output   -- Minimize narcotics   -- Trial low dose Flexeril for muscle spasms   -- If requiring more narcotic and continued respiratory depression would need to consider intubation vs comfort cares.   -- Monitor for signs of infection given steroids, asplenia. Stress dose steroids not indicated.   -- Plan for PT/OT consult early to mid week when able to work with them    > Hypertension, chronic  Likely elevated in part due to pain.   -- Resume home meds (carvedilol, spironolactone).    Diet: Clear Liquid Diet    DVT Prophylaxis: Pneumatic  Compression Devices  Cohn Catheter: PRESENT, indication: Strict 1-2 Hour I&O  Code Status: Full Code           Disposition Plan   Anticipate at least a week in hospital, followed by discharge to SNF for acute rehab stay.  Entered: Lambert August MD 01/08/2023, 9:40 AM       The patient's care was discussed with the Attending Physician, Dr. Ronald Mccall, Patient and Patient's Family.    Lambert August MD  Hospitalist Service  [facility]  Contact information available via [facility] Paging/Directory    ______________________________________________________________________    Interval History   CC: What is the course of events?  Having significant pain, requiring many narcotic doses overnight resulting in respiratory depression on BiPap.  Having muscle spasms in the buttock. Has not defecated since admit.      -Data reviewed today: I reviewed all new labs and imaging results over the last 24 hours. I personally reviewed see below.    Physical Exam   Temp: 97.2  F (36.2  C) Temp src: Tympanic BP: (!) 164/107 Pulse: 71   Resp: 12 SpO2: 96 % O2 Device: BiPAP/CPAP Oxygen Delivery: 4 LPM  Vitals:    01/06/23 1629   Weight: 138.8 kg (306 lb)     Vital Signs with Ranges  Temp:  [95.9  F (35.5  C)-98.2  F (36.8  C)] 97.2  F (36.2  C)  Pulse:  [61-88] 71  Resp:  [7-28] 12  BP: ()/() 164/107  SpO2:  [90 %-100 %] 96 %  I/O last 3 completed shifts:  In: 4234 [I.V.:3534]  Out: 3425 [Urine:2200; Drains:225; Blood:1000]    Gen: Alert, NAD.  HEENT: BiPap mask  Neck: Supple  CV: RRR no m/r/g  Pulm: Clear to anterior auscultation  Msk: No LE edema  Abd: Soft, NT/ND. No rebound or guarding. VIKTORIA present on the left with small amount of blood.  Neuro: Grossly intact  Skin: No concerning lesions.  Psychiatric: Normal affect and insight. Does not appear anxious or depressed.        Medications     dextrose 5% and 0.45% NaCl 125 mL/hr at 01/08/23 0329       acetaminophen  1,000 mg Intravenous Q6H      carvedilol  6.25 mg Oral BID w/meals     famotidine  20 mg Oral BID     gabapentin  200 mg Oral BID     gabapentin  800 mg Oral At Bedtime     ketorolac  15 mg Intravenous Q6H     levothyroxine  200 mcg Oral Daily     polyethylene glycol  17 g Oral BID     predniSONE  5 mg Oral Daily     senna-docusate  1 tablet Oral BID     sodium chloride (PF)  3 mL Intracatheter Q8H       Data   Recent Labs   Lab 01/08/23  0608 01/08/23  0322 01/08/23  0050 01/07/23  2107 01/07/23  1755 01/07/23  1502 01/07/23  0550 01/06/23  1747   WBC  --  20.4*  --   --   --  19.0*  --  19.6*   HGB  --  8.2*  --   --  8.9* 7.4*   < > 9.8*   MCV  --  97  --   --   --  96  --  101*   PLT  --  210  --   --   --  233  --  290   NA  --  137  --   --   --   --   --  139   POTASSIUM  --  4.5  --   --   --   --   --  4.1   CHLORIDE  --  103  --   --   --   --   --  103   CO2  --  24  --   --   --   --   --  25   BUN  --  33.6*  --   --   --   --   --  30.5*   CR  --  1.21*  --   --   --   --   --  1.19*   ANIONGAP  --  10  --   --   --   --   --  11   THAI  --  8.2*  --   --   --   --   --  9.3   * 140* 130*   < >  --   --   --  125*    < > = values in this interval not displayed.       Recent Results (from the past 24 hour(s))   XR Chest Port 1 View    Narrative    PROCEDURE INFORMATION:   Exam: XR Chest   Exam date and time: 1/8/2023 4:03 AM   Age: 67 years old   Clinical indication: Other: Hypoxemia     TECHNIQUE:   Imaging protocol: Radiologic exam of the chest.   Views: 1 view.     COMPARISON:   CR XR CHEST 2 VIEWS 10/5/2022 1:44 PM     FINDINGS:   Lungs: Low lung volumes, limiting evaluation of lung bases. No definite acute   airspace consolidation. No appreciable pulmonary edema.  Pleural spaces: No large pleural effusion. No pneumothorax.   Heart/Mediastinum: Cardiomediastinal silhouette is unchanged, accounting for   differences in technique.   Bones/joints: No evidence of acute osseous abnormality.       Impression    IMPRESSION:   No  evidence of acute cardiopulmonary process, noting low lung volumes limits   evaluation of the lung bases.     THIS DOCUMENT HAS BEEN ELECTRONICALLY SIGNED BY PSENCER JENSEN MD

## 2023-01-08 NOTE — PLAN OF CARE
Goal Outcome Evaluation:      Plan of Care Reviewed With: patient, spouse    Overall Patient Progress: no change    Pt became anxious,restless and disoriented to situation after receiving IV Lorazepam.  easily reoriented with verbal reminders and treated aggressively with PRN medications ( Dilaudid, Oxycodone, Toradol), see flowsheets and MAR, pain rated 11/10 to 9/10 most of shift, with little relief. B/ps elevated with tanxiety and now trending slightly elevated but wdl.    Oxygen did desat to 83-89% while drifting off to sleep while on home  nasal CPAP and 4 LPM NC. Pt mostly mouth breathing and unable to get accurate n-tidal, ABG's done and reported to tele med, pt now on BIPAP.    CHITRA drain to bulb suction 120 mls bright red blood for output, softball size clot in chitra, bulb changed, HGB stable. UTV dressing or under abdominal binder per MD Mccall pt not to turn side to side or remove binder.        06:36-  MD Mccall updated on pt bipap status and blood clot in CHITRA, MD will evaluate in person and TORB to hold off on Narcotics as much as possible.    BP (!) 132/98   Pulse 72   Temp (!) 96.2  F (35.7  C) (Tympanic)   Resp 12   Ht 1.829 m (6')   Wt 138.8 kg (306 lb)   SpO2 98%   BMI 41.50 kg/m      Problem: Plan of Care - These are the overarching goals to be used throughout the patient stay.    Goal: Patient-Specific Goal (Individualized)  1/8/2023 0536 by Carlene Quinones, RN  Outcome: Progressing  1/7/2023 2241 by Carlene Quinones, RN  Flowsheets (Taken 1/7/2023 2241)  Individualized Care Needs: post surgical hemmorrhage  Anxieties, Fears or Concerns: pain managment  Patient/Family-Specific Goals (Include Timeframe): monitor dressing,pain managment strict bedrest, and no repositioning this shift.     Problem: Plan of Care - These are the overarching goals to be used throughout the patient stay.    Goal: Absence of Hospital-Acquired Illness or Injury  Intervention: Identify and Manage Fall Risk  Recent Flowsheet  Documentation  Taken 1/8/2023 0400 by Carlene Quinones RN  Safety Promotion/Fall Prevention:    bed alarm on    room door open    room near nurse's station    nonskid shoes/slippers when out of bed    Problem: Plan of Care - These are the overarching goals to be used throughout the patient stay.    Goal: Absence of Hospital-Acquired Illness or Injury  Intervention: Prevent and Manage VTE (Venous Thromboembolism) Risk  Recent Flowsheet Documentation  Taken 1/8/2023 0400 by Carlene Quinones RN  VTE Prevention/Management: SCDs (sequential compression devices) on  Taken 1/7/2023 2000 by Carlene Quinones RN  VTE Prevention/Management: SCDs (sequential compression devices) on     Problem: Plan of Care - These are the overarching goals to be used throughout the patient stay.    Goal: Optimal Comfort and Wellbeing  Intervention: Monitor Pain and Promote Comfort  Recent Flowsheet Documentation  Taken 1/8/2023 0300 by Carlene Quinones RN  Pain Management Interventions:    medication (see MAR)    MD notified (comment)    cold applied    Pain Management Interventions:    medication (see MAR)    MD notified (comment)  Taken 1/7/2023 1948 by Carlene Quinones RN  Pain Management Interventions:    medication (see MAR)    MD notified (comment)  Taken 1/7/2023 1906 by Carlene Quinones RN  Pain Management Interventions: medication (see MAR)

## 2023-01-08 NOTE — PROGRESS NOTES
SAFETY CHECKLIST  ID Bands and Risk clasps correct and in place (DNR, Fall risk, Allergy, Latex, Limb):  Yes  All Lines Reconciled and labeled correctly: Yes  Whiteboard updated:Yes  Environmental interventions: Yes  Verify Tele #: in room ICU          MD Mccall notified of unresolving pain, new TORB for 1 mg dilaudid IV every one hour PRN.

## 2023-01-08 NOTE — PROGRESS NOTES
Pt is A/O x 4, he has had complaints of pain 8/10, this has been managed by PRN dilaudid, and scheduled IV acetaminophen. CMS is present in lower extremities, pedal pulses earl +2. LS are clear but diminished anteriorly/laterally. Pt is on strict bed rest, MD states to not move patient side to side, he wants to give the wound time to stop bleeding. BP decreased in PACU, has been within normal limits since arrival to ICU. Hemoglobin redrawn, increased from 7.4 to 8.9.    /86 (BP Location: Left arm, Patient Position: Supine, Cuff Size: Adult Regular)   Pulse 61   Temp 97.2  F (36.2  C) (Tympanic)   Resp 16   Ht 1.829 m (6')   Wt 138.8 kg (306 lb)   SpO2 95%   BMI 41.50 kg/m

## 2023-01-09 ENCOUNTER — TRANSFERRED RECORDS (OUTPATIENT)
Dept: HEALTH INFORMATION MANAGEMENT | Facility: OTHER | Age: 68
End: 2023-01-09

## 2023-01-09 VITALS
BODY MASS INDEX: 42.66 KG/M2 | HEART RATE: 77 BPM | RESPIRATION RATE: 22 BRPM | DIASTOLIC BLOOD PRESSURE: 65 MMHG | HEIGHT: 72 IN | TEMPERATURE: 98.6 F | OXYGEN SATURATION: 94 % | SYSTOLIC BLOOD PRESSURE: 114 MMHG | WEIGHT: 315 LBS

## 2023-01-09 LAB
ANION GAP SERPL CALCULATED.3IONS-SCNC: 7 MMOL/L (ref 7–15)
BLD PROD TYP BPU: NORMAL
BLOOD COMPONENT TYPE: NORMAL
BUN SERPL-MCNC: 28.7 MG/DL (ref 8–23)
CALCIUM SERPL-MCNC: 8.3 MG/DL (ref 8.8–10.2)
CHLORIDE SERPL-SCNC: 103 MMOL/L (ref 98–107)
CODING SYSTEM: NORMAL
CREAT SERPL-MCNC: 1.26 MG/DL (ref 0.67–1.17)
CROSSMATCH: NORMAL
DEPRECATED HCO3 PLAS-SCNC: 26 MMOL/L (ref 22–29)
ERYTHROCYTE [DISTWIDTH] IN BLOOD BY AUTOMATED COUNT: 16.5 % (ref 10–15)
GFR SERPL CREATININE-BSD FRML MDRD: 62 ML/MIN/1.73M2
GLUCOSE BLDC GLUCOMTR-MCNC: 92 MG/DL (ref 70–99)
GLUCOSE SERPL-MCNC: 95 MG/DL (ref 70–99)
HCT VFR BLD AUTO: 20.3 % (ref 40–53)
HGB BLD-MCNC: 6.7 G/DL (ref 13.3–17.7)
HGB BLD-MCNC: 7.4 G/DL (ref 13.3–17.7)
ISSUE DATE AND TIME: NORMAL
MCH RBC QN AUTO: 31.3 PG (ref 26.5–33)
MCHC RBC AUTO-ENTMCNC: 33 G/DL (ref 31.5–36.5)
MCV RBC AUTO: 95 FL (ref 78–100)
PLATELET # BLD AUTO: 235 10E3/UL (ref 150–450)
POTASSIUM SERPL-SCNC: 4.1 MMOL/L (ref 3.4–5.3)
RBC # BLD AUTO: 2.14 10E6/UL (ref 4.4–5.9)
SODIUM SERPL-SCNC: 136 MMOL/L (ref 136–145)
UNIT ABO/RH: NORMAL
UNIT NUMBER: NORMAL
UNIT STATUS: NORMAL
UNIT TYPE ISBT: 5100
WBC # BLD AUTO: 20.7 10E3/UL (ref 4–11)

## 2023-01-09 PROCEDURE — 250N000011 HC RX IP 250 OP 636: Performed by: SURGERY

## 2023-01-09 PROCEDURE — 36415 COLL VENOUS BLD VENIPUNCTURE: CPT | Performed by: SURGERY

## 2023-01-09 PROCEDURE — 36416 COLLJ CAPILLARY BLOOD SPEC: CPT | Performed by: FAMILY MEDICINE

## 2023-01-09 PROCEDURE — 99233 SBSQ HOSP IP/OBS HIGH 50: CPT | Performed by: FAMILY MEDICINE

## 2023-01-09 PROCEDURE — 85018 HEMOGLOBIN: CPT | Performed by: FAMILY MEDICINE

## 2023-01-09 PROCEDURE — 250N000013 HC RX MED GY IP 250 OP 250 PS 637: Performed by: SURGERY

## 2023-01-09 PROCEDURE — 250N000013 HC RX MED GY IP 250 OP 250 PS 637: Performed by: INTERNAL MEDICINE

## 2023-01-09 PROCEDURE — 80048 BASIC METABOLIC PNL TOTAL CA: CPT | Performed by: SURGERY

## 2023-01-09 PROCEDURE — 85027 COMPLETE CBC AUTOMATED: CPT | Performed by: SURGERY

## 2023-01-09 PROCEDURE — 250N000012 HC RX MED GY IP 250 OP 636 PS 637: Performed by: SURGERY

## 2023-01-09 PROCEDURE — 250N000013 HC RX MED GY IP 250 OP 250 PS 637: Performed by: FAMILY MEDICINE

## 2023-01-09 PROCEDURE — P9016 RBC LEUKOCYTES REDUCED: HCPCS | Performed by: INTERNAL MEDICINE

## 2023-01-09 PROCEDURE — 99024 POSTOP FOLLOW-UP VISIT: CPT | Performed by: SURGERY

## 2023-01-09 PROCEDURE — 999N000157 HC STATISTIC RCP TIME EA 10 MIN

## 2023-01-09 PROCEDURE — 258N000001 HC RX 258: Performed by: SURGERY

## 2023-01-09 RX ORDER — PANTOPRAZOLE SODIUM 40 MG/1
40 TABLET, DELAYED RELEASE ORAL
Status: DISCONTINUED | OUTPATIENT
Start: 2023-01-09 | End: 2023-01-09 | Stop reason: HOSPADM

## 2023-01-09 RX ORDER — GABAPENTIN 300 MG/1
300 CAPSULE ORAL 3 TIMES DAILY
Status: DISCONTINUED | OUTPATIENT
Start: 2023-01-09 | End: 2023-01-09 | Stop reason: HOSPADM

## 2023-01-09 RX ADMIN — LEVOTHYROXINE SODIUM 200 MCG: 0.1 TABLET ORAL at 09:16

## 2023-01-09 RX ADMIN — HYDROMORPHONE HYDROCHLORIDE 0.5 MG: 1 INJECTION, SOLUTION INTRAMUSCULAR; INTRAVENOUS; SUBCUTANEOUS at 10:38

## 2023-01-09 RX ADMIN — ONDANSETRON HYDROCHLORIDE 4 MG: 2 SOLUTION INTRAMUSCULAR; INTRAVENOUS at 07:46

## 2023-01-09 RX ADMIN — CARVEDILOL 6.25 MG: 6.25 TABLET, FILM COATED ORAL at 07:42

## 2023-01-09 RX ADMIN — SENNOSIDES AND DOCUSATE SODIUM 1 TABLET: 50; 8.6 TABLET ORAL at 09:17

## 2023-01-09 RX ADMIN — HYDROMORPHONE HYDROCHLORIDE 0.5 MG: 1 INJECTION, SOLUTION INTRAMUSCULAR; INTRAVENOUS; SUBCUTANEOUS at 05:35

## 2023-01-09 RX ADMIN — HYDROMORPHONE HYDROCHLORIDE 2 MG: 2 TABLET ORAL at 09:16

## 2023-01-09 RX ADMIN — FAMOTIDINE 20 MG: 20 TABLET, FILM COATED ORAL at 09:17

## 2023-01-09 RX ADMIN — GABAPENTIN 200 MG: 100 CAPSULE ORAL at 05:32

## 2023-01-09 RX ADMIN — KETOROLAC TROMETHAMINE 15 MG: 15 INJECTION, SOLUTION INTRAMUSCULAR; INTRAVENOUS at 05:48

## 2023-01-09 RX ADMIN — PREDNISONE 5 MG: 5 TABLET ORAL at 09:23

## 2023-01-09 RX ADMIN — HYDROMORPHONE HYDROCHLORIDE 0.5 MG: 1 INJECTION, SOLUTION INTRAMUSCULAR; INTRAVENOUS; SUBCUTANEOUS at 07:42

## 2023-01-09 RX ADMIN — ACETAMINOPHEN 1000 MG: 10 INJECTION, SOLUTION INTRAVENOUS at 01:03

## 2023-01-09 RX ADMIN — ACETAMINOPHEN 1000 MG: 10 INJECTION, SOLUTION INTRAVENOUS at 05:33

## 2023-01-09 RX ADMIN — ACETAMINOPHEN 1000 MG: 10 INJECTION, SOLUTION INTRAVENOUS at 11:54

## 2023-01-09 RX ADMIN — POLYETHYLENE GLYCOL 3350 17 G: 17 POWDER, FOR SOLUTION ORAL at 09:15

## 2023-01-09 RX ADMIN — SPIRONOLACTONE 25 MG: 25 TABLET ORAL at 09:16

## 2023-01-09 RX ADMIN — DEXTROSE AND SODIUM CHLORIDE: 5; 450 INJECTION, SOLUTION INTRAVENOUS at 10:24

## 2023-01-09 RX ADMIN — PANTOPRAZOLE SODIUM 40 MG: 40 TABLET, DELAYED RELEASE ORAL at 09:16

## 2023-01-09 RX ADMIN — CYCLOBENZAPRINE 5 MG: 5 TABLET, FILM COATED ORAL at 09:23

## 2023-01-09 ASSESSMENT — ACTIVITIES OF DAILY LIVING (ADL)
ADLS_ACUITY_SCORE: 31

## 2023-01-09 NOTE — PROGRESS NOTES
Patient continues to have increased pain to left thigh. Upon closer assessment patient leg appears to be more swollen. Writer noted bruising to interior posterior upper thigh. Writer outlined bruising. Skin is warm to the touch, firm over upper portion of thigh. Writer did notice swelling earlier but now it appears to be increased. However writer did not notice bruising on inner thigh at earlier in day however due to location this was not assessed earlier in shift. Bruising also noted to left outside of knee. Writer measured above knee with tape measure. 51 cm was above knee measurement. Writer marked measurement location. Writer spoke to Dr. Mccall about above findings. Also updated him on patients stable vital signs. At this time  writer asked about a CT scan to evaluate the swelling. Dr. Mccall wanted writer to place an ace wrap around thigh to help with swelling and continue to monitor. Writer ace wrapped thigh, post ace wrap CMS intact and good distal pulse noted distally.

## 2023-01-09 NOTE — PROGRESS NOTES
Attempted patient on our CPAP machine.  Patient refused, he has remained on his home CPAP majority of shift.

## 2023-01-09 NOTE — PROGRESS NOTES
[facility]    Medicine Progress Note - Hospitalist Service    Date of Admission:  1/6/2023    Assessment & Plan   > Respiratory failure, suspect narcotic associated  > KYLAH on CPAP   > Postoperative hematoma of musculoskeletal structure following musculoskeletal procedure  > Acute blood loss anemia  > Morbid obesity  > Chronic prednisone use  > Asplenia  With regards to breathing expect multifactorial reason for decline including significant drop in hemoglobin post-op, probable obesity hypoventilation syndrome, KYLAH, body habitus, etc.  Case discussed with Dr. Mccall of General Surgery.  I spoke at length with Mr. An and his wife about the clinical course as apparent, expected future clinical course and possible complications.  He expresses a desire to prolong life.    -- Appreciate General Surgery    -- home CPAP   -- Serial Hgb   -- Monitor VIKTORIA output   -- Minimize narcotics. Oral and IV dilaudid. IV tylenol. Gabapentin during the day as needed. Stop oxycodone, not helpful.    -- Trial low dose Flexeril for muscle spasms   -- Monitor for signs of infection given steroids, asplenia. Stress dose steroids not indicated.   -- Plan for PT/OT consult early to mid week when able to work with them  --blood transfusion today.      > Hypertension, chronic  Likely elevated in part due to pain.   -- Resume home meds (carvedilol, spironolactone).       Diet: Regular Diet Adult    DVT Prophylaxis: contraindicated due to bleeding.   Cohn Catheter: PRESENT, indication: Strict 1-2 Hour I&O  Lines: None     Cardiac Monitoring: None  Code Status: Full Code      Clinically Significant Risk Factors                        # Severe Obesity: Estimated body mass index is 41.5 kg/m  as calculated from the following:    Height as of this encounter: 1.829 m (6').    Weight as of this encounter: 138.8 kg (306 lb)., PRESENT ON ADMISSION         Disposition Plan             Brenda Chery DO  Hospitalist Service  [facility] [address]  [facility]  Securely message with [employer] (more info)  Text page via [employer] Paging/Directory   ______________________________________________________________________    Interval History   Low Hb this AM. Pain better controlled. Slept 6 hours. Wife at bedside. All questions answered. No nausea/vomiting/chest pain. Leg sore from swelling/edema.     Physical Exam   Vital Signs: Temp: 98.6  F (37  C) Temp src: Temporal BP: 135/73 Pulse: 80   Resp: 11 SpO2: 95 % O2 Device: Nasal cannula Oxygen Delivery: 5 LPM  Weight: 306 lbs 0 oz    General Appearance: AAO. Moderate discomfort. Morbidly obese.   Respiratory: CTA B/L  Cardiovascular: RR.   GI: abd soft  Skin: scattered bruising  Other: Scrotal edema. R leg in ACE wrap helping with edema. Presacral edema.      Medical Decision Making     50 MINUTES SPENT BY ME on the date of service doing chart review, history, exam, documentation & further activities per the note.  MANAGEMENT DISCUSSED with the following over the past 24 hours: wife, RN, primary care team   NOTE(S)/MEDICAL RECORDS REVIEWED over the past 24 hours: nursing, care team  Tests REVIEWED in the past 24 hours:  SUPPLEMENTAL HISTORY, in addition to the patient's history, over the past 24 hours obtained from:   - Spouse or significant other  Medical complexity over the past 24 hours:  - Parenteral (IV) CONTROLLED SUBSTANCES ordered  - Intensive monitoring for MEDICATION TOXICITY  - Decision regarding MAJOR SURGERY without additional risks      Data     I have personally reviewed the following data over the past 24 hrs:    20.7 (H)  \   6.7 (LL)   / 235     136 103 28.7 (H) /  92   4.1 26 1.26 (H) \       Imaging results reviewed over the past 24 hrs:   No results found for this or any previous visit (from the past 24 hour(s)).  Recent Labs   Lab 01/09/23  0813 01/09/23  0523 01/08/23  1954/23  1458 01/08/23  0608 01/08/23  0322 01/07/23  1755 01/07/23  1502 01/07/23  0550 01/06/23  1747   WBC   --  20.7*  --   --   --  20.4*  --  19.0*  --  19.6*   HGB  --  6.7* 7.5* 6.9*  --  8.2*   < > 7.4*   < > 9.8*   MCV  --  95  --   --   --  97  --  96  --  101*   PLT  --  235  --   --   --  210  --  233  --  290   NA  --  136  --   --   --  137  --   --   --  139   POTASSIUM  --  4.1  --   --   --  4.5  --   --   --  4.1   CHLORIDE  --  103  --   --   --  103  --   --   --  103   CO2  --  26  --   --   --  24  --   --   --  25   BUN  --  28.7*  --   --   --  33.6*  --   --   --  30.5*   CR  --  1.26*  --   --   --  1.21*  --   --   --  1.19*   ANIONGAP  --  7  --   --   --  10  --   --   --  11   THAI  --  8.3*  --   --   --  8.2*  --   --   --  9.3   GLC 92 95  --   --  136* 140*   < >  --   --  125*    < > = values in this interval not displayed.

## 2023-01-09 NOTE — DISCHARGE SUMMARY
[facility] Discharge Summary    Selvin An MRN# [id number]   Age: 67 year old YOB: 1955     Date of Admission:  1/6/2023  Date of Discharge::  1/9/2023  Admitting Physician:  Ronald Mccall MD  Discharge Physician:  Ronald Mccall MD     Home clinic: [facility]          Admission Diagnoses:   Postoperative hematoma of musculoskeletal structure following musculoskeletal procedure [M96.840]  Hematoma of left lower leg [S80.12XA]          Discharge Diagnosis:   Patient Active Problem List   Diagnosis     History of carpal tunnel surgery     Psychosexual dysfunction with inhibited sexual excitement     Mixed dyslipidemia     Benign essential hypertension     Postoperative hypothyroidism     Post-splenectomy     History of kidney stones     KYLAH on CPAP -- Uses nightly, finds helpful and beneficial     Vitamin D deficiency     Morbid obesity with BMI of 40.0-44.9, adult (H)     Steroid-induced hyperglycemia     Bilateral lower extremity edema     Vitamin B12 deficiency     Chronic fatigue syndrome with fibromyalgia     History of Lyme disease     Primary insomnia     Polymyalgia rheumatica (H)     Personal history of malignant neoplasm of bladder -- s/p TURBT x2 in 3/2018 and 4/2018 for high-grade non-muscle invasive bladder cancer, now s/p BCG treatment - last cystoscopy 1/20/2021 was normal     Hypercalcemia     Trigger point with back pain     Trigger point of right shoulder region     Sacroiliac joint pain - left     Osteoarthritis of lumbar spine, unspecified spinal osteoarthritis complication status     Radicular low back pain     Neuroforaminal stenosis of lumbar spine     Low testosterone level in male     Stage 3a chronic kidney disease (H)     Hyperuricemia     History of thyroid cancer     Lung mass     Meniere disease, bilateral     Rotator cuff syndrome of right shoulder     Bilateral renal cysts     Chronic radicular low back pain     Coronary artery calcification      Hypertriglyceridemia     Low oxygen saturation - during surgery.     Postoperative hematoma of musculoskeletal structure following musculoskeletal procedure     Acute blood loss anemia     Hematoma of left lower leg     Asplenia     Postoperative state     Urinary retention               Procedures:   Procedure(s): Drainage left buttock hematoma 1/7/23                  Medications Prior to Admission:     Medications Prior to Admission   Medication Sig Dispense Refill Last Dose     B Complex-C (VITAMIN B COMPLEX W/VITAMIN C) TABS tablet Take 1 tablet by mouth daily   Past Month at AM     Cholecalciferol (VITAMIN D-3) 125 MCG (5000 UT) TABS Take 5,000 Units by mouth daily 100 tablet 3 Past Month at AM     Cyanocobalamin 2500 MCG TABS Take 2,500 mcg by mouth daily -- or every other day -- OTC okay 90 tablet 4 Past Month at AM     magnesium 250 MG tablet Take 1 tablet by mouth daily   Past Month at AM     Multiple Vitamins-Minerals (CENTRUM ADULTS PO) Take 1 tablet by mouth daily   Past Month at AM     Probiotic Product (PROBIOTIC-10 PO) Take 1 tablet by mouth daily   Past Month at AM     sildenafil (VIAGRA) 100 MG tablet Take 1/2 to 1 tablet 1 hour prior to sexual activity 10 tablet 11 Past Month at PM     spironolactone (ALDACTONE) 25 MG tablet Take 1 tablet (25 mg) by mouth daily -- STOP NORVASC -- 90 tablet 1 Past Week at AM     testosterone (ANDROGEL 1.62% PUMP) 20.25 MG/ACT gel Place 2 Pump onto the skin every morning 75 g 5 Past Month at AM     allopurinol (ZYLOPRIM) 300 MG tablet Take 1 tablet (300 mg) by mouth daily - for gout prevention 90 tablet 4 1/5/2023 at AM     carvedilol (COREG) 6.25 MG tablet Take 1-2 tablets (6.25-12.5 mg) by mouth 2 times daily (with meals) 360 tablet 1 1/5/2023 at AM     fenofibrate (TRICOR) 145 MG tablet Take 1 tablet (145 mg) by mouth daily 90 tablet 4 1/5/2023 at AM     gabapentin (NEURONTIN) 100 MG capsule Take 2 capsules (200 mg) by mouth 2 times daily - for  burning/shooting nerve pain 360 capsule 1 1/5/2023 at PM     gabapentin (NEURONTIN) 800 MG tablet Take 0.5-1 tablets (400-800 mg) by mouth At Bedtime 90 tablet 1 1/5/2023 at PM     levothyroxine (SYNTHROID/LEVOTHROID) 200 MCG tablet Take 1 tablet (200 mcg) by mouth daily 90 tablet 4 1/5/2023 at AM     Melatonin 10 MG TABS tablet Take 20 mg by mouth At Bedtime   1/5/2023 at PM     order for DME CPAP machine for home use at pressure: 10, with supplies:  humidifier, chamber, mask, tubing   NA     predniSONE (DELTASONE) 1 MG tablet By mouth 8mg daily x30 days then 7mg daily x30 days then 6mg daily x30 days ten 5mg daily x30 days. Combine with 5mg tabs to make total daily dose. 300 tablet 0 1/5/2023 at 5 mg daily     predniSONE (DELTASONE) 5 MG tablet TAKE BY MOUTH 8MG DAILY X 30 DAYS THEN 7MG DAILY X 30DAYS THEN 6MG DAILY X 30 DAYS THEN 5MG DAILY X 30 DAYS. TAKE WITH 1MG TABS TOMAKE TOTAL DAILY DOSE 120 tablet 0 1/5/2023 at 5 mg daily     predniSONE (DELTASONE) 5 MG tablet Take 2 tablets (10 mg) by mouth daily + 3 mg - total of 13 mg daily 180 tablet 1 1/5/2023 at 5 mg daily     rosuvastatin (CRESTOR) 5 MG tablet Take 1-2 tablets (5-10 mg) by mouth daily -- Dose Increase 11/11/2022 180 tablet 4 1/5/2023 at PM             Discharge Medications:     Current Facility-Administered Medications   Medication     acetaminophen (OFIRMEV) infusion 1,000 mg     carvedilol (COREG) tablet 6.25 mg     cyclobenzaprine (FLEXERIL) half-tab 2.5-5 mg     dextrose 5% and 0.45% NaCl infusion     famotidine (PEPCID) tablet 20 mg     gabapentin (NEURONTIN) capsule 300 mg     gabapentin (NEURONTIN) capsule 800 mg     HYDROmorphone (DILAUDID) half-tab 2 mg     HYDROmorphone (PF) (DILAUDID) injection 0.5 mg     levothyroxine (SYNTHROID/LEVOTHROID) tablet 200 mcg     lidocaine 1 % 0.1-1 mL     LORazepam (ATIVAN) injection 0.5 mg     magnesium hydroxide (MILK OF MAGNESIA) suspension 30 mL     naloxone (NARCAN) injection 0.2 mg    Or     naloxone  (NARCAN) injection 0.4 mg    Or     naloxone (NARCAN) injection 0.2 mg    Or     naloxone (NARCAN) injection 0.4 mg     ondansetron (ZOFRAN ODT) ODT tab 4 mg    Or     ondansetron (ZOFRAN) injection 4 mg     pantoprazole (PROTONIX) EC tablet 40 mg     polyethylene glycol (MIRALAX) Packet 17 g     predniSONE (DELTASONE) tablet 5 mg     prochlorperazine (COMPAZINE) injection 5 mg    Or     prochlorperazine (COMPAZINE) tablet 5 mg     senna-docusate (SENOKOT-S/PERICOLACE) 8.6-50 MG per tablet 1 tablet     sodium chloride (PF) 0.9% PF flush 3 mL     sodium chloride (PF) 0.9% PF flush 3 mL     spironolactone (ALDACTONE) tablet 25 mg             Consultations:   Consultation during this admission received from hospitalist          Brief History of Illness:   The patient is a 67-year-old man who is postoperative day 4 after SI joint surgery that was done by Dr. Geovany Ho at [facility] in [place].  The patient had intraoperative bleeding that was controlled and then re-developed increased swelling and sciatic nerve pain.  Since there were no beds in the [facility] area, after discussion with Dr. Davison who was covering for Dr. Armenta and Dr. Armenta himself, we decided to proceed with incision and drainage here.  The CT scan showed a 16 x 10 x 9 cm hematoma.  This was located near the gluteus medius.  There was no blush to suggest active bleeding           Hospital Course:   The patient's hospital course was remarkable for ongoing slow bleeding requiring transfusion. He was monitored in ICU. He had respiratory depression from narcotics requiring BiPap. He has had pain controlled with Ofirmev, Toradol, Ice and less dilaudid.  He has much better pain control and has been on his CPAP with oxygen. Hgb was 6.7 today and received one unit PRBC's.  Cohn in for pre-op urinary retention of 1400 mL. Incisions without erythema. Ecchymosis on left infra-gluteal area. Dr Armenta was able to arrange angiogram at [facility] and  will transfer there.          Discharge Instructions and Follow-Up:   Discharge diet: Regular   Discharge activity: Bedrest   [facility]   Wound care: Keep dressing clean and dry.  Remove dressing in 48 hours and you may then shower with wounds open. Proxi-strips will remain intact for 7-10 days . Once loose they may be removed.             Discharge Disposition:   Transferred to [facility]      Attestation:  I have reviewed today's vital signs, notes, medications, labs and imaging.    Ronald Mccall MD

## 2023-01-09 NOTE — PROGRESS NOTES
Shift Summary: Pt on BiPAP this morning and then transitioned to CPAP 10( his home setting.) Pt tolerated well. He was given a few breaks and placed on a NC, tolerating well. Plan to wear our CPAP this evening and transition to him home until tomorrow. No further respiratory interventions. Pat Govea RRT

## 2023-01-09 NOTE — PROGRESS NOTES
Surgical Progress Note     POD# 6/2 s/p L SI fusion/Drainage hematoma    Subjective: Comfortable. On CPAP. Nausea better.     Objective:  /64   Pulse 75   Temp 98.6  F (37  C)   Resp 16   Ht 1.829 m (6')   Wt 138.8 kg (306 lb)   SpO2 95%   BMI 41.50 kg/m      U/O: 650 mL last shift  Drains: 135 mL yesterday. More serosanguinous    L buttock: Ecchymosis at upper posterior thigh/infra-gluteal crease. Incisions without erythema or drainage. Edema present but thigh is soft.     LABS  Recent Labs   Lab Test 01/09/23  0523 01/08/23  1955/23  1458 01/08/23  0322 03/12/21  1044 12/12/17  0825 09/19/17  1609   WBC 20.7*  --   --  20.4*   < >  --   --    RBC 2.14*  --   --  2.60*   < >  --   --    HGB 6.7* 7.5*   < > 8.2*   < >  --  16.0   HCT 20.3*  --   --  25.1*   < >  --  46.3   MCV 95  --   --  97   < >  --  92   MCH 31.3  --   --  31.5   < >  --  31.7   MCHC 33.0  --   --  32.7   < >  --  34.6     --   --  210   < > 355 490*   MPV  --   --   --   --   --  9.5 9.8    < > = values in this interval not displayed.       Recent Labs   Lab Test 01/09/23  0523 01/08/23  0322   POTASSIUM 4.1 4.5   CHLORIDE 103 103   BUN 28.7* 33.6*       Recent Labs   Lab Test 12/02/22  1015 11/11/22  0755   BILITOTAL 0.6 0.6   AST 31 23   ALT 27 23           ASSESSMENT  1. Stable after drainage of hematoma. Clinically looks better. Hgb has drifted down and needs transfusion. If clinically deteriorates will get CT of pelvis and thigh with IV contrast  2. Post-operative pain better controlled  3. Respiratory status improved with decreased narcotics  4. Debilitated/deconditioned    PLAN  Dr Cordero will check on patient today  Pain: Ofirmev/Toradol/Neurontin/ice  Nutrition: Regular diet  Ulcer prophylaxis: Pepcid  DVT prophylaxis : SCD's only  Cohn: Continue as not mobile  PT/OT/    Addendum: Dr Armenta called me an requested that patient be transferred to [facility] for an angiogram. Discussed with patient  and wife and they are in agreement.    Ronald Mccall MD

## 2023-01-09 NOTE — PROGRESS NOTES
DATE:  1/9/2023   TIME OF RECEIPT FROM LAB:  0615       LAB TEST:  hgb  LAB VALUE:  6.7  RESULTS GIVEN WITH READ-BACK TO (PROVIDER):  Dr. Bone   TIME LAB VALUE REPORTED TO PROVIDER:   0612

## 2023-01-09 NOTE — PROGRESS NOTES
Patient still reporting significant pain in posterior portion of his upper thigh. Assessment of the extremity showing significant swelling on the posterior and lateral was able to be reduced with manual pressure and ace bandage wrap with a moderate reduction in pain as well.  VIKTORIA drain is patent with minimal output.

## 2023-01-09 NOTE — PROGRESS NOTES
:     referral closed as patient was transfered to [facility] in [place], [place].      JUNIE Valerio on 1/9/2023 at 2:35 PM

## 2023-01-09 NOTE — PROGRESS NOTES
Switched CPAP mask to under-nose face mask, patient did not like the full face mask.  HME changed to chamber heater without heat on because he felt the air was to hot on the CPAP.  On hospital CPAP (on home settings) for around 10 minutes, per patient unable to tolerate our machine.  Switched to home CPAP machine.

## 2023-01-10 ENCOUNTER — PATIENT OUTREACH (OUTPATIENT)
Dept: INTERNAL MEDICINE | Facility: OTHER | Age: 68
End: 2023-01-10

## 2023-01-10 NOTE — TELEPHONE ENCOUNTER
Transitional Care Management    Patient discharged to higher level of care at [facility] in [place], [place]  No TCM call required per policy. Arabella Matias RN on 1/10/2023 at 7:43 AM

## 2023-01-11 ENCOUNTER — TELEPHONE (OUTPATIENT)
Dept: INTERNAL MEDICINE | Facility: OTHER | Age: 68
End: 2023-01-11

## 2023-01-11 NOTE — TELEPHONE ENCOUNTER
I agree with the Home Care order requests.    Electronically signed by:  Matt Whitmore MD  on January 11, 2023 10:50 AM

## 2023-01-11 NOTE — TELEPHONE ENCOUNTER
[facility] needs to verify if Dr. Whitmore would be willing to follow patient for home care.    Manaas Lemons on 1/11/2023 at 9:59 AM

## 2023-01-12 ENCOUNTER — TRANSFERRED RECORDS (OUTPATIENT)
Dept: HEALTH INFORMATION MANAGEMENT | Facility: OTHER | Age: 68
End: 2023-01-12

## 2023-01-13 ENCOUNTER — TRANSFERRED RECORDS (OUTPATIENT)
Dept: HEALTH INFORMATION MANAGEMENT | Facility: OTHER | Age: 68
End: 2023-01-13

## 2023-01-14 ENCOUNTER — TRANSFERRED RECORDS (OUTPATIENT)
Dept: HEALTH INFORMATION MANAGEMENT | Facility: OTHER | Age: 68
End: 2023-01-14

## 2023-01-16 ENCOUNTER — TRANSFERRED RECORDS (OUTPATIENT)
Dept: HEALTH INFORMATION MANAGEMENT | Facility: OTHER | Age: 68
End: 2023-01-16

## 2023-01-24 ENCOUNTER — TELEPHONE (OUTPATIENT)
Dept: INTERNAL MEDICINE | Facility: OTHER | Age: 68
End: 2023-01-24
Payer: MEDICARE

## 2023-01-24 NOTE — TELEPHONE ENCOUNTER
DJS-patient needs a hospital follow up can scheduling use 02.01.23 2:40    Please call and advise  Thank You  Sabrina López on 1/24/2023 at 8:31 AM

## 2023-01-26 ENCOUNTER — TELEPHONE (OUTPATIENT)
Dept: INTERNAL MEDICINE | Facility: OTHER | Age: 68
End: 2023-01-26
Payer: MEDICARE

## 2023-01-26 ENCOUNTER — MYC MEDICAL ADVICE (OUTPATIENT)
Dept: INTERNAL MEDICINE | Facility: OTHER | Age: 68
End: 2023-01-26
Payer: MEDICARE

## 2023-01-26 PROCEDURE — G0180 MD CERTIFICATION HHA PATIENT: HCPCS | Performed by: INTERNAL MEDICINE

## 2023-01-26 NOTE — TELEPHONE ENCOUNTER
Dr. Whitmore,     Today I admitted Henry into home care for PT/OT services, upon admission patient had a question on a previously stopped medication. Patient states that his spironolactone 25 mg is on hold, and he is unsure why/ how long it should be held. Please advise, thank you!    Patient is also having moderate to severe pain in his scrotum. Upon assessment the skin is red, there is no edema or swelling noted. Patient has been using cold packs and a cream given to him at [facility] but would like a call to discuss any other options he may have.     Thanks,     Africa Oneill RN on 1/26/2023 at 1:48 PM

## 2023-01-27 ENCOUNTER — TELEPHONE (OUTPATIENT)
Dept: INTERNAL MEDICINE | Facility: OTHER | Age: 68
End: 2023-01-27
Payer: MEDICARE

## 2023-01-27 DIAGNOSIS — I50.32 CHRONIC HEART FAILURE WITH PRESERVED EJECTION FRACTION (HFPEF) (H): ICD-10-CM

## 2023-01-27 DIAGNOSIS — F51.01 PRIMARY INSOMNIA: ICD-10-CM

## 2023-01-27 DIAGNOSIS — G47.00 PERSISTENT DISORDER OF INITIATING OR MAINTAINING SLEEP: ICD-10-CM

## 2023-01-27 DIAGNOSIS — M54.16 CHRONIC RADICULAR LOW BACK PAIN: Primary | ICD-10-CM

## 2023-01-27 DIAGNOSIS — K21.9 ESOPHAGEAL REFLUX: ICD-10-CM

## 2023-01-27 DIAGNOSIS — G89.29 CHRONIC RADICULAR LOW BACK PAIN: Primary | ICD-10-CM

## 2023-01-27 DIAGNOSIS — M35.3 POLYMYALGIA RHEUMATICA (H): ICD-10-CM

## 2023-01-27 DIAGNOSIS — K59.00 CONSTIPATION: ICD-10-CM

## 2023-01-27 DIAGNOSIS — R11.0 NAUSEA: ICD-10-CM

## 2023-01-27 NOTE — TELEPHONE ENCOUNTER
Date of birth and last name verified by jef.    I did relay Dr. Whitmore comments to her.    She states understanding.  Kevin Haynes .......  1/27/2023  9:18 AM

## 2023-01-27 NOTE — TELEPHONE ENCOUNTER
Request for Home Care Physical Therapy orders as follows:        Continuation frequency =   ___2___  x week x  ___8___ week(s)    Effective date = 1/30/23        Therapist: Maynor Walker PT    Please respond with .HOMECAREAGREE, if you are in agreement. Please sign with your comments and signature, if you are not in agreement.

## 2023-01-27 NOTE — TELEPHONE ENCOUNTER
"Dr. Whitmore,     Home Care regulation mandates that you are notified about drug discrepancies, interactions & contraindications. Response within a 24 hour timeframe is established by CMS as \"best practice\" for the delivery of home health care. Home Care is required to report if the 24 hour timeframe was met. The home health clinician will contact you again if this timeframe is not met or if the response does not address all concerns.      Situation:  You are being contacted for clarifying orders related to issues found during medication reconciliation.     Background:  The patient was admitted to [facility]. Upon admission, a med reconciliation was completed to identify any drug discrepancies, interactions or contraindications. Home Care's drug regime review has revealed significant medication issues.     Assessment:       Severe interactions:     -fenofibrate and rosuvastatin  -oxycodone and cyclobenzaprine  -oxycodone and gabapentin  -Trazodone and cyclobenzaprine  -trazodone and ondansetron      + New medications from [facility]:     +Cyclobenzaprine 10 mg three times daily PRN  +docusate 100 mg 2 times daily  +famotidine 20 mg 2 times daily  +gabapentin 400 mg 2 tab twice daily  +gabapentin 600 mg 2 tab at bed  +zofran 4 mg every 8 hours  + oxycodone 5 mg  Every 8 hours PRN  +mirilax 17 gm daily  +metamucil 3.4 gm daily  + sennosides docusate 8.6 -50 mg   +trazodone 50 mg      Questions on medications:  -patient is needing further instructions whether to start his spironolactone again or not. Please advise.         Recommendations:    Please evaluate this information and indicate below whether or not changes are required. A full copy of the patient's drug interaction/contraindications report is available upon request.    Provider response/orders as follows:      Thank you for reviewing the severe interactions, the new medications from [facility], and the question regarding the medication on " hold.    Thanks!    Africa Oneill RN on 1/27/2023 at 2:12 PM

## 2023-01-27 NOTE — TELEPHONE ENCOUNTER
I agree with the Home Care order requests.    Electronically signed by:  Matt Whitmore MD  on January 27, 2023 10:51 AM

## 2023-01-27 NOTE — TELEPHONE ENCOUNTER
I agree with the Home Care order requests.    Electronically signed by:  Matt Whitmore MD  on January 27, 2023 7:15 AM

## 2023-01-27 NOTE — TELEPHONE ENCOUNTER
Date of birth and last name verified sherman Damian.    I did relay Dr. Whitmore comments to her and she states understanding.    Kevin Haynes .......  1/27/2023  11:11 AM

## 2023-01-28 NOTE — TELEPHONE ENCOUNTER
Continue to hold Spironolactone until clinic follow-up when we can review everything.     If scrotal pain does not improve, may need to consider ER or urgent care evaluation for infection.     Electronically signed by:  Matt Whitmore MD  on January 28, 2023 10:18 AM

## 2023-01-28 NOTE — TELEPHONE ENCOUNTER
No changes.     Please update med list as noted by Home Care.     Electronically signed by:  Matt Whitmore MD  on January 28, 2023 10:17 AM

## 2023-01-30 RX ORDER — FAMOTIDINE 20 MG/1
20 TABLET, FILM COATED ORAL 2 TIMES DAILY
Qty: 60 TABLET | Refills: 0 | COMMUNITY
Start: 2023-01-30 | End: 2023-02-24

## 2023-01-30 RX ORDER — CYCLOBENZAPRINE HCL 10 MG
10 TABLET ORAL 3 TIMES DAILY PRN
Qty: 90 TABLET | Refills: 0 | COMMUNITY
Start: 2023-01-30 | End: 2023-02-24

## 2023-01-30 RX ORDER — GABAPENTIN 600 MG/1
1200 TABLET ORAL AT BEDTIME
Qty: 180 TABLET | Refills: 3 | COMMUNITY
Start: 2023-01-30 | End: 2023-01-31

## 2023-01-30 RX ORDER — ONDANSETRON 4 MG/1
4 TABLET, ORALLY DISINTEGRATING ORAL EVERY 8 HOURS PRN
Qty: 30 TABLET | Refills: 1 | COMMUNITY
Start: 2023-01-30 | End: 2023-03-08

## 2023-01-30 RX ORDER — SENNA AND DOCUSATE SODIUM 50; 8.6 MG/1; MG/1
1 TABLET, FILM COATED ORAL AT BEDTIME
Qty: 30 TABLET | Refills: 1 | COMMUNITY
Start: 2023-01-30 | End: 2023-02-24

## 2023-01-30 RX ORDER — GABAPENTIN 400 MG/1
400 CAPSULE ORAL 2 TIMES DAILY PRN
Qty: 180 CAPSULE | Refills: 3 | COMMUNITY
Start: 2023-01-30 | End: 2023-03-08

## 2023-01-30 RX ORDER — DOCUSATE SODIUM 100 MG/1
100 CAPSULE, LIQUID FILLED ORAL 2 TIMES DAILY
Qty: 60 CAPSULE | Refills: 0 | COMMUNITY
Start: 2023-01-30 | End: 2023-02-24

## 2023-01-30 RX ORDER — POLYETHYLENE GLYCOL 3350 17 G/17G
1 POWDER, FOR SOLUTION ORAL DAILY
Qty: 510 G | Refills: 1 | COMMUNITY
Start: 2023-01-30 | End: 2023-02-24

## 2023-01-30 RX ORDER — OXYCODONE HYDROCHLORIDE 5 MG/1
5 TABLET ORAL EVERY 8 HOURS
Qty: 30 TABLET | Refills: 0 | COMMUNITY
Start: 2023-01-30 | End: 2023-01-31

## 2023-01-30 RX ORDER — TRAZODONE HYDROCHLORIDE 50 MG/1
50 TABLET, FILM COATED ORAL AT BEDTIME
Qty: 90 TABLET | Refills: 1 | COMMUNITY
Start: 2023-01-30 | End: 2023-02-15

## 2023-01-31 ENCOUNTER — OFFICE VISIT (OUTPATIENT)
Dept: INTERNAL MEDICINE | Facility: OTHER | Age: 68
End: 2023-01-31
Attending: INTERNAL MEDICINE
Payer: MEDICARE

## 2023-01-31 ENCOUNTER — TELEPHONE (OUTPATIENT)
Dept: INTERNAL MEDICINE | Facility: OTHER | Age: 68
End: 2023-01-31
Payer: MEDICARE

## 2023-01-31 VITALS
SYSTOLIC BLOOD PRESSURE: 130 MMHG | HEART RATE: 75 BPM | OXYGEN SATURATION: 97 % | DIASTOLIC BLOOD PRESSURE: 72 MMHG | TEMPERATURE: 98.3 F | RESPIRATION RATE: 20 BRPM

## 2023-01-31 DIAGNOSIS — M35.3 POLYMYALGIA RHEUMATICA (H): ICD-10-CM

## 2023-01-31 DIAGNOSIS — Z09 HOSPITAL DISCHARGE FOLLOW-UP: Primary | ICD-10-CM

## 2023-01-31 DIAGNOSIS — M54.16 CHRONIC RADICULAR LOW BACK PAIN: ICD-10-CM

## 2023-01-31 DIAGNOSIS — D62 ACUTE POSTHEMORRHAGIC ANEMIA: Primary | ICD-10-CM

## 2023-01-31 DIAGNOSIS — G89.18 ACUTE POST-OPERATIVE PAIN: ICD-10-CM

## 2023-01-31 DIAGNOSIS — Z99.81 SUPPLEMENTAL OXYGEN DEPENDENT: ICD-10-CM

## 2023-01-31 DIAGNOSIS — D62 ACUTE BLOOD LOSS ANEMIA: ICD-10-CM

## 2023-01-31 DIAGNOSIS — D50.9 IRON DEFICIENCY ANEMIA, UNSPECIFIED IRON DEFICIENCY ANEMIA TYPE: ICD-10-CM

## 2023-01-31 DIAGNOSIS — I12.9 BENIGN HYPERTENSIVE RENAL DISEASE: ICD-10-CM

## 2023-01-31 DIAGNOSIS — E66.01 MORBID OBESITY (H): ICD-10-CM

## 2023-01-31 DIAGNOSIS — E87.5 HYPERKALEMIA: ICD-10-CM

## 2023-01-31 DIAGNOSIS — K59.03 CONSTIPATION DUE TO PAIN MEDICATION: ICD-10-CM

## 2023-01-31 DIAGNOSIS — G89.29 CHRONIC RADICULAR LOW BACK PAIN: ICD-10-CM

## 2023-01-31 PROCEDURE — 99496 TRANSJ CARE MGMT HIGH F2F 7D: CPT | Performed by: INTERNAL MEDICINE

## 2023-01-31 PROCEDURE — G0463 HOSPITAL OUTPT CLINIC VISIT: HCPCS | Performed by: INTERNAL MEDICINE

## 2023-01-31 PROCEDURE — G0463 HOSPITAL OUTPT CLINIC VISIT: HCPCS

## 2023-01-31 PROCEDURE — 99214 OFFICE O/P EST MOD 30 MIN: CPT | Performed by: INTERNAL MEDICINE

## 2023-01-31 RX ORDER — LORATADINE 10 MG/1
10 TABLET ORAL DAILY
COMMUNITY
End: 2023-04-05

## 2023-01-31 RX ORDER — HYDROMORPHONE HYDROCHLORIDE 2 MG/1
TABLET ORAL
COMMUNITY
Start: 2023-01-06 | End: 2023-02-02

## 2023-01-31 RX ORDER — LUBIPROSTONE 24 UG/1
24 CAPSULE ORAL 2 TIMES DAILY WITH MEALS
Qty: 60 CAPSULE | Refills: 1 | Status: ON HOLD | OUTPATIENT
Start: 2023-01-31 | End: 2023-02-06

## 2023-01-31 RX ORDER — LIDOCAINE 50 MG/G
OINTMENT TOPICAL DAILY PRN
COMMUNITY
End: 2023-10-06

## 2023-01-31 RX ORDER — PREDNISONE 1 MG/1
TABLET ORAL
Qty: 300 TABLET | Refills: 0 | Status: SHIPPED | OUTPATIENT
Start: 2023-01-31 | End: 2023-02-24

## 2023-01-31 RX ORDER — DIAZEPAM 5 MG
TABLET ORAL
Status: ON HOLD | COMMUNITY
Start: 2023-01-05 | End: 2023-02-07

## 2023-01-31 RX ORDER — OXYCODONE HYDROCHLORIDE 5 MG/1
5 TABLET ORAL EVERY 8 HOURS
Qty: 15 TABLET | Refills: 0 | Status: ON HOLD | OUTPATIENT
Start: 2023-01-31 | End: 2023-02-07

## 2023-01-31 ASSESSMENT — ENCOUNTER SYMPTOMS
BRUISES/BLEEDS EASILY: 1
HEADACHES: 0
FREQUENCY: 0
COUGH: 0
BACK PAIN: 1
CHILLS: 0
DYSURIA: 0
HEMATOCHEZIA: 0
DIZZINESS: 0
EYE PAIN: 0
FEVER: 0
NERVOUS/ANXIOUS: 0
ARTHRALGIAS: 0
SHORTNESS OF BREATH: 0
CONSTIPATION: 0
WEAKNESS: 1
JOINT SWELLING: 0
DIARRHEA: 0
PALPITATIONS: 0
PARESTHESIAS: 0
MYALGIAS: 1
SORE THROAT: 0
HEARTBURN: 0
HEMATURIA: 0
ABDOMINAL PAIN: 0
NAUSEA: 0

## 2023-01-31 ASSESSMENT — PAIN SCALES - GENERAL: PAINLEVEL: SEVERE PAIN (7)

## 2023-01-31 NOTE — TELEPHONE ENCOUNTER
Dr Matt Whitmore reviewed and completed the following home care or hospice form(s) for Portia on 01/31/2023   This covers the certification period effective 01/26/2023 to 03/26/2023.  Kevin Haynes on 1/31/2023 at 3:40 PM

## 2023-01-31 NOTE — PATIENT INSTRUCTIONS
1. Hospital discharge follow-up  2. Acute blood loss anemia  4. Iron deficiency anemia, unspecified iron deficiency anemia type    START:   - ferrous fumarate 65 mg, [place]. FE,-Vitamin C 125 mg (VITRON C)  MG TABS tablet; 1 tablet by mouth daily in the evening on empty stomach -for iron deficiency  Dispense: 90 tablet; Refill: 1    3. Supplemental oxygen dependent  - should continue to wean off.     5. Constipation due to pain medication    START one of these for constipation from opiate medications:  - naloxegol 25 MG TABS tablet; Take 1 tablet (25 mg) by mouth every morning (before breakfast) --- for opiate induced constipation - vs similar medication -- check cost/coverage  Dispense: 30 tablet; Refill: 1  - methylnaltrexone (RELISTOR) 150 MG TABS; Take 3 tablets (450 mg) by mouth daily --- for opiate induced constipation - vs similar medication -- check cost/coverage  Dispense: 90 tablet; Refill: 1  - lubiprostone (AMITIZA) 24 MCG capsule; Take 1 capsule (24 mcg) by mouth 2 times daily (with meals) --- for opiate induced constipation - vs similar medication -- check cost/coverage  Dispense: 60 capsule; Refill: 1    6. Chronic radicular low back pain  8. Acute post-operative pain  - oxyCODONE (ROXICODONE) 5 MG tablet; Take 1 tablet (5 mg) by mouth every 8 hours  Dispense: 15 tablet; Refill: 0    7. Polymyalgia rheumatica (H)  - predniSONE (DELTASONE) 1 MG tablet; 5mg daily x30 days, reduce 1 mg every 30 days. Combine with 5mg tabs to make total daily dose.  Dispense: 300 tablet; Refill: 0    Return in approximately 1-2 week(s), or sooner as needed for follow-up with Dr. Whitmore.   - Follow-up pain / constipation    Clinic : [phone]  Appointment line: [phone]

## 2023-01-31 NOTE — TELEPHONE ENCOUNTER
Called and spoke with patient's wife, Nati, after verifying consent to communicate. Relayed below message to her regarding Spironolactone and let her know that any existing scrotal pain that may still be present can be addressed during office visit today.    ENDER SMILEY RN on 1/31/2023 at 9:38 AM

## 2023-01-31 NOTE — NURSING NOTE
Chief Complaint   Patient presents with     Hospital F/U     Hematoma         Initial /72 (BP Location: Right arm, Patient Position: Sitting, Cuff Size: Adult Large)   Pulse 75   Temp 98.3  F (36.8  C) (Temporal)   Resp 20   SpO2 97%  Estimated body mass index is 42.74 kg/m  as calculated from the following:    Height as of 1/6/23: 1.829 m (6').    Weight as of 1/9/23: 142.9 kg (315 lb 1.6 oz).       FOOD SECURITY SCREENING QUESTIONS:    The next two questions are to help us understand your food security.  If you are feeling you need any assistance in this area, we have resources available to support you today.    Hunger Vital Signs:  Within the past 12 months we worried whether our food would run out before we got money to buy more. Never  Within the past 12 months the food we bought just didn't last and we didn't have money to get more. Never    Advance Care Directive on file? no      Medication reconciliation complete.      Kevin Haynes,on 1/31/2023 at 4:09 PM

## 2023-01-31 NOTE — PROGRESS NOTES
Assessment & Plan   Selvin An is a 67 year old accompanied by his spouse, presenting for the following health issues:      ICD-10-CM    1. Hospital discharge follow-up  Z09       2. Acute blood loss anemia  D62 ferrous fumarate 65 mg, [place]. FE,-Vitamin C 125 mg (VITRON C)  MG TABS tablet     CBC W PLT No Diff     Comprehensive Metabolic Panel      3. Supplemental oxygen dependent  Z99.81       4. Iron deficiency anemia, unspecified iron deficiency anemia type  D50.9 ferrous fumarate 65 mg, [place]. FE,-Vitamin C 125 mg (VITRON C)  MG TABS tablet      5. Constipation due to pain medication  K59.03 naloxegol 25 MG TABS tablet     methylnaltrexone (RELISTOR) 150 MG TABS     lubiprostone (AMITIZA) 24 MCG capsule      6. Chronic radicular low back pain  M54.16 oxyCODONE (ROXICODONE) 5 MG tablet    G89.29       7. Polymyalgia rheumatica (H)  M35.3 predniSONE (DELTASONE) 1 MG tablet      8. Acute post-operative pain  G89.18 oxyCODONE (ROXICODONE) 5 MG tablet      9. Hyperkalemia  E87.5 CBC W PLT No Diff     Comprehensive Metabolic Panel      Patient presents for hospital discharge follow-up appointment.  He ended up getting 5 units of blood transfusion.  4 PRBC units transfused at [facility], 1 unit [facility] in [place].  Hemoglobin at discharge was 8.8 on January 25.    He is having a lot of bruising and swelling.  His scrotum is quite sore.  States that he has lost a lot of sensation in his perineal area, scrotum.  He has a hard time telling if he can start urinating, if he started urinating, if he stopped urinating.  This is quite frustrating for him.  He is also having difficult time telling whether or not he needs to have a bowel movement.  He has having quite a bit of constipation issues with use of oxycodone.    -Discussed a few treatment options due to narcotic induced constipation.  Uncertain what medication will be covered or how much they will cost.  Naloxegol, Relistor and Amitiza prescription  sent to pharmacy.  Hopefully 1 of these are reasonably priced and covered.    Acute blood loss anemia.  Start oral iron supplement.  He did get 5 units of packed cells.  Hemoglobin was 8.8 at discharge from the hospital.  Consider repeat lab work in a couple weeks.    Supplemental oxygen dependent.  Using continuous oxygen at this time.  Continue to monitor.  They do a pulse ox at home.    Radicular low back pain with acute postoperative pain.  Tapering off of his 5 mg oxycodone dosing at this time.  Needs a small prescription to help with his opiate taper.    Polymyalgia rheumatica.  Ongoing.  Still using prednisone taper and hoping to continue to taper off of this.    Hyperkalemia, noted with lab work at hospital discharge.  Repeat lab work ordered for follow-up appointment.    Encouraged weight loss and regular exercise.     Return in about 2 weeks (around 2/14/2023) for - Follow-up pain / constipation.    Matt Whitmore MD  [facility] AND [facility] F/U (Hematoma)      HPI       Hospital Follow-up Visit:    Hospital/Nursing Home/IP Rehab Facility: [facility] 01/  Date of Admission: [facility] 01/09/2023 to  Date of Discharge: 01/25/2023  Reason(s) for Admission: post-operative hematoma - superior gluteal artery hemorrhage, s/p coil emoblization    Was your hospitalization related to COVID-19? No   Problems taking medications regularly:  None  Medication changes since discharge: yes - numerous.   Problems adhering to non-medication therapy:  None    Summary of hospitalization:  See outside records, reviewed and scanned  Diagnostic Tests/Treatments reviewed.  Follow up needed: Spine surgery, [facility] care  Other Healthcare Providers Involved in Patient s Care:         Homecare and Surgical follow-up appointment - 2/2/2023  Update since discharge: stable.  Additional issues: Still having a lot of left buttocks/thigh and scrotal pain.      Plan of care  communicated with patient and family     Review of Systems   Constitutional: Negative for chills and fever.   HENT: Positive for hearing loss. Negative for congestion, ear pain and sore throat.    Eyes: Negative for pain and visual disturbance.   Respiratory: Negative for cough and shortness of breath.    Cardiovascular: Negative for chest pain, palpitations and peripheral edema.   Gastrointestinal: Negative for abdominal pain, constipation, diarrhea, heartburn, hematochezia and nausea.   Genitourinary: Positive for impotence. Negative for dysuria, frequency, genital sores, hematuria, penile discharge and urgency.   Musculoskeletal: Positive for back pain (left buttocks/thigh pain) and myalgias. Negative for arthralgias and joint swelling.   Skin: Negative for rash.   Allergic/Immunologic: Positive for immunocompromised state.   Neurological: Positive for weakness. Negative for dizziness, headaches and paresthesias.   Hematological: Bruises/bleeds easily.   Psychiatric/Behavioral: Negative for mood changes. The patient is not nervous/anxious.           Objective    /72 (BP Location: Right arm, Patient Position: Sitting, Cuff Size: Adult Large)   Pulse 75   Temp 98.3  F (36.8  C) (Temporal)   Resp 20   SpO2 97%   There is no height or weight on file to calculate BMI.  Physical Exam  Constitutional:       General: He is not in acute distress.     Appearance: He is well-developed. He is obese. He is not diaphoretic.   HENT:      Head: Normocephalic and atraumatic.   Eyes:      General: No scleral icterus.     Conjunctiva/sclera: Conjunctivae normal.   Cardiovascular:      Rate and Rhythm: Normal rate and regular rhythm.      Pulses: Normal pulses.   Pulmonary:      Effort: Pulmonary effort is normal.      Breath sounds: Normal breath sounds.      Comments: + Wearing oxygen  Abdominal:      Palpations: Abdomen is soft.      Tenderness: There is no abdominal tenderness.   Musculoskeletal:         General:  Swelling present. No tenderness or deformity.      Cervical back: Neck supple.      Right lower leg: Edema present.      Left lower leg: Edema present.   Lymphadenopathy:      Cervical: No cervical adenopathy.   Skin:     General: Skin is warm and dry.      Findings: Bruising (Thigh, scrotum) present. No rash.   Neurological:      Mental Status: He is alert and oriented to person, place, and time. Mental status is at baseline.   Psychiatric:         Mood and Affect: Mood normal.         Behavior: Behavior normal.

## 2023-02-01 DIAGNOSIS — M53.3 SI (SACROILIAC) JOINT DYSFUNCTION: Primary | ICD-10-CM

## 2023-02-02 ENCOUNTER — TELEPHONE (OUTPATIENT)
Dept: INTERNAL MEDICINE | Facility: OTHER | Age: 68
End: 2023-02-02

## 2023-02-02 ENCOUNTER — OFFICE VISIT (OUTPATIENT)
Dept: ORTHOPEDICS | Facility: OTHER | Age: 68
End: 2023-02-02
Attending: STUDENT IN AN ORGANIZED HEALTH CARE EDUCATION/TRAINING PROGRAM
Payer: MEDICARE

## 2023-02-02 ENCOUNTER — HOSPITAL ENCOUNTER (OUTPATIENT)
Dept: GENERAL RADIOLOGY | Facility: OTHER | Age: 68
Discharge: HOME | End: 2023-02-02
Attending: STUDENT IN AN ORGANIZED HEALTH CARE EDUCATION/TRAINING PROGRAM
Payer: MEDICARE

## 2023-02-02 DIAGNOSIS — M53.3 SI (SACROILIAC) JOINT DYSFUNCTION: ICD-10-CM

## 2023-02-02 DIAGNOSIS — M53.3 SI (SACROILIAC) JOINT DYSFUNCTION: Primary | ICD-10-CM

## 2023-02-02 PROCEDURE — 72170 X-RAY EXAM OF PELVIS: CPT

## 2023-02-02 PROCEDURE — 99024 POSTOP FOLLOW-UP VISIT: CPT | Performed by: STUDENT IN AN ORGANIZED HEALTH CARE EDUCATION/TRAINING PROGRAM

## 2023-02-02 PROCEDURE — G0463 HOSPITAL OUTPT CLINIC VISIT: HCPCS

## 2023-02-02 NOTE — PROGRESS NOTES
Visit Date: 2023    HISTORY OF PRESENT ILLNESS:  Henry is a 67-year-old male who underwent a left-sided minimally invasive SI joint fusion surgery approximately 5 weeks ago.  He had a complicated course with some significant bleeding from the superior gluteal artery that was ultimately embolized.  He is in the early stages of recovery from that process and has been making some significant strides in terms of pain management.  He has recently begun his home physical therapy as well.    PHYSICAL EXAMINATION:    GENERAL:  Well-appearing, well-nourished.  MUSCULOSKELETAL/NEUROLOGIC:  He has 5/5 strength in bilateral hip flexion, knee extension, flexion, ankle dorsiflexion, plantarflexion, EHL.  He has a band-like sensation of numbness in his abdomen, but normal sensation in the lower extremities.    IMAGING:  Imaging available for review today includes an AP pelvis x-ray, which shows adequate positioning of his SI joint hardware and the expected embolization coils.    ASSESSMENT AND PLAN:  Henry is a 67-year-old male approximately 5 weeks out from a left-sided minimally invasive sacroiliac joint fusion with a jameel postoperative course.  He is going to continue with his physical therapy at this time and return to clinic in 1 month for routine evaluation.    This clinic visit took approximately 15-29 minutes.    Vic Armenta MD        D: 2023   T: 2023   MT: BRI    Name:     PITER ROTHMAN  MRN:      [id number]        Account:    [id number]   :      1955           Visit Date: 2023     Document: [id number]

## 2023-02-02 NOTE — TELEPHONE ENCOUNTER
Dr. Whitmore,     Patient stated today he was no longer taking Hydromorphone. Please update medication list.     Thanks!    Africa Oneill RN on 2/2/2023 at 3:39 PM

## 2023-02-02 NOTE — TELEPHONE ENCOUNTER
"Fax received from [id number] of [place], with message regarding:    methylnaltrexone (RELISTOR) 150 MG TABS 90 tablet 1 1/31/2023  --   Sig - Route: Take 3 tablets (450 mg) by mouth daily --- for opiate induced constipation - vs similar medication -- check cost/coverage - Oral     \"Non-formulary drug. Try Movantik or covered alternative.\"    Milagro Baez RN .............. 2/2/2023  9:01 AM        "

## 2023-02-02 NOTE — PROGRESS NOTES
Patient is here for follow up on his left SI joint fusion.  Priscila Scales LPN .....................2/2/2023 10:37 AM

## 2023-02-05 ENCOUNTER — APPOINTMENT (OUTPATIENT)
Dept: GENERAL RADIOLOGY | Facility: OTHER | Age: 68
End: 2023-02-05
Attending: PHYSICIAN ASSISTANT
Payer: MEDICARE

## 2023-02-05 ENCOUNTER — HOSPITAL ENCOUNTER (OUTPATIENT)
Facility: OTHER | Age: 68
Setting detail: OBSERVATION
Discharge: HOME HEALTH | End: 2023-02-07
Attending: PHYSICIAN ASSISTANT | Admitting: FAMILY MEDICINE
Payer: MEDICARE

## 2023-02-05 DIAGNOSIS — Z99.81 DEPENDENCE ON SUPPLEMENTAL OXYGEN: ICD-10-CM

## 2023-02-05 DIAGNOSIS — G89.18 POSTOPERATIVE PAIN: ICD-10-CM

## 2023-02-05 DIAGNOSIS — N50.819 TESTICLE PAIN: ICD-10-CM

## 2023-02-05 DIAGNOSIS — J96.21 ACUTE AND CHRONIC RESPIRATORY FAILURE WITH HYPOXIA (H): ICD-10-CM

## 2023-02-05 DIAGNOSIS — N50.819 PAIN IN TESTICLE, UNSPECIFIED LATERALITY: ICD-10-CM

## 2023-02-05 DIAGNOSIS — J18.9 PNEUMONIA: ICD-10-CM

## 2023-02-05 DIAGNOSIS — R53.1 GENERALIZED WEAKNESS: ICD-10-CM

## 2023-02-05 DIAGNOSIS — N50.82 SCROTAL PAIN: Primary | ICD-10-CM

## 2023-02-05 DIAGNOSIS — M96.840 POSTOPERATIVE HEMATOMA OF MUSCULOSKELETAL STRUCTURE FOLLOWING MUSCULOSKELETAL PROCEDURE: ICD-10-CM

## 2023-02-05 DIAGNOSIS — Z11.52 ENCOUNTER FOR SCREENING LABORATORY TESTING FOR COVID-19 VIRUS: ICD-10-CM

## 2023-02-05 LAB
ALBUMIN UR-MCNC: NEGATIVE MG/DL
ANION GAP SERPL CALCULATED.3IONS-SCNC: 8 MMOL/L (ref 7–15)
APPEARANCE UR: CLEAR
BASOPHILS # BLD AUTO: 0.2 10E3/UL (ref 0–0.2)
BASOPHILS NFR BLD AUTO: 1 %
BILIRUB UR QL STRIP: NEGATIVE
BUN SERPL-MCNC: 13.3 MG/DL (ref 8–23)
CALCIUM SERPL-MCNC: 9.6 MG/DL (ref 8.8–10.2)
CHLORIDE SERPL-SCNC: 101 MMOL/L (ref 98–107)
COLOR UR AUTO: YELLOW
CREAT SERPL-MCNC: 1.34 MG/DL (ref 0.67–1.17)
DEPRECATED HCO3 PLAS-SCNC: 30 MMOL/L (ref 22–29)
EOSINOPHIL # BLD AUTO: 0.9 10E3/UL (ref 0–0.7)
EOSINOPHIL NFR BLD AUTO: 7 %
ERYTHROCYTE [DISTWIDTH] IN BLOOD BY AUTOMATED COUNT: 18.7 % (ref 10–15)
FLUAV RNA SPEC QL NAA+PROBE: NEGATIVE
FLUBV RNA RESP QL NAA+PROBE: NEGATIVE
GFR SERPL CREATININE-BSD FRML MDRD: 58 ML/MIN/1.73M2
GLUCOSE SERPL-MCNC: 97 MG/DL (ref 70–99)
GLUCOSE UR STRIP-MCNC: NEGATIVE MG/DL
HCT VFR BLD AUTO: 35.8 % (ref 40–53)
HGB BLD-MCNC: 11.2 G/DL (ref 13.3–17.7)
HGB UR QL STRIP: NEGATIVE
HOLD SPECIMEN: NORMAL
IMM GRANULOCYTES # BLD: 0.1 10E3/UL
IMM GRANULOCYTES NFR BLD: 1 %
KETONES UR STRIP-MCNC: NEGATIVE MG/DL
LACTATE SERPL-SCNC: 1.1 MMOL/L (ref 0.7–2)
LEUKOCYTE ESTERASE UR QL STRIP: NEGATIVE
LYMPHOCYTES # BLD AUTO: 2.9 10E3/UL (ref 0.8–5.3)
LYMPHOCYTES NFR BLD AUTO: 24 %
MCH RBC QN AUTO: 31.1 PG (ref 26.5–33)
MCHC RBC AUTO-ENTMCNC: 31.3 G/DL (ref 31.5–36.5)
MCV RBC AUTO: 99 FL (ref 78–100)
MONOCYTES # BLD AUTO: 1 10E3/UL (ref 0–1.3)
MONOCYTES NFR BLD AUTO: 9 %
MUCOUS THREADS #/AREA URNS LPF: PRESENT /LPF
NEUTROPHILS # BLD AUTO: 6.9 10E3/UL (ref 1.6–8.3)
NEUTROPHILS NFR BLD AUTO: 58 %
NITRATE UR QL: NEGATIVE
NRBC # BLD AUTO: 0.1 10E3/UL
NRBC BLD AUTO-RTO: 0 /100
PH UR STRIP: 7 [PH] (ref 5–9)
PLATELET # BLD AUTO: 570 10E3/UL (ref 150–450)
POTASSIUM SERPL-SCNC: 3.5 MMOL/L (ref 3.4–5.3)
RBC # BLD AUTO: 3.6 10E6/UL (ref 4.4–5.9)
RBC URINE: 0 /HPF
RSV RNA SPEC NAA+PROBE: NEGATIVE
SARS-COV-2 RNA RESP QL NAA+PROBE: NEGATIVE
SODIUM SERPL-SCNC: 139 MMOL/L (ref 136–145)
SP GR UR STRIP: 1.02 (ref 1–1.03)
UROBILINOGEN UR STRIP-MCNC: NORMAL MG/DL
WBC # BLD AUTO: 11.9 10E3/UL (ref 4–11)
WBC URINE: <1 /HPF

## 2023-02-05 PROCEDURE — 258N000003 HC RX IP 258 OP 636: Performed by: PHYSICIAN ASSISTANT

## 2023-02-05 PROCEDURE — 96360 HYDRATION IV INFUSION INIT: CPT | Performed by: PHYSICIAN ASSISTANT

## 2023-02-05 PROCEDURE — 99284 EMERGENCY DEPT VISIT MOD MDM: CPT | Mod: CS | Performed by: PHYSICIAN ASSISTANT

## 2023-02-05 PROCEDURE — 87637 SARSCOV2&INF A&B&RSV AMP PRB: CPT | Performed by: PHYSICIAN ASSISTANT

## 2023-02-05 PROCEDURE — 99285 EMERGENCY DEPT VISIT HI MDM: CPT | Mod: 25,CS | Performed by: PHYSICIAN ASSISTANT

## 2023-02-05 PROCEDURE — 84145 PROCALCITONIN (PCT): CPT | Performed by: FAMILY MEDICINE

## 2023-02-05 PROCEDURE — 36415 COLL VENOUS BLD VENIPUNCTURE: CPT | Performed by: PHYSICIAN ASSISTANT

## 2023-02-05 PROCEDURE — 85025 COMPLETE CBC W/AUTO DIFF WBC: CPT | Performed by: PHYSICIAN ASSISTANT

## 2023-02-05 PROCEDURE — 81001 URINALYSIS AUTO W/SCOPE: CPT | Performed by: PHYSICIAN ASSISTANT

## 2023-02-05 PROCEDURE — C9803 HOPD COVID-19 SPEC COLLECT: HCPCS | Performed by: PHYSICIAN ASSISTANT

## 2023-02-05 PROCEDURE — 80048 BASIC METABOLIC PNL TOTAL CA: CPT | Performed by: PHYSICIAN ASSISTANT

## 2023-02-05 PROCEDURE — 250N000013 HC RX MED GY IP 250 OP 250 PS 637: Performed by: PHYSICIAN ASSISTANT

## 2023-02-05 PROCEDURE — 71045 X-RAY EXAM CHEST 1 VIEW: CPT | Mod: TC

## 2023-02-05 PROCEDURE — 83605 ASSAY OF LACTIC ACID: CPT | Performed by: PHYSICIAN ASSISTANT

## 2023-02-05 RX ORDER — PROCHLORPERAZINE 25 MG
12.5 SUPPOSITORY, RECTAL RECTAL EVERY 12 HOURS PRN
Status: DISCONTINUED | OUTPATIENT
Start: 2023-02-05 | End: 2023-02-07 | Stop reason: HOSPADM

## 2023-02-05 RX ORDER — AZITHROMYCIN 250 MG/1
500 TABLET, FILM COATED ORAL ONCE
Status: COMPLETED | OUTPATIENT
Start: 2023-02-05 | End: 2023-02-05

## 2023-02-05 RX ORDER — NALOXONE HYDROCHLORIDE 0.4 MG/ML
0.4 INJECTION, SOLUTION INTRAMUSCULAR; INTRAVENOUS; SUBCUTANEOUS
Status: DISCONTINUED | OUTPATIENT
Start: 2023-02-05 | End: 2023-02-07 | Stop reason: HOSPADM

## 2023-02-05 RX ORDER — ONDANSETRON 2 MG/ML
4 INJECTION INTRAMUSCULAR; INTRAVENOUS EVERY 6 HOURS PRN
Status: DISCONTINUED | OUTPATIENT
Start: 2023-02-05 | End: 2023-02-07 | Stop reason: HOSPADM

## 2023-02-05 RX ORDER — SODIUM CHLORIDE 9 MG/ML
INJECTION, SOLUTION INTRAVENOUS CONTINUOUS
Status: DISCONTINUED | OUTPATIENT
Start: 2023-02-05 | End: 2023-02-07 | Stop reason: HOSPADM

## 2023-02-05 RX ORDER — NALOXONE HYDROCHLORIDE 0.4 MG/ML
0.2 INJECTION, SOLUTION INTRAMUSCULAR; INTRAVENOUS; SUBCUTANEOUS
Status: DISCONTINUED | OUTPATIENT
Start: 2023-02-05 | End: 2023-02-07 | Stop reason: HOSPADM

## 2023-02-05 RX ORDER — ONDANSETRON 4 MG/1
4 TABLET, ORALLY DISINTEGRATING ORAL EVERY 6 HOURS PRN
Status: DISCONTINUED | OUTPATIENT
Start: 2023-02-05 | End: 2023-02-07 | Stop reason: HOSPADM

## 2023-02-05 RX ORDER — PROCHLORPERAZINE MALEATE 5 MG
5 TABLET ORAL EVERY 6 HOURS PRN
Status: DISCONTINUED | OUTPATIENT
Start: 2023-02-05 | End: 2023-02-07 | Stop reason: HOSPADM

## 2023-02-05 RX ORDER — HYDROMORPHONE HCL IN WATER/PF 6 MG/30 ML
0.2 PATIENT CONTROLLED ANALGESIA SYRINGE INTRAVENOUS
Status: DISCONTINUED | OUTPATIENT
Start: 2023-02-05 | End: 2023-02-07 | Stop reason: HOSPADM

## 2023-02-05 RX ADMIN — SODIUM CHLORIDE: 9 INJECTION, SOLUTION INTRAVENOUS at 23:28

## 2023-02-05 RX ADMIN — AZITHROMYCIN MONOHYDRATE 500 MG: 250 TABLET ORAL at 23:27

## 2023-02-05 ASSESSMENT — ACTIVITIES OF DAILY LIVING (ADL)
ADLS_ACUITY_SCORE: 35
ADLS_ACUITY_SCORE: 35

## 2023-02-06 ENCOUNTER — APPOINTMENT (OUTPATIENT)
Dept: ULTRASOUND IMAGING | Facility: OTHER | Age: 68
End: 2023-02-06
Attending: FAMILY MEDICINE
Payer: MEDICARE

## 2023-02-06 ENCOUNTER — APPOINTMENT (OUTPATIENT)
Dept: PHYSICAL THERAPY | Facility: OTHER | Age: 68
End: 2023-02-06
Attending: FAMILY MEDICINE
Payer: MEDICARE

## 2023-02-06 ENCOUNTER — APPOINTMENT (OUTPATIENT)
Dept: OCCUPATIONAL THERAPY | Facility: OTHER | Age: 68
End: 2023-02-06
Attending: FAMILY MEDICINE
Payer: MEDICARE

## 2023-02-06 PROBLEM — N50.82 SCROTAL PAIN: Status: ACTIVE | Noted: 2023-02-06

## 2023-02-06 PROBLEM — M62.81 GENERALIZED MUSCLE WEAKNESS: Status: ACTIVE | Noted: 2023-02-06

## 2023-02-06 PROBLEM — K59.03 DRUG-INDUCED CONSTIPATION: Status: ACTIVE | Noted: 2023-02-06

## 2023-02-06 PROBLEM — J96.21 ACUTE AND CHRONIC RESPIRATORY FAILURE WITH HYPOXIA (H): Status: ACTIVE | Noted: 2023-02-06

## 2023-02-06 PROBLEM — G89.18 POSTOPERATIVE PAIN: Status: ACTIVE | Noted: 2023-02-06

## 2023-02-06 LAB — PROCALCITONIN SERPL IA-MCNC: 0.06 NG/ML

## 2023-02-06 PROCEDURE — 250N000011 HC RX IP 250 OP 636: Performed by: PHYSICIAN ASSISTANT

## 2023-02-06 PROCEDURE — 96376 TX/PRO/DX INJ SAME DRUG ADON: CPT

## 2023-02-06 PROCEDURE — G0378 HOSPITAL OBSERVATION PER HR: HCPCS

## 2023-02-06 PROCEDURE — 97116 GAIT TRAINING THERAPY: CPT | Mod: GP

## 2023-02-06 PROCEDURE — 94660 CPAP INITIATION&MGMT: CPT

## 2023-02-06 PROCEDURE — 250N000013 HC RX MED GY IP 250 OP 250 PS 637: Performed by: FAMILY MEDICINE

## 2023-02-06 PROCEDURE — 96375 TX/PRO/DX INJ NEW DRUG ADDON: CPT

## 2023-02-06 PROCEDURE — 76870 US EXAM SCROTUM: CPT

## 2023-02-06 PROCEDURE — 96374 THER/PROPH/DIAG INJ IV PUSH: CPT

## 2023-02-06 PROCEDURE — 99223 1ST HOSP IP/OBS HIGH 75: CPT | Performed by: FAMILY MEDICINE

## 2023-02-06 PROCEDURE — 999N000157 HC STATISTIC RCP TIME EA 10 MIN

## 2023-02-06 PROCEDURE — 999N000104 HC STATISTIC NO CHARGE

## 2023-02-06 PROCEDURE — 250N000011 HC RX IP 250 OP 636: Performed by: FAMILY MEDICINE

## 2023-02-06 PROCEDURE — 250N000012 HC RX MED GY IP 250 OP 636 PS 637: Performed by: FAMILY MEDICINE

## 2023-02-06 PROCEDURE — 97161 PT EVAL LOW COMPLEX 20 MIN: CPT | Mod: GP

## 2023-02-06 PROCEDURE — 258N000003 HC RX IP 258 OP 636: Performed by: FAMILY MEDICINE

## 2023-02-06 RX ORDER — PREDNISONE 5 MG/1
5 TABLET ORAL DAILY
Status: DISCONTINUED | OUTPATIENT
Start: 2023-02-06 | End: 2023-02-07 | Stop reason: HOSPADM

## 2023-02-06 RX ORDER — DOCUSATE SODIUM 100 MG/1
100 CAPSULE, LIQUID FILLED ORAL 2 TIMES DAILY
Status: DISCONTINUED | OUTPATIENT
Start: 2023-02-06 | End: 2023-02-07 | Stop reason: HOSPADM

## 2023-02-06 RX ORDER — ALLOPURINOL 300 MG/1
300 TABLET ORAL DAILY
Status: DISCONTINUED | OUTPATIENT
Start: 2023-02-06 | End: 2023-02-07 | Stop reason: HOSPADM

## 2023-02-06 RX ORDER — OXYCODONE HYDROCHLORIDE 5 MG/1
5 TABLET ORAL EVERY 4 HOURS PRN
Status: DISCONTINUED | OUTPATIENT
Start: 2023-02-06 | End: 2023-02-07 | Stop reason: HOSPADM

## 2023-02-06 RX ORDER — LEVOFLOXACIN 500 MG/1
500 TABLET, FILM COATED ORAL DAILY
Status: DISCONTINUED | OUTPATIENT
Start: 2023-02-06 | End: 2023-02-07 | Stop reason: HOSPADM

## 2023-02-06 RX ORDER — LORATADINE 10 MG/1
10 TABLET ORAL DAILY
Status: DISCONTINUED | OUTPATIENT
Start: 2023-02-06 | End: 2023-02-07 | Stop reason: HOSPADM

## 2023-02-06 RX ORDER — GABAPENTIN 400 MG/1
800 CAPSULE ORAL 2 TIMES DAILY
Status: DISCONTINUED | OUTPATIENT
Start: 2023-02-06 | End: 2023-02-06 | Stop reason: DRUGHIGH

## 2023-02-06 RX ORDER — POLYETHYLENE GLYCOL 3350 17 G/17G
17 POWDER, FOR SOLUTION ORAL DAILY
Status: DISCONTINUED | OUTPATIENT
Start: 2023-02-06 | End: 2023-02-07 | Stop reason: HOSPADM

## 2023-02-06 RX ORDER — AMOXICILLIN 250 MG
1 CAPSULE ORAL AT BEDTIME
Status: DISCONTINUED | OUTPATIENT
Start: 2023-02-06 | End: 2023-02-07 | Stop reason: HOSPADM

## 2023-02-06 RX ORDER — CYCLOBENZAPRINE HCL 10 MG
10 TABLET ORAL 3 TIMES DAILY PRN
Status: DISCONTINUED | OUTPATIENT
Start: 2023-02-06 | End: 2023-02-07 | Stop reason: HOSPADM

## 2023-02-06 RX ORDER — FAMOTIDINE 20 MG/1
20 TABLET, FILM COATED ORAL 2 TIMES DAILY
Status: DISCONTINUED | OUTPATIENT
Start: 2023-02-06 | End: 2023-02-07 | Stop reason: HOSPADM

## 2023-02-06 RX ORDER — CARVEDILOL 6.25 MG/1
6.25 TABLET ORAL 2 TIMES DAILY WITH MEALS
Status: DISCONTINUED | OUTPATIENT
Start: 2023-02-06 | End: 2023-02-07 | Stop reason: HOSPADM

## 2023-02-06 RX ORDER — FERROUS SULFATE 325(65) MG
325 TABLET, DELAYED RELEASE (ENTERIC COATED) ORAL DAILY
Status: DISCONTINUED | OUTPATIENT
Start: 2023-02-06 | End: 2023-02-07 | Stop reason: HOSPADM

## 2023-02-06 RX ORDER — DIAZEPAM 5 MG
5 TABLET ORAL EVERY 8 HOURS PRN
Status: DISCONTINUED | OUTPATIENT
Start: 2023-02-06 | End: 2023-02-07 | Stop reason: HOSPADM

## 2023-02-06 RX ORDER — LIDOCAINE 40 MG/G
CREAM TOPICAL
Status: DISCONTINUED | OUTPATIENT
Start: 2023-02-06 | End: 2023-02-07 | Stop reason: HOSPADM

## 2023-02-06 RX ORDER — LEVOTHYROXINE SODIUM 100 UG/1
200 TABLET ORAL DAILY
Status: DISCONTINUED | OUTPATIENT
Start: 2023-02-06 | End: 2023-02-07 | Stop reason: HOSPADM

## 2023-02-06 RX ORDER — CARVEDILOL 6.25 MG/1
6.25 TABLET ORAL 2 TIMES DAILY WITH MEALS
COMMUNITY
End: 2023-10-23

## 2023-02-06 RX ORDER — TRAZODONE HYDROCHLORIDE 50 MG/1
50 TABLET, FILM COATED ORAL AT BEDTIME
Status: DISCONTINUED | OUTPATIENT
Start: 2023-02-06 | End: 2023-02-07 | Stop reason: HOSPADM

## 2023-02-06 RX ORDER — LIDOCAINE/PRILOCAINE 2.5 %-2.5%
CREAM (GRAM) TOPICAL
Status: DISCONTINUED | OUTPATIENT
Start: 2023-02-06 | End: 2023-02-06

## 2023-02-06 RX ADMIN — SODIUM CHLORIDE: 9 INJECTION, SOLUTION INTRAVENOUS at 20:32

## 2023-02-06 RX ADMIN — HYDROMORPHONE HYDROCHLORIDE 0.2 MG: 0.2 INJECTION, SOLUTION INTRAMUSCULAR; INTRAVENOUS; SUBCUTANEOUS at 03:36

## 2023-02-06 RX ADMIN — CARVEDILOL 6.25 MG: 6.25 TABLET, FILM COATED ORAL at 18:53

## 2023-02-06 RX ADMIN — OXYCODONE HYDROCHLORIDE 5 MG: 5 TABLET ORAL at 21:50

## 2023-02-06 RX ADMIN — TRAZODONE HYDROCHLORIDE 50 MG: 50 TABLET ORAL at 21:49

## 2023-02-06 RX ADMIN — ONDANSETRON 4 MG: 4 TABLET, ORALLY DISINTEGRATING ORAL at 01:00

## 2023-02-06 RX ADMIN — FERROUS SULFATE TAB EC 325 MG (65 MG FE EQUIVALENT) 325 MG: 325 (65 FE) TABLET DELAYED RESPONSE at 09:44

## 2023-02-06 RX ADMIN — PROCHLORPERAZINE EDISYLATE 5 MG: 5 INJECTION, SOLUTION INTRAMUSCULAR; INTRAVENOUS at 09:34

## 2023-02-06 RX ADMIN — FAMOTIDINE 20 MG: 20 TABLET ORAL at 21:49

## 2023-02-06 RX ADMIN — PROCHLORPERAZINE EDISYLATE 5 MG: 5 INJECTION, SOLUTION INTRAMUSCULAR; INTRAVENOUS at 23:17

## 2023-02-06 RX ADMIN — LEVOTHYROXINE SODIUM 200 MCG: 0.1 TABLET ORAL at 09:33

## 2023-02-06 RX ADMIN — DOCUSATE SODIUM 100 MG: 100 CAPSULE, LIQUID FILLED ORAL at 09:33

## 2023-02-06 RX ADMIN — HYDROMORPHONE HYDROCHLORIDE 0.2 MG: 0.2 INJECTION, SOLUTION INTRAMUSCULAR; INTRAVENOUS; SUBCUTANEOUS at 01:01

## 2023-02-06 RX ADMIN — LEVOFLOXACIN 500 MG: 500 TABLET, FILM COATED ORAL at 14:14

## 2023-02-06 RX ADMIN — SENNOSIDES AND DOCUSATE SODIUM 1 TABLET: 50; 8.6 TABLET ORAL at 21:50

## 2023-02-06 RX ADMIN — POLYETHYLENE GLYCOL 3350 17 G: 17 POWDER, FOR SOLUTION ORAL at 09:33

## 2023-02-06 RX ADMIN — ALLOPURINOL 300 MG: 300 TABLET ORAL at 09:33

## 2023-02-06 RX ADMIN — OXYCODONE HYDROCHLORIDE 5 MG: 5 TABLET ORAL at 09:31

## 2023-02-06 RX ADMIN — PROCHLORPERAZINE EDISYLATE 5 MG: 5 INJECTION, SOLUTION INTRAMUSCULAR; INTRAVENOUS at 16:53

## 2023-02-06 RX ADMIN — SODIUM CHLORIDE: 9 INJECTION, SOLUTION INTRAVENOUS at 09:42

## 2023-02-06 RX ADMIN — DOCUSATE SODIUM 100 MG: 100 CAPSULE, LIQUID FILLED ORAL at 21:50

## 2023-02-06 RX ADMIN — CARVEDILOL 6.25 MG: 6.25 TABLET, FILM COATED ORAL at 09:33

## 2023-02-06 RX ADMIN — PREDNISONE 5 MG: 5 TABLET ORAL at 09:32

## 2023-02-06 RX ADMIN — OXYCODONE HYDROCHLORIDE 5 MG: 5 TABLET ORAL at 16:54

## 2023-02-06 RX ADMIN — FAMOTIDINE 20 MG: 20 TABLET ORAL at 09:32

## 2023-02-06 RX ADMIN — LORATADINE 10 MG: 10 TABLET ORAL at 09:32

## 2023-02-06 ASSESSMENT — ACTIVITIES OF DAILY LIVING (ADL)
ADLS_ACUITY_SCORE: 35
ADLS_ACUITY_SCORE: 39
ADLS_ACUITY_SCORE: 35
ADLS_ACUITY_SCORE: 35
ADLS_ACUITY_SCORE: 33
ADLS_ACUITY_SCORE: 35
ADLS_ACUITY_SCORE: 39

## 2023-02-06 ASSESSMENT — ENCOUNTER SYMPTOMS
SHORTNESS OF BREATH: 0
ABDOMINAL PAIN: 0
FEVER: 0
VOMITING: 0
DYSURIA: 0
FATIGUE: 1
NAUSEA: 0
CONFUSION: 0

## 2023-02-06 NOTE — ASSESSMENT & PLAN NOTE
Presenting from home with increased weakness,  Recovering from prolonged hospitalizations with work-up in ER suggesting possible pneumonia  At this point not clear if there is infection present or not although he is at risk due to daily steroid use and history of asplenia.  Reassuringly has a low procalcitonin  US showing left sided epididymitis, treated with oral levaquin, improved sxs today, wanting to go home  Home today, restart HHC with [facility] with nursing, PT/OT

## 2023-02-06 NOTE — PHARMACY-ADMISSION MEDICATION HISTORY
"Pharmacy -- Admission Medication Reconciliation    Prior to admission (PTA) medications were reviewed and the patient's PTA medication list was updated.    Sources Consulted: chart review, sure scripts, care everywhere, patient interview (wife present)    The reliability of this Medication Reconciliation is: Reliability: Reliable    The following significant changes were made:    Updated:    Lidocaine cream to PRN    Removed:    Lubiprostone (too expensive)    Methylnaltrexone (too expensive)    Duplicate prednisone entries: now at 5 mg daily     Added:    Naloxone nasal spray     Carvedilol: stopped at last office visit with Dr. Whitmore- per patient's wife, they did not know about this change or why it happened so she has still been putting carvedilol in Henry's medication box.     Note:    Cyclobenzaprine: has been weaning off of. Last took regularly over a week ago.     Diazepam: stopped about a week ago. Did try again a few nights ago when pain was bad.     Gabapentin: before surgery was using 100 BID for lyme's disease pain per patient's wife. On 1/23/23 was increased to 800 mg BID and 1200 mg at bedtime but they did not start that dosing as they were trying to wean off of pain medications. Left on med list as 800 mg BID as Dr. Whitmore wrote it in last office visit. Marked as not taking.     Oxycodone: trying to wean off of; last had about a week ago.     Rosuvastatin: taking 5 mg daily not 5-10 mg. Marked as taking differently.     Spironolactone: on hold since surgery. Was not told to restart to have continued to hold.     Trazodone: patient commented that the \"baby\" dose did nothing for his sleep.     Vitron C: new prescription; have not started yet.     In addition, the patient's allergies were reviewed with the patient and updated as follows:   Allergies: Losartan, Omeprazole, Pantoprazole, and Valsartan    The pharmacist has reviewed with the patient that all personal medications should be removed from " the building or locked in the belongings safe.  Patient shall only take medications ordered by the physician and administered by the nursing staff.     Medication barriers identified: trying to wean off of pain medicines; constipation is an issue   Medication adherence concerns: none noted    Understanding of emergency medications: has naloxone PRN    Medication Affordability:  Yes; stated [employer] helps with [employer]     Estrella Wallace [facility], 2/6/2023,  9:03 AM

## 2023-02-06 NOTE — ASSESSMENT & PLAN NOTE
Increasing pain in the scrotum over the past few days culminating in coming to the ER last evening  Generalized mild redness to the scrotum, both testicles sore but not obviously swollen  US demonstrated left sided epididymitis  Treated with oral levaquin, improved pain and swelling overnite  Home today on oral levaquin

## 2023-02-06 NOTE — PROGRESS NOTES
Incentive Spirometry education completed.  Pt goal 3150 mls.  Pt achieved [id number] mls.  Pt instructed to perform 10/hr while awake with at least one deep breath and cough per hour until able to perform baseline activity.  RT will follow and re-assess as need.      Sophia Pierre, RT on 2/6/2023 at 1:37 PM

## 2023-02-06 NOTE — PROGRESS NOTES
02/06/23 1400   Appointment Info   Signing Clinician's Name / Credentials (OT) Holland Russell, OTS; Milagro Vizcarra, OTR/L   Living Environment   People in Home spouse   Current Living Arrangements house   Home Accessibility no concerns   Transportation Anticipated car, drives self;family or friend will provide   Self-Care   Usual Activity Tolerance moderate   Current Activity Tolerance poor   Equipment Currently Used at Home none   Fall history within last six months no   General Information   Referring Physician Chetan Farrell MD   Cognitive Status Examination   Orientation Status orientation to person, place and time   Follows Commands WFL   Pain Assessment   Patient Currently in Pain   (Yes, pt reported that he had some pain)   Transfers   Transfers sit-stand transfer   Sit-Stand Transfer   Sit-Stand [place] (Transfers) contact guard   Assistive Device (Sit-Stand Transfers) walker, front-wheeled   Clinical Impression   Criteria for Skilled Therapeutic Interventions Met (OT) Yes, treatment indicated   OT Problem List-Impairments impacting ADL mobility;strength   Assessment of Occupational Performance 1-3 Performance Deficits   Identified Performance Deficits Mobility and self cares   Planned Therapy Interventions (OT) ADL retraining;strengthening   Clinical Decision Making Complexity (OT) low complexity   Risk & Benefits of therapy have been explained risks/benefits reviewed   OT Total Evaluation Time   OT Eval, Low Complexity Minutes (13219) 15   Interventions   Interventions Quick Adds Self-Care/Home Management;Therapeutic Activity   Self-Care/Home Management   Self-Care/Home Mgmt/ADL, Compensatory, Meal Prep Minutes (83312) 10   [place] Level (Grooming Training) moderate assist (50% patient effort)  (for shampoo cap and brushing hair)   Assistance (Grooming Training) set-up required;1 person assist   Therapeutic Activities   Therapeutic Activity Minutes (13002) 15   Symptoms noted during/after treatment  fatigue;shortness of breath   Treatment Detail/Skilled Intervention Pt stood up from EOB with CGA. Pt ambulated in the hallway with CGA and FWW. Pt's O2 levels were checked before and after walk, pt was 96% after walk.. Pt was on 3 L of oxygen.   OT Discharge Planning   OT Plan Continue OT while hospitalized.   OT Discharge Recommendation (DC Rec) home with home care occupational therapy   OT Rationale for DC Rec Pt would benefit from continued therapies to maximize independence for functional mobility and ADLs to return to baseline.   OT Brief overview of current status Pt was very pleasant during evaluation. Pt reported that he had some pain. He ambulated in room and hallway with CGA and FWW while on 3 L of O2. O2 was checked before and after walk. Pt did some grooming tasks but ADLs and self-cares will be assessed further. O2 was turned down to 2 liters after session.   Total Session Time   Timed Code Treatment Minutes 25   Total Session Time (sum of timed and untimed services) 40

## 2023-02-06 NOTE — H&P
[facility]    History and Physical - Hospitalist Service       Date of Admission:  2/5/2023    Assessment & Plan      Selvin An is a 67 year old male admitted on 2/5/2023. He presented to the ER with concerns of increasing weakness, scrotal pain.  He has a somewhat complicated history with initial SI joint surgery on January 3 followed by complications with a nicked artery leading to a left buttock hematoma.  He has been home since discharge from [facility] the past week, followed by home care but is noted to be increasingly weak and now is having pain in his scrotum.  Evaluation in the ER showing mildly elevated white count 11,000 with normal lactate, and other labs and imaging not showing obvious signs of infectious process.  Procalcitonin is mildly elevated at 0.06.  Concern is weakness and really not able to care for himself at home.  Plan at this point is to monitor, fluids ordered, will order PT/OT evaluation.  Because of the scrotal pain will order ultrasound for evaluation.  We will hold further antibiotics although he is at risk for infection due to history of asplenia and taking ongoing steroids.  At this time we will continue observation status but if infectious etiology sorted out, may need inpatient treatment.    Generalized muscle weakness  Presenting from home with increased weakness,  Recovering from prolonged hospitalizations with work-up in ER suggesting possible pneumonia  At this point not clear if there is infection present or not although he is at risk due to daily steroid use and history of asplenia.  Reassuringly has a low procalcitonin  Continue work-up plan for ultrasound of the scrotum.  Hold further antibiotics at this point, follow clinically    Scrotal pain  Increasing pain in the scrotum over the past few days culminating in coming to the ER last evening  Generalized mild redness to the scrotum, both testicles sore but not obviously swollen  Plan is for  ultrasound today to evaluate    Acute and chronic respiratory failure with hypoxia (H)  Prior to surgery was not using oxygen, does have KYLAH using CPAP  Since surgery on 3 L, overnight increasing oxygen needs  Chest x-ray showing atelectasis, question right lower lobe infiltrate without clear signs of infectious etiology monitor, incentive spirometry ordered, RT to evaluate      Asplenia  History of surgery in the past  Concern in regards to possible infectious etiology    Benign essential hypertension  Longstanding history, currently on Aldactone, states he is also taking Coreg  Pharmacy to investigate, order correct meds    Drug-induced constipation  Worsening issues since surgery, has not responded to some of the newer medications  Encourage fluid intake, MiraLAX, senna ordered    KYLAH on CPAP -- Uses nightly, finds helpful and beneficial  Stable using home CPAP  Continue    Postoperative pain  Ongoing pain issues since his sacroiliac surgery with complications  Continue pain management, wean narcotics as able    History of thyroid cancer  Stable on home dosing Synthroid  Continue home dosing    Personal history of malignant neoplasm of bladder -- s/p TURBT x2 in 3/2018 and 4/2018 for high-grade non-muscle invasive bladder cancer, now s/p BCG treatment - last cystoscopy 1/20/2021 was normal  No current issues, having some bladder issues with voiding with some postoperative numbness  Monitor    Postoperative hypothyroidism  Stable on daily Synthroid  No change         Diet: Regular Diet Adult    DVT Prophylaxis: Obs status  Cohn Catheter: Not present  Lines: None     Cardiac Monitoring: None  Code Status: Full Code      Clinically Significant Risk Factors Present on Admission                    # Non-Invasive mechanical ventilation: current O2 Device: BiPAP/CPAP  # Acute hypoxic respiratory failure: continue supplemental O2 as needed     # Severe Obesity: Estimated body mass index is 40.48 kg/m  as calculated from  the following:    Height as of this encounter: 1.829 m (6').    Weight as of this encounter: 135.4 kg (298 lb 8 oz).           Disposition Plan      Expected Discharge Date: 02/06/2023                  Deejay Farrell MD  Hospitalist Service  [facility]  Securely message with [employer] (more info)  Text page via [facility] Paging/Directory     ______________________________________________________________________    Chief Complaint   Comes from home with increased weakness    History is obtained from the patient, electronic health record, emergency department physician and patient's spouse    History of Present Illness   Selvin An is a 67 year old male who presents from home with wife with concerns of increased weakness.  Patient has a complicated history with SI joint surgery on January 3 which was complicated by a small artery being nicked.  He was observed at [facility], sent home but then presented again with increasing pain in the left buttock and noted to have a developing hematoma.  This was drained here by Dr. Mccall, but because of probable continued bleeding was sent back to [facility] where imaging demonstrated further bleeding and he had a coil embolization of one of the arteries.  He was in the hospital for little over a weeks, went home about a week and a half ago being followed by home care.  At home he is now complaining of increasing pain in the scrotal area with some redness noted last evening and brought to the ER for that reason.  Feels that he is just become more weak.  He has been eating okay having some nausea but no vomiting.  He has had issues with constipation due to the pain medications trying to work on this with his last stools yesterday.  He denies urinary urinary frequency or burning.  He has history of KYLAH on CPAP but since surgery is required home oxygen and that actually has gotten little bit worse over the past day or so.  He denies cough or complaints of  shortness of breath.  Is been no chest pain.  This has vague abdominal discomfort and almost more of a numbness and that it makes it feel difficult to sense when he needs to go for urination or defecation.  He denies any increase pain in his legs.  The scrotum has been more sore with no previous history of problems.      Past Medical History    Past Medical History:   Diagnosis Date     Bell's palsy 3/30/2017     Bilateral carpal tunnel syndrome     No Comments Provided     Essential (primary) hypertension     No Comments Provided     Gout 6/3/2013     Hyperlipidemia     No Comments Provided     Hypothyroidism     No Comments Provided     Malignant neoplasm of thyroid gland (H)     1994     Sleep apnea     No Comments Provided       Past Surgical History   Past Surgical History:   Procedure Laterality Date     COLONOSCOPY  09/09/2014 9/2014,Normal - follow up 10 years     CV CORONARY ANGIOGRAM N/A 10/31/2022    Procedure: Coronary Angiogram;  Surgeon: Ender Michaels MD;  Location:  [facility]     CYSTOSCOPY      bladder cancer     INCISION AND DRAINAGE LOWER EXTREMITY, COMBINED Left 1/7/2023    Procedure: INCISION AND DRAINAGE, LEFT BUTTOCK (Drain Hematoma of Left Buttock);  Surgeon: Ronald Mccall MD;  Location:  OR     OTHER SURGICAL HISTORY      ,SPLENECTOMY     OTHER SURGICAL HISTORY      2010,13880,EYE MUSCLE SURGERY,Left,strabismus     OTHER SURGICAL HISTORY      10/06/14,[id number],OTHER,Left,Dr. Lana LEHMAN     OTHER SURGICAL HISTORY      10/14/14,[id number],OTHER,Left,Dr. Lana LEHMAN     OTHER SURGICAL HISTORY      11/16/2017,[id number],ENDOSCOPIC RELEASE TRANSVERSE CARPAL LIGAMENT OF HAND,Left     RELEASE CARPAL TUNNEL      12/2011     RETINAL DETACHMENT SURGERY Right      STRABISMUS SURGERY Left      THYROIDECTOMY      1994,thyroid ca     TONSILLECTOMY      2004,T & A > 13yo     VASECTOMY      No Comments Provided       Prior to Admission Medications   Prior to Admission  Medications   Prescriptions Last Dose Informant Patient Reported? Taking?   B Complex-C (VITAMIN B COMPLEX W/VITAMIN C) TABS tablet   Yes No   Sig: Take 1 tablet by mouth daily   Cholecalciferol (VITAMIN D-3) 125 MCG (5000 UT) TABS   No No   Sig: Take 5,000 Units by mouth daily   Cyanocobalamin 2500 MCG TABS   No No   Sig: Take 2,500 mcg by mouth daily -- or every other day -- OTC okay   Melatonin 10 MG TABS tablet   Yes No   Sig: Take 20 mg by mouth At Bedtime   Multiple Vitamins-Minerals (CENTRUM ADULTS PO)   Yes No   Sig: Take 1 tablet by mouth daily   Probiotic Product (PROBIOTIC-10 PO)   Yes No   Sig: Take 1 tablet by mouth daily   SENNA-docusate sodium (SENNA S) 8.6-50 MG tablet   Yes No   Sig: Take 1 tablet by mouth At Bedtime   allopurinol (ZYLOPRIM) 300 MG tablet   No No   Sig: Take 1 tablet (300 mg) by mouth daily - for gout prevention   cyclobenzaprine (FLEXERIL) 10 MG tablet   Yes No   Sig: Take 1 tablet (10 mg) by mouth 3 times daily as needed for muscle spasms   diazepam (VALIUM) 5 MG tablet   Yes No   Sig: TAKE 1 TABLET BY MOUTH EVERY EIGHT HOURS AS NEEDED FOR PAIN   docusate sodium (COLACE) 100 MG capsule   Yes No   Sig: Take 1 capsule (100 mg) by mouth 2 times daily   famotidine (PEPCID) 20 MG tablet   Yes No   Sig: Take 1 tablet (20 mg) by mouth 2 times daily   fenofibrate (TRICOR) 145 MG tablet   No No   Sig: Take 1 tablet (145 mg) by mouth daily   ferrous fumarate 65 mg, [place]. FE,-Vitamin C 125 mg (VITRON C)  MG TABS tablet   No No   Si tablet by mouth daily in the evening on empty stomach -for iron deficiency   gabapentin (NEURONTIN) 400 MG capsule   Yes No   Sig: Take 2 capsules (800 mg) by mouth 2 times daily   levothyroxine (SYNTHROID/LEVOTHROID) 200 MCG tablet   No No   Sig: Take 1 tablet (200 mcg) by mouth daily   lidocaine (LIDOCAINE NATAN) 5 % external ointment   Yes No   Sig: Apply topically daily   loratadine (CLARITIN) 10 MG tablet   Yes No   Sig: Take 10 mg by mouth daily    lubiprostone (AMITIZA) 24 MCG capsule   No No   Sig: Take 1 capsule (24 mcg) by mouth 2 times daily (with meals) --- for opiate induced constipation - vs similar medication -- check cost/coverage   magnesium 250 MG tablet   Yes No   Sig: Take 1 tablet by mouth daily   methylnaltrexone (RELISTOR) 150 MG TABS   No No   Sig: Take 3 tablets (450 mg) by mouth daily --- for opiate induced constipation - vs similar medication -- check cost/coverage   ondansetron (ZOFRAN ODT) 4 MG ODT tab   Yes No   Sig: Take 1 tablet (4 mg) by mouth every 8 hours as needed for nausea   order for DME   Yes No   Sig: CPAP machine for home use at pressure: 10, with supplies:  humidifier, chamber, mask, tubing   oxyCODONE (ROXICODONE) 5 MG tablet   No No   Sig: Take 1 tablet (5 mg) by mouth every 8 hours   polyethylene glycol (MIRALAX) 17 GM/Dose powder   Yes No   Sig: Take 17 g (1 capful) by mouth daily   predniSONE (DELTASONE) 1 MG tablet   No No   Simg daily x30 days, reduce 1 mg every 30 days. Combine with 5mg tabs to make total daily dose.   predniSONE (DELTASONE) 5 MG tablet   No No   Sig: Take 2 tablets (10 mg) by mouth daily + 3 mg - total of 13 mg daily   predniSONE (DELTASONE) 5 MG tablet   No No   Sig: TAKE BY MOUTH 8MG DAILY X 30 DAYS THEN 7MG DAILY X 30DAYS THEN 6MG DAILY X 30 DAYS THEN 5MG DAILY X 30 DAYS. TAKE WITH 1MG TABS TOMAKE TOTAL DAILY DOSE   psyllium (METAMUCIL/KONSYL) 58.6 % powder   Yes No   Sig: Take 3 g by mouth daily   rosuvastatin (CRESTOR) 5 MG tablet   No No   Sig: Take 1-2 tablets (5-10 mg) by mouth daily -- Dose Increase 2022   sildenafil (VIAGRA) 100 MG tablet   No No   Sig: Take 1/2 to 1 tablet 1 hour prior to sexual activity   Patient taking differently: Take 1/2 to 1 tablet 1 hour prior to sexual activity  2023 on hold rp/lpn   spironolactone (ALDACTONE) 25 MG tablet   No No   Sig: Take 1 tablet (25 mg) by mouth daily -- STOP NORVASC --   Patient taking differently: Take 25 mg by mouth  daily -- STOP [facility] --  01- on hold  rp/lpn   testosterone (ANDROGEL 1.62% PUMP) 20.25 MG/ACT gel   No No   Sig: Place 2 Pump onto the skin every morning   traZODone (DESYREL) 50 MG tablet   Yes No   Sig: Take 1 tablet (50 mg) by mouth At Bedtime      Facility-Administered Medications: None        Review of Systems    All other systems reviewed and negative other than noted in the HPI or here.       Social History   I have reviewed this patient's social history and updated it with pertinent information if needed.  Social History     Tobacco Use     Smoking status: Never     Smokeless tobacco: Never   Vaping Use     Vaping Use: Never used   Substance Use Topics     Alcohol use: No     Comment: none     Drug use: No       Family History   I have reviewed this patient's family history and updated it with pertinent information if needed.  Family History   Problem Relation Age of Onset     Heart Disease Mother         Heart Disease,MI - SCD     Heart Disease Father         Heart Disease,MI - SCD        Physical Exam   Vital Signs: Temp: 97.7  F (36.5  C) Temp src: Tympanic BP: (!) 144/86 Pulse: 58   Resp: 18 SpO2: 92 % O2 Device: BiPAP/CPAP Oxygen Delivery: 3 LPM  Weight: 298 lbs 8 oz    General Appearance: Overweight male, lying in the bed, somewhat hard of hearing and some mild discomfort  Eyes: Pupils equal, reactive, EOM intact  HEENT: Normocephalic, nose mouth and throat normal  Respiratory: Lungs are clear  Cardiovascular: Regular rate and rhythm, no murmur heard.  No peripheral edema  GI: Obese but soft, nontender  Lymph/Hematologic: Cervical nodes unremarkable  Genitourinary: Circumcised male.  Scrotum is mildly swollen and slightly red.  Very tender with any palpation, no sense that his testicles are swollen.  Skin: Redness of the scrotum but no open sores.  He is got a healing wound on his left hip area, no signs of redness or drainage  Musculoskeletal: Moving arms and legs without  difficulty  Neurologic: No focal deficits, did not attempt to walk  Psychiatric: Mood and affect are normal    Medical Decision Making       80 MINUTES SPENT BY ME on the date of service doing chart review, history, exam, documentation & further activities per the note.      Data     I have personally reviewed the following data over the past 24 hrs:    11.9 (H)  \   11.2 (L)   / 570 (H)     139 101 13.3 /  97   3.5 30 (H) 1.34 (H) \       Procal: 0.06 (H) CRP: N/A Lactic Acid: 1.1         Imaging results reviewed over the past 24 hrs:   Recent Results (from the past 24 hour(s))   XR Chest Port 1 View    Narrative    PROCEDURE INFORMATION:   Exam: XR Chest   Exam date and time: 2/5/2023 9:56 PM   Age: 67 years old   Clinical indication: Other: Generalized weakness     TECHNIQUE:   Imaging protocol: Radiologic exam of the chest.   Views: 1 view.     COMPARISON:   CR XR CHEST PORT 1 VIEW 1/8/2023 4:03 AM     FINDINGS:   Lungs: Airspace consolidation in the right lung base compatible with   subsegmental atelectasis vs infiltrate.   Pleural spaces: No large pleural effusion. No pneumothorax.   Heart/Mediastinum: Cardiomediastinal silhouette is unchanged.   Bones/joints: No evidence of acute osseous abnormality.       Impression    IMPRESSION:   Airspace consolidation in the right lung base compatible with subsegmental   atelectasis vs infiltrate.     THIS DOCUMENT HAS BEEN ELECTRONICALLY SIGNED BY SPENCER JENSEN MD

## 2023-02-06 NOTE — PROGRESS NOTES
Pt admitted for increased weakness and scrotum pain. Pt is alert and orientated. BP (!) 166/90 (BP Location: Right arm, Patient Position: Chair, Cuff Size: Adult Large)   Pulse 60   Temp 98.4  F (36.9  C) (Tympanic)   Resp 18   Ht 1.829 m (6')   Wt 135.4 kg (298 lb 8 oz)   SpO2 97%   BMI 40.48 kg/m    Lung sounds clear. Pt c/o of scrotum pain 6/10. Dilaudid given for pain and zofran given to prevent nausea r/t pain medication. Effective per pt. Pt has a scabbed over surgical incision on the left hip with steri strip. Pt is tender on the left hip. No drainage noticed and open to air. Will continue to monitor. Jen Lund RN on 2/6/2023 at 1:50 AM

## 2023-02-06 NOTE — ED TRIAGE NOTES
Patient here with generalized weakness and scrotum pain after surgery on Jan 3rd.  Patient had a complication during surgery for SI fusion where they nicked an artery and patient was transferred to [facility] for over night care.  Patient ended up coming here and having Dr Mccall do surgery to remove blood clots. Patient was then transferred to [facility] and was discharged a week ago with home care.  Patient has been getting weaker everyday since he has been home.   Patient also has concerns of his left hip incision being infected according to home health.  Patient has been on oxygen at 3 LPM since surgery and EMS bumped his to 5 LPM for an oxygen SP02 of 95%.     Triage Assessment     Row Name 02/05/23 2022       Triage Assessment (Adult)    Airway WDL WDL       Skin Circulation/Temperature WDL    Skin Circulation/Temperature WDL WDL       Cardiac WDL    Cardiac WDL WDL       Peripheral/Neurovascular WDL    Peripheral Neurovascular WDL WDL       Cognitive/Neuro/Behavioral WDL    Cognitive/Neuro/Behavioral WDL WDL               None

## 2023-02-06 NOTE — PROGRESS NOTES
02/06/23 0049   Valuables   Patient Belongings Remaining with Patient clothing;ring;shoes   Did you bring any home meds/supplements to the hospital?  No     [facility] will make every effort per our policy to help keep your items safe while in the hospital.  If you choose to keep any items at the bedside, we cannot be held responsible for any items that are lost or broken.        I have reviewed my belongings list on admission and verify that it is correct.     Patient signature_______________________________  Date/Time_____________________    2nd Staff person if patient unable to sign __________________________  Date/Time ______________________      I have received all my belongings noted above at discharge.    Patient signature________________________________  Date/Time  __________________________

## 2023-02-06 NOTE — ASSESSMENT & PLAN NOTE
Ongoing pain issues since his sacroiliac surgery with complications  Continue pain management, wean narcotics as able

## 2023-02-06 NOTE — PROGRESS NOTES
SAFETY CHECKLIST  ID Bands and Risk clasps correct and in place (DNR, Fall risk, Allergy, Latex, Limb):  Yes  All Lines Reconciled and labeled correctly: Yes  Whiteboard updated:Yes  Environmental interventions: Yes  Verify Tele #: [id number] [phone]

## 2023-02-06 NOTE — PROGRESS NOTES
:     Patient is from home with spouse. Patient is receiving home care services from [facility].      following for discharge planning needs.     JUNIE Covarrubias on 2/6/2023 at 1:08 PM     Anesthesia/Event Note

## 2023-02-06 NOTE — PROGRESS NOTES
ADMISSION NOTE    Patient admitted to room 312 at approximately 00:10 via cart from emergency room. Patient was accompanied by spouse and nurse.     Verbal SBAR report received from Laly prior to patient arrival.     Patient ambulated to bed with one assist. Patient alert and oriented X 3. Pain is controlled with current analgesics.  Medication(s) being used: Dilautid . Admission vital signs: Blood pressure (!) 166/90, pulse 60, temperature 98.4  F (36.9  C), temperature source Tympanic, resp. rate 18, height 1.829 m (6'), weight 135.4 kg (298 lb 8 oz), SpO2 97 %. Patient was oriented to plan of care, call light, bed controls, tv, telephone, bathroom and visiting hours.       Jen Lund RN

## 2023-02-06 NOTE — PROGRESS NOTES
Setup patients home CPAP with 3L bled in.  Patients home machine setting is a CPAP of 10.  Engineering notified of patients home equipment and need for safety check.

## 2023-02-06 NOTE — ED PROVIDER NOTES
History     Chief Complaint   Patient presents with     Generalized Weakness     Post-op Problem     HPI  Selvin An is a 67 year old male who presents to the ED for evaluation of generalized weakness and postop problem. Patient here with generalized weakness and scrotum pain after surgery on Jan 3rd.  Patient had a complication during surgery for SI fusion where they nicked an artery and patient was transferred to [facility] for over night care.  Patient ended up coming here and having Dr Mccall do surgery to remove blood clots. Patient was then transferred to [facility] and was discharged a week ago with home care.  Patient has been getting weaker everyday since he has been home.   Patient also has concerns of his left hip incision being infected according to home health.  Patient has been on oxygen at 3 LPM since surgery and EMS bumped his to 5 LPM for an oxygen SP02 of 95%.    Allergies:  Allergies   Allergen Reactions     Losartan Cough     Omeprazole Diarrhea     Pantoprazole Diarrhea     Valsartan Cough     Started with losartan, worsened with high dose valsartan       Problem List:    Patient Active Problem List    Diagnosis Date Noted     Postoperative state 01/08/2023     Priority: Medium     Urinary retention 01/08/2023     Priority: Medium     Postoperative hematoma of musculoskeletal structure following musculoskeletal procedure 01/07/2023     Priority: Medium     Hematoma of left lower leg 01/07/2023     Priority: Medium     Asplenia 01/07/2023     Priority: Medium     Low oxygen saturation - during surgery. 01/04/2023     Priority: Medium     Hypertriglyceridemia 11/11/2022     Priority: Medium     Coronary artery calcification 10/18/2022     Priority: Medium     Bilateral renal cysts 08/30/2022     Priority: Medium     Chronic radicular low back pain 08/30/2022     Priority: Medium     Hyperuricemia 12/23/2021     Priority: Medium     Stage 3a chronic kidney disease (H) 10/27/2021     Priority:  Medium     Low testosterone level in male 08/18/2021     Priority: Medium     Radicular low back pain 04/27/2021     Priority: Medium     Neuroforaminal stenosis of lumbar spine 04/27/2021     Priority: Medium     Trigger point with back pain 04/14/2021     Priority: Medium     Trigger point of right shoulder region 04/14/2021     Priority: Medium     Sacroiliac joint pain - left 04/14/2021     Priority: Medium     Osteoarthritis of lumbar spine, unspecified spinal osteoarthritis complication status 04/14/2021     Priority: Medium     Hypercalcemia 03/12/2021     Priority: Medium     Primary insomnia 01/31/2021     Priority: Medium     Polymyalgia rheumatica (H) 01/31/2021     Priority: Medium     Personal history of malignant neoplasm of bladder -- s/p TURBT x2 in 3/2018 and 4/2018 for high-grade non-muscle invasive bladder cancer, now s/p BCG treatment - last cystoscopy 1/20/2021 was normal 01/31/2021     Priority: Medium     Steroid-induced hyperglycemia 01/29/2021     Priority: Medium     Bilateral lower extremity edema 01/29/2021     Priority: Medium     Vitamin B12 deficiency 01/29/2021     Priority: Medium     Chronic fatigue syndrome with fibromyalgia 01/29/2021     Priority: Medium     History of Lyme disease 01/29/2021     Priority: Medium     Morbid obesity with BMI of 40.0-44.9, adult (H) 11/02/2020     Priority: Medium     Meniere disease, bilateral 06/01/2018     Priority: Medium     Rotator cuff syndrome of right shoulder 04/23/2018     Priority: Medium     Lung mass 03/15/2018     Priority: Medium     History of carpal tunnel surgery 11/02/2017     Priority: Medium     Vitamin D deficiency 10/10/2016     Priority: Medium     Postoperative hypothyroidism 01/11/2011     Priority: Medium     Mixed dyslipidemia 07/12/2010     Priority: Medium     Psychosexual dysfunction with inhibited sexual excitement 02/17/2010     Priority: Medium     Benign essential hypertension 02/17/2010     Priority: Medium      History of kidney stones 02/17/2010     Priority: Medium     KYLAH on CPAP -- Uses nightly, finds helpful and beneficial 02/17/2010     Priority: Medium     History of thyroid cancer 02/17/1994     Priority: Medium     Formatting of this note might be different from the original.  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     Priority: Medium        Past Medical History:    Past Medical History:   Diagnosis Date     Bell's palsy 3/30/2017     Bilateral carpal tunnel syndrome      Essential (primary) hypertension      Gout 6/3/2013     Hyperlipidemia      Hypothyroidism      Malignant neoplasm of thyroid gland (H)      Sleep apnea        Past Surgical History:    Past Surgical History:   Procedure Laterality Date     COLONOSCOPY  09/09/2014 9/2014,Normal - follow up 10 years     CV CORONARY ANGIOGRAM N/A 10/31/2022    Procedure: Coronary Angiogram;  Surgeon: Ender Michaels MD;  Location: [facility] CARDIAC CATH LAB     CYSTOSCOPY      bladder cancer     INCISION AND DRAINAGE LOWER EXTREMITY, COMBINED Left 1/7/2023    Procedure: INCISION AND DRAINAGE, LEFT BUTTOCK (Drain Hematoma of Left Buttock);  Surgeon: Ronald Mccall MD;  Location:  OR     OTHER SURGICAL HISTORY      ,SPLENECTOMY     OTHER SURGICAL HISTORY      2010,69956,EYE MUSCLE SURGERY,Left,strabismus     OTHER SURGICAL HISTORY      10/06/14,154046,OTHER,Left,Dr. Lana LEHMAN     OTHER SURGICAL HISTORY      10/14/14,050439,OTHER,Left,Dr. Lana LEHMAN     OTHER SURGICAL HISTORY      11/16/2017,[id number],ENDOSCOPIC RELEASE TRANSVERSE CARPAL LIGAMENT OF HAND,Left     RELEASE CARPAL TUNNEL      12/2011     RETINAL DETACHMENT SURGERY Right      STRABISMUS SURGERY Left      THYROIDECTOMY      1994,thyroid ca     TONSILLECTOMY      2004,T & A > 11yo     VASECTOMY      No Comments Provided       Family History:    Family History   Problem Relation Age of Onset     Heart Disease Mother         Heart Disease,MI - SCD     Heart  Disease Father         Heart Disease,MI - SCD       Social History:  Marital Status:   [2]  Social History     Tobacco Use     Smoking status: Never     Smokeless tobacco: Never   Vaping Use     Vaping Use: Never used   Substance Use Topics     Alcohol use: No     Comment: none     Drug use: No        Medications:    No current outpatient medications on file.        Review of Systems   Constitutional: Positive for fatigue. Negative for fever.   HENT: Negative for congestion.    Eyes: Negative for visual disturbance.   Respiratory: Negative for shortness of breath.    Cardiovascular: Negative for chest pain.   Gastrointestinal: Negative for abdominal pain, nausea and vomiting.   Genitourinary: Positive for testicular pain. Negative for dysuria.   Psychiatric/Behavioral: Negative for confusion.       Physical Exam   BP: (!) 167/101  Pulse: 64  Temp: 97.5  F (36.4  C)  Resp: 18  Height: 182.9 cm (6')  Weight: 133.1 kg (293 lb 6.4 oz)  SpO2: 98 %      Physical Exam  Constitutional:       General: He is not in acute distress.     Appearance: He is well-developed. He is not diaphoretic.   HENT:      Head: Normocephalic and atraumatic.   Eyes:      General: No scleral icterus.     Conjunctiva/sclera: Conjunctivae normal.   Cardiovascular:      Rate and Rhythm: Normal rate and regular rhythm.   Pulmonary:      Effort: Pulmonary effort is normal.      Breath sounds: Normal breath sounds.   Abdominal:      Palpations: Abdomen is soft.      Tenderness: There is no abdominal tenderness.   Genitourinary:     Comments: General swelling of testicles  Musculoskeletal:         General: No deformity.      Cervical back: Neck supple.   Lymphadenopathy:      Cervical: No cervical adenopathy.   Skin:     General: Skin is warm and dry.      Coloration: Skin is not jaundiced.      Findings: No rash.      Comments: Well appearing post surgical area of left hip   Neurological:      Mental Status: He is alert and oriented to person,  place, and time. Mental status is at baseline.      Comments: Left sided facial droop, has a dx of Bell's palsy   Psychiatric:         Mood and Affect: Mood normal.         Behavior: Behavior normal.         Thought Content: Thought content normal.         Judgment: Judgment normal.         ED Course                 Procedures              Critical Care time:  none               Results for orders placed or performed during the hospital encounter of 02/05/23 (from the past 24 hour(s))   Symptomatic Influenza A/B & SARS-CoV2 (COVID-19) Virus PCR Multiplex Nasopharyngeal    Specimen: Nasopharyngeal; Swab   Result Value Ref Range    Influenza A PCR Negative Negative    Influenza B PCR Negative Negative    RSV PCR Negative Negative    SARS CoV2 PCR Negative Negative    Narrative    Testing was performed using the Xpert Xpress CoV2/Flu/RSV Assay on the Cepheid GeneXpert Instrument. This test should be ordered for the detection of SARS-CoV-2 and influenza viruses in individuals who meet clinical and/or epidemiological criteria. Test performance is unknown in asymptomatic patients. This test is for in vitro diagnostic use under the FDA EUA for laboratories certified under CLIA to perform high or moderate complexity testing. This test has not been FDA cleared or approved. A negative result does not rule out the presence of PCR inhibitors in the specimen or target RNA in concentration below the limit of detection for the assay. If only one viral target is positive but coinfection with multiple targets is suspected, the sample should be re-tested with another FDA cleared, approved, or authorized test, if coinfection would change clinical management. This test was validated by the [facility]. These laboratories are certified under the Clinical Laboratory Improvement Amendments of 1988 (CLIA-88) as qualified to perform high complexity laboratory testing.   CBC with platelets differential    Narrative    The  following orders were created for panel order CBC with platelets differential.  Procedure                               Abnormality         Status                     ---------                               -----------         ------                     CBC with platelets and d...[[id number]]  Abnormal            Final result                 Please view results for these tests on the individual orders.   Basic metabolic panel   Result Value Ref Range    Sodium 139 136 - 145 mmol/L    Potassium 3.5 3.4 - 5.3 mmol/L    Chloride 101 98 - 107 mmol/L    Carbon Dioxide (CO2) 30 (H) 22 - 29 mmol/L    Anion Gap 8 7 - 15 mmol/L    Urea Nitrogen 13.3 8.0 - 23.0 mg/dL    Creatinine 1.34 (H) 0.67 - 1.17 mg/dL    Calcium 9.6 8.8 - 10.2 mg/dL    Glucose 97 70 - 99 mg/dL    GFR Estimate 58 (L) >60 mL/min/1.73m2   Lactic acid whole blood   Result Value Ref Range    Lactic Acid 1.1 0.7 - 2.0 mmol/L   Extra Tube    Narrative    The following orders were created for panel order Extra Tube.  Procedure                               Abnormality         Status                     ---------                               -----------         ------                     Extra Red Top Tube[[id number]]                               Final result                 Please view results for these tests on the individual orders.   Extra Tube    Narrative    The following orders were created for panel order Extra Tube.  Procedure                               Abnormality         Status                     ---------                               -----------         ------                     Extra Blue Top Tube[[id number]]                              Final result                 Please view results for these tests on the individual orders.   Extra Red Top Tube   Result Value Ref Range    Hold Specimen x    Extra Blue Top Tube   Result Value Ref Range    Hold Specimen x    Extra Tube    Narrative    The following orders were created for panel order Extra  Tube.  Procedure                               Abnormality         Status                     ---------                               -----------         ------                     Extra Blood Culture Bottle[[id number]]                       Final result                 Please view results for these tests on the individual orders.   Extra Blood Culture Bottle   Result Value Ref Range    Hold Specimen JIC    CBC with platelets and differential   Result Value Ref Range    WBC Count 11.9 (H) 4.0 - 11.0 10e3/uL    RBC Count 3.60 (L) 4.40 - 5.90 10e6/uL    Hemoglobin 11.2 (L) 13.3 - 17.7 g/dL    Hematocrit 35.8 (L) 40.0 - 53.0 %    MCV 99 78 - 100 fL    MCH 31.1 26.5 - 33.0 pg    MCHC 31.3 (L) 31.5 - 36.5 g/dL    RDW 18.7 (H) 10.0 - 15.0 %    Platelet Count 570 (H) 150 - 450 10e3/uL    % Neutrophils 58 %    % Lymphocytes 24 %    % Monocytes 9 %    % Eosinophils 7 %    % Basophils 1 %    % Immature Granulocytes 1 %    NRBCs per 100 WBC 0 <1 /100    Absolute Neutrophils 6.9 1.6 - 8.3 10e3/uL    Absolute Lymphocytes 2.9 0.8 - 5.3 10e3/uL    Absolute Monocytes 1.0 0.0 - 1.3 10e3/uL    Absolute Eosinophils 0.9 (H) 0.0 - 0.7 10e3/uL    Absolute Basophils 0.2 0.0 - 0.2 10e3/uL    Absolute Immature Granulocytes 0.1 <=0.4 10e3/uL    Absolute NRBCs 0.1 10e3/uL   Extra Tube    Narrative    The following orders were created for panel order Extra Tube.  Procedure                               Abnormality         Status                     ---------                               -----------         ------                     Extra Blood Bank Purple ...[[id number]]                      Final result                 Please view results for these tests on the individual orders.   Extra Blood Bank Purple Top Tube   Result Value Ref Range    Hold Specimen JIC    Extra Tube    Narrative    The following orders were created for panel order Extra Tube.  Procedure                               Abnormality         Status                      ---------                               -----------         ------                     Extra Blood Bank Purple ...[[id number]]                      Final result                 Please view results for these tests on the individual orders.   Extra Blood Bank Purple Top Tube   Result Value Ref Range    Hold Specimen [facility]    XR Chest Port 1 View    Narrative    PROCEDURE INFORMATION:   Exam: XR Chest   Exam date and time: 2/5/2023 9:56 PM   Age: 67 years old   Clinical indication: Other: Generalized weakness     TECHNIQUE:   Imaging protocol: Radiologic exam of the chest.   Views: 1 view.     COMPARISON:   CR XR CHEST PORT 1 VIEW 1/8/2023 4:03 AM     FINDINGS:   Lungs: Airspace consolidation in the right lung base compatible with   subsegmental atelectasis vs infiltrate.   Pleural spaces: No large pleural effusion. No pneumothorax.   Heart/Mediastinum: Cardiomediastinal silhouette is unchanged.   Bones/joints: No evidence of acute osseous abnormality.       Impression    IMPRESSION:   Airspace consolidation in the right lung base compatible with subsegmental   atelectasis vs infiltrate.     THIS DOCUMENT HAS BEEN ELECTRONICALLY SIGNED BY SPENCER JENSEN MD   UA with Microscopic reflex to Culture    Specimen: Urine, Midstream   Result Value Ref Range    Color Urine Yellow Colorless, Straw, Light Yellow, Yellow    Appearance Urine Clear Clear    Glucose Urine Negative Negative mg/dL    Bilirubin Urine Negative Negative    Ketones Urine Negative Negative mg/dL    Specific Gravity Urine 1.018 1.000 - 1.030    Blood Urine Negative Negative    pH Urine 7.0 5.0 - 9.0    Protein Albumin Urine Negative Negative mg/dL    Urobilinogen Urine Normal Normal, 2.0 mg/dL    Nitrite Urine Negative Negative    Leukocyte Esterase Urine Negative Negative    Mucus Urine Present (A) None Seen /LPF    RBC Urine 0 <=2 /HPF    WBC Urine <1 <=5 /HPF    Narrative    Urine Culture not indicated       Medications   sodium chloride 0.9% infusion (0  mLs Intravenous ED Infusing on Admission/transfer 2/5/23 1417)   naloxone (NARCAN) injection 0.2 mg (has no administration in time range)     Or   naloxone (NARCAN) injection 0.4 mg (has no administration in time range)     Or   naloxone (NARCAN) injection 0.2 mg (has no administration in time range)     Or   naloxone (NARCAN) injection 0.4 mg (has no administration in time range)   HYDROmorphone (DILAUDID) injection 0.2 mg (has no administration in time range)   ondansetron (ZOFRAN ODT) ODT tab 4 mg (has no administration in time range)     Or   ondansetron (ZOFRAN) injection 4 mg (has no administration in time range)   prochlorperazine (COMPAZINE) injection 5 mg (has no administration in time range)     Or   prochlorperazine (COMPAZINE) tablet 5 mg (has no administration in time range)     Or   prochlorperazine (COMPAZINE) suppository 12.5 mg (has no administration in time range)   azithromycin (ZITHROMAX) tablet 500 mg (500 mg Oral Given 2/5/23 5725)       Assessments & Plan (with Medical Decision Making)   Nontoxic but in general appears in poor health. He does have Bell's Palsy which has been present for awhile. He has been on chronic 3L NC since his surgery a month ago. He mainly complains of overall weakness as well as testicle pain.  However, he says his testicle swelling and pain is improving over the last week.  He is afebrile.    Slight increase in WBC 11.9.  However his hemoglobin is trending up nicely to 11.2.    Chest x-ray read as:  Airspace consolidation in the right lung base compatible with subsegmental   atelectasis vs infiltrate.     I considered obtaining a testicle US this evening however, with gradual improvement in his sx's it seems less likely that an emergent process is occurring.     Discussed findings with patient and his wife.  In general there are no obvious emergent abnormalities are found on his work-up.  However he is feeling much weaker since coming home from the hospital a week  ago. Therefore we will admit him to [facility] for further observation this evening, Dr. Trejo is accepting.     I did start him on Azithromycin in case an early pneumonia is occurring.     Rylan Campos PA-C    I have reviewed the nursing notes.    I have reviewed the findings, diagnosis, plan and need for follow up with the patient.             Current Discharge Medication List          Final diagnoses:   Generalized weakness   Pneumonia   Testicle pain       2/5/2023   [facility]     Rylan Campos PA  02/06/23 0033

## 2023-02-06 NOTE — PROGRESS NOTES
Scrotal US showing probable epididimytis, will start on oral levaquin, should cover this as well as maybe something in the lungs  Electronically signed by ANCA Farrell on February 6, 2023

## 2023-02-06 NOTE — PROGRESS NOTES
Pt wears cpap at home and did not have access to home unit. Pt is on a hospital unit this evening with a 3L O2 bleed in.

## 2023-02-06 NOTE — ASSESSMENT & PLAN NOTE
Prior to surgery was not using oxygen, does have KYLAH using CPAP  Since surgery on 3 L, overnight increasing oxygen needs  Chest x-ray showing atelectasis, question right lower lobe infiltrate without clear signs of infectious etiology monitor, incentive spirometry ordered, RT to evaluate  Oxygen needs stable, home today on previous rx for oxygen

## 2023-02-06 NOTE — PROGRESS NOTES
02/06/23 1411   Appointment Info   Signing Clinician's Name / Credentials (PT) Maverick Dailey MPT   Living Environment   People in Home spouse   Current Living Arrangements house   Home Accessibility no concerns   Transportation Anticipated car, drives self;family or friend will provide   Self-Care   Usual Activity Tolerance moderate   Current Activity Tolerance poor   Equipment Currently Used at Home none   Fall history within last six months no   General Information   Referring Physician Bud   Patient/Family Therapy Goals Statement (PT) return home   Existing Precautions/Restrictions fall;oxygen therapy device and L/min   Weight-Bearing Status - LLE full weight-bearing   Weight-Bearing Status - RLE full weight-bearing   Cognition   Affect/Mental Status (Cognition) WFL   Orientation Status (Cognition) oriented x 4   Follows Commands (Cognition) WFL   Pain Assessment   Patient Currently in Pain Yes, see Vital Sign flowsheet   Integumentary/Edema   Integumentary/Edema Comments scrotal edema   Posture    Posture Forward head position   Range of Motion (ROM)   Range of Motion ROM is WFL   Strength (Manual Muscle Testing)   Strength (Manual Muscle Testing) strength is WFL   Strength Comments however, patient fatigues with activity   Bed Mobility   Comment, (Bed Mobility) minimal assist for left LE support   Transfers   Impairments Contributing to Impaired Transfers pain;decreased strength   Comment, (Transfers) CGA with Fww   Gait/Stairs (Locomotion)   [place] Level (Gait) contact guard   Assistive Device (Gait) walker, front-wheeled   Distance in Feet 125   Pattern (Gait) 2-point   Balance   Balance Comments good with Fww   Sensory Examination   Sensory Perception WFL   Coordination   Coordination no deficits were identified   Muscle Tone   Muscle Tone no deficits were identified   Clinical Impression   Criteria for Skilled Therapeutic Intervention Yes, treatment indicated   PT Diagnosis (PT) impaired mobility    Influenced by the following impairments fatigue, weakness and pain   Functional limitations due to impairments activity/gait tolerance and stability   Clinical Presentation (PT Evaluation Complexity) Stable/Uncomplicated   Clinical Decision Making (Complexity) low complexity   Planned Therapy Interventions (PT) bed mobility training;gait training;transfer training   Anticipated Equipment Needs at Discharge (PT) walker, rolling   Risk & Benefits of therapy have been explained evaluation/treatment results reviewed;patient;spouse/significant other   PT Total Evaluation Time   PT Eval, Low Complexity Minutes (00987) 15   Physical Therapy Goals   PT Frequency Daily   PT Predicted Duration/Target Date for Goal Attainment 02/10/23   PT Goals Bed Mobility;Transfers;Gait   PT: Bed Mobility Supervision/stand-by assist   PT: Transfers Supervision/stand-by assist;Assistive device   PT: Gait Supervision/stand-by assist;Rolling walker;150 feet   PT Discharge Planning   PT Plan continue PT   PT Discharge Recommendation (DC Rec) home with home care physical therapy;home with assist   PT Rationale for DC Rec to promote strength, stability and safe mobility   PT Brief overview of current status minimal assist for bed mobilities; CGA for transfers and ambulation using a Fww; imroved scrotal pain control at time of PT assessment

## 2023-02-06 NOTE — ASSESSMENT & PLAN NOTE
Worsening issues since surgery, has not responded to some of the newer medications  Encourage fluid intake, MiraLAX, senna ordered

## 2023-02-06 NOTE — PROGRESS NOTES
Patient stands at bedside to urinate with walker--wife assists him.  Abodomen, left testicle,  left hip pain along with nausea.  Oxy and compazine given.  3 liters acute oxygen. Nonproductive cough.      Left hip pain related to previous surgery.  Site has scabbed over surgical site with steri strips.  Darker/purplish skin with hardness under the skin related to previous hematoma.  abd pain and some numbness also related to previous surgery.  He cannot always know when he has the urge to urinate or know when he is done urinating in the urinal.    Ultrasound of left painful, swollen testicle.

## 2023-02-06 NOTE — ASSESSMENT & PLAN NOTE
Longstanding history, currently on Aldactone, states he is also taking Coreg  Pharmacy to investigate, order correct meds

## 2023-02-06 NOTE — PLAN OF CARE
Goal Outcome Evaluation:    Pt admitted for increase of weakness and scrotum pain. Pt is alert and orientated. BP (!) 144/86 (BP Location: Right arm, Patient Position: Supine, Cuff Size: Adult Large)   Pulse 58   Temp 97.7  F (36.5  C) (Tympanic)   Resp 18   Ht 1.829 m (6')   Wt 135.4 kg (298 lb 8 oz)   SpO2 92%   BMI 40.48 kg/m    Lung sounds clear. Pt c/o scrotum pain. PRN medication given for pain. See MAR. Medication effective per pt. Pt is on 3 liter O2 which is acute. Pt also wears a CPAP at night, which is chronic. Pt currently has the hospital's CPAP machine. Pt has hesitancy with voiding. Pt has been standing at bedside and using the urinal and able to void. Wife is at bedside. Will continue to monitor. Jen Lund RN on 2/6/2023 at 5:58 AM

## 2023-02-06 NOTE — ASSESSMENT & PLAN NOTE
No current issues, having some bladder issues with voiding with some postoperative numbness  Monitor

## 2023-02-07 ENCOUNTER — APPOINTMENT (OUTPATIENT)
Dept: PHYSICAL THERAPY | Facility: OTHER | Age: 68
End: 2023-02-07
Payer: MEDICARE

## 2023-02-07 ENCOUNTER — TELEPHONE (OUTPATIENT)
Dept: INTERNAL MEDICINE | Facility: OTHER | Age: 68
End: 2023-02-07

## 2023-02-07 ENCOUNTER — TELEPHONE (OUTPATIENT)
Dept: INTERNAL MEDICINE | Facility: OTHER | Age: 68
End: 2023-02-07
Payer: MEDICARE

## 2023-02-07 VITALS
BODY MASS INDEX: 39.36 KG/M2 | HEIGHT: 72 IN | RESPIRATION RATE: 18 BRPM | WEIGHT: 290.6 LBS | OXYGEN SATURATION: 92 % | TEMPERATURE: 98.1 F | HEART RATE: 66 BPM | DIASTOLIC BLOOD PRESSURE: 82 MMHG | SYSTOLIC BLOOD PRESSURE: 152 MMHG

## 2023-02-07 LAB
ANION GAP SERPL CALCULATED.3IONS-SCNC: 9 MMOL/L (ref 7–15)
BUN SERPL-MCNC: 13.7 MG/DL (ref 8–23)
CALCIUM SERPL-MCNC: 9.1 MG/DL (ref 8.8–10.2)
CHLORIDE SERPL-SCNC: 106 MMOL/L (ref 98–107)
CREAT SERPL-MCNC: 1.18 MG/DL (ref 0.67–1.17)
DEPRECATED HCO3 PLAS-SCNC: 24 MMOL/L (ref 22–29)
ERYTHROCYTE [DISTWIDTH] IN BLOOD BY AUTOMATED COUNT: 18.6 % (ref 10–15)
GFR SERPL CREATININE-BSD FRML MDRD: 67 ML/MIN/1.73M2
GLUCOSE SERPL-MCNC: 89 MG/DL (ref 70–99)
HCT VFR BLD AUTO: 31.7 % (ref 40–53)
HGB BLD-MCNC: 9.9 G/DL (ref 13.3–17.7)
HOLD SPECIMEN: NORMAL
MCH RBC QN AUTO: 30.7 PG (ref 26.5–33)
MCHC RBC AUTO-ENTMCNC: 31.2 G/DL (ref 31.5–36.5)
MCV RBC AUTO: 98 FL (ref 78–100)
PLATELET # BLD AUTO: 514 10E3/UL (ref 150–450)
POTASSIUM SERPL-SCNC: 3.5 MMOL/L (ref 3.4–5.3)
RBC # BLD AUTO: 3.22 10E6/UL (ref 4.4–5.9)
SODIUM SERPL-SCNC: 139 MMOL/L (ref 136–145)
WBC # BLD AUTO: 11.9 10E3/UL (ref 4–11)

## 2023-02-07 PROCEDURE — 85027 COMPLETE CBC AUTOMATED: CPT | Performed by: FAMILY MEDICINE

## 2023-02-07 PROCEDURE — 250N000011 HC RX IP 250 OP 636: Performed by: FAMILY MEDICINE

## 2023-02-07 PROCEDURE — 250N000012 HC RX MED GY IP 250 OP 636 PS 637: Performed by: FAMILY MEDICINE

## 2023-02-07 PROCEDURE — 258N000003 HC RX IP 258 OP 636: Performed by: FAMILY MEDICINE

## 2023-02-07 PROCEDURE — 250N000013 HC RX MED GY IP 250 OP 250 PS 637: Performed by: FAMILY MEDICINE

## 2023-02-07 PROCEDURE — 80048 BASIC METABOLIC PNL TOTAL CA: CPT | Performed by: FAMILY MEDICINE

## 2023-02-07 PROCEDURE — 99239 HOSP IP/OBS DSCHRG MGMT >30: CPT | Performed by: FAMILY MEDICINE

## 2023-02-07 PROCEDURE — 96376 TX/PRO/DX INJ SAME DRUG ADON: CPT

## 2023-02-07 PROCEDURE — G0378 HOSPITAL OBSERVATION PER HR: HCPCS

## 2023-02-07 PROCEDURE — 999N000104 HC STATISTIC NO CHARGE

## 2023-02-07 PROCEDURE — 36415 COLL VENOUS BLD VENIPUNCTURE: CPT | Performed by: FAMILY MEDICINE

## 2023-02-07 RX ORDER — DIAZEPAM 5 MG
5 TABLET ORAL EVERY 8 HOURS PRN
Qty: 10 TABLET | Refills: 0 | Status: SHIPPED | OUTPATIENT
Start: 2023-02-07 | End: 2023-02-15

## 2023-02-07 RX ORDER — OXYCODONE HYDROCHLORIDE 5 MG/1
5 TABLET ORAL EVERY 6 HOURS PRN
Qty: 10 TABLET | Refills: 0 | Status: SHIPPED | OUTPATIENT
Start: 2023-02-07 | End: 2023-02-15

## 2023-02-07 RX ORDER — LEVOFLOXACIN 500 MG/1
500 TABLET, FILM COATED ORAL DAILY
Qty: 7 TABLET | Refills: 0 | Status: SHIPPED | OUTPATIENT
Start: 2023-02-07 | End: 2023-02-08

## 2023-02-07 RX ADMIN — FERROUS SULFATE TAB EC 325 MG (65 MG FE EQUIVALENT) 325 MG: 325 (65 FE) TABLET DELAYED RESPONSE at 11:11

## 2023-02-07 RX ADMIN — CARVEDILOL 6.25 MG: 6.25 TABLET, FILM COATED ORAL at 11:18

## 2023-02-07 RX ADMIN — ALLOPURINOL 300 MG: 300 TABLET ORAL at 11:10

## 2023-02-07 RX ADMIN — PROCHLORPERAZINE EDISYLATE 5 MG: 5 INJECTION, SOLUTION INTRAMUSCULAR; INTRAVENOUS at 05:27

## 2023-02-07 RX ADMIN — DOCUSATE SODIUM 100 MG: 100 CAPSULE, LIQUID FILLED ORAL at 11:10

## 2023-02-07 RX ADMIN — OXYCODONE HYDROCHLORIDE 5 MG: 5 TABLET ORAL at 01:58

## 2023-02-07 RX ADMIN — PREDNISONE 5 MG: 5 TABLET ORAL at 11:10

## 2023-02-07 RX ADMIN — OXYCODONE HYDROCHLORIDE 5 MG: 5 TABLET ORAL at 11:09

## 2023-02-07 RX ADMIN — POLYETHYLENE GLYCOL 3350 17 G: 17 POWDER, FOR SOLUTION ORAL at 11:13

## 2023-02-07 RX ADMIN — LEVOTHYROXINE SODIUM 200 MCG: 0.1 TABLET ORAL at 11:10

## 2023-02-07 RX ADMIN — LEVOFLOXACIN 500 MG: 500 TABLET, FILM COATED ORAL at 11:18

## 2023-02-07 RX ADMIN — FAMOTIDINE 20 MG: 20 TABLET ORAL at 11:11

## 2023-02-07 RX ADMIN — OXYCODONE HYDROCHLORIDE 5 MG: 5 TABLET ORAL at 05:27

## 2023-02-07 RX ADMIN — SODIUM CHLORIDE: 9 INJECTION, SOLUTION INTRAVENOUS at 06:38

## 2023-02-07 RX ADMIN — LORATADINE 10 MG: 10 TABLET ORAL at 11:11

## 2023-02-07 ASSESSMENT — ACTIVITIES OF DAILY LIVING (ADL)
ADLS_ACUITY_SCORE: 38
ADLS_ACUITY_SCORE: 36
ADLS_ACUITY_SCORE: 38

## 2023-02-07 NOTE — DISCHARGE SUMMARY
[facility]  Hospitalist Discharge Summary      Date of Admission:  2/5/2023  Date of Discharge:  2/7/2023  Discharging Provider: Deejay Farrell MD  Discharge Service: Hospitalist Service    Discharge Diagnoses   Active Problems:    Generalized muscle weakness    Scrotal pain    Benign essential hypertension    KYLAH on CPAP -- Uses nightly, finds helpful and beneficial    Asplenia    Drug-induced constipation    Postoperative pain    Acute and chronic respiratory failure with hypoxia (H)    Postoperative hypothyroidism    Personal history of malignant neoplasm of bladder -- s/p TURBT x2 in 3/2018 and 4/2018 for high-grade non-muscle invasive bladder cancer, now s/p BCG treatment - last cystoscopy 1/20/2021 was normal    History of thyroid cancer        Follow-ups Needed After Discharge   Follow-up Appointments     Follow-up and recommended labs and tests       Follow up with primary care provider, Matt Whitmore, within 7 days for   hospital follow- up.  No follow up labs or test are needed.             Unresulted Labs Ordered in the Past 30 Days of this Admission     No orders found from 1/6/2023 to 2/6/2023.      These results will be followed up by Dr. Whitmore    Discharge Disposition   Admited to home care:   Agency: [facility]  Discharged to home  Condition at discharge: Satisfactory      Hospital Course   Generalized muscle weakness  Presenting from home with increased weakness,  Recovering from prolonged hospitalizations with work-up in ER suggesting possible pneumonia  At this point not clear if there is infection present or not although he is at risk due to daily steroid use and history of asplenia.  Reassuringly has a low procalcitonin  US showing left sided epididymitis, treated with oral levaquin, improved sxs today, wanting to go home  Home today, restart HHC with [facility] with nursing, PT/OT    Scrotal pain  Increasing pain in the scrotum over the past few days culminating in coming to  the ER last evening  Generalized mild redness to the scrotum, both testicles sore but not obviously swollen  US demonstrated left sided epididymitis  Treated with oral levaquin, improved pain and swelling overnite  Home today on oral levaquin    Acute and chronic respiratory failure with hypoxia (H)  Prior to surgery was not using oxygen, does have KYLAH using CPAP  Since surgery on 3 L, overnight increasing oxygen needs  Chest x-ray showing atelectasis, question right lower lobe infiltrate without clear signs of infectious etiology monitor, incentive spirometry ordered, RT to evaluate      Asplenia  History of surgery in the past  Concern in regards to possible infectious etiology    Benign essential hypertension  Longstanding history, currently on Aldactone, states he is also taking Coreg  Pharmacy to investigate, order correct meds    Drug-induced constipation  Worsening issues since surgery, has not responded to some of the newer medications  Encourage fluid intake, MiraLAX, senna ordered    KYLAH on CPAP -- Uses nightly, finds helpful and beneficial  Stable using home CPAP  Continue    Postoperative pain  Ongoing pain issues since his sacroiliac surgery with complications  Continue pain management, wean narcotics as able    History of thyroid cancer  Stable on home dosing Synthroid  Continue home dosing    Personal history of malignant neoplasm of bladder -- s/p TURBT x2 in 3/2018 and 4/2018 for high-grade non-muscle invasive bladder cancer, now s/p BCG treatment - last cystoscopy 1/20/2021 was normal  No current issues, having some bladder issues with voiding with some postoperative numbness  Monitor    Postoperative hypothyroidism  Stable on daily Synthroid  No change    Documentation of Face to Face and Certification for Home Health Services    I certify that patient: Selvin An is under my care and that I, or a nurse practitioner or physician's assistant working with me, had a face-to-face encounter that  meets the physician face-to-face encounter requirements with this patient on: 2/7/2023.    This encounter with the patient was in whole, or in part, for the following medical condition, which is the primary reason for home health care: weakness, post op pain, post op bleeding.    I certify that, based on my findings, the following services are medically necessary home health services: Nursing, Occupational Therapy and Physical Therapy.    My clinical findings support the need for the above services because: Nurse is needed: To assess safety after changes in medications or other medical regimen. and To provide assessment and oversight required in the home to assure adherence to the medical plan due to: pain, safety concerns.., Occupational Therapy Services are needed to assess and treat cognitive ability and address ADL safety due to impairment in ambulation. and Physical Therapy Services are needed to assess and treat the following functional impairments: left hip pain.    Further, I certify that my clinical findings support that this patient is homebound (i.e. absences from home require considerable and taxing effort and are for medical reasons or [employer] services or infrequently or of short duration when for other reasons) because: Leaving home is medically contraindicated for the following reason(s): limits in ambulation and safety concerns..    Based on the above findings. I certify that this patient is confined to the home and needs intermittent skilled nursing care, physical therapy and/or speech therapy.  The patient is under my care, and I have initiated the establishment of the plan of care.  This patient will be followed by a physician who will periodically review the plan of care.  Physician/Provider to provide follow up care: Matt Whitmore    Attending hospital physician (the Medicare certified [place] provider): Deejay Farrell MD  Physician Signature: See electronic signature associated with  these discharge orders.  Date: 2/7/2023      Consultations This Hospital Stay   SOCIAL WORK IP CONSULT  PHYSICAL THERAPY ADULT IP CONSULT  OCCUPATIONAL THERAPY ADULT IP CONSULT    Code Status   Full Code    Time Spent on this Encounter   I, Deejay Farrell MD, personally saw the patient today and spent greater than 30 minutes discharging this patient.       Deejay Farrell MD  [facility]  [address]  [place] [address]  Phone: [phone]  Fax: [phone]  ______________________________________________________________________    Physical Exam   Vital Signs: Temp: 97.6  F (36.4  C) Temp src: Tympanic BP: (!) 143/74 Pulse: 60   Resp: 18 SpO2: 92 % O2 Device: BiPAP/CPAP Oxygen Delivery: 3 LPM  Weight: 290 lbs 9.6 oz  Constitutional: awake, alert, cooperative, no apparent distress, and appears stated age  Respiratory: No increased work of breathing, good air exchange, clear to auscultation bilaterally, no crackles or wheezing  Cardiovascular: regular rate and rhythm  GI: normal bowel sounds, soft and non-distended  Genitounirinary: scrotum with less redness, left testes tender over top, better than yesterday       Primary Care Physician   Matt Whitmore    Discharge Orders      Home Care Referral      Reason for your hospital stay    Admitted with weakness and scrotal pain     Follow-up and recommended labs and tests     Follow up with primary care provider, Matt Whitmore, within 7 days for hospital follow- up.  No follow up labs or test are needed.     Activity    Your activity upon discharge: activity as tolerated     Oxygen Adult/Peds    Oxygen Documentation  I certify that this patient, Selvin An has been under my care (or a nurse practitioner or physican's assistant working with me). This is the face-to-face encounter for oxygen medical necessity.      At the time of this encounter supplemental oxygen is reasonable and necessary and is expected to improve the  patient's condition in a home setting.       Patient has continued oxygen desaturation due to Chronic Respiratory Failure with Hypoxia J96.11.    If portability is ordered, is the patient mobile within the home? yes     Diet    Follow this diet upon discharge: Orders Placed This Encounter      Regular Diet Adult       Significant Results and Procedures   Most Recent 3 CBC's:Recent Labs   Lab Test 02/07/23  0541 02/05/23 2110 01/09/23  1115 01/09/23 0523   WBC 11.9* 11.9*  --  20.7*   HGB 9.9* 11.2* 7.4* 6.7*   MCV 98 99  --  95   * 570*  --  235     Most Recent 3 BMP's:Recent Labs   Lab Test 02/07/23  0541 02/05/23 2110 01/09/23  0813 01/09/23 0523    139  --  136   POTASSIUM 3.5 3.5  --  4.1   CHLORIDE 106 101  --  103   CO2 24 30*  --  26   BUN 13.7 13.3  --  28.7*   CR 1.18* 1.34*  --  1.26*   ANIONGAP 9 8  --  7   THAI 9.1 9.6  --  8.3*   GLC 89 97 92 95   ,   Results for orders placed or performed during the hospital encounter of 02/05/23   XR Chest Port 1 View    Narrative    PROCEDURE INFORMATION:   Exam: XR Chest   Exam date and time: 2/5/2023 9:56 PM   Age: 67 years old   Clinical indication: Other: Generalized weakness     TECHNIQUE:   Imaging protocol: Radiologic exam of the chest.   Views: 1 view.     COMPARISON:   CR XR CHEST PORT 1 VIEW 1/8/2023 4:03 AM     FINDINGS:   Lungs: Airspace consolidation in the right lung base compatible with   subsegmental atelectasis vs infiltrate.   Pleural spaces: No large pleural effusion. No pneumothorax.   Heart/Mediastinum: Cardiomediastinal silhouette is unchanged.   Bones/joints: No evidence of acute osseous abnormality.       Impression    IMPRESSION:   Airspace consolidation in the right lung base compatible with subsegmental   atelectasis vs infiltrate.     THIS DOCUMENT HAS BEEN ELECTRONICALLY SIGNED BY SPENCER JENSEN MD   US Testicular & Scrotum w Doppler Ltd    Narrative    PROCEDURE: US TESTICULAR AND SCROTUM WITH DOPPLER LIMITED  2/6/2023  10:42 AM    HISTORY: Male, age 67 years, left-sided testicular pain .    TECHNIQUE: Ultrasound of the scrotum was performed.    COMPARISON: No relevant prior imaging.    FINDINGS:     MEASUREMENTS:   Right testis: 5.5 x 2.8 x 4.0 cm (Length x AP x Width)  Left testis: 5.5 x 2.4 x 3.8 cm (Length x AP x Width)    TESTES: No evidence of mass. Mild hyperemia of the left testicle.  Normal blood flow on the right.    EPIDIDYMIDES: Bilateral epididymal head cysts. Mild hyperemia of the  left epididymal structures.    OTHER: Small bilateral hydroceles. No varicocele.      Impression    IMPRESSION:   Left-sided epididymal orchitis. No evidence of testicular mass.    Bilateral epididymal head cysts.    Small bilateral hydroceles.    LORENZO HATCH MD         SYSTEM ID:  [id number]       Discharge Medications   Current Discharge Medication List      START taking these medications    Details   levofloxacin (LEVAQUIN) 500 MG tablet Take 1 tablet (500 mg) by mouth daily for 7 days  Qty: 7 tablet, Refills: 0    Associated Diagnoses: Scrotal pain         CONTINUE these medications which have CHANGED    Details   diazepam (VALIUM) 5 MG tablet Take 1 tablet (5 mg) by mouth every 8 hours as needed for muscle spasms or pain  Qty: 10 tablet, Refills: 0    Associated Diagnoses: Postoperative hematoma of musculoskeletal structure following musculoskeletal procedure      oxyCODONE (ROXICODONE) 5 MG tablet Take 1 tablet (5 mg) by mouth every 6 hours as needed for moderate pain (4-6)  Qty: 10 tablet, Refills: 0    Associated Diagnoses: Scrotal pain; Postoperative hematoma of musculoskeletal structure following musculoskeletal procedure; Postoperative pain         CONTINUE these medications which have NOT CHANGED    Details   allopurinol (ZYLOPRIM) 300 MG tablet Take 1 tablet (300 mg) by mouth daily - for gout prevention  Qty: 90 tablet, Refills: 4    Associated Diagnoses: Hyperuricemia      B Complex-C (VITAMIN B COMPLEX W/VITAMIN C) TABS  tablet Take 1 tablet by mouth daily      carvedilol (COREG) 6.25 MG tablet Take 6.25 mg by mouth 2 times daily (with meals)      Cholecalciferol (VITAMIN D-3) 125 MCG (5000 UT) TABS Take 5,000 Units by mouth daily  Qty: 100 tablet, Refills: 3    Associated Diagnoses: Vitamin D deficiency      Cyanocobalamin 2500 MCG TABS Take 2,500 mcg by mouth daily -- or every other day -- OTC okay  Qty: 90 tablet, Refills: 4    Associated Diagnoses: Vitamin B12 deficiency      cyclobenzaprine (FLEXERIL) 10 MG tablet Take 1 tablet (10 mg) by mouth 3 times daily as needed for muscle spasms  Qty: 90 tablet, Refills: 0    Associated Diagnoses: Chronic radicular low back pain      docusate sodium (COLACE) 100 MG capsule Take 1 capsule (100 mg) by mouth 2 times daily  Qty: 60 capsule, Refills: 0    Associated Diagnoses: Constipation      famotidine (PEPCID) 20 MG tablet Take 1 tablet (20 mg) by mouth 2 times daily  Qty: 60 tablet, Refills: 0    Associated Diagnoses: Esophageal reflux      fenofibrate (TRICOR) 145 MG tablet Take 1 tablet (145 mg) by mouth daily  Qty: 90 tablet, Refills: 4    Associated Diagnoses: Mixed dyslipidemia; Hypertriglyceridemia      levothyroxine (SYNTHROID/LEVOTHROID) 200 MCG tablet Take 1 tablet (200 mcg) by mouth daily  Qty: 90 tablet, Refills: 4    Associated Diagnoses: Postoperative hypothyroidism      lidocaine (XYLOCAINE) 5 % external ointment Apply topically daily as needed      loratadine (CLARITIN) 10 MG tablet Take 10 mg by mouth daily      magnesium 250 MG tablet Take 1 tablet by mouth daily      Melatonin 10 MG TABS tablet Take 20 mg by mouth At Bedtime      Multiple Vitamins-Minerals (CENTRUM ADULTS PO) Take 1 tablet by mouth daily      ondansetron (ZOFRAN ODT) 4 MG ODT tab Take 1 tablet (4 mg) by mouth every 8 hours as needed for nausea  Qty: 30 tablet, Refills: 1    Associated Diagnoses: Nausea      order for DME CPAP machine for home use at pressure: 10, with supplies:  humidifier, chamber,  mask, tubing      polyethylene glycol (MIRALAX) 17 GM/Dose powder Take 17 g (1 capful) by mouth daily  Qty: 510 g, Refills: 1    Associated Diagnoses: Constipation      predniSONE (DELTASONE) 1 MG tablet 5mg daily x30 days, reduce 1 mg every 30 days. Combine with 5mg tabs to make total daily dose.  Qty: 300 tablet, Refills: 0    Associated Diagnoses: Polymyalgia rheumatica (H)      Probiotic Product (PROBIOTIC-10 PO) Take 1 tablet by mouth daily      psyllium (METAMUCIL/KONSYL) 58.6 % powder Take 3 g by mouth daily  Qty: 120 g, Refills: 1    Associated Diagnoses: Constipation      rosuvastatin (CRESTOR) 5 MG tablet Take 1-2 tablets (5-10 mg) by mouth daily -- Dose Increase 11/11/2022  Qty: 180 tablet, Refills: 4    Associated Diagnoses: Mixed dyslipidemia      SENNA-docusate sodium (SENNA S) 8.6-50 MG tablet Take 1 tablet by mouth At Bedtime  Qty: 30 tablet, Refills: 1    Associated Diagnoses: Constipation      sildenafil (VIAGRA) 100 MG tablet Take 1/2 to 1 tablet 1 hour prior to sexual activity  Qty: 10 tablet, Refills: 11    Associated Diagnoses: Erectile dysfunction, unspecified erectile dysfunction type      spironolactone (ALDACTONE) 25 MG tablet Take 1 tablet (25 mg) by mouth daily -- STOP NORVASC --  Qty: 90 tablet, Refills: 1    Associated Diagnoses: Benign essential hypertension; Bilateral lower extremity edema      testosterone (ANDROGEL 1.62% PUMP) 20.25 MG/ACT gel Place 2 Pump onto the skin every morning  Qty: 75 g, Refills: 5    Associated Diagnoses: Hypogonadism in male      traZODone (DESYREL) 50 MG tablet Take 1 tablet (50 mg) by mouth At Bedtime  Qty: 90 tablet, Refills: 1    Associated Diagnoses: Persistent disorder of initiating or maintaining sleep      ferrous fumarate 65 mg, [place]. FE,-Vitamin C 125 mg (VITRON C)  MG TABS tablet 1 tablet by mouth daily in the evening on empty stomach -for iron deficiency  Qty: 90 tablet, Refills: 1    Comments: -- Please notify patient of new prescription  -- okay for 2 separate OTC tablets  Associated Diagnoses: Acute blood loss anemia; Iron deficiency anemia, unspecified iron deficiency anemia type      gabapentin (NEURONTIN) 400 MG capsule Take 2 capsules (800 mg) by mouth 2 times daily  Qty: 180 capsule, Refills: 3    Associated Diagnoses: Chronic radicular low back pain      naloxone (NARCAN) 4 MG/0.1ML nasal spray SPRAY 0.1ML (CONTENTS OF ONE DEVICE) INTO ONE NOSTRIL UPON SIGNS OF OPIOID OVERDOSE. CALL 911. MAY REPEAT ONCE IF NO RESPONSE WITH 2-3 MINUT           Allergies   Allergies   Allergen Reactions     Losartan Cough     Omeprazole Diarrhea     Pantoprazole Diarrhea     Valsartan Cough     Started with losartan, worsened with high dose valsartan

## 2023-02-07 NOTE — PROGRESS NOTES
[facility]      OUTPATIENT PHYSICAL THERAPY EVALUATION  PLAN OF TREATMENT FOR OUTPATIENT REHABILITATION  (COMPLETE FOR INITIAL CLAIMS ONLY)  Patient's Last Name, First Name, M.I.  YOB: 1955  Selvin An                        Provider's Name  [facility] Medical Record No.  [id number]                               Onset Date:   2/5/23   Start of Care Date:    2/6/23     Type:     _X_PT   ___OT   ___SLP Medical Diagnosis:   Weakness, scrotal pain                          PT Diagnosis:  impaired mobility   Visits from SOC:  1   _________________________________________________________________________________  Plan of Treatment/Functional Goals    Planned Interventions: bed mobility training, gait training, transfer training     Goals: See Physical Therapy Goals on Care Plan in [employer] electronic health record.    Therapy Frequency: Daily  Predicted Duration of Therapy Intervention: 02/10/23  _________________________________________________________________________________    I CERTIFY THE NEED FOR THESE SERVICES FURNISHED UNDER        THIS PLAN OF TREATMENT AND WHILE UNDER MY CARE     (Physician co-signature of this document indicates review and certification of the therapy plan).                 ,      Referring Physician: Bud            Initial Assessment        See Physical Therapy evaluation dated   in Epic electronic health record.

## 2023-02-07 NOTE — PHARMACY - DISCHARGE MEDICATION RECONCILIATION AND EDUCATION
Pharmacy:  Discharge Counseling and Medication Reconciliation    Selvin An     [facility] [address]  [phone] (home)   67 year old male  PCP: Matt Whitmore    Allergies: Losartan, Omeprazole, Pantoprazole, and Valsartan    Discharge Counseling:    Pharmacist met with patient (and/or family) today to review the medication portion of the After Visit Summary (with an emphasis on NEW medications) and to address patient's questions/concerns.    Summary of Education: met with patient to discuss administration, duration and side effects of new medication.  Discussed increase risk of tendon rupture with being on chronic prednisone.  All questions answered.    Materials Provided:  MedCounselor sheets printed from Clinical Pharmacology on: levaquin    Discharge Medication Reconciliation:    It has been determined that the patient has an adequate supply of medications available or which can be obtained from the patient's preferred pharmacy, which HE/SHE has confirmed as: [facility].    Thank you for the consult.    Sylwia Peng RPH........February 7, 2023 11:04 AM

## 2023-02-07 NOTE — TELEPHONE ENCOUNTER
Request for Home Care Skilled Nursing orders as follows:      Continuation frequency =  ___1____ X week X ___3____ weeks     3 PRN visits        Effective date= 2/5/23      Please respond with .HOMECAREAGREE, if you are in agreement. Please sign with your comments and signature, if you are not in agreement.      Thanks,     Africa Oneill RN on 2/7/2023 at 8:45 AM

## 2023-02-07 NOTE — PLAN OF CARE
Physical Therapy Discharge Summary    Reason for therapy discharge:    Discharged to home with home therapy.    Progress towards therapy goal(s). See goals on Care Plan in [facility] electronic health record for goal details.  Goals partially met.  Barriers to achieving goals:   discharge from facility.    Therapy recommendation(s):    Continued therapy is recommended.  Rationale/Recommendations:  to promote strength, stability and safe mobility.    Goal Outcome Evaluation:

## 2023-02-07 NOTE — PROGRESS NOTES
Gave discharge instructions.  Answered questions.  Room air but has uxygen at home related to previous sx .  Been using for the last month at home. Wife has o2 sat monitor.  Wheelchair ride wife's car at 1220.

## 2023-02-07 NOTE — PROGRESS NOTES
"PRIMARY DIAGNOSIS: \"GENERIC\" NURSING  Weakness, scrotal pain, Acute and chronic respiratory failure with hypoxia, Postoperative pain    OUTPATIENT/OBSERVATION GOALS TO BE MET BEFORE DISCHARGE:  1. ADLs back to baseline: No     2. Activity and level of assistance: Assist 1 with walker. Patient's wife at bedside. Stands to use urinal.     3. Pain status: Improved-controlled with oral pain medications - See MAR     4. Return to near baseline physical activity: No             Discharge Planner Nurse []Expand by Default     Safe discharge environment identified: Yes - from home with wife with home care services  Barriers to discharge: Yes - Increased oxygen needs and pain management.           Entered by: Tabitha Lima RN 02/07/2023 0000         Please review provider order for any additional goals.   Nurse to notify provider when observation goals have been met and patient is ready for discharge.  "

## 2023-02-07 NOTE — TELEPHONE ENCOUNTER
I agree with the Home Care order requests.    Electronically signed by:  Matt Whitmore MD  on February 7, 2023 8:48 AM

## 2023-02-07 NOTE — PROGRESS NOTES
"PRIMARY DIAGNOSIS: \"GENERIC\" NURSING  Weakness, scrotal pain, Acute and chronic respiratory failure with hypoxia, Postoperative pain     OUTPATIENT/OBSERVATION GOALS TO BE MET BEFORE DISCHARGE:  1. ADLs back to baseline: No     2. Activity and level of assistance: Assist 1 with walker. Patient's wife at bedside. Stands to use urinal.     3. Pain status: Improved-controlled with oral pain medications - See MAR     4. Return to near baseline physical activity: No             Discharge Planner Nurse []?Expand by Default     Safe discharge environment identified: Yes - from home with wife with home care services  Barriers to discharge: Yes - Increased oxygen needs and pain management.           Entered by: Tabitha Lima RN 02/07/2023 0415                   Please review provider order for any additional goals.   Nurse to notify provider when observation goals have been met and patient is ready for discharge.          "

## 2023-02-07 NOTE — PROGRESS NOTES
SAFETY CHECKLIST  ID Bands and Risk clasps correct and in place (DNR, Fall risk, Allergy, Latex, Limb):  Yes  All Lines Reconciled and labeled correctly: Yes  Whiteboard updated:Yes  Environmental interventions: Yes - see PCS  Verify Tele #: NA

## 2023-02-07 NOTE — PROGRESS NOTES
02/07/23 1300   Appointment Info   Signing Clinician's Name / Credentials (PT) Maverick Dailey MPT   PT Discharge Planning   PT Plan patient discharging home   PT Discharge Recommendation (DC Rec) home with assist;home with home care physical therapy   PT Rationale for DC Rec to promote strength, stability and safe mobility   PT Brief overview of current status minimal assist for bed mobilities; CGA for transfers and ambulation using a Fww; imroved scrotal pain control; fatigue remains to affect activity/gait tolerance; PT, patient and his spouse reviewed the benefit of increasing activity and participating with home care PT; they are in agreement with this plan

## 2023-02-07 NOTE — TELEPHONE ENCOUNTER
"Home Care regulation mandates that you are notified about drug discrepancies, interactions & contraindications. Response within a 24 hour timeframe is established by CMS as \"best practice\" for the delivery of home health care. Home Care is required to report if the 24 hour timeframe was met. The home health clinician will contact you again if this timeframe is not met or if the response does not address all concerns.      Situation:  You are being contacted for clarifying orders related to issues found during medication reconciliation.     Background:  The patient was admitted to [facility]. Upon admission, a med reconciliation was completed to identify any drug discrepancies, interactions or contraindications. Home Care's drug regime review has revealed significant medication issues.     Assessment:         Severe interaction:     Levofloxacin and prednisone    Recommendations:    Please evaluate this information and indicate below whether or not changes are required. A full copy of the patient's drug interaction/contraindications report is available upon request.      Provider response/orders as follows:      Thanks!     Africa Oneill RN on 2/7/2023 at 3:55 PM      "

## 2023-02-07 NOTE — PROGRESS NOTES
"PRIMARY DIAGNOSIS: \"GENERIC\" NURSING  Weakness, scrotal pain, Acute and chronic respiratory failure with hypoxia, Postoperative pain  OUTPATIENT/OBSERVATION GOALS TO BE MET BEFORE DISCHARGE:  1. ADLs back to baseline: No    2. Activity and level of assistance: Assist 1 with walker. Patient's wife at bedside. Stands to use urinal.    3. Pain status: Improved-controlled with oral pain medications - See MAR    4. Return to near baseline physical activity: No     Discharge Planner Nurse   Safe discharge environment identified: Yes - from home with wife with home care services  Barriers to discharge: Yes - Increased oxygen needs and pain management.          Entered by: Tabitha Lima RN 02/07/2023 2:20 AM     Please review provider order for any additional goals.   Nurse to notify provider when observation goals have been met and patient is ready for discharge.  "

## 2023-02-08 ENCOUNTER — PATIENT OUTREACH (OUTPATIENT)
Dept: INTERNAL MEDICINE | Facility: OTHER | Age: 68
End: 2023-02-08
Payer: MEDICARE

## 2023-02-08 DIAGNOSIS — J18.9 PNEUMONIA DUE TO INFECTIOUS ORGANISM, UNSPECIFIED LATERALITY, UNSPECIFIED PART OF LUNG: Primary | ICD-10-CM

## 2023-02-08 NOTE — TELEPHONE ENCOUNTER
Attempted to contact patient, no answer. Home care RN aware.  Julisa Thomson RN on 2/8/2023 at 4:23 PM

## 2023-02-08 NOTE — TELEPHONE ENCOUNTER
Stop Levaquin.    Start Augmentin 875 mg twice daily x7 days.      Electronically signed by:  Matt Whitmore MD  on February 8, 2023 2:37 PM

## 2023-02-08 NOTE — TELEPHONE ENCOUNTER
Patient discharged to home with skilled nursing services. [facility] admitting to service 2/7/23  No TCM call required per policy.     ENDER SMILEY RN on 2/8/2023 at 9:02 AM

## 2023-02-10 ENCOUNTER — MYC MEDICAL ADVICE (OUTPATIENT)
Dept: INTERNAL MEDICINE | Facility: OTHER | Age: 68
End: 2023-02-10
Payer: MEDICARE

## 2023-02-15 ENCOUNTER — LAB (OUTPATIENT)
Dept: LAB | Facility: OTHER | Age: 68
End: 2023-02-15
Attending: INTERNAL MEDICINE
Payer: MEDICARE

## 2023-02-15 ENCOUNTER — OFFICE VISIT (OUTPATIENT)
Dept: INTERNAL MEDICINE | Facility: OTHER | Age: 68
End: 2023-02-15
Attending: INTERNAL MEDICINE
Payer: MEDICARE

## 2023-02-15 VITALS
HEART RATE: 68 BPM | SYSTOLIC BLOOD PRESSURE: 132 MMHG | RESPIRATION RATE: 18 BRPM | TEMPERATURE: 96.6 F | OXYGEN SATURATION: 95 % | DIASTOLIC BLOOD PRESSURE: 74 MMHG

## 2023-02-15 DIAGNOSIS — I10 BENIGN ESSENTIAL HYPERTENSION: ICD-10-CM

## 2023-02-15 DIAGNOSIS — R60.0 BILATERAL LOWER EXTREMITY EDEMA: ICD-10-CM

## 2023-02-15 DIAGNOSIS — G89.18 POSTOPERATIVE PAIN: ICD-10-CM

## 2023-02-15 DIAGNOSIS — I97.89 COMPLICATION OF ARTERY ASSOCIATED WITH SURGICAL PROCEDURE: Primary | ICD-10-CM

## 2023-02-15 DIAGNOSIS — N49.2 SCROTAL INFECTION: ICD-10-CM

## 2023-02-15 DIAGNOSIS — F51.01 PRIMARY INSOMNIA: ICD-10-CM

## 2023-02-15 DIAGNOSIS — N50.82 SCROTAL PAIN: ICD-10-CM

## 2023-02-15 DIAGNOSIS — E87.5 HYPERKALEMIA: ICD-10-CM

## 2023-02-15 DIAGNOSIS — R68.82 DECREASED LIBIDO: ICD-10-CM

## 2023-02-15 DIAGNOSIS — S76.012A TEAR OF LEFT GLUTEUS MAXIMUS MUSCLE: ICD-10-CM

## 2023-02-15 DIAGNOSIS — M35.3 POLYMYALGIA RHEUMATICA (H): ICD-10-CM

## 2023-02-15 DIAGNOSIS — R29.898 LEFT LEG WEAKNESS: ICD-10-CM

## 2023-02-15 DIAGNOSIS — S76.902A: ICD-10-CM

## 2023-02-15 DIAGNOSIS — E29.1 HYPOGONADISM IN MALE: ICD-10-CM

## 2023-02-15 DIAGNOSIS — M96.840 POSTOPERATIVE HEMATOMA OF MUSCULOSKELETAL STRUCTURE FOLLOWING MUSCULOSKELETAL PROCEDURE: ICD-10-CM

## 2023-02-15 DIAGNOSIS — N50.89 SCROTAL EDEMA: ICD-10-CM

## 2023-02-15 DIAGNOSIS — D62 ACUTE BLOOD LOSS ANEMIA: ICD-10-CM

## 2023-02-15 LAB
ALBUMIN SERPL BCG-MCNC: 3.8 G/DL (ref 3.5–5.2)
ALP SERPL-CCNC: 64 U/L (ref 40–129)
ALT SERPL W P-5'-P-CCNC: 11 U/L (ref 10–50)
ANION GAP SERPL CALCULATED.3IONS-SCNC: 10 MMOL/L (ref 7–15)
AST SERPL W P-5'-P-CCNC: 15 U/L (ref 10–50)
BILIRUB SERPL-MCNC: 0.5 MG/DL
BUN SERPL-MCNC: 13.7 MG/DL (ref 8–23)
CALCIUM SERPL-MCNC: 9.7 MG/DL (ref 8.8–10.2)
CHLORIDE SERPL-SCNC: 104 MMOL/L (ref 98–107)
CREAT SERPL-MCNC: 1.12 MG/DL (ref 0.67–1.17)
CRP SERPL-MCNC: 31.19 MG/L
DEPRECATED HCO3 PLAS-SCNC: 25 MMOL/L (ref 22–29)
ERYTHROCYTE [DISTWIDTH] IN BLOOD BY AUTOMATED COUNT: 17.9 % (ref 10–15)
ERYTHROCYTE [SEDIMENTATION RATE] IN BLOOD BY WESTERGREN METHOD: 25 MM/HR (ref 0–20)
GFR SERPL CREATININE-BSD FRML MDRD: 72 ML/MIN/1.73M2
GLUCOSE SERPL-MCNC: 97 MG/DL (ref 70–99)
HCT VFR BLD AUTO: 37 % (ref 40–53)
HGB BLD-MCNC: 12.1 G/DL (ref 13.3–17.7)
MCH RBC QN AUTO: 31.3 PG (ref 26.5–33)
MCHC RBC AUTO-ENTMCNC: 32.7 G/DL (ref 31.5–36.5)
MCV RBC AUTO: 96 FL (ref 78–100)
PLATELET # BLD AUTO: 525 10E3/UL (ref 150–450)
POTASSIUM SERPL-SCNC: 3.5 MMOL/L (ref 3.4–5.3)
PROT SERPL-MCNC: 7.6 G/DL (ref 6.4–8.3)
RBC # BLD AUTO: 3.86 10E6/UL (ref 4.4–5.9)
SODIUM SERPL-SCNC: 139 MMOL/L (ref 136–145)
WBC # BLD AUTO: 11.3 10E3/UL (ref 4–11)

## 2023-02-15 PROCEDURE — 85652 RBC SED RATE AUTOMATED: CPT | Mod: ZL

## 2023-02-15 PROCEDURE — 86140 C-REACTIVE PROTEIN: CPT | Mod: ZL

## 2023-02-15 PROCEDURE — 36415 COLL VENOUS BLD VENIPUNCTURE: CPT | Mod: ZL

## 2023-02-15 PROCEDURE — 99495 TRANSJ CARE MGMT MOD F2F 14D: CPT | Performed by: INTERNAL MEDICINE

## 2023-02-15 PROCEDURE — G0463 HOSPITAL OUTPT CLINIC VISIT: HCPCS

## 2023-02-15 PROCEDURE — 85027 COMPLETE CBC AUTOMATED: CPT | Mod: ZL

## 2023-02-15 PROCEDURE — 84403 ASSAY OF TOTAL TESTOSTERONE: CPT | Mod: ZL

## 2023-02-15 PROCEDURE — 80053 COMPREHEN METABOLIC PANEL: CPT | Mod: ZL

## 2023-02-15 RX ORDER — OXYCODONE HYDROCHLORIDE 5 MG/1
5 TABLET ORAL EVERY 6 HOURS PRN
Qty: 12 TABLET | Refills: 0 | Status: SHIPPED | OUTPATIENT
Start: 2023-02-22 | End: 2023-03-08

## 2023-02-15 RX ORDER — ZOLPIDEM TARTRATE 10 MG/1
5-10 TABLET ORAL
Qty: 10 TABLET | Refills: 3 | Status: SHIPPED | OUTPATIENT
Start: 2023-02-15 | End: 2023-03-08

## 2023-02-15 RX ORDER — DIAZEPAM 5 MG
5 TABLET ORAL EVERY 8 HOURS PRN
Qty: 10 TABLET | Refills: 3 | Status: SHIPPED | OUTPATIENT
Start: 2023-02-15 | End: 2023-03-08

## 2023-02-15 RX ORDER — SPIRONOLACTONE 25 MG/1
25 TABLET ORAL DAILY
Qty: 90 TABLET | Refills: 1 | Status: SHIPPED | OUTPATIENT
Start: 2023-02-15 | End: 2023-03-08

## 2023-02-15 RX ORDER — OXYCODONE HYDROCHLORIDE 5 MG/1
5 TABLET ORAL EVERY 6 HOURS PRN
Qty: 12 TABLET | Refills: 0 | Status: SHIPPED | OUTPATIENT
Start: 2023-02-27 | End: 2023-03-08

## 2023-02-15 RX ORDER — CEPHALEXIN 500 MG/1
500 CAPSULE ORAL 3 TIMES DAILY
Qty: 30 CAPSULE | Refills: 1 | Status: SHIPPED | OUTPATIENT
Start: 2023-02-15 | End: 2023-02-24

## 2023-02-15 RX ORDER — OXYCODONE HYDROCHLORIDE 5 MG/1
5 TABLET ORAL EVERY 6 HOURS PRN
Qty: 12 TABLET | Refills: 0 | Status: SHIPPED | OUTPATIENT
Start: 2023-02-15 | End: 2023-04-05

## 2023-02-15 ASSESSMENT — ENCOUNTER SYMPTOMS
CONSTIPATION: 0
JOINT SWELLING: 0
FREQUENCY: 0
DIZZINESS: 0
HEMATOCHEZIA: 0
NAUSEA: 0
HEARTBURN: 0
FEVER: 0
SORE THROAT: 0
HEADACHES: 0
DIARRHEA: 0
BACK PAIN: 1
SHORTNESS OF BREATH: 0
CHILLS: 0
PALPITATIONS: 0
MYALGIAS: 1
NERVOUS/ANXIOUS: 0
WEAKNESS: 1
EYE PAIN: 0
ABDOMINAL PAIN: 0
COUGH: 0
DYSURIA: 0
ARTHRALGIAS: 0
PARESTHESIAS: 0
HEMATURIA: 0
BRUISES/BLEEDS EASILY: 1

## 2023-02-15 ASSESSMENT — PAIN SCALES - GENERAL: PAINLEVEL: SEVERE PAIN (6)

## 2023-02-15 NOTE — PROGRESS NOTES
Assessment & Plan   Selvin An is a 67 year old accompanied by his spouse, presenting for the following health issues:      ICD-10-CM    1. Complication of artery associated with surgical procedure - Left superior gluteal artery Injury -- subsequently thrombosed / embolized by Interventional Radiology  I97.89 Adult Physical Medicine and Rehab  Referral     MR Pelvis Musclular Tissue wo & w Contrast     MR Hip Left w/o Contrast     oxyCODONE (ROXICODONE) 5 MG tablet     diazepam (VALIUM) 5 MG tablet     oxyCODONE (ROXICODONE) 5 MG tablet     oxyCODONE (ROXICODONE) 5 MG tablet     Adult Neurology  Referral      2. Injury of muscle of left thigh  S76.902A Adult Physical Medicine and Rehab  Referral     MR Pelvis Musclular Tissue wo & w Contrast     MR Hip Left w/o Contrast     oxyCODONE (ROXICODONE) 5 MG tablet     diazepam (VALIUM) 5 MG tablet     oxyCODONE (ROXICODONE) 5 MG tablet     oxyCODONE (ROXICODONE) 5 MG tablet     Adult Neurology  Referral      3. Tear of left gluteus elif muscle  S76.012A Adult Physical Medicine and Rehab  Referral     MR Pelvis Musclular Tissue wo & w Contrast     MR Hip Left w/o Contrast     oxyCODONE (ROXICODONE) 5 MG tablet     diazepam (VALIUM) 5 MG tablet     oxyCODONE (ROXICODONE) 5 MG tablet     oxyCODONE (ROXICODONE) 5 MG tablet     Adult Neurology  Referral      4. Left leg weakness  R29.898 Adult Neurology  Referral      5. Scrotal edema  N50.89 cephALEXin (KEFLEX) 500 MG capsule      6. Scrotal infection  N49.2 cephALEXin (KEFLEX) 500 MG capsule      7. Scrotal pain  N50.82 oxyCODONE (ROXICODONE) 5 MG tablet     oxyCODONE (ROXICODONE) 5 MG tablet     oxyCODONE (ROXICODONE) 5 MG tablet      8. Postoperative hematoma of musculoskeletal structure following musculoskeletal procedure  M96.840 oxyCODONE (ROXICODONE) 5 MG tablet     diazepam (VALIUM) 5 MG tablet     oxyCODONE (ROXICODONE) 5 MG tablet     oxyCODONE  (ROXICODONE) 5 MG tablet      9. Postoperative pain  G89.18 oxyCODONE (ROXICODONE) 5 MG tablet     oxyCODONE (ROXICODONE) 5 MG tablet     oxyCODONE (ROXICODONE) 5 MG tablet      10. Primary insomnia  F51.01 zolpidem (AMBIEN) 10 MG tablet      11. Benign essential hypertension  I10 spironolactone (ALDACTONE) 25 MG tablet      12. Bilateral lower extremity edema  R60.0 spironolactone (ALDACTONE) 25 MG tablet      Patient recently hospitalized due to possible pneumonia, possible scrotal infection.  Ended up being discharged on Levaquin.  Subsequently started on Augmentin.  States that his scrotal pain is about 50% better.  Scrotum is still quite red and irritated.  Additional prescription of Keflex sent to pharmacy today.    Patient had complications following surgery which resulted in arterial laceration, large hematoma in the left buttocks.  Ended up having tearing of some of his gluteal and thigh muscles, muscle injury.  He ultimately had embolization/thrombosis of his superior gluteal artery.  Since that time has had significant weakness of his left leg and is suspicious that he will have long-term disability because of this.  Has been doing some physical therapy.  They are quite concerned that he has significant muscle injury or denervation or muscular tissue death given the recent operative complications and subsequent postoperative treatment/issues.  MRI of the sacrum and pelvis as well as MRI of the hip ordered.  Physical medicine rehabilitation referral placed.  He is having a lot of pain.  Small prescriptions of 5 mg oxycodone sent to pharmacy x3 prescriptions.  Physical therapy is worried about denervation, given the arterial coiling/embolization, may have suffered neurologic deficit as well involving the gluteal musculature.  EMG ordered.    Insomnia.  Ongoing.  Start trial of Ambien at bedtime.  He is not sleeping much due to pain.    Hypertension and edema.  Ongoing, somewhat worsening.  Restart  spironolactone 25 mg daily.  May need to consider repeat metabolic panel in 1 to 3 weeks.    Encouraged weight loss and regular exercise.     Return in about 3 weeks (around 3/8/2023) for follow-up post-op pain, left leg weakness.    Matt Whitmore MD  [facility]     Subjective   Post Hosptial      History of Present Illness       Reason for visit:  Post hosptial    He eats 2-3 servings of fruits and vegetables daily.He consumes 0 sweetened beverage(s) daily.He exercises with enough effort to increase his heart rate 9 or less minutes per day.  He exercises with enough effort to increase his heart rate 3 or less days per week.   He is taking medications regularly.         Hospital Follow-up Visit:    Hospital/Nursing Home/IP Rehab Facility: [facility]  Date of Admission: 02/05/2023  Date of Discharge: 02/07/2023  Reason(s) for Admission: possible pneumonia, possible infection in scrotum    Was your hospitalization related to COVID-19? No   Problems taking medications regularly:  None  Medication changes since discharge: anti-biotic  Problems adhering to non-medication therapy:  None    Summary of hospitalization:  [facility] discharge summary reviewed  Diagnostic Tests/Treatments reviewed.  Follow up needed: Home Care, PMR physician referral.   Other Healthcare Providers Involved in Patient s Care:         Homecare, Specialist appointment - PMR, Physical Therapy and Imaging  Update since discharge: stable.     Plan of care communicated with patient and family     Review of Systems   Constitutional: Negative for chills and fever.   HENT: Positive for hearing loss. Negative for congestion, ear pain and sore throat.    Eyes: Negative for pain and visual disturbance.   Respiratory: Negative for cough and shortness of breath.    Cardiovascular: Negative for chest pain, palpitations and peripheral edema.   Gastrointestinal: Negative for abdominal pain, constipation,  diarrhea, heartburn, hematochezia and nausea.   Genitourinary: Positive for impotence. Negative for dysuria, frequency, genital sores, hematuria, penile discharge and urgency.   Musculoskeletal: Positive for back pain (left buttocks/thigh pain) and myalgias. Negative for arthralgias and joint swelling.        Left leg weakness - can't Abduct left leg, up against gravity when laying in bed.   Very hard time standing, due to left leg weakness.   Skin: Negative for rash.   Allergic/Immunologic: Positive for immunocompromised state.   Neurological: Positive for weakness. Negative for dizziness, headaches and paresthesias.   Hematological: Bruises/bleeds easily.   Psychiatric/Behavioral: Negative for mood changes. The patient is not nervous/anxious.           Objective    /74 (BP Location: Right arm, Patient Position: Sitting, Cuff Size: Adult Large)   Pulse 68   Temp (!) 96.6  F (35.9  C) (Temporal)   Resp 18   SpO2 95%   There is no height or weight on file to calculate BMI.  Physical Exam  Constitutional:       General: He is not in acute distress.     Appearance: He is well-developed. He is obese. He is not diaphoretic.   HENT:      Head: Normocephalic and atraumatic.   Eyes:      General: No scleral icterus.     Conjunctiva/sclera: Conjunctivae normal.   Cardiovascular:      Rate and Rhythm: Normal rate and regular rhythm.      Pulses: Normal pulses.   Pulmonary:      Effort: Pulmonary effort is normal.      Breath sounds: Normal breath sounds.      Comments: + Wearing oxygen  Abdominal:      Palpations: Abdomen is soft.      Tenderness: There is no abdominal tenderness.   Musculoskeletal:         General: Swelling present. No tenderness or deformity.      Cervical back: Neck supple.      Right lower leg: Edema present.      Left lower leg: Edema present.   Lymphadenopathy:      Cervical: No cervical adenopathy.   Skin:     General: Skin is warm and dry.      Findings: Bruising (Thigh, scrotum) present. No  erythema (scrotum) or rash.   Neurological:      Mental Status: He is alert and oriented to person, place, and time. Mental status is at baseline.   Psychiatric:         Mood and Affect: Mood normal.         Behavior: Behavior normal.

## 2023-02-15 NOTE — NURSING NOTE
Chief Complaint   Patient presents with     Post Hosptial         Initial /74 (BP Location: Right arm, Patient Position: Sitting, Cuff Size: Adult Large)   Pulse 68   Temp (!) 96.6  F (35.9  C) (Temporal)   Resp 18   SpO2 95%  Estimated body mass index is 39.41 kg/m  as calculated from the following:    Height as of 2/6/23: 1.829 m (6').    Weight as of 2/7/23: 131.8 kg (290 lb 9.6 oz).       FOOD SECURITY SCREENING QUESTIONS:    The next two questions are to help us understand your food security.  If you are feeling you need any assistance in this area, we have resources available to support you today.    Hunger Vital Signs:  Within the past 12 months we worried whether our food would run out before we got money to buy more. Never  Within the past 12 months the food we bought just didn't last and we didn't have money to get more. Never    Advance Care Directive on file? no      Medication reconciliation complete.      Kevin Haynes,on 2/15/2023 at 9:55 AM

## 2023-02-17 LAB — TESTOST SERPL-MCNC: 183 NG/DL (ref 240–950)

## 2023-02-20 ENCOUNTER — TELEPHONE (OUTPATIENT)
Dept: INTERNAL MEDICINE | Facility: OTHER | Age: 68
End: 2023-02-20
Payer: MEDICARE

## 2023-02-20 NOTE — TELEPHONE ENCOUNTER
"Home Care regulation mandates that you are notified about drug discrepancies, interactions & contraindications. Response within a 24 hour timeframe is established by CMS as \"best practice\" for the delivery of home health care. Home Care is required to report if the 24 hour timeframe was met. The home health clinician will contact you again if this timeframe is not met or if the response does not address all concerns.      Situation:  You are being contacted for clarifying orders related to issues found during medication reconciliation.     Background:  The patient was admitted to [facility]. Upon admission, a med reconciliation was completed to identify any drug discrepancies, interactions or contraindications. Home Care's drug regime review has revealed significant medication issues.     Assessment:         Severe interaction:     Oxycodone and Zolpidem      Recommendations:    Please evaluate this information and indicate below whether or not changes are required. A full copy of the patient's drug interaction/contraindications report is available upon request.      Provider response/orders as follows      Thanks!     Africa Oneill RN on 2/20/2023 at 3:39 PM    "

## 2023-02-21 ENCOUNTER — MYC MEDICAL ADVICE (OUTPATIENT)
Dept: INTERNAL MEDICINE | Facility: OTHER | Age: 68
End: 2023-02-21
Payer: MEDICARE

## 2023-02-21 NOTE — TELEPHONE ENCOUNTER
Discontinue home supplemental oxygen.     Electronically signed by:  Matt Whitmore MD  on February 21, 2023 5:11 PM

## 2023-02-21 NOTE — TELEPHONE ENCOUNTER
Called [facility] to cancel patient's Oxygen orders per his request via [employer].  No longer using O2 during day or night.      We are to Fax order for O2 discontinuation to [facility] in [place].  Phone [phone]  Fax [phone].    Jahaira Ace RN on 2/21/2023 at 4:44 PM

## 2023-02-22 NOTE — TELEPHONE ENCOUNTER
Faxed discontinuation of supplemental O2 to [facility] in [place].  Called to confirm that they got it and we are all set.  Will update patient.    Jahaira Ace RN on 2/22/2023 at 8:35 AM

## 2023-02-23 ENCOUNTER — MYC MEDICAL ADVICE (OUTPATIENT)
Dept: INTERNAL MEDICINE | Facility: OTHER | Age: 68
End: 2023-02-23
Payer: MEDICARE

## 2023-02-24 ENCOUNTER — OFFICE VISIT (OUTPATIENT)
Dept: INTERNAL MEDICINE | Facility: OTHER | Age: 68
End: 2023-02-24
Payer: MEDICARE

## 2023-02-24 VITALS
OXYGEN SATURATION: 94 % | TEMPERATURE: 97.4 F | SYSTOLIC BLOOD PRESSURE: 130 MMHG | DIASTOLIC BLOOD PRESSURE: 72 MMHG | RESPIRATION RATE: 18 BRPM

## 2023-02-24 DIAGNOSIS — K59.09 INTERMITTENT CONSTIPATION: ICD-10-CM

## 2023-02-24 DIAGNOSIS — M35.3 POLYMYALGIA RHEUMATICA (H): ICD-10-CM

## 2023-02-24 DIAGNOSIS — M54.16 CHRONIC RADICULAR LOW BACK PAIN: ICD-10-CM

## 2023-02-24 DIAGNOSIS — N49.2 SCROTAL INFECTION: Primary | ICD-10-CM

## 2023-02-24 DIAGNOSIS — K21.9 GASTROESOPHAGEAL REFLUX DISEASE, UNSPECIFIED WHETHER ESOPHAGITIS PRESENT: ICD-10-CM

## 2023-02-24 DIAGNOSIS — N18.31 STAGE 3A CHRONIC KIDNEY DISEASE (H): ICD-10-CM

## 2023-02-24 DIAGNOSIS — G89.29 CHRONIC RADICULAR LOW BACK PAIN: ICD-10-CM

## 2023-02-24 DIAGNOSIS — N50.89 SCROTAL EDEMA: ICD-10-CM

## 2023-02-24 DIAGNOSIS — N50.82 SCROTAL PAIN: ICD-10-CM

## 2023-02-24 DIAGNOSIS — E66.01 MORBID OBESITY (H): ICD-10-CM

## 2023-02-24 DIAGNOSIS — I50.32 CHRONIC HEART FAILURE WITH PRESERVED EJECTION FRACTION (HFPEF) (H): ICD-10-CM

## 2023-02-24 PROBLEM — J96.21 ACUTE AND CHRONIC RESPIRATORY FAILURE WITH HYPOXIA (H): Status: RESOLVED | Noted: 2023-02-06 | Resolved: 2023-02-24

## 2023-02-24 PROCEDURE — 99214 OFFICE O/P EST MOD 30 MIN: CPT | Performed by: INTERNAL MEDICINE

## 2023-02-24 PROCEDURE — G0463 HOSPITAL OUTPT CLINIC VISIT: HCPCS

## 2023-02-24 RX ORDER — FAMOTIDINE 20 MG/1
20 TABLET, FILM COATED ORAL 2 TIMES DAILY
Qty: 180 TABLET | Refills: 4 | Status: SHIPPED | OUTPATIENT
Start: 2023-02-24 | End: 2023-12-01

## 2023-02-24 RX ORDER — CIPROFLOXACIN 500 MG/1
500 TABLET, FILM COATED ORAL 2 TIMES DAILY
Qty: 20 TABLET | Refills: 0 | Status: SHIPPED | OUTPATIENT
Start: 2023-02-24 | End: 2023-03-07

## 2023-02-24 RX ORDER — POLYETHYLENE GLYCOL 3350 17 G/17G
1 POWDER, FOR SOLUTION ORAL DAILY
Qty: 850 G | Refills: 11 | Status: SHIPPED | OUTPATIENT
Start: 2023-02-24 | End: 2023-10-06

## 2023-02-24 RX ORDER — PREDNISONE 1 MG/1
TABLET ORAL
Qty: 150 TABLET | Refills: 0 | Status: SHIPPED | OUTPATIENT
Start: 2023-02-24 | End: 2023-10-06

## 2023-02-24 RX ORDER — DOCUSATE SODIUM 100 MG/1
100 CAPSULE, LIQUID FILLED ORAL 2 TIMES DAILY
Qty: 180 CAPSULE | Refills: 4 | Status: SHIPPED | OUTPATIENT
Start: 2023-02-24 | End: 2023-05-03

## 2023-02-24 RX ORDER — PREDNISONE 1 MG/1
TABLET ORAL
Qty: 1 TABLET | Refills: 0 | Status: SHIPPED | OUTPATIENT
Start: 2023-02-24 | End: 2023-02-24

## 2023-02-24 RX ORDER — CYCLOBENZAPRINE HCL 10 MG
10 TABLET ORAL 3 TIMES DAILY PRN
Qty: 270 TABLET | Refills: 1 | Status: SHIPPED | OUTPATIENT
Start: 2023-02-24 | End: 2023-05-03

## 2023-02-24 RX ORDER — DOXYCYCLINE 100 MG/1
100 CAPSULE ORAL 2 TIMES DAILY
Qty: 20 CAPSULE | Refills: 0 | Status: SHIPPED | OUTPATIENT
Start: 2023-02-24 | End: 2023-03-08

## 2023-02-24 ASSESSMENT — ENCOUNTER SYMPTOMS
SORE THROAT: 0
SHORTNESS OF BREATH: 0
PALPITATIONS: 0
JOINT SWELLING: 0
PARESTHESIAS: 0
ARTHRALGIAS: 0
NERVOUS/ANXIOUS: 0
FREQUENCY: 0
BACK PAIN: 1
MYALGIAS: 1
BRUISES/BLEEDS EASILY: 1
DIARRHEA: 0
NAUSEA: 0
DIZZINESS: 0
HEMATOCHEZIA: 0
DYSURIA: 0
COUGH: 0
EYE PAIN: 0
CONSTIPATION: 0
ABDOMINAL PAIN: 0
HEMATURIA: 0
HEARTBURN: 0
WEAKNESS: 1
HEADACHES: 0
FEVER: 0
CHILLS: 0

## 2023-02-24 ASSESSMENT — PAIN SCALES - GENERAL: PAINLEVEL: MODERATE PAIN (5)

## 2023-02-24 NOTE — PATIENT INSTRUCTIONS
Start doxycycline 100 mg tablet -- take twice daily (AM and PM) after meals.   Avoid dairy products within 1 to 2 hours (before or after) taking antibiotic -- as it will grab the antibiotic and not let it work.   -- You can eat whatever you want for lunch.   -- Take calcium or magnesium or multivitamin supplements at lunchtime while taking doxycycline.     -- Plus start Ciprofloxacin -- twice daily -- after lunch and at bedtime.     Try to stop using the Valium and Oxycodone as much as possible.     Return as needed for follow-up with Dr. Whitmore.    Clinic : [phone]  Appointment line: [phone]

## 2023-02-24 NOTE — NURSING NOTE
Chief Complaint   Patient presents with     Recheck:  Infection         Initial /72 (BP Location: Right arm, Patient Position: Sitting, Cuff Size: Adult Large)   Temp 97.4  F (36.3  C) (Temporal)   Resp 18   SpO2 94%  Estimated body mass index is 39.41 kg/m  as calculated from the following:    Height as of 2/6/23: 1.829 m (6').    Weight as of 2/7/23: 131.8 kg (290 lb 9.6 oz).       FOOD SECURITY SCREENING QUESTIONS:    The next two questions are to help us understand your food security.  If you are feeling you need any assistance in this area, we have resources available to support you today.    Hunger Vital Signs:  Within the past 12 months we worried whether our food would run out before we got money to buy more. Never  Within the past 12 months the food we bought just didn't last and we didn't have money to get more. Never    Advance Care Directive on file? no      Medication reconciliation complete.      Kevin Haynes,on 2/24/2023 at 1:02 PM

## 2023-02-24 NOTE — TELEPHONE ENCOUNTER
"Fax received from [id number]) of [place], with URGENT message regarding:    predniSONE (DELTASONE) 1 MG tablet 1 tablet 0 2023  No   Simg daily x 15 days, reduce 1 mg every 15 days - start 2023     \"Can we get a larger qty (150 tabs) to complete?\"    Milagro Baez RN .............. 2023  2:49 PM        "

## 2023-02-24 NOTE — PROGRESS NOTES
Assessment & Plan   Selvin An is a 67 year old accompanied by his spouse, presenting for the following health issues:      ICD-10-CM    1. Scrotal infection  N49.2 ciprofloxacin (CIPRO) 500 MG tablet     doxycycline hyclate (VIBRAMYCIN) 100 MG capsule      2. Scrotal pain  N50.82 ciprofloxacin (CIPRO) 500 MG tablet     doxycycline hyclate (VIBRAMYCIN) 100 MG capsule      3. Scrotal edema  N50.89 ciprofloxacin (CIPRO) 500 MG tablet     doxycycline hyclate (VIBRAMYCIN) 100 MG capsule      4. Polymyalgia rheumatica (H)  M35.3 DISCONTINUED: predniSONE (DELTASONE) 1 MG tablet      5. Chronic heart failure with preserved ejection fraction (HFpEF) (H)  I50.32       6. Morbid obesity (H)  E66.01       7. Stage 3a chronic kidney disease (H)  N18.31       8. Gastroesophageal reflux disease, unspecified whether esophagitis present  K21.9 famotidine (PEPCID) 20 MG tablet      9. Intermittent constipation  K59.09 docusate sodium (COLACE) 100 MG capsule     polyethylene glycol (MIRALAX) 17 GM/Dose powder      10. Chronic radicular low back pain  M54.16 cyclobenzaprine (FLEXERIL) 10 MG tablet    G89.29       Patient presents for follow-up due to ongoing issues with scrotal pain and swelling, infection.  Wife brings him in today.    Scrotal infection with pain and edema.  Currently on Keflex and is not having any significant clinical response.  Stop Keflex. Keflex hasn't helped. Augmentin prior, also wasn't very helpful. Had most improvement with oral Levaquin, which was initially prescribed after hospitalization.   - scrotum is still very painful, swollen, skin is very sensitive.   -Start doxycycline.  If needed afterwards, start ciprofloxacin.    Polymyalgia rheumatica.  Currently taking 5 mg prednisone daily.  Advised to try reducing down to 4 mg daily, after 2 weeks 3 mg daily, reducing by 1 mg every 2 weeks.  He does have follow-up with rheumatology and recently had some labs drawn.    Chronic heart failure with preserved  ejection fraction, appears to be doing better.  His leg edema and breathing have improved slightly.  Close monitoring and follow-up advised.    Obesity, Ongoing. Discussed need for reduced oral caloric intake, weight loss, regular exercise and reduce carbohydrate/sugar intake.    Stage IIIa chronic kidney disease.  Kidney function is relatively stable.  Encouraged NSAID avoidance.    Heartburn and reflux, Pepcid has been quite effective.  Needs refills.  Taking regularly to help improve heartburn symptoms.    Intermittent constipation.  Combination of Colace and MiraLAX-well-tolerated and effective.  Needs refills.    Radicular low back pain.  Still using Flexeril intermittently which has been effective.  Needs refills.    Return for follow-up as needed with Dr. Whitmore., Appointments: [phone].    Matt Whitmore MD  [facility]     Subjective   Recheck:  Infection      History of Present Illness       Reason for visit:  Injury follow up  Symptom onset:  More than a month  Symptom intensity:  Moderate  Symptom progression:  Staying the same  Had these symptoms before:  Yes    He eats 0-1 servings of fruits and vegetables daily.He consumes 1 sweetened beverage(s) daily.He exercises with enough effort to increase his heart rate 9 or less minutes per day.  He exercises with enough effort to increase his heart rate 3 or less days per week.   He is taking medications regularly.     Review of Systems   Constitutional: Negative for chills and fever.   HENT: Positive for hearing loss. Negative for congestion, ear pain and sore throat.    Eyes: Negative for pain and visual disturbance.   Respiratory: Negative for cough and shortness of breath.    Cardiovascular: Negative for chest pain, palpitations and peripheral edema.   Gastrointestinal: Negative for abdominal pain, constipation, diarrhea, heartburn, hematochezia and nausea.   Genitourinary: Positive for impotence. Negative for dysuria, frequency,  genital sores, hematuria, penile discharge and urgency.   Musculoskeletal: Positive for back pain (left buttocks/thigh pain) and myalgias. Negative for arthralgias and joint swelling.        Left leg weakness - can't Abduct left leg, up against gravity when laying in bed.   Very hard time standing, due to left leg weakness.   Skin: Negative for rash.   Allergic/Immunologic: Positive for immunocompromised state.   Neurological: Positive for weakness. Negative for dizziness, headaches and paresthesias.   Hematological: Bruises/bleeds easily.   Psychiatric/Behavioral: Negative for mood changes. The patient is not nervous/anxious.           Objective    /72 (BP Location: Right arm, Patient Position: Sitting, Cuff Size: Adult Large)   Temp 97.4  F (36.3  C) (Temporal)   Resp 18   SpO2 94%   There is no height or weight on file to calculate BMI.  Physical Exam  Constitutional:       General: He is not in acute distress.     Appearance: He is well-developed. He is obese. He is not diaphoretic.   HENT:      Head: Normocephalic and atraumatic.   Eyes:      General: No scleral icterus.     Conjunctiva/sclera: Conjunctivae normal.   Cardiovascular:      Rate and Rhythm: Normal rate and regular rhythm.      Pulses: Normal pulses.   Pulmonary:      Effort: Pulmonary effort is normal.      Breath sounds: Normal breath sounds.      Comments: + Wearing oxygen  Abdominal:      Palpations: Abdomen is soft.      Tenderness: There is no abdominal tenderness.   Musculoskeletal:         General: Swelling present. No tenderness or deformity.      Cervical back: Neck supple.      Right lower leg: Edema present.      Left lower leg: Edema present.   Lymphadenopathy:      Cervical: No cervical adenopathy.   Skin:     General: Skin is warm and dry.      Findings: Bruising (Thigh, scrotum) present. No erythema (scrotum) or rash.   Neurological:      Mental Status: He is alert and oriented to person, place, and time. Mental status is  at baseline.   Psychiatric:         Mood and Affect: Mood normal.         Behavior: Behavior normal.

## 2023-02-27 ENCOUNTER — TELEPHONE (OUTPATIENT)
Dept: INTERNAL MEDICINE | Facility: OTHER | Age: 68
End: 2023-02-27
Payer: MEDICARE

## 2023-02-27 DIAGNOSIS — N50.89 SCROTAL EDEMA: ICD-10-CM

## 2023-02-27 DIAGNOSIS — N49.2 SCROTAL INFECTION: ICD-10-CM

## 2023-02-27 DIAGNOSIS — N50.82 SCROTAL PAIN: ICD-10-CM

## 2023-02-27 NOTE — TELEPHONE ENCOUNTER
After verifying patient's name and date of birth, [facility] was given the below information.    Sumi Matamoros, ANIKET....2/27/2023 1:45 PM

## 2023-02-27 NOTE — TELEPHONE ENCOUNTER
I agree with the Home Care order requests.    Electronically signed by:  Matt Whitmore MD  on February 27, 2023 1:06 PM

## 2023-02-27 NOTE — TELEPHONE ENCOUNTER
Request for Home Care Occupational Therapy orders as follows:          Continuation frequency =   ___2___  x week x  ___3____ week(s)    Effective date = 2/27/23      Continuation frequency =   ______  x week x  _______ week(s)    Effective date =                                      For therapist: Sunshine Cortes OT   Please respond with .HOMECAREAGREE, if you are in agreement. Please sign with your comments and signature, if you are not in agreement.

## 2023-02-28 RX ORDER — DOXYCYCLINE 100 MG/1
CAPSULE ORAL
Qty: 20 CAPSULE | Refills: 0 | OUTPATIENT
Start: 2023-02-28

## 2023-02-28 NOTE — TELEPHONE ENCOUNTER
Prescription refused.  This is a duplicate request.      Requested Prescriptions   Pending Prescriptions Disp Refills     doxycycline hyclate (VIBRAMYCIN) 100 MG capsule [Pharmacy Med Name: DOXYCYCLINE HYCLATE 100MG CAP] 20 capsule 0     Sig: TAKE 1 CAPSULE (100 MG) BY MOUTH 2 TIMES DAILY FOR 10 DAYS -- AFTER BREAKFAST ANDSUPPER       There is no refill protocol information for this order        Last Prescription Date:   02/24/23 END 03/06/23  Last Fill Qty/Refills:         20, R-0    Mariana Servin RN on 2/28/2023 at 8:44 AM

## 2023-03-02 ENCOUNTER — OFFICE VISIT (OUTPATIENT)
Dept: ORTHOPEDICS | Facility: OTHER | Age: 68
End: 2023-03-02
Attending: STUDENT IN AN ORGANIZED HEALTH CARE EDUCATION/TRAINING PROGRAM
Payer: MEDICARE

## 2023-03-02 ENCOUNTER — HOSPITAL ENCOUNTER (OUTPATIENT)
Dept: GENERAL RADIOLOGY | Facility: OTHER | Age: 68
Discharge: HOME | End: 2023-03-02
Attending: STUDENT IN AN ORGANIZED HEALTH CARE EDUCATION/TRAINING PROGRAM
Payer: MEDICARE

## 2023-03-02 DIAGNOSIS — M53.3 SI (SACROILIAC) JOINT DYSFUNCTION: ICD-10-CM

## 2023-03-02 DIAGNOSIS — M53.3 SI (SACROILIAC) JOINT DYSFUNCTION: Primary | ICD-10-CM

## 2023-03-02 PROCEDURE — 99024 POSTOP FOLLOW-UP VISIT: CPT | Performed by: STUDENT IN AN ORGANIZED HEALTH CARE EDUCATION/TRAINING PROGRAM

## 2023-03-02 PROCEDURE — G0463 HOSPITAL OUTPT CLINIC VISIT: HCPCS

## 2023-03-02 PROCEDURE — 72170 X-RAY EXAM OF PELVIS: CPT

## 2023-03-02 ASSESSMENT — PAIN SCALES - GENERAL: PAINLEVEL: MODERATE PAIN (5)

## 2023-03-02 NOTE — PROGRESS NOTES
Chief Complaint   Patient presents with     Surgical Followup     S/p 1/3/23 Lt SI joint fusion       Radha Hall LPN

## 2023-03-02 NOTE — PROGRESS NOTES
Visit Date: 03/02/2023    HISTORY OF PRESENT ILLNESS:  Henry is a 67-year-old male who underwent a left-sided minimally invasive SI joint fusion approximately 2 months ago.  This was complicated by a postoperative hematoma that required surgical evacuation and coil embolization of the superior gluteal artery.  He has had a slow recovery since that time with extensive physical therapy.  His postoperative course is now also complicated by epididymitis and he is now on new antibiotics to help with his testicular discomfort.  He has made some slow progress with his SI joint pain, which appears to be improved from baseline, but he has been dealing with the post-hematoma pain in the lateral aspect of his thigh with weakness in the proximal left lower extremity with full strength in the distal left extremity.    PHYSICAL EXAMINATION:    GENERAL:  Well-developed, well-nourished.    MUSCULOSKELETAL NEUROLOGIC:  He has 5/5 strength in right hip flexion, knee extension, flexion, ankle dorsiflexion, plantarflexion, EHL.  He has band-like numbness around his abdomen, normal sensation throughout his distal lower extremities, 4-/5, left hip flexion strength, 5/5 left knee extension, flexion, ankle dorsiflexion and plantarflexion and EHL.    IMAGING:  Available for review today includes an AP pelvis x-ray, which shows adequate positioning of his SI joint hardware and the expected embolization coils.    ASSESSMENT AND PLAN:  Henry is a 67-year-old male who is now approximately 2 months out from a left-sided minimally invasive SI joint fusion surgery in the early stages of healing after a hematoma treatment.  He is scheduled for an upcoming MRI of the pelvis to further evaluate the ongoing resolution of his hematoma and for an EMG to further evaluate ongoing neurovascular recovery.  He will follow up with me in approximately 1 week after his MRI and EMG are completed.    This clinic visit took approximately 15 minutes.    Vic  MD Geovany        D: 2023   T: 2023   MT: KAYLEE    Name:     PITER ROTHMAN  MRN:      [id number]        Account:    [id number]   :      1955           Visit Date: 2023     Document: [id number]   FERNANDO

## 2023-03-05 ENCOUNTER — MYC MEDICAL ADVICE (OUTPATIENT)
Dept: INTERNAL MEDICINE | Facility: OTHER | Age: 68
End: 2023-03-05
Payer: MEDICARE

## 2023-03-05 DIAGNOSIS — N50.82 SCROTAL PAIN: ICD-10-CM

## 2023-03-05 DIAGNOSIS — N49.2 SCROTAL INFECTION: ICD-10-CM

## 2023-03-05 DIAGNOSIS — N50.89 SCROTAL EDEMA: ICD-10-CM

## 2023-03-06 RX ORDER — DOXYCYCLINE 100 MG/1
100 CAPSULE ORAL 2 TIMES DAILY
Qty: 20 CAPSULE | Refills: 0 | Status: CANCELLED | OUTPATIENT
Start: 2023-03-06

## 2023-03-07 ENCOUNTER — TELEPHONE (OUTPATIENT)
Dept: INTERNAL MEDICINE | Facility: OTHER | Age: 68
End: 2023-03-07

## 2023-03-07 RX ORDER — CIPROFLOXACIN 500 MG/1
500 TABLET, FILM COATED ORAL 2 TIMES DAILY
Qty: 20 TABLET | Refills: 0 | Status: SHIPPED | OUTPATIENT
Start: 2023-03-07 | End: 2023-04-05

## 2023-03-07 NOTE — TELEPHONE ENCOUNTER
Okay to continue Ciprofloxacin.     1. Scrotal infection  2. Scrotal pain  3. Scrotal edema  - ciprofloxacin (CIPRO) 500 MG tablet; Take 1 tablet (500 mg) by mouth 2 times daily - lunch and bedtime  Dispense: 20 tablet; Refill: 0      Electronically signed by:  Matt Whitmore MD  on March 7, 2023 9:02 AM

## 2023-03-07 NOTE — TELEPHONE ENCOUNTER
I agree with the Home Care order requests.    Electronically signed by:  Matt Whitmore MD  on March 7, 2023 4:14 PM

## 2023-03-07 NOTE — TELEPHONE ENCOUNTER
"S = Situation: chief complaint PATIENT FALL     Your patient sustained a fall on 3/2/23. Patient was ambulating in his living room when his wife was not home and he lost his balance and fell on his left hip on the carpet.       B  = Background: history & factors leading up to event     Frequency of falls: 1     Suspected cause of falls: balance concerns and weakness      A  =  Assessment: what is happening now     Home Care services being provided: skilled nursing, Physical Therapy, Occupational Therapy    R =   Request or Recommendation: action needed     Request for more nursing orders:      1x week x 3 weeks        Please respond with .HOMECAREAGREE, if you are in agreement. Please sign with your comments and signature, if you are not in agreement.              Home Care regulation mandates that you are notified about drug discrepancies, interactions & contraindications. Response within a 24 hour timeframe is established by CMS as \"best practice\" for the delivery of home health care. Home Care is required to report if the 24 hour timeframe was met. The home health clinician will contact you again if this timeframe is not met or if the response does not address all concerns.      Situation:  You are being contacted for clarifying orders related to issues found during medication reconciliation.     Background:  The patient was admitted to [facility]. Upon admission, a med reconciliation was completed to identify any drug discrepancies, interactions or contraindications. Home Care's drug regime review has revealed significant medication issues.     Assessment:        severe interactions:   Ciprofloxacin and Naloxegol  Ciprofloxacin and oxycodone  Ciprofloxacin and prednisone  Ciprofloxacin and zolpidem       Recommendations:    Please evaluate this information and indicate below whether or not changes are required. A full copy of the patient's drug interaction/contraindications report is available upon " request.      Provider response/orders as follows:           Thank you,     Africa Oneill RN on 3/7/2023 at 3:51 PM

## 2023-03-08 ENCOUNTER — TELEPHONE (OUTPATIENT)
Dept: RHEUMATOLOGY | Facility: CLINIC | Age: 68
End: 2023-03-08
Payer: MEDICARE

## 2023-03-08 ENCOUNTER — VIRTUAL VISIT (OUTPATIENT)
Dept: RHEUMATOLOGY | Facility: CLINIC | Age: 68
End: 2023-03-08
Payer: MEDICARE

## 2023-03-08 ENCOUNTER — OFFICE VISIT (OUTPATIENT)
Dept: INTERNAL MEDICINE | Facility: OTHER | Age: 68
End: 2023-03-08
Attending: INTERNAL MEDICINE
Payer: MEDICARE

## 2023-03-08 VITALS
OXYGEN SATURATION: 97 % | SYSTOLIC BLOOD PRESSURE: 132 MMHG | HEART RATE: 76 BPM | DIASTOLIC BLOOD PRESSURE: 74 MMHG | TEMPERATURE: 97.5 F | BODY MASS INDEX: 40.23 KG/M2 | WEIGHT: 296.6 LBS | RESPIRATION RATE: 20 BRPM

## 2023-03-08 DIAGNOSIS — Z79.52 LONG TERM (CURRENT) USE OF SYSTEMIC STEROIDS: ICD-10-CM

## 2023-03-08 DIAGNOSIS — N49.2 SCROTAL INFECTION: ICD-10-CM

## 2023-03-08 DIAGNOSIS — G89.18 POSTOPERATIVE PAIN: ICD-10-CM

## 2023-03-08 DIAGNOSIS — M35.3 POLYMYALGIA RHEUMATICA (H): Primary | ICD-10-CM

## 2023-03-08 DIAGNOSIS — E66.01 MORBID OBESITY (H): ICD-10-CM

## 2023-03-08 DIAGNOSIS — N50.89 SCROTAL EDEMA: ICD-10-CM

## 2023-03-08 DIAGNOSIS — I97.89 COMPLICATION OF ARTERY ASSOCIATED WITH SURGICAL PROCEDURE: ICD-10-CM

## 2023-03-08 DIAGNOSIS — I50.32 CHRONIC HEART FAILURE WITH PRESERVED EJECTION FRACTION (HFPEF) (H): ICD-10-CM

## 2023-03-08 DIAGNOSIS — M96.840 POSTOPERATIVE HEMATOMA OF MUSCULOSKELETAL STRUCTURE FOLLOWING MUSCULOSKELETAL PROCEDURE: ICD-10-CM

## 2023-03-08 DIAGNOSIS — G89.29 CHRONIC RADICULAR LOW BACK PAIN: ICD-10-CM

## 2023-03-08 DIAGNOSIS — S76.012A TEAR OF LEFT GLUTEUS MAXIMUS MUSCLE: ICD-10-CM

## 2023-03-08 DIAGNOSIS — N50.82 SCROTAL PAIN: ICD-10-CM

## 2023-03-08 DIAGNOSIS — M54.16 CHRONIC RADICULAR LOW BACK PAIN: ICD-10-CM

## 2023-03-08 DIAGNOSIS — N18.31 STAGE 3A CHRONIC KIDNEY DISEASE (H): ICD-10-CM

## 2023-03-08 DIAGNOSIS — S76.902A: ICD-10-CM

## 2023-03-08 PROCEDURE — G0463 HOSPITAL OUTPT CLINIC VISIT: HCPCS

## 2023-03-08 PROCEDURE — 99214 OFFICE O/P EST MOD 30 MIN: CPT | Performed by: INTERNAL MEDICINE

## 2023-03-08 PROCEDURE — 99213 OFFICE O/P EST LOW 20 MIN: CPT | Mod: 24 | Performed by: INTERNAL MEDICINE

## 2023-03-08 RX ORDER — GABAPENTIN 400 MG/1
CAPSULE ORAL
Qty: 810 CAPSULE | Refills: 1 | Status: SHIPPED | OUTPATIENT
Start: 2023-03-08 | End: 2023-05-03

## 2023-03-08 RX ORDER — DOXYCYCLINE 100 MG/1
100 CAPSULE ORAL 2 TIMES DAILY
Qty: 20 CAPSULE | Refills: 0 | Status: SHIPPED | OUTPATIENT
Start: 2023-03-11 | End: 2023-04-05

## 2023-03-08 RX ORDER — OXYCODONE HYDROCHLORIDE 5 MG/1
2.5-5 TABLET ORAL
Qty: 12 TABLET | Refills: 0 | Status: SHIPPED | OUTPATIENT
Start: 2023-03-08 | End: 2023-04-05

## 2023-03-08 ASSESSMENT — ANXIETY QUESTIONNAIRES
GAD7 TOTAL SCORE: 0
GAD7 TOTAL SCORE: 0
7. FEELING AFRAID AS IF SOMETHING AWFUL MIGHT HAPPEN: NOT AT ALL
8. IF YOU CHECKED OFF ANY PROBLEMS, HOW DIFFICULT HAVE THESE MADE IT FOR YOU TO DO YOUR WORK, TAKE CARE OF THINGS AT HOME, OR GET ALONG WITH OTHER PEOPLE?: SOMEWHAT DIFFICULT
2. NOT BEING ABLE TO STOP OR CONTROL WORRYING: NOT AT ALL
IF YOU CHECKED OFF ANY PROBLEMS ON THIS QUESTIONNAIRE, HOW DIFFICULT HAVE THESE PROBLEMS MADE IT FOR YOU TO DO YOUR WORK, TAKE CARE OF THINGS AT HOME, OR GET ALONG WITH OTHER PEOPLE: SOMEWHAT DIFFICULT
3. WORRYING TOO MUCH ABOUT DIFFERENT THINGS: NOT AT ALL
5. BEING SO RESTLESS THAT IT IS HARD TO SIT STILL: NOT AT ALL
7. FEELING AFRAID AS IF SOMETHING AWFUL MIGHT HAPPEN: NOT AT ALL
4. TROUBLE RELAXING: NOT AT ALL
6. BECOMING EASILY ANNOYED OR IRRITABLE: NOT AT ALL
GAD7 TOTAL SCORE: 0
1. FEELING NERVOUS, ANXIOUS, OR ON EDGE: NOT AT ALL

## 2023-03-08 ASSESSMENT — PATIENT HEALTH QUESTIONNAIRE - PHQ9
10. IF YOU CHECKED OFF ANY PROBLEMS, HOW DIFFICULT HAVE THESE PROBLEMS MADE IT FOR YOU TO DO YOUR WORK, TAKE CARE OF THINGS AT HOME, OR GET ALONG WITH OTHER PEOPLE: SOMEWHAT DIFFICULT
SUM OF ALL RESPONSES TO PHQ QUESTIONS 1-9: 4
SUM OF ALL RESPONSES TO PHQ QUESTIONS 1-9: 4

## 2023-03-08 ASSESSMENT — ENCOUNTER SYMPTOMS
HEADACHES: 0
DIARRHEA: 0
HEMATURIA: 0
ARTHRALGIAS: 0
CONSTIPATION: 0
HEMATOCHEZIA: 0
COUGH: 0
HEARTBURN: 0
BACK PAIN: 1
ABDOMINAL PAIN: 0
CHILLS: 0
DYSURIA: 0
SHORTNESS OF BREATH: 0
JOINT SWELLING: 0
NERVOUS/ANXIOUS: 0
SORE THROAT: 0
EYE PAIN: 0
FEVER: 0
PARESTHESIAS: 0
WEAKNESS: 1
DIZZINESS: 0
BRUISES/BLEEDS EASILY: 1
MYALGIAS: 1
FREQUENCY: 0
PALPITATIONS: 0
NAUSEA: 0

## 2023-03-08 ASSESSMENT — PAIN SCALES - GENERAL: PAINLEVEL: MODERATE PAIN (4)

## 2023-03-08 NOTE — PROGRESS NOTES
Assessment & Plan   Selvin An is a 67 year old accompanied by his spouse, presenting for the following health issues:      ICD-10-CM    1. Chronic heart failure with preserved ejection fraction (HFpEF) (H)  I50.32       2. Morbid obesity (H)  E66.01       3. Stage 3a chronic kidney disease (H)  N18.31       4. Scrotal infection  N49.2 doxycycline hyclate (VIBRAMYCIN) 100 MG capsule      5. Scrotal pain  N50.82 doxycycline hyclate (VIBRAMYCIN) 100 MG capsule     oxyCODONE (ROXICODONE) 5 MG tablet      6. Scrotal edema  N50.89 doxycycline hyclate (VIBRAMYCIN) 100 MG capsule      7. Chronic radicular low back pain  M54.16 gabapentin (NEURONTIN) 400 MG capsule    G89.29       8. Complication of artery associated with surgical procedure - Left superior gluteal artery Injury -- subsequently thrombosed / embolized by Interventional Radiology  I97.89 oxyCODONE (ROXICODONE) 5 MG tablet      9. Injury of muscle of left thigh  S76.902A oxyCODONE (ROXICODONE) 5 MG tablet      10. Tear of left gluteus elif muscle  S76.012A oxyCODONE (ROXICODONE) 5 MG tablet      11. Postoperative hematoma of musculoskeletal structure following musculoskeletal procedure  M96.840 oxyCODONE (ROXICODONE) 5 MG tablet      12. Postoperative pain  G89.18 oxyCODONE (ROXICODONE) 5 MG tablet      Patient presents with his wife for follow-up multiple issues.    Chronic heart failure with preserved ejection fraction.  Was using spironolactone but ended up stopping this a number of days ago.  Blood pressures are doing well.  Edema has improved.  We will remove from med list at this time.    Obesity, Ongoing. Discussed need for reduced oral caloric intake, weight loss, regular exercise and reduce carbohydrate/sugar intake.    Stage IIIa chronic kidney disease.  Continue to closely monitor.  Kidney function has been declining.    Ongoing issues with scrotal pain, infection and edema.  He would like to restart doxycycline.  Continues on ciprofloxacin at  this time.  Recommend continuing to taper down and off oxycodone.  Start using NSAIDs and Tylenol arthritis combination and limit or avoid use of oxycodone.    History of recent surgery.  Postoperative pain is improving.  Small prescription of oxycodone refilled today.  Ended up having injury of his left gluteus muscles, postoperative hematoma required arterial embolization.  His mobility is slowly improving.  He is working with physical therapy.  Still has some left leg strength and range of motion limitations.    Scrotal pain improved 4/10.   1/2 of scrotum had nearly improved to normal skin color.  Improving pain / infection with Ciprofloxacin.     Encouraged weight loss and regular exercise.     Return in about 4 weeks (around 4/5/2023).    Matt Whitmore MD  [facility]     Subjective   Post-op pain / leg weakness      History of Present Illness       Reason for visit:  Post-op pain leg weakness, scrotum swelling  Symptom onset:  More than a month  Symptom intensity:  Moderate  Symptom progression:  Improving  Had these symptoms before:  No    He eats 2-3 servings of fruits and vegetables daily.He consumes 0 sweetened beverage(s) daily.He exercises with enough effort to increase his heart rate 60 or more minutes per day.  He exercises with enough effort to increase his heart rate 7 days per week.   He is taking medications regularly.     Review of Systems   Constitutional: Negative for chills and fever.   HENT: Positive for hearing loss. Negative for congestion, ear pain and sore throat.    Eyes: Negative for pain and visual disturbance.   Respiratory: Negative for cough and shortness of breath.    Cardiovascular: Negative for chest pain, palpitations and peripheral edema.   Gastrointestinal: Negative for abdominal pain, constipation, diarrhea, heartburn, hematochezia and nausea.   Genitourinary: Positive for impotence. Negative for dysuria, frequency, genital sores, hematuria, penile  discharge and urgency.   Musculoskeletal: Positive for back pain (left buttocks/thigh pain) and myalgias. Negative for arthralgias and joint swelling.        Left leg weakness - can't Abduct left leg up against gravity when laying in bed.     More ability to stand, can now extend and abduct left hip slightly when standing.    Skin: Negative for rash.   Allergic/Immunologic: Positive for immunocompromised state.   Neurological: Positive for weakness. Negative for dizziness, headaches and paresthesias.   Hematological: Bruises/bleeds easily.   Psychiatric/Behavioral: Negative for mood changes. The patient is not nervous/anxious.           Objective    /74 (BP Location: Right arm, Patient Position: Sitting, Cuff Size: Adult Large)   Pulse 76   Temp 97.5  F (36.4  C) (Temporal)   Resp 20   Wt 134.5 kg (296 lb 9.6 oz)   SpO2 97%   BMI 40.23 kg/m    Body mass index is 40.23 kg/m .  Physical Exam  Constitutional:       General: He is not in acute distress.     Appearance: He is well-developed. He is obese. He is not diaphoretic.   HENT:      Head: Normocephalic and atraumatic.   Eyes:      General: No scleral icterus.     Conjunctiva/sclera: Conjunctivae normal.   Cardiovascular:      Rate and Rhythm: Normal rate and regular rhythm.      Pulses: Normal pulses.   Pulmonary:      Effort: Pulmonary effort is normal.      Breath sounds: Normal breath sounds.      Comments: + Wearing oxygen  Abdominal:      Palpations: Abdomen is soft.      Tenderness: There is no abdominal tenderness.   Musculoskeletal:         General: Swelling present. No tenderness or deformity.      Cervical back: Neck supple.      Right lower leg: Edema present.      Left lower leg: Edema present.   Lymphadenopathy:      Cervical: No cervical adenopathy.   Skin:     General: Skin is warm and dry.      Findings: Bruising (resolving thigh, scrotum ) and erythema (less redness of scrotum) present. No rash.   Neurological:      Mental Status: He  is alert.      Comments: Left leg weakness   Psychiatric:         Mood and Affect: Mood normal.         Behavior: Behavior normal.                    Answers for HPI/ROS submitted by the patient on 3/8/2023  If you checked off any problems, how difficult have these problems made it for you to do your work, take care of things at home, or get along with other people?: Somewhat difficult  PHQ9 TOTAL SCORE: 4  AJITH 7 TOTAL SCORE: 0

## 2023-03-08 NOTE — PROGRESS NOTES
Video-Visit Details    Type of service:  Video Visit    Joined the call at 3/8/2023, 8:34:40 am.  Left the call at 3/8/2023, 8:47:15 am.  You were on the call for 12 minutes 35 seconds .    Originating Location (pt. Location): Home        Distant Location (provider location):  on site    Mode of Communication:  [facility] Conference [facility]  Date of service: 3/8/2023  Date of initial consultation: September 21, 2022    Patient name: Selvin An  YOB: 1955  MRN: [id number]    Reason for follow-up: PMR    Interval history 3/8/2023  -underwent left sided SI joint fusion which was complicated by post op hematoma requiring return to OR for surgical evacuation and coil embolization of the superior gluteal artery and then epididymitis.  -He is currently on 4mg of prednisone. Decreased down to this dose one week ago.   -was 19 days in the hospital. Learning to walk again. Goes in tomorrow for MRI to assess for gluteal damage. Will be followed by EMG on Tuesday.   -Denies any proximal muscle pain/stiffness in upper extremities. Of course unable to report on symptoms in lower extremities due to above  -No peripheral joint pain/stiffness. No red/hot/swollen joints  -Ongoing scrotal discomfort, though improving  -Tolerating prednisone 4mg daily without noticeable side effects  -Has not noticed worsening of any symptoms consistent with his prior active disease as he continues to taper down on prednisone  14 point ROS collected and negative if not documented above.     HPI from initial consultation: This is a 67 year old male with a past medical history of fibromyalgia, gout on allopurinol (2013), chronic radicular low back pain/SI joint pain, R rotator cuff repair 03/2019, chronic fatigue syndrome (2/2 Lyme disease diagnosed and treated spring 2017 after Bell's palsey), bladder cancer s/and time and so in your body does not like it because is not  "getting it fast enough he does not get enough of it and so you know your bones do not like daily blood pressure does not like it nothing makes it and so we need to slow it down to give your your adrenal glands just wake up just which is the reason p TURBT 2018 for high grade non muscle invasive bladder cancer also s/p BCG following with urology, low testosterone, 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    In fall of 2016, had known tick bite. February 2017 had bell's palsy and treated with doxy for lyme. Around this time started to experience muscular aches and pains as well as extreme fatigue that he dealt with for years. On 06/26/2019 had sudden loss of vision later diagnosed as torn retina- out of concern for GCA, sounds like 60mg prednisone started. Patient remarks feeling \"like I was 9 years old again without any aches or pains,\" after that, treatment with prednisone tapers began. Initially did feel like prednisone was helping, though when he would taper to about 10mg, would start experiencing symptoms again, and would go back to on steroids to retaper. Most recently, started on 20mg about 6 months ago and has tapered down to 9mg per primary's instruction and told to hold there until seeing us. At this point, patient is not sure if prednisone is helping. Describes pain as aches, dull and diffuse. Located in all muscles, no one spot worse than another, not located in any joint. Not better or worse throughout the day or with activity- just notes he gets more tired throughout the day. Nothing makes it better or worse. No trouble making a fist in the morning. Says fatigue is worse than the pains- will be able to do an activity (chopping wood, mowing lawn) for 5 min, " then will need to sit down and rest. States his strength is the same, he just gets tired quickly. Able to get in and out of the car without problem, but thinks he would have a hard time if he was asked to do this many times in a row because of fatigue, not weakness. Denies SOB, CP. States he can easily rase his arms above his head.    Per note from Dr. Whitmore, he did mention that inflammatory markers and AVERY were negative in past, though unable to pull previous labs. Has been on several prednisone tapers and patient tends to notice symptoms around 10mg, has had several increases to dose with similar results when reaches ~10mg. Has not had ultrasound to look for subacromial/subdeltoid bursitis or tendosynovitis of long head of biceps tendon.    Does also sound like patient has chronic radicular low back pain and is set to have surgery soon, though these symptoms preexisted diffuse muscular aches.   On vitamin D, noted to be at sufficient level on 06/08/21.  Surgical hypothyroid 2/2 thyroid cancer and last TSH wnl on 03/12/2021 on synthroid.  B12 at one point was low and has since dima corrected.  Was recommended to try cymbalta by primary for possible fibromyalgia, has tried this in the past. Has not yet started this new prescription as he wanted to see if it was ok with us.  Low testosterone treated with testosterone gel, unclear if primary or secondary cause?  Is hypertensive today though he opted not to take his BP medication this morning because he came fasting for possible labs. Is normally  In 130s/80s at home while on spironolactone-hydrochlorothiazide.     14 point review of systems collected and negative if not documented above.      Past Medical History:  Past Medical History:   Diagnosis Date     Acute and chronic respiratory failure with hypoxia (H) 2/6/2023     Bell's palsy 3/30/2017     Bilateral carpal tunnel syndrome     No Comments Provided     Essential (primary) hypertension     No Comments  Provided     Gout 6/3/2013     Hyperlipidemia     No Comments Provided     Hypothyroidism     No Comments Provided     Malignant neoplasm of thyroid gland (H)     1994     Sleep apnea     No Comments Provided       Past Surgical History:  Past Surgical History:   Procedure Laterality Date     COLONOSCOPY  09/09/2014 9/2014,Normal - follow up 10 years     CV CORONARY ANGIOGRAM N/A 10/31/2022    Procedure: Coronary Angiogram;  Surgeon: Ender Michaels MD;  Location:  [facility]     CYSTOSCOPY      bladder cancer     INCISION AND DRAINAGE LOWER EXTREMITY, COMBINED Left 1/7/2023    Procedure: INCISION AND DRAINAGE, LEFT BUTTOCK (Drain Hematoma of Left Buttock);  Surgeon: Ronald Mccall MD;  Location:  OR     OTHER SURGICAL HISTORY      ,SPLENECTOMY     OTHER SURGICAL HISTORY      2010,30809,EYE MUSCLE SURGERY,Left,strabismus     OTHER SURGICAL HISTORY      10/06/14,765951,OTHER,Left,Dr. Lana LEHMAN     OTHER SURGICAL HISTORY      10/14/14,125113,OTHER,Left,Dr. Lana LEHMAN     OTHER SURGICAL HISTORY      11/16/2017,CAP804,ENDOSCOPIC RELEASE TRANSVERSE CARPAL LIGAMENT OF HAND,Left     RELEASE CARPAL TUNNEL      12/2011     RETINAL DETACHMENT SURGERY Right      STRABISMUS SURGERY Left      THYROIDECTOMY      1994,thyroid ca     TONSILLECTOMY      2004,T & A > 13yo     VASECTOMY      No Comments Provided       Medications:  Current Outpatient Medications   Medication     allopurinol (ZYLOPRIM) 300 MG tablet     B Complex-C (VITAMIN B COMPLEX W/VITAMIN C) TABS tablet     carvedilol (COREG) 6.25 MG tablet     Cholecalciferol (VITAMIN D-3) 125 MCG (5000 UT) TABS     ciprofloxacin (CIPRO) 500 MG tablet     Cyanocobalamin 2500 MCG TABS     cyclobenzaprine (FLEXERIL) 10 MG tablet     diazepam (VALIUM) 5 MG tablet     docusate sodium (COLACE) 100 MG capsule     famotidine (PEPCID) 20 MG tablet     fenofibrate (TRICOR) 145 MG tablet     ferrous fumarate 65 mg, [place]. FE,-Vitamin C 125 mg  (VITRON C)  MG TABS tablet     gabapentin (NEURONTIN) 400 MG capsule     levothyroxine (SYNTHROID/LEVOTHROID) 200 MCG tablet     lidocaine (XYLOCAINE) 5 % external ointment     loratadine (CLARITIN) 10 MG tablet     magnesium 250 MG tablet     Melatonin 10 MG TABS tablet     Multiple Vitamins-Minerals (CENTRUM ADULTS PO)     naloxone (NARCAN) 4 MG/0.1ML nasal spray     ondansetron (ZOFRAN ODT) 4 MG ODT tab     order for DME     oxyCODONE (ROXICODONE) 5 MG tablet     oxyCODONE (ROXICODONE) 5 MG tablet     oxyCODONE (ROXICODONE) 5 MG tablet     polyethylene glycol (MIRALAX) 17 GM/Dose powder     predniSONE (DELTASONE) 1 MG tablet     Probiotic Product (PROBIOTIC-10 PO)     rosuvastatin (CRESTOR) 5 MG tablet     sildenafil (VIAGRA) 100 MG tablet     spironolactone (ALDACTONE) 25 MG tablet     zolpidem (AMBIEN) 10 MG tablet     No current facility-administered medications for this visit.       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  Sitting up unassisted  Wife sitting next to him who provides some of the history today  No facial rash. Sclera appear clear.   Breathing comfortably, no cough, no use of accessory muscles. No audible wheeze.  No obvious pain/stiffness in proximal muscle groups of upper extremities  No rash on exposed skin  No obvious erythema/edema of joints in upper extremieis    WBC   Date Value Ref Range Status   03/12/2021 11.0 4.0 - 11.0 10e9/L Final     WBC Count   Date Value Ref Range Status   02/15/2023 11.3 (H) 4.0 - 11.0 10e3/uL Final     Hemoglobin   Date Value Ref  Range Status   02/15/2023 12.1 (L) 13.3 - 17.7 g/dL Final   03/12/2021 16.7 13.3 - 17.7 g/dL Final     Platelet Count   Date Value Ref Range Status   02/15/2023 525 (H) 150 - 450 10e3/uL Final   03/12/2021 424 150 - 450 10e9/L Final     Creatinine   Date Value Ref Range Status   02/15/2023 1.12 0.67 - 1.17 mg/dL Final   06/08/2021 1.65 (H) 0.70 - 1.30 mg/dL Final     Lab Results   Component Value Date    ALKPHOS 39 12/22/2021    ALKPHOS 65 03/12/2021     AST   Date Value Ref Range Status   02/15/2023 15 10 - 50 U/L Final   03/12/2021 26 13 - 39 U/L Final     Lab Results   Component Value Date    ALT 25 12/22/2021    ALT 26 03/12/2021     Sed Rate   Date Value Ref Range Status   09/19/2017 6 <21 mm/hr      Erythrocyte Sedimentation Rate   Date Value Ref Range Status   02/15/2023 25 (H) 0 - 20 mm/hr Final     No results found for: CRP  UA RESULTS:  Recent Labs   Lab Test 09/10/17  2103   COLOR Yellow   URINEGLC Negative   URINEKETONE Trace*   SG 1.025   NITRITE Negative   LEUKEST Negative      Aldolase   Date Value Ref Range Status   09/21/2022 4.1 1.2 - 7.6 U/L Final     Comment:     REFERENCE INTERVAL: Aldolase    Access complete set of age- and/or gender-specific   reference intervals for this test in the [facility]   Test Directory ([email or web address]).  Performed By: [facility]  [address] [place] [id number]  : Saulo Marks MD, PhD     Rheumatoid Factor   Date Value Ref Range Status   09/21/2022 <6 <12 IU/mL Final     Cyclic Citrullinated Peptide Antibody IgG   Date Value Ref Range Status   09/21/2022 1.3 <7.0 U/mL Final     Comment:     Negative       A/P: 67 year old male who was referred to rheumatology and evaluated for evaluation for polymyalgia rheumatica on 9/21/22.     At that time he was on 9mg of prednisone daily. We planned to continue to taper by 1mg every 30 days. Discussed that during his taper, if he starts to experience symptoms consistent with prior  diagnosis of PMR advised that our plan will be to repeat inflammatory markers to assess again for inflammation. Did consider that as combination of surgical hypothyroid with normal TSH on synthroid, low testosterone of unclear etiology, did have large GI bleed (question of jaimie's), also long term course of steroids with no labs for ACTH or cortisol, stage 3a CKD with normal sodium and potassium- overall a complex picture that obscures possible hypopituitarism vs steroid included adrenal insuffiencey.    Most recent labs obtained on 2/15/2023 showed CRP of 31 and ESR of 25.  Difficult to attribute this in his current clinical context active polymyalgia rheumatica and do not suspect that these indicate an active systemic inflammatory rheumatologic disease.  Rather suspect this reflects his postop complications which he continues to recover from. He denies any symptoms consistent with his prior proximal muscle pain/stiffness. My assessment is that his PMR is currently quiet/ in remission. Will continue with prednisone taper as outlined below.     PLAN  -current dose of prednisone is 4mg once daily. Decreased down to this dose about 1 week ago. Will continue on 4mg once daily for total of 30 days. Then decrease to 3mg once daily x30 days, then 2mg once daily x30 days, then 1mg once daily x30 days then stop  -Counseled to reach out if any return of proximal muscle pain or stiffness  -Will follow-up in 4 months with labs (ESR and CRP) the week or so prior    Phill Acosta MD  Rheumatology

## 2023-03-08 NOTE — PATIENT INSTRUCTIONS
Start doxycycline on Saturday.     Wean / taper off the oxycodone.     Okay to restart Aleve and Tylenol Arthritis.     Can adjust dose of Gabapentin.     Return in approximately 4 week(s), or sooner as needed for follow-up with Dr. Whitmore.  - follow-up groin infection.    Clinic : [phone]  Appointment line: [phone]

## 2023-03-08 NOTE — NURSING NOTE
Chief Complaint   Patient presents with     Post-op pain / leg weakness         Initial /74 (BP Location: Right arm, Patient Position: Sitting, Cuff Size: Adult Large)   Pulse 76   Temp 97.5  F (36.4  C) (Temporal)   Resp 20   Wt 134.5 kg (296 lb 9.6 oz)   SpO2 97%   BMI 40.23 kg/m   Estimated body mass index is 40.23 kg/m  as calculated from the following:    Height as of 2/6/23: 1.829 m (6').    Weight as of this encounter: 134.5 kg (296 lb 9.6 oz).       FOOD SECURITY SCREENING QUESTIONS:    The next two questions are to help us understand your food security.  If you are feeling you need any assistance in this area, we have resources available to support you today.    Hunger Vital Signs:  Within the past 12 months we worried whether our food would run out before we got money to buy more. Never  Within the past 12 months the food we bought just didn't last and we didn't have money to get more. Never    Advance Care Directive on file? no      Medication reconciliation complete.      Kevin Haynes,on 3/8/2023 at 4:02 PM

## 2023-03-08 NOTE — NURSING NOTE
Patient denies any changes since echeck-in regarding medication and allergies and states all information entered during echeck-in remains accurate.    Is the patient currently in the state of [place]? YES    Visit mode:VIDEO    If the visit is dropped, the patient can be reconnected by: VIDEO VISIT: Text to cell phone: [phone]    Will anyone else be joining the visit? NO      How would you like to obtain your AVS? MyChart    Are changes needed to the allergy or medication list? NO    Reason for visit: Polymyalgia Rheumatica

## 2023-03-09 ENCOUNTER — HOSPITAL ENCOUNTER (OUTPATIENT)
Dept: MRI IMAGING | Facility: OTHER | Age: 68
Discharge: HOME | End: 2023-03-09
Attending: INTERNAL MEDICINE
Payer: MEDICARE

## 2023-03-09 DIAGNOSIS — S76.012A TEAR OF LEFT GLUTEUS MAXIMUS MUSCLE: ICD-10-CM

## 2023-03-09 DIAGNOSIS — I97.89 COMPLICATION OF ARTERY ASSOCIATED WITH SURGICAL PROCEDURE: ICD-10-CM

## 2023-03-09 DIAGNOSIS — S76.902A: ICD-10-CM

## 2023-03-09 PROCEDURE — G1010 CDSM STANSON: HCPCS

## 2023-03-09 PROCEDURE — 255N000002 HC RX 255 OP 636: Performed by: INTERNAL MEDICINE

## 2023-03-09 PROCEDURE — A9575 INJ GADOTERATE MEGLUMI 0.1ML: HCPCS | Performed by: INTERNAL MEDICINE

## 2023-03-09 RX ADMIN — GADOTERATE MEGLUMINE 20 ML: 376.9 INJECTION INTRAVENOUS at 11:56

## 2023-03-11 DIAGNOSIS — N50.82 SCROTAL PAIN: ICD-10-CM

## 2023-03-11 DIAGNOSIS — N50.89 SCROTAL EDEMA: ICD-10-CM

## 2023-03-11 DIAGNOSIS — N49.2 SCROTAL INFECTION: ICD-10-CM

## 2023-03-14 ENCOUNTER — MYC MEDICAL ADVICE (OUTPATIENT)
Dept: INTERNAL MEDICINE | Facility: OTHER | Age: 68
End: 2023-03-14

## 2023-03-14 ENCOUNTER — OFFICE VISIT (OUTPATIENT)
Dept: NEUROLOGY | Facility: OTHER | Age: 68
End: 2023-03-14
Attending: PSYCHIATRY & NEUROLOGY
Payer: MEDICARE

## 2023-03-14 VITALS
OXYGEN SATURATION: 93 % | WEIGHT: 296 LBS | SYSTOLIC BLOOD PRESSURE: 150 MMHG | DIASTOLIC BLOOD PRESSURE: 100 MMHG | RESPIRATION RATE: 19 BRPM | TEMPERATURE: 97.6 F | BODY MASS INDEX: 40.14 KG/M2 | HEART RATE: 76 BPM

## 2023-03-14 DIAGNOSIS — G89.29 CHRONIC RADICULAR LOW BACK PAIN: Primary | ICD-10-CM

## 2023-03-14 DIAGNOSIS — G54.1 NEUROPATHY, SACRAL PLEXUS: Primary | ICD-10-CM

## 2023-03-14 DIAGNOSIS — M54.16 CHRONIC RADICULAR LOW BACK PAIN: Primary | ICD-10-CM

## 2023-03-14 PROCEDURE — 95860 NEEDLE EMG 1 EXTREMITY: CPT | Performed by: PSYCHIATRY & NEUROLOGY

## 2023-03-14 PROCEDURE — 95860 NEEDLE EMG 1 EXTREMITY: CPT | Mod: 26 | Performed by: PSYCHIATRY & NEUROLOGY

## 2023-03-14 PROCEDURE — 99204 OFFICE O/P NEW MOD 45 MIN: CPT | Mod: 25 | Performed by: PSYCHIATRY & NEUROLOGY

## 2023-03-14 PROCEDURE — G0463 HOSPITAL OUTPT CLINIC VISIT: HCPCS

## 2023-03-14 RX ORDER — DOXYCYCLINE 100 MG/1
100 CAPSULE ORAL 2 TIMES DAILY
Qty: 20 CAPSULE | Refills: 0 | OUTPATIENT
Start: 2023-03-14

## 2023-03-14 ASSESSMENT — PAIN SCALES - GENERAL: PAINLEVEL: MODERATE PAIN (4)

## 2023-03-14 NOTE — PROGRESS NOTES
Visit Date: 03/14/2023    NEURODIAGNOSTICS CONSULTATION    REFERRING PHYSICIAN:  Matt Whitmore MD    HISTORY OF PRESENT ILLNESS:  The patient underwent surgery to fuse the left sacroiliac joint in early January of this year.  Apparently, he developed postoperative bleeding, which ultimately required embolization of the superior gluteal artery and hematoma evacuation 2 days after the initial surgery.  Since then, the patient has had pain in the posterior thigh, and to a lesser extent the proximal lateral thigh, and a perception of weakness at the hip.  He does not describe any symptoms distal to the knee.  He does not complain of any symptoms on the right side and does not have low back pain.    PAST MEDICAL HISTORY:  Significant for chronic obesity, but negative for diabetes.  The patient has asplenia hypercalcemia, stage III kidney disease, hypertension, and hyperlipidemia.    REVIEW OF SYSTEMS:  Other than the above, is unremarkable.  The patient does feel somewhat off balance when walking and he has been using a 4-prong cane.    PHYSICAL EXAMINATION:  The patient is morbidly obese.  He weighs 296 pounds.  Blood pressure is 150/92.  He ambulates with a limp using a 4-point cane in his left hand.  Reflexes are hypoactive, but symmetric at the knees and ankles.  There is fairly severe weakness on the left for the thigh abductors, but grossly normal strength for the thigh extensors, adductors and flexors.  Distal strength is normal and symmetric in the lower extremities.    MONOPOLAR EMG NEEDLE EXAMINATION:  Monopolar needle exam was performed for the vastus medialis, gluteus medius, gluteus elif, biceps femoris short head and biceps femoris long head.  Fibrillations were seen for the gluteus medius.  Motor units were very unstable and there was moderate-to-severe reduction in recruitment and interference.  There were numerous myopathic units.    IMPRESSION:  The patient has findings on physical exam and EMG to  indicate fairly severe recent injury to the superior gluteal nerve.  This applies to the gluteus medius and the gluteus minimus, which are responsible for abduction of the hip, and to some degree rotation of the hip.  The prognosis for improvement of nerve function is somewhat guarded, but the patient will almost certainly see some degree of regenerative improvement.  Additionally, there are myopathic changes on the patient's EMG suggesting that some of the weakness and discomfort is the result of muscle cell degeneration secondary to elevated pressure from the hematoma.  The muscle cells have a good regenerative capability, so improvement from the standpoint of muscle repair is probable.    Findings were reviewed with the patient and his wife.  They had a great many questions.  We discussed the findings and their significance for about 25 minutes.    Pedro Chisholm MD        D: 2023   T: 2023   MT: KAYLEE    Name:     PITER ROTHMAN  MRN:      [id number]        Account:    [id number]   :      1955           Visit Date: 2023     Document: [id number]    cc:  Matt Whitmore MD

## 2023-03-14 NOTE — LETTER
3/14/2023         RE: Selvin An  [address] [place] [id number]        Dear Colleague,    Thank you for referring your patient, Selvin An, to the [facility]. Please see a copy of my visit note below.    Visit Date: 03/14/2023    NEURODIAGNOSTICS CONSULTATION    REFERRING PHYSICIAN:  Matt Whitmore MD    HISTORY OF PRESENT ILLNESS:  The patient underwent surgery to fuse the left sacroiliac joint in early January of this year.  Apparently, he developed postoperative bleeding, which ultimately required embolization of the superior gluteal artery and hematoma evacuation 2 days after the initial surgery.  Since then, the patient has had pain in the posterior thigh, and to a lesser extent the proximal lateral thigh, and a perception of weakness at the hip.  He does not describe any symptoms distal to the knee.  He does not complain of any symptoms on the right side and does not have low back pain.    PAST MEDICAL HISTORY:  Significant for chronic obesity, but negative for diabetes.  The patient has asplenia hypercalcemia, stage III kidney disease, hypertension, and hyperlipidemia.    REVIEW OF SYSTEMS:  Other than the above, is unremarkable.  The patient does feel somewhat off balance when walking and he has been using a 4-prong cane.    PHYSICAL EXAMINATION:  The patient is morbidly obese.  He weighs 296 pounds.  Blood pressure is 150/92.  He ambulates with a limp using a 4-point cane in his left hand.  Reflexes are hypoactive, but symmetric at the knees and ankles.  There is fairly severe weakness on the left for the thigh abductors, but grossly normal strength for the thigh extensors, adductors and flexors.  Distal strength is normal and symmetric in the lower extremities.    MONOPOLAR EMG NEEDLE EXAMINATION:  Monopolar needle exam was performed for the vastus medialis, gluteus medius, gluteus elif, biceps femoris short head and biceps femoris long head.  Fibrillations were seen for  the gluteus medius.  Motor units were very unstable and there was moderate-to-severe reduction in recruitment and interference.  There were numerous myopathic units.    IMPRESSION:  The patient has findings on physical exam and EMG to indicate fairly severe recent injury to the superior gluteal nerve.  This applies to the gluteus medius and the gluteus minimus, which are responsible for abduction of the hip, and to some degree rotation of the hip.  The prognosis for improvement of nerve function is somewhat guarded, but the patient will almost certainly see some degree of regenerative improvement.  Additionally, there are myopathic changes on the patient's EMG suggesting that some of the weakness and discomfort is the result of muscle cell degeneration secondary to elevated pressure from the hematoma.  The muscle cells have a good regenerative capability, so improvement from the standpoint of muscle repair is probable.    Findings were reviewed with the patient and his wife.  They had a great many questions.  We discussed the findings and their significance for about 25 minutes.    Pedro Wilkins MD        D: 2023   T: 2023   MT: KAYLEE    Name:     PITER ROTHMAN  MRN:      [id number]        Account:    [id number]   :      1955           Visit Date: 2023     Document: [id number]    cc:  Matt Whitmore MD       Again, thank you for allowing me to participate in the care of your patient.        Sincerely,        Pedro Wilkins MD

## 2023-03-14 NOTE — TELEPHONE ENCOUNTER
Patient's chart was accessed to determine the status of a refill request - nothing needed at this time as the request was previously addressed.    Milagro Baez RN .............. 3/14/2023  5:33 PM     not examined

## 2023-03-16 ENCOUNTER — TELEPHONE (OUTPATIENT)
Dept: INTERNAL MEDICINE | Facility: OTHER | Age: 68
End: 2023-03-16
Payer: MEDICARE

## 2023-03-16 DIAGNOSIS — I97.89 COMPLICATION OF ARTERY ASSOCIATED WITH SURGICAL PROCEDURE: Primary | ICD-10-CM

## 2023-03-16 DIAGNOSIS — S76.012A TEAR OF LEFT GLUTEUS MAXIMUS MUSCLE: ICD-10-CM

## 2023-03-16 DIAGNOSIS — M96.840 POSTOPERATIVE HEMATOMA OF MUSCULOSKELETAL STRUCTURE FOLLOWING MUSCULOSKELETAL PROCEDURE: ICD-10-CM

## 2023-03-16 NOTE — TELEPHONE ENCOUNTER
Henry has been working with [facility] since 1/26/23.  He has made good progress with his mobility and can transition to outpatient physical therapy soon.  Patient will likely be discharged from homecare in the next week or two.  Please place an order for outpatient therapy services.    Thank you-  Erika Walker, PT on 3/16/2023 at 9:16 AM

## 2023-03-31 ENCOUNTER — HOSPITAL ENCOUNTER (OUTPATIENT)
Dept: PHYSICAL THERAPY | Facility: OTHER | Age: 68
Setting detail: THERAPIES SERIES
Discharge: HOME | End: 2023-03-31
Attending: INTERNAL MEDICINE
Payer: MEDICARE

## 2023-03-31 DIAGNOSIS — S76.012A TEAR OF LEFT GLUTEUS MAXIMUS MUSCLE: ICD-10-CM

## 2023-03-31 DIAGNOSIS — M96.840 POSTOPERATIVE HEMATOMA OF MUSCULOSKELETAL STRUCTURE FOLLOWING MUSCULOSKELETAL PROCEDURE: ICD-10-CM

## 2023-03-31 DIAGNOSIS — I97.89 COMPLICATION OF ARTERY ASSOCIATED WITH SURGICAL PROCEDURE: ICD-10-CM

## 2023-03-31 PROCEDURE — 97110 THERAPEUTIC EXERCISES: CPT | Mod: GP,PN | Performed by: PHYSICAL THERAPIST

## 2023-03-31 PROCEDURE — 97161 PT EVAL LOW COMPLEX 20 MIN: CPT | Mod: GP,PN | Performed by: PHYSICAL THERAPIST

## 2023-03-31 NOTE — PROGRESS NOTES
03/31/23 1100   General Information   Type of Visit Initial OP Ortho PT Evaluation   Start of Care Date 03/31/23   Referring Physician Dr. Whitmore   Orders Evaluate and Treat   Date of Order 03/16/23   Certification Required? Yes   Medical Diagnosis 1) Complication of artery associated with surgical procedure   2) Post-operative hematoma of musculoskeletal structure following musculoskeletal procedure   Surgical/Medical history reviewed Yes   Precautions/Limitations fall precautions   Body Part(s)   Body Part(s) Hip   Presentation and Etiology   Pertinent history of current problem (include personal factors and/or comorbidities that impact the POC) Patient underwent minimally invasive Left SI joint fusion 1/3/23.  Complicaations due to lesion of artery that resulted in a hematoma.  Worked with homecare therapy and is now transitioning to outpatient therapy.  Progressed from a FWW to a small-based quad cane.  Continues to have significant left hip strength impairments.  Rates pain 3-10/10 lately.  Describes pain as burning and constant.  Continues to take pain medication.  Goals for therapy are to improve Left hip and leg strength, improve walking and balance, and be able to return to household and yardwork tasks.  Would like to be able to get in/out of low seat in vehicle.   Impairments A. Pain;B. Decreased WB tolerance;D. Decreased ROM;E. Decreased flexibility;F. Decreased strength and endurance;G. Impaired balance;H. Impaired gait;J. Burning   Functional Limitations perform activities of daily living;perform required work activities;perform desired leisure / sports activities   Symptom Location Left hip/Leg   Onset date of current episode/exacerbation 01/03/23   Chronicity New   Progression of symptoms since onset: Improved   Prior Level of Function   Prior Level of Function-Mobility independent with all self-cares, household, and outdoor tasks   Current Level of Function   Patient role/employment history F.  Retired   Living environment [facility]   Home/community accessibility 1 tall step to enter front door, no railing.   Current equipment-Gait/Locomotion Quad cane   Current equipment-ADL Shower/tub chair   Fall Risk Screen   Fall screen completed by PT   Have you fallen 2 or more times in the past year? No   Have you fallen and had an injury in the past year? No   Is patient a fall risk? Yes   Fall screen comments 1 fall at home a couple weeks ago with no resulting injury.   Hip Objective Findings   Gait/Locomotion ambulation with small-based quad cane in RUE; Improving step-through gait pattern;  Asymmetrical alignment due to impaired weightbearing/strength in LLE;  Gait spee = 1.76 ft/sec;   Tinetti = 22/28   Side (if bilateral, select both right and left) Right;Left   Palpation deferred   Observation Difficulty with sit-supine and rolling bilaterally; UE assist required;   Right Hamstring Flexibility SLR = 54   Left Hamstring Flexibility SLR = 42   Right Piriformis Flexibility WNL   Left Piriformis Flexibility discomfort with left piriformis stretch   Right Prone Quad Flexibility WNL   Left Prone Quad Flexibility WNL   Right Hip ER PROM 41   Left Hip ER PROM 44   Right Hip IR PROM 20   Left Hip IR PROM 6   Right Hip Flexion Strength 5/5   Left Hip Flexion Strength 3/5   Right Hip Abduction Strength 4/5   Left Hip Abduction Strength 2-/5   Right Hip Extension Strength 5/5   Left Hip Extension Strength 3/5   Right Hip IR Strength 4+/5   Left Hip IR Strength 2-/5   Right Hip ER Strength 5/5   Left Hip ER Strength 3+/5   Planned Therapy Interventions   Planned Therapy Interventions balance training;gait training;manual therapy;neuromuscular re-education;ROM;strengthening;stretching   Planned Modality Interventions   Planned Modality Interventions Cryotherapy   Clinical Impression   Criteria for Skilled Therapeutic Interventions Met yes, treatment indicated   PT Diagnosis impaired mobility   Influenced by the  following impairments impaired strength, flexibility, ROM, balance; Pain   Functional limitations due to impairments bed mobility, standing, transfers, walking, stairs   Clinical Presentation Evolving/Changing   Clinical Presentation Rationale clinical observation   Clinical Decision Making (Complexity) Low complexity   Therapy Frequency 2 times/Week   Predicted Duration of Therapy Intervention (days/wks) 8 weeks   Risk & Benefits of therapy have been explained Yes   Patient, Family & other staff in agreement with plan of care Yes   Clinical Impression Comments Patient would benefit from progression of strengthening, and gait and balance training for improved safety with mobility and ability to return to prior level of function.   ORTHO GOALS   PT Ortho Eval Goals 1;2;3;4;5   Ortho Goal 1   Goal Identifier strength   Goal Description Patient will demonstrate increased left hip Internal Rotation strength from 2/5 to 3/5 or better for increased stability with walking.   Target Date 05/26/23   Ortho Goal 2   Goal Identifier strength   Goal Description Patient will demonstrate increased left hip Abduction from 2/5 to 3/5 or better for increased stability with standing and walking.   Target Date 05/26/23   Ortho Goal 3   Goal Identifier strength   Goal Description Patient will demonstrate increased left hip Extension strength from 3/5 to 4/5 or better for increased stability with standing and walking and ability to walk up/down steps.   Target Date 05/26/23   Ortho Goal 4   Goal Identifier walking   Goal Description Patient will demonstrate ability to walk 250 ft on outdoor terrain safely with SBQC for ability to walk to garage/shed with no falls.   Target Date 05/26/23   Ortho Goal 5   Goal Identifier transfers   Goal Description Patient will demonstrate adequate strength to be able to transfer from sit-stand from 1 ft seat surface to allow getting in/out of vehicle.   Target Date 05/26/23   Total Evaluation Time   PT  Savi, Low Complexity Minutes (95843) 25   Therapy Certification   Certification date from 03/31/23   Certification date to 05/26/23   Medical Diagnosis 1) Complication of artery associated with surgical procedure   2) Post-operative hematoma of musculoskeletal structure following musculoskeletal procedure

## 2023-03-31 NOTE — PROGRESS NOTES
[facility]    OUTPATIENT PHYSICAL THERAPY ORTHOPEDIC EVALUATION  PLAN OF TREATMENT FOR OUTPATIENT REHABILITATION  (COMPLETE FOR INITIAL CLAIMS ONLY)  Patient's Last Name, First Name, M.I.  YOB: 1955  Selvin An    Provider s Name:  [facility]   Medical Record No.  [id number]   Start of Care Date:  03/31/23   Onset Date:  01/03/23   Type:     _X__PT   ___OT   ___SLP Medical Diagnosis:  1) Complication of artery associated with surgical procedure   2) Post-operative hematoma of musculoskeletal structure following musculoskeletal procedure     PT Diagnosis:  impaired mobility   Visits from SOC:  1      _________________________________________________________________________________  Plan of Treatment/Functional Goals:  balance training, gait training, manual therapy, neuromuscular re-education, ROM, strengthening, stretching     Cryotherapy     Goals  Goal Identifier: strength  Goal Description: Patient will demonstrate increased left hip Internal Rotation strength from 2/5 to 3/5 or better for increased stability with walking.  Target Date: 05/26/23    Goal Identifier: strength  Goal Description: Patient will demonstrate increased left hip Abduction from 2/5 to 3/5 or better for increased stability with standing and walking.  Target Date: 05/26/23    Goal Identifier: strength  Goal Description: Patient will demonstrate increased left hip Extension strength from 3/5 to 4/5 or better for increased stability with standing and walking and ability to walk up/down steps.  Target Date: 05/26/23    Goal Identifier: walking  Goal Description: Patient will demonstrate ability to walk 250 ft on outdoor terrain safely with SBQC for ability to walk to garage/shed with no falls.  Target Date: 05/26/23    Goal Identifier: transfers  Goal Description: Patient will demonstrate  adequate strength to be able to transfer from sit-stand from 1 ft seat surface to allow getting in/out of vehicle.  Target Date: 05/26/23                  Therapy Frequency:  2 times/Week  Predicted Duration of Therapy Intervention:  8 weeks    RADHA CARROLL PT                 I CERTIFY THE NEED FOR THESE SERVICES FURNISHED UNDER        THIS PLAN OF TREATMENT AND WHILE UNDER MY CARE     (Physician co-signature of this document indicates review and certification of the therapy plan).                     Certification Date From:  03/31/23   Certification Date To:  05/26/23    Referring Provider:  Dr. Whitmore    Initial Assessment        See Epic Evaluation Start of Care Date: 03/31/23

## 2023-04-03 ENCOUNTER — HOSPITAL ENCOUNTER (OUTPATIENT)
Dept: PHYSICAL THERAPY | Facility: OTHER | Age: 68
Setting detail: THERAPIES SERIES
Discharge: HOME | End: 2023-04-03
Attending: INTERNAL MEDICINE
Payer: MEDICARE

## 2023-04-03 PROCEDURE — 97110 THERAPEUTIC EXERCISES: CPT | Mod: GP,PN | Performed by: PHYSICAL THERAPIST

## 2023-04-05 ENCOUNTER — OFFICE VISIT (OUTPATIENT)
Dept: INTERNAL MEDICINE | Facility: OTHER | Age: 68
End: 2023-04-05
Attending: INTERNAL MEDICINE
Payer: MEDICARE

## 2023-04-05 VITALS
RESPIRATION RATE: 20 BRPM | DIASTOLIC BLOOD PRESSURE: 74 MMHG | SYSTOLIC BLOOD PRESSURE: 128 MMHG | OXYGEN SATURATION: 98 % | TEMPERATURE: 97.5 F | HEART RATE: 63 BPM

## 2023-04-05 DIAGNOSIS — G89.29 CHRONIC RADICULAR LOW BACK PAIN: ICD-10-CM

## 2023-04-05 DIAGNOSIS — R10.2 NEURALGIA OF LEFT PERINEAL NERVE: Primary | ICD-10-CM

## 2023-04-05 DIAGNOSIS — D47.3 ESSENTIAL THROMBOCYTOSIS (H): ICD-10-CM

## 2023-04-05 DIAGNOSIS — G47.9 SLEEP DIFFICULTIES: ICD-10-CM

## 2023-04-05 DIAGNOSIS — M54.16 CHRONIC RADICULAR LOW BACK PAIN: ICD-10-CM

## 2023-04-05 DIAGNOSIS — I97.89 COMPLICATION OF ARTERY ASSOCIATED WITH SURGICAL PROCEDURE: ICD-10-CM

## 2023-04-05 PROBLEM — J45.909 ASTHMA: Status: ACTIVE | Noted: 2023-04-05

## 2023-04-05 PROBLEM — N49.2 SCROTAL INFECTION: Status: RESOLVED | Noted: 2023-02-15 | Resolved: 2023-04-05

## 2023-04-05 PROBLEM — N50.89 SCROTAL EDEMA: Status: RESOLVED | Noted: 2023-02-15 | Resolved: 2023-04-05

## 2023-04-05 PROBLEM — Z98.890 POSTOPERATIVE STATE: Status: RESOLVED | Noted: 2023-01-08 | Resolved: 2023-04-05

## 2023-04-05 PROBLEM — G89.18 POSTOPERATIVE PAIN: Status: RESOLVED | Noted: 2023-02-06 | Resolved: 2023-04-05

## 2023-04-05 PROCEDURE — 81450 HL NEO GSAP 5-50DNA/DNA&RNA: CPT | Mod: ZL | Performed by: INTERNAL MEDICINE

## 2023-04-05 PROCEDURE — 84999 UNLISTED CHEMISTRY PROCEDURE: CPT | Mod: ZL | Performed by: INTERNAL MEDICINE

## 2023-04-05 PROCEDURE — 36415 COLL VENOUS BLD VENIPUNCTURE: CPT | Mod: ZL | Performed by: INTERNAL MEDICINE

## 2023-04-05 PROCEDURE — G0463 HOSPITAL OUTPT CLINIC VISIT: HCPCS

## 2023-04-05 PROCEDURE — 99214 OFFICE O/P EST MOD 30 MIN: CPT | Performed by: INTERNAL MEDICINE

## 2023-04-05 RX ORDER — DOXEPIN HYDROCHLORIDE 10 MG/1
10 CAPSULE ORAL AT BEDTIME
Qty: 120 CAPSULE | Refills: 1 | Status: SHIPPED | OUTPATIENT
Start: 2023-04-05 | End: 2023-05-03

## 2023-04-05 RX ORDER — PREGABALIN 75 MG/1
75 CAPSULE ORAL 3 TIMES DAILY
Qty: 90 CAPSULE | Refills: 0 | Status: SHIPPED | OUTPATIENT
Start: 2023-04-05 | End: 2023-05-03

## 2023-04-05 ASSESSMENT — ENCOUNTER SYMPTOMS
DIZZINESS: 0
DYSURIA: 0
FREQUENCY: 0
BACK PAIN: 1
CONSTIPATION: 0
COUGH: 0
HEMATOCHEZIA: 0
MYALGIAS: 1
BRUISES/BLEEDS EASILY: 1
PARESTHESIAS: 0
JOINT SWELLING: 0
ARTHRALGIAS: 0
HEADACHES: 0
PALPITATIONS: 0
CHILLS: 0
SORE THROAT: 0
DIARRHEA: 0
NAUSEA: 0
ABDOMINAL PAIN: 0
EYE PAIN: 0
HEMATURIA: 0
WEAKNESS: 1
FEVER: 0
HEARTBURN: 0
SHORTNESS OF BREATH: 0
NERVOUS/ANXIOUS: 0

## 2023-04-05 ASSESSMENT — PAIN SCALES - GENERAL: PAINLEVEL: MILD PAIN (3)

## 2023-04-05 NOTE — NURSING NOTE
Chief Complaint   Patient presents with     Follow Up     Groin infection         Initial /74 (BP Location: Right arm, Patient Position: Sitting, Cuff Size: Adult Large)   Pulse 63   Temp 97.5  F (36.4  C) (Temporal)   Resp 20   SpO2 98%  Estimated body mass index is 40.14 kg/m  as calculated from the following:    Height as of 2/6/23: 1.829 m (6').    Weight as of 3/14/23: 134.3 kg (296 lb).       FOOD SECURITY SCREENING QUESTIONS:    The next two questions are to help us understand your food security.  If you are feeling you need any assistance in this area, we have resources available to support you today.    Hunger Vital Signs:  Within the past 12 months we worried whether our food would run out before we got money to buy more. Never  Within the past 12 months the food we bought just didn't last and we didn't have money to get more. Never    Advance Care Directive on file? no      Medication reconciliation complete.      Kevin Haynes,on 4/5/2023 at 1:43 PM

## 2023-04-05 NOTE — PROGRESS NOTES
Assessment & Plan     ICD-10-CM    1. Neuralgia of left perineal nerve  R10.2 doxepin (SINEQUAN) 10 MG capsule     pregabalin (LYRICA) 75 MG capsule      2. Chronic radicular low back pain  M54.16 doxepin (SINEQUAN) 10 MG capsule    G89.29 pregabalin (LYRICA) 75 MG capsule      3. Complication of artery associated with surgical procedure - Left superior gluteal artery Injury -- subsequently thrombosed / embolized by Interventional Radiology  I97.89 doxepin (SINEQUAN) 10 MG capsule     pregabalin (LYRICA) 75 MG capsule      4. Sleep difficulties  G47.9 doxepin (SINEQUAN) 10 MG capsule     pregabalin (LYRICA) 75 MG capsule      5. Essential thrombocytosis (H)  D47.3 [facility]; [facility]; [facility] Myeloid Neoplasms, Comprehensive OncoHeme Next-Generation Sequencing, Varies (Laboratory Miscellaneous Order)      Patient comes in with his wife for follow-up of multiple issues.    Scrotal infection has resolved.  Now appears to be mostly having peroneal nerve, neuralgia.  Does take gabapentin.  Pain is 50 or 60% better now than it was shortly after postoperative issues.  We discussed a few treatment options.  Start trial of doxepin at bedtime.  If needed okay to use Lyrica evening/bedtime.  Can cause sedation.  Advised to monitor.  Do not use during the day if causes too much sedation.  Okay to take 1 or 2 or 3 of his Lyrica tablets at bedtime if he cannot use them during the day.  Dose adjustment of doxepin based on clinical response, dosing instructions provided.    Patient has chronic radicular low back pain, history of surgical complication as noted above.  Working with physical therapy, now graduated from home care therapy, starting outpatient physical therapy now.  Can walk about 100 feet with a 4 prong walker.    Having a lot of problems with sleep.  See above.  Start trial of doxepin, Lyrica.    Essential thrombocytosis, needs some additional lab work.    Return in about 4 weeks (around  5/3/2023).    Matt Whitmore MD  [facility]     Subjective   Henry is a 67 year old, presenting for the following health issues:  Follow Up (Groin infection)        4/5/2023     1:39 PM   Additional Questions   Roomed by Kevin Downs LPN   Accompanied by wife - Nati     History of Present Illness       Reason for visit:  Surgury follow  Symptom onset:  More than a month  Symptoms include:  Groin infection  Symptom intensity:  Mild  Symptom progression:  Improving  Had these symptoms before:  No    He eats 2-3 servings of fruits and vegetables daily.He consumes 0 sweetened beverage(s) daily.He exercises with enough effort to increase his heart rate 9 or less minutes per day.  He exercises with enough effort to increase his heart rate 3 or less days per week.   He is taking medications regularly.     Review of Systems   Constitutional: Negative for chills and fever.        + walking up to 100 feet with small based Quad-cane   HENT: Positive for hearing loss. Negative for congestion, ear pain and sore throat.    Eyes: Negative for pain and visual disturbance.   Respiratory: Negative for cough and shortness of breath.    Cardiovascular: Negative for chest pain, palpitations and peripheral edema.   Gastrointestinal: Negative for abdominal pain, constipation, diarrhea, heartburn, hematochezia and nausea.   Genitourinary: Positive for impotence. Negative for dysuria, frequency, genital sores, hematuria, penile discharge and urgency.   Musculoskeletal: Positive for back pain (left buttocks/thigh pain) and myalgias. Negative for arthralgias and joint swelling.        Left leg weakness - can't Abduct left leg up against gravity when laying in bed.     More ability to stand, can now extend and abduct left hip slightly when standing.    Skin: Negative for rash.   Allergic/Immunologic: Positive for immunocompromised state.   Neurological: Positive for weakness. Negative for dizziness, headaches and  paresthesias.   Hematological: Bruises/bleeds easily.   Psychiatric/Behavioral: Negative for mood changes. The patient is not nervous/anxious.           Objective    /74 (BP Location: Right arm, Patient Position: Sitting, Cuff Size: Adult Large)   Pulse 63   Temp 97.5  F (36.4  C) (Temporal)   Resp 20   SpO2 98%   There is no height or weight on file to calculate BMI.  Physical Exam  Constitutional:       General: He is not in acute distress.     Appearance: He is well-developed. He is obese. He is not diaphoretic.   HENT:      Head: Normocephalic and atraumatic.   Eyes:      General: No scleral icterus.     Conjunctiva/sclera: Conjunctivae normal.   Cardiovascular:      Rate and Rhythm: Normal rate and regular rhythm.      Pulses: Normal pulses.   Pulmonary:      Effort: Pulmonary effort is normal.      Breath sounds: Normal breath sounds.      Comments: + Wearing oxygen  Abdominal:      Palpations: Abdomen is soft.      Tenderness: There is no abdominal tenderness.   Musculoskeletal:         General: Swelling present. No tenderness or deformity.      Cervical back: Neck supple.      Right lower leg: Edema present.      Left lower leg: Edema present.   Lymphadenopathy:      Cervical: No cervical adenopathy.   Skin:     General: Skin is warm and dry.      Findings: No rash.   Neurological:      Mental Status: He is alert.      Comments: Left leg weakness   Psychiatric:         Mood and Affect: Mood normal.         Behavior: Behavior normal.

## 2023-04-06 LAB
Lab: NORMAL
PERFORMING LABORATORY: NORMAL
SPECIMEN STATUS: NORMAL
TEST NAME: NORMAL

## 2023-04-12 ENCOUNTER — HOSPITAL ENCOUNTER (OUTPATIENT)
Dept: PHYSICAL THERAPY | Facility: OTHER | Age: 68
Setting detail: THERAPIES SERIES
Discharge: HOME | End: 2023-04-12
Attending: INTERNAL MEDICINE
Payer: MEDICARE

## 2023-04-12 PROCEDURE — 97110 THERAPEUTIC EXERCISES: CPT | Mod: GP,PN | Performed by: PHYSICAL THERAPIST

## 2023-04-14 ENCOUNTER — HOSPITAL ENCOUNTER (OUTPATIENT)
Dept: PHYSICAL THERAPY | Facility: OTHER | Age: 68
Setting detail: THERAPIES SERIES
Discharge: HOME | End: 2023-04-14
Attending: INTERNAL MEDICINE
Payer: MEDICARE

## 2023-04-14 PROCEDURE — 97110 THERAPEUTIC EXERCISES: CPT | Mod: GP,PN | Performed by: PHYSICAL THERAPIST

## 2023-04-19 ENCOUNTER — HOSPITAL ENCOUNTER (OUTPATIENT)
Dept: PHYSICAL THERAPY | Facility: OTHER | Age: 68
Setting detail: THERAPIES SERIES
Discharge: HOME | End: 2023-04-19
Attending: INTERNAL MEDICINE
Payer: MEDICARE

## 2023-04-19 PROCEDURE — 97110 THERAPEUTIC EXERCISES: CPT | Mod: GP,PN | Performed by: PHYSICAL THERAPIST

## 2023-04-24 ENCOUNTER — TRANSFERRED RECORDS (OUTPATIENT)
Dept: HEALTH INFORMATION MANAGEMENT | Facility: OTHER | Age: 68
End: 2023-04-24
Payer: MEDICARE

## 2023-04-26 ENCOUNTER — HOSPITAL ENCOUNTER (OUTPATIENT)
Dept: PHYSICAL THERAPY | Facility: OTHER | Age: 68
Setting detail: THERAPIES SERIES
Discharge: HOME | End: 2023-04-26
Attending: INTERNAL MEDICINE
Payer: MEDICARE

## 2023-04-26 PROCEDURE — 97110 THERAPEUTIC EXERCISES: CPT | Mod: GP,PN | Performed by: PHYSICAL THERAPIST

## 2023-04-26 NOTE — PROGRESS NOTES
[facility]    Outpatient Physical Therapy Progress Note    Patient: Selvin An  : 1955    Beginning/End Dates of Reporting Period:  3/31/23 to 23    Referring Provider: Dr. Whitmore    Therapy Diagnosis: 1) Complication of artery associated with surgical procedure   2) Post-operative hematoma of musculoskeletal structure following musculoskeletal procedure           Client Self Report:  Patient and his wife report improved functional mobility.  Patient was able to walk up/down 3 steps with reciprocal gait using a railing.  Continues to use [facility] for ambulation.      Objective Measurements:  Left hip IR = 2+/5  Left hip ABD = 2+/5  Left hip EXT = 3/5     Continues to have difficulty with sit-supine transfers and rolling (on narrow plinth).    Continues to demonstrate Trendelenburg gait for LLE.  Improved ability to activate Left hip muscles, but not able to move LLE through full range against gravity.       Goals:  Goal Identifier strength   Goal Description Patient will demonstrate increased left hip Internal Rotation strength from 2/5 to 3/5 or better for increased stability with walking.   Target Date 23   Date Met      Progress (detail required for progress note):  = 2+/5     Goal Identifier strength   Goal Description Patient will demonstrate increased left hip Abduction from 2/5 to 3/5 or better for increased stability with standing and walking.   Target Date 23   Date Met      Progress (detail required for progress note):  = 2+/5     Goal Identifier strength   Goal Description Patient will demonstrate increased left hip Extension strength from 3/5 to 4/5 or better for increased stability with standing and walking and ability to walk up/down steps.   Target Date 23   Date Met      Progress (detail required for progress note):  = 3/5     Goal Identifier walking    Goal Description Patient will demonstrate ability to walk 250 ft on outdoor terrain safely with SBQC for ability to walk to garage/shed with no falls.   Target Date 05/26/23   Date Met      Progress (detail required for progress note):       Goal Identifier transfers   Goal Description Patient will demonstrate adequate strength to be able to transfer from sit-stand from 1 ft seat surface to allow getting in/out of vehicle.   Target Date 05/26/23   Date Met      Progress (detail required for progress note):           Plan:  Continue therapy per current plan of care.    Discharge:  No - current certification through 5/26/23

## 2023-04-28 ENCOUNTER — HOSPITAL ENCOUNTER (OUTPATIENT)
Dept: PHYSICAL THERAPY | Facility: OTHER | Age: 68
Setting detail: THERAPIES SERIES
Discharge: HOME | End: 2023-04-28
Attending: INTERNAL MEDICINE
Payer: MEDICARE

## 2023-04-28 PROCEDURE — 97110 THERAPEUTIC EXERCISES: CPT | Mod: GP,PN | Performed by: PHYSICAL THERAPIST

## 2023-05-03 ENCOUNTER — HOSPITAL ENCOUNTER (OUTPATIENT)
Dept: PHYSICAL THERAPY | Facility: OTHER | Age: 68
Setting detail: THERAPIES SERIES
Discharge: HOME | End: 2023-05-03
Attending: INTERNAL MEDICINE
Payer: MEDICARE

## 2023-05-03 ENCOUNTER — OFFICE VISIT (OUTPATIENT)
Dept: INTERNAL MEDICINE | Facility: OTHER | Age: 68
End: 2023-05-03
Attending: INTERNAL MEDICINE
Payer: MEDICARE

## 2023-05-03 VITALS
DIASTOLIC BLOOD PRESSURE: 74 MMHG | OXYGEN SATURATION: 94 % | BODY MASS INDEX: 40.8 KG/M2 | SYSTOLIC BLOOD PRESSURE: 130 MMHG | WEIGHT: 300.8 LBS | RESPIRATION RATE: 18 BRPM | TEMPERATURE: 97.1 F | HEART RATE: 65 BPM

## 2023-05-03 DIAGNOSIS — R10.2 NEURALGIA OF LEFT PERINEAL NERVE: ICD-10-CM

## 2023-05-03 DIAGNOSIS — L65.9 HAIR LOSS: ICD-10-CM

## 2023-05-03 DIAGNOSIS — R79.89 LOW TESTOSTERONE IN MALE: Primary | ICD-10-CM

## 2023-05-03 DIAGNOSIS — G89.29 CHRONIC RADICULAR LOW BACK PAIN: ICD-10-CM

## 2023-05-03 DIAGNOSIS — G47.9 SLEEP DIFFICULTIES: ICD-10-CM

## 2023-05-03 DIAGNOSIS — N52.9 ERECTILE DYSFUNCTION, UNSPECIFIED ERECTILE DYSFUNCTION TYPE: ICD-10-CM

## 2023-05-03 DIAGNOSIS — M54.16 CHRONIC RADICULAR LOW BACK PAIN: ICD-10-CM

## 2023-05-03 DIAGNOSIS — I97.89 COMPLICATION OF ARTERY ASSOCIATED WITH SURGICAL PROCEDURE: ICD-10-CM

## 2023-05-03 PROCEDURE — 97110 THERAPEUTIC EXERCISES: CPT | Mod: GP,PN | Performed by: PHYSICAL THERAPIST

## 2023-05-03 PROCEDURE — G0463 HOSPITAL OUTPT CLINIC VISIT: HCPCS

## 2023-05-03 PROCEDURE — 99214 OFFICE O/P EST MOD 30 MIN: CPT | Performed by: INTERNAL MEDICINE

## 2023-05-03 RX ORDER — GABAPENTIN 400 MG/1
1200 CAPSULE ORAL 2 TIMES DAILY
Qty: 540 CAPSULE | Refills: 1 | Status: SHIPPED | OUTPATIENT
Start: 2023-05-03 | End: 2023-10-06

## 2023-05-03 RX ORDER — PREGABALIN 75 MG/1
75 CAPSULE ORAL 3 TIMES DAILY
Qty: 93 CAPSULE | Refills: 5 | Status: SHIPPED | OUTPATIENT
Start: 2023-05-03 | End: 2023-10-06

## 2023-05-03 RX ORDER — SILDENAFIL 100 MG/1
TABLET, FILM COATED ORAL
Qty: 25 TABLET | Refills: 11 | Status: SHIPPED | OUTPATIENT
Start: 2023-05-03 | End: 2023-12-01

## 2023-05-03 ASSESSMENT — ENCOUNTER SYMPTOMS
CONSTIPATION: 0
FREQUENCY: 0
NAUSEA: 0
BRUISES/BLEEDS EASILY: 1
EYE PAIN: 0
MYALGIAS: 1
ENDOCRINE COMMENTS: + HAIR LOSS
COUGH: 0
HEARTBURN: 0
FEVER: 0
ARTHRALGIAS: 0
JOINT SWELLING: 0
SORE THROAT: 0
DIZZINESS: 0
ABDOMINAL PAIN: 0
WEAKNESS: 1
BACK PAIN: 1
PARESTHESIAS: 0
DYSURIA: 0
DIARRHEA: 0
NERVOUS/ANXIOUS: 0
SHORTNESS OF BREATH: 0
HEMATOCHEZIA: 0
HEADACHES: 0
PALPITATIONS: 0
CHILLS: 0
HEMATURIA: 0

## 2023-05-03 ASSESSMENT — ASTHMA QUESTIONNAIRES
QUESTION_3 LAST FOUR WEEKS HOW OFTEN DID YOUR ASTHMA SYMPTOMS (WHEEZING, COUGHING, SHORTNESS OF BREATH, CHEST TIGHTNESS OR PAIN) WAKE YOU UP AT NIGHT OR EARLIER THAN USUAL IN THE MORNING: NOT AT ALL
QUESTION_1 LAST FOUR WEEKS HOW MUCH OF THE TIME DID YOUR ASTHMA KEEP YOU FROM GETTING AS MUCH DONE AT WORK, SCHOOL OR AT HOME: NONE OF THE TIME
QUESTION_5 LAST FOUR WEEKS HOW WOULD YOU RATE YOUR ASTHMA CONTROL: COMPLETELY CONTROLLED
QUESTION_4 LAST FOUR WEEKS HOW OFTEN HAVE YOU USED YOUR RESCUE INHALER OR NEBULIZER MEDICATION (SUCH AS ALBUTEROL): NOT AT ALL
ACT_TOTALSCORE: 25
QUESTION_2 LAST FOUR WEEKS HOW OFTEN HAVE YOU HAD SHORTNESS OF BREATH: NOT AT ALL
EMERGENCY_ROOM_LAST_YEAR_TOTAL: THREE OR MORE
ACT_TOTALSCORE: 25

## 2023-05-03 ASSESSMENT — PAIN SCALES - GENERAL: PAINLEVEL: MILD PAIN (3)

## 2023-05-03 NOTE — PROGRESS NOTES
Assessment & Plan     ICD-10-CM    1. Low testosterone in male  R79.89 Adult Urology  Referral      2. Hair loss  L65.9 Adult Urology  Referral      3. Erectile dysfunction, unspecified erectile dysfunction type  N52.9 sildenafil (VIAGRA) 100 MG tablet     Adult Urology  Referral      4. Neuralgia of left perineal nerve  R10.2 pregabalin (LYRICA) 75 MG capsule      5. Chronic radicular low back pain  M54.16 pregabalin (LYRICA) 75 MG capsule    G89.29 gabapentin (NEURONTIN) 400 MG capsule      6. Complication of artery associated with surgical procedure - Left superior gluteal artery Injury -- subsequently thrombosed / embolized by Interventional Radiology  I97.89 pregabalin (LYRICA) 75 MG capsule      7. Sleep difficulties  G47.9 pregabalin (LYRICA) 75 MG capsule      Patient presents with his wife for follow-up of multiple issues.    Low testosterone levels, was getting treatment with topical or injection testosterone with urology but needs a new urologist.  Referral placed.  Has been having hair loss, erectile dysfunction.  Some of which may be related to peroneal nerve injury after having surgical arterial injury and subsequent embolization of the left superior gluteal artery.  Has chronic left leg and gluteal weakness following this.  Uncertain how much this has affected erectile abilities.  Urology referral placed.  Prescription for Viagra updated.  Was previously using.    Perineal neuralgia with radicular low back pain.  Using combination of gabapentin and Neurontin for chronic pain.  Combination has been quite effective.  No longer using opiates.  Has found Lyrica to also help with his sleep difficulties.  Continues to use melatonin as needed as well.  Needs refills.  [facility] website reviewed.  No opiate use.    Encouraged weight loss and regular exercise.     Return in about 23 weeks (around 10/11/2023) for -- follow-up Lyrica / Nerve pain.    Matt Whitmore MD  [facility]     Subjective   Henry is a 67 year old, presenting for the following health issues:  Follow Up Nerve Pain / Sleep        5/3/2023     1:40 PM   Additional Questions   Roomed by Kevin Downs LPN   Accompanied by wife - Nati     History of Present Illness       Reason for visit:  Follow yp  Symptom onset:  More than a month  Symptoms include:  Nerve pain, scrotum pain  Symptom intensity:  Moderate  Symptom progression:  Staying the same  Had these symptoms before:  Yes  Has tried/received treatment for these symptoms:  Yes  Previous treatment was successful:  Yes    He eats 2-3 servings of fruits and vegetables daily.He consumes 1 sweetened beverage(s) daily.He exercises with enough effort to increase his heart rate 10 to 19 minutes per day.  He exercises with enough effort to increase his heart rate 3 or less days per week.   He is taking medications regularly.     Review of Systems   Constitutional: Negative for chills and fever.        + walking up to 100 feet with small based Quad-cane   HENT: Positive for hearing loss. Negative for congestion, ear pain and sore throat.    Eyes: Negative for pain and visual disturbance.   Respiratory: Negative for cough and shortness of breath.    Cardiovascular: Negative for chest pain, palpitations and peripheral edema.   Gastrointestinal: Negative for abdominal pain, constipation, diarrhea, heartburn, hematochezia and nausea.   Endocrine:        + hair loss   Genitourinary: Positive for impotence. Negative for dysuria, frequency, genital sores, hematuria, penile discharge and urgency.   Musculoskeletal: Positive for back pain (left buttocks/thigh pain) and myalgias. Negative for arthralgias and joint swelling.        Left leg weakness - can't Abduct left leg up against gravity when laying in bed.     More ability to stand, can now extend and abduct left hip slightly when standing.    Skin: Negative for rash.   Allergic/Immunologic: Positive for immunocompromised  state.   Neurological: Positive for weakness. Negative for dizziness, headaches and paresthesias.   Hematological: Bruises/bleeds easily.   Psychiatric/Behavioral: Negative for mood changes. The patient is not nervous/anxious.           Objective    /74 (BP Location: Right arm, Patient Position: Sitting, Cuff Size: Adult Large)   Pulse 65   Temp 97.1  F (36.2  C) (Temporal)   Resp 18   Wt 136.4 kg (300 lb 12.8 oz)   SpO2 94%   BMI 40.80 kg/m    Body mass index is 40.8 kg/m .  Physical Exam  Constitutional:       General: He is not in acute distress.     Appearance: He is well-developed. He is obese. He is not diaphoretic.   HENT:      Head: Normocephalic and atraumatic.   Eyes:      General: No scleral icterus.     Conjunctiva/sclera: Conjunctivae normal.   Cardiovascular:      Rate and Rhythm: Normal rate and regular rhythm.      Pulses: Normal pulses.   Pulmonary:      Effort: Pulmonary effort is normal.      Breath sounds: Normal breath sounds.   Abdominal:      Palpations: Abdomen is soft.      Tenderness: There is no abdominal tenderness.   Musculoskeletal:         General: Swelling present. No tenderness or deformity.      Cervical back: Neck supple.      Right lower leg: Edema present.      Left lower leg: Edema present.      Comments: + Walking with 4 prong cane   Lymphadenopathy:      Cervical: No cervical adenopathy.   Skin:     General: Skin is warm and dry.      Findings: No rash.   Neurological:      Mental Status: He is alert.      Comments: Left leg weakness   Psychiatric:         Mood and Affect: Mood normal.         Behavior: Behavior normal.

## 2023-05-03 NOTE — PATIENT INSTRUCTIONS
1. Low testosterone in male  2. Hair loss  3. Erectile dysfunction, unspecified erectile dysfunction type    START:   - sildenafil (VIAGRA) 100 MG tablet; Take 1/2 to 1 tablet 1 hour prior to sexual activity  Dispense: 25 tablet; Refill: 11  - Adult Urology  Referral; Future  - they will call with date/time of appointment.      4. Neuralgia of left perineal nerve  5. Chronic radicular low back pain  6. Complication of artery associated with surgical procedure - Left superior gluteal artery Injury -- subsequently thrombosed / embolized by Interventional Radiology  7. Sleep difficulties    Continue:   - pregabalin (LYRICA) 75 MG capsule; Take 1 capsule (75 mg) by mouth 3 times daily  Dispense: 93 capsule; Refill: 5  - gabapentin (NEURONTIN) 400 MG capsule; Take 3 capsules (1,200 mg) by mouth 2 times daily  Dispense: 540 capsule; Refill: 1      Return in approximately 5 month(s), or sooner as needed for follow-up with Dr. Whitmore.  -- follow-up Lyrica / Nerve pain    Clinic : [phone]  Appointment line: [phone]

## 2023-05-03 NOTE — NURSING NOTE
Chief Complaint   Patient presents with     Follow Up Nerve Pain / Sleep         Initial /74 (BP Location: Right arm, Patient Position: Sitting, Cuff Size: Adult Large)   Pulse 65   Temp 97.1  F (36.2  C) (Temporal)   Resp 18   Wt 136.4 kg (300 lb 12.8 oz)   SpO2 94%   BMI 40.80 kg/m   Estimated body mass index is 40.8 kg/m  as calculated from the following:    Height as of 2/6/23: 1.829 m (6').    Weight as of this encounter: 136.4 kg (300 lb 12.8 oz).       FOOD SECURITY SCREENING QUESTIONS:    The next two questions are to help us understand your food security.  If you are feeling you need any assistance in this area, we have resources available to support you today.    Hunger Vital Signs:  Within the past 12 months we worried whether our food would run out before we got money to buy more. Never  Within the past 12 months the food we bought just didn't last and we didn't have money to get more. Never    Advance Care Directive on file? no      Medication reconciliation complete.      Kevin Enrique,on 5/3/2023 at 1:47 PM

## 2023-05-04 RX ORDER — PREGABALIN 75 MG/1
75 CAPSULE ORAL 3 TIMES DAILY
Qty: 90 CAPSULE | Refills: 0 | OUTPATIENT
Start: 2023-05-04

## 2023-05-05 ENCOUNTER — HOSPITAL ENCOUNTER (OUTPATIENT)
Dept: PHYSICAL THERAPY | Facility: OTHER | Age: 68
Setting detail: THERAPIES SERIES
Discharge: HOME | End: 2023-05-05
Attending: INTERNAL MEDICINE
Payer: MEDICARE

## 2023-05-05 PROCEDURE — 97110 THERAPEUTIC EXERCISES: CPT | Mod: GP,PN | Performed by: PHYSICAL THERAPIST

## 2023-05-08 ENCOUNTER — ANCILLARY ORDERS (OUTPATIENT)
Dept: MRI IMAGING | Facility: OTHER | Age: 68
End: 2023-05-08

## 2023-05-08 DIAGNOSIS — R29.898 WEAKNESS OF BOTH LOWER EXTREMITIES: ICD-10-CM

## 2023-05-08 LAB — MAYO MISC RESULT: NORMAL

## 2023-05-09 ENCOUNTER — HOSPITAL ENCOUNTER (OUTPATIENT)
Dept: RESPIRATORY THERAPY | Facility: OTHER | Age: 68
Discharge: HOME | End: 2023-05-09
Attending: INTERNAL MEDICINE | Admitting: INTERNAL MEDICINE
Payer: MEDICARE

## 2023-05-09 DIAGNOSIS — R79.81 LOW OXYGEN SATURATION: ICD-10-CM

## 2023-05-09 PROCEDURE — 94729 DIFFUSING CAPACITY: CPT

## 2023-05-09 PROCEDURE — 94010 BREATHING CAPACITY TEST: CPT | Mod: 26 | Performed by: STUDENT IN AN ORGANIZED HEALTH CARE EDUCATION/TRAINING PROGRAM

## 2023-05-09 PROCEDURE — 94726 PLETHYSMOGRAPHY LUNG VOLUMES: CPT | Mod: 26 | Performed by: STUDENT IN AN ORGANIZED HEALTH CARE EDUCATION/TRAINING PROGRAM

## 2023-05-09 PROCEDURE — 94729 DIFFUSING CAPACITY: CPT | Mod: 26 | Performed by: STUDENT IN AN ORGANIZED HEALTH CARE EDUCATION/TRAINING PROGRAM

## 2023-05-09 PROCEDURE — 999N000157 HC STATISTIC RCP TIME EA 10 MIN

## 2023-05-09 PROCEDURE — 94726 PLETHYSMOGRAPHY LUNG VOLUMES: CPT

## 2023-05-09 PROCEDURE — 94010 BREATHING CAPACITY TEST: CPT

## 2023-05-11 ENCOUNTER — TRANSFERRED RECORDS (OUTPATIENT)
Dept: HEALTH INFORMATION MANAGEMENT | Facility: OTHER | Age: 68
End: 2023-05-11
Payer: MEDICARE

## 2023-05-12 ENCOUNTER — HOSPITAL ENCOUNTER (OUTPATIENT)
Dept: PHYSICAL THERAPY | Facility: OTHER | Age: 68
Setting detail: THERAPIES SERIES
Discharge: HOME | End: 2023-05-12
Attending: INTERNAL MEDICINE
Payer: MEDICARE

## 2023-05-12 PROCEDURE — 97110 THERAPEUTIC EXERCISES: CPT | Mod: GP,PN | Performed by: PHYSICAL THERAPIST

## 2023-05-13 ENCOUNTER — HOSPITAL ENCOUNTER (OUTPATIENT)
Dept: MRI IMAGING | Facility: OTHER | Age: 68
Discharge: HOME | End: 2023-05-13
Attending: PHYSICAL MEDICINE & REHABILITATION | Admitting: PHYSICAL MEDICINE & REHABILITATION
Payer: MEDICARE

## 2023-05-13 DIAGNOSIS — R29.898 WEAKNESS OF BOTH LOWER EXTREMITIES: ICD-10-CM

## 2023-05-13 PROCEDURE — 72148 MRI LUMBAR SPINE W/O DYE: CPT

## 2023-05-15 ENCOUNTER — HOSPITAL ENCOUNTER (OUTPATIENT)
Dept: PHYSICAL THERAPY | Facility: OTHER | Age: 68
Setting detail: THERAPIES SERIES
Discharge: HOME | End: 2023-05-15
Attending: INTERNAL MEDICINE
Payer: MEDICARE

## 2023-05-15 PROCEDURE — 97110 THERAPEUTIC EXERCISES: CPT | Mod: GP,PN | Performed by: PHYSICAL THERAPIST

## 2023-05-17 ENCOUNTER — THERAPY VISIT (OUTPATIENT)
Dept: PHYSICAL THERAPY | Facility: OTHER | Age: 68
End: 2023-05-17
Attending: INTERNAL MEDICINE
Payer: MEDICARE

## 2023-05-17 DIAGNOSIS — M96.840 POSTOPERATIVE HEMATOMA OF MUSCULOSKELETAL STRUCTURE FOLLOWING MUSCULOSKELETAL PROCEDURE: ICD-10-CM

## 2023-05-17 DIAGNOSIS — I97.89 COMPLICATION OF ARTERY ASSOCIATED WITH SURGICAL PROCEDURE: Primary | ICD-10-CM

## 2023-05-17 PROCEDURE — 97110 THERAPEUTIC EXERCISES: CPT | Mod: GP,PN | Performed by: PHYSICAL THERAPIST

## 2023-05-19 ENCOUNTER — TRANSFERRED RECORDS (OUTPATIENT)
Dept: HEALTH INFORMATION MANAGEMENT | Facility: OTHER | Age: 68
End: 2023-05-19
Payer: MEDICARE

## 2023-05-23 ENCOUNTER — TRANSFERRED RECORDS (OUTPATIENT)
Dept: HEALTH INFORMATION MANAGEMENT | Facility: OTHER | Age: 68
End: 2023-05-23
Payer: MEDICARE

## 2023-05-24 ENCOUNTER — THERAPY VISIT (OUTPATIENT)
Dept: PHYSICAL THERAPY | Facility: OTHER | Age: 68
End: 2023-05-24
Attending: INTERNAL MEDICINE
Payer: MEDICARE

## 2023-05-24 DIAGNOSIS — M96.840 POSTOPERATIVE HEMATOMA OF MUSCULOSKELETAL STRUCTURE FOLLOWING MUSCULOSKELETAL PROCEDURE: ICD-10-CM

## 2023-05-24 DIAGNOSIS — I97.89 COMPLICATION OF ARTERY ASSOCIATED WITH SURGICAL PROCEDURE: Primary | ICD-10-CM

## 2023-05-24 PROCEDURE — 97110 THERAPEUTIC EXERCISES: CPT | Mod: GP,PN | Performed by: PHYSICAL THERAPIST

## 2023-05-26 ENCOUNTER — THERAPY VISIT (OUTPATIENT)
Dept: PHYSICAL THERAPY | Facility: OTHER | Age: 68
End: 2023-05-26
Attending: INTERNAL MEDICINE
Payer: MEDICARE

## 2023-05-26 DIAGNOSIS — I97.89 COMPLICATION OF ARTERY ASSOCIATED WITH SURGICAL PROCEDURE: Primary | ICD-10-CM

## 2023-05-26 DIAGNOSIS — M96.840 POSTOPERATIVE HEMATOMA OF MUSCULOSKELETAL STRUCTURE FOLLOWING MUSCULOSKELETAL PROCEDURE: ICD-10-CM

## 2023-05-26 DIAGNOSIS — S76.012A TEAR OF LEFT GLUTEUS MAXIMUS MUSCLE: ICD-10-CM

## 2023-05-26 PROCEDURE — 97110 THERAPEUTIC EXERCISES: CPT | Mod: GP,PN | Performed by: PHYSICAL THERAPIST

## 2023-05-31 ENCOUNTER — THERAPY VISIT (OUTPATIENT)
Dept: PHYSICAL THERAPY | Facility: OTHER | Age: 68
End: 2023-05-31
Attending: INTERNAL MEDICINE
Payer: MEDICARE

## 2023-05-31 DIAGNOSIS — I97.89 COMPLICATION OF ARTERY ASSOCIATED WITH SURGICAL PROCEDURE: Primary | ICD-10-CM

## 2023-05-31 DIAGNOSIS — M96.840 POSTOPERATIVE HEMATOMA OF MUSCULOSKELETAL STRUCTURE FOLLOWING MUSCULOSKELETAL PROCEDURE: ICD-10-CM

## 2023-05-31 DIAGNOSIS — S76.012A TEAR OF LEFT GLUTEUS MAXIMUS MUSCLE: ICD-10-CM

## 2023-05-31 PROCEDURE — 97110 THERAPEUTIC EXERCISES: CPT | Mod: GP,PN | Performed by: PHYSICAL THERAPIST

## 2023-05-31 NOTE — PROGRESS NOTES
05/31/23 1012   Appointment Info   Signing clinician's name / credentials Maynor Walker, PT   Total/Authorized Visits 15   Visits Used 9/10   Progress Note/Certification   Progress Note Completed Date 05/31/23   GOALS   PT Goals 2;3;4;5   PT Goal 1   Goal Identifier strength   Goal Description Patient will demonstrate increased left hip Internal Rotation strength from 2/5 to 3/5 or better for increased stability with walking.   Goal Progress 4/26 = 2+/5;     5/31 = 3/5 - Goal met   Target Date 05/26/23   Date Met 05/31/23   PT Goal 2   Goal Identifier strength   Goal Description Patient will demonstrate increased left hip Abduction from 2/5 to 3/5 or better for increased stability with standing and walking.   Goal Progress 4/26 = 2+/5     5/31 = 3-/5   Target Date 05/26/23   PT Goal 3   Goal Identifier strength   Goal Description Patient will demonstrate increased left hip Extension strength from 3/5 to 4/5 or better for increased stability with standing and walking and ability to walk up/down steps.   Goal Progress 5/31 = 3/5 (see notes)   Target Date 05/26/23   PT Goal 4   Goal Identifier walking   Goal Description Patient will demonstrate ability to walk 250 ft on outdoor terrain safely with SBQC for ability to walk to garage/shed with no falls.   Goal Progress Goal met   Target Date 05/26/23   Date Met 05/31/23   PT Goal 5   Goal Identifier transfers   Goal Description Patient will demonstrate adequate strength to be able to transfer from sit-stand from 1 ft seat surface to allow getting in/out of vehicle.   Goal Progress Progress made - able to do sit-stand from 1ft surface, but difficult from low seat in car due to limited space.   Target Date 05/26/23   Subjective Report   Subjective Report Patient and his wife have both noticed improvment over the past 2 weeks.  Continues to need SBQC for ambulation, but ability to walk outdoors and walk further distances has improved.  Transfers from recliner and low  "surfaces has improved significantly.  Continues to have pain if attempting to take a step without support of SBQC to stabilize Left hip.  Was able to walk up multiple steps at family gathering with support of railing and SBQC.  Continues to report pain, usually 4/10 in scrotum.  Overall feels he is making progress.   Treatment Interventions (PT)   Interventions Therapeutic Procedure/Exercise   Therapeutic Procedure/Exercise   Therapeutic Procedures: strength, endurance, ROM, flexibillity minutes (72000) 40   Ther Proc 1 (not completed today: NuStepT6, seat/arms 12, L6, 10 min;)       Leg press (F19) # 15 x 1, L/R # 15 x 2 each;     Abduction (F3) 64# 15 x 3;     Ham Curl (9) 70# 15 x 3;      Knee Extension (9) 90lb, 15 x 3;       Low Back Ext 94# 15 x 2;       Lateral step-up 4\" step 10 x 3;                                    Not completed: Sidestepping at railing, 40 ft R/L x 1 each;      backward walking 40ft x 1,    high-knee walking railing on L 40 ft x 1;   Skilled Intervention cues for positioning and correct technique for exercise   Patient Response/Progress fatigue   Plan   Home program 3/31: seated hip IR, supine hip IR/ER, supine ABD    5/17: slow stand to sit   Total Session Time   Timed Code Treatment Minutes 40   Total Treatment Time (sum of timed and untimed services) 40   Medicare Claim Information   Medical Diagnosis 1) Complication of artery associated with surgical procedure   2) Post-operative hematoma of musculoskeletal structure following musculoskeletal procedure   PT Diagnosis impaired mobility   Start of Care Date 03/31/23   Onset date of current episode/exacerbation 01/03/23   Certification date from 03/31/23   Ortho Goal 1   Goal Identifier strength   Goal Description Patient will demonstrate increased left hip Internal Rotation strength from 2/5 to 3/5 or better for increased stability with walking.   Goal Progress 4/26 = 2+/5   Target Date 05/26/23   Ortho Goal 2   Goal " Identifier strength   Goal Description Patient will demonstrate increased left hip Abduction from 2/5 to 3/5 or better for increased stability with standing and walking.   Goal Progress 4/26 = 2+/5   Target Date 05/26/23   Ortho Goal 3   Goal Identifier strength   Goal Description Patient will demonstrate increased left hip Extension strength from 3/5 to 4/5 or better for increased stability with standing and walking and ability to walk up/down steps.   Goal Progress 4/26 = 3/5   Target Date 05/26/23   Ortho Goal 4   Goal Identifier walking   Goal Description Patient will demonstrate ability to walk 250 ft on outdoor terrain safely with SBQC for ability to walk to garage/shed with no falls.   Target Date 05/26/23   Ortho Goal 5   Goal Identifier transfers   Goal Description Patient will demonstrate adequate strength to be able to transfer from sit-stand from 1 ft seat surface to allow getting in/out of vehicle.   Target Date 05/26/23   Session Number   Additional Session Number 5/10       [facility]                                                                                   OUTPATIENT PHYSICAL THERAPY          PLAN OF TREATMENT FOR OUTPATIENT REHABILITATION   Patient's Last Name, First Name, Selvin Rosales YOB: 1955   Provider's Name   [facility]   Medical Record No.  [id number]     Onset Date: 01/03/23  Start of Care Date: 03/31/23     Medical Diagnosis:    1) Complication of artery associated with surgical procedure   2) Post-operative hematoma of musculoskeletal structure following musculoskeletal procedure     PT Treatment Diagnosis:   Impaired mobility Plan of Treatment  Frequency/Duration: 2x/week  8 weeks    Certification date from 05/27/23 to 07/26/23        See note for plan of treatment details and functional goals     Erika Walker, PT                         I CERTIFY THE NEED FOR THESE SERVICES FURNISHED  UNDER        THIS PLAN OF TREATMENT AND WHILE UNDER MY CARE .             Physician Signature               Date      X_____________________________________________________              Referring Provider:  Matt Whitmore      Initial Assessment  See Epic Evaluation- Start of Care Date: 03/31/23

## 2023-06-02 ENCOUNTER — THERAPY VISIT (OUTPATIENT)
Dept: PHYSICAL THERAPY | Facility: OTHER | Age: 68
End: 2023-06-02
Attending: INTERNAL MEDICINE
Payer: MEDICARE

## 2023-06-02 DIAGNOSIS — M96.840 POSTOPERATIVE HEMATOMA OF MUSCULOSKELETAL STRUCTURE FOLLOWING MUSCULOSKELETAL PROCEDURE: ICD-10-CM

## 2023-06-02 DIAGNOSIS — S76.012A TEAR OF LEFT GLUTEUS MAXIMUS MUSCLE: ICD-10-CM

## 2023-06-02 DIAGNOSIS — I97.89 COMPLICATION OF ARTERY ASSOCIATED WITH SURGICAL PROCEDURE: Primary | ICD-10-CM

## 2023-06-02 PROCEDURE — 97110 THERAPEUTIC EXERCISES: CPT | Mod: GP,PN | Performed by: PHYSICAL THERAPIST

## 2023-06-14 ENCOUNTER — THERAPY VISIT (OUTPATIENT)
Dept: PHYSICAL THERAPY | Facility: OTHER | Age: 68
End: 2023-06-14
Attending: INTERNAL MEDICINE
Payer: MEDICARE

## 2023-06-14 DIAGNOSIS — M96.840 POSTOPERATIVE HEMATOMA OF MUSCULOSKELETAL STRUCTURE FOLLOWING MUSCULOSKELETAL PROCEDURE: ICD-10-CM

## 2023-06-14 DIAGNOSIS — S76.012A TEAR OF LEFT GLUTEUS MAXIMUS MUSCLE: ICD-10-CM

## 2023-06-14 DIAGNOSIS — I97.89 COMPLICATION OF ARTERY ASSOCIATED WITH SURGICAL PROCEDURE: Primary | ICD-10-CM

## 2023-06-14 PROCEDURE — 97110 THERAPEUTIC EXERCISES: CPT | Mod: GP,PN | Performed by: PHYSICAL THERAPIST

## 2023-06-18 ENCOUNTER — MYC MEDICAL ADVICE (OUTPATIENT)
Dept: INTERNAL MEDICINE | Facility: OTHER | Age: 68
End: 2023-06-18
Payer: MEDICARE

## 2023-06-18 DIAGNOSIS — I97.89 COMPLICATION OF ARTERY ASSOCIATED WITH SURGICAL PROCEDURE: Primary | ICD-10-CM

## 2023-06-18 DIAGNOSIS — M96.840 POSTOPERATIVE HEMATOMA OF MUSCULOSKELETAL STRUCTURE FOLLOWING MUSCULOSKELETAL PROCEDURE: ICD-10-CM

## 2023-06-18 DIAGNOSIS — S76.012A TEAR OF LEFT GLUTEUS MAXIMUS MUSCLE: ICD-10-CM

## 2023-06-21 ENCOUNTER — THERAPY VISIT (OUTPATIENT)
Dept: PHYSICAL THERAPY | Facility: OTHER | Age: 68
End: 2023-06-21
Attending: INTERNAL MEDICINE
Payer: MEDICARE

## 2023-06-21 DIAGNOSIS — M96.840 POSTOPERATIVE HEMATOMA OF MUSCULOSKELETAL STRUCTURE FOLLOWING MUSCULOSKELETAL PROCEDURE: ICD-10-CM

## 2023-06-21 DIAGNOSIS — S76.012A TEAR OF LEFT GLUTEUS MAXIMUS MUSCLE: ICD-10-CM

## 2023-06-21 DIAGNOSIS — I97.89 COMPLICATION OF ARTERY ASSOCIATED WITH SURGICAL PROCEDURE: Primary | ICD-10-CM

## 2023-06-21 PROCEDURE — 97110 THERAPEUTIC EXERCISES: CPT | Mod: GP,PN | Performed by: PHYSICAL THERAPIST

## 2023-06-23 ENCOUNTER — THERAPY VISIT (OUTPATIENT)
Dept: PHYSICAL THERAPY | Facility: OTHER | Age: 68
End: 2023-06-23
Attending: INTERNAL MEDICINE
Payer: MEDICARE

## 2023-06-23 ENCOUNTER — HOSPITAL ENCOUNTER (EMERGENCY)
Facility: OTHER | Age: 68
Discharge: HOME | End: 2023-06-23
Attending: FAMILY MEDICINE | Admitting: FAMILY MEDICINE
Payer: MEDICARE

## 2023-06-23 VITALS
HEART RATE: 66 BPM | BODY MASS INDEX: 40.63 KG/M2 | TEMPERATURE: 98.5 F | WEIGHT: 300 LBS | HEIGHT: 72 IN | OXYGEN SATURATION: 92 % | SYSTOLIC BLOOD PRESSURE: 174 MMHG | RESPIRATION RATE: 16 BRPM | DIASTOLIC BLOOD PRESSURE: 102 MMHG

## 2023-06-23 DIAGNOSIS — I97.89 COMPLICATION OF ARTERY ASSOCIATED WITH SURGICAL PROCEDURE: Primary | ICD-10-CM

## 2023-06-23 DIAGNOSIS — M96.840 POSTOPERATIVE HEMATOMA OF MUSCULOSKELETAL STRUCTURE FOLLOWING MUSCULOSKELETAL PROCEDURE: ICD-10-CM

## 2023-06-23 DIAGNOSIS — S76.012A TEAR OF LEFT GLUTEUS MAXIMUS MUSCLE: ICD-10-CM

## 2023-06-23 DIAGNOSIS — H53.9 VISION CHANGES: ICD-10-CM

## 2023-06-23 PROCEDURE — 99285 EMERGENCY DEPT VISIT HI MDM: CPT | Performed by: FAMILY MEDICINE

## 2023-06-23 PROCEDURE — 99284 EMERGENCY DEPT VISIT MOD MDM: CPT | Performed by: FAMILY MEDICINE

## 2023-06-23 PROCEDURE — 97110 THERAPEUTIC EXERCISES: CPT | Mod: GP,CQ

## 2023-06-23 NOTE — ED PROVIDER NOTES
"  History     Chief Complaint   Patient presents with     Possible Retinal Tear     The history is provided by the patient, the spouse and medical records.     Selvin An is a 68 year old male here with a change in the vision of the right eye. He was exercising at the Cardiac Rehab here and had black lines develop in the visual field of the right eye. It progressed to a \"shade\" being drawn down over the field of vision and now he feels that he has about 50% vision loss in his right eye.     He has had a retinal detachment in the right eye with laser surgery by Dr Gomez at [facility] in June 2019. He has since had cataract surgery in the right eye as well.     Allergies:  Allergies   Allergen Reactions     Losartan Cough     Omeprazole Diarrhea     Pantoprazole Diarrhea     Valsartan Cough     Started with losartan, worsened with high dose valsartan       Problem List:    Patient Active Problem List    Diagnosis Date Noted     Hair loss 05/03/2023     Priority: Medium     Erectile dysfunction, unspecified erectile dysfunction type 05/03/2023     Priority: Medium     Neuralgia of left perineal nerve 05/03/2023     Priority: Medium     Sleep difficulties 05/03/2023     Priority: Medium     Asthma 04/05/2023     Priority: Medium     Chronic heart failure with preserved ejection fraction (HFpEF) (H) 02/24/2023     Priority: Medium     Complication of artery associated with surgical procedure - Left superior gluteal artery Injury -- subsequently thrombosed / embolized by Interventional Radiology 02/15/2023     Priority: Medium     Tear of left gluteus elif muscle 02/15/2023     Priority: Medium     Injury of muscle of left thigh 02/15/2023     Priority: Medium     Left leg weakness 02/15/2023     Priority: Medium     Generalized muscle weakness 02/06/2023     Priority: Medium     Scrotal pain 02/06/2023     Priority: Medium     Drug-induced constipation 02/06/2023     Priority: Medium     Urinary retention 01/08/2023 "     Priority: Medium     Postoperative hematoma of musculoskeletal structure following musculoskeletal procedure 01/07/2023     Priority: Medium     Hematoma of left lower leg 01/07/2023     Priority: Medium     Asplenia 01/07/2023     Priority: Medium     Low oxygen saturation - during surgery. 01/04/2023     Priority: Medium     Hypertriglyceridemia 11/11/2022     Priority: Medium     Coronary artery calcification 10/18/2022     Priority: Medium     Bilateral renal cysts 08/30/2022     Priority: Medium     Chronic radicular low back pain 08/30/2022     Priority: Medium     Hyperuricemia 12/23/2021     Priority: Medium     Stage 3a chronic kidney disease (H) 10/27/2021     Priority: Medium     Low testosterone in male 08/18/2021     Priority: Medium     Radicular low back pain 04/27/2021     Priority: Medium     Neuroforaminal stenosis of lumbar spine 04/27/2021     Priority: Medium     Trigger point with back pain 04/14/2021     Priority: Medium     Trigger point of right shoulder region 04/14/2021     Priority: Medium     Sacroiliac joint pain - left 04/14/2021     Priority: Medium     Osteoarthritis of lumbar spine, unspecified spinal osteoarthritis complication status 04/14/2021     Priority: Medium     Hypercalcemia 03/12/2021     Priority: Medium     Primary insomnia 01/31/2021     Priority: Medium     Polymyalgia rheumatica (H) 01/31/2021     Priority: Medium     Personal history of malignant neoplasm of bladder -- s/p TURBT x2 in 3/2018 and 4/2018 for high-grade non-muscle invasive bladder cancer, now s/p BCG treatment - last cystoscopy 1/20/2021 was normal 01/31/2021     Priority: Medium     Steroid-induced hyperglycemia 01/29/2021     Priority: Medium     Bilateral lower extremity edema 01/29/2021     Priority: Medium     Vitamin B12 deficiency 01/29/2021     Priority: Medium     Chronic fatigue syndrome with fibromyalgia 01/29/2021     Priority: Medium     History of Lyme disease 01/29/2021     Priority:  Medium     Morbid obesity (H) 11/02/2020     Priority: Medium     Meniere disease, bilateral 06/01/2018     Priority: Medium     Rotator cuff syndrome of right shoulder 04/23/2018     Priority: Medium     Lung mass 03/15/2018     Priority: Medium     History of carpal tunnel surgery 11/02/2017     Priority: Medium     Vitamin D deficiency 10/10/2016     Priority: Medium     Postoperative hypothyroidism 01/11/2011     Priority: Medium     Mixed dyslipidemia 07/12/2010     Priority: Medium     Psychosexual dysfunction with inhibited sexual excitement 02/17/2010     Priority: Medium     Benign essential hypertension 02/17/2010     Priority: Medium     History of kidney stones 02/17/2010     Priority: Medium     KYLAH on CPAP -- Uses nightly, finds helpful and beneficial 02/17/2010     Priority: Medium     History of thyroid cancer 02/17/1994     Priority: Medium     Formatting of this note might be different from the original.  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     Priority: Medium        Past Medical History:    Past Medical History:   Diagnosis Date     Acute and chronic respiratory failure with hypoxia (H) 2/6/2023     Bell's palsy 3/30/2017     Bilateral carpal tunnel syndrome      Essential (primary) hypertension      Gout 6/3/2013     Hyperlipidemia      Hypothyroidism      Malignant neoplasm of thyroid gland (H)      Sleep apnea        Past Surgical History:    Past Surgical History:   Procedure Laterality Date     COLONOSCOPY  09/09/2014 9/2014,Normal - follow up 10 years     CV CORONARY ANGIOGRAM N/A 10/31/2022    Procedure: Coronary Angiogram;  Surgeon: Ender Michaels MD;  Location:  HE[facility] CARD[facility] CATH LAB     CYSTOSCOPY      bladder cancer     INCISION AND DRAINAGE LOWER EXTREMITY, COMBINED Left 1/7/2023    Procedure: INCISION AND DRAINAGE, LEFT BUTTOCK (Drain Hematoma of Left Buttock);  Surgeon: Ronald Mccall MD;  Location:  OR     OTHER SURGICAL HISTORY       ,SPLENECTOMY     OTHER SURGICAL HISTORY      2010,91564,EYE MUSCLE SURGERY,Left,strabismus     OTHER SURGICAL HISTORY      10/06/14,055086,OTHER,Left,Dr. Lana LEHMAN     OTHER SURGICAL HISTORY      10/14/14,554291,OTHER,Left,Dr. Lana LEHMAN     OTHER SURGICAL HISTORY      11/16/2017,[id number],ENDOSCOPIC RELEASE TRANSVERSE CARPAL LIGAMENT OF HAND,Left     RELEASE CARPAL TUNNEL      12/2011     RETINAL DETACHMENT SURGERY Right      STRABISMUS SURGERY Left      THYROIDECTOMY      1994,thyroid ca     TONSILLECTOMY      2004,T & A > 13yo     VASECTOMY      No Comments Provided       Family History:    Family History   Problem Relation Age of Onset     Heart Disease Mother         Heart Disease,MI - SCD     Heart Disease Father         Heart Disease,MI - SCD       Social History:  Marital Status:   [2]  Social History     Tobacco Use     Smoking status: Never     Smokeless tobacco: Never   Vaping Use     Vaping Use: Never used   Substance Use Topics     Alcohol use: No     Comment: none     Drug use: No        Medications:    allopurinol (ZYLOPRIM) 300 MG tablet  B Complex-C (VITAMIN B COMPLEX W/VITAMIN C) TABS tablet  carvedilol (COREG) 6.25 MG tablet  Cholecalciferol (VITAMIN D-3) 125 MCG (5000 UT) TABS  Cyanocobalamin 2500 MCG TABS  famotidine (PEPCID) 20 MG tablet  fenofibrate (TRICOR) 145 MG tablet  gabapentin (NEURONTIN) 400 MG capsule  levothyroxine (SYNTHROID/LEVOTHROID) 200 MCG tablet  lidocaine (XYLOCAINE) 5 % external ointment  magnesium 250 MG tablet  Melatonin 10 MG TABS tablet  Multiple Vitamins-Minerals (CENTRUM ADULTS PO)  order for DME  polyethylene glycol (MIRALAX) 17 GM/Dose powder  predniSONE (DELTASONE) 1 MG tablet  pregabalin (LYRICA) 75 MG capsule  Probiotic Product (PROBIOTIC-10 PO)  rosuvastatin (CRESTOR) 5 MG tablet  sildenafil (VIAGRA) 100 MG tablet  spironolactone (ALDACTONE) 25 MG tablet      Review of Systems   Eyes: Positive for visual disturbance.   All other systems reviewed and  "are negative.      Physical Exam   BP: (!) 174/102  Pulse: 66  Temp: 98.5  F (36.9  C)  Resp: 16  Height: 182.9 cm (6')  Weight: 136.1 kg (300 lb)  SpO2: 92 %      Physical Exam  Vitals and nursing note reviewed.   Constitutional:       General: He is not in acute distress.     Appearance: Normal appearance. He is not ill-appearing, toxic-appearing or diaphoretic.   Eyes:      Extraocular Movements: Extraocular movements intact.      Conjunctiva/sclera: Conjunctivae normal.      Pupils: Pupils are equal, round, and reactive to light.      Comments: Exam of the right eye shows apparently normal vasculature in the globe. His field of vision appears to be equally affected at this time with estimated 50% loss of vision in all visual fields (medial, lateral, upper, lower, central). He can count fingers with me standing at the foot of the bed but his vision is blurry.    Neurological:      Mental Status: He is alert.         Assessments & Plan (with Medical Decision Making)  Selvin An is a 68 year old male here with a change in the vision of the right eye. He was exercising at the Cardiac Rehab here and had black lines develop in the visual field of the right eye. It progressed to a \"shade\" being drawn down over the field of vision and now he feels that he has about 50% vision loss in his right eye. He has had a retinal detachment in the right eye with laser surgery by Dr Gomez at [facility] in June 2019. He has since had cataract surgery in the right eye as well. VS in the ED BP (!) 174/102   Pulse 66   Temp 98.5  F (36.9  C) (Tympanic)   Resp 16   Ht 1.829 m (6')   Wt 136.1 kg (300 lb)   SpO2 92%   BMI 40.69 kg/m    Exam shows a change in vision. He has normal appearing vasculature in the back of the eye to my exam. Bedside ultrasound seems to show a segment of retina floating in the vitreus of the right globe.  I think this is consistent with retinal detachment and he needs to see ophthalmology ASA.  11:59 " AM  Dr Mckeon would like to see him at [facility], [address] [place] [address], [address] on second floor, [place] [address].  They can go by private car.      I have reviewed the nursing notes.    I have reviewed the findings, diagnosis, plan and need for follow up with the patient.    Medical Decision Making  The patient's presentation was of low complexity (an acute and uncomplicated illness or injury).    The patient's evaluation involved:  an assessment requiring an independent historian (see separate area of note for details)    The patient's management necessitated further care after sign-out to Dr Menchaca, ophthalmology in [place] (see their note for further management).      Final diagnoses:   Vision changes - right eye, thought to be retinal detachment       6/23/2023   [facility]     Pedro Kenyn MD  06/23/23 1205       Pedro Kenny MD  06/23/23 1214

## 2023-06-23 NOTE — DISCHARGE INSTRUCTIONS
Selvin Menchaca, on call for Dr Knight, would like to see you right away in [place]. Their office is:  [address]  [address] (on the second floor)  [place] [address]    Thank you for choosing our Emergency Department for your care.     You may receive a phone call or letter for a survey about your care in our ED.  Please complete this as this is how we improve care for our patients.     If you have any questions after leaving the ED you can call or text me on my cell phone at [phone] and I will get back to you at some point. This does not mean that I am on call and if you are not doing well please return to the ED.     Sincerely,    Dr Kevon Kenny M.D.

## 2023-06-23 NOTE — ED TRIAGE NOTES
"  Pt here with wife, pt reports that he was exercising at cardiac rehab about 30 minutes ago when he developed, \"squiggly lines and dots over his right eye\", pt reports a hx of a retinal detachment, VSS, pt into [facility] ambulatory   Triage Assessment     Row Name 06/23/23 1133       Triage Assessment (Adult)    Airway WDL WDL       Respiratory WDL    Respiratory WDL WDL       Skin Circulation/Temperature WDL    Skin Circulation/Temperature WDL WDL       Cardiac WDL    Cardiac WDL WDL       Peripheral/Neurovascular WDL    Peripheral Neurovascular WDL WDL       Cognitive/Neuro/Behavioral WDL    Cognitive/Neuro/Behavioral WDL WDL              "

## 2023-06-27 ENCOUNTER — TRANSFERRED RECORDS (OUTPATIENT)
Dept: HEALTH INFORMATION MANAGEMENT | Facility: OTHER | Age: 68
End: 2023-06-27
Payer: MEDICARE

## 2023-06-28 ENCOUNTER — THERAPY VISIT (OUTPATIENT)
Dept: PHYSICAL THERAPY | Facility: OTHER | Age: 68
End: 2023-06-28
Attending: INTERNAL MEDICINE
Payer: MEDICARE

## 2023-06-28 DIAGNOSIS — I97.89 COMPLICATION OF ARTERY ASSOCIATED WITH SURGICAL PROCEDURE: ICD-10-CM

## 2023-06-28 DIAGNOSIS — S76.012A TEAR OF LEFT GLUTEUS MAXIMUS MUSCLE: ICD-10-CM

## 2023-06-28 DIAGNOSIS — M96.840 POSTOPERATIVE HEMATOMA OF MUSCULOSKELETAL STRUCTURE FOLLOWING MUSCULOSKELETAL PROCEDURE: ICD-10-CM

## 2023-06-28 PROCEDURE — 97110 THERAPEUTIC EXERCISES: CPT | Mod: GP,PN | Performed by: PHYSICAL THERAPIST

## 2023-06-28 NOTE — TELEPHONE ENCOUNTER
Referred to:       Injury of muscle of left thigh   Tear of left gluteus elif muscle   Order: Adult Physical Medicine [facility] Rehab [facility] Referral    [facility] ([facility])   [address] [address]   Phone: [phone]   Fax: [phone]        Patient update on [facility].    Jahaira Ace RN on 6/28/2023 at 11:51 AM

## 2023-06-30 ENCOUNTER — THERAPY VISIT (OUTPATIENT)
Dept: PHYSICAL THERAPY | Facility: OTHER | Age: 68
End: 2023-06-30
Attending: INTERNAL MEDICINE
Payer: MEDICARE

## 2023-06-30 DIAGNOSIS — S76.012A TEAR OF LEFT GLUTEUS MAXIMUS MUSCLE: ICD-10-CM

## 2023-06-30 DIAGNOSIS — M96.840 POSTOPERATIVE HEMATOMA OF MUSCULOSKELETAL STRUCTURE FOLLOWING MUSCULOSKELETAL PROCEDURE: ICD-10-CM

## 2023-06-30 DIAGNOSIS — I97.89 COMPLICATION OF ARTERY ASSOCIATED WITH SURGICAL PROCEDURE: Primary | ICD-10-CM

## 2023-06-30 PROCEDURE — 97110 THERAPEUTIC EXERCISES: CPT | Mod: GP,PN | Performed by: PHYSICAL THERAPIST

## 2023-06-30 NOTE — PROGRESS NOTES
PROGRESS NOTE:      PLAN  Patient has been seen 21 visits since 3/31/23.  Continues to make slow, but consistent progress with LLE and trunk strength (see goals/goal progress).  Was recently able to elevate LLE against gravity, through partial range, without assistance.  Able to repeat motion several reps prior to fatigue.  Patient was not able to elevate LLE against gravity at all when starting therapy at the end of March.      Continues to require SBQC for pelvic stability and balance while walking, but demonstrates decreased RUE weightbearing and less hip drop/Trendelenburg pattern when walking without SBQC during exercise performance during therapy sessions.  Continued functional mobility improvements reported by patient and his wife in both household and outdoor environments.  Significant improvement in ability to complete sit-stand transfers.    Goals were reassessed today.  See note below for more information.  Plan to continue with current plan of care.  Will transition care to Carmine Long DPT after today.      Beginning/End Dates of Progress Note Reporting Period:  3/31/23 to 06/30/2023  Current certification period through 7/26/23    Referring Provider:  Matt Whitmore     06/30/23 8769   Appointment Info   Signing clinician's name / credentials Maynor Walker PT   Total/Authorized Visits 21   Visits Used 6/10   Quick Adds Certification   Progress Note/Certification   Start of Care Date 03/31/23   Onset of illness/injury or Date of Surgery 01/03/23   Therapy Frequency 2x/week   Predicted Duration 8 weeks   Certification date from 05/27/23   Certification date to 07/26/23   Progress Note Due Date 07/26/23   Progress Note Completed Date 06/30/23   Supervision   PT Assistant Visit Number 1   GOALS   PT Goals 2;3;4;5   PT Goal 1   Goal Identifier strength (new goal)   Goal Description Patient will demonstrate increased left hip Internal Rotation strength from 3/5 to 4/5 or better for increased stability  with walking.   Goal Progress 4/26 = 2+/5;     5/31 = 3/5     6/30 = 3+/5   Target Date 07/26/23   PT Goal 2   Goal Identifier strength   Goal Description Patient will demonstrate increased left hip Abduction from 2+/5 to 3/5 or better for increased stability with standing and walking.   Goal Progress 4/26 = 2+/5     5/31 = 3-/5     6/30 = 3/5   Target Date 07/26/23   PT Goal 3   Goal Identifier strength   Goal Description Patient will demonstrate increased left hip Extension strength from 3/5 to 4/5 or better for increased stability with standing and walking and ability to walk up/down steps.   Goal Progress 5/31 = 3/5    6/30 = 3+/5   Target Date 07/26/23   PT Goal 4   Goal Identifier walking (new goal)   Goal Description Patient will demonstrate ability to walk 250 ft on outdoor terrain safely with SEC for increased independence with household and community level mobility.   Goal Progress Continues to require SBQC for pelvic stability while walking.   Target Date 07/26/23   PT Goal 5   Goal Identifier transfers   Goal Description Patient will demonstrate adequate strength to be able to transfer from sit-stand from 1 ft seat surface to allow getting in/out of vehicle.   Goal Progress GOAL MET - with minimal upper extremity assist   Target Date 07/26/23   Date Met 06/30/23   Subjective Report   Subjective Report Patient reported at last session (6/28/23) that at previous session (6/23/23) he burst a blood vessel in his eye, possibly while doing abdominal curl machine.  Patient was seen by an eye specialist due to previous issue with retinal detatchment, but was reassured the retina was attached and that is was a blood vessel in the eye.  Was recommended to avoid any straining for 2-3 weeks from injury, and gradually resume as tolerated.  This information was relayed to PT who will be assuming primary treatment for patient after today.  Patient reports overall, that his pain has decreased, and is getting stronger,  but progress is very slow difficult to recognize at times due to ongoing need for SBQC for walking.  Plans to continue to follow up with Dr. Whitmore and possibly have another EMG completed for comparison to prior EMG that was completed in March 2023.   Treatment Interventions (PT)   Interventions Therapeutic Procedure/Exercise   Therapeutic Procedure/Exercise   Therapeutic Procedures: strength, endurance, ROM, flexibillity minutes (91137) 40   Ther Proc 1 Walking on sidewalk, sloped surface, outdoor parking lot area with SBQC; Was able to complete sit-stand transfer to/from low seat in vehicle with minimal upper extremity assist.   NuStep, 10 min, seat 12, L6;  Sidestepping R/L with fingertips on wall 40 ft x 1 R/L each - cues to no cross feet;  backward walking 40 ft x 2 with cues for long steps and keeping pelvis level;  sidestepping with unweighted heels, 40 ft x 1 R/L each;   tandem walking with railings bilaterally, 15 ft x 2;   Skilled Intervention cues for positioning and correct technique for exercise   Patient Response/Progress fatigue   Plan   Home program 3/31: seated hip IR, supine hip IR/ER, supine ABD    5/17: slow stand to sit   Total Session Time   Timed Code Treatment Minutes 40   Total Treatment Time (sum of timed and untimed services) 40   Medicare Claim Information   Medical Diagnosis 1) Complication of artery associated with surgical procedure   2) Post-operative hematoma of musculoskeletal structure following musculoskeletal procedure   PT Diagnosis impaired mobility   Start of Care Date 03/31/23   Onset date of current episode/exacerbation 01/03/23   Certification date from 03/31/23

## 2023-07-09 NOTE — NURSING NOTE
Chief Complaint   Patient presents with     Cough     Fatigue     Follow up after covid - still sick     Patient in clinic with his wife for follow up after Covid 2 weeks ago. Patient is still not feeling well, coughing, and extreme fatigue.      Advanced Care Planning on file? no    Medication Review Completed: complete    FOOD SECURITY SCREENING QUESTIONS:    The next two questions are to help us understand your food security.  If you are feeling you need any assistance in this area, we have resources available to support you today.    Hunger Vital Signs:  Within the past 12 months we worried whether our food would run out before we got money to buy more. Never  Within the past 12 months the food we bought just didn't last and we didn't have money to get more. Never    Glory Shine LPN     None

## 2023-07-11 ENCOUNTER — TELEPHONE (OUTPATIENT)
Dept: INTERNAL MEDICINE | Facility: OTHER | Age: 68
End: 2023-07-11

## 2023-07-11 ENCOUNTER — THERAPY VISIT (OUTPATIENT)
Dept: PHYSICAL THERAPY | Facility: OTHER | Age: 68
End: 2023-07-11
Attending: INTERNAL MEDICINE
Payer: MEDICARE

## 2023-07-11 DIAGNOSIS — I97.89 COMPLICATION OF ARTERY ASSOCIATED WITH SURGICAL PROCEDURE: Primary | ICD-10-CM

## 2023-07-11 DIAGNOSIS — S76.012A TEAR OF LEFT GLUTEUS MAXIMUS MUSCLE: ICD-10-CM

## 2023-07-11 DIAGNOSIS — M96.840 POSTOPERATIVE HEMATOMA OF MUSCULOSKELETAL STRUCTURE FOLLOWING MUSCULOSKELETAL PROCEDURE: ICD-10-CM

## 2023-07-11 PROCEDURE — 97110 THERAPEUTIC EXERCISES: CPT | Mod: GP,PO

## 2023-07-11 NOTE — TELEPHONE ENCOUNTER
Freda with [facility] called and requested a call back today (07/11/2023). Freda stated she was told by the patient a referral was sent however she has yet to receive one.    Fax ([facility]): [phone]    Okay to leave detailed message.    Mena Mclaughlin on 7/11/2023 at 12:25 PM

## 2023-07-13 ENCOUNTER — THERAPY VISIT (OUTPATIENT)
Dept: PHYSICAL THERAPY | Facility: OTHER | Age: 68
End: 2023-07-13
Attending: INTERNAL MEDICINE
Payer: MEDICARE

## 2023-07-13 DIAGNOSIS — I97.89 COMPLICATION OF ARTERY ASSOCIATED WITH SURGICAL PROCEDURE: Primary | ICD-10-CM

## 2023-07-13 DIAGNOSIS — S76.012A TEAR OF LEFT GLUTEUS MAXIMUS MUSCLE: ICD-10-CM

## 2023-07-13 DIAGNOSIS — M96.840 POSTOPERATIVE HEMATOMA OF MUSCULOSKELETAL STRUCTURE FOLLOWING MUSCULOSKELETAL PROCEDURE: ICD-10-CM

## 2023-07-13 PROCEDURE — 97110 THERAPEUTIC EXERCISES: CPT | Mod: GP,PO

## 2023-07-13 NOTE — TELEPHONE ENCOUNTER
The only referral for Physical Medicine and Rehab was placed on 2.15.2023 by Dr. Whitmore.  It was sent to [facility] at that time.  The orders on 6.19.2023 were for Physical Therapy.  The referral to PM&R was redone and faxed to the number provided.  Mikala Clarke on 7/13/2023 at 3:50 PM

## 2023-07-14 ENCOUNTER — TELEPHONE (OUTPATIENT)
Dept: RHEUMATOLOGY | Facility: CLINIC | Age: 68
End: 2023-07-14
Payer: MEDICARE

## 2023-07-14 NOTE — TELEPHONE ENCOUNTER
AAMIR and britany sent for the patient to call back and reschedule the following:    Appointment type: Return  Provider: Dr. Acosta  Return date: 9/13/2023 at 8:30am  Specialty phone number: [phone]  Additional appointment(s) needed:  Additonal Notes:

## 2023-07-17 ENCOUNTER — THERAPY VISIT (OUTPATIENT)
Dept: PHYSICAL THERAPY | Facility: OTHER | Age: 68
End: 2023-07-17
Attending: INTERNAL MEDICINE
Payer: MEDICARE

## 2023-07-17 DIAGNOSIS — M96.840 POSTOPERATIVE HEMATOMA OF MUSCULOSKELETAL STRUCTURE FOLLOWING MUSCULOSKELETAL PROCEDURE: ICD-10-CM

## 2023-07-17 DIAGNOSIS — S76.012A TEAR OF LEFT GLUTEUS MAXIMUS MUSCLE: ICD-10-CM

## 2023-07-17 DIAGNOSIS — I97.89 COMPLICATION OF ARTERY ASSOCIATED WITH SURGICAL PROCEDURE: Primary | ICD-10-CM

## 2023-07-17 PROCEDURE — 97110 THERAPEUTIC EXERCISES: CPT | Mod: GP,PO

## 2023-07-18 ENCOUNTER — TRANSFERRED RECORDS (OUTPATIENT)
Dept: HEALTH INFORMATION MANAGEMENT | Facility: OTHER | Age: 68
End: 2023-07-18
Payer: MEDICARE

## 2023-07-19 ENCOUNTER — THERAPY VISIT (OUTPATIENT)
Dept: PHYSICAL THERAPY | Facility: OTHER | Age: 68
End: 2023-07-19
Attending: INTERNAL MEDICINE
Payer: MEDICARE

## 2023-07-19 DIAGNOSIS — M96.840 POSTOPERATIVE HEMATOMA OF MUSCULOSKELETAL STRUCTURE FOLLOWING MUSCULOSKELETAL PROCEDURE: ICD-10-CM

## 2023-07-19 DIAGNOSIS — I97.89 COMPLICATION OF ARTERY ASSOCIATED WITH SURGICAL PROCEDURE: Primary | ICD-10-CM

## 2023-07-19 DIAGNOSIS — S76.012A TEAR OF LEFT GLUTEUS MAXIMUS MUSCLE: ICD-10-CM

## 2023-07-19 PROCEDURE — 97110 THERAPEUTIC EXERCISES: CPT | Mod: GP,PO

## 2023-07-20 ENCOUNTER — TRANSFERRED RECORDS (OUTPATIENT)
Dept: HEALTH INFORMATION MANAGEMENT | Facility: OTHER | Age: 68
End: 2023-07-20
Payer: MEDICARE

## 2023-07-20 ENCOUNTER — TELEPHONE (OUTPATIENT)
Dept: RHEUMATOLOGY | Facility: CLINIC | Age: 68
End: 2023-07-20
Payer: MEDICARE

## 2023-07-20 NOTE — TELEPHONE ENCOUNTER
Patient was called and reschedule the following:    Appointment type:Video Return   Provider: Dr. Munoz  Return date: 11/3/2023  Specialty phone number: [phone]  Additional appointment(s) needed: Additonal Notes: Patient was rescheduled from 9/13/2023  .

## 2023-07-24 ENCOUNTER — THERAPY VISIT (OUTPATIENT)
Dept: PHYSICAL THERAPY | Facility: OTHER | Age: 68
End: 2023-07-24
Attending: INTERNAL MEDICINE
Payer: MEDICARE

## 2023-07-24 DIAGNOSIS — M96.840 POSTOPERATIVE HEMATOMA OF MUSCULOSKELETAL STRUCTURE FOLLOWING MUSCULOSKELETAL PROCEDURE: ICD-10-CM

## 2023-07-24 DIAGNOSIS — S76.012A TEAR OF LEFT GLUTEUS MAXIMUS MUSCLE: ICD-10-CM

## 2023-07-24 DIAGNOSIS — I97.89 COMPLICATION OF ARTERY ASSOCIATED WITH SURGICAL PROCEDURE: Primary | ICD-10-CM

## 2023-07-24 PROCEDURE — 97110 THERAPEUTIC EXERCISES: CPT | Mod: GP,PO

## 2023-07-24 PROCEDURE — 97116 GAIT TRAINING THERAPY: CPT | Mod: GP,PO

## 2023-07-26 ENCOUNTER — THERAPY VISIT (OUTPATIENT)
Dept: PHYSICAL THERAPY | Facility: OTHER | Age: 68
End: 2023-07-26
Attending: INTERNAL MEDICINE
Payer: MEDICARE

## 2023-07-26 DIAGNOSIS — S76.012A TEAR OF LEFT GLUTEUS MAXIMUS MUSCLE: ICD-10-CM

## 2023-07-26 DIAGNOSIS — M96.840 POSTOPERATIVE HEMATOMA OF MUSCULOSKELETAL STRUCTURE FOLLOWING MUSCULOSKELETAL PROCEDURE: ICD-10-CM

## 2023-07-26 DIAGNOSIS — I97.89 COMPLICATION OF ARTERY ASSOCIATED WITH SURGICAL PROCEDURE: Primary | ICD-10-CM

## 2023-07-26 PROCEDURE — 97110 THERAPEUTIC EXERCISES: CPT | Mod: GP,PO

## 2023-08-01 ENCOUNTER — THERAPY VISIT (OUTPATIENT)
Dept: PHYSICAL THERAPY | Facility: OTHER | Age: 68
End: 2023-08-01
Attending: INTERNAL MEDICINE
Payer: MEDICARE

## 2023-08-01 DIAGNOSIS — S76.012A TEAR OF LEFT GLUTEUS MAXIMUS MUSCLE: ICD-10-CM

## 2023-08-01 DIAGNOSIS — I97.89 COMPLICATION OF ARTERY ASSOCIATED WITH SURGICAL PROCEDURE: Primary | ICD-10-CM

## 2023-08-01 DIAGNOSIS — M96.840 POSTOPERATIVE HEMATOMA OF MUSCULOSKELETAL STRUCTURE FOLLOWING MUSCULOSKELETAL PROCEDURE: ICD-10-CM

## 2023-08-01 PROCEDURE — 97110 THERAPEUTIC EXERCISES: CPT | Mod: GP,KX,PO

## 2023-08-01 NOTE — PROGRESS NOTES
08/01/23 0500   Appointment Info   Signing clinician's name / credentials Carmine Long DPT   Total/Authorized Visits 28   Visits Used 7/10   Medical Diagnosis 1) Complication of artery associated with surgical procedure 2) Post-operative hematoma of musculoskeletal structure following musculoskeletal procedure   PT Tx Diagnosis Impaired mobility   Quick Adds Certification   Progress Note/Certification   Start of Care Date 03/31/23   Onset of illness/injury or Date of Surgery 01/03/23   Therapy Frequency 2x/week   Predicted Duration 8 weeks   Certification date from 07/26/23   Certification date to 09/26/23   Progress Note Due Date 07/26/23   Progress Note Completed Date 08/01/23   Supervision   PT Assistant Visit Number 1   GOALS   PT Goals 2;3;4;5   PT Goal 1   Goal Identifier strength (new goal)   Goal Description Patient will demonstrate increased left hip Internal Rotation strength from 3/5 to 4/5 or better for increased stability with walking.   Goal Progress 4/26 = 2+/5;     5/31 = 3/5     6/30 = 3+/5     8/1 = 4/5   Target Date 07/26/23   PT Goal 2   Goal Identifier strength   Goal Description Patient will demonstrate increased left hip Abduction from 2+/5 to 3/5 or better for increased stability with standing and walking.   Goal Progress 4/26 = 2+/5     5/31 = 3-/5     6/30 = 3/5     8/1 = 3/5   Target Date 07/26/23   PT Goal 3   Goal Identifier strength   Goal Description Patient will demonstrate increased left hip Extension strength from 3/5 to 4/5 or better for increased stability with standing and walking and ability to walk up/down steps.   Goal Progress 5/31 = 3/5    6/30 = 3+/5     8/1 = 4-/5   Target Date 07/26/23   PT Goal 4   Goal Identifier walking (new goal)   Goal Description Patient will demonstrate ability to walk 250 ft on outdoor terrain safely with SEC for increased independence with household and community level mobility.   Goal Progress Patient at this time has progressed to a  tripod cane and has done well with this distance. Has not used a SEC yet.   Target Date 09/26/23   PT Goal 5   Goal Identifier transfers   Goal Description Patient will demonstrate adequate strength to be able to transfer from sit-stand from 1 ft seat surface to allow getting in/out of vehicle.   Goal Progress GOAL MET - with minimal upper extremity assist   Target Date 07/26/23   Date Met 06/30/23   Subjective Report   Subjective Report Patient reports that he continues to be a little frustrated with the lieelt improvements he is seeing. Overall though, he realizes that he has come a long way since the start of this. He is ambulating with a tripod cane and he is very happy with this. Did encourage patient to get his exercises in at home a little more frequently to [facility] with his overall progression.   Treatment Interventions (PT)   Interventions Therapeutic Procedure/Exercise;Gait Training   Therapeutic Procedure/Exercise   Therapeutic Procedures: strength, endurance, ROM, flexibillity minutes (76728) 40   Ther Proc 1 Nu step 12 min, seat 12, L5 - leg press: 2x15 at 150 lbs double leg, 2x15 bilaterally single leg at 70 lbs, hip abd: 2x15 at 60 lbs, hamstring curls; 2x15 at 80 lbs, bridge: x25, reviewed HEP   Skilled Intervention cues for positioning and correct technique for exercise   Patient Response/Progress fatigue   Gait Training   Gait 1 - Details Ambulation on solid ground, stairs, inclines/desclines, grass, and navigated a curb with tripod cane. Patient demonstrated good stability and notes that it feels better than using the quad cane. Patient does ambulate with more efficiency and shows no loss of balance. Does get fatigued and SOB with this activity.   Skilled Intervention VC for upright posture.   Patient Response/Progress Tolerated well - no loss of balance and patient feels confident with this to go and buy a tripod cane.   Plan   Home program 3/31: seated hip IR, supine hip IR/ER, supine ABD    5/17:  slow stand to sit     7/11: Marching with UE support: x20   Total Session Time   Timed Code Treatment Minutes 40   Total Treatment Time (sum of timed and untimed services) 40   Medicare Claim Information   Medical Diagnosis 1) Complication of artery associated with surgical procedure   2) Post-operative hematoma of musculoskeletal structure following musculoskeletal procedure   PT Diagnosis impaired mobility   Start of Care Date 03/31/23   Onset date of current episode/exacerbation 01/03/23   Certification date from 03/31/23         [facility]                                                                                   OUTPATIENT PHYSICAL THERAPY    PLAN OF TREATMENT FOR OUTPATIENT REHABILITATION   Patient's Last Name, First Name, Selvin Rosales YOB: 1955   Provider's Name   [facility]   Medical Record No.  [id number]     Onset Date: 01/03/23  Start of Care Date: 03/31/23     Medical Diagnosis:  1) Complication of artery associated with surgical procedure 2) Post-operative hematoma of musculoskeletal structure following musculoskeletal procedure      PT Treatment Diagnosis:  Impaired mobility Plan of Treatment  Frequency/Duration: 2x/week/ 8 weeks    Certification date from 07/26/23 to 09/26/23         See note for plan of treatment details and functional goals     Carmine Long, PT                         I CERTIFY THE NEED FOR THESE SERVICES FURNISHED UNDER        THIS PLAN OF TREATMENT AND WHILE UNDER MY CARE     (Physician attestation of this document indicates review and certification of the therapy plan).                Referring Provider:  Matt Whitmore      Initial Assessment  See Epic Evaluation- Start of Care Date: 03/31/23

## 2023-08-03 ENCOUNTER — THERAPY VISIT (OUTPATIENT)
Dept: PHYSICAL THERAPY | Facility: OTHER | Age: 68
End: 2023-08-03
Attending: INTERNAL MEDICINE
Payer: MEDICARE

## 2023-08-03 DIAGNOSIS — M96.840 POSTOPERATIVE HEMATOMA OF MUSCULOSKELETAL STRUCTURE FOLLOWING MUSCULOSKELETAL PROCEDURE: ICD-10-CM

## 2023-08-03 DIAGNOSIS — S76.012A TEAR OF LEFT GLUTEUS MAXIMUS MUSCLE: ICD-10-CM

## 2023-08-03 DIAGNOSIS — I97.89 COMPLICATION OF ARTERY ASSOCIATED WITH SURGICAL PROCEDURE: Primary | ICD-10-CM

## 2023-08-03 PROCEDURE — 97110 THERAPEUTIC EXERCISES: CPT | Mod: GP,KX,PO

## 2023-08-08 ENCOUNTER — THERAPY VISIT (OUTPATIENT)
Dept: PHYSICAL THERAPY | Facility: OTHER | Age: 68
End: 2023-08-08
Attending: INTERNAL MEDICINE
Payer: MEDICARE

## 2023-08-08 DIAGNOSIS — M96.840 POSTOPERATIVE HEMATOMA OF MUSCULOSKELETAL STRUCTURE FOLLOWING MUSCULOSKELETAL PROCEDURE: ICD-10-CM

## 2023-08-08 DIAGNOSIS — S76.012A TEAR OF LEFT GLUTEUS MAXIMUS MUSCLE: ICD-10-CM

## 2023-08-08 DIAGNOSIS — I97.89 COMPLICATION OF ARTERY ASSOCIATED WITH SURGICAL PROCEDURE: Primary | ICD-10-CM

## 2023-08-08 PROCEDURE — 97530 THERAPEUTIC ACTIVITIES: CPT | Mod: GP,KX,PO

## 2023-08-08 PROCEDURE — 97110 THERAPEUTIC EXERCISES: CPT | Mod: GP,KX,PO

## 2023-08-10 ENCOUNTER — THERAPY VISIT (OUTPATIENT)
Dept: PHYSICAL THERAPY | Facility: OTHER | Age: 68
End: 2023-08-10
Attending: INTERNAL MEDICINE
Payer: MEDICARE

## 2023-08-10 DIAGNOSIS — M96.840 POSTOPERATIVE HEMATOMA OF MUSCULOSKELETAL STRUCTURE FOLLOWING MUSCULOSKELETAL PROCEDURE: ICD-10-CM

## 2023-08-10 DIAGNOSIS — I97.89 COMPLICATION OF ARTERY ASSOCIATED WITH SURGICAL PROCEDURE: Primary | ICD-10-CM

## 2023-08-10 DIAGNOSIS — S76.012A TEAR OF LEFT GLUTEUS MAXIMUS MUSCLE: ICD-10-CM

## 2023-08-10 PROCEDURE — 97110 THERAPEUTIC EXERCISES: CPT | Mod: GP,KX,PO

## 2023-08-15 ENCOUNTER — LAB (OUTPATIENT)
Dept: LAB | Facility: OTHER | Age: 68
End: 2023-08-15
Attending: INTERNAL MEDICINE
Payer: MEDICARE

## 2023-08-15 ENCOUNTER — THERAPY VISIT (OUTPATIENT)
Dept: PHYSICAL THERAPY | Facility: OTHER | Age: 68
End: 2023-08-15
Attending: INTERNAL MEDICINE
Payer: MEDICARE

## 2023-08-15 DIAGNOSIS — J98.4 LUNG DISORDER: ICD-10-CM

## 2023-08-15 DIAGNOSIS — I97.89 COMPLICATION OF ARTERY ASSOCIATED WITH SURGICAL PROCEDURE: Primary | ICD-10-CM

## 2023-08-15 DIAGNOSIS — S76.012A TEAR OF LEFT GLUTEUS MAXIMUS MUSCLE: ICD-10-CM

## 2023-08-15 DIAGNOSIS — M35.3 POLYMYALGIA RHEUMATICA (H): ICD-10-CM

## 2023-08-15 DIAGNOSIS — M96.840 POSTOPERATIVE HEMATOMA OF MUSCULOSKELETAL STRUCTURE FOLLOWING MUSCULOSKELETAL PROCEDURE: ICD-10-CM

## 2023-08-15 LAB
ANION GAP SERPL CALCULATED.3IONS-SCNC: 11 MMOL/L (ref 7–15)
BASOPHILS # BLD AUTO: 0.1 10E3/UL (ref 0–0.2)
BASOPHILS NFR BLD AUTO: 2 %
BUN SERPL-MCNC: 25.6 MG/DL (ref 8–23)
CALCIUM SERPL-MCNC: 10.7 MG/DL (ref 8.8–10.2)
CHLORIDE SERPL-SCNC: 103 MMOL/L (ref 98–107)
CREAT SERPL-MCNC: 1.18 MG/DL (ref 0.67–1.17)
CRP SERPL-MCNC: 4.19 MG/L
DEPRECATED HCO3 PLAS-SCNC: 23 MMOL/L (ref 22–29)
EOSINOPHIL # BLD AUTO: 0.6 10E3/UL (ref 0–0.7)
EOSINOPHIL NFR BLD AUTO: 7 %
ERYTHROCYTE [DISTWIDTH] IN BLOOD BY AUTOMATED COUNT: 16.4 % (ref 10–15)
ERYTHROCYTE [SEDIMENTATION RATE] IN BLOOD BY WESTERGREN METHOD: 17 MM/HR (ref 0–20)
GFR SERPL CREATININE-BSD FRML MDRD: 67 ML/MIN/1.73M2
GLUCOSE SERPL-MCNC: 92 MG/DL (ref 70–99)
HCT VFR BLD AUTO: 46.8 % (ref 40–53)
HGB BLD-MCNC: 16.3 G/DL (ref 13.3–17.7)
IMM GRANULOCYTES # BLD: 0 10E3/UL
IMM GRANULOCYTES NFR BLD: 0 %
LYMPHOCYTES # BLD AUTO: 3.5 10E3/UL (ref 0.8–5.3)
LYMPHOCYTES NFR BLD AUTO: 40 %
MCH RBC QN AUTO: 31.8 PG (ref 26.5–33)
MCHC RBC AUTO-ENTMCNC: 34.8 G/DL (ref 31.5–36.5)
MCV RBC AUTO: 91 FL (ref 78–100)
MONOCYTES # BLD AUTO: 0.9 10E3/UL (ref 0–1.3)
MONOCYTES NFR BLD AUTO: 11 %
NEUTROPHILS # BLD AUTO: 3.7 10E3/UL (ref 1.6–8.3)
NEUTROPHILS NFR BLD AUTO: 40 %
NRBC # BLD AUTO: 0 10E3/UL
NRBC BLD AUTO-RTO: 0 /100
PLATELET # BLD AUTO: 473 10E3/UL (ref 150–450)
POTASSIUM SERPL-SCNC: 4.2 MMOL/L (ref 3.4–5.3)
RBC # BLD AUTO: 5.12 10E6/UL (ref 4.4–5.9)
SODIUM SERPL-SCNC: 137 MMOL/L (ref 136–145)
WBC # BLD AUTO: 8.9 10E3/UL (ref 4–11)

## 2023-08-15 PROCEDURE — 82310 ASSAY OF CALCIUM: CPT | Mod: ZL

## 2023-08-15 PROCEDURE — 97110 THERAPEUTIC EXERCISES: CPT | Mod: GP,KX,PO

## 2023-08-15 PROCEDURE — 36415 COLL VENOUS BLD VENIPUNCTURE: CPT | Mod: ZL

## 2023-08-15 PROCEDURE — 85652 RBC SED RATE AUTOMATED: CPT | Mod: ZL

## 2023-08-15 PROCEDURE — 86140 C-REACTIVE PROTEIN: CPT | Mod: ZL

## 2023-08-15 PROCEDURE — 85025 COMPLETE CBC W/AUTO DIFF WBC: CPT | Mod: ZL

## 2023-08-17 ENCOUNTER — THERAPY VISIT (OUTPATIENT)
Dept: PHYSICAL THERAPY | Facility: OTHER | Age: 68
End: 2023-08-17
Attending: INTERNAL MEDICINE
Payer: MEDICARE

## 2023-08-17 DIAGNOSIS — M96.840 POSTOPERATIVE HEMATOMA OF MUSCULOSKELETAL STRUCTURE FOLLOWING MUSCULOSKELETAL PROCEDURE: ICD-10-CM

## 2023-08-17 DIAGNOSIS — S76.012A TEAR OF LEFT GLUTEUS MAXIMUS MUSCLE: ICD-10-CM

## 2023-08-17 DIAGNOSIS — I97.89 COMPLICATION OF ARTERY ASSOCIATED WITH SURGICAL PROCEDURE: Primary | ICD-10-CM

## 2023-08-17 PROCEDURE — 97110 THERAPEUTIC EXERCISES: CPT | Mod: GP,KX,PO

## 2023-08-21 ENCOUNTER — THERAPY VISIT (OUTPATIENT)
Dept: PHYSICAL THERAPY | Facility: OTHER | Age: 68
End: 2023-08-21
Attending: INTERNAL MEDICINE
Payer: MEDICARE

## 2023-08-21 DIAGNOSIS — S76.012A TEAR OF LEFT GLUTEUS MAXIMUS MUSCLE: ICD-10-CM

## 2023-08-21 DIAGNOSIS — I97.89 COMPLICATION OF ARTERY ASSOCIATED WITH SURGICAL PROCEDURE: Primary | ICD-10-CM

## 2023-08-21 DIAGNOSIS — M96.840 POSTOPERATIVE HEMATOMA OF MUSCULOSKELETAL STRUCTURE FOLLOWING MUSCULOSKELETAL PROCEDURE: ICD-10-CM

## 2023-08-21 PROCEDURE — 97110 THERAPEUTIC EXERCISES: CPT | Mod: GP,KX

## 2023-08-23 ENCOUNTER — THERAPY VISIT (OUTPATIENT)
Dept: PHYSICAL THERAPY | Facility: OTHER | Age: 68
End: 2023-08-23
Attending: INTERNAL MEDICINE
Payer: MEDICARE

## 2023-08-23 DIAGNOSIS — I97.89 COMPLICATION OF ARTERY ASSOCIATED WITH SURGICAL PROCEDURE: Primary | ICD-10-CM

## 2023-08-23 DIAGNOSIS — S76.012A TEAR OF LEFT GLUTEUS MAXIMUS MUSCLE: ICD-10-CM

## 2023-08-23 DIAGNOSIS — M96.840 POSTOPERATIVE HEMATOMA OF MUSCULOSKELETAL STRUCTURE FOLLOWING MUSCULOSKELETAL PROCEDURE: ICD-10-CM

## 2023-08-23 PROCEDURE — 97110 THERAPEUTIC EXERCISES: CPT | Mod: GP,KX,PO

## 2023-08-28 NOTE — TELEPHONE ENCOUNTER
Discharged from rehab yesterday and cannot see patient until the 28th.   Ok to start cares on the 28th?      Yanely Arias on 1/26/2023 at 11:06 AM     No Change Improving No Change Improving No Change Improving Improving Improving No Change

## 2023-08-29 ENCOUNTER — THERAPY VISIT (OUTPATIENT)
Dept: PHYSICAL THERAPY | Facility: OTHER | Age: 68
End: 2023-08-29
Attending: INTERNAL MEDICINE
Payer: MEDICARE

## 2023-08-29 DIAGNOSIS — M96.840 POSTOPERATIVE HEMATOMA OF MUSCULOSKELETAL STRUCTURE FOLLOWING MUSCULOSKELETAL PROCEDURE: ICD-10-CM

## 2023-08-29 DIAGNOSIS — I97.89 COMPLICATION OF ARTERY ASSOCIATED WITH SURGICAL PROCEDURE: Primary | ICD-10-CM

## 2023-08-29 DIAGNOSIS — S76.012A TEAR OF LEFT GLUTEUS MAXIMUS MUSCLE: ICD-10-CM

## 2023-08-29 PROCEDURE — 97110 THERAPEUTIC EXERCISES: CPT | Mod: GP,PO

## 2023-08-31 ENCOUNTER — THERAPY VISIT (OUTPATIENT)
Dept: PHYSICAL THERAPY | Facility: OTHER | Age: 68
End: 2023-08-31
Attending: INTERNAL MEDICINE
Payer: MEDICARE

## 2023-08-31 DIAGNOSIS — M96.840 POSTOPERATIVE HEMATOMA OF MUSCULOSKELETAL STRUCTURE FOLLOWING MUSCULOSKELETAL PROCEDURE: ICD-10-CM

## 2023-08-31 DIAGNOSIS — S76.012A TEAR OF LEFT GLUTEUS MAXIMUS MUSCLE: ICD-10-CM

## 2023-08-31 DIAGNOSIS — I97.89 COMPLICATION OF ARTERY ASSOCIATED WITH SURGICAL PROCEDURE: Primary | ICD-10-CM

## 2023-08-31 PROCEDURE — 97110 THERAPEUTIC EXERCISES: CPT | Mod: GP,KX,PO

## 2023-09-05 ENCOUNTER — TRANSFERRED RECORDS (OUTPATIENT)
Dept: HEALTH INFORMATION MANAGEMENT | Facility: OTHER | Age: 68
End: 2023-09-05
Payer: MEDICARE

## 2023-09-07 ENCOUNTER — THERAPY VISIT (OUTPATIENT)
Dept: PHYSICAL THERAPY | Facility: OTHER | Age: 68
End: 2023-09-07
Attending: INTERNAL MEDICINE
Payer: MEDICARE

## 2023-09-07 DIAGNOSIS — S76.012A TEAR OF LEFT GLUTEUS MAXIMUS MUSCLE: ICD-10-CM

## 2023-09-07 DIAGNOSIS — I97.89 COMPLICATION OF ARTERY ASSOCIATED WITH SURGICAL PROCEDURE: Primary | ICD-10-CM

## 2023-09-07 DIAGNOSIS — M96.840 POSTOPERATIVE HEMATOMA OF MUSCULOSKELETAL STRUCTURE FOLLOWING MUSCULOSKELETAL PROCEDURE: ICD-10-CM

## 2023-09-07 PROCEDURE — 97110 THERAPEUTIC EXERCISES: CPT | Mod: GP,PO

## 2023-09-07 NOTE — PROGRESS NOTES
09/07/23 0500   Appointment Info   Signing clinician's name / credentials Carmine Long DPT   Total/Authorized Visits 38   Visits Used 10/10   Medical Diagnosis 1) Complication of artery associated with surgical procedure 2) Post-operative hematoma of musculoskeletal structure following musculoskeletal procedure   PT Tx Diagnosis Impaired mobility   Quick Adds Certification   Progress Note/Certification   Start of Care Date 03/31/23   Onset of illness/injury or Date of Surgery 01/03/23   Therapy Frequency 2x/week   Predicted Duration 8 weeks   Certification date from 07/26/23   Certification date to 09/26/23   Progress Note Due Date 07/26/23   Progress Note Completed Date 08/01/23   Supervision   PT Assistant Visit Number 1   GOALS   PT Goals 2;3;4;5   PT Goal 1   Goal Identifier strength (new goal)   Goal Description Patient will demonstrate increased left hip Internal Rotation strength from 3/5 to 4/5 or better for increased stability with walking.   Goal Progress 4/26 = 2+/5;     5/31 = 3/5     6/30 = 3+/5     8/1 = 4/5     9/7/23: 4/5   Target Date 07/26/23   PT Goal 2   Goal Identifier strength   Goal Description Patient will demonstrate increased left hip Abduction from 2+/5 to 3/5 or better for increased stability with standing and walking.   Goal Progress 4/26 = 2+/5     5/31 = 3-/5     6/30 = 3/5     8/1 = 3/5     9/7: 3+/5   Target Date 07/26/23   PT Goal 3   Goal Identifier strength   Goal Description Patient will demonstrate increased left hip Extension strength from 3/5 to 4/5 or better for increased stability with standing and walking and ability to walk up/down steps.   Goal Progress 5/31 = 3/5    6/30 = 3+/5     8/1 = 4-/5     9/7/23: 4-/5   Target Date 07/26/23   PT Goal 4   Goal Identifier walking (new goal)   Goal Description Patient will demonstrate ability to walk 250 ft on outdoor terrain safely with SEC for increased independence with household and community level mobility.   Goal  Progress Patient is progressing well. He is still using a tripod cane at this time and still demonstrating a trendelenburg gait. He does have stability with this but not quite using a SEC.   Target Date 09/26/23   PT Goal 5   Goal Identifier transfers   Goal Description Patient will demonstrate adequate strength to be able to transfer from sit-stand from 1 ft seat surface to allow getting in/out of vehicle.   Goal Progress GOAL MET - with minimal upper extremity assist   Target Date 07/26/23   Date Met 06/30/23   Subjective Report   Subjective Report Patient reports that he had his EMG earlier this week and got his results from the physician. He reports that there has been no change in the results. He states that the superior gluteal nerve is still the same as it was. He has seen functional changes however it was not because the regeneration of this nerve. Patient voiced frustration about this. Did spend time discussing next step after therapy. Now that we are transitioning to 1x per week, it will be important to get more independence with his HEP. This therapist did discuss tranisitioning to workout gym or facility, such as [employer], [employer], and their local [place] gym.   Treatment Interventions (PT)   Interventions Therapeutic Procedure/Exercise;Gait Training;Therapeutic Activity   Therapeutic Procedure/Exercise   Therapeutic Procedures: strength, endurance, ROM, flexibillity minutes (04809) 40   Ther Proc 1 Sci fit 13 min,  seat 12, L6.0, core isolator: 2x10 at 20 lbs, leg press: 2x15 double leg at 180 lbs, 2x15 single leg at 80 lbs, hip abd: 2x15 at 60 lbs, hamstring curl; 2x15 at 100 lbs, knee extension: 2x15 at 70 lbs, piriformis stretch: 2x30 seconds, calf stretch: 2x30 seconds on wedge, resisted retro walking on cable column at 8 kgs: x5.   Skilled Intervention cues for positioning and correct technique for exercise   Patient Response/Progress fatigue   Therapeutic Activity   Ther Act 1 - Details  Demonstrated and had patient get off of the ground from a prone position to a standing position with and without the use of a chair or mat table. Patient was able to get into tall kneeling from a prone position, get his left LE up, and stand up 2x with the use of table once and use of his other knee the second time.   Skilled Intervention VC/demo   Patient Response/Progress Tolerated well - patient notes that it felt easier than when he had to get off the floor at home a few months ago. He and wife were very pleased with this.   Gait Training   Gait 1 - Details Ambulation on solid ground, stairs, inclines/desclines, grass, and navigated a curb with tripod cane. Patient demonstrated good stability and notes that it feels better than using the quad cane. Patient does ambulate with more efficiency and shows no loss of balance. Does get fatigued and SOB with this activity.   Skilled Intervention VC for upright posture.   Patient Response/Progress Tolerated well - no loss of balance and patient feels confident with this to go and buy a tripod cane.   Plan   Home program 3/31: seated hip IR, supine hip IR/ER, supine ABD    5/17: slow stand to sit     7/11: Marching with UE support: x20   Total Session Time   Timed Code Treatment Minutes 40   Total Treatment Time (sum of timed and untimed services) 40   Medicare Claim Information   Medical Diagnosis 1) Complication of artery associated with surgical procedure   2) Post-operative hematoma of musculoskeletal structure following musculoskeletal procedure   PT Diagnosis impaired mobility   Start of Care Date 03/31/23   Onset date of current episode/exacerbation 01/03/23   Certification date from 03/31/23         PLAN  Continue therapy per current plan of care. Transitioning to 1x per week for the next month    Beginning/End Dates of Progress Note Reporting Period:  08/01/23 to 09/07/2023    Referring Provider:  Matt Whitmore

## 2023-09-21 ENCOUNTER — MYC MEDICAL ADVICE (OUTPATIENT)
Dept: INTERNAL MEDICINE | Facility: OTHER | Age: 68
End: 2023-09-21
Payer: MEDICARE

## 2023-09-21 DIAGNOSIS — G47.9 SLEEP DIFFICULTIES: Primary | ICD-10-CM

## 2023-09-21 RX ORDER — ZOLPIDEM TARTRATE 10 MG/1
5-10 TABLET ORAL
Qty: 10 TABLET | Refills: 0 | Status: SHIPPED | OUTPATIENT
Start: 2023-09-21 | End: 2023-10-06

## 2023-09-21 NOTE — TELEPHONE ENCOUNTER
"Rx is not on the current med list, \"Patient not taking: Reported on 3/8/2023.\" Please review in PCP's absence. Natasha Ochoa RN on 9/21/2023 at 1:12 PM    Last Prescription Date: 2/15/23  Last Qty/Refills: 10 / 3  Last Office Visit: 5/3/23 [facility]  Future Office Visit: 10/10/23 [facility]     Requested Prescriptions   Pending Prescriptions Disp Refills    zolpidem (AMBIEN) 10 MG tablet 10 tablet      Sig: Take 0.5-1 tablets (5-10 mg) by mouth nightly as needed for sleep       There is no refill protocol information for this order            "

## 2023-10-06 ENCOUNTER — OFFICE VISIT (OUTPATIENT)
Dept: INTERNAL MEDICINE | Facility: OTHER | Age: 68
End: 2023-10-06
Attending: INTERNAL MEDICINE
Payer: MEDICARE

## 2023-10-06 VITALS
DIASTOLIC BLOOD PRESSURE: 68 MMHG | WEIGHT: 303 LBS | HEIGHT: 72 IN | HEART RATE: 61 BPM | BODY MASS INDEX: 41.04 KG/M2 | SYSTOLIC BLOOD PRESSURE: 130 MMHG | TEMPERATURE: 98 F | RESPIRATION RATE: 20 BRPM

## 2023-10-06 DIAGNOSIS — M54.16 CHRONIC RADICULAR LOW BACK PAIN: ICD-10-CM

## 2023-10-06 DIAGNOSIS — G47.9 SLEEP DIFFICULTIES: ICD-10-CM

## 2023-10-06 DIAGNOSIS — R79.89 LOW TESTOSTERONE IN MALE: ICD-10-CM

## 2023-10-06 DIAGNOSIS — E89.0 POSTOPERATIVE HYPOTHYROIDISM: ICD-10-CM

## 2023-10-06 DIAGNOSIS — I97.89 COMPLICATION OF ARTERY ASSOCIATED WITH SURGICAL PROCEDURE: ICD-10-CM

## 2023-10-06 DIAGNOSIS — R29.898 WEAKNESS OF LEFT HIP: ICD-10-CM

## 2023-10-06 DIAGNOSIS — N52.9 ERECTILE DYSFUNCTION, UNSPECIFIED ERECTILE DYSFUNCTION TYPE: ICD-10-CM

## 2023-10-06 DIAGNOSIS — G89.29 CHRONIC RADICULAR LOW BACK PAIN: ICD-10-CM

## 2023-10-06 DIAGNOSIS — R10.2 NEURALGIA OF LEFT PERINEAL NERVE: ICD-10-CM

## 2023-10-06 DIAGNOSIS — N18.2 CKD (CHRONIC KIDNEY DISEASE) STAGE 2, GFR 60-89 ML/MIN: ICD-10-CM

## 2023-10-06 DIAGNOSIS — E66.01 MORBID OBESITY (H): ICD-10-CM

## 2023-10-06 DIAGNOSIS — N50.82 SCROTAL PAIN: Primary | ICD-10-CM

## 2023-10-06 DIAGNOSIS — F52.8 OTHER SEXUAL DYSFUNCTION NOT DUE TO A SUBSTANCE OR KNOWN PHYSIOLOGICAL CONDITION: ICD-10-CM

## 2023-10-06 PROCEDURE — 90662 IIV NO PRSV INCREASED AG IM: CPT

## 2023-10-06 PROCEDURE — 99214 OFFICE O/P EST MOD 30 MIN: CPT | Performed by: INTERNAL MEDICINE

## 2023-10-06 PROCEDURE — G0463 HOSPITAL OUTPT CLINIC VISIT: HCPCS | Mod: 25

## 2023-10-06 RX ORDER — GABAPENTIN 400 MG/1
1200 CAPSULE ORAL 2 TIMES DAILY
Qty: 540 CAPSULE | Refills: 0 | Status: SHIPPED | OUTPATIENT
Start: 2023-10-06 | End: 2023-12-01

## 2023-10-06 RX ORDER — PREGABALIN 75 MG/1
75 CAPSULE ORAL 3 TIMES DAILY
Qty: 93 CAPSULE | Refills: 1 | Status: SHIPPED | OUTPATIENT
Start: 2023-10-06 | End: 2023-12-01

## 2023-10-06 RX ORDER — ESTAZOLAM 1 MG
.5-1 TABLET ORAL AT BEDTIME
Qty: 30 TABLET | Refills: 1 | Status: SHIPPED | OUTPATIENT
Start: 2023-10-06 | End: 2023-12-01

## 2023-10-06 ASSESSMENT — PATIENT HEALTH QUESTIONNAIRE - PHQ9
SUM OF ALL RESPONSES TO PHQ QUESTIONS 1-9: 8
SUM OF ALL RESPONSES TO PHQ QUESTIONS 1-9: 8
10. IF YOU CHECKED OFF ANY PROBLEMS, HOW DIFFICULT HAVE THESE PROBLEMS MADE IT FOR YOU TO DO YOUR WORK, TAKE CARE OF THINGS AT HOME, OR GET ALONG WITH OTHER PEOPLE: VERY DIFFICULT

## 2023-10-06 ASSESSMENT — ANXIETY QUESTIONNAIRES
GAD7 TOTAL SCORE: 0
3. WORRYING TOO MUCH ABOUT DIFFERENT THINGS: NOT AT ALL
1. FEELING NERVOUS, ANXIOUS, OR ON EDGE: NOT AT ALL
5. BEING SO RESTLESS THAT IT IS HARD TO SIT STILL: NOT AT ALL
IF YOU CHECKED OFF ANY PROBLEMS ON THIS QUESTIONNAIRE, HOW DIFFICULT HAVE THESE PROBLEMS MADE IT FOR YOU TO DO YOUR WORK, TAKE CARE OF THINGS AT HOME, OR GET ALONG WITH OTHER PEOPLE: SOMEWHAT DIFFICULT
2. NOT BEING ABLE TO STOP OR CONTROL WORRYING: NOT AT ALL
4. TROUBLE RELAXING: NOT AT ALL
GAD7 TOTAL SCORE: 0
7. FEELING AFRAID AS IF SOMETHING AWFUL MIGHT HAPPEN: NOT AT ALL
6. BECOMING EASILY ANNOYED OR IRRITABLE: NOT AT ALL

## 2023-10-06 ASSESSMENT — ASTHMA QUESTIONNAIRES: ACT_TOTALSCORE: 25

## 2023-10-06 ASSESSMENT — PAIN SCALES - GENERAL: PAINLEVEL: MODERATE PAIN (4)

## 2023-10-06 NOTE — PATIENT INSTRUCTIONS
Scrotal pain  Neuralgia of left perineal nerve  - IR Referral; Future  --- Consult with interventional radiology requested  - they will call with date/time of appointment.      - pregabalin (LYRICA) 75 MG capsule; Take 1 capsule (75 mg) by mouth 3 times daily    CKD (chronic kidney disease) stage 2, GFR 60-89 ml/min  Postoperative hypothyroidism    Morbid obesity (H)    To help with weight loss and improve blood sugar control....    -- Try to avoid Carbohydrates as much as possible -- breads, pasta, baked goods, cakes, oatmeal, cold cereal, potatoes.   -- Eat more lean meats, proteins, eggs, nuts, vegetables.    -- Start or Continue regular daily exercise.     Get out and exercise, bike ride, walk for 10 to 15 minutes after each meal -- this can significantly lowers the spike in blood sugar after eating.     Erectile dysfunction, unspecified erectile dysfunction type  Psychosexual dysfunction with inhibited sexual excitement  Low testosterone in male  - Adult Urology  Referral; Future  -- [facility]  - they will call with date/time of appointment.      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 Whitmore.  - Annual Follow-up / Physical - Medicare Annual Wellness Visit     Clinic : [phone]  Appointment line: [phone]

## 2023-10-06 NOTE — NURSING NOTE
"Chief Complaint   Patient presents with    Recheck Medication     Lyrica         Initial /68 (BP Location: Right arm, Patient Position: Sitting, Cuff Size: Adult Large)   Pulse 61   Temp 98  F (36.7  C) (Temporal)   Resp 20   Ht 1.816 m (5' 11.5\")   Wt 137.4 kg (303 lb)   BMI 41.67 kg/m   Estimated body mass index is 41.67 kg/m  as calculated from the following:    Height as of this encounter: 1.816 m (5' 11.5\").    Weight as of this encounter: 137.4 kg (303 lb).       FOOD SECURITY SCREENING QUESTIONS:    The next two questions are to help us understand your food security.  If you are feeling you need any assistance in this area, we have resources available to support you today.    Hunger Vital Signs:  Within the past 12 months we worried whether our food would run out before we got money to buy more. Never  Within the past 12 months the food we bought just didn't last and we didn't have money to get more. Never    Advance Care Directive on file? yes      Medication reconciliation complete.      Kevin Enrique,on 10/6/2023 at 4:20 PM          "

## 2023-10-08 ENCOUNTER — MYC MEDICAL ADVICE (OUTPATIENT)
Dept: INTERNAL MEDICINE | Facility: OTHER | Age: 68
End: 2023-10-08
Payer: MEDICARE

## 2023-10-09 DIAGNOSIS — R60.0 LOCALIZED EDEMA: Primary | ICD-10-CM

## 2023-10-09 PROBLEM — R29.898 WEAKNESS OF LEFT HIP: Status: ACTIVE | Noted: 2023-10-09

## 2023-10-09 ASSESSMENT — ENCOUNTER SYMPTOMS
HEADACHES: 0
BRUISES/BLEEDS EASILY: 1
FEVER: 0
CHILLS: 0
PALPITATIONS: 0
BACK PAIN: 1
SORE THROAT: 0
ENDOCRINE COMMENTS: + HAIR LOSS
SHORTNESS OF BREATH: 0
WEAKNESS: 1
NERVOUS/ANXIOUS: 0
COUGH: 0
DIARRHEA: 0
ARTHRALGIAS: 0
MYALGIAS: 1
DYSURIA: 0
JOINT SWELLING: 0
HEMATOCHEZIA: 0
CONSTIPATION: 0
HEMATURIA: 0
ABDOMINAL PAIN: 0
FREQUENCY: 0
NAUSEA: 0
EYE PAIN: 0
PARESTHESIAS: 0
HEARTBURN: 0
DIZZINESS: 0

## 2023-10-13 NOTE — TELEPHONE ENCOUNTER
Furosemide  sent Rx request for the following:        This medication is not on active medication list.         Requested Prescriptions   Pending Prescriptions Disp Refills    furosemide (LASIX) 20 MG tablet [Pharmacy Med Name: FUROSEMIDE 20MG TABLET] 90 tablet 3     Sig: TAKE 1/2 TABLET (10MG) BY MOUTH DAILY       Diuretics (Including Combos) Protocol Failed - 10/13/2023  3:51 PM        Failed - Medication is active on med list        Failed - Normal serum creatinine on file in past 12 months     Recent Labs   Lab Test 08/15/23  1213   CR 1.18*             Last Office Visit:              10/6/23   Future Office visit:           12/1/23

## 2023-10-16 DIAGNOSIS — I10 BENIGN ESSENTIAL HYPERTENSION: Primary | ICD-10-CM

## 2023-10-17 ENCOUNTER — TELEPHONE (OUTPATIENT)
Dept: CT IMAGING | Facility: OTHER | Age: 68
End: 2023-10-17
Payer: MEDICARE

## 2023-10-17 RX ORDER — FUROSEMIDE 20 MG
TABLET ORAL
Qty: 90 TABLET | Refills: 3 | OUTPATIENT
Start: 2023-10-17

## 2023-10-17 NOTE — TELEPHONE ENCOUNTER
Called and spoke to Patient after verifying last name and date of birth. Pt states this was prescribed by Dr. De La Vega in [place], based on a pulmonology referral from Dr. Whitmore. Pt is done seeing this provider now, and needs refill from Dr. Whitmore.    Per LOV note:  Return in about 8 weeks (around 12/1/2023) for 11:20 AM on Friday 12/1 , Annual Medicare Wellness Visit, + Update Controlled Substance Agreement.     Next 5 appointments (look out 90 days)      Dec 01, 2023 11:20 AM  PHYSICAL with Matt Whitmore MD  [facility] ([facility] ) [address]  [place] [address] [phone]          Unable to complete prescription refill per RN Medication Refill Policy.     Milagro Baez RN .............. 10/17/2023  4:38 PM

## 2023-10-17 NOTE — TELEPHONE ENCOUNTER
Patient has questions about ordered procedure: CT pudendal block. Call placed to patient. Discussed overview of procedure and answered some questions for them. Advised that they could schedule the procedure and that the radiologist could discuss all more detailed questions with them at that time. I sent him some generic information as a MyChart message. Patient and wife are going to think about things and let us know if they want to schedule.

## 2023-10-20 RX ORDER — FUROSEMIDE 20 MG
10 TABLET ORAL DAILY
Qty: 45 TABLET | Refills: 0 | Status: SHIPPED | OUTPATIENT
Start: 2023-10-20 | End: 2023-10-31

## 2023-10-20 NOTE — TELEPHONE ENCOUNTER
Please see 5/26/23 [facility] note in chart.     Per notes:      Milagro Baez RN .............. 10/20/2023  8:49 AM

## 2023-10-20 NOTE — TELEPHONE ENCOUNTER
Next 5 appointments (look out 90 days)   Encounter Information   Provider Department Encounter # Center   11/16/2023 8:00 AM Spike Milton MD  UROLOGY [id number] [facility]        Dec 01, 2023 11:20 AM  PHYSICAL with Matt Whitmore MD  [facility] ([facility] ) [address]  [place] [address]  [phone]          Is this adequate, or should Pt schedule sooner?    Milagro Baez RN .............. 10/20/2023  11:44 AM

## 2023-10-20 NOTE — TELEPHONE ENCOUNTER
Thank you, order signed- does he have a follow up coming up at some point- usually we stacy to recheck a BMP within a month or so to make sure he is tolerating the medication ok.

## 2023-10-20 NOTE — TELEPHONE ENCOUNTER
Ruby Collins, JOSE CNP   to Me  Gh Refill  Gh Internal Med [facility] Clinical Pool       10/19/23  5:11 PM   I am not able to find pulmonology notes that state this.  Would you be able to locate these notes so that I can review them before refilling this medication.

## 2023-10-23 DIAGNOSIS — R60.0 LOCALIZED EDEMA: ICD-10-CM

## 2023-10-23 RX ORDER — CARVEDILOL 6.25 MG/1
TABLET ORAL
Qty: 360 TABLET | Refills: 1 | Status: SHIPPED | OUTPATIENT
Start: 2023-10-23 | End: 2023-12-01

## 2023-10-23 NOTE — TELEPHONE ENCOUNTER
CARVEDILOL 6.25MG TABLET     Last Written Prescription Date:  5/30/23  Last Fill Quantity: 90,   # refills: 0  Last Office Visit: 10/6/23  Future Office visit:    Next 5 appointments (look out 90 days)      Dec 01, 2023 11:20 AM  PHYSICAL with Matt Whitmore MD  [facility] ([facility] ) [address]  [facility] [address]  [phone]             Routing refill request to provider for review/approval because:  Medication is reported/historical.  Call placed to pharmacy with communication of patient requesting and medication was ordered 11/11/22 with last refill 5/3/23 for 90day.  Will forward to provider for consideration. Unable to complete prescription refill per RNMedication Refill Policy.................... Whitney Otto RN ....................  10/23/2023   10:02 AM      '

## 2023-10-30 NOTE — TELEPHONE ENCOUNTER
[id number] of [place] sent Rx request for the following:      PT WAS TOLD TO INCREASE TO 1 TABLET DAILY, CAN WE GET A NEW RX?    Requested Prescriptions   Pending Prescriptions Disp Refills    furosemide (LASIX) 20 MG tablet [Pharmacy Med Name: FUROSEMIDE 20MG TABLET] 45 tablet 0     Sig: TAKE 0.5 TABLETS (10 MG) BYMOUTH DAILY     LOV: 10/6/23    Per LOV note:  Return in about 8 weeks (around 12/1/2023) for 11:20 AM on Friday 12/1 , Annual Medicare Wellness Visit, + Update Controlled Substance Agreement.     Next 5 appointments (look out 90 days)      Dec 01, 2023 11:20 AM  PHYSICAL with Matt Whitmore MD  [facility] ([facility] ) [address]  [facility] [id number] [phone]          Unable to complete prescription refill per RN Medication Refill Policy.     Milagro Baez RN .............. 10/30/2023  4:49 PM

## 2023-10-31 RX ORDER — FUROSEMIDE 20 MG
20 TABLET ORAL DAILY
Qty: 90 TABLET | Refills: 0 | Status: SHIPPED | OUTPATIENT
Start: 2023-10-31 | End: 2023-12-01

## 2023-11-03 ENCOUNTER — VIRTUAL VISIT (OUTPATIENT)
Dept: RHEUMATOLOGY | Facility: CLINIC | Age: 68
End: 2023-11-03
Attending: INTERNAL MEDICINE
Payer: MEDICARE

## 2023-11-03 VITALS — WEIGHT: 295 LBS | BODY MASS INDEX: 39.96 KG/M2 | HEIGHT: 72 IN

## 2023-11-03 DIAGNOSIS — M35.3 POLYMYALGIA RHEUMATICA (H): Primary | ICD-10-CM

## 2023-11-03 PROCEDURE — 99213 OFFICE O/P EST LOW 20 MIN: CPT | Mod: VID | Performed by: INTERNAL MEDICINE

## 2023-11-03 ASSESSMENT — PAIN SCALES - GENERAL: PAINLEVEL: NO PAIN (0)

## 2023-11-03 ASSESSMENT — PATIENT HEALTH QUESTIONNAIRE - PHQ9: SUM OF ALL RESPONSES TO PHQ QUESTIONS 1-9: 4

## 2023-11-03 NOTE — LETTER
11/3/2023       RE: Selvin An  [address]  [facility] [address]     Dear Colleague,    Thank you for referring your patient, Selvin An, to the [facility] [place] [facility]. Please see a copy of my visit note below.    68 year old being seen for billable video visit  Consent for billable video visit: yes  Patient location: home  Physician location: off site  Platform used: Adrian  Joined the call at 11/3/2023, 3:29:43 pm.  Left the call at 11/3/2023, 3:41:47 pm.  You were on the call for 12 minutes 4 seconds .    [facility] Outpatient Rheumatology follow-up  Date of service: 11/3/23  Date of last visit: 3/8/2023  Date of initial consultation: September 21, 2022    Patient name: Selvin An  YOB: 1955  MRN: [id number]    Reason for follow-up: PMR    November 3, 2023  -most recent esr and crp obtained on 8/15/23 both of which were normal.   -The patient reports an 80% disability in the left lower extremity due to a previous injury, resulting in difficulty with mobility and the need for a cane. He expresses frustration with the limitations and increased reliance on his spouse for assistance.    The patient denies experiencing proximal muscle pain and stiffness. He states that he has successfully tapered off steroids. He denies any pain or stiffness in the shoulders when raising his arms and reports no pain or stiffness in the proximal muscle groups of the thighs, except for the left side where the injury occurred.    He reports occasional headaches but no increase in frequency. He denies any scalp tenderness but mentions dry, flaky skin on the scalp and hair loss, which has improved slightly. The patient also reports patches of dry skin on the top of his feet.    The patient experienced a blood vessel rupture in his eye a few months ago, which temporarily affected his vision. He has been seeing a retina  specialist for this issue and has a follow-up appointment scheduled in a couple of weeks. He denies any jaw pain when chewing tough foods, unintentional weight loss, or fevers, chills, or night sweats.    14 point review of systems collected and negative if not documented above.    Interval history 3/8/2023  -underwent left sided SI joint fusion which was complicated by post op hematoma requiring return to OR for surgical evacuation and coil embolization of the superior gluteal artery and then epididymitis.  -He is currently on 4mg of prednisone. Decreased down to this dose one week ago.   -was 19 days in the hospital. Learning to walk again. Goes in tomorrow for MRI to assess for gluteal damage. Will be followed by EMG on Tuesday.   -Denies any proximal muscle pain/stiffness in upper extremities. Of course unable to report on symptoms in lower extremities due to above  -No peripheral joint pain/stiffness. No red/hot/swollen joints  -Ongoing scrotal discomfort, though improving  -Tolerating prednisone 4mg daily without noticeable side effects  -Has not noticed worsening of any symptoms consistent with his prior active disease as he continues to taper down on prednisone  14 point ROS collected and negative if not documented above.     HPI from initial consultation: This is a 67 year old male with a past medical history of fibromyalgia, gout on allopurinol (2013), chronic radicular low back pain/SI joint pain, R rotator cuff repair 03/2019, chronic fatigue syndrome (2/2 Lyme disease diagnosed and treated spring 2017 after Bell's palsey), bladder cancer s/and time and so in your body does not like it because is not getting it fast enough he does not get enough of it and so you know your bones do not like daily blood pressure does not like it nothing makes it and so we need to slow it down to give your your adrenal glands just wake up just which is the reason p TURBT 2018 for high grade non muscle invasive bladder  "cancer also s/p BCG following with urology, low testosterone, 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    In fall of 2016, had known tick bite. February 2017 had bell's palsy and treated with doxy for lyme. Around this time started to experience muscular aches and pains as well as extreme fatigue that he dealt with for years. On 06/26/2019 had sudden loss of vision later diagnosed as torn retina- out of concern for GCA, sounds like 60mg prednisone started. Patient remarks feeling \"like I was 9 years old again without any aches or pains,\" after that, treatment with prednisone tapers began. Initially did feel like prednisone was helping, though when he would taper to about 10mg, would start experiencing symptoms again, and would go back to on steroids to retaper. Most recently, started on 20mg about 6 months ago and has tapered down to 9mg per primary's instruction and told to hold there until seeing us. At this point, patient is not sure if prednisone is helping. Describes pain as aches, dull and diffuse. Located in all muscles, no one spot worse than another, not located in any joint. Not better or worse throughout the day or with activity- just notes he gets more tired throughout the day. Nothing makes it better or worse. No trouble making a fist in the morning. Says fatigue is worse than the pains- will be able to do an activity (chopping wood, mowing lawn) for 5 min, then will need to sit down and rest. States his strength is the same, he just gets tired quickly. Able to get in and out of the car without problem, but thinks he would have a hard time if he was asked to do this many times in a row because of fatigue, not weakness. Denies SOB, CP. States he can easily " rase his arms above his head.    Per note from Dr. Whitmore, he did mention that inflammatory markers and AVERY were negative in past, though unable to pull previous labs. Has been on several prednisone tapers and patient tends to notice symptoms around 10mg, has had several increases to dose with similar results when reaches ~10mg. Has not had ultrasound to look for subacromial/subdeltoid bursitis or tendosynovitis of long head of biceps tendon.    Does also sound like patient has chronic radicular low back pain and is set to have surgery soon, though these symptoms preexisted diffuse muscular aches.   On vitamin D, noted to be at sufficient level on 06/08/21.  Surgical hypothyroid 2/2 thyroid cancer and last TSH wnl on 03/12/2021 on synthroid.  B12 at one point was low and has since dima corrected.  Was recommended to try cymbalta by primary for possible fibromyalgia, has tried this in the past. Has not yet started this new prescription as he wanted to see if it was ok with us.  Low testosterone treated with testosterone gel, unclear if primary or secondary cause?  Is hypertensive today though he opted not to take his BP medication this morning because he came fasting for possible labs. Is normally  In 130s/80s at home while on spironolactone-hydrochlorothiazide.     14 point review of systems collected and negative if not documented above.      Past Medical History:  Past Medical History:   Diagnosis Date    Acute and chronic respiratory failure with hypoxia (H) 2/6/2023    Bell's palsy 3/30/2017    Bilateral carpal tunnel syndrome     No Comments Provided    Essential (primary) hypertension     No Comments Provided    Gout 6/3/2013    Hyperlipidemia     No Comments Provided    Hypothyroidism     No Comments Provided    Malignant neoplasm of thyroid gland (H)     1994    Sleep apnea     No Comments Provided       Past Surgical History:  Past Surgical History:   Procedure Laterality Date    COLONOSCOPY  09/09/2014     9/2014,Normal - follow up 10 years    CV CORONARY ANGIOGRAM N/A 10/31/2022    Procedure: Coronary Angiogram;  Surgeon: Ender Michaels MD;  Location:  [facility]    CYSTOSCOPY      bladder cancer    INCISION AND DRAINAGE LOWER EXTREMITY, COMBINED Left 1/7/2023    Procedure: INCISION AND DRAINAGE, LEFT BUTTOCK (Drain Hematoma of Left Buttock);  Surgeon: Ronald Mccall MD;  Location: [facility] OR    OTHER SURGICAL HISTORY      ,SPLENECTOMY    OTHER SURGICAL HISTORY      2010,16746,EYE MUSCLE SURGERY,Left,strabismus    OTHER SURGICAL HISTORY      10/06/14,[id number],OTHER,Left,Dr. Lana LEHMAN    OTHER SURGICAL HISTORY      10/14/14,[id number],OTHER,Left,Dr. Lana LEHMAN    OTHER SURGICAL HISTORY      11/16/2017,[id number],ENDOSCOPIC RELEASE TRANSVERSE CARPAL LIGAMENT OF HAND,Left    RELEASE CARPAL TUNNEL      12/2011    RETINAL DETACHMENT SURGERY Right     STRABISMUS SURGERY Left     THYROIDECTOMY      1994,thyroid ca    TONSILLECTOMY      2004,T & A > 11yo    VASECTOMY      No Comments Provided       Medications:  Current Outpatient Medications   Medication    allopurinol (ZYLOPRIM) 300 MG tablet    B Complex-C (VITAMIN B COMPLEX W/VITAMIN C) TABS tablet    carvedilol (COREG) 6.25 MG tablet    Cholecalciferol (VITAMIN D-3) 125 MCG (5000 UT) TABS    Cyanocobalamin 2500 MCG TABS    estazolam (PROSOM) 1 MG tablet    famotidine (PEPCID) 20 MG tablet    fenofibrate (TRICOR) 145 MG tablet    furosemide (LASIX) 20 MG tablet    gabapentin (NEURONTIN) 400 MG capsule    levothyroxine (SYNTHROID/LEVOTHROID) 200 MCG tablet    magnesium 250 MG tablet    Melatonin 10 MG TABS tablet    Multiple Vitamins-Minerals (CENTRUM ADULTS PO)    order for DME    pregabalin (LYRICA) 75 MG capsule    Probiotic Product (PROBIOTIC-10 PO)    sildenafil (VIAGRA) 100 MG tablet    spironolactone (ALDACTONE) 25 MG tablet     No current facility-administered medications for this visit.       Allergies:  Allergies   Allergen Reactions     Losartan Cough    Omeprazole Diarrhea    Pantoprazole Diarrhea    Valsartan Cough     Started with losartan, worsened with high dose valsartan       Family history:  No family history of autoimmune disease    Social History: , 2 kids,   ETOH: never  Smoking: never smoker  Drug use: none  Occupation: marketing, [employer], property management on [facility]    Objective:  There were no vitals taken for this visit.  Sitting up unassisted NAD, wife joins him for his video visit today from home  No facial rash  Sclera clear  Breathing comfortably on room air, no use of accessory muscles, no cough, no audible wheeze  Able to abduct and adduct bilateral shoulders without pain or stiffness  Able to stand, with assistance due to strength deficits in his left leg, without pain or stiffness in proximal muscle groups of either lower extremity  Able to make a full fist bilaterally without difficulty.  strength appears tight. No loss of MCP valleys. No erythematous or swollen joints by video exam.   No acute cutaneous lesions appreciated on exposed skin during crude video exam    WBC   Date Value Ref Range Status   03/12/2021 11.0 4.0 - 11.0 10e9/L Final     WBC Count   Date Value Ref Range Status   08/15/2023 8.9 4.0 - 11.0 10e3/uL Final     Hemoglobin   Date Value Ref Range Status   08/15/2023 16.3 13.3 - 17.7 g/dL Final   03/12/2021 16.7 13.3 - 17.7 g/dL Final     Platelet Count   Date Value Ref Range Status   08/15/2023 473 (H) 150 - 450 10e3/uL Final   03/12/2021 424 150 - 450 10e9/L Final     Creatinine   Date Value Ref Range Status   08/15/2023 1.18 (H) 0.67 - 1.17 mg/dL Final   06/08/2021 1.65 (H) 0.70 - 1.30 mg/dL Final     Lab Results   Component Value Date    ALKPHOS 39 12/22/2021    ALKPHOS 65 03/12/2021     AST   Date Value Ref Range Status   02/15/2023 15 10 - 50 U/L Final   03/12/2021 26 13 - 39 U/L Final     Lab Results   Component Value Date    ALT 25 12/22/2021    ALT 26 03/12/2021     Sed Rate  "  Date Value Ref Range Status   09/19/2017 6 <21 mm/hr      Erythrocyte Sedimentation Rate   Date Value Ref Range Status   08/15/2023 17 0 - 20 mm/hr Final     No results found for: \"CRP\"  UA RESULTS:  Recent Labs   Lab Test 09/10/17  2103   COLOR Yellow   URINEGLC Negative   URINEKETONE Trace*   SG 1.025   NITRITE Negative   LEUKEST Negative      Aldolase   Date Value Ref Range Status   09/21/2022 4.1 1.2 - 7.6 U/L Final     Comment:     REFERENCE INTERVAL: Aldolase    Access complete set of age- and/or gender-specific   reference intervals for this test in the [facility]   Test Directory ([email or web address]).  Performed By: [id number]  [address] [place] [id number]  : Saulo Marks MD, PhD     Rheumatoid Factor   Date Value Ref Range Status   09/21/2022 <6 <12 IU/mL Final     Cyclic Citrullinated Peptide Antibody IgG   Date Value Ref Range Status   09/21/2022 1.3 <7.0 U/mL Final     Comment:     Negative       A/P: 68 year old male who was referred to rheumatology and evaluated for evaluation for polymyalgia rheumatica on 9/21/22.     At that time he was on 9mg of prednisone daily. We planned to continue to taper by 1mg every 30 days. He was able to do that through his last appointment with me which was on 3/8/23, at which time he was on 4mg of prednisone once daily. Prednisone taper was continued by 1mg every 30 days. He has since completely tapered off of systemic corticosteroids. He has not had any return of proximal pain or stiffness in upper or lower extremity muscle groups. He has no evidence by history or exam or peripheral inflammatory arthritis consistent with pseudo-RA associated with PMR. He has no evidence by history or exam of GCA. All of this is further supported by his most recent inflammatory markers which were drawn on 8/15/23, at which time he had already completely tapered off of systemic corticosteroids, and his ESR and CRP were both negative/normal. " His disease is in remission.     Given his quiet disease for some time off of systemic corticosteroids, I discussed with Henry and his wife that we can follow-up as needed at this point. However, we discussed in detail that should any symptoms of his prior PMR or inflammatory arthritis or GCA develop (which were outlined in detail) that he needs to let me know. He understands that should he have acute vision loss that should prompt him to go to the ED emergently due to potential for a variety of dipseses, including vision loss from GCA. They understand and are appreciative of the care they have received. All questions were answered.     Phill Acosta MD  Rheumatology    I spent a total of 15 minutes on the date of service on chart review, patient encounter, documentation.

## 2023-11-03 NOTE — PROGRESS NOTES
68 year old being seen for billable video visit  Consent for billable video visit: yes  Patient location: home  Physician location: off site  Platform used: Adrian  Joined the call at 11/3/2023, 3:29:43 pm.  Left the call at 11/3/2023, 3:41:47 pm.  You were on the call for 12 minutes 4 seconds .    [facility] Outpatient Rheumatology follow-up  Date of service: 11/3/23  Date of last visit: 3/8/2023  Date of initial consultation: September 21, 2022    Patient name: Selvin An  YOB: 1955  MRN: [id number]    Reason for follow-up: PMR    November 3, 2023  -most recent esr and crp obtained on 8/15/23 both of which were normal.   -The patient reports an 80% disability in the left lower extremity due to a previous injury, resulting in difficulty with mobility and the need for a cane. He expresses frustration with the limitations and increased reliance on his spouse for assistance.    The patient denies experiencing proximal muscle pain and stiffness. He states that he has successfully tapered off steroids. He denies any pain or stiffness in the shoulders when raising his arms and reports no pain or stiffness in the proximal muscle groups of the thighs, except for the left side where the injury occurred.    He reports occasional headaches but no increase in frequency. He denies any scalp tenderness but mentions dry, flaky skin on the scalp and hair loss, which has improved slightly. The patient also reports patches of dry skin on the top of his feet.    The patient experienced a blood vessel rupture in his eye a few months ago, which temporarily affected his vision. He has been seeing a retina specialist for this issue and has a follow-up appointment scheduled in a couple of weeks. He denies any jaw pain when chewing tough foods, unintentional weight loss, or fevers, chills, or night sweats.    14 point review of systems collected and negative if not documented above.    Interval history  3/8/2023  -underwent left sided SI joint fusion which was complicated by post op hematoma requiring return to OR for surgical evacuation and coil embolization of the superior gluteal artery and then epididymitis.  -He is currently on 4mg of prednisone. Decreased down to this dose one week ago.   -was 19 days in the hospital. Learning to walk again. Goes in tomorrow for MRI to assess for gluteal damage. Will be followed by EMG on Tuesday.   -Denies any proximal muscle pain/stiffness in upper extremities. Of course unable to report on symptoms in lower extremities due to above  -No peripheral joint pain/stiffness. No red/hot/swollen joints  -Ongoing scrotal discomfort, though improving  -Tolerating prednisone 4mg daily without noticeable side effects  -Has not noticed worsening of any symptoms consistent with his prior active disease as he continues to taper down on prednisone  14 point ROS collected and negative if not documented above.     HPI from initial consultation: This is a 67 year old male with a past medical history of fibromyalgia, gout on allopurinol (2013), chronic radicular low back pain/SI joint pain, R rotator cuff repair 03/2019, chronic fatigue syndrome (2/2 Lyme disease diagnosed and treated spring 2017 after Bell'fanta betts), bladder cancer s/and time and so in your body does not like it because is not getting it fast enough he does not get enough of it and so you know your bones do not like daily blood pressure does not like it nothing makes it and so we need to slow it down to give your your adrenal glands just wake up just which is the reason p TURBT 2018 for high grade non muscle invasive bladder cancer also s/p BCG following with urology, low testosterone, 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    In fall of 2016, had known tick bite. February 2017 had bell's palsy and treated with doxy for lyme. Around this time started to experience muscular aches and pains as well as extreme fatigue that he dealt with for years. On 06/26/2019 had sudden loss of vision later diagnosed as torn retina- out of concern for GCA, sounds like 60mg prednisone started. Patient remarks feeling \"like I was 9 years old again without any aches or pains,\" after that, treatment with prednisone tapers began. Initially did feel like prednisone was helping, though when he would taper to about 10mg, would start experiencing symptoms again, and would go back to on steroids to retaper. Most recently, started on 20mg about 6 months ago and has tapered down to 9mg per primary's instruction and told to hold there until seeing us. At this point, patient is not sure if prednisone is helping. Describes pain as aches, dull and diffuse. Located in all muscles, no one spot worse than another, not located in any joint. Not better or worse throughout the day or with activity- just notes he gets more tired throughout the day. Nothing makes it better or worse. No trouble making a fist in the morning. Says fatigue is worse than the pains- will be able to do an activity (chopping wood, mowing lawn) for 5 min, then will need to sit down and rest. States his strength is the same, he just gets tired quickly. Able to get in and out of the car without problem, but thinks he would have a hard time if he was asked to do this many times in a row because of fatigue, not weakness. Denies SOB, CP. States he can easily rase his arms above his head.    Per note from Dr. Whitmore, he did mention that inflammatory markers and AVERY were negative in past, though unable to pull previous labs. Has been on several prednisone tapers and patient tends to notice symptoms around 10mg, has had several increases to dose with similar " results when reaches ~10mg. Has not had ultrasound to look for subacromial/subdeltoid bursitis or tendosynovitis of long head of biceps tendon.    Does also sound like patient has chronic radicular low back pain and is set to have surgery soon, though these symptoms preexisted diffuse muscular aches.   On vitamin D, noted to be at sufficient level on 06/08/21.  Surgical hypothyroid 2/2 thyroid cancer and last TSH wnl on 03/12/2021 on synthroid.  B12 at one point was low and has since dima corrected.  Was recommended to try cymbalta by primary for possible fibromyalgia, has tried this in the past. Has not yet started this new prescription as he wanted to see if it was ok with us.  Low testosterone treated with testosterone gel, unclear if primary or secondary cause?  Is hypertensive today though he opted not to take his BP medication this morning because he came fasting for possible labs. Is normally  In 130s/80s at home while on spironolactone-hydrochlorothiazide.     14 point review of systems collected and negative if not documented above.      Past Medical History:  Past Medical History:   Diagnosis Date    Acute and chronic respiratory failure with hypoxia (H) 2/6/2023    Bell's palsy 3/30/2017    Bilateral carpal tunnel syndrome     No Comments Provided    Essential (primary) hypertension     No Comments Provided    Gout 6/3/2013    Hyperlipidemia     No Comments Provided    Hypothyroidism     No Comments Provided    Malignant neoplasm of thyroid gland (H)     1994    Sleep apnea     No Comments Provided       Past Surgical History:  Past Surgical History:   Procedure Laterality Date    COLONOSCOPY  09/09/2014 9/2014,Normal - follow up 10 years    CV CORONARY ANGIOGRAM N/A 10/31/2022    Procedure: Coronary Angiogram;  Surgeon: Ender Michaels MD;  Location:  [facility]    CYSTOSCOPY      bladder cancer    INCISION AND DRAINAGE LOWER EXTREMITY, COMBINED Left 1/7/2023    Procedure:  INCISION AND DRAINAGE, LEFT BUTTOCK (Drain Hematoma of Left Buttock);  Surgeon: Ronald Mccall MD;  Location: [facility] OR    OTHER SURGICAL HISTORY      ,SPLENECTOMY    OTHER SURGICAL HISTORY      2010,17830,EYE MUSCLE SURGERY,Left,strabismus    OTHER SURGICAL HISTORY      10/06/14,131119,OTHER,Left,Dr. Lana LEHMAN    OTHER SURGICAL HISTORY      10/14/14,854335,OTHER,Left,Dr. Lana LEHMAN    OTHER SURGICAL HISTORY      11/16/2017,[id number],ENDOSCOPIC RELEASE TRANSVERSE CARPAL LIGAMENT OF HAND,Left    RELEASE CARPAL TUNNEL      12/2011    RETINAL DETACHMENT SURGERY Right     STRABISMUS SURGERY Left     THYROIDECTOMY      1994,thyroid ca    TONSILLECTOMY      2004,T & A > 13yo    VASECTOMY      No Comments Provided       Medications:  Current Outpatient Medications   Medication    allopurinol (ZYLOPRIM) 300 MG tablet    B Complex-C (VITAMIN B COMPLEX W/VITAMIN C) TABS tablet    carvedilol (COREG) 6.25 MG tablet    Cholecalciferol (VITAMIN D-3) 125 MCG (5000 UT) TABS    Cyanocobalamin 2500 MCG TABS    estazolam (PROSOM) 1 MG tablet    famotidine (PEPCID) 20 MG tablet    fenofibrate (TRICOR) 145 MG tablet    furosemide (LASIX) 20 MG tablet    gabapentin (NEURONTIN) 400 MG capsule    levothyroxine (SYNTHROID/LEVOTHROID) 200 MCG tablet    magnesium 250 MG tablet    Melatonin 10 MG TABS tablet    Multiple Vitamins-Minerals (CENTRUM ADULTS PO)    order for DME    pregabalin (LYRICA) 75 MG capsule    Probiotic Product (PROBIOTIC-10 PO)    sildenafil (VIAGRA) 100 MG tablet    spironolactone (ALDACTONE) 25 MG tablet     No current facility-administered medications for this visit.       Allergies:  Allergies   Allergen Reactions    Losartan Cough    Omeprazole Diarrhea    Pantoprazole Diarrhea    Valsartan Cough     Started with losartan, worsened with high dose valsartan       Family history:  No family history of autoimmune disease    Social History: , 2 kids,   ETOH: never  Smoking: never smoker  Drug use:  "none  Occupation: marketing, [employer], property management on [facility]    Objective:  There were no vitals taken for this visit.  Sitting up unassisted NAD, wife joins him for his video visit today from home  No facial rash  Sclera clear  Breathing comfortably on room air, no use of accessory muscles, no cough, no audible wheeze  Able to abduct and adduct bilateral shoulders without pain or stiffness  Able to stand, with assistance due to strength deficits in his left leg, without pain or stiffness in proximal muscle groups of either lower extremity  Able to make a full fist bilaterally without difficulty.  strength appears tight. No loss of MCP valleys. No erythematous or swollen joints by video exam.   No acute cutaneous lesions appreciated on exposed skin during crude video exam    WBC   Date Value Ref Range Status   03/12/2021 11.0 4.0 - 11.0 10e9/L Final     WBC Count   Date Value Ref Range Status   08/15/2023 8.9 4.0 - 11.0 10e3/uL Final     Hemoglobin   Date Value Ref Range Status   08/15/2023 16.3 13.3 - 17.7 g/dL Final   03/12/2021 16.7 13.3 - 17.7 g/dL Final     Platelet Count   Date Value Ref Range Status   08/15/2023 473 (H) 150 - 450 10e3/uL Final   03/12/2021 424 150 - 450 10e9/L Final     Creatinine   Date Value Ref Range Status   08/15/2023 1.18 (H) 0.67 - 1.17 mg/dL Final   06/08/2021 1.65 (H) 0.70 - 1.30 mg/dL Final     Lab Results   Component Value Date    ALKPHOS 39 12/22/2021    ALKPHOS 65 03/12/2021     AST   Date Value Ref Range Status   02/15/2023 15 10 - 50 U/L Final   03/12/2021 26 13 - 39 U/L Final     Lab Results   Component Value Date    ALT 25 12/22/2021    ALT 26 03/12/2021     Sed Rate   Date Value Ref Range Status   09/19/2017 6 <21 mm/hr      Erythrocyte Sedimentation Rate   Date Value Ref Range Status   08/15/2023 17 0 - 20 mm/hr Final     No results found for: \"CRP\"  UA RESULTS:  Recent Labs   Lab Test 09/10/17  2103   COLOR Yellow   URINEGLC Negative   URINEKETONE Trace* "   SG 1.025   NITRITE Negative   LEUKEST Negative      Aldolase   Date Value Ref Range Status   09/21/2022 4.1 1.2 - 7.6 U/L Final     Comment:     REFERENCE INTERVAL: Aldolase    Access complete set of age- and/or gender-specific   reference intervals for this test in the [facility]   Test Directory ([email or web address]).  Performed By: [email or web address]  [address] [place] [id number]  : Saulo Marks MD, PhD     Rheumatoid Factor   Date Value Ref Range Status   09/21/2022 <6 <12 IU/mL Final     Cyclic Citrullinated Peptide Antibody IgG   Date Value Ref Range Status   09/21/2022 1.3 <7.0 U/mL Final     Comment:     Negative       A/P: 68 year old male who was referred to rheumatology and evaluated for evaluation for polymyalgia rheumatica on 9/21/22.     At that time he was on 9mg of prednisone daily. We planned to continue to taper by 1mg every 30 days. He was able to do that through his last appointment with me which was on 3/8/23, at which time he was on 4mg of prednisone once daily. Prednisone taper was continued by 1mg every 30 days. He has since completely tapered off of systemic corticosteroids. He has not had any return of proximal pain or stiffness in upper or lower extremity muscle groups. He has no evidence by history or exam or peripheral inflammatory arthritis consistent with pseudo-RA associated with PMR. He has no evidence by history or exam of GCA. All of this is further supported by his most recent inflammatory markers which were drawn on 8/15/23, at which time he had already completely tapered off of systemic corticosteroids, and his ESR and CRP were both negative/normal. His disease is in remission.     Given his quiet disease for some time off of systemic corticosteroids, I discussed with Henry and his wife that we can follow-up as needed at this point. However, we discussed in detail that should any symptoms of his prior PMR or inflammatory arthritis or GCA  develop (which were outlined in detail) that he needs to let me know. He understands that should he have acute vision loss that should prompt him to go to the ED emergently due to potential for a variety of dipseses, including vision loss from GCA. They understand and are appreciative of the care they have received. All questions were answered.     Phill Acosta MD  Rheumatology    I spent a total of 15 minutes on the date of service on chart review, patient encounter, documentation.

## 2023-11-03 NOTE — NURSING NOTE
Is the patient currently in the state of [place]? YES    Visit mode:VIDEO    If the visit is dropped, the patient can be reconnected by: VIDEO VISIT: Text to cell phone:   Telephone Information:   Mobile [phone]       Will anyone else be joining the visit? NO  (If patient encounters technical issues they should call [phone]    How would you like to obtain your AVS? MyChart    Are changes needed to the allergy or medication list? Pt stated no med changes    Reason for visit: RECHECK    Haydee SHORE

## 2023-11-04 DIAGNOSIS — E78.1 HYPERTRIGLYCERIDEMIA: ICD-10-CM

## 2023-11-04 DIAGNOSIS — E78.2 MIXED DYSLIPIDEMIA: ICD-10-CM

## 2023-11-06 DIAGNOSIS — R60.0 LOCALIZED EDEMA: ICD-10-CM

## 2023-11-07 NOTE — TELEPHONE ENCOUNTER
Patient Information     Patient Name MRN Selvin Love [id number] Male 1955      Telephone Encounter by Izabella Villavicencio LPN at 10/11/2017 10:55 AM     Author:  Izabella Villavicencio LPN Service:  (none) Author Type:  NURS- Licensed Practical Nurse     Filed:  10/11/2017 10:55 AM Encounter Date:  10/11/2017 Status:  Signed     :  Izabella Villavicencio LPN (NURS- Licensed Practical Nurse)            The patient was notified MRI was ordered.  Izabella Villavicencio LPN......10/11/2017  10:55 AM          
Patient Information     Patient Name MRN Selvin Love [id number] Male 1955      Telephone Encounter by Izabella Villavicencio LPN at 10/11/2017 10:22 AM     Author:  Izabella Villavicencio LPN Service:  (none) Author Type:  NURS- Licensed Practical Nurse     Filed:  10/11/2017 10:24 AM Encounter Date:  10/11/2017 Status:  Signed     :  Izabella Villavicencio LPN (NURS- Licensed Practical Nurse)            Contacted the patient he stated he would like a MRI of his brain. He stated he saw Juan C Bess MD on 2017 and thought one was going to be ordered for him.  Izabella Villavicencio LPN......10/11/2017  10:24 AM          
no

## 2023-11-09 RX ORDER — FENOFIBRATE 145 MG/1
145 TABLET, COATED ORAL DAILY
Qty: 90 TABLET | Refills: 4 | Status: SHIPPED | OUTPATIENT
Start: 2023-11-09

## 2023-11-09 NOTE — TELEPHONE ENCOUNTER
[facility] sent Rx request for the following:      Requested Prescriptions   Pending Prescriptions Disp Refills    fenofibrate (TRICOR) 145 MG tablet [Pharmacy Med Name: FENOFIBRATE 145MG TABLET] 90 tablet 4     Sig: TAKE 1 TABLET (145 MG) BY MOUTH DAILY       Fibrates Passed - 11/4/2023  1:02 AM     Last Prescription Date:   11/11/22  Last Fill Qty/Refills:         90, R-4    Last Office Visit:              10/6/23   Future Office visit:           12/1/23    Julisa Thomson RN on 11/9/2023 at 8:19 AM

## 2023-11-13 RX ORDER — FUROSEMIDE 20 MG
20 TABLET ORAL DAILY
Qty: 90 TABLET | Refills: 0 | OUTPATIENT
Start: 2023-11-13

## 2023-11-13 NOTE — TELEPHONE ENCOUNTER
[id number] sent Rx request for the following:      Requested Prescriptions   Pending Prescriptions Disp Refills    furosemide (LASIX) 20 MG tablet [Pharmacy Med Name: FUROSEMIDE 20MG TABLET] 90 tablet 0     Sig: TAKE 1 TABLET (20 MG) BY MOUTH DAILY       Diuretics (Including Combos) Protocol Failed - 11/6/2023 10:56 AM        Failed - Normal serum creatinine on file in past 12 months       Last Prescription Date:   10/31/23  Last Fill Qty/Refills:         90, R-0    Last Office Visit:              10/6/23   Future Office visit:           12/1/23      Refusing medication request as this request is too soon.    LORRIE WYATT RN on 11/13/2023 at 3:31 PM

## 2023-11-16 ENCOUNTER — TRANSFERRED RECORDS (OUTPATIENT)
Dept: HEALTH INFORMATION MANAGEMENT | Facility: OTHER | Age: 68
End: 2023-11-16

## 2023-11-16 ENCOUNTER — OFFICE VISIT (OUTPATIENT)
Dept: UROLOGY | Facility: OTHER | Age: 68
End: 2023-11-16
Attending: INTERNAL MEDICINE
Payer: MEDICARE

## 2023-11-16 VITALS
TEMPERATURE: 97 F | BODY MASS INDEX: 40.99 KG/M2 | DIASTOLIC BLOOD PRESSURE: 100 MMHG | SYSTOLIC BLOOD PRESSURE: 170 MMHG | HEIGHT: 72 IN | OXYGEN SATURATION: 93 % | RESPIRATION RATE: 16 BRPM | WEIGHT: 302.6 LBS | HEART RATE: 64 BPM

## 2023-11-16 DIAGNOSIS — E29.1 HYPOGONADISM IN MALE: Primary | ICD-10-CM

## 2023-11-16 DIAGNOSIS — N52.9 ERECTILE DYSFUNCTION, UNSPECIFIED ERECTILE DYSFUNCTION TYPE: ICD-10-CM

## 2023-11-16 DIAGNOSIS — Z85.51 PERSONAL HISTORY OF MALIGNANT NEOPLASM OF BLADDER: ICD-10-CM

## 2023-11-16 DIAGNOSIS — D47.3 THROMBOCYTHEMIA, ESSENTIAL (H): ICD-10-CM

## 2023-11-16 DIAGNOSIS — N40.3 NODULAR PROSTATE WITH LOWER URINARY TRACT SYMPTOMS: ICD-10-CM

## 2023-11-16 DIAGNOSIS — Z80.42 FAMILY HISTORY OF PROSTATE CANCER: ICD-10-CM

## 2023-11-16 LAB
ALBUMIN SERPL BCG-MCNC: 4.6 G/DL (ref 3.5–5.2)
ALBUMIN UR-MCNC: 10 MG/DL
ALP SERPL-CCNC: 88 U/L (ref 40–150)
ALT SERPL W P-5'-P-CCNC: 36 U/L (ref 0–70)
ANION GAP SERPL CALCULATED.3IONS-SCNC: 12 MMOL/L (ref 7–15)
APPEARANCE UR: CLEAR
AST SERPL W P-5'-P-CCNC: 35 U/L (ref 0–45)
BASOPHILS # BLD AUTO: 0.2 10E3/UL (ref 0–0.2)
BASOPHILS NFR BLD AUTO: 2 %
BILIRUB SERPL-MCNC: 0.5 MG/DL
BILIRUB UR QL STRIP: NEGATIVE
BUN SERPL-MCNC: 25.7 MG/DL (ref 8–23)
CALCIUM SERPL-MCNC: 10.6 MG/DL (ref 8.8–10.2)
CHLORIDE SERPL-SCNC: 106 MMOL/L (ref 98–107)
COLOR UR AUTO: ABNORMAL
CREAT SERPL-MCNC: 1.32 MG/DL (ref 0.67–1.17)
DEPRECATED HCO3 PLAS-SCNC: 24 MMOL/L (ref 22–29)
EGFRCR SERPLBLD CKD-EPI 2021: 58 ML/MIN/1.73M2
EOSINOPHIL # BLD AUTO: 0.8 10E3/UL (ref 0–0.7)
EOSINOPHIL NFR BLD AUTO: 8 %
ERYTHROCYTE [DISTWIDTH] IN BLOOD BY AUTOMATED COUNT: 15.5 % (ref 10–15)
GLUCOSE SERPL-MCNC: 100 MG/DL (ref 70–99)
GLUCOSE UR STRIP-MCNC: NEGATIVE MG/DL
HCT VFR BLD AUTO: 50.1 % (ref 40–53)
HGB BLD-MCNC: 16.9 G/DL (ref 13.3–17.7)
HGB UR QL STRIP: NEGATIVE
IMM GRANULOCYTES # BLD: 0 10E3/UL
IMM GRANULOCYTES NFR BLD: 0 %
KETONES UR STRIP-MCNC: NEGATIVE MG/DL
LDH SERPL L TO P-CCNC: 184 U/L (ref 0–250)
LEUKOCYTE ESTERASE UR QL STRIP: NEGATIVE
LYMPHOCYTES # BLD AUTO: 3.6 10E3/UL (ref 0.8–5.3)
LYMPHOCYTES NFR BLD AUTO: 37 %
MCH RBC QN AUTO: 31.6 PG (ref 26.5–33)
MCHC RBC AUTO-ENTMCNC: 33.7 G/DL (ref 31.5–36.5)
MCV RBC AUTO: 94 FL (ref 78–100)
MONOCYTES # BLD AUTO: 1.1 10E3/UL (ref 0–1.3)
MONOCYTES NFR BLD AUTO: 11 %
NEUTROPHILS # BLD AUTO: 4 10E3/UL (ref 1.6–8.3)
NEUTROPHILS NFR BLD AUTO: 42 %
NITRATE UR QL: NEGATIVE
NRBC # BLD AUTO: 0 10E3/UL
NRBC BLD AUTO-RTO: 0 /100
PH UR STRIP: 5.5 [PH] (ref 5–9)
PLATELET # BLD AUTO: 480 10E3/UL (ref 150–450)
POTASSIUM SERPL-SCNC: 4 MMOL/L (ref 3.4–5.3)
PROT SERPL-MCNC: 8.3 G/DL (ref 6.4–8.3)
PSA SERPL DL<=0.01 NG/ML-MCNC: 2.67 NG/ML (ref 0–4.5)
RBC # BLD AUTO: 5.34 10E6/UL (ref 4.4–5.9)
SHBG SERPL-SCNC: 31 NMOL/L (ref 11–80)
SODIUM SERPL-SCNC: 142 MMOL/L (ref 135–145)
SP GR UR STRIP: 1.02 (ref 1–1.03)
UROBILINOGEN UR STRIP-MCNC: NORMAL MG/DL
WBC # BLD AUTO: 9.6 10E3/UL (ref 4–11)

## 2023-11-16 PROCEDURE — G0103 PSA SCREENING: HCPCS | Mod: ZL | Performed by: UROLOGY

## 2023-11-16 PROCEDURE — 83615 LACTATE (LD) (LDH) ENZYME: CPT | Mod: ZL | Performed by: UROLOGY

## 2023-11-16 PROCEDURE — 80053 COMPREHEN METABOLIC PANEL: CPT | Mod: ZL | Performed by: UROLOGY

## 2023-11-16 PROCEDURE — 84403 ASSAY OF TOTAL TESTOSTERONE: CPT | Mod: ZL | Performed by: UROLOGY

## 2023-11-16 PROCEDURE — 81003 URINALYSIS AUTO W/O SCOPE: CPT | Mod: ZL | Performed by: UROLOGY

## 2023-11-16 PROCEDURE — G0463 HOSPITAL OUTPT CLINIC VISIT: HCPCS | Performed by: UROLOGY

## 2023-11-16 PROCEDURE — 36415 COLL VENOUS BLD VENIPUNCTURE: CPT | Mod: ZL | Performed by: UROLOGY

## 2023-11-16 PROCEDURE — 99215 OFFICE O/P EST HI 40 MIN: CPT | Performed by: UROLOGY

## 2023-11-16 PROCEDURE — 85025 COMPLETE CBC W/AUTO DIFF WBC: CPT | Mod: ZL | Performed by: UROLOGY

## 2023-11-16 PROCEDURE — 84270 ASSAY OF SEX HORMONE GLOBUL: CPT | Mod: ZL | Performed by: UROLOGY

## 2023-11-16 ASSESSMENT — PAIN SCALES - GENERAL: PAINLEVEL: MILD PAIN (3)

## 2023-11-16 NOTE — PROGRESS NOTES
HPI: Mr. Selvin An is a 68 year old year old male presenting today for evaluation of chief complaint(s): Consult (Low testosterone)    68-year-old male patient of Dr. Schwartz last seen in August 2022 with a history of hypogonadism on AndroGel 1.62% and ED on sildenafil.    Also evaluated for frequency with progressively weakening stream over the last 5 to 10 years.  Bother was moderate in nature.  He was initially started on tamsulosin 0.4 mg but has apparently stopped taking this.    Here today 11/16/2023 for follow-up.  Last PSA on record was 1.013 on 3/12/2021.  That was redrawn today along with free and total testosterone.    Last testosterone on record was 2/15/2023 and measured 183 ng/dL    Patient has been having left-sided scrotal and penile pain recently evaluated by Dr. Whitmore most likely the sequela of his previous left gluteal muscle injury.    Today, he is accompanied by his wife.    Reports a family history of prostate cancer in his brother.  Patient also reports a personal history of high-grade bladder cancer resected in 2018 by Dr. Multani at [facility].  He received BCG x6 and was followed with periodic cystoscopy.  He was recently dismissed in May after undergoing negative cystoscopy.    Patient denies any bleeding.  Reports no nocturia.  Some frequency secondary to his diuretic and the coffee use.  Stream is decent.  No reported blood.  He has PMH significant for CKD stage II, hypothyroid disease, morbid obesity, with a history of low back pain and left hip weakness.    Current Outpatient Medications   Medication Sig Dispense Refill    allopurinol (ZYLOPRIM) 300 MG tablet Take 1 tablet (300 mg) by mouth daily - for gout prevention 90 tablet 4    B Complex-C (VITAMIN B COMPLEX W/VITAMIN C) TABS tablet Take 1 tablet by mouth daily      carvedilol (COREG) 6.25 MG tablet TAKE 1-2 TABLETS (6.25-12.5MG) BY MOUTH 2 TIMES DAILY (WITH MEALS) 360 tablet 1    Cholecalciferol (VITAMIN D-3) 125 MCG  (5000 UT) TABS Take 5,000 Units by mouth daily 100 tablet 3    Cyanocobalamin 2500 MCG TABS Take 2,500 mcg by mouth daily -- or every other day -- OTC okay 90 tablet 4    estazolam (PROSOM) 1 MG tablet Take 0.5-1 tablets (0.5-1 mg) by mouth at bedtime 30 tablet 1    famotidine (PEPCID) 20 MG tablet Take 1 tablet (20 mg) by mouth 2 times daily 180 tablet 4    fenofibrate (TRICOR) 145 MG tablet TAKE 1 TABLET (145 MG) BY MOUTH DAILY 90 tablet 4    furosemide (LASIX) 20 MG tablet Take 1 tablet (20 mg) by mouth daily 90 tablet 0    gabapentin (NEURONTIN) 400 MG capsule Take 3 capsules (1,200 mg) by mouth 2 times daily 540 capsule 0    levothyroxine (SYNTHROID/LEVOTHROID) 200 MCG tablet Take 1 tablet (200 mcg) by mouth daily 90 tablet 4    magnesium 250 MG tablet Take 1 tablet by mouth daily      Melatonin 10 MG TABS tablet Take 20 mg by mouth At Bedtime      Multiple Vitamins-Minerals (CENTRUM ADULTS PO) Take 1 tablet by mouth daily      order for DME CPAP machine for home use at pressure: 10, with supplies:  humidifier, chamber, mask, tubing      pregabalin (LYRICA) 75 MG capsule Take 1 capsule (75 mg) by mouth 3 times daily 93 capsule 1    Probiotic Product (PROBIOTIC-10 PO) Take 1 tablet by mouth daily      sildenafil (VIAGRA) 100 MG tablet Take 1/2 to 1 tablet 1 hour prior to sexual activity 25 tablet 11    spironolactone (ALDACTONE) 25 MG tablet Take 1 tablet (25 mg) by mouth daily 90 tablet 4       ALLERGIES: Losartan, Omeprazole, Pantoprazole, and Valsartan      Review Of Systems  Skin: negative  Eyes: negative  Ears/Nose/Throat: negative  Respiratory: No shortness of breath, dyspnea on exertion, cough, or hemoptysis  Cardiovascular: negative  Gastrointestinal: negative  Genitourinary: negative  Musculoskeletal: negative and positive for negative and back pain  Neurologic: negative  Psychiatric: negative  Hematologic/Lymphatic/Immunologic: negative  Endocrine: Positive thyroid cancer    Post-Void Residual  A  "post-void residual was measured by ultrasonic bladder scanner.  52 mL  Kenia Tovar, GENIAN  11/16/2023 8:31 AM  GENERAL PHYSICAL EXAM:   Vitals: There were no vitals taken for this visit.  There is no height or weight on file to calculate BMI.    GENERAL: Well groomed, well developed, well nourished male in NAD.  Obese  ENT:  ENT exam normal  CV: Warm LE, no edema   RESPIRATORY: Normal respiratory effort.   GI: Soft, NT, ND, no palpable masses.  None CVAT bilaterally.  MS: Moving all four   SKIN: Warm to touch  NEURO: Alert and oriented x 3.  PSYCH: Normal mood and affect, pleasant and agreeable during interview and exam.     :      Inguinal: no hernias or none palpable lymph nodes      Circumcised penis, no penile plaques or lesions. Orthotopic location of the urethral meatus.       Scrotum normal.      Testicles of normal firmness and consistency, no masses.      Epididymes bilaterally without masses or tenderness.         Vas and cord normal bilaterally.    SAEID:      Normal rectal tone, minimal 30 amount of stool in the rectal vault.      30 sized prostate, firm, nodular prostate right greater than left     PVR: Residual urine by ultrasound was 52 ml.           RADIOLOGY: The following tests were reviewed: None    LABS: The last test results for Mr. Selvin An were reviewed:  PSA - No results found for: \"PSA\"  BMP -   Recent Labs   Lab Test 08/15/23  1213 02/15/23  0902 02/07/23  0541 09/14/17  0916 09/10/17  2050    139 139   < > 138   POTASSIUM 4.2 3.5 3.5   < > 4.2   CHLORIDE 103 104 106   < > 101   CO2 23 25 24   < > 26   BUN 25.6* 13.7 13.7   < > 29*   CR 1.18* 1.12 1.18*   < > 1.56*   GLC 92 97 89   < > 105   THAI 10.7* 9.7 9.1   < > 10.9*   MAG  --   --   --   --  1.9    < > = values in this interval not displayed.       CBC -   Recent Labs   Lab Test 08/15/23  1213 02/15/23  0902 02/07/23  0541   WBC 8.9 11.3* 11.9*   HGB 16.3 12.1* 9.9*   * 525* 514*       ASSESSMENT:   Hypogonadism in " male  Personal history of high-grade bladder cancer  Nodular prostate with lower urinary tract symptoms  Erectile dysfunction due to diseases classified elsewhere  Family history of prostate cancer    PLAN:   Much more complicated patient than anticipated.  The patient does have profound hypogonadism and was previously on AndroGel 1.62% with good results.  He has not been on it for years since Dr. Schwartz left.  We did redraw labs today just to get some baseline levels.  I did anticipate starting him on that.  Risks and benefits were discussed    Digital exam today was firm possibly secondary to previous BCG.  Unfortunately has a family history of prostate cancer.  Right now we could justify doing a biopsy.  The risks and benefits of this was discussed.  I will await his PSA before doing so.  I did explain my reluctance to restart him on AndroGel pending the PSA and further work-up of his nodular SAEID.  Differential diagnosis was discussed.  Prostate biopsy with risks and benefits was discussed.    His history of bladder cancer is concerning.  He has been 5 years since his diagnosis and his last cystoscopy in May was clear.  Given the high-grade nature of this disease I am inclined to not dismiss him.  I would recommend cystoscopy at the minimum once a year with cytology plus or minus upper tract imaging.  The risks and benefits of cystoscopy were discussed.  They were in agreement.    PSA will be chart checked.  UA will be chart checked.  Follow-up in 6 months.  All questions were addressed    Today's visit was 40 minutes and includes all clinical activities before, during and after the visit.    Spike Milton MD  [facility]

## 2023-11-16 NOTE — NURSING NOTE
Chief Complaint   Patient presents with    Consult     Low testosterone   Patient presents to the clinic today for a consult for low testosterone.    Review Of Systems  Skin: negative  Eyes: negative  Ears/Nose/Throat: negative  Respiratory: No shortness of breath, dyspnea on exertion, cough, or hemoptysis  Cardiovascular: negative  Gastrointestinal: negative  Genitourinary: negative  Musculoskeletal: negative and positive for negative and back pain  Neurologic: negative  Psychiatric: negative  Hematologic/Lymphatic/Immunologic: negative  Endocrine: Positive thyroid cancer      Initial There were no vitals taken for this visit. Estimated body mass index is 40.01 kg/m  as calculated from the following:    Height as of 11/3/23: 1.829 m (6').    Weight as of 11/3/23: 133.8 kg (295 lb).  Meds Reconciled: complete      Kenia Tovar LPN,LPN on 11/16/2023 at 7:51 AM  Ext. [phone]        Kenia Tovar LPN

## 2023-11-17 DIAGNOSIS — E79.0 HYPERURICEMIA: ICD-10-CM

## 2023-11-17 NOTE — TELEPHONE ENCOUNTER
[facility] sent Rx request for the following:      Requested Prescriptions   Pending Prescriptions Disp Refills    allopurinol (ZYLOPRIM) 300 MG tablet [Pharmacy Med Name: ALLOPURINOL 300MG TABLET] 90 tablet 4     Sig: TAKE 1 TABLET (300 MG) BY MOUTH DAILY - FOR GOUT PREVENTION       Gout Agents Protocol Failed - 11/17/2023  1:00 AM     Last Prescription Date:   11/11/22  Last Fill Qty/Refills:         90, R-4    Last Office Visit:              10/6/23   Future Office visit:           12/1/23    Julisa Thomson RN on 11/17/2023 at 4:11 PM

## 2023-11-18 RX ORDER — ALLOPURINOL 300 MG/1
1 TABLET ORAL DAILY
Qty: 90 TABLET | Refills: 4 | Status: SHIPPED | OUTPATIENT
Start: 2023-11-18

## 2023-11-21 ENCOUNTER — TRANSFERRED RECORDS (OUTPATIENT)
Dept: HEALTH INFORMATION MANAGEMENT | Facility: OTHER | Age: 68
End: 2023-11-21
Payer: MEDICARE

## 2023-11-21 LAB
TESTOST FREE SERPL-MCNC: 5.99 NG/DL
TESTOST SERPL-MCNC: 294 NG/DL (ref 240–950)

## 2023-11-21 NOTE — RESULT ENCOUNTER NOTE
Karen,  Can you let him know that his total testosterone is borderline but normal and he may not qualify for testosterone replacement.  His levels have rebounded and we will have to see if insurance allows him to get it.  WBC

## 2023-11-27 DIAGNOSIS — N40.3 NODULAR PROSTATE WITH LOWER URINARY TRACT SYMPTOMS: Primary | ICD-10-CM

## 2023-11-27 DIAGNOSIS — R97.20 ELEVATED PROSTATE SPECIFIC ANTIGEN (PSA): ICD-10-CM

## 2023-11-27 NOTE — PROGRESS NOTES
This gentleman was seen last week secondary to hypogonadism.  He was found to have a family history of prostate cancer and mom on my exam of a nodular firm feeling prostate.  His PSA jumped from 1.09-2.67 in a little over a years period of time.    I had a discussion with him today about my concern.  We discussed the differential including lab air as well as the risk of prostate cancer given his family history.    We discussed prostate biopsy versus watchful waiting versus an MRI of the prostate.    I would recommend an MRI of the prostate as he is high risk given his family history.  If that were positive then a prostate biopsy would be done immediately.    If insurance does not allow the MRI then follow-up in 3 months with a recheck of his PSA and we will proceed accordingly.    I do recommend he not use testosterone replacement as that could technically hasten prostate cancer development if he did have prostate cancer and we were unaware of it.    Spike Milton

## 2023-12-01 ENCOUNTER — OFFICE VISIT (OUTPATIENT)
Dept: INTERNAL MEDICINE | Facility: OTHER | Age: 68
End: 2023-12-01
Attending: INTERNAL MEDICINE
Payer: MEDICARE

## 2023-12-01 ENCOUNTER — LAB (OUTPATIENT)
Dept: LAB | Facility: OTHER | Age: 68
End: 2023-12-01
Attending: UROLOGY
Payer: MEDICARE

## 2023-12-01 VITALS
DIASTOLIC BLOOD PRESSURE: 72 MMHG | HEIGHT: 72 IN | OXYGEN SATURATION: 95 % | SYSTOLIC BLOOD PRESSURE: 136 MMHG | WEIGHT: 305 LBS | RESPIRATION RATE: 16 BRPM | TEMPERATURE: 98.4 F | BODY MASS INDEX: 41.31 KG/M2 | HEART RATE: 68 BPM

## 2023-12-01 DIAGNOSIS — G89.29 CHRONIC RADICULAR LOW BACK PAIN: ICD-10-CM

## 2023-12-01 DIAGNOSIS — Z00.00 ENCOUNTER FOR MEDICARE ANNUAL WELLNESS EXAM: ICD-10-CM

## 2023-12-01 DIAGNOSIS — E55.9 VITAMIN D DEFICIENCY: ICD-10-CM

## 2023-12-01 DIAGNOSIS — I10 BENIGN ESSENTIAL HYPERTENSION: Primary | ICD-10-CM

## 2023-12-01 DIAGNOSIS — E66.01 CLASS 3 SEVERE OBESITY DUE TO EXCESS CALORIES WITH SERIOUS COMORBIDITY AND BODY MASS INDEX (BMI) OF 40.0 TO 44.9 IN ADULT (H): ICD-10-CM

## 2023-12-01 DIAGNOSIS — N18.2 CKD (CHRONIC KIDNEY DISEASE) STAGE 2, GFR 60-89 ML/MIN: ICD-10-CM

## 2023-12-01 DIAGNOSIS — E53.8 VITAMIN B12 DEFICIENCY: ICD-10-CM

## 2023-12-01 DIAGNOSIS — E66.813 CLASS 3 SEVERE OBESITY DUE TO EXCESS CALORIES WITH SERIOUS COMORBIDITY AND BODY MASS INDEX (BMI) OF 40.0 TO 44.9 IN ADULT (H): ICD-10-CM

## 2023-12-01 DIAGNOSIS — G47.9 SLEEP DIFFICULTIES: ICD-10-CM

## 2023-12-01 DIAGNOSIS — N40.3 NODULAR PROSTATE WITH LOWER URINARY TRACT SYMPTOMS: ICD-10-CM

## 2023-12-01 DIAGNOSIS — E89.0 POSTOPERATIVE HYPOTHYROIDISM: ICD-10-CM

## 2023-12-01 DIAGNOSIS — Z71.85 VACCINE COUNSELING: ICD-10-CM

## 2023-12-01 DIAGNOSIS — E78.2 MIXED HYPERLIPIDEMIA: ICD-10-CM

## 2023-12-01 DIAGNOSIS — R29.898 LEFT LEG WEAKNESS: ICD-10-CM

## 2023-12-01 DIAGNOSIS — M54.16 CHRONIC RADICULAR LOW BACK PAIN: ICD-10-CM

## 2023-12-01 DIAGNOSIS — N52.9 ERECTILE DYSFUNCTION, UNSPECIFIED ERECTILE DYSFUNCTION TYPE: ICD-10-CM

## 2023-12-01 DIAGNOSIS — G47.33 OSA ON CPAP: ICD-10-CM

## 2023-12-01 DIAGNOSIS — I50.32 CHRONIC HEART FAILURE WITH PRESERVED EJECTION FRACTION (HFPEF) (H): ICD-10-CM

## 2023-12-01 DIAGNOSIS — R10.2 NEURALGIA OF LEFT PERINEAL NERVE: ICD-10-CM

## 2023-12-01 DIAGNOSIS — I97.89 COMPLICATION OF ARTERY ASSOCIATED WITH SURGICAL PROCEDURE: ICD-10-CM

## 2023-12-01 DIAGNOSIS — R97.20 ELEVATED PROSTATE SPECIFIC ANTIGEN (PSA): ICD-10-CM

## 2023-12-01 DIAGNOSIS — R60.0 BILATERAL LOWER EXTREMITY EDEMA: ICD-10-CM

## 2023-12-01 DIAGNOSIS — E79.0 HYPERURICEMIA: ICD-10-CM

## 2023-12-01 DIAGNOSIS — K21.9 GASTROESOPHAGEAL REFLUX DISEASE, UNSPECIFIED WHETHER ESOPHAGITIS PRESENT: ICD-10-CM

## 2023-12-01 PROBLEM — M96.840 POSTOPERATIVE HEMATOMA OF MUSCULOSKELETAL STRUCTURE FOLLOWING MUSCULOSKELETAL PROCEDURE: Status: RESOLVED | Noted: 2023-01-07 | Resolved: 2023-12-01

## 2023-12-01 LAB
ANION GAP SERPL CALCULATED.3IONS-SCNC: 11 MMOL/L (ref 7–15)
BUN SERPL-MCNC: 24 MG/DL (ref 8–23)
CALCIUM SERPL-MCNC: 10.5 MG/DL (ref 8.8–10.2)
CHLORIDE SERPL-SCNC: 105 MMOL/L (ref 98–107)
CREAT SERPL-MCNC: 1.38 MG/DL (ref 0.67–1.17)
DEPRECATED HCO3 PLAS-SCNC: 25 MMOL/L (ref 22–29)
EGFRCR SERPLBLD CKD-EPI 2021: 55 ML/MIN/1.73M2
GLUCOSE SERPL-MCNC: 115 MG/DL (ref 70–99)
POTASSIUM SERPL-SCNC: 4.3 MMOL/L (ref 3.4–5.3)
SODIUM SERPL-SCNC: 141 MMOL/L (ref 135–145)

## 2023-12-01 PROCEDURE — G0463 HOSPITAL OUTPT CLINIC VISIT: HCPCS

## 2023-12-01 PROCEDURE — 36415 COLL VENOUS BLD VENIPUNCTURE: CPT | Mod: ZL

## 2023-12-01 PROCEDURE — G0439 PPPS, SUBSEQ VISIT: HCPCS | Performed by: INTERNAL MEDICINE

## 2023-12-01 PROCEDURE — 99214 OFFICE O/P EST MOD 30 MIN: CPT | Mod: 25 | Performed by: INTERNAL MEDICINE

## 2023-12-01 PROCEDURE — 82310 ASSAY OF CALCIUM: CPT | Mod: ZL

## 2023-12-01 RX ORDER — PREGABALIN 75 MG/1
75 CAPSULE ORAL 3 TIMES DAILY
Qty: 270 CAPSULE | Refills: 1 | Status: SHIPPED | OUTPATIENT
Start: 2023-12-01 | End: 2024-05-27

## 2023-12-01 RX ORDER — CARVEDILOL 6.25 MG/1
6.25 TABLET ORAL 2 TIMES DAILY WITH MEALS
Qty: 360 TABLET | Refills: 4 | Status: SHIPPED | OUTPATIENT
Start: 2023-12-01 | End: 2024-04-24

## 2023-12-01 RX ORDER — ESTAZOLAM 1 MG
1 TABLET ORAL AT BEDTIME
Qty: 90 TABLET | Refills: 1 | Status: SHIPPED | OUTPATIENT
Start: 2023-12-01 | End: 2024-05-27

## 2023-12-01 RX ORDER — LEVOTHYROXINE SODIUM 200 UG/1
200 TABLET ORAL DAILY
Qty: 90 TABLET | Refills: 4 | Status: SHIPPED | OUTPATIENT
Start: 2023-12-01

## 2023-12-01 RX ORDER — FUROSEMIDE 20 MG
20 TABLET ORAL DAILY
Qty: 90 TABLET | Refills: 4 | Status: SHIPPED | OUTPATIENT
Start: 2023-12-01 | End: 2024-05-28

## 2023-12-01 RX ORDER — SPIRONOLACTONE 25 MG/1
25 TABLET ORAL DAILY
Qty: 90 TABLET | Refills: 4 | Status: SHIPPED | OUTPATIENT
Start: 2023-12-01 | End: 2024-05-28

## 2023-12-01 RX ORDER — FAMOTIDINE 20 MG/1
20 TABLET, FILM COATED ORAL 2 TIMES DAILY
Qty: 180 TABLET | Refills: 4 | Status: SHIPPED | OUTPATIENT
Start: 2023-12-01

## 2023-12-01 RX ORDER — GABAPENTIN 400 MG/1
1200 CAPSULE ORAL 2 TIMES DAILY
Qty: 540 CAPSULE | Refills: 1 | Status: SHIPPED | OUTPATIENT
Start: 2023-12-01 | End: 2024-05-27

## 2023-12-01 RX ORDER — SILDENAFIL 100 MG/1
100 TABLET, FILM COATED ORAL DAILY PRN
Qty: 90 TABLET | Refills: 1 | Status: SHIPPED | OUTPATIENT
Start: 2023-12-01

## 2023-12-01 ASSESSMENT — ENCOUNTER SYMPTOMS
ENDOCRINE COMMENTS: + HAIR LOSS
FREQUENCY: 0
JOINT SWELLING: 1
ARTHRALGIAS: 0
HEADACHES: 0
BRUISES/BLEEDS EASILY: 1
CHILLS: 0
PARESTHESIAS: 0
FEVER: 0
EYE PAIN: 0
ABDOMINAL PAIN: 0
WEAKNESS: 1
DYSURIA: 0
BACK PAIN: 1
SORE THROAT: 0
NERVOUS/ANXIOUS: 0
COUGH: 0
PALPITATIONS: 0
MYALGIAS: 1
CONSTIPATION: 0
HEMATURIA: 0
DIZZINESS: 0
HEMATOCHEZIA: 0
SHORTNESS OF BREATH: 0
DIARRHEA: 0
ARTHRALGIAS: 1
NAUSEA: 0
HEARTBURN: 0
JOINT SWELLING: 0

## 2023-12-01 ASSESSMENT — ASTHMA QUESTIONNAIRES
QUESTION_2 LAST FOUR WEEKS HOW OFTEN HAVE YOU HAD SHORTNESS OF BREATH: NOT AT ALL
QUESTION_5 LAST FOUR WEEKS HOW WOULD YOU RATE YOUR ASTHMA CONTROL: COMPLETELY CONTROLLED
QUESTION_1 LAST FOUR WEEKS HOW MUCH OF THE TIME DID YOUR ASTHMA KEEP YOU FROM GETTING AS MUCH DONE AT WORK, SCHOOL OR AT HOME: NONE OF THE TIME
QUESTION_3 LAST FOUR WEEKS HOW OFTEN DID YOUR ASTHMA SYMPTOMS (WHEEZING, COUGHING, SHORTNESS OF BREATH, CHEST TIGHTNESS OR PAIN) WAKE YOU UP AT NIGHT OR EARLIER THAN USUAL IN THE MORNING: NOT AT ALL
QUESTION_4 LAST FOUR WEEKS HOW OFTEN HAVE YOU USED YOUR RESCUE INHALER OR NEBULIZER MEDICATION (SUCH AS ALBUTEROL): NOT AT ALL
ACT_TOTALSCORE: 25
ACT_TOTALSCORE: 25

## 2023-12-01 ASSESSMENT — PAIN SCALES - GENERAL: PAINLEVEL: MILD PAIN (3)

## 2023-12-01 ASSESSMENT — ACTIVITIES OF DAILY LIVING (ADL): CURRENT_FUNCTION: NO ASSISTANCE NEEDED

## 2023-12-01 NOTE — NURSING NOTE
"Chief Complaint   Patient presents with    Medicare Wellness Visit       Initial /72 (BP Location: Right arm, Patient Position: Sitting, Cuff Size: Adult Large)   Pulse 68   Temp 98.4  F (36.9  C) (Temporal)   Resp 16   Ht 1.816 m (5' 11.5\")   Wt 138.3 kg (305 lb)   SpO2 95%   BMI 41.95 kg/m   Estimated body mass index is 41.95 kg/m  as calculated from the following:    Height as of this encounter: 1.816 m (5' 11.5\").    Weight as of this encounter: 138.3 kg (305 lb).  Medication Review: complete    The next two questions are to help us understand your food security.  If you are feeling you need any assistance in this area, we have resources available to support you today.          12/1/2023   SDOH- Food Insecurity   Within the past 12 months, did you worry that your food would run out before you got money to buy more? N   Within the past 12 months, did the food you bought just not last and you didn t have money to get more? N         Health Care Directive:  Patient does not have a Health Care Directive or Living Will: Discussed advance care planning with patient; however, patient declined at this time.    Kevin Enrique      "

## 2023-12-01 NOTE — PROGRESS NOTES
Assessment & Plan     ICD-10-CM    1. Benign essential hypertension  I10 furosemide (LASIX) 20 MG tablet     carvedilol (COREG) 6.25 MG tablet     spironolactone (ALDACTONE) 25 MG tablet      2. Bilateral lower extremity edema  R60.0 furosemide (LASIX) 20 MG tablet     spironolactone (ALDACTONE) 25 MG tablet      3. Chronic heart failure with preserved ejection fraction (HFpEF) (H)  I50.32 furosemide (LASIX) 20 MG tablet      4. CKD (chronic kidney disease) stage 2, GFR 60-89 ml/min  N18.2       5. Hyperuricemia  E79.0       6. Complication of artery associated with surgical procedure - Left superior gluteal artery Injury -- subsequently thrombosed / embolized by Interventional Radiology  I97.89 pregabalin (LYRICA) 75 MG capsule      7. Left leg weakness  R29.898       8. Mixed hyperlipidemia  E78.2       9. KYLAH on CPAP -- Uses nightly, finds helpful and beneficial  G47.33       10. Class 3 severe obesity due to excess calories with serious comorbidity and body mass index (BMI) of 40.0 to 44.9 in adult (H)  E66.01     Z68.41       11. Postoperative hypothyroidism  E89.0 levothyroxine (SYNTHROID/LEVOTHROID) 200 MCG tablet      12. Vitamin D deficiency  E55.9       13. Vitamin B12 deficiency  E53.8       14. Sleep difficulties  G47.9 estazolam (PROSOM) 1 MG tablet      15. Gastroesophageal reflux disease, unspecified whether esophagitis present  K21.9 famotidine (PEPCID) 20 MG tablet      16. Chronic radicular low back pain  M54.16 gabapentin (NEURONTIN) 400 MG capsule    G89.29 pregabalin (LYRICA) 75 MG capsule      17. Neuralgia of left perineal nerve  R10.2 pregabalin (LYRICA) 75 MG capsule      18. Erectile dysfunction, unspecified erectile dysfunction type  N52.9 sildenafil (VIAGRA) 100 MG tablet      19. Vaccine counseling  Z71.85       20. Encounter for Medicare annual wellness exam  Z00.00         Patient presents for Medicare annual wellness visit as well as follow-up multiple issues.    Heart failure with  preserved ejection fraction and lower extremity edema, has noted improvement with use of Lasix, in addition to spironolactone.  Seems to be tolerating well.  Needs refills.    Hyperuricemia, currently well controlled.  No recent gout attacks.  Continue with allopurinol.    Leg weakness following surgical complication, appears that he has made maximal medical improvement.  Advised that he continue to do regular walking and exercise.  Able to walk about 150 feet now with a small triangle base cane.  Advise regular exercise as we lose muscle mass within just a couple days of becoming stationary.  Has some problems with left leg weakness which did improve following the surgical procedure but has plateaued.    Vitamin D and B12 deficiency.  Ongoing.  Encourage continued oral replacement.    Sleep difficulties, has been struggling with some sleep but has found estazolam quite helpful.  Also taking gabapentin and Lyrica to help with neuropathy pain, this commendation has been quite effective at night.  Tolerating well.  Needs refills.    Heartburn and reflux, doing reasonably well at this time with regular dosing of famotidine.  Needs refills.    Radicular low back pain with neuralgia of the left peroneal nerve.  Gabapentin and Lyrica combination has been effective and well-tolerated.  Needs refills.    Erectile dysfunction, ongoing.  Viagra has been tolerated and helpful and effective.  No medication side effects reported.  Refill sent to pharmacy.    HYPERTENSION - Ongoing. Blood pressure is currently well controlled.  Medication side effects: None. Denies syncope or presyncope.  No changes for now. Continue current medications.   Medication list reviewed/updated. Refills completed as needed.      MIXED HYPERLIPIDEMIA.  Ongoing. LDL is at goal: No. Triglycerides are at goal: Yes.  Hopefully lifestyle modifications will improve cholesterol levels, otherwise we will need to consider additional medication dose adjustments  or medication changes.  Medication side effects reported: None.   Continue current medications for now. Medication list reviewed/updated. Refills completed as needed.  Recent Labs   Lab Test 11/11/22  0755 09/15/21  0824   CHOL 163 214*   HDL 41 43   * 143*   TRIG 87 140        HYPOTHYROIDISM - Patient has a longstanding history of chronic hypothyroidism. Patient has been doing reasonably well. Reports: denies fatigue, weight changes, heat/cold intolerance, bowel/skin changes or CVS symptoms.  Continue: Synthroid oral thyroid replacement, denies adverse medication reactions or side effects.                       TSH   Date Value Ref Range Status   11/08/2022 0.40 0.30 - 4.20 uIU/mL Final   12/22/2021 1.20 0.40 - 4.00 mU/L Final        OBESITY - Ongoing.  (See Encounter Diagnosis list above for obesity class / severity).  - Counseled on diet and exercise.   - Recommend Nutrition / Dietician appointment.  - Weight loss would improve Hypertension, Cholesterol.      Chronic Kidney Disease, Stage 2 (GFR 60-89), chronic, ongoing.  Kidney function has been slowly declining.  Encouraged NSAID avoidance.       Sleep Apnea, chronic, ongoing.  Uses CPAP nightly.  Finds helpful and beneficial.  Encouraged continued use.     Vaccine counseling completed.  Encouraged routine / annual vaccinations.     Return in about 6 months (around 5/28/2024) for - Med Refills - Controlled Medications.    Matt Whitmore MD  [facility] AND [facility]     SUBJECTIVE:   Henry is a 68 year old, presenting for the following:  Medicare Wellness Visit        12/1/2023    11:09 AM   Additional Questions   Roomed by Kevin Downs LPN   Accompanied by wife - Cami       Are you in the first 12 months of your Medicare coverage?  No    Healthy Habits:     In general, how would you rate your overall health?  Fair    Frequency of exercise:  2-3 days/week    Duration of exercise:  15-30 minutes    Do you usually eat at least 4 servings of fruit and  "vegetables a day, include whole grains    & fiber and avoid regularly eating high fat or \"junk\" foods?  Yes    Taking medications regularly:  Yes    Barriers to taking medications:  None    Medication side effects:  Not applicable    Ability to successfully perform activities of daily living:  No assistance needed    Home Safety:  Lack of grab bars in the bathroom    Hearing Impairment:  No hearing concerns    In the past 6 months, have you been bothered by leaking of urine?  No    In general, how would you rate your overall mental or emotional health?  Fair    Additional concerns today:  Yes      Today's PHQ-2 Score:       12/1/2023    10:58 AM   PHQ-2 ( 1999 [employer])   Q1: Little interest or pleasure in doing things 0   Q2: Feeling down, depressed or hopeless 0   PHQ-2 Score 0   Q1: Little interest or pleasure in doing things Not at all   Q2: Feeling down, depressed or hopeless Not at all   PHQ-2 Score 0           Have you ever done Advance Care Planning? (For example, a Health Directive, POLST, or a discussion with a medical provider or your loved ones about your wishes): Yes, patient states has an Advance Care Planning document and will bring a copy to the clinic.     Fall risk  Fallen 2 or more times in the past year?: No  Any fall with injury in the past year?: No    Cognitive Screening   1) Repeat 3 items (Leader, Season, Table)    2) Clock draw: NORMAL  3) 3 item recall: Recalls 3 objects  Results: 3 items recalled: COGNITIVE IMPAIRMENT LESS LIKELY    Mini-CogTM Copyright ESTEFANÍA Corbin. Licensed by the author for use in [facility]; reprinted with permission (tisha@.[facility]). All rights reserved.      Do you have sleep apnea, excessive snoring or daytime drowsiness? : yes    Reviewed and updated as needed this visit by clinical staff   Tobacco  Allergies  Meds  Problems  Med Hx  Surg Hx  Fam Hx  Soc   Hx        Reviewed and updated as needed this visit by Provider   Tobacco  Allergies  Meds  " Problems  Med Hx  Surg Hx  Fam Hx         Social History     Tobacco Use     Smoking status: Never     Smokeless tobacco: Never   Substance Use Topics     Alcohol use: No     Comment: none             12/1/2023    10:58 AM   Alcohol Use   Prescreen: >3 drinks/day or >7 drinks/week? Not Applicable     Do you have a current opioid prescription? No  Do you use any other controlled substances or medications that are not prescribed by a provider? None    Current providers sharing in care for this patient include:   Patient Care Team:  Matt Whitmore MD as PCP - General (Internal Medicine)  Matt Whitmore MD as Assigned PCP  Janet Quiles [facility] as Pharmacist (Pharmacist)  Janet Quiles [facility] as Assigned MTM Pharmacist  Phill Acosta MD as Assigned Rheumatology Provider  Sophia Chavira APRN CNP as Assigned Heart and Vascular Provider  Sebastien Gustafson MD as Hospitalist (Infectious Diseases)  Pedro hCisholm MD as Assigned Neuroscience Provider  Vic Armenta MD as Assigned Surgical Provider    The following health maintenance items are reviewed in Epic and correct as of today:  Health Maintenance   Topic Date Due     HF ACTION PLAN  Never done     MICROALBUMIN  Never done     ASTHMA ACTION PLAN  Never done     RSV VACCINE (Pregnancy & 60+) (1 - 1-dose 60+ series) Never done     LIPID  11/11/2023     BMP  06/01/2024     ASTHMA CONTROL TEST  06/01/2024     DTAP/TDAP/TD IMMUNIZATION (2 - Td or Tdap) 08/13/2024     COLORECTAL CANCER SCREENING  09/09/2024     ALT  11/16/2024     CBC  11/16/2024     MEDICARE ANNUAL WELLNESS VISIT  12/01/2024     FALL RISK ASSESSMENT  12/01/2024     ADVANCE CARE PLANNING  12/01/2028     TSH W/FREE T4 REFLEX  Completed     HEPATITIS C SCREENING  Completed     PHQ-2 (once per calendar year)  Completed     INFLUENZA VACCINE  Completed     Pneumococcal Vaccine: 65+ Years  Completed     URINALYSIS  Completed     ZOSTER IMMUNIZATION  Completed     AORTIC  "ANEURYSM SCREENING (SYSTEM ASSIGNED)  Completed     IPV IMMUNIZATION  Aged Out     HPV IMMUNIZATION  Aged Out     RSV MONOCLONAL ANTIBODY  Aged Out     MENINGITIS IMMUNIZATION  Discontinued     COVID-19 Vaccine  Discontinued     Review of Systems   Constitutional:  Negative for chills and fever.        + walking up to 150 feet with small based triangular cane.   HENT:  Positive for hearing loss. Negative for congestion, ear pain and sore throat.    Eyes:  Negative for pain and visual disturbance.   Respiratory:  Negative for cough and shortness of breath.    Cardiovascular:  Negative for chest pain, palpitations and peripheral edema.   Gastrointestinal:  Negative for abdominal pain, constipation, diarrhea, heartburn, hematochezia and nausea.   Endocrine:        + hair loss   Genitourinary:  Positive for impotence. Negative for dysuria, frequency, genital sores, hematuria, penile discharge and urgency.   Musculoskeletal:  Positive for back pain (left buttocks/thigh pain) and myalgias. Negative for arthralgias and joint swelling.        Left leg weakness - can't Abduct left leg up against gravity when laying in bed.   More ability to stand, can now extend and abduct left hip slightly when standing.    Skin:  Negative for rash.   Allergic/Immunologic: Positive for immunocompromised state.   Neurological:  Positive for weakness. Negative for dizziness, headaches and paresthesias.   Hematological:  Bruises/bleeds easily.   Psychiatric/Behavioral:  Negative for mood changes. The patient is not nervous/anxious.          OBJECTIVE:   /72 (BP Location: Right arm, Patient Position: Sitting, Cuff Size: Adult Large)   Pulse 68   Temp 98.4  F (36.9  C) (Temporal)   Resp 16   Ht 1.816 m (5' 11.5\")   Wt 138.3 kg (305 lb)   SpO2 95%   BMI 41.95 kg/m   Estimated body mass index is 41.95 kg/m  as calculated from the following:    Height as of this encounter: 1.816 m (5' 11.5\").    Weight as of this encounter: 138.3 kg " "(305 lb).  Physical Exam  Constitutional:       General: He is not in acute distress.     Appearance: He is well-developed. He is obese. He is not diaphoretic.   HENT:      Head: Normocephalic and atraumatic.   Eyes:      General: No scleral icterus.     Conjunctiva/sclera: Conjunctivae normal.   Cardiovascular:      Rate and Rhythm: Normal rate and regular rhythm.      Pulses: Normal pulses.   Pulmonary:      Effort: Pulmonary effort is normal.      Breath sounds: Normal breath sounds.   Abdominal:      Palpations: Abdomen is soft.      Tenderness: There is no abdominal tenderness.   Musculoskeletal:         General: Swelling present. No tenderness or deformity.      Cervical back: Neck supple.      Right lower leg: Edema present.      Left lower leg: Edema present.      Comments: + Walking with small base stand-up triangular cane.   Lymphadenopathy:      Cervical: No cervical adenopathy.   Skin:     General: Skin is warm and dry.      Findings: No rash.   Neurological:      Mental Status: He is alert. Mental status is at baseline.      Comments: Left leg weakness   Psychiatric:         Mood and Affect: Mood normal.         Behavior: Behavior normal.         Patient has been advised of split billing requirements and indicates understanding: Yes      COUNSELING:  Reviewed preventive health counseling, as reflected in patient instructions  Special attention given to:       Consider AAA screening for ages 65-75 and smoking history       Regular exercise       Healthy diet/nutrition       Vision screening       Hearing screening       Dental care       Bladder control       Fall risk prevention       Immunizations    BMI:   Estimated body mass index is 41.95 kg/m  as calculated from the following:    Height as of this encounter: 1.816 m (5' 11.5\").    Weight as of this encounter: 138.3 kg (305 lb).   Weight management plan: Discussed healthy diet and exercise guidelines      He reports that he has never smoked. He has " never used smokeless tobacco.      Appropriate preventive services were discussed with this patient, including applicable screening as appropriate for fall prevention, nutrition, physical activity, Tobacco-use cessation, weight loss and cognition.  Checklist reviewing preventive services available has been given to the patient.    Reviewed patients plan of care and provided an AVS. The Complex Care Plan (for patients with higher acuity and needing more deliberate coordination of services) for Selvin meets the Care Plan requirement. This Care Plan has been established and reviewed with the Patient and spouse.          Matt Whitmore MD  [facility] AND [facility]    Identified Health Risks:  I have reviewed Opioid Use Disorder and Substance Use Disorder risk factors and made any needed referrals.     The patient was provided with suggestions to help him develop a healthy physical lifestyle.  The patient was provided with suggestions to help him develop a healthy emotional lifestyle.

## 2023-12-01 NOTE — PATIENT INSTRUCTIONS
Blood pressure is well controlled.     Medications refilled.       [facility] - Dr. Davison  -- call and schedule appointment if needed for trigger finger.   Phone: [phone]       Patient Education   Personalized Prevention Plan  You are due for the preventive services outlined below.  Your care team is available to assist you in scheduling these services.  If you have already completed any of these items, please share that information with your care team to update in your medical record.  Health Maintenance Due   Topic Date Due    Heart Failure Action Plan  Never done    Kidney Microalbumin Urine Test  Never done    Asthma Action Plan - yearly  Never done    RSV VACCINE (Pregnancy & 60+) (1 - 1-dose 60+ series) Never done    Cholesterol Lab  11/11/2023    Annual Wellness Visit  11/11/2023     Preventive Health Recommendations  See your health care provider every year to  Review health changes.   Discuss preventive care.    Review your medicines if your doctor has prescribed any.  Talk with your health care provider about whether you should have a test to screen for prostate cancer (PSA).  Every 3 years, have a diabetes test (fasting glucose). If you are at risk for diabetes, you should have this test more often.  Every 5 years, have a cholesterol test. Have this test more often if you are at risk for high cholesterol or heart disease.   Every 10 years, have a colonoscopy. Or, have a yearly FIT test (stool test). These exams will check for colon cancer.  Talk to with your health care provider about screening for Abdominal Aortic Aneurysm if you have a family history of AAA or have a history of smoking.    Shots:   Get a flu shot each year.   Get a tetanus shot every 10 years.   Talk to your doctor about your pneumonia vaccines. There are now two you should receive - Pneumovax (PPSV 23) and Prevnar (PCV 13).  Talk to your pharmacist about a shingles vaccine.   Talk to your doctor about the  hepatitis B vaccine.    Nutrition:   Eat at least 5 servings of fruits and vegetables each day.   Eat whole-grain bread, whole-wheat pasta and brown rice instead of white grains and rice.   Get adequate Calcium and Vitamin D.     Lifestyle  Exercise for at least 150 minutes a week (30 minutes a day, 5 days a week). This will help you control your weight and prevent disease.   Limit alcohol to one drink per day.   No smoking.   Wear sunscreen to prevent skin cancer.   See your dentist every six months for an exam and cleaning.   See your eye doctor every 1 to 2 years to screen for conditions such as glaucoma, macular degeneration and cataracts.    Personalized Prevention Plan  You are due for the preventive services outlined below.  Your care team is available to assist you in scheduling these services.  If you have already completed any of these items, please share that information with your care team to update in your medical record.  Health Maintenance   Topic Date Due    HF ACTION PLAN  Never done    MICROALBUMIN  Never done    ASTHMA ACTION PLAN  Never done    RSV VACCINE (Pregnancy & 60+) (1 - 1-dose 60+ series) Never done    LIPID  11/11/2023    MEDICARE ANNUAL WELLNESS VISIT  11/11/2023    BMP  06/01/2024    ASTHMA CONTROL TEST  06/01/2024    DTAP/TDAP/TD IMMUNIZATION (2 - Td or Tdap) 08/13/2024    COLORECTAL CANCER SCREENING  09/09/2024    ALT  11/16/2024    CBC  11/16/2024    FALL RISK ASSESSMENT  12/01/2024    ADVANCE CARE PLANNING  12/01/2028    TSH W/FREE T4 REFLEX  Completed    HEPATITIS C SCREENING  Completed    PHQ-2 (once per calendar year)  Completed    INFLUENZA VACCINE  Completed    Pneumococcal Vaccine: 65+ Years  Completed    URINALYSIS  Completed    ZOSTER IMMUNIZATION  Completed    AORTIC ANEURYSM SCREENING (SYSTEM ASSIGNED)  Completed    IPV IMMUNIZATION  Aged Out    HPV IMMUNIZATION  Aged Out    RSV MONOCLONAL ANTIBODY  Aged Out    MENINGITIS IMMUNIZATION  Discontinued    COVID-19 Vaccine   Discontinued       Your Health Risk Assessment indicates you feel you are not in good health    A healthy lifestyle helps keep the body fit and the mind alert. It helps protect you from disease, helps you fight disease, and helps prevent chronic disease (disease that doesn't go away) from getting worse. This is important as you get older and begin to notice twinges in muscles and joints and a decline in the strength and stamina you once took for granted. A healthy lifestyle includes good healthcare, good nutrition, weight control, recreation, and regular exercise. Avoid harmful substances and do what you can to keep safe. Another part of a healthy lifestyle is stay mentally active and socially involved.    Good healthcare   Have a wellness visit every year.   If you have new symptoms, let us know right away. Don't wait until the next checkup.   Take medicines exactly as prescribed and keep your medicines in a safe place. Tell us if your medicine causes problems.   Healthy diet and weight control   Eat 3 or 4 small, nutritious, low-fat, high-fiber meals a day. Include a variety of fruits, vegetables, and whole-grain foods.   Make sure you get enough calcium in your diet. Calcium, vitamin D, and exercise help prevent osteoporosis (bone thinning).   If you live alone, try eating with others when you can. That way you get a good meal and have company while you eat it.   Try to keep a healthy weight. If you eat more calories than your body uses for energy, it will be stored as fat and you will gain weight.     Recreation   Recreation is not limited to sports and team events. It includes any activity that provides relaxation, interest, enjoyment, and exercise. Recreation provides an outlet for physical, mental, and social energy. It can give a sense of worth and achievement. It can help you stay healthy.    Mental Exercise and Social Involvement  Mental and emotional health is as important as physical health. Keep in  touch with friends and family. Stay as active as possible. Continue to learn and challenge yourself.   Things you can do to stay mentally active are:  Learn something new, like a foreign language or musical instrument.   Play SCRABBLE or do crossword puzzles. If you cannot find people to play these games with you at home, you can play them with others on your computer through the Internet.   Join a games club--anything from card games to chess or checkers or lawn bowling.   Start a new hobby.   Go back to school.   Volunteer.   Read.   Keep up with world events.  Your Health Risk Assessment indicates you feel you are not in good emotional health.    Recreation   Recreation is not limited to sports and team events. It includes any activity that provides relaxation, interest, enjoyment, and exercise. Recreation provides an outlet for physical, mental, and social energy. It can give a sense of worth and achievement. It can help you stay healthy.    Mental Exercise and Social Involvement  Mental and emotional health is as important as physical health. Keep in touch with friends and family. Stay as active as possible. Continue to learn and challenge yourself.   Things you can do to stay mentally active are:  Learn something new, like a foreign language or musical instrument.   Play SCRABBLE or do crossword puzzles. If you cannot find people to play these games with you at home, you can play them with others on your computer through the Internet.   Join a games club--anything from card games to chess or checkers or lawn bowling.   Start a new hobby.   Go back to school.   Volunteer.   Read.   Keep up with world events.

## 2023-12-07 ENCOUNTER — HOSPITAL ENCOUNTER (OUTPATIENT)
Dept: MRI IMAGING | Facility: OTHER | Age: 68
Discharge: HOME | End: 2023-12-07
Attending: UROLOGY | Admitting: UROLOGY
Payer: MEDICARE

## 2023-12-07 DIAGNOSIS — R97.20 ELEVATED PROSTATE SPECIFIC ANTIGEN (PSA): ICD-10-CM

## 2023-12-07 DIAGNOSIS — N40.3 NODULAR PROSTATE WITH LOWER URINARY TRACT SYMPTOMS: ICD-10-CM

## 2023-12-07 PROCEDURE — 255N000002 HC RX 255 OP 636: Mod: JZ | Performed by: UROLOGY

## 2023-12-07 PROCEDURE — 76377 3D RENDER W/INTRP POSTPROCES: CPT

## 2023-12-07 PROCEDURE — A9575 INJ GADOTERATE MEGLUMI 0.1ML: HCPCS | Mod: JZ | Performed by: UROLOGY

## 2023-12-07 PROCEDURE — 72197 MRI PELVIS W/O & W/DYE: CPT | Mod: MG

## 2023-12-07 RX ORDER — GADOTERATE MEGLUMINE 376.9 MG/ML
20 INJECTION INTRAVENOUS ONCE
Status: COMPLETED | OUTPATIENT
Start: 2023-12-07 | End: 2023-12-07

## 2023-12-07 RX ADMIN — GADOTERATE MEGLUMINE 20 ML: 376.9 INJECTION INTRAVENOUS at 19:35

## 2023-12-08 NOTE — RESULT ENCOUNTER NOTE
Ashely Jones,    We need to bring him in for a rectal swab and appt with me in January so we can get him ready for a prostate biopsy.  Patient is aware.  Can we reach out.  Thanks, Michael

## 2023-12-14 ENCOUNTER — OFFICE VISIT (OUTPATIENT)
Dept: UROLOGY | Facility: OTHER | Age: 68
End: 2023-12-14
Attending: UROLOGY
Payer: MEDICARE

## 2023-12-14 VITALS
SYSTOLIC BLOOD PRESSURE: 138 MMHG | DIASTOLIC BLOOD PRESSURE: 88 MMHG | TEMPERATURE: 98.3 F | HEIGHT: 72 IN | WEIGHT: 298 LBS | OXYGEN SATURATION: 96 % | BODY MASS INDEX: 40.36 KG/M2 | HEART RATE: 78 BPM | RESPIRATION RATE: 16 BRPM

## 2023-12-14 DIAGNOSIS — N40.3 NODULAR PROSTATE WITH LOWER URINARY TRACT SYMPTOMS: ICD-10-CM

## 2023-12-14 DIAGNOSIS — Z86.39 HISTORY OF HYPOGONADISM: ICD-10-CM

## 2023-12-14 DIAGNOSIS — Z80.42 FAMILY HISTORY OF PROSTATE CANCER: ICD-10-CM

## 2023-12-14 DIAGNOSIS — Z85.51 PERSONAL HISTORY OF BLADDER CANCER: Primary | ICD-10-CM

## 2023-12-14 LAB
Lab: NORMAL
PERFORMING LABORATORY: NORMAL
SPECIMEN STATUS: NORMAL
TEST NAME: NORMAL

## 2023-12-14 PROCEDURE — 99213 OFFICE O/P EST LOW 20 MIN: CPT | Performed by: UROLOGY

## 2023-12-14 PROCEDURE — 87081 CULTURE SCREEN ONLY: CPT | Mod: ZL | Performed by: UROLOGY

## 2023-12-14 PROCEDURE — 84999 UNLISTED CHEMISTRY PROCEDURE: CPT | Mod: ZL | Performed by: UROLOGY

## 2023-12-14 PROCEDURE — G0463 HOSPITAL OUTPT CLINIC VISIT: HCPCS

## 2023-12-14 RX ORDER — TAMSULOSIN HYDROCHLORIDE 0.4 MG/1
0.4 CAPSULE ORAL DAILY
Qty: 30 CAPSULE | Refills: 11 | Status: SHIPPED | OUTPATIENT
Start: 2023-12-14 | End: 2024-06-25

## 2023-12-14 ASSESSMENT — PAIN SCALES - GENERAL: PAINLEVEL: NO PAIN (0)

## 2023-12-14 NOTE — PROGRESS NOTES
Chief Complaint: Nodular prostate s/p MRI  History of HG bladder cancer   Hypogonadism.    HPI: Mr. Selvin An is a 68 year old year old male presenting today December 14, 2023 in follow up for evaluation of high-grade bladder cancer with previous BCG as well as nodular prostate for which I recommended an MRI.    Previous patient Dr. Schwartz who was placed on AndroGel for hypogonadism.  This is despite a family history of prostate cancer.    Here today 12/14/2023 to review the MRI.  PSA came back last week at 2.67 ng/mL up from 1.013 in March 2021.  Total testosterone came back at 294 which is normal and does not qualify for testosterone replacement at this time.  In addition his free testosterone was normal at 5.99.    He has a PMH significant for high-grade bladder cancer, CKD stage II, hypothyroid disease, morbid obesity and hypogonadism.    Current Outpatient Medications   Medication Sig Dispense Refill    allopurinol (ZYLOPRIM) 300 MG tablet TAKE 1 TABLET (300 MG) BY MOUTH DAILY - FOR GOUT PREVENTION 90 tablet 4    B Complex-C (VITAMIN B COMPLEX W/VITAMIN C) TABS tablet Take 1 tablet by mouth daily      carvedilol (COREG) 6.25 MG tablet Take 1 tablet (6.25 mg) by mouth 2 times daily (with meals) 360 tablet 4    Cholecalciferol (VITAMIN D-3) 125 MCG (5000 UT) TABS Take 5,000 Units by mouth daily 100 tablet 3    Cyanocobalamin 2500 MCG TABS Take 2,500 mcg by mouth daily -- or every other day -- OTC okay 90 tablet 4    estazolam (PROSOM) 1 MG tablet Take 1 tablet (1 mg) by mouth at bedtime 90 tablet 1    famotidine (PEPCID) 20 MG tablet Take 1 tablet (20 mg) by mouth 2 times daily 180 tablet 4    fenofibrate (TRICOR) 145 MG tablet TAKE 1 TABLET (145 MG) BY MOUTH DAILY 90 tablet 4    furosemide (LASIX) 20 MG tablet Take 1 tablet (20 mg) by mouth daily 90 tablet 4    gabapentin (NEURONTIN) 400 MG capsule Take 3 capsules (1,200 mg) by mouth 2 times daily 540 capsule 1    levothyroxine (SYNTHROID/LEVOTHROID) 200 MCG  "tablet Take 1 tablet (200 mcg) by mouth daily 90 tablet 4    magnesium 250 MG tablet Take 1 tablet by mouth daily      Melatonin 10 MG TABS tablet Take 20 mg by mouth At Bedtime      Multiple Vitamins-Minerals (CENTRUM ADULTS PO) Take 1 tablet by mouth daily      order for DME CPAP machine for home use at pressure: 10, with supplies:  humidifier, chamber, mask, tubing      pregabalin (LYRICA) 75 MG capsule Take 1 capsule (75 mg) by mouth 3 times daily 270 capsule 1    Probiotic Product (PROBIOTIC-10 PO) Take 1 tablet by mouth daily      sildenafil (VIAGRA) 100 MG tablet Take 1 tablet (100 mg) by mouth daily as needed 30 minutes to 4 hours prior to sexual activity 90 tablet 1    spironolactone (ALDACTONE) 25 MG tablet Take 1 tablet (25 mg) by mouth daily 90 tablet 4       ALLERGIES: Losartan, Omeprazole, Pantoprazole, and Valsartan      REVIEW OF SYSTEMS:  Skin: negative  Eyes: negative  Ears/Nose/Throat: negative, hearing loss  Respiratory: No shortness of breath, dyspnea on exertion, cough, or hemoptysis  Cardiovascular: negative  Gastrointestinal: negative  Genitourinary: urgency  Musculoskeletal: back pain  Neurologic: negative  Psychiatric: negative  Hematologic/Lymphatic/Immunologic: negative  Endocrine: negative and thyroid cancer thyroid removed    GENERAL PHYSICAL EXAM:   Vitals: /88 (BP Location: Right arm, Patient Position: Sitting, Cuff Size: Adult Large)   Pulse 78   Temp 98.3  F (36.8  C) (Temporal)   Resp 16   Ht 1.816 m (5' 11.5\")   Wt 135.2 kg (298 lb)   SpO2 96%   BMI 40.98 kg/m    Body mass index is 40.98 kg/m .    GENERAL: Well groomed, well developed, well nourished male in NAD.  ENT:  ENT exam normal  CV: Warm LE,   RESPIRATORY: Normal respiratory effort.   GI: Soft, NT, ND, no palpable masses.   MS: Moving all four   SKIN: Warm to touch  NEURO: Alert and oriented x 3.  PSYCH: Normal mood and affect, pleasant and agreeable during interview and exam.              RADIOLOGY: The " following tests were reviewed:   MR PELVIS PROSTATE [facility]     CLINICAL INFORMATION: , Nodular prostate with lower urinary tract  symptoms; Elevated prostate specific antigen (PSA) at 2.67.     COMPARISON: MR pelvis 3/9/2023.     TECHNICAL INFORMATION: 1.5T MR imaging of the prostate including high  resolution 3mm axial and sagittal T2 and axial 3.5mm diffusion  weighted (B0, B100, B800, and ) images through the prostate gland  and seminal vesicles. Dynamic enhanced images of the prostate gland  were also obtained in the axial plane. Axial T1, axial T2, and axial  T1 postcontrast images were also obtained through the entire pelvis.  Images were analyzed using a separate [employer] workstation.     INTERPRETATION: There is prostate gland enlargement secondary to  central gland hyperplasia. The prostate gland measures 4.9 x 4.6 x 4.7  cm in AP, mediolateral and craniocaudal dimensions, respectively, for  a volume of 53 mL. No significant intrinsic T1 signal is seen within  the gland.     There is a circumscribed fatty lesion at the right base of the pelvis  resulting in right to left midline shift of the anorectal junction,  spanning 4.2 x 4.6 x 5.5 cm.     No pelvic adenopathy is identified. Prior left SI joint fusion is  noted. No suspicious osseous lesion is seen.                                                                      IMPRESSION:  No intermediate or high suspicion focal prostate lesion.     Right pelvic floor lipoma versus fatty herniation.     Assessment: PI-RADS 2     PI-RADS Assessment Categories:  Score 1 = very low; clinically significant disease highly unlikely  Score 2 = low; clinically significant disease is unlikely  Score 3 = intermediate; clinically significant disease is equivocal  Score 4 = high; clinically significant disease is likely  Score 5 = very high; clinically significant disease is highly likely     SIERRA RENNER MD     LABS: The last test results for Mr. Selvin An were  reviewed:  No results found for this or any previous visit (from the past 24 hour(s)).    PSA -   Lab Results   Component Value Date    PSA 2.67 11/16/2023     BMP -   Recent Labs   Lab Test 12/01/23  1048 11/16/23  0749 08/15/23  1213 09/14/17  0916 09/10/17  2050    142 137   < > 138   POTASSIUM 4.3 4.0 4.2   < > 4.2   CHLORIDE 105 106 103   < > 101   CO2 25 24 23   < > 26   BUN 24.0* 25.7* 25.6*   < > 29*   CR 1.38* 1.32* 1.18*   < > 1.56*   * 100* 92   < > 105   THAI 10.5* 10.6* 10.7*   < > 10.9*   MAG  --   --   --   --  1.9    < > = values in this interval not displayed.       CBC -   Recent Labs   Lab Test 11/16/23  0749 08/15/23  1213 02/15/23  0902   WBC 9.6 8.9 11.3*   HGB 16.9 16.3 12.1*   * 473* 525*       ASSESSMENT:   High-grade bladder cancer status  Nodular prostate status post normal MRI  History of hypogonadism improved with borderline normal testosterone  Family history of prostate cancer    PLAN:   For his high-grade bladder cancer I recommend cystoscopy next May.  This should be done once a year.  Imaging can be suspended at this point unless there are concerns such as bleeding.  Cytology will be checked today.    Regarding his testosterone, his levels are now low normal.  He does not qualify for replacement.  I recommend diet and exercise with weight loss.    Regarding his nodular prostate, even though his MRI was normal guidelines would still recommend a prostate biopsy as MRI can miss cancer 30% of the time.  Biopsy was explained including the risk of bleeding, infection, sepsis and retention.  There is a risk of ED.    Fortunately he has new hardware in his left hip and had a complication including a bleed requiring embolization and the formation of a hematoma with some residual fluid still around that joint.    Given his MRI which was normal I am more inclined to just wait 6 months and revisit this issue.  My concern is infecting the joint.  I would like that issue  resolved or at least improved enough.  We will recheck his PSA at that point.  They voiced an understanding.    This prostate symptoms continue to be a problem despite elimination of caffeine that a trial of tamsulosin would be in order.  Risks of dizziness upon standing and retrograde ejaculation were discussed.    25 minutes spent on the date of this encounter doing chart review, history and exam, documentation and further activities as noted above.        Spike Milton MD  [facility]      Epidermal Sutures: 3-0 Ethilon

## 2023-12-14 NOTE — NURSING NOTE
"Chief Complaint   Patient presents with    Follow Up     Review MRI    Patient presents to the clinic today for a follow up to review MRI  Review Of Systems  Skin: negative  Eyes: negative  Ears/Nose/Throat: negative, hearing loss  Respiratory: No shortness of breath, dyspnea on exertion, cough, or hemoptysis  Cardiovascular: negative  Gastrointestinal: negative  Genitourinary: urgency  Musculoskeletal: back pain  Neurologic: negative  Psychiatric: negative  Hematologic/Lymphatic/Immunologic: negative  Endocrine: negative and thyroid cancer thyroid removed        Initial There were no vitals taken for this visit. Estimated body mass index is 41.95 kg/m  as calculated from the following:    Height as of 12/1/23: 1.816 m (5' 11.5\").    Weight as of 12/1/23: 138.3 kg (305 lb).  Meds Reconciled: lotus Tovar LPN,LPN on 12/14/2023 at 1:56 PM  Ext. [phone]          "

## 2023-12-18 LAB — MISCELLANEOUS TEST 1 (ARUP): NORMAL

## 2023-12-19 NOTE — RESULT ENCOUNTER NOTE
Negative.  But we will hold off on biopsy given hip situation, new hardware and recent hematoma formation.  Dr. Milton

## 2024-01-05 ENCOUNTER — TRANSFERRED RECORDS (OUTPATIENT)
Dept: HEALTH INFORMATION MANAGEMENT | Facility: OTHER | Age: 69
End: 2024-01-05
Payer: MEDICARE

## 2024-01-26 NOTE — PROGRESS NOTES
Patient Information     Patient Name MRN Sex Selvin Duenas [id number] Male 1955      Progress Notes by Tara Smith at 10/13/2017  3:23 PM     Author:  Tara Smith Service:  (none) Author Type:  Other Clinical Staff     Filed:  10/13/2017  3:23 PM Date of Service:  10/13/2017  3:23 PM Status:  Signed     :  Tara Smith (Other Clinical Staff)            Falls Risk Criteria:    Age 65 and older or under age 4        Sensory deficits    Poor vision    Use of ambulatory aides    Impaired judgment    Unable to walk independently    Meets High Risk criteria for falls:  no               Normal vision: sees adequately in most situations; can see medication labels, newsprint

## 2024-02-14 ENCOUNTER — MEDICAL CORRESPONDENCE (OUTPATIENT)
Dept: HEALTH INFORMATION MANAGEMENT | Facility: OTHER | Age: 69
End: 2024-02-14
Payer: MEDICARE

## 2024-04-24 ENCOUNTER — MYC MEDICAL ADVICE (OUTPATIENT)
Dept: INTERNAL MEDICINE | Facility: OTHER | Age: 69
End: 2024-04-24
Payer: MEDICARE

## 2024-04-24 DIAGNOSIS — I10 BENIGN ESSENTIAL HYPERTENSION: ICD-10-CM

## 2024-04-24 RX ORDER — CARVEDILOL 6.25 MG/1
9.38 TABLET ORAL 2 TIMES DAILY WITH MEALS
Qty: 360 TABLET | Refills: 4 | Status: SHIPPED | OUTPATIENT
Start: 2024-04-24 | End: 2024-06-25

## 2024-04-24 NOTE — TELEPHONE ENCOUNTER
12/1/2023  OV with Haim     Patient states he was changed to Carvedilol 1.5 tablets BID at this appt.      Updated order for review/signing.     Jahaira Ace RN on 4/24/2024 at 11:58 AM

## 2024-05-12 ENCOUNTER — HOSPITAL ENCOUNTER (EMERGENCY)
Facility: OTHER | Age: 69
Discharge: HOME | End: 2024-05-12
Attending: STUDENT IN AN ORGANIZED HEALTH CARE EDUCATION/TRAINING PROGRAM | Admitting: STUDENT IN AN ORGANIZED HEALTH CARE EDUCATION/TRAINING PROGRAM
Payer: MEDICARE

## 2024-05-12 ENCOUNTER — APPOINTMENT (OUTPATIENT)
Dept: GENERAL RADIOLOGY | Facility: OTHER | Age: 69
End: 2024-05-12
Attending: STUDENT IN AN ORGANIZED HEALTH CARE EDUCATION/TRAINING PROGRAM
Payer: MEDICARE

## 2024-05-12 ENCOUNTER — APPOINTMENT (OUTPATIENT)
Dept: CT IMAGING | Facility: OTHER | Age: 69
End: 2024-05-12
Attending: STUDENT IN AN ORGANIZED HEALTH CARE EDUCATION/TRAINING PROGRAM
Payer: MEDICARE

## 2024-05-12 VITALS
OXYGEN SATURATION: 89 % | TEMPERATURE: 97.3 F | SYSTOLIC BLOOD PRESSURE: 158 MMHG | HEART RATE: 56 BPM | DIASTOLIC BLOOD PRESSURE: 80 MMHG | RESPIRATION RATE: 17 BRPM | BODY MASS INDEX: 40.63 KG/M2 | HEIGHT: 72 IN | WEIGHT: 300 LBS

## 2024-05-12 DIAGNOSIS — R09.A2 GLOBUS SENSATION: ICD-10-CM

## 2024-05-12 DIAGNOSIS — I10 PRIMARY HYPERTENSION: ICD-10-CM

## 2024-05-12 LAB
ALBUMIN SERPL BCG-MCNC: 4.5 G/DL (ref 3.5–5.2)
ALP SERPL-CCNC: 82 U/L (ref 40–150)
ALT SERPL W P-5'-P-CCNC: 48 U/L (ref 0–70)
ANION GAP SERPL CALCULATED.3IONS-SCNC: 13 MMOL/L (ref 7–15)
AST SERPL W P-5'-P-CCNC: 48 U/L (ref 0–45)
ATRIAL RATE - MUSE: 64 BPM
BASOPHILS # BLD MANUAL: 0.3 10E3/UL (ref 0–0.2)
BASOPHILS NFR BLD MANUAL: 2 %
BILIRUB DIRECT SERPL-MCNC: <0.2 MG/DL (ref 0–0.3)
BILIRUB SERPL-MCNC: 0.5 MG/DL
BUN SERPL-MCNC: 21.3 MG/DL (ref 8–23)
CALCIUM SERPL-MCNC: 10.4 MG/DL (ref 8.8–10.2)
CHLORIDE SERPL-SCNC: 100 MMOL/L (ref 98–107)
CREAT SERPL-MCNC: 1.26 MG/DL (ref 0.67–1.17)
DEPRECATED HCO3 PLAS-SCNC: 25 MMOL/L (ref 22–29)
DIASTOLIC BLOOD PRESSURE - MUSE: NORMAL MMHG
EGFRCR SERPLBLD CKD-EPI 2021: 62 ML/MIN/1.73M2
EOSINOPHIL # BLD MANUAL: 0.9 10E3/UL (ref 0–0.7)
EOSINOPHIL NFR BLD MANUAL: 7 %
ERYTHROCYTE [DISTWIDTH] IN BLOOD BY AUTOMATED COUNT: 14.9 % (ref 10–15)
GLUCOSE SERPL-MCNC: 129 MG/DL (ref 70–99)
HCT VFR BLD AUTO: 46.6 % (ref 40–53)
HGB BLD-MCNC: 16.2 G/DL (ref 13.3–17.7)
HOLD SPECIMEN: NORMAL
HOLD SPECIMEN: NORMAL
INTERPRETATION ECG - MUSE: NORMAL
LACTATE SERPL-SCNC: 1.8 MMOL/L (ref 0.7–2)
LYMPHOCYTES # BLD MANUAL: 4.8 10E3/UL (ref 0.8–5.3)
LYMPHOCYTES NFR BLD MANUAL: 38 %
MCH RBC QN AUTO: 33.1 PG (ref 26.5–33)
MCHC RBC AUTO-ENTMCNC: 34.8 G/DL (ref 31.5–36.5)
MCV RBC AUTO: 95 FL (ref 78–100)
METAMYELOCYTES # BLD MANUAL: 0.1 10E3/UL
METAMYELOCYTES NFR BLD MANUAL: 1 %
MONOCYTES # BLD MANUAL: 0.8 10E3/UL (ref 0–1.3)
MONOCYTES NFR BLD MANUAL: 6 %
MYELOCYTES # BLD MANUAL: 0.1 10E3/UL
MYELOCYTES NFR BLD MANUAL: 1 %
NEUTROPHILS # BLD MANUAL: 5.7 10E3/UL (ref 1.6–8.3)
NEUTROPHILS NFR BLD MANUAL: 45 %
NRBC # BLD AUTO: 0 10E3/UL
NRBC BLD AUTO-RTO: 0 /100
NT-PROBNP SERPL-MCNC: 136 PG/ML (ref 0–900)
P AXIS - MUSE: 59 DEGREES
PLAT MORPH BLD: ABNORMAL
PLATELET # BLD AUTO: 440 10E3/UL (ref 150–450)
POTASSIUM SERPL-SCNC: 3.8 MMOL/L (ref 3.4–5.3)
PR INTERVAL - MUSE: 242 MS
PROT SERPL-MCNC: 7.9 G/DL (ref 6.4–8.3)
QRS DURATION - MUSE: 98 MS
QT - MUSE: 402 MS
QTC - MUSE: 414 MS
R AXIS - MUSE: 15 DEGREES
RBC # BLD AUTO: 4.89 10E6/UL (ref 4.4–5.9)
RBC MORPH BLD: ABNORMAL
SODIUM SERPL-SCNC: 138 MMOL/L (ref 135–145)
SYSTOLIC BLOOD PRESSURE - MUSE: NORMAL MMHG
T AXIS - MUSE: 59 DEGREES
TROPONIN T SERPL HS-MCNC: 42 NG/L
TROPONIN T SERPL HS-MCNC: 44 NG/L
VARIANT LYMPHS BLD QL SMEAR: PRESENT
VENTRICULAR RATE- MUSE: 64 BPM
WBC # BLD AUTO: 12.6 10E3/UL (ref 4–11)

## 2024-05-12 PROCEDURE — 70491 CT SOFT TISSUE NECK W/DYE: CPT | Mod: MA

## 2024-05-12 PROCEDURE — 83880 ASSAY OF NATRIURETIC PEPTIDE: CPT | Performed by: STUDENT IN AN ORGANIZED HEALTH CARE EDUCATION/TRAINING PROGRAM

## 2024-05-12 PROCEDURE — 99207 PR ELECTROCARDIOGRAM, POST PROCEDURE - NO CHARGE: CPT | Performed by: STUDENT IN AN ORGANIZED HEALTH CARE EDUCATION/TRAINING PROGRAM

## 2024-05-12 PROCEDURE — 85007 BL SMEAR W/DIFF WBC COUNT: CPT | Performed by: STUDENT IN AN ORGANIZED HEALTH CARE EDUCATION/TRAINING PROGRAM

## 2024-05-12 PROCEDURE — 80053 COMPREHEN METABOLIC PANEL: CPT | Performed by: STUDENT IN AN ORGANIZED HEALTH CARE EDUCATION/TRAINING PROGRAM

## 2024-05-12 PROCEDURE — 99284 EMERGENCY DEPT VISIT MOD MDM: CPT | Performed by: STUDENT IN AN ORGANIZED HEALTH CARE EDUCATION/TRAINING PROGRAM

## 2024-05-12 PROCEDURE — 71046 X-RAY EXAM CHEST 2 VIEWS: CPT

## 2024-05-12 PROCEDURE — 36415 COLL VENOUS BLD VENIPUNCTURE: CPT | Performed by: STUDENT IN AN ORGANIZED HEALTH CARE EDUCATION/TRAINING PROGRAM

## 2024-05-12 PROCEDURE — 93005 ELECTROCARDIOGRAM TRACING: CPT | Performed by: STUDENT IN AN ORGANIZED HEALTH CARE EDUCATION/TRAINING PROGRAM

## 2024-05-12 PROCEDURE — 84484 ASSAY OF TROPONIN QUANT: CPT | Mod: 91 | Performed by: STUDENT IN AN ORGANIZED HEALTH CARE EDUCATION/TRAINING PROGRAM

## 2024-05-12 PROCEDURE — 70360 X-RAY EXAM OF NECK: CPT

## 2024-05-12 PROCEDURE — 80048 BASIC METABOLIC PNL TOTAL CA: CPT | Performed by: STUDENT IN AN ORGANIZED HEALTH CARE EDUCATION/TRAINING PROGRAM

## 2024-05-12 PROCEDURE — 85025 COMPLETE CBC W/AUTO DIFF WBC: CPT | Performed by: STUDENT IN AN ORGANIZED HEALTH CARE EDUCATION/TRAINING PROGRAM

## 2024-05-12 PROCEDURE — 250N000011 HC RX IP 250 OP 636: Performed by: STUDENT IN AN ORGANIZED HEALTH CARE EDUCATION/TRAINING PROGRAM

## 2024-05-12 PROCEDURE — 83605 ASSAY OF LACTIC ACID: CPT | Performed by: STUDENT IN AN ORGANIZED HEALTH CARE EDUCATION/TRAINING PROGRAM

## 2024-05-12 PROCEDURE — 82248 BILIRUBIN DIRECT: CPT | Performed by: STUDENT IN AN ORGANIZED HEALTH CARE EDUCATION/TRAINING PROGRAM

## 2024-05-12 PROCEDURE — 99285 EMERGENCY DEPT VISIT HI MDM: CPT | Mod: 25 | Performed by: STUDENT IN AN ORGANIZED HEALTH CARE EDUCATION/TRAINING PROGRAM

## 2024-05-12 RX ORDER — IOPAMIDOL 755 MG/ML
100 INJECTION, SOLUTION INTRAVASCULAR ONCE
Status: COMPLETED | OUTPATIENT
Start: 2024-05-12 | End: 2024-05-12

## 2024-05-12 RX ADMIN — IOPAMIDOL 100 ML: 755 INJECTION, SOLUTION INTRAVENOUS at 18:25

## 2024-05-12 ASSESSMENT — ACTIVITIES OF DAILY LIVING (ADL)
ADLS_ACUITY_SCORE: 38

## 2024-05-12 ASSESSMENT — COLUMBIA-SUICIDE SEVERITY RATING SCALE - C-SSRS
1. IN THE PAST MONTH, HAVE YOU WISHED YOU WERE DEAD OR WISHED YOU COULD GO TO SLEEP AND NOT WAKE UP?: NO
2. HAVE YOU ACTUALLY HAD ANY THOUGHTS OF KILLING YOURSELF IN THE PAST MONTH?: NO
6. HAVE YOU EVER DONE ANYTHING, STARTED TO DO ANYTHING, OR PREPARED TO DO ANYTHING TO END YOUR LIFE?: NO

## 2024-05-12 NOTE — PROGRESS NOTES
IV Contrast- Discharge Instructions After Your CT Scan      The IV contrast you received today will be filtered from your bloodstream by your kidneys during the next 24 hours and pass from the body in urine.  You will not be aware of this process and your urine will not change in color.  To help this process you should drink at least 4 additional glasses of water or juice today.  This reduces stress on your kidneys.    Most contrast reactions are immediate.  Should you develop symptoms of concern after discharge, contact the department at the number below.  After hours you should contact your personal physician.  If you develop breathing distress or wheezing, call 911.    1.  Has the patient had a previous reaction to IV contrast? No    2.  Does the patient have kidney disease? Yes, GFR within range to give contrast.     3.  Is the patient on dialysis? No    If YES to any of these questions, exam will be reviewed with a Radiologist before administering contrast.    Amandeep Noyola on 5/12/2024 at 6:12 PM

## 2024-05-12 NOTE — ED TRIAGE NOTES
Patient had a back surgery (was intubated) on Thursday in [place] and states he feels like there is something stuck in his throat since Thursday night.  Patient reports SOB, difficulty breathing, able to eat and drink just fine and denies pain in his throat.  Patient noted to have HTN in triage.  BP (!) 234/109   Pulse 70   Temp 97.3  F (36.3  C) (Tympanic)   Resp 18   Ht 1.829 m (6')   Wt 136.1 kg (300 lb)   SpO2 95%   BMI 40.69 kg/m         Triage Assessment (Adult)       Row Name 05/12/24 1521          Triage Assessment    Airway WDL X        Respiratory WDL    Respiratory WDL WDL        Skin Circulation/Temperature WDL    Skin Circulation/Temperature WDL WDL        Cardiac WDL    Cardiac WDL WDL        Peripheral/Neurovascular WDL    Peripheral Neurovascular WDL WDL        Cognitive/Neuro/Behavioral WDL    Cognitive/Neuro/Behavioral WDL WDL

## 2024-05-12 NOTE — ED PROVIDER NOTES
Emergency Department Provider Note  : 1955 Age: 68 year old Sex: male MRN: [id number]    Chief Complaint   Patient presents with    Oral Swelling       Medical Decision Making / Assessment / Plan   68 year old male presenting with globus sensation after surgery.    Oropharynx exam was unremarkable, x-ray did not show any masses of the throat and out of abundance of caution especially given his mild leukocytosis a CT soft tissue of the neck was performed which did not show any abnormalities.  Suspect he has some mild epiglottis irritation.  Discussed treatment for this but elected conservative management and watchful waiting.  He has no stridor or wheezing.  If the symptoms do not resolve within the week he will reach out to his primary care physician for a referral to ENT.  Discussed indications to return to the emergency department.    He was also markedly hypertensive on presentation due to being in pain and having to walk across the parking lot.  He also has some modest edema so was encouraged to take an extra dose of his furosemide tomorrow and follow-up with his PCP for blood pressure follow-up.  His blood pressure did reduce to 158/80 without intervention while in the emergency department.    Troponin was minimally elevated on presentation with the elevated blood pressure which decreased without intervention.  EKG without ischemic changes.        The patient was informed of the plan and verbalized understanding and agreed with the plan. The patient was given strict return to Emergency Department precautions as well as appropriate follow up instructions. The patient was discharged in stable condition.    New Prescriptions    No medications on file       Final diagnoses:   Globus sensation   Primary hypertension       Carlos Chinchilla MD  2024   Emergency Department    Igor Montalvo is a 68 year old male who presents at  3:29 PM with the swallowing concern.    The patient recently underwent  lumbar procedure at [facility] on 5-9-2024.  He states he was intubated for the procedure.    After his procedure he has felt like he has had something stuck in his throat since that evening.  He does not recall choking or feeling like food was stuck.  He has been able to eat and drink without any difficulties.  He feels like he can somewhat cough the sensation and minimally mobilize it but cannot get it up.    He has chronic hypertension and notes that he had to walk from [facility] over to the emergency department and this has been quite a deal for him.  He is also in significant pain from his back surgery.        I have reviewed the Medications, Allergies, Past Medical and Surgical History, and Social History in the [facility] and with family.    Review of Systems:  Please see Subjective / HPI for pertinent positives and negatives. All other systems reviewed and found to be negative.      Objective   Patient Vitals for the past 24 hrs:   BP Temp Temp src Pulse Resp SpO2 Height Weight   05/12/24 1800 (!) 158/80 -- -- 56 17 (!) 89 % -- --   05/12/24 1741 (!) 153/82 -- -- 59 22 (!) 89 % -- --   05/12/24 1726 (!) 140/84 -- -- 58 -- 93 % -- --   05/12/24 1713 -- -- -- 59 -- (!) 89 % -- --   05/12/24 1711 (!) 155/80 -- -- 58 -- (!) 89 % -- --   05/12/24 1658 (!) 163/82 -- -- 60 -- (!) 89 % -- --   05/12/24 1643 (!) 155/87 -- -- 60 -- 91 % -- --   05/12/24 1628 (!) 148/80 -- -- 60 -- 92 % -- --   05/12/24 1558 (!) 184/103 -- -- 64 -- -- -- --   05/12/24 1548 (!) 195/92 -- -- -- -- -- -- --   05/12/24 1547 (!) 200/106 -- -- -- -- -- -- --   05/12/24 1520 (!) 234/109 97.3  F (36.3  C) Tympanic 70 18 95 % 1.829 m (6') 136.1 kg (300 lb)       Physical Exam:   General: Pleasant 68-year-old man sitting in chair no acute distress  HEENT: Oropharynx clear, no cervical lymphadenopathy  CV: Regular rate and rhythm modest edema to the midshin bilaterally  Pulmonary: Clear to auscultation    Procedures / Critical Care    Procedures    Aggregate Critical Care Time: None.     Orders Placed This Encounter   Procedures    XR Neck Soft Tissue    XR Chest 2 Views    CT Soft Tissue Neck w Contrast    Basic metabolic panel    Hepatic function panel    Lactic acid whole blood    Troponin T, High Sensitivity    CBC with platelets and differential    Extra Tube    Extra Blue Top Tube    Extra Red Top Tube    Nt probnp inpatient    Manual Differential    Troponin T, High Sensitivity    EKG 12-lead, tracing only    Peripheral IV catheter    Cardiac Continuous Monitoring    CBC with platelets differential       RESULTS:  Results for orders placed or performed during the hospital encounter of 05/12/24   XR Neck Soft Tissue     Status: None    Narrative    EXAM: XR NECK SOFT TISSUE  5/12/2024 4:16 PM      HISTORY: foreign body sensation in neck    COMPARISON: None    TECHNIQUE: 2 view radiographs of the soft tissues of the neck.    FINDINGS:  No foreign body. Aerodigestive tract appears within normal limits. No  prevertebral soft tissue swelling. No soft tissue masses. Lung apices  are clear.    Degenerative changes of the cervical spine. No suspicious osseous  lesions. Joints are appropriately aligned.      Impression    IMPRESSION:  No foreign body.    FORREST LEYVA MD         SYSTEM ID:  [email or web address]   XR Chest 2 Views     Status: None    Narrative    PROCEDURE:  XR CHEST 2 VIEWS    HISTORY: shortness of breath    COMPARISON: Chest radiograph 2/5/2023    FINDINGS: PA and lateral chest radiographs    Cardiomediastinal silhouette is within normal limits.  No focal consolidation, effusion or pneumothorax. A few ill-defined  bibasilar opacities.    No suspicious osseous lesion or subdiaphragmatic free air.      Impression    IMPRESSION:    A few ill-defined bibasilar opacities, favored to represent  atelectasis or mild edema. Infection is not excluded.    FORREST LEYVA MD         SYSTEM ID:  [email or web address]   CT Soft Tissue Neck w Contrast     Status: None     Narrative    EXAM: CT SOFT TISSUE NECK W CONTRAST 5/12/2024 6:33 PM    HISTORY: globus sensation, recent surgery with intubation.      COMPARISON: Radiographs 5/12/2024    Technique: Following intravenous administration of nonionic iodinated  contrast medium, thin section helical CT images were obtained from the  skull base down to the level of the thoracic inlet.  Axial, coronal  and sagittal reformations were performed. Images were reviewed in soft  tissue, lung and bone windows.    CONTRAST: ISOVUE 370, 100 mL     FINDINGS:   No foreign body. Evaluation of the mucosal space demonstrates no  evident abnormality in the nasopharynx, oropharynx, hypopharynx or the  glottis. Mild asymmetric effacement of the right piriform sinus,  likely normal variant. The major salivary glands appear within normal  limits. Accessory right submandibular gland tissue. Thyroid appears  atrophic.    There are no pathologically enlarged lymph nodes.  The major vascular  structures in the neck appear patent.    Evaluation of the osseous structures demonstrate no worrisome lytic or  sclerotic lesion. Scattered dental caries. No severe spinal canal  stenosis. Mild paranasal sinus mucosal thickening. Mastoid air cells  are clear. Bilateral pseudophakia.    The visualized lung apices demonstrate no pulmonary mass or acute  abnormality.      Impression    IMPRESSION:  No evident mass, foreign body or adenopathy within the neck.     FORREST LEYVA MD         SYSTEM ID:  [email or web address]   Basic metabolic panel     Status: Abnormal   Result Value Ref Range    Sodium 138 135 - 145 mmol/L    Potassium 3.8 3.4 - 5.3 mmol/L    Chloride 100 98 - 107 mmol/L    Carbon Dioxide (CO2) 25 22 - 29 mmol/L    Anion Gap 13 7 - 15 mmol/L    Urea Nitrogen 21.3 8.0 - 23.0 mg/dL    Creatinine 1.26 (H) 0.67 - 1.17 mg/dL    GFR Estimate 62 >60 mL/min/1.73m2    Calcium 10.4 (H) 8.8 - 10.2 mg/dL    Glucose 129 (H) 70 - 99 mg/dL   Hepatic function panel     Status: Abnormal    Result Value Ref Range    Protein Total 7.9 6.4 - 8.3 g/dL    Albumin 4.5 3.5 - 5.2 g/dL    Bilirubin Total 0.5 <=1.2 mg/dL    Alkaline Phosphatase 82 40 - 150 U/L    AST 48 (H) 0 - 45 U/L    ALT 48 0 - 70 U/L    Bilirubin Direct <0.20 0.00 - 0.30 mg/dL   Lactic acid whole blood     Status: Normal   Result Value Ref Range    Lactic Acid 1.8 0.7 - 2.0 mmol/L   Troponin T, High Sensitivity     Status: Abnormal   Result Value Ref Range    Troponin T, High Sensitivity 44 (H) <=22 ng/L   CBC with platelets and differential     Status: Abnormal   Result Value Ref Range    WBC Count 12.6 (H) 4.0 - 11.0 10e3/uL    RBC Count 4.89 4.40 - 5.90 10e6/uL    Hemoglobin 16.2 13.3 - 17.7 g/dL    Hematocrit 46.6 40.0 - 53.0 %    MCV 95 78 - 100 fL    MCH 33.1 (H) 26.5 - 33.0 pg    MCHC 34.8 31.5 - 36.5 g/dL    RDW 14.9 10.0 - 15.0 %    Platelet Count 440 150 - 450 10e3/uL    NRBCs per 100 WBC 0 <1 /100    Absolute NRBCs 0.0 10e3/uL   Extra Tube     Status: None    Narrative    The following orders were created for panel order Extra Tube.  Procedure                               Abnormality         Status                     ---------                               -----------         ------                     Extra Blue Top Tube[[id number]]                              Final result               Extra Red Top Tube[[id number]]                               Final result                 Please view results for these tests on the individual orders.   Extra Blue Top Tube     Status: None   Result Value Ref Range    Hold Specimen x    Extra Red Top Tube     Status: None   Result Value Ref Range    Hold Specimen x    Nt probnp inpatient     Status: Normal   Result Value Ref Range    N terminal Pro BNP Inpatient 136 0 - 900 pg/mL   Manual Differential     Status: Abnormal   Result Value Ref Range    % Neutrophils 45 %    % Lymphocytes 38 %    % Monocytes 6 %    % Eosinophils 7 %    % Basophils 2 %    % Metamyelocytes 1 %    % Myelocytes 1 %     Absolute Neutrophils 5.7 1.6 - 8.3 10e3/uL    Absolute Lymphocytes 4.8 0.8 - 5.3 10e3/uL    Absolute Monocytes 0.8 0.0 - 1.3 10e3/uL    Absolute Eosinophils 0.9 (H) 0.0 - 0.7 10e3/uL    Absolute Basophils 0.3 (H) 0.0 - 0.2 10e3/uL    Absolute Metamyelocytes 0.1 (H) <=0.0 10e3/uL    Absolute Myelocytes 0.1 (H) <=0.0 10e3/uL    RBC Morphology Confirmed RBC Indices     Platelet Assessment  Automated Count Confirmed. Platelet morphology is normal.     Automated Count Confirmed. Platelet morphology is normal.    Reactive Lymphocytes Present (A) None Seen   Troponin T, High Sensitivity     Status: Abnormal   Result Value Ref Range    Troponin T, High Sensitivity 42 (H) <=22 ng/L   EKG 12-lead, tracing only     Status: None   Result Value Ref Range    Systolic Blood Pressure  mmHg    Diastolic Blood Pressure  mmHg    Ventricular Rate 64 BPM    Atrial Rate 64 BPM    SD Interval 242 ms    QRS Duration 98 ms     ms    QTc 414 ms    P Axis 59 degrees    R AXIS 15 degrees    T Axis 59 degrees    Interpretation ECG       Sinus rhythm with 1st degree A-V block  Otherwise normal ECG  When compared with ECG of 31-OCT-2022 10:19,  No significant change was found  Confirmed by MD ROHAN, Mariposa ([id number]) on 5/12/2024 5:41:54 PM     CBC with platelets differential     Status: Abnormal    Narrative    The following orders were created for panel order CBC with platelets differential.  Procedure                               Abnormality         Status                     ---------                               -----------         ------                     CBC with platelets and d...[[id number]]  Abnormal            Final result               RBC and Platelet Morphology[[id number]]                                                 Manual Differential[[id number]]          Abnormal            Final result                 Please view results for these tests on the individual orders.             Medical/Surgical History:  Past Medical  History:   Diagnosis Date    Acute and chronic respiratory failure with hypoxia (H) 02/06/2023    Bell's palsy 03/30/2017    Bilateral carpal tunnel syndrome     No Comments Provided    Essential (primary) hypertension     No Comments Provided    Gout 06/03/2013    Hyperlipidemia     No Comments Provided    Hypothyroidism     No Comments Provided    Malignant neoplasm of thyroid gland (H)     1994    Postoperative hematoma of musculoskeletal structure following musculoskeletal procedure     Sleep apnea     No Comments Provided     Past Surgical History:   Procedure Laterality Date    COLONOSCOPY  09/09/2014 9/2014,Normal - follow up 10 years    CV CORONARY ANGIOGRAM N/A 10/31/2022    Procedure: Coronary Angiogram;  Surgeon: Ender Michaels MD;  Location:  [facility]    CYSTOSCOPY      bladder cancer    INCISION AND DRAINAGE LOWER EXTREMITY, COMBINED Left 1/7/2023    Procedure: INCISION AND DRAINAGE, LEFT BUTTOCK (Drain Hematoma of Left Buttock);  Surgeon: Ronald Mccall MD;  Location:  OR    OTHER SURGICAL HISTORY      ,SPLENECTOMY    OTHER SURGICAL HISTORY      2010,[id number],EYE MUSCLE SURGERY,Left,strabismus    OTHER SURGICAL HISTORY      10/06/14,[id number],OTHER,Left,Dr. Lana LEHMAN    OTHER SURGICAL HISTORY      10/14/14,[id number],OTHER,Left,Dr. Lana LEHMAN    OTHER SURGICAL HISTORY      11/16/2017,[id number],ENDOSCOPIC RELEASE TRANSVERSE CARPAL LIGAMENT OF HAND,Left    RELEASE CARPAL TUNNEL      12/2011    RETINAL DETACHMENT SURGERY Right     STRABISMUS SURGERY Left     THYROIDECTOMY      1994,thyroid ca    TONSILLECTOMY      2004,T & A > 13yo    VASECTOMY      No Comments Provided       Medications:  No current facility-administered medications for this encounter.     Current Outpatient Medications   Medication Sig Dispense Refill    allopurinol (ZYLOPRIM) 300 MG tablet TAKE 1 TABLET (300 MG) BY MOUTH DAILY - FOR GOUT PREVENTION 90 tablet 4    B Complex-C (VITAMIN B COMPLEX W/VITAMIN  C) TABS tablet Take 1 tablet by mouth daily      carvedilol (COREG) 6.25 MG tablet Take 1.5 tablets (9.375 mg) by mouth 2 times daily (with meals) 360 tablet 4    Cholecalciferol (VITAMIN D-3) 125 MCG (5000 UT) TABS Take 5,000 Units by mouth daily 100 tablet 3    Cyanocobalamin 2500 MCG TABS Take 2,500 mcg by mouth daily -- or every other day -- OTC okay 90 tablet 4    estazolam (PROSOM) 1 MG tablet Take 1 tablet (1 mg) by mouth at bedtime 90 tablet 1    famotidine (PEPCID) 20 MG tablet Take 1 tablet (20 mg) by mouth 2 times daily 180 tablet 4    fenofibrate (TRICOR) 145 MG tablet TAKE 1 TABLET (145 MG) BY MOUTH DAILY 90 tablet 4    furosemide (LASIX) 20 MG tablet Take 1 tablet (20 mg) by mouth daily 90 tablet 4    gabapentin (NEURONTIN) 400 MG capsule Take 3 capsules (1,200 mg) by mouth 2 times daily 540 capsule 1    levothyroxine (SYNTHROID/LEVOTHROID) 200 MCG tablet Take 1 tablet (200 mcg) by mouth daily 90 tablet 4    magnesium 250 MG tablet Take 1 tablet by mouth daily      Melatonin 10 MG TABS tablet Take 20 mg by mouth At Bedtime      Multiple Vitamins-Minerals (CENTRUM ADULTS PO) Take 1 tablet by mouth daily      order for DME CPAP machine for home use at pressure: 10, with supplies:  humidifier, chamber, mask, tubing      pregabalin (LYRICA) 75 MG capsule Take 1 capsule (75 mg) by mouth 3 times daily 270 capsule 1    Probiotic Product (PROBIOTIC-10 PO) Take 1 tablet by mouth daily      sildenafil (VIAGRA) 100 MG tablet Take 1 tablet (100 mg) by mouth daily as needed 30 minutes to 4 hours prior to sexual activity 90 tablet 1    spironolactone (ALDACTONE) 25 MG tablet Take 1 tablet (25 mg) by mouth daily 90 tablet 4    tamsulosin (FLOMAX) 0.4 MG capsule Take 1 capsule (0.4 mg) by mouth daily Watch for dizziness upon standing.  Do not take within 4 hours of Viagra/sildenafil. 30 capsule 11       Allergies:  Losartan, Omeprazole, Pantoprazole, and Valsartan    Relevant labs, images, EKGs, Epic and outside  hospital (if applicable) charts were reviewed. The findings, diagnosis, plan, and need for follow up were discussed with the patient/family. Nursing notes were reviewed.        Carlos Chinchilla MD  05/12/24 [id number]

## 2024-05-13 NOTE — DISCHARGE INSTRUCTIONS
Please reach out to Dr. Whitmore later this week if your symptoms do not improve and he will place a referral to an ear nose and throat doctor.  Please follow-up with Dr. Whitmore later this month as scheduled for blood pressure follow-up.  Please take an extra dose of furosemide tomorrow.

## 2024-05-15 ENCOUNTER — MYC MEDICAL ADVICE (OUTPATIENT)
Dept: INTERNAL MEDICINE | Facility: OTHER | Age: 69
End: 2024-05-15
Payer: MEDICARE

## 2024-05-15 DIAGNOSIS — R09.A2 GLOBUS SENSATION: Primary | ICD-10-CM

## 2024-05-28 ENCOUNTER — OFFICE VISIT (OUTPATIENT)
Dept: INTERNAL MEDICINE | Facility: OTHER | Age: 69
End: 2024-05-28
Attending: INTERNAL MEDICINE
Payer: MEDICARE

## 2024-05-28 VITALS
BODY MASS INDEX: 42.21 KG/M2 | WEIGHT: 311.2 LBS | OXYGEN SATURATION: 96 % | HEART RATE: 67 BPM | RESPIRATION RATE: 20 BRPM | TEMPERATURE: 97.6 F | SYSTOLIC BLOOD PRESSURE: 166 MMHG | DIASTOLIC BLOOD PRESSURE: 100 MMHG

## 2024-05-28 DIAGNOSIS — I10 UNCONTROLLED HYPERTENSION: Primary | ICD-10-CM

## 2024-05-28 DIAGNOSIS — E66.813 CLASS 3 SEVERE OBESITY DUE TO EXCESS CALORIES WITH SERIOUS COMORBIDITY AND BODY MASS INDEX (BMI) OF 40.0 TO 44.9 IN ADULT (H): ICD-10-CM

## 2024-05-28 DIAGNOSIS — E55.9 VITAMIN D DEFICIENCY: ICD-10-CM

## 2024-05-28 DIAGNOSIS — M54.16 CHRONIC RADICULAR LOW BACK PAIN: ICD-10-CM

## 2024-05-28 DIAGNOSIS — G47.9 SLEEP DIFFICULTIES: ICD-10-CM

## 2024-05-28 DIAGNOSIS — N18.2 CKD (CHRONIC KIDNEY DISEASE) STAGE 2, GFR 60-89 ML/MIN: ICD-10-CM

## 2024-05-28 DIAGNOSIS — R10.2 NEURALGIA OF LEFT PERINEAL NERVE: ICD-10-CM

## 2024-05-28 DIAGNOSIS — M35.3 POLYMYALGIA RHEUMATICA (H): ICD-10-CM

## 2024-05-28 DIAGNOSIS — G89.29 CHRONIC RADICULAR LOW BACK PAIN: ICD-10-CM

## 2024-05-28 DIAGNOSIS — D47.3 THROMBOCYTHEMIA, ESSENTIAL (H): ICD-10-CM

## 2024-05-28 DIAGNOSIS — I50.32 CHRONIC HEART FAILURE WITH PRESERVED EJECTION FRACTION (HFPEF) (H): ICD-10-CM

## 2024-05-28 DIAGNOSIS — R60.0 BILATERAL LOWER EXTREMITY EDEMA: ICD-10-CM

## 2024-05-28 DIAGNOSIS — E66.01 CLASS 3 SEVERE OBESITY DUE TO EXCESS CALORIES WITH SERIOUS COMORBIDITY AND BODY MASS INDEX (BMI) OF 40.0 TO 44.9 IN ADULT (H): ICD-10-CM

## 2024-05-28 DIAGNOSIS — I97.89 COMPLICATION OF ARTERY ASSOCIATED WITH SURGICAL PROCEDURE: ICD-10-CM

## 2024-05-28 PROCEDURE — 99214 OFFICE O/P EST MOD 30 MIN: CPT | Performed by: INTERNAL MEDICINE

## 2024-05-28 PROCEDURE — G0463 HOSPITAL OUTPT CLINIC VISIT: HCPCS

## 2024-05-28 PROCEDURE — G2211 COMPLEX E/M VISIT ADD ON: HCPCS | Performed by: INTERNAL MEDICINE

## 2024-05-28 RX ORDER — SPIRONOLACTONE 50 MG/1
50 TABLET, FILM COATED ORAL DAILY
Qty: 90 TABLET | Refills: 4 | Status: SHIPPED | OUTPATIENT
Start: 2024-05-28 | End: 2024-06-25

## 2024-05-28 RX ORDER — ESTAZOLAM 1 MG
1 TABLET ORAL AT BEDTIME
Qty: 90 TABLET | Refills: 1 | Status: SHIPPED | OUTPATIENT
Start: 2024-05-28 | End: 2024-07-31

## 2024-05-28 RX ORDER — IRBESARTAN 75 MG/1
75 TABLET ORAL AT BEDTIME
Qty: 90 TABLET | Refills: 4 | Status: SHIPPED | OUTPATIENT
Start: 2024-05-28 | End: 2024-06-25

## 2024-05-28 RX ORDER — FUROSEMIDE 40 MG
40-80 TABLET ORAL DAILY
Qty: 180 TABLET | Refills: 4 | Status: SHIPPED | OUTPATIENT
Start: 2024-05-28 | End: 2024-06-25

## 2024-05-28 RX ORDER — PREGABALIN 75 MG/1
75 CAPSULE ORAL 3 TIMES DAILY
Qty: 270 CAPSULE | Refills: 1 | Status: SHIPPED | OUTPATIENT
Start: 2024-05-28 | End: 2024-07-31

## 2024-05-28 RX ORDER — GABAPENTIN 400 MG/1
1200 CAPSULE ORAL 2 TIMES DAILY
Qty: 540 CAPSULE | Refills: 1 | Status: SHIPPED | OUTPATIENT
Start: 2024-05-28 | End: 2024-07-31

## 2024-05-28 ASSESSMENT — ENCOUNTER SYMPTOMS
PALPITATIONS: 0
CHILLS: 0
SHORTNESS OF BREATH: 1
WEAKNESS: 1
HEMATOCHEZIA: 0
HEARTBURN: 0
BRUISES/BLEEDS EASILY: 1
NAUSEA: 0
JOINT SWELLING: 0
CONSTIPATION: 0
MYALGIAS: 1
PARESTHESIAS: 0
DIZZINESS: 0
BACK PAIN: 1
ABDOMINAL PAIN: 0
COUGH: 0
FEVER: 0
SORE THROAT: 0
DIARRHEA: 0
HEMATURIA: 0
FREQUENCY: 0
ARTHRALGIAS: 0
DYSURIA: 0
ENDOCRINE COMMENTS: + HAIR LOSS
HEADACHES: 0
NERVOUS/ANXIOUS: 0
EYE PAIN: 0

## 2024-05-28 ASSESSMENT — ASTHMA QUESTIONNAIRES
ACT_TOTALSCORE: 25
QUESTION_2 LAST FOUR WEEKS HOW OFTEN HAVE YOU HAD SHORTNESS OF BREATH: NOT AT ALL
QUESTION_1 LAST FOUR WEEKS HOW MUCH OF THE TIME DID YOUR ASTHMA KEEP YOU FROM GETTING AS MUCH DONE AT WORK, SCHOOL OR AT HOME: NONE OF THE TIME
QUESTION_4 LAST FOUR WEEKS HOW OFTEN HAVE YOU USED YOUR RESCUE INHALER OR NEBULIZER MEDICATION (SUCH AS ALBUTEROL): NOT AT ALL
ACT_TOTALSCORE: 25
QUESTION_5 LAST FOUR WEEKS HOW WOULD YOU RATE YOUR ASTHMA CONTROL: COMPLETELY CONTROLLED
QUESTION_3 LAST FOUR WEEKS HOW OFTEN DID YOUR ASTHMA SYMPTOMS (WHEEZING, COUGHING, SHORTNESS OF BREATH, CHEST TIGHTNESS OR PAIN) WAKE YOU UP AT NIGHT OR EARLIER THAN USUAL IN THE MORNING: NOT AT ALL

## 2024-05-28 ASSESSMENT — PAIN SCALES - GENERAL: PAINLEVEL: MILD PAIN (3)

## 2024-05-28 NOTE — PROGRESS NOTES
Assessment & Plan     ICD-10-CM    1. Uncontrolled hypertension  I10 furosemide (LASIX) 40 MG tablet     spironolactone (ALDACTONE) 50 MG tablet     irbesartan (AVAPRO) 75 MG tablet     Comprehensive metabolic panel      2. Complication of artery associated with surgical procedure - Left superior gluteal artery Injury -- subsequently thrombosed / embolized by Interventional Radiology  I97.89 pregabalin (LYRICA) 75 MG capsule      3. Chronic radicular low back pain  M54.16 pregabalin (LYRICA) 75 MG capsule    G89.29 gabapentin (NEURONTIN) 400 MG capsule      4. Neuralgia of left perineal nerve  R10.2 pregabalin (LYRICA) 75 MG capsule      5. Sleep difficulties  G47.9 estazolam (PROSOM) 1 MG tablet      6. Bilateral lower extremity edema  R60.0 furosemide (LASIX) 40 MG tablet     spironolactone (ALDACTONE) 50 MG tablet      7. Chronic heart failure with preserved ejection fraction (HFpEF) (H)  I50.32 furosemide (LASIX) 40 MG tablet     spironolactone (ALDACTONE) 50 MG tablet     irbesartan (AVAPRO) 75 MG tablet      8. Vitamin D deficiency  E55.9 Vitamin D Deficiency      9. CKD (chronic kidney disease) stage 2, GFR 60-89 ml/min  N18.2 UA with Microscopic reflex to Culture     Albumin Random Urine Quantitative with Creat Ratio      10. Class 3 severe obesity due to excess calories with serious comorbidity and body mass index (BMI) of 40.0 to 44.9 in adult (H)  E66.01     Z68.41       11. Thrombocythemia, essential (H)  D47.3       12. Polymyalgia rheumatica (H24)  M35.3          Patient presents for follow-up multiple issues.    HYPERTENSION - Ongoing. Blood pressure is NOT currently well controlled.  Medication side effects: None. Denies syncope or presyncope.    Blood pressure is high and not at goal.  Increase Lasix.  Increase spironolactone.  Start irbesartan.  Check labs in 1 month.  Close follow-up advised.  Check blood pressures at home.  - History of cough with ARB's in the past.    Left buttocks pain with  radicular low back pain and left peroneal nerve neuralgia.  Seem to be doing well with combination of Lyrica and gabapentin at current dosing.  Tolerating well.  Needs refills.    Sleep difficulties, ongoing.  Taking melatonin at bedtime close estazolam.  Has skipped a few times and did not sleep at all.  Wondering if there is something more helpful or alternative.  Advised he has tried a number of different medications, at this time there is not really much of any alternative options.  Would like to continue with current dosing.  Refill sent to pharmacy.    Lower extremity edema with heart failure with preserved ejection fraction.  Increase Lasix and spironolactone as noted above.    MND deficiency.  Check lab work.  Has been taking 5000 IU daily.  Recommend switching to every other day dosing for now and recheck labs in 1 month.    Essential thrombocytopenia.  Chronic, stable.  Recheck at follow-up.    Polymyalgia rheumatica, doing better recently.  Was using prednisone tablets diffuse myalgias and diffuse pain.  Continue to monitor.     OBESITY - Ongoing.  (See Encounter Diagnosis list above for obesity class / severity).  - Encourage continued maintenance / improvement in diet and exercise.   - Consider Nutrition / Dietician appointment.  - Weight loss would improve Hypertension, Cholesterol.      Chronic Kidney Disease, Stage 2 (GFR 60-89), chronic, ongoing.  Kidney function had been slowly declining.  Encourage NSAID avoidance.       Vaccine counseling completed.  Encourage routine / annual vaccinations.    The longitudinal plan of care for the diagnosis(es)/condition(s) as documented were addressed during this visit. Due to the added complexity in care, I will continue to support Henry in the subsequent management and with ongoing continuity of care.               BMI  Estimated body mass index is 42.21 kg/m  as calculated from the following:    Height as of 5/12/24: 1.829 m (6').    Weight as of this  encounter: 141.2 kg (311 lb 3.2 oz).         Return in about 4 weeks (around 6/25/2024) for -- Blood pressure follow-up + labs prior to appointment.      Matt Whitmore MD  [facility]    Review of Systems   Constitutional:  Negative for chills and fever.        + walking up to 150 feet with small based triangular cane.   HENT:  Positive for hearing loss. Negative for congestion, ear pain and sore throat.    Eyes:  Negative for pain and visual disturbance.   Respiratory:  Positive for shortness of breath. Negative for cough.    Cardiovascular:  Positive for peripheral edema. Negative for chest pain and palpitations.   Gastrointestinal:  Negative for abdominal pain, constipation, diarrhea, heartburn, hematochezia and nausea.   Endocrine:        + hair loss   Genitourinary:  Positive for impotence. Negative for dysuria, frequency, genital sores, hematuria, penile discharge and urgency.   Musculoskeletal:  Positive for back pain (left buttocks/thigh pain) and myalgias. Negative for arthralgias and joint swelling.        Left leg weakness - can't Abduct left leg up against gravity when laying in bed.   More ability to stand, can now extend and abduct left hip slightly when standing.    Skin:  Negative for rash.   Allergic/Immunologic: Positive for immunocompromised state.   Neurological:  Positive for weakness. Negative for dizziness, headaches and paresthesias.   Hematological:  Bruises/bleeds easily.   Psychiatric/Behavioral:  Negative for mood changes. The patient is not nervous/anxious.          Igor Figueroa is a 68 year old, presenting for the following health issues:  Recheck Medication (Medication refills)        5/28/2024     1:29 PM   Additional Questions   Roomed by Patricia Romero LPN   Accompanied by Wife Nati     History of Present Illness       Hypertension: He presents for follow up of hypertension.  He does check blood pressure  regularly outside of the clinic. Outside blood  pressures have been over 140/90. He does not follow a low salt diet.     He eats 2-3 servings of fruits and vegetables daily.He consumes 1 sweetened beverage(s) daily.He exercises with enough effort to increase his heart rate 9 or less minutes per day.  He exercises with enough effort to increase his heart rate 4 days per week.   He is taking medications regularly.                     Objective    BP (!) 166/100   Pulse 67   Temp 97.6  F (36.4  C) (Temporal)   Resp 20   Wt 141.2 kg (311 lb 3.2 oz)   SpO2 96%   BMI 42.21 kg/m    Body mass index is 42.21 kg/m .  Physical Exam  Constitutional:       General: He is not in acute distress.     Appearance: He is well-developed. He is obese. He is not diaphoretic.   HENT:      Head: Normocephalic and atraumatic.   Eyes:      General: No scleral icterus.     Conjunctiva/sclera: Conjunctivae normal.   Cardiovascular:      Rate and Rhythm: Normal rate and regular rhythm.      Pulses: Normal pulses.   Pulmonary:      Effort: Pulmonary effort is normal.      Breath sounds: Normal breath sounds.   Abdominal:      Palpations: Abdomen is soft.      Tenderness: There is no abdominal tenderness.   Musculoskeletal:         General: Swelling present. No tenderness or deformity.      Cervical back: Neck supple.      Right lower le+ Pitting Edema present.      Left lower le+ Pitting Edema present.      Comments: + Walking with small base stand-up triangular cane.   Lymphadenopathy:      Cervical: No cervical adenopathy.   Skin:     General: Skin is warm and dry.      Findings: No rash.   Neurological:      Mental Status: He is alert. Mental status is at baseline.      Comments: Left leg weakness   Psychiatric:         Mood and Affect: Mood normal.         Behavior: Behavior normal.                  Signed Electronically by: Matt Whitmore MD

## 2024-05-28 NOTE — PATIENT INSTRUCTIONS
Blood pressures at home at too high.....       Sleep difficulties    Continue:   - estazolam (PROSOM) 1 MG tablet; Take 1 tablet (1 mg) by mouth at bedtime    Uncontrolled hypertension    Dose changes:  - furosemide (LASIX) 40 MG tablet; Take 1-2 tablets (40-80 mg) by mouth daily -- Dose Increase 5/28/2024  - spironolactone (ALDACTONE) 50 MG tablet; Take 1 tablet (50 mg) by mouth daily -- Dose Increase 5/28/2024 -- take 2 x 25 mg tablet daily until gone    START:   - irbesartan (AVAPRO) 75 MG tablet; Take 1 tablet (75 mg) by mouth at bedtime            Blood pressure checks at home - check some in AM, some in Afternoon, some in Evening and record   -- bring these with you to your next appointment.     Goal blood pressures -- less than 140 and less than 90.    -- Ideally would like the numbers about 110-130 and 70-80.  -- If running higher or lower than this on regular basis, will need to adjust your medications.          Return in approximately 4 week(s), or sooner as needed for follow-up with Dr. Whitmore.  -- Blood pressure follow-up    Clinic : [phone]  Appointment line: [phone]

## 2024-05-28 NOTE — NURSING NOTE
Chief Complaint   Patient presents with    Recheck Medication     Medication refills       Initial BP (!) 162/100   Pulse 67   Temp 97.6  F (36.4  C) (Temporal)   Resp 20   Wt 141.2 kg (311 lb 3.2 oz)   SpO2 96%   BMI 42.21 kg/m   Estimated body mass index is 42.21 kg/m  as calculated from the following:    Height as of 5/12/24: 1.829 m (6').    Weight as of this encounter: 141.2 kg (311 lb 3.2 oz).  Medication Review: complete    The next two questions are to help us understand your food security.  If you are feeling you need any assistance in this area, we have resources available to support you today.          5/28/2024   SDOH- Food Insecurity   Within the past 12 months, did you worry that your food would run out before you got money to buy more? N   Within the past 12 months, did the food you bought just not last and you didn t have money to get more? N         Health Care Directive:  Patient does not have a Health Care Directive or Living Will: Discussed advance care planning with patient; however, patient declined at this time.    Patricia Romero LPN

## 2024-06-06 ENCOUNTER — DOCUMENTATION ONLY (OUTPATIENT)
Dept: UROLOGY | Facility: OTHER | Age: 69
End: 2024-06-06
Payer: MEDICARE

## 2024-06-06 DIAGNOSIS — Z85.51 PERSONAL HISTORY OF MALIGNANT NEOPLASM OF BLADDER: Primary | ICD-10-CM

## 2024-06-25 ENCOUNTER — LAB (OUTPATIENT)
Dept: LAB | Facility: OTHER | Age: 69
End: 2024-06-25
Attending: INTERNAL MEDICINE
Payer: MEDICARE

## 2024-06-25 ENCOUNTER — OFFICE VISIT (OUTPATIENT)
Dept: INTERNAL MEDICINE | Facility: OTHER | Age: 69
End: 2024-06-25
Attending: INTERNAL MEDICINE
Payer: MEDICARE

## 2024-06-25 VITALS
TEMPERATURE: 96 F | WEIGHT: 307 LBS | DIASTOLIC BLOOD PRESSURE: 88 MMHG | OXYGEN SATURATION: 96 % | RESPIRATION RATE: 18 BRPM | SYSTOLIC BLOOD PRESSURE: 138 MMHG | BODY MASS INDEX: 41.64 KG/M2 | HEART RATE: 73 BPM

## 2024-06-25 DIAGNOSIS — M54.50 INTERMITTENT LOW BACK PAIN: ICD-10-CM

## 2024-06-25 DIAGNOSIS — M35.3 POLYMYALGIA RHEUMATICA (H): ICD-10-CM

## 2024-06-25 DIAGNOSIS — E78.1 HYPERTRIGLYCERIDEMIA: ICD-10-CM

## 2024-06-25 DIAGNOSIS — M79.18 MYOFASCIAL MUSCLE PAIN: ICD-10-CM

## 2024-06-25 DIAGNOSIS — N18.2 CKD (CHRONIC KIDNEY DISEASE) STAGE 2, GFR 60-89 ML/MIN: ICD-10-CM

## 2024-06-25 DIAGNOSIS — M54.16 CHRONIC RADICULAR LOW BACK PAIN: ICD-10-CM

## 2024-06-25 DIAGNOSIS — I10 BENIGN ESSENTIAL HYPERTENSION: Primary | ICD-10-CM

## 2024-06-25 DIAGNOSIS — E55.9 VITAMIN D DEFICIENCY: ICD-10-CM

## 2024-06-25 DIAGNOSIS — M54.9 TRIGGER POINT WITH BACK PAIN: ICD-10-CM

## 2024-06-25 DIAGNOSIS — M48.061 NEUROFORAMINAL STENOSIS OF LUMBAR SPINE: ICD-10-CM

## 2024-06-25 DIAGNOSIS — G89.29 CHRONIC RADICULAR LOW BACK PAIN: ICD-10-CM

## 2024-06-25 DIAGNOSIS — E66.01 CLASS 3 SEVERE OBESITY DUE TO EXCESS CALORIES WITH SERIOUS COMORBIDITY AND BODY MASS INDEX (BMI) OF 40.0 TO 44.9 IN ADULT (H): ICD-10-CM

## 2024-06-25 DIAGNOSIS — I10 UNCONTROLLED HYPERTENSION: ICD-10-CM

## 2024-06-25 DIAGNOSIS — R60.0 BILATERAL LOWER EXTREMITY EDEMA: ICD-10-CM

## 2024-06-25 DIAGNOSIS — E66.813 CLASS 3 SEVERE OBESITY DUE TO EXCESS CALORIES WITH SERIOUS COMORBIDITY AND BODY MASS INDEX (BMI) OF 40.0 TO 44.9 IN ADULT (H): ICD-10-CM

## 2024-06-25 DIAGNOSIS — E78.2 MIXED HYPERLIPIDEMIA: ICD-10-CM

## 2024-06-25 DIAGNOSIS — I50.32 CHRONIC HEART FAILURE WITH PRESERVED EJECTION FRACTION (HFPEF) (H): ICD-10-CM

## 2024-06-25 LAB
ALBUMIN SERPL BCG-MCNC: 4.5 G/DL (ref 3.5–5.2)
ALBUMIN UR-MCNC: NEGATIVE MG/DL
ALP SERPL-CCNC: 74 U/L (ref 40–150)
ALT SERPL W P-5'-P-CCNC: 48 U/L (ref 0–70)
ANION GAP SERPL CALCULATED.3IONS-SCNC: 15 MMOL/L (ref 7–15)
APPEARANCE UR: CLEAR
AST SERPL W P-5'-P-CCNC: 41 U/L (ref 0–45)
BILIRUB SERPL-MCNC: 0.4 MG/DL
BILIRUB UR QL STRIP: NEGATIVE
BUN SERPL-MCNC: 43.6 MG/DL (ref 8–23)
CALCIUM SERPL-MCNC: 10.9 MG/DL (ref 8.8–10.2)
CHLORIDE SERPL-SCNC: 99 MMOL/L (ref 98–107)
COLOR UR AUTO: YELLOW
CREAT SERPL-MCNC: 1.79 MG/DL (ref 0.67–1.17)
CREAT UR-MCNC: 48.5 MG/DL
DEPRECATED HCO3 PLAS-SCNC: 22 MMOL/L (ref 22–29)
EGFRCR SERPLBLD CKD-EPI 2021: 40 ML/MIN/1.73M2
GLUCOSE SERPL-MCNC: 110 MG/DL (ref 70–99)
GLUCOSE UR STRIP-MCNC: NEGATIVE MG/DL
HGB UR QL STRIP: NEGATIVE
HOLD SPECIMEN: NORMAL
KETONES UR STRIP-MCNC: NEGATIVE MG/DL
LEUKOCYTE ESTERASE UR QL STRIP: NEGATIVE
MICROALBUMIN UR-MCNC: <12 MG/L
MICROALBUMIN/CREAT UR: NORMAL MG/G{CREAT}
NITRATE UR QL: NEGATIVE
PH UR STRIP: 5.5 [PH] (ref 5–9)
POTASSIUM SERPL-SCNC: 4.6 MMOL/L (ref 3.4–5.3)
PROT SERPL-MCNC: 8.4 G/DL (ref 6.4–8.3)
RBC URINE: 0 /HPF
SODIUM SERPL-SCNC: 136 MMOL/L (ref 135–145)
SP GR UR STRIP: 1.01 (ref 1–1.03)
UROBILINOGEN UR STRIP-MCNC: NORMAL MG/DL
WBC URINE: <1 /HPF

## 2024-06-25 PROCEDURE — G0463 HOSPITAL OUTPT CLINIC VISIT: HCPCS

## 2024-06-25 PROCEDURE — G2211 COMPLEX E/M VISIT ADD ON: HCPCS | Performed by: INTERNAL MEDICINE

## 2024-06-25 PROCEDURE — 36415 COLL VENOUS BLD VENIPUNCTURE: CPT | Mod: ZL

## 2024-06-25 PROCEDURE — 82570 ASSAY OF URINE CREATININE: CPT | Mod: ZL

## 2024-06-25 PROCEDURE — 80053 COMPREHEN METABOLIC PANEL: CPT | Mod: ZL

## 2024-06-25 PROCEDURE — 81001 URINALYSIS AUTO W/SCOPE: CPT | Mod: ZL

## 2024-06-25 PROCEDURE — 99214 OFFICE O/P EST MOD 30 MIN: CPT | Performed by: INTERNAL MEDICINE

## 2024-06-25 PROCEDURE — 82306 VITAMIN D 25 HYDROXY: CPT | Mod: ZL

## 2024-06-25 RX ORDER — IRBESARTAN 75 MG/1
75 TABLET ORAL AT BEDTIME
Qty: 90 TABLET | Refills: 4 | Status: SHIPPED | OUTPATIENT
Start: 2024-06-25 | End: 2024-07-31

## 2024-06-25 RX ORDER — DULOXETIN HYDROCHLORIDE 20 MG/1
CAPSULE, DELAYED RELEASE ORAL
Qty: 388 CAPSULE | Refills: 0 | Status: SHIPPED | OUTPATIENT
Start: 2024-06-25 | End: 2024-07-31

## 2024-06-25 RX ORDER — CARVEDILOL 6.25 MG/1
9.38 TABLET ORAL 2 TIMES DAILY WITH MEALS
Qty: 360 TABLET | Refills: 4 | Status: SHIPPED | OUTPATIENT
Start: 2024-06-25

## 2024-06-25 RX ORDER — SPIRONOLACTONE 50 MG/1
50 TABLET, FILM COATED ORAL DAILY
Qty: 90 TABLET | Refills: 4 | Status: SHIPPED | OUTPATIENT
Start: 2024-06-25

## 2024-06-25 RX ORDER — FUROSEMIDE 40 MG
40-80 TABLET ORAL DAILY
Qty: 180 TABLET | Refills: 4 | Status: SHIPPED | OUTPATIENT
Start: 2024-06-25

## 2024-06-25 ASSESSMENT — ENCOUNTER SYMPTOMS
ENDOCRINE COMMENTS: + HAIR LOSS
WEAKNESS: 1
BRUISES/BLEEDS EASILY: 1
NAUSEA: 0
DYSURIA: 0
HEARTBURN: 0
HEMATURIA: 0
ABDOMINAL PAIN: 0
FREQUENCY: 0
CHILLS: 0
EYE PAIN: 0
HEMATOCHEZIA: 0
NERVOUS/ANXIOUS: 0
CONSTIPATION: 0
DIARRHEA: 0
COUGH: 0
FEVER: 0
SORE THROAT: 0
PARESTHESIAS: 0
PALPITATIONS: 0
MYALGIAS: 1
ARTHRALGIAS: 0
SHORTNESS OF BREATH: 1
JOINT SWELLING: 0
BACK PAIN: 1
DIZZINESS: 0
HEADACHES: 0

## 2024-06-25 ASSESSMENT — PAIN SCALES - GENERAL: PAINLEVEL: MILD PAIN (3)

## 2024-06-25 NOTE — PROGRESS NOTES
Assessment & Plan     ICD-10-CM    1. Benign essential hypertension  I10 carvedilol (COREG) 6.25 MG tablet     furosemide (LASIX) 40 MG tablet     irbesartan (AVAPRO) 75 MG tablet     spironolactone (ALDACTONE) 50 MG tablet      2. Bilateral lower extremity edema  R60.0 furosemide (LASIX) 40 MG tablet     spironolactone (ALDACTONE) 50 MG tablet      3. Chronic heart failure with preserved ejection fraction (HFpEF) (H)  I50.32 furosemide (LASIX) 40 MG tablet     irbesartan (AVAPRO) 75 MG tablet     spironolactone (ALDACTONE) 50 MG tablet      4. Class 3 severe obesity due to excess calories with serious comorbidity and body mass index (BMI) of 40.0 to 44.9 in adult (H)  E66.01     Z68.41       5. Mixed hyperlipidemia  E78.2       6. Hypertriglyceridemia  E78.1       7. Neuroforaminal stenosis of lumbar spine  M48.061       8. Chronic radicular low back pain - Following with back pain clinic - s/p injections and nerve burning procedures at [facility] - minimally helpful  M54.16     G89.29       9. Polymyalgia rheumatica (H24)  M35.3       10. Myofascial muscle pain  M79.18 DULoxetine (CYMBALTA) 20 MG capsule     XR Inj Piriformis/Trigger Pt >3 Muscles      11. Intermittent low back pain  M54.50 DULoxetine (CYMBALTA) 20 MG capsule     XR Inj Piriformis/Trigger Pt >3 Muscles      12. Trigger point with back pain  M54.9 DULoxetine (CYMBALTA) 20 MG capsule     XR Inj Piriformis/Trigger Pt >3 Muscles         Patient presents for follow-up multiple issues.    Flexion edema has improved, has diastolic heart failure/preserved ejection fraction.  Has noted improvement in symptoms with use of irbesartan, spironolactone, Lasix.  Lab work collected and stable.  No changes for now.  Needs refills.    Elevated triglycerides, hopefully this will improve with reduced oral caloric intake, low-carbohydrate diet, regular exercise, weight loss.  Close follow-up advised.    Low back pain, radicular symptoms.  Only had minimal improvement  after back injections.  Advised that this likely will improve with weight loss.    Polymyalgia rheumatica, appears stable.    Having trigger points with intermittent low back pain, myofascial muscle pain.  Trigger point injections and prescription for Cymbalta sent to pharmacy.  Start trial follow-up with myofascial muscle pain.    HYPERTENSION - Ongoing. Blood pressure is currently well controlled.  Medication side effects: None. Denies syncope or presyncope.  Continue current medications.   Medication list reviewed/updated. Refills completed as needed.      OBESITY - Ongoing.  (See Encounter Diagnosis list above for obesity class / severity).  - Encourage continued maintenance / improvement in diet and exercise.   - Consider Nutrition / Dietician appointment.  - Weight loss would improve Hypertension, Cholesterol.      MIXED HYPERLIPIDEMIA.  Ongoing. LDL is at goal: No. Triglycerides are at goal: Yes.    Medication side effects reported: None.   Continue current medications for now. Medication list reviewed/updated. Refills completed as needed.  Recent Labs   Lab Test 11/11/22  0755 09/15/21  0824   CHOL 163 214*   HDL 41 43   * 143*   TRIG 87 140           The longitudinal plan of care for the diagnosis(es)/condition(s) as documented were addressed during this visit. Due to the added complexity in care, I will continue to support Henry in the subsequent management and with ongoing continuity of care.               BMI  Estimated body mass index is 41.64 kg/m  as calculated from the following:    Height as of 5/12/24: 1.829 m (6').    Weight as of this encounter: 139.3 kg (307 lb).           Return in about 8 weeks (around 8/20/2024) for -- follow-up back pain / cymbalta.      Matt Whitmore MD  [facility]    Review of Systems   Constitutional:  Negative for chills and fever.        + walking up to 150 feet with small based triangular cane.   HENT:  Positive for hearing loss. Negative  for congestion, ear pain and sore throat.    Eyes:  Negative for pain and visual disturbance.   Respiratory:  Positive for shortness of breath. Negative for cough.    Cardiovascular:  Positive for peripheral edema. Negative for chest pain and palpitations.   Gastrointestinal:  Negative for abdominal pain, constipation, diarrhea, heartburn, hematochezia and nausea.   Endocrine:        + hair loss   Genitourinary:  Positive for impotence. Negative for dysuria, frequency, genital sores, hematuria, penile discharge and urgency.   Musculoskeletal:  Positive for back pain, gait problem (limited to less than 200 feet, due to low back pain. Walks with cane.) and myalgias. Negative for arthralgias and joint swelling.        Left leg weakness - can't Abduct left leg up against gravity when laying in bed.   More ability to stand, can now extend and abduct left hip slightly when standing.    Skin:  Negative for rash.   Allergic/Immunologic: Positive for immunocompromised state.   Neurological:  Positive for weakness. Negative for dizziness, headaches and paresthesias.   Hematological:  Bruises/bleeds easily.   Psychiatric/Behavioral:  Negative for mood changes. The patient is not nervous/anxious.        Igor Figueroa is a 69 year old, presenting for the following health issues:  Follow Up (Hypertension )        6/25/2024     1:05 PM   Additional Questions   Roomed by Jahaira GOTTLIEB   Accompanied by wife     HPI       Hypertension Follow-up    Do you check your blood pressure regularly outside of the clinic? Yes   Are you following a low salt diet? Yes  Are your blood pressures ever more than 140 on the top number (systolic) OR more   than 90 on the bottom number (diastolic), for example 140/90? Yes  How many servings of fruits and vegetables do you eat daily?  0-1  On average, how many sweetened beverages do you drink each day (Examples: soda, juice, sweet tea, etc.  Do NOT count diet or artificially sweetened beverages)?    0  How many days per week do you exercise enough to make your heart beat faster? 3 or less  How many minutes a day do you exercise enough to make your heart beat faster? 10 - 19  How many days per week do you miss taking your medication? 0            Objective    /88   Pulse 73   Temp (!) 96  F (35.6  C) (Temporal)   Resp 18   Wt 139.3 kg (307 lb)   SpO2 96%   BMI 41.64 kg/m    Body mass index is 41.64 kg/m .  Physical Exam  Constitutional:       General: He is not in acute distress.     Appearance: He is well-developed. He is obese. He is not diaphoretic.   HENT:      Head: Normocephalic and atraumatic.   Eyes:      General: No scleral icterus.     Conjunctiva/sclera: Conjunctivae normal.   Cardiovascular:      Rate and Rhythm: Normal rate and regular rhythm.      Pulses: Normal pulses.   Pulmonary:      Effort: Pulmonary effort is normal.      Breath sounds: Normal breath sounds.   Abdominal:      Palpations: Abdomen is soft.      Tenderness: There is no abdominal tenderness.   Musculoskeletal:         General: Swelling and tenderness (low back, soft tissues / SI joint pains) present. No deformity.      Cervical back: Neck supple.      Right lower le+ Pitting Edema present.      Left lower le+ Pitting Edema present.      Comments: + Walking with small base stand-up triangular cane.   Lymphadenopathy:      Cervical: No cervical adenopathy.   Skin:     General: Skin is warm and dry.      Findings: No rash.   Neurological:      Mental Status: He is alert. Mental status is at baseline.      Comments: Left leg weakness   Psychiatric:         Mood and Affect: Mood normal.         Behavior: Behavior normal.                    Signed Electronically by: Matt Whitmore MD

## 2024-06-25 NOTE — PATIENT INSTRUCTIONS
Benign essential hypertension    Blood pressure is better... continue current meds:    - carvedilol (COREG) 6.25 MG tablet; Take 1.5 tablets (9.375 mg) by mouth 2 times daily (with meals)  - furosemide (LASIX) 40 MG tablet; Take 1-2 tablets (40-80 mg) by mouth daily  - irbesartan (AVAPRO) 75 MG tablet; Take 1 tablet (75 mg) by mouth at bedtime  - spironolactone (ALDACTONE) 50 MG tablet; Take 1 tablet (50 mg) by mouth daily    Bilateral lower extremity edema  - furosemide (LASIX) 40 MG tablet; Take 1-2 tablets (40-80 mg) by mouth daily  - spironolactone (ALDACTONE) 50 MG tablet; Take 1 tablet (50 mg) by mouth daily    Chronic heart failure with preserved ejection fraction (HFpEF) (H)  - furosemide (LASIX) 40 MG tablet; Take 1-2 tablets (40-80 mg) by mouth daily  - irbesartan (AVAPRO) 75 MG tablet; Take 1 tablet (75 mg) by mouth at bedtime  - spironolactone (ALDACTONE) 50 MG tablet; Take 1 tablet (50 mg) by mouth daily    Myofascial muscle pain  Intermittent low back pain  Trigger point with back pain    START:   - DULoxetine (CYMBALTA) 20 MG capsule; Take 1 capsule (20 mg) by mouth 2 times daily for 14 days, THEN 2 capsules (40 mg) 2 times daily for 90 days. -- use lowest effective dose for muscle pain --      - XR Inj Piriformis/Trigger Pt >3 Muscles; Future   - they will call with date/time of appointment.          Return in approximately 2 to 3 month(s), or sooner as needed for follow-up with Dr. Whitmore.  -- follow-up back pain / cymbalta    Clinic : [phone]  Appointment line: [phone]

## 2024-06-26 LAB — VIT D+METAB SERPL-MCNC: 75 NG/ML (ref 20–50)

## 2024-07-02 ENCOUNTER — HOSPITAL ENCOUNTER (OUTPATIENT)
Dept: GENERAL RADIOLOGY | Facility: OTHER | Age: 69
Discharge: HOME | End: 2024-07-02
Attending: INTERNAL MEDICINE | Admitting: INTERNAL MEDICINE
Payer: MEDICARE

## 2024-07-02 DIAGNOSIS — M54.9 TRIGGER POINT WITH BACK PAIN: ICD-10-CM

## 2024-07-02 DIAGNOSIS — M79.18 MYOFASCIAL MUSCLE PAIN: ICD-10-CM

## 2024-07-02 DIAGNOSIS — M54.50 INTERMITTENT LOW BACK PAIN: ICD-10-CM

## 2024-07-02 PROCEDURE — 250N000011 HC RX IP 250 OP 636: Performed by: RADIOLOGY

## 2024-07-02 PROCEDURE — 20553 NJX 1/MLT TRIGGER POINTS 3/>: CPT

## 2024-07-02 RX ORDER — TRIAMCINOLONE ACETONIDE 40 MG/ML
40 INJECTION, SUSPENSION INTRA-ARTICULAR; INTRAMUSCULAR ONCE
Status: COMPLETED | OUTPATIENT
Start: 2024-07-02 | End: 2024-07-02

## 2024-07-02 RX ORDER — BUPIVACAINE HYDROCHLORIDE 5 MG/ML
9 INJECTION, SOLUTION EPIDURAL; INTRACAUDAL ONCE
Status: COMPLETED | OUTPATIENT
Start: 2024-07-02 | End: 2024-07-02

## 2024-07-02 RX ADMIN — BUPIVACAINE HYDROCHLORIDE 45 MG: 5 INJECTION, SOLUTION EPIDURAL; INTRACAUDAL; PERINEURAL at 11:09

## 2024-07-02 RX ADMIN — TRIAMCINOLONE ACETONIDE 40 MG: 40 INJECTION, SUSPENSION INTRA-ARTICULAR; INTRAMUSCULAR at 11:10

## 2024-07-31 ENCOUNTER — OFFICE VISIT (OUTPATIENT)
Dept: INTERNAL MEDICINE | Facility: OTHER | Age: 69
End: 2024-07-31
Attending: INTERNAL MEDICINE
Payer: MEDICARE

## 2024-07-31 VITALS
TEMPERATURE: 97.9 F | HEART RATE: 62 BPM | SYSTOLIC BLOOD PRESSURE: 132 MMHG | RESPIRATION RATE: 18 BRPM | OXYGEN SATURATION: 93 % | BODY MASS INDEX: 40.42 KG/M2 | HEIGHT: 72 IN | WEIGHT: 298.4 LBS | DIASTOLIC BLOOD PRESSURE: 88 MMHG

## 2024-07-31 DIAGNOSIS — E55.9 VITAMIN D DEFICIENCY: ICD-10-CM

## 2024-07-31 DIAGNOSIS — G89.29 CHRONIC RADICULAR LOW BACK PAIN: ICD-10-CM

## 2024-07-31 DIAGNOSIS — I10 BENIGN ESSENTIAL HYPERTENSION: ICD-10-CM

## 2024-07-31 DIAGNOSIS — M54.16 CHRONIC RADICULAR LOW BACK PAIN: ICD-10-CM

## 2024-07-31 DIAGNOSIS — R73.9 ELEVATED RANDOM BLOOD GLUCOSE LEVEL: ICD-10-CM

## 2024-07-31 DIAGNOSIS — M54.9 TRIGGER POINT WITH BACK PAIN: ICD-10-CM

## 2024-07-31 DIAGNOSIS — I97.89 COMPLICATION OF ARTERY ASSOCIATED WITH SURGICAL PROCEDURE: ICD-10-CM

## 2024-07-31 DIAGNOSIS — M79.18 MYOFASCIAL MUSCLE PAIN: Primary | ICD-10-CM

## 2024-07-31 DIAGNOSIS — E89.0 POSTOPERATIVE HYPOTHYROIDISM: ICD-10-CM

## 2024-07-31 DIAGNOSIS — Z12.5 ENCOUNTER FOR SCREENING FOR MALIGNANT NEOPLASM OF PROSTATE: ICD-10-CM

## 2024-07-31 DIAGNOSIS — M54.50 INTERMITTENT LOW BACK PAIN: ICD-10-CM

## 2024-07-31 DIAGNOSIS — G47.9 SLEEP DIFFICULTIES: ICD-10-CM

## 2024-07-31 DIAGNOSIS — E53.8 VITAMIN B12 DEFICIENCY: ICD-10-CM

## 2024-07-31 DIAGNOSIS — E78.2 MIXED HYPERLIPIDEMIA: ICD-10-CM

## 2024-07-31 DIAGNOSIS — R10.2 NEURALGIA OF LEFT PERINEAL NERVE: ICD-10-CM

## 2024-07-31 PROCEDURE — G0463 HOSPITAL OUTPT CLINIC VISIT: HCPCS

## 2024-07-31 PROCEDURE — G2211 COMPLEX E/M VISIT ADD ON: HCPCS | Performed by: INTERNAL MEDICINE

## 2024-07-31 PROCEDURE — 99214 OFFICE O/P EST MOD 30 MIN: CPT | Performed by: INTERNAL MEDICINE

## 2024-07-31 RX ORDER — PREGABALIN 100 MG/1
100 CAPSULE ORAL 3 TIMES DAILY
Qty: 270 CAPSULE | Refills: 1 | Status: SHIPPED | OUTPATIENT
Start: 2024-07-31

## 2024-07-31 RX ORDER — ESTAZOLAM 1 MG
1 TABLET ORAL AT BEDTIME
Qty: 90 TABLET | Refills: 1 | Status: SHIPPED | OUTPATIENT
Start: 2024-07-31

## 2024-07-31 RX ORDER — DULOXETIN HYDROCHLORIDE 20 MG/1
CAPSULE, DELAYED RELEASE ORAL
Qty: 388 CAPSULE | Refills: 0 | Status: CANCELLED | OUTPATIENT
Start: 2024-07-31 | End: 2024-11-11

## 2024-07-31 RX ORDER — GABAPENTIN 400 MG/1
1200 CAPSULE ORAL 3 TIMES DAILY
Qty: 820 CAPSULE | Refills: 1 | Status: SHIPPED | OUTPATIENT
Start: 2024-07-31

## 2024-07-31 ASSESSMENT — PATIENT HEALTH QUESTIONNAIRE - PHQ9
2. FEELING DOWN, DEPRESSED, IRRITABLE, OR HOPELESS: NOT AT ALL
SUM OF ALL RESPONSES TO PHQ QUESTIONS 1-9: 3
1. LITTLE INTEREST OR PLEASURE IN DOING THINGS: NOT AT ALL
10. IF YOU CHECKED OFF ANY PROBLEMS, HOW DIFFICULT HAVE THESE PROBLEMS MADE IT FOR YOU TO DO YOUR WORK, TAKE CARE OF THINGS AT HOME, OR GET ALONG WITH OTHER PEOPLE: VERY DIFFICULT
10. IF YOU CHECKED OFF ANY PROBLEMS, HOW DIFFICULT HAVE THESE PROBLEMS MADE IT FOR YOU TO DO YOUR WORK, TAKE CARE OF THINGS AT HOME, OR GET ALONG WITH OTHER PEOPLE: VERY DIFFICULT
5. POOR APPETITE OR OVEREATING: NOT AT ALL
9. THOUGHTS THAT YOU WOULD BE BETTER OFF DEAD, OR OF HURTING YOURSELF: NOT AT ALL
7. TROUBLE CONCENTRATING ON THINGS, SUCH AS READING THE NEWSPAPER OR WATCHING TELEVISION: NOT AT ALL
3. TROUBLE FALLING OR STAYING ASLEEP OR SLEEPING TOO MUCH: NOT AT ALL
SUM OF ALL RESPONSES TO PHQ QUESTIONS 1-9: 3
4. FEELING TIRED OR HAVING LITTLE ENERGY: NOT AT ALL
SUM OF ALL RESPONSES TO PHQ QUESTIONS 1-9: 3

## 2024-07-31 ASSESSMENT — ANXIETY QUESTIONNAIRES
5. BEING SO RESTLESS THAT IT IS HARD TO SIT STILL: NOT AT ALL
7. FEELING AFRAID AS IF SOMETHING AWFUL MIGHT HAPPEN: NOT AT ALL
GAD7 TOTAL SCORE: 0
2. NOT BEING ABLE TO STOP OR CONTROL WORRYING: NOT AT ALL
3. WORRYING TOO MUCH ABOUT DIFFERENT THINGS: NOT AT ALL
4. TROUBLE RELAXING: NOT AT ALL
1. FEELING NERVOUS, ANXIOUS, OR ON EDGE: NOT AT ALL
6. BECOMING EASILY ANNOYED OR IRRITABLE: NOT AT ALL
8. IF YOU CHECKED OFF ANY PROBLEMS, HOW DIFFICULT HAVE THESE MADE IT FOR YOU TO DO YOUR WORK, TAKE CARE OF THINGS AT HOME, OR GET ALONG WITH OTHER PEOPLE?: NOT DIFFICULT AT ALL
7. FEELING AFRAID AS IF SOMETHING AWFUL MIGHT HAPPEN: NOT AT ALL
GAD7 TOTAL SCORE: 0
IF YOU CHECKED OFF ANY PROBLEMS ON THIS QUESTIONNAIRE, HOW DIFFICULT HAVE THESE PROBLEMS MADE IT FOR YOU TO DO YOUR WORK, TAKE CARE OF THINGS AT HOME, OR GET ALONG WITH OTHER PEOPLE: NOT DIFFICULT AT ALL

## 2024-07-31 ASSESSMENT — PAIN SCALES - PAIN ENJOYMENT GENERAL ACTIVITY SCALE (PEG)
AVG_PAIN_PASTWEEK: 5
INTERFERED_GENERAL_ACTIVITY: 7
PEG_TOTALSCORE: 5.67
INTERFERED_GENERAL_ACTIVITY: 7
INTERFERED_ENJOYMENT_LIFE: 5
INTERFERED_ENJOYMENT_LIFE: 5
PEG_TOTALSCORE: 5.67
AVG_PAIN_PASTWEEK: 5

## 2024-07-31 ASSESSMENT — ENCOUNTER SYMPTOMS
COUGH: 0
PARESTHESIAS: 0
FREQUENCY: 0
DYSURIA: 0
NAUSEA: 0
HEADACHES: 0
JOINT SWELLING: 0
CHILLS: 0
ARTHRALGIAS: 0
ABDOMINAL PAIN: 0
SORE THROAT: 0
FEVER: 0
DIARRHEA: 0
WEAKNESS: 1
MYALGIAS: 1
DIZZINESS: 0
HEMATOCHEZIA: 0
NERVOUS/ANXIOUS: 0
HEMATURIA: 0
BRUISES/BLEEDS EASILY: 1
PALPITATIONS: 0
HEARTBURN: 0
CONSTIPATION: 0
BACK PAIN: 1
ENDOCRINE COMMENTS: + HAIR LOSS
EYE PAIN: 0
SHORTNESS OF BREATH: 1

## 2024-07-31 ASSESSMENT — PAIN SCALES - GENERAL: PAINLEVEL: MODERATE PAIN (5)

## 2024-07-31 NOTE — PATIENT INSTRUCTIONS
Drop off the Cymbalta at your next appointment, can donate if desired.     Dose change to Lyrica / Gabapentin today.     Return in approximately Mid December, or sooner as needed for follow-up with Dr. Whitmore.  -- Medicare wellness visit + controlled medication refills    Clinic : [phone]  Appointment line: [phone]

## 2024-07-31 NOTE — NURSING NOTE
Patient is here for medication follow up on Cymbalta. States he stopped taking it. Felt it caused migraine, headaches, and nausea.       Medication Reconciliation: complete    Tabitha Jerry LPN 7/31/2024 1:12 PM       Advance care directive on file? no  Advance care directive provided to patient? no       Tabitha Jerry LPN

## 2024-07-31 NOTE — PROGRESS NOTES
Assessment & Plan     ICD-10-CM    1. Myofascial muscle pain  M79.18 XR Inj Piriformis/Trigger Pt >3 Muscles      2. Intermittent low back pain  M54.50 XR Inj Piriformis/Trigger Pt >3 Muscles      3. Trigger point with back pain  M54.9 XR Inj Piriformis/Trigger Pt >3 Muscles      4. Chronic radicular low back pain  M54.16 gabapentin (NEURONTIN) 400 MG capsule    G89.29 pregabalin (LYRICA) 100 MG capsule      5. Complication of artery associated with surgical procedure - Left superior gluteal artery Injury -- subsequently thrombosed / embolized by Interventional Radiology  I97.89 pregabalin (LYRICA) 100 MG capsule      6. Neuralgia of left perineal nerve  R10.2 pregabalin (LYRICA) 100 MG capsule      7. Sleep difficulties  G47.9 estazolam (PROSOM) 1 MG tablet      8. Encounter for screening for malignant neoplasm of prostate  Z12.5 Prostate Specific Antigen Screen      9. Vitamin B12 deficiency  E53.8 Vitamin B12      10. Vitamin D deficiency  E55.9 Vitamin D Deficiency      11. Elevated random blood glucose level  R73.09 Hemoglobin A1c      12. Benign essential hypertension  I10 Comprehensive metabolic panel     CBC with Platelets & Differential      13. Mixed hyperlipidemia  E78.2 Lipid Profile      14. Postoperative hypothyroidism  E89.0 TSH with free T4 reflex         Patient presents for follow-up multiple issues.    Would like to try have his trigger point injection schedule every 3 to 4 months.  Standing orders updated.  Reports having significant improvement in low back pain and mobility following the trigger point injections that were completed about 1 month ago.  Was having some significant myofascial muscle pain, low back pain.  Standing orders placed as noted.    Radicular low back pain with neuralgia especially involving left peroneal nerve.  Did not tolerate Cymbalta.  States that he was getting rather severe migraines after initiation.  Only use medication for about 3 days.  Would like to make some  adjustments to his gabapentin and Lyrica dosing.  Prescription went from twice daily gabapentin to 3 times daily dosing and Lyrica dose increased from 75 mg 3 times daily up to 100 mg 3 times daily.  Does note some mental fogginess, advised that this can happen with these medications and to take lowest effective dose.    Sleep difficulties, ongoing but has noted much improvement with regular dosing of estazolam at bedtime.  Tolerating well.  Needs refills    Prostate lab cancer screening, check lab work.    MND and B12 deficiency.  Check lab work prior to next clinic appointment.    Elevated random glucose, check hemoglobin A1c with next lab work prior to clinic follow-up appointment.    Hypertension, blood pressure is currently well-controlled.  Seems to be doing well with current medications.  Stop taking irbesartan due to low blood pressure.  Check lab work prior to clinic appointment.    Mixed hyperlipidemia.  Cholesterol levels are currently managed with diet.  Check labs prior to follow-up appointment.    HYPERTENSION - Ongoing. Blood pressure is currently well controlled.  Medication side effects: None. Denies syncope or presyncope.  Continue current medications.   Medication list reviewed/updated. Refills completed as needed.      MIXED HYPERLIPIDEMIA.  Ongoing. LDL is at goal: No. Triglycerides are at goal: Yes.  Hopefully lifestyle modifications will improve cholesterol levels, otherwise will consider additional medication dose adjustments or medication changes.  Medication side effects reported: None.   Continue current medications for now. Medication list reviewed/updated. Refills completed as needed.  Recent Labs   Lab Test 11/11/22  0755 09/15/21  0824   CHOL 163 214*   HDL 41 43   * 143*   TRIG 87 140        Post-operative HYPOTHYROIDISM - Patient has a longstanding history of hypothyroidism. Reports doing reasonably well. Reports: No fatigue, weight changes, heat/cold intolerance, bowel/skin  changes or CVS symptoms.  Continue: Synthroid oral thyroid replacement.  Denies adverse medication reactions or side effects.                       TSH   Date Value Ref Range Status   11/08/2022 0.40 0.30 - 4.20 uIU/mL Final   12/22/2021 1.20 0.40 - 4.00 mU/L Final        OBESITY - Ongoing.  (See Encounter Diagnosis list above for obesity class / severity).  - Encourage continued maintenance / improvement in diet and exercise.   - Consider Nutrition / Dietician appointment.  - Weight loss would improve Hypertension, Cholesterol.      Vaccine counseling completed.  Encourage routine / annual vaccinations.    The longitudinal plan of care for the diagnosis(es)/condition(s) as documented were addressed during this visit. Due to the added complexity in care, I will continue to support Henry in the subsequent management and with ongoing continuity of care.               BMI  Estimated body mass index is 40.47 kg/m  as calculated from the following:    Height as of this encounter: 1.829 m (6').    Weight as of this encounter: 135.4 kg (298 lb 6.4 oz).         Return in about 19 weeks (around 12/11/2024) for Annual Medicare Wellness Visit, - Med Refills - Controlled Medications.      Matt Whitmore MD  [facility]    Review of Systems   Constitutional:  Negative for chills and fever.        + walking up to 150 feet with small based triangular cane.   HENT:  Positive for hearing loss. Negative for congestion, ear pain and sore throat.    Eyes:  Negative for pain and visual disturbance.   Respiratory:  Positive for shortness of breath. Negative for cough.    Cardiovascular:  Positive for peripheral edema. Negative for chest pain and palpitations.   Gastrointestinal:  Negative for abdominal pain, constipation, diarrhea, heartburn, hematochezia and nausea.   Endocrine:        + hair loss   Genitourinary:  Positive for impotence. Negative for dysuria, frequency, genital sores, hematuria, penile discharge  and urgency.   Musculoskeletal:  Positive for back pain, gait problem (limited to less than 200 feet, due to low back pain. Walks with cane.) and myalgias. Negative for arthralgias and joint swelling.        Left leg weakness - can't Abduct left leg up against gravity when laying in bed.   More ability to stand, can now extend and abduct left hip slightly when standing.    Skin:  Negative for rash.   Allergic/Immunologic: Positive for immunocompromised state.   Neurological:  Positive for weakness. Negative for dizziness, headaches and paresthesias.   Hematological:  Bruises/bleeds easily.   Psychiatric/Behavioral:  Negative for mood changes. The patient is not nervous/anxious.          Subjective   Henry is a 69 year old, presenting for the following health issues:  Recheck Medication and Back Pain        7/31/2024     1:01 PM   Additional Questions   Roomed by Tabitha crook   Accompanied by Wife Nati     History of Present Illness       Back Pain:  He presents for follow up of back pain. Patient's back pain is a chronic problem.  Location of back pain:  Right middle of back, left middle of back and right hip  Description of back pain: cramping  Back pain spreads: left thigh    Since patient first noticed back pain, pain is: unchanged  Does back pain interfere with his job:  Not applicable       Mental Health Follow-up: Patient's depression since last visit has been:  No change        Any significant life events: No        He eats 2-3 servings of fruits and vegetables daily.He consumes 1 sweetened beverage(s) daily.He exercises with enough effort to increase his heart rate 9 or less minutes per day.  He exercises with enough effort to increase his heart rate 3 or less days per week.   He is taking medications regularly.                     Objective    /88   Pulse 62   Temp 97.9  F (36.6  C) (Tympanic)   Resp 18   Ht 1.829 m (6')   Wt 135.4 kg (298 lb 6.4 oz)   SpO2 93%   BMI 40.47 kg/m    Body mass index is  40.47 kg/m .  Physical Exam  Constitutional:       General: He is not in acute distress.     Appearance: He is well-developed. He is obese. He is not diaphoretic.   HENT:      Head: Normocephalic and atraumatic.   Eyes:      General: No scleral icterus.     Conjunctiva/sclera: Conjunctivae normal.   Cardiovascular:      Rate and Rhythm: Normal rate and regular rhythm.      Pulses: Normal pulses.   Pulmonary:      Effort: Pulmonary effort is normal.      Breath sounds: Normal breath sounds.   Abdominal:      Palpations: Abdomen is soft.      Tenderness: There is no abdominal tenderness.   Musculoskeletal:         General: Swelling and tenderness (low back, soft tissues / SI joint pains) present. No deformity.      Cervical back: Neck supple.      Right lower le+ Pitting Edema present.      Left lower le+ Pitting Edema present.      Comments: + Walking with small base stand-up triangular cane.   Lymphadenopathy:      Cervical: No cervical adenopathy.   Skin:     General: Skin is warm and dry.      Findings: No rash.   Neurological:      Mental Status: He is alert. Mental status is at baseline.      Comments: Left leg weakness   Psychiatric:         Mood and Affect: Mood normal.         Behavior: Behavior normal.                    Signed Electronically by: Matt Whitmore MD

## 2024-08-01 ASSESSMENT — PATIENT HEALTH QUESTIONNAIRE - PHQ9: SUM OF ALL RESPONSES TO PHQ QUESTIONS 1-9: 3

## 2024-08-26 DIAGNOSIS — Z85.51 PERSONAL HISTORY OF BLADDER CANCER: Primary | ICD-10-CM

## 2024-09-05 ENCOUNTER — LAB (OUTPATIENT)
Dept: LAB | Facility: OTHER | Age: 69
End: 2024-09-05
Payer: MEDICARE

## 2024-09-05 ENCOUNTER — OFFICE VISIT (OUTPATIENT)
Dept: UROLOGY | Facility: OTHER | Age: 69
End: 2024-09-05
Payer: MEDICARE

## 2024-09-05 DIAGNOSIS — Z85.51 PERSONAL HISTORY OF BLADDER CANCER: Primary | ICD-10-CM

## 2024-09-05 DIAGNOSIS — Z80.42 FAMILY HISTORY OF PROSTATE CANCER: ICD-10-CM

## 2024-09-05 DIAGNOSIS — Z85.51 PERSONAL HISTORY OF BLADDER CANCER: ICD-10-CM

## 2024-09-05 DIAGNOSIS — N40.3 NODULAR PROSTATE WITH LOWER URINARY TRACT SYMPTOMS: ICD-10-CM

## 2024-09-05 LAB
ALBUMIN UR-MCNC: 10 MG/DL
APPEARANCE UR: CLEAR
BILIRUB UR QL STRIP: NEGATIVE
COLOR UR AUTO: YELLOW
GLUCOSE UR STRIP-MCNC: NEGATIVE MG/DL
HGB UR QL STRIP: NEGATIVE
KETONES UR STRIP-MCNC: NEGATIVE MG/DL
LEUKOCYTE ESTERASE UR QL STRIP: NEGATIVE
NITRATE UR QL: NEGATIVE
PH UR STRIP: 6 [PH] (ref 5–9)
SP GR UR STRIP: 1.02 (ref 1–1.03)
UROBILINOGEN UR STRIP-MCNC: NORMAL MG/DL

## 2024-09-05 PROCEDURE — G0463 HOSPITAL OUTPT CLINIC VISIT: HCPCS | Mod: 25

## 2024-09-05 PROCEDURE — 250N000009 HC RX 250: Performed by: UROLOGY

## 2024-09-05 PROCEDURE — 88112 CYTOPATH CELL ENHANCE TECH: CPT

## 2024-09-05 PROCEDURE — 52000 CYSTOURETHROSCOPY: CPT | Performed by: UROLOGY

## 2024-09-05 PROCEDURE — 52000 CYSTOURETHROSCOPY: CPT

## 2024-09-05 PROCEDURE — 250N000013 HC RX MED GY IP 250 OP 250 PS 637: Performed by: UROLOGY

## 2024-09-05 PROCEDURE — 81003 URINALYSIS AUTO W/O SCOPE: CPT | Mod: ZL

## 2024-09-05 RX ORDER — LIDOCAINE HYDROCHLORIDE 20 MG/ML
JELLY TOPICAL ONCE
Status: COMPLETED | OUTPATIENT
Start: 2024-09-05 | End: 2024-09-05

## 2024-09-05 RX ADMIN — LIDOCAINE HYDROCHLORIDE: 20 JELLY TOPICAL at 10:30

## 2024-09-05 RX ADMIN — CEPHALEXIN 250 MG: 250 CAPSULE ORAL at 10:38

## 2024-09-05 NOTE — PROGRESS NOTES
69-year-old male with a history of high-grade bladder cancer and previous BCG induction and therapy who presents today 9/5/2024 for follow-up.    History of nodular prostate for which I recommended an MRI.  There is a positive family history of prostate cancer.    MRI was done and read as normal, PI-RADS 2 with the prostate measuring 53 cm .  Despite this I recommended a prostate biopsy given his PSA velocity change.  Unfortunately he had had a recent hip therefore decision was made to postpone this and to reevaluate today.    Patient with a history of hypogonadism on AndroGel.  This is despite his total testosterone being normal as well as his free testosterone.    Here today for cystoscopy.    Male Cystoscopy Procedure Note     PRE-PROCEDURE DIAGNOSIS: High-grade bladder cancer  POST-PROCEDURE DIAGNOSIS: Same  PROCEDURE: cystoscopy    RISKS DISCUSSED:  Bleeding, infection, urethral stricture, irritative voiding symptoms, retention of urine.      DESCRIPTION OF PROCEDURE:  After informed consent was obtained, the patient was brought to the procedure room where he was placed in the supine position with all pressure points well padded.  The penis and scrotum were prepped and draped in a sterile fashion. A flexible cystoscope was introduced through a well-lubricated urethra.      FINDINGS:    Meatus: normal  Urethra: normal  Prostate: moderate obstruction  Bladder Neck: open  Bladder:  no visible tumor, stones or lesions  Trigone:  Normal ureters, normal efflux    The flexible cystoscope was removed and the findings were described to the patient.     FINDINGS:  normal cystoscopy      Assessment:  High-grade bladder cancer history  Nodular prostate with normal MRI of the prostate  Hypogonadism  Family history of prostate cancer    Plan:  Cystoscopy negative without evidence of recurrence.  Given his high-grade cancer history we will plan for a cytology.  I anticipate upper tract imaging in 6 months with a CT urogram to  fully evaluate the upper tracts.    Regarding his nodular prostate and family history of prostate cancer, MRI was read as normal.  He still warrants a prostate biopsy.  We will recheck his PSA in 3 weeks and reevaluate.  I will give him a call with those results.    Otherwise follow-up in 6 months for repeat cystoscopy and CT urogram with cytology.    All questions were addressed

## 2024-09-05 NOTE — NURSING NOTE
Patient positioned in supine position, perineum area prepped with chlorhexidene Gluconate, betadine swabs, patient draped per sterile technique. Per verbal order read back by Spike Milton MD, Urojet 10mL 2% lidocaine jelly to be instilled into urethra.      Universal Protocol    A. Pre-procedure verification complete Yes  1-relevant information / documentation available, reviewed and properly matched to the patient; 2-consent accurate and complete, 3-equipment and supplies available    B. Site marking complete Yes  Site marked if not in continuous attendance with patient    C. TIME OUT completed Yes  Time Out was conducted just prior to starting procedure to verify the eight required elements: 1-patient identity, 2-consent accurate and complete, 3-position, 4-correct side/site marked (if applicable), 5-procedure, 6-relevant images / results properly labeled and displayed (if applicable), 7-antibiotics / irrigation fluids (if applicable), 8-safety precautions.    After procedure perineum area rinsed. Discharge instructions reviewed with patient. Patient verbalized understanding of discharge instructions and discharged ambulatory.  Arabella Matias RN..................9/5/2024  5:11 PM

## 2024-09-09 LAB — PATH REPORT.FINAL DX SPEC: NORMAL

## 2024-09-26 ENCOUNTER — LAB (OUTPATIENT)
Dept: LAB | Facility: OTHER | Age: 69
End: 2024-09-26
Attending: UROLOGY
Payer: MEDICARE

## 2024-09-26 DIAGNOSIS — N40.3 NODULAR PROSTATE WITH LOWER URINARY TRACT SYMPTOMS: ICD-10-CM

## 2024-09-26 LAB — PSA SERPL DL<=0.01 NG/ML-MCNC: 4.01 NG/ML (ref 0–6.5)

## 2024-09-26 PROCEDURE — 84153 ASSAY OF PSA TOTAL: CPT | Mod: ZL

## 2024-09-26 PROCEDURE — 36415 COLL VENOUS BLD VENIPUNCTURE: CPT | Mod: ZL

## 2024-09-26 NOTE — RESULT ENCOUNTER NOTE
Ashely,     He will need to be scheduled for a prostate biopsy in the OR with a rectal swab before with Sonya or Karen.    Can you reach out to patient and get this scheduled.     Thanks, Michael

## 2024-09-27 ENCOUNTER — TELEPHONE (OUTPATIENT)
Dept: INTERNAL MEDICINE | Facility: OTHER | Age: 69
End: 2024-09-27
Payer: MEDICARE

## 2024-09-27 ENCOUNTER — MYC MEDICAL ADVICE (OUTPATIENT)
Dept: UROLOGY | Facility: OTHER | Age: 69
End: 2024-09-27
Payer: MEDICARE

## 2024-09-27 DIAGNOSIS — Z85.51 PERSONAL HISTORY OF BLADDER CANCER: Primary | ICD-10-CM

## 2024-09-27 NOTE — TELEPHONE ENCOUNTER
Spoke with patient and spouse. They do not have further questions at this time. Natasha Ochoa RN on 9/27/2024 at 1:17 PM

## 2024-09-27 NOTE — TELEPHONE ENCOUNTER
Patient Returning Call    Reason for call:  Dr. Spike Milton -- Pt received the results through [employer] and pt was not able to respond to the msg via [employer] and has more questions regarding the test results     Information relayed to patient:  yes     Patient has additional questions:  Yes    What are your questions/concerns:      Who does the patient want to speak with:  Provider or care team     Is an  needed?:  No      Could we send this information to you in [email or web address] or would you prefer to receive a phone call?:   No preference   Okay to leave a detailed message?: Yes at Cell number on file:    Telephone Information:   Mobile [phone]

## 2024-09-30 DIAGNOSIS — R97.20 ELEVATED PROSTATE SPECIFIC ANTIGEN (PSA): Primary | ICD-10-CM

## 2024-09-30 NOTE — RESULT ENCOUNTER NOTE
Ashely, did Mr. An get scheduled for a rectal swab and a prostate biopsy in the OR?  I do not see this is being done.  Let me know and thank you Michael

## 2024-10-02 ENCOUNTER — HOSPITAL ENCOUNTER (OUTPATIENT)
Dept: GENERAL RADIOLOGY | Facility: OTHER | Age: 69
Discharge: HOME | End: 2024-10-02
Attending: INTERNAL MEDICINE | Admitting: INTERNAL MEDICINE
Payer: MEDICARE

## 2024-10-02 DIAGNOSIS — M79.18 MYOFASCIAL MUSCLE PAIN: ICD-10-CM

## 2024-10-02 DIAGNOSIS — M54.9 TRIGGER POINT WITH BACK PAIN: ICD-10-CM

## 2024-10-02 DIAGNOSIS — M54.50 INTERMITTENT LOW BACK PAIN: ICD-10-CM

## 2024-10-02 PROCEDURE — 77002 NEEDLE LOCALIZATION BY XRAY: CPT

## 2024-10-02 PROCEDURE — 250N000011 HC RX IP 250 OP 636: Performed by: RADIOLOGY

## 2024-10-02 RX ORDER — BUPIVACAINE HYDROCHLORIDE 5 MG/ML
9 INJECTION, SOLUTION EPIDURAL; INTRACAUDAL ONCE
Status: COMPLETED | OUTPATIENT
Start: 2024-10-02 | End: 2024-10-02

## 2024-10-02 RX ORDER — TRIAMCINOLONE ACETONIDE 40 MG/ML
40 INJECTION, SUSPENSION INTRA-ARTICULAR; INTRAMUSCULAR ONCE
Status: COMPLETED | OUTPATIENT
Start: 2024-10-02 | End: 2024-10-02

## 2024-10-02 RX ADMIN — TRIAMCINOLONE ACETONIDE 40 MG: 40 INJECTION, SUSPENSION INTRA-ARTICULAR; INTRAMUSCULAR at 12:55

## 2024-10-02 RX ADMIN — BUPIVACAINE HYDROCHLORIDE 45 MG: 5 INJECTION, SOLUTION EPIDURAL; INTRACAUDAL; PERINEURAL at 12:54

## 2024-10-18 ENCOUNTER — ALLIED HEALTH/NURSE VISIT (OUTPATIENT)
Dept: UROLOGY | Facility: OTHER | Age: 69
End: 2024-10-18
Attending: UROLOGY
Payer: MEDICARE

## 2024-10-18 ENCOUNTER — OFFICE VISIT (OUTPATIENT)
Dept: FAMILY MEDICINE | Facility: OTHER | Age: 69
End: 2024-10-18
Attending: NURSE PRACTITIONER
Payer: MEDICARE

## 2024-10-18 ENCOUNTER — TELEPHONE (OUTPATIENT)
Dept: UROLOGY | Facility: OTHER | Age: 69
End: 2024-10-18
Payer: MEDICARE

## 2024-10-18 VITALS
OXYGEN SATURATION: 96 % | BODY MASS INDEX: 41.56 KG/M2 | SYSTOLIC BLOOD PRESSURE: 152 MMHG | WEIGHT: 306.4 LBS | RESPIRATION RATE: 20 BRPM | DIASTOLIC BLOOD PRESSURE: 90 MMHG | TEMPERATURE: 97.6 F | HEART RATE: 62 BPM

## 2024-10-18 DIAGNOSIS — E66.01 CLASS 3 SEVERE OBESITY DUE TO EXCESS CALORIES WITH SERIOUS COMORBIDITY AND BODY MASS INDEX (BMI) OF 40.0 TO 44.9 IN ADULT (H): ICD-10-CM

## 2024-10-18 DIAGNOSIS — Z85.51 PERSONAL HISTORY OF MALIGNANT NEOPLASM OF BLADDER: ICD-10-CM

## 2024-10-18 DIAGNOSIS — I50.32 CHRONIC HEART FAILURE WITH PRESERVED EJECTION FRACTION (HFPEF) (H): ICD-10-CM

## 2024-10-18 DIAGNOSIS — Z85.51 PERSONAL HISTORY OF BLADDER CANCER: Primary | ICD-10-CM

## 2024-10-18 DIAGNOSIS — Z01.818 PRE-OP EXAM: ICD-10-CM

## 2024-10-18 DIAGNOSIS — N40.3 NODULAR PROSTATE WITH LOWER URINARY TRACT SYMPTOMS: ICD-10-CM

## 2024-10-18 DIAGNOSIS — I10 BENIGN ESSENTIAL HYPERTENSION: ICD-10-CM

## 2024-10-18 DIAGNOSIS — Z87.442 HISTORY OF KIDNEY STONES: ICD-10-CM

## 2024-10-18 DIAGNOSIS — G47.33 OSA ON CPAP: ICD-10-CM

## 2024-10-18 DIAGNOSIS — Z01.818 PREOP GENERAL PHYSICAL EXAM: Primary | ICD-10-CM

## 2024-10-18 DIAGNOSIS — E66.813 CLASS 3 SEVERE OBESITY DUE TO EXCESS CALORIES WITH SERIOUS COMORBIDITY AND BODY MASS INDEX (BMI) OF 40.0 TO 44.9 IN ADULT (H): ICD-10-CM

## 2024-10-18 DIAGNOSIS — R97.20 ELEVATED PROSTATE SPECIFIC ANTIGEN (PSA): ICD-10-CM

## 2024-10-18 DIAGNOSIS — Z85.51 PERSONAL HISTORY OF BLADDER CANCER: ICD-10-CM

## 2024-10-18 DIAGNOSIS — N18.2 CKD (CHRONIC KIDNEY DISEASE) STAGE 2, GFR 60-89 ML/MIN: ICD-10-CM

## 2024-10-18 LAB
ANION GAP SERPL CALCULATED.3IONS-SCNC: 8 MMOL/L (ref 7–15)
BUN SERPL-MCNC: 27.4 MG/DL (ref 8–23)
CALCIUM SERPL-MCNC: 10.4 MG/DL (ref 8.8–10.4)
CHLORIDE SERPL-SCNC: 104 MMOL/L (ref 98–107)
CREAT SERPL-MCNC: 1.54 MG/DL (ref 0.67–1.17)
EGFRCR SERPLBLD CKD-EPI 2021: 48 ML/MIN/1.73M2
GLUCOSE SERPL-MCNC: 98 MG/DL (ref 70–99)
HCO3 SERPL-SCNC: 26 MMOL/L (ref 22–29)
Lab: NORMAL
PERFORMING LABORATORY: NORMAL
POTASSIUM SERPL-SCNC: 4.5 MMOL/L (ref 3.4–5.3)
SODIUM SERPL-SCNC: 138 MMOL/L (ref 135–145)
SPECIMEN STATUS: NORMAL
TEST NAME: NORMAL

## 2024-10-18 PROCEDURE — 80048 BASIC METABOLIC PNL TOTAL CA: CPT | Mod: ZL | Performed by: NURSE PRACTITIONER

## 2024-10-18 PROCEDURE — 93005 ELECTROCARDIOGRAM TRACING: CPT | Performed by: NURSE PRACTITIONER

## 2024-10-18 PROCEDURE — 99207 PR NO CHARGE NURSE ONLY: CPT

## 2024-10-18 PROCEDURE — 87086 URINE CULTURE/COLONY COUNT: CPT | Mod: ZL | Performed by: NURSE PRACTITIONER

## 2024-10-18 PROCEDURE — 82310 ASSAY OF CALCIUM: CPT | Mod: ZL | Performed by: NURSE PRACTITIONER

## 2024-10-18 PROCEDURE — 99214 OFFICE O/P EST MOD 30 MIN: CPT | Performed by: NURSE PRACTITIONER

## 2024-10-18 PROCEDURE — G0463 HOSPITAL OUTPT CLINIC VISIT: HCPCS | Mod: 25

## 2024-10-18 PROCEDURE — 87081 CULTURE SCREEN ONLY: CPT | Mod: ZL

## 2024-10-18 PROCEDURE — G2211 COMPLEX E/M VISIT ADD ON: HCPCS | Performed by: NURSE PRACTITIONER

## 2024-10-18 PROCEDURE — 36415 COLL VENOUS BLD VENIPUNCTURE: CPT | Mod: ZL | Performed by: NURSE PRACTITIONER

## 2024-10-18 PROCEDURE — 93010 ELECTROCARDIOGRAM REPORT: CPT | Performed by: INTERNAL MEDICINE

## 2024-10-18 ASSESSMENT — PAIN SCALES - GENERAL: PAINLEVEL: MILD PAIN (3)

## 2024-10-18 NOTE — PATIENT INSTRUCTIONS
How to Take Your Medication Before Surgery  Preoperative Medication Instructions   Antiplatelet or Anticoagulation Medication Instructions   - Patient is on no antiplatelet or anticoagulation medications.    Additional Medication Instructions  Take all scheduled medications on the day of surgery    No naproxen for 3 days before  Take lasix daily-may hold day of procedure       Patient Education   Preparing for Your Surgery  For Adults  Getting started  In most cases, a nurse will call to review your health history and instructions. They will give you an arrival time based on your scheduled surgery time. Please be ready to share:  Your doctor's clinic name and phone number  Your medical, surgical, and anesthesia history  A list of allergies and sensitivities  A list of medicines, including herbal treatments and over-the-counter drugs  Whether the patient has a legal guardian (ask how to send us the papers in advance)  Note: You may not receive a call if you were seen at our PAC (Preoperative Assessment Center).  Please tell us if you're pregnant--or if there's any chance you might be pregnant. Some surgeries may injure a fetus (unborn baby), so they require a pregnancy test. Surgeries that are safe for a fetus don't always need a test, and you can choose whether to have one.   Preparing for surgery  Within 10 to 30 days of surgery: Have a pre-op exam (sometimes called an H&P, or History and Physical). This can be done at a clinic or pre-operative center.  If you're having a , you may not need this exam. Talk to your care team.  At your pre-op exam, talk to your care team about all medicines you take. (This includes CBD oil and any drugs, such as THC, marijuana, and other forms of cannabis.) If you need to stop any medicine before surgery, ask when to start taking it again.  This is for your safety. Many medicines and drugs can make you bleed too much during surgery. Some change how well surgery (anesthesia)  drugs work.  Call your insurance company to let them know you're having surgery. (If you don't have insurance, call [phone].)  Call your clinic if there's any change in your health. This includes a scrape or scratch near the surgery site, or any signs of a cold (sore throat, runny nose, cough, rash, fever).  Eating and drinking guidelines  For your safety: Unless your surgeon tells you otherwise, follow the guidelines below.  Eat and drink as normal until 8 hours before you arrive for surgery. After that, no food or milk. You can spit out gum when you arrive.  Drink clear liquids until 2 hours before you arrive. These are liquids you can see through, like water, Gatorade, and Propel Water. They also include plain black coffee and tea (no cream or milk).  No alcohol for 24 hours before you arrive. The night before surgery, stop any drinks that contain THC.  If your care team tells you to take medicine on the morning of surgery, it's okay to take it with a sip of water. No other medicines or drugs are allowed (including CBD oil)--follow your care team's instructions.  If you have questions the day of surgery, call your hospital or surgery center.   Preventing infection  Shower or bathe the night before and the morning of surgery. Follow the instructions your clinic gave you. (If no instructions, use regular soap.)  Don't shave or clip hair near your surgery site. We'll remove the hair if needed.  Don't smoke or vape the morning of surgery. No chewing tobacco for 6 hours before you arrive. A nicotine patch is okay. You may spit out nicotine gum when you arrive.  For some surgeries, the surgeon will tell you to fully quit smoking and nicotine.  We will make every effort to keep you safe from infection. We will:  Clean our hands often with soap and water (or an alcohol-based hand rub).  Clean the skin at your surgery site with a special soap that kills germs.  Give you a special gown to keep you warm. (Cold raises  the risk of infection.)  Wear hair covers, masks, gowns, and gloves during surgery.  Give antibiotic medicine, if prescribed. Not all surgeries need this medicine.  What to bring on the day of surgery  Photo ID and insurance card  Copy of your health care directive, if you have one  Glasses and hearing aids (bring cases)  You can't wear contacts during surgery  Inhaler and eye drops, if you use them (tell us about these when you arrive)  CPAP machine or breathing device, if you use them  A few personal items, if spending the night  If you have . . .  A pacemaker, ICD (cardiac defibrillator), or other implant: Bring the ID card.  An implanted stimulator: Bring the remote control.  A legal guardian: Bring a copy of the certified (court-stamped) guardianship papers.  Please remove any jewelry, including body piercings. Leave jewelry and other valuables at home.  If you're going home the day of surgery  You must have a responsible adult drive you home. They should stay with you overnight as well.  If you don't have someone to stay with you, and you aren't safe to go home alone, we may keep you overnight. Insurance often won't pay for this.  After surgery  If it's hard to control your pain or you need more pain medicine, please call your surgeon's office.  Questions?   If you have any questions for your care team, list them here:   ____________________________________________________________________________________________________________________________________________________________________________________________________________________________________________________________  For informational purposes only. Not to replace the advice of your health care provider. Copyright   2003, 2019 [facility]. All rights reserved. Clinically reviewed by Harry Mercado MD. [employer] 650375 - REV 08/24.

## 2024-10-18 NOTE — NURSING NOTE
Patient here for preop for TURP with  at [facility] on 10/28/2024. Medication Reconciliation: complete.    Albina Servin LPN  10/18/2024 2:42 PM

## 2024-10-18 NOTE — PROGRESS NOTES
Preoperative Evaluation  [facility] AND [facility]  [address]  [place] [address]  Phone: [phone]  Fax: [phone]  Primary Provider: Matt Whitmore MD  Pre-op Performing Provider: JOSE Newton CNP  Oct 18, 2024             10/18/2024   Surgical Information   What procedure is being done? biopsy   Facility or Hospital where procedure/surgery will be performed: [facility]   Who is doing the procedure / surgery? leone   Date of surgery / procedure: 1028   Time of surgery / procedure: dont have time schedules   Where do you plan to recover after surgery? at home with family        Fax number for surgical facility: Note does not need to be faxed, will be available electronically in Epic.    Assessment & Plan     The proposed surgical procedure is considered INTERMEDIATE risk.    Problem List Items Addressed This Visit          Respiratory    KYLAH on CPAP -- Uses nightly, finds helpful and beneficial       Digestive    Class 3 severe obesity due to excess calories with serious comorbidity and body mass index (BMI) of 40.0 to 44.9 in adult (H)       Circulatory    Chronic heart failure with preserved ejection fraction (HFpEF) (H)    Relevant Orders    EKG 12-lead, tracing only (Same Day) (Completed)    Benign essential hypertension    Relevant Orders    EKG 12-lead, tracing only (Same Day) (Completed)       Urinary    CKD (chronic kidney disease) stage 2, GFR 60-89 ml/min       Other    Personal history of malignant neoplasm of bladder -- s/p TURBT x2 in 3/2018 and 4/2018 for high-grade non-muscle invasive bladder cancer, now s/p BCG treatment - last cystoscopy 1/20/2021 was normal     Other Visit Diagnoses       Preop general physical exam    -  Primary    Elevated prostate specific antigen (PSA)        Pre-op exam        Nodular prostate with lower urinary tract symptoms              Primary concern today with blood pressure being elevated.  He has not been taking furosemide at  home with the past several days.  Encouraged him to take this daily up until the day of surgery, may hold day of surgery if desired.  If blood pressures continue to run greater than goal, they will reach out next week.  He is otherwise optimized for upcoming procedure as requested.          - No identified additional risk factors other than previously addressed    Preoperative Medication Instructions  Antiplatelet or Anticoagulation Medication Instructions   - Patient is on no antiplatelet or anticoagulation medications.    Additional Medication Instructions  Take all scheduled medications on the day of surgery    No naproxen for 3 days before  Take lasix daily-may hold day of procedure    Recommendation  Approval given to proceed with proposed procedure, without further diagnostic evaluation.    Igor Figueroa is a 69 year old, presenting for the following:  Pre-Op Exam        HPI related to upcoming procedure: He comes in today for preoperative clearance for prostate biopsy.  He is feeling well, no recent chest pains or shortness of breath.  Blood pressure is elevated in office today.  Typically blood pressures in the 130s over 80s at home.  They have been traveling so he has not taken his Lasix for a few days.        10/18/2024   Pre-Op Questionnaire   Have you ever had a heart attack or stroke? No   Have you ever had surgery on your heart or blood vessels, such as a stent placement, a coronary artery bypass, or surgery on an artery in your head, neck, heart, or legs? No   Do you have chest pain with activity? No   Do you have a history of heart failure? No   Do you currently have a cold, bronchitis or symptoms of other infection? No   Do you have a cough, shortness of breath, or wheezing? No   Do you or anyone in your family have previous history of blood clots? No   Do you or does anyone in your family have a serious bleeding problem such as prolonged bleeding following surgeries or cuts? No   Have you ever  had problems with anemia or been told to take iron pills? No   Have you had any abnormal blood loss such as black, tarry or bloody stools? No   Have you ever had a blood transfusion? (!) YES   Have you ever had a transfusion reaction? No   Are you willing to have a blood transfusion if it is medically needed before, during, or after your surgery? Yes   Have you or any of your relatives ever had problems with anesthesia? No   Do you have sleep apnea, excessive snoring or daytime drowsiness? (!) YES   Do you have a CPAP machine? Yes   Do you have any artifical heart valves or other implanted medical devices like a pacemaker, defibrillator, or continuous glucose monitor? No   Do you have artificial joints? No   Are you allergic to latex? No        Health Care Directive  Patient does not have a Health Care Directive or Living Will: Discussed advance care planning with patient; however, patient declined at this time.    Preoperative Review of    reviewed - no record of controlled substances prescribed.      Status of Chronic Conditions:  See problem list for active medical problems.  Problems all longstanding and stable, except as noted/documented.  See ROS for pertinent symptoms related to these conditions.    Patient Active Problem List    Diagnosis Date Noted    Benign essential hypertension 06/25/2024     Priority: Medium    Intermittent low back pain 06/25/2024     Priority: Medium    Trigger point with back pain 06/25/2024     Priority: Medium    Thrombocythemia, essential (H) 05/28/2024     Priority: Medium    Class 3 severe obesity due to excess calories with serious comorbidity and body mass index (BMI) of 40.0 to 44.9 in adult (H) 12/01/2023     Priority: Medium    Weakness of left hip 10/09/2023     Priority: Medium    Hair loss 05/03/2023     Priority: Medium    Erectile dysfunction, unspecified erectile dysfunction type 05/03/2023     Priority: Medium    Neuralgia of left perineal nerve 05/03/2023      Priority: Medium    Sleep difficulties 05/03/2023     Priority: Medium    Asthma 04/05/2023     Priority: Medium    Chronic heart failure with preserved ejection fraction (HFpEF) (H) 02/24/2023     Priority: Medium    Complication of artery associated with surgical procedure - Left superior gluteal artery Injury -- subsequently thrombosed / embolized by Interventional Radiology 02/15/2023     Priority: Medium    Tear of left gluteus elif muscle 02/15/2023     Priority: Medium    Injury of muscle of left thigh 02/15/2023     Priority: Medium    Left leg weakness 02/15/2023     Priority: Medium    Generalized muscle weakness 02/06/2023     Priority: Medium    Scrotal pain 02/06/2023     Priority: Medium    Drug-induced constipation 02/06/2023     Priority: Medium    Urinary retention 01/08/2023     Priority: Medium    Hematoma of left lower leg 01/07/2023     Priority: Medium    Asplenia 01/07/2023     Priority: Medium    Low oxygen saturation - during surgery. 01/04/2023     Priority: Medium    Hypertriglyceridemia 11/11/2022     Priority: Medium    Coronary artery calcification 10/18/2022     Priority: Medium    Bilateral renal cysts 08/30/2022     Priority: Medium    Chronic radicular low back pain - Following with back pain clinic - s/p injections and nerve burning procedures at [place] - minimally helpful 08/30/2022     Priority: Medium    Hyperuricemia 12/23/2021     Priority: Medium    CKD (chronic kidney disease) stage 2, GFR 60-89 ml/min 10/27/2021     Priority: Medium    Low testosterone in male 08/18/2021     Priority: Medium    Radicular low back pain 04/27/2021     Priority: Medium    Neuroforaminal stenosis of lumbar spine 04/27/2021     Priority: Medium    Myofascial muscle pain 04/14/2021     Priority: Medium    Trigger point of right shoulder region 04/14/2021     Priority: Medium    Sacroiliac joint pain - left 04/14/2021     Priority: Medium    Osteoarthritis of lumbar spine, unspecified spinal  osteoarthritis complication status 04/14/2021     Priority: Medium    Hypercalcemia 03/12/2021     Priority: Medium    Primary insomnia 01/31/2021     Priority: Medium    Polymyalgia rheumatica (H) 01/31/2021     Priority: Medium    Personal history of malignant neoplasm of bladder -- s/p TURBT x2 in 3/2018 and 4/2018 for high-grade non-muscle invasive bladder cancer, now s/p BCG treatment - last cystoscopy 1/20/2021 was normal 01/31/2021     Priority: Medium    Steroid-induced hyperglycemia 01/29/2021     Priority: Medium    Bilateral lower extremity edema 01/29/2021     Priority: Medium    Vitamin B12 deficiency 01/29/2021     Priority: Medium    Chronic fatigue syndrome with fibromyalgia 01/29/2021     Priority: Medium    History of Lyme disease 01/29/2021     Priority: Medium    Meniere disease, bilateral 06/01/2018     Priority: Medium    Rotator cuff syndrome of right shoulder 04/23/2018     Priority: Medium    Lung mass 03/15/2018     Priority: Medium    History of carpal tunnel surgery 11/02/2017     Priority: Medium    Vitamin D deficiency 10/10/2016     Priority: Medium    Postoperative hypothyroidism 01/11/2011     Priority: Medium    Mixed hyperlipidemia 07/12/2010     Priority: Medium    Psychosexual dysfunction with inhibited sexual excitement 02/17/2010     Priority: Medium    History of kidney stones 02/17/2010     Priority: Medium    KYLAH on CPAP -- Uses nightly, finds helpful and beneficial 02/17/2010     Priority: Medium    History of thyroid cancer 02/17/1994     Priority: Medium     Formatting of this note might be different from the original.  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     Priority: Medium      Past Medical History:   Diagnosis Date    Acute and chronic respiratory failure with hypoxia (H) 02/06/2023    Bell's palsy 03/30/2017    Bilateral carpal tunnel syndrome     No Comments Provided    Essential (primary) hypertension     No Comments Provided    Gout  06/03/2013    Hyperlipidemia     No Comments Provided    Hypothyroidism     No Comments Provided    Malignant neoplasm of thyroid gland (H)     1994    Postoperative hematoma of musculoskeletal structure following musculoskeletal procedure     Sleep apnea     No Comments Provided     Past Surgical History:   Procedure Laterality Date    COLONOSCOPY  09/09/2014 9/2014,Normal - follow up 10 years    CV CORONARY ANGIOGRAM N/A 10/31/2022    Procedure: Coronary Angiogram;  Surgeon: Ender Michaels MD;  Location:  [facility]    CYSTOSCOPY      bladder cancer    INCISION AND DRAINAGE LOWER EXTREMITY, COMBINED Left 1/7/2023    Procedure: INCISION AND DRAINAGE, LEFT BUTTOCK (Drain Hematoma of Left Buttock);  Surgeon: Ronald Mccall MD;  Location: [facility] OR    OTHER SURGICAL HISTORY      ,SPLENECTOMY    OTHER SURGICAL HISTORY      2010,[id number],EYE MUSCLE SURGERY,Left,strabismus    OTHER SURGICAL HISTORY      10/06/14,[id number],OTHER,Left,Dr. Lana LEHMAN    OTHER SURGICAL HISTORY      10/14/14,[id number],OTHER,Left,Dr. Lana LEHMAN    OTHER SURGICAL HISTORY      11/16/2017,[id number],ENDOSCOPIC RELEASE TRANSVERSE CARPAL LIGAMENT OF HAND,Left    RELEASE CARPAL TUNNEL      12/2011    RETINAL DETACHMENT SURGERY Right     STRABISMUS SURGERY Left     THYROIDECTOMY      1994,thyroid ca    TONSILLECTOMY      2004,T & A > 11yo    VASECTOMY      No Comments Provided     Current Outpatient Medications   Medication Sig Dispense Refill    allopurinol (ZYLOPRIM) 300 MG tablet TAKE 1 TABLET (300 MG) BY MOUTH DAILY - FOR GOUT PREVENTION 90 tablet 4    B Complex-C (VITAMIN B COMPLEX W/VITAMIN C) TABS tablet Take 1 tablet by mouth daily      carvedilol (COREG) 6.25 MG tablet Take 1.5 tablets (9.375 mg) by mouth 2 times daily (with meals) 360 tablet 4    Cholecalciferol (VITAMIN D-3) 125 MCG (5000 UT) TABS Take 5,000 Units by mouth daily (Patient taking differently: Take 1,000 Units by mouth daily.) 100 tablet 3     Cyanocobalamin 2500 MCG TABS Take 2,500 mcg by mouth daily -- or every other day -- OTC okay 90 tablet 4    estazolam (PROSOM) 1 MG tablet Take 1 tablet (1 mg) by mouth at bedtime 90 tablet 1    famotidine (PEPCID) 20 MG tablet Take 1 tablet (20 mg) by mouth 2 times daily 180 tablet 4    fenofibrate (TRICOR) 145 MG tablet TAKE 1 TABLET (145 MG) BY MOUTH DAILY 90 tablet 4    furosemide (LASIX) 40 MG tablet Take 1-2 tablets (40-80 mg) by mouth daily 180 tablet 4    gabapentin (NEURONTIN) 400 MG capsule Take 3 capsules (1,200 mg) by mouth 3 times daily -- Dose Increase 7/31/2024 820 capsule 1    levothyroxine (SYNTHROID/LEVOTHROID) 200 MCG tablet Take 1 tablet (200 mcg) by mouth daily 90 tablet 4    magnesium 250 MG tablet Take 1 tablet by mouth daily      Melatonin 10 MG TABS tablet Take 20 mg by mouth At Bedtime      Multiple Vitamins-Minerals (CENTRUM ADULTS PO) Take 1 tablet by mouth daily      order for DME CPAP machine for home use at pressure: 10, with supplies:  humidifier, chamber, mask, tubing      pregabalin (LYRICA) 100 MG capsule Take 1 capsule (100 mg) by mouth 3 times daily -- Dose Increase 7/31/2024 270 capsule 1    Probiotic Product (PROBIOTIC-10 PO) Take 1 tablet by mouth daily      sildenafil (VIAGRA) 100 MG tablet Take 1 tablet (100 mg) by mouth daily as needed 30 minutes to 4 hours prior to sexual activity 90 tablet 1    spironolactone (ALDACTONE) 50 MG tablet Take 1 tablet (50 mg) by mouth daily 90 tablet 4       Allergies   Allergen Reactions    Losartan Cough    Omeprazole Diarrhea    Pantoprazole Diarrhea    Valsartan Cough     Started with losartan, worsened with high dose valsartan        Social History     Tobacco Use    Smoking status: Never     Passive exposure: Never    Smokeless tobacco: Never   Substance Use Topics    Alcohol use: No     Comment: none     Family History   Problem Relation Age of Onset    Heart Disease Mother         Heart Disease,MI - SCD    Heart Disease Father          Heart Disease,MI - SCD     History   Drug Use No               Objective    BP (!) 152/90   Pulse 62   Temp 97.6  F (36.4  C)   Resp 20   Wt 139 kg (306 lb 6.4 oz)   SpO2 96%   BMI 41.56 kg/m     Estimated body mass index is 41.56 kg/m  as calculated from the following:    Height as of 7/31/24: 1.829 m (6').    Weight as of this encounter: 139 kg (306 lb 6.4 oz).  Physical Exam  GENERAL: alert and no distress  NECK: no adenopathy, no asymmetry, masses, or scars  RESP: lungs clear to auscultation - no rales, rhonchi or wheezes  CV: regular rate and rhythm, normal S1 S2, no S3 or S4, no murmur, click or rub, no peripheral edema  ABDOMEN: soft, nontender, no hepatosplenomegaly, no masses and bowel sounds normal  MS: no gross musculoskeletal defects noted, no edema    Recent Labs   Lab Test 06/25/24  1245 05/12/24  1625 12/01/23  1048 11/16/23  0749   HGB  --  16.2  --  16.9   PLT  --  440  --  480*    138   < > 142   POTASSIUM 4.6 3.8   < > 4.0   CR 1.79* 1.26*   < > 1.32*    < > = values in this interval not displayed.        Diagnostics  Recent Results (from the past 24 hour(s))   EKG 12-lead, tracing only (Same Day)    Collection Time: 10/18/24  3:10 PM   Result Value Ref Range    Systolic Blood Pressure  mmHg    Diastolic Blood Pressure  mmHg    Ventricular Rate 62 BPM    Atrial Rate 62 BPM    DE Interval 278 ms    QRS Duration 98 ms     ms    QTc 424 ms    P Axis 92 degrees    R AXIS 14 degrees    T Axis 44 degrees    Interpretation ECG       Sinus rhythm with 1st degree A-V block  Otherwise normal ECG  When compared with ECG of 12-May-2024 15:59,  No significant change was found     Basic metabolic panel    Collection Time: 10/18/24  3:12 PM   Result Value Ref Range    Sodium 138 135 - 145 mmol/L    Potassium 4.5 3.4 - 5.3 mmol/L    Chloride 104 98 - 107 mmol/L    Carbon Dioxide (CO2) 26 22 - 29 mmol/L    Anion Gap 8 7 - 15 mmol/L    Urea Nitrogen 27.4 (H) 8.0 - 23.0 mg/dL    Creatinine 1.54 (H)  0.67 - 1.17 mg/dL    GFR Estimate 48 (L) >60 mL/min/1.73m2    Calcium 10.4 8.8 - 10.4 mg/dL    Glucose 98 70 - 99 mg/dL      EKG: Normal Sinus Rhythm, unchanged from previous tracings    Revised Cardiac Risk Index (RCRI)  The patient has the following serious cardiovascular risks for perioperative complications:   - No serious cardiac risks = 0 points     RCRI Interpretation: 0 points: Class I (very low risk - 0.4% complication rate)         Signed Electronically by: JOSE Newton CNP  A copy of this evaluation report is provided to the requesting physician.

## 2024-10-18 NOTE — PROGRESS NOTES
Patient arrivd for Swab sample. Sample obtained and taken to lab for evaluation. Arabella Matias RN on 10/18/2024 at 2:26 PM

## 2024-10-21 LAB — BACTERIA UR CULT: NO GROWTH

## 2024-10-22 LAB — MISCELLANEOUS TEST 1 (ARUP): NORMAL

## 2024-10-23 LAB
ATRIAL RATE - MUSE: 62 BPM
DIASTOLIC BLOOD PRESSURE - MUSE: NORMAL MMHG
INTERPRETATION ECG - MUSE: NORMAL
P AXIS - MUSE: 92 DEGREES
PR INTERVAL - MUSE: 278 MS
QRS DURATION - MUSE: 98 MS
QT - MUSE: 418 MS
QTC - MUSE: 424 MS
R AXIS - MUSE: 14 DEGREES
SYSTOLIC BLOOD PRESSURE - MUSE: NORMAL MMHG
T AXIS - MUSE: 44 DEGREES
VENTRICULAR RATE- MUSE: 62 BPM

## 2024-10-25 ENCOUNTER — ANESTHESIA EVENT (OUTPATIENT)
Dept: SURGERY | Facility: OTHER | Age: 69
End: 2024-10-25
Payer: MEDICARE

## 2024-10-28 ENCOUNTER — HOSPITAL ENCOUNTER (OUTPATIENT)
Facility: OTHER | Age: 69
Discharge: HOME | End: 2024-10-28
Attending: UROLOGY | Admitting: UROLOGY
Payer: MEDICARE

## 2024-10-28 ENCOUNTER — ANESTHESIA (OUTPATIENT)
Dept: SURGERY | Facility: OTHER | Age: 69
End: 2024-10-28
Payer: MEDICARE

## 2024-10-28 VITALS
RESPIRATION RATE: 12 BRPM | WEIGHT: 306 LBS | DIASTOLIC BLOOD PRESSURE: 105 MMHG | HEIGHT: 72 IN | SYSTOLIC BLOOD PRESSURE: 161 MMHG | BODY MASS INDEX: 41.45 KG/M2 | TEMPERATURE: 98.7 F | OXYGEN SATURATION: 91 % | HEART RATE: 65 BPM

## 2024-10-28 DIAGNOSIS — N40.2 NODULAR PROSTATE WITHOUT LOWER URINARY TRACT SYMPTOMS: ICD-10-CM

## 2024-10-28 DIAGNOSIS — E55.9 VITAMIN D DEFICIENCY: ICD-10-CM

## 2024-10-28 DIAGNOSIS — R97.20 ELEVATED PSA: Primary | ICD-10-CM

## 2024-10-28 PROCEDURE — 258N000003 HC RX IP 258 OP 636

## 2024-10-28 PROCEDURE — 250N000011 HC RX IP 250 OP 636: Performed by: NURSE ANESTHETIST, CERTIFIED REGISTERED

## 2024-10-28 PROCEDURE — 999N000141 HC STATISTIC PRE-PROCEDURE NURSING ASSESSMENT: Performed by: UROLOGY

## 2024-10-28 PROCEDURE — 710N000012 HC RECOVERY PHASE 2, PER MINUTE: Performed by: UROLOGY

## 2024-10-28 PROCEDURE — 370N000017 HC ANESTHESIA TECHNICAL FEE, PER MIN: Performed by: UROLOGY

## 2024-10-28 PROCEDURE — 55700 AS BIOPSY PROSTATE NEEDLE/PUNCH: CPT | Performed by: NURSE ANESTHETIST, CERTIFIED REGISTERED

## 2024-10-28 PROCEDURE — 360N000075 HC SURGERY LEVEL 2, PER MIN: Performed by: UROLOGY

## 2024-10-28 PROCEDURE — 250N000009 HC RX 250: Performed by: UROLOGY

## 2024-10-28 PROCEDURE — 250N000009 HC RX 250: Performed by: NURSE ANESTHETIST, CERTIFIED REGISTERED

## 2024-10-28 PROCEDURE — 55700 PR BIOPSY OF PROSTATE,NEEDLE/PUNCH: CPT | Performed by: UROLOGY

## 2024-10-28 PROCEDURE — G0416 PROSTATE BIOPSY, ANY MTHD: HCPCS

## 2024-10-28 PROCEDURE — 250N000011 HC RX IP 250 OP 636: Performed by: UROLOGY

## 2024-10-28 PROCEDURE — 272N000001 HC OR GENERAL SUPPLY STERILE: Performed by: UROLOGY

## 2024-10-28 PROCEDURE — 250N000013 HC RX MED GY IP 250 OP 250 PS 637: Performed by: UROLOGY

## 2024-10-28 RX ORDER — ACETAMINOPHEN 650 MG/1
650 SUPPOSITORY RECTAL ONCE
Status: COMPLETED | OUTPATIENT
Start: 2024-10-28 | End: 2024-10-28

## 2024-10-28 RX ORDER — ONDANSETRON 2 MG/ML
INJECTION INTRAMUSCULAR; INTRAVENOUS PRN
Status: DISCONTINUED | OUTPATIENT
Start: 2024-10-28 | End: 2024-10-28

## 2024-10-28 RX ORDER — ACETAMINOPHEN 325 MG/1
975 TABLET ORAL ONCE
Status: COMPLETED | OUTPATIENT
Start: 2024-10-28 | End: 2024-10-28

## 2024-10-28 RX ORDER — ONDANSETRON 4 MG/1
4 TABLET, ORALLY DISINTEGRATING ORAL EVERY 30 MIN PRN
Status: DISCONTINUED | OUTPATIENT
Start: 2024-10-28 | End: 2024-10-28 | Stop reason: HOSPADM

## 2024-10-28 RX ORDER — LEVOFLOXACIN 5 MG/ML
500 INJECTION, SOLUTION INTRAVENOUS ONCE
Status: COMPLETED | OUTPATIENT
Start: 2024-10-28 | End: 2024-10-28

## 2024-10-28 RX ORDER — PROPOFOL 10 MG/ML
INJECTION, EMULSION INTRAVENOUS PRN
Status: DISCONTINUED | OUTPATIENT
Start: 2024-10-28 | End: 2024-10-28

## 2024-10-28 RX ORDER — LIDOCAINE HYDROCHLORIDE 20 MG/ML
INJECTION, SOLUTION INFILTRATION; PERINEURAL PRN
Status: DISCONTINUED | OUTPATIENT
Start: 2024-10-28 | End: 2024-10-28

## 2024-10-28 RX ORDER — PROPOFOL 10 MG/ML
INJECTION, EMULSION INTRAVENOUS CONTINUOUS PRN
Status: DISCONTINUED | OUTPATIENT
Start: 2024-10-28 | End: 2024-10-28

## 2024-10-28 RX ORDER — LEVOFLOXACIN 500 MG/1
500 TABLET, FILM COATED ORAL DAILY
Qty: 2 TABLET | Refills: 0 | Status: SHIPPED | OUTPATIENT
Start: 2024-10-28

## 2024-10-28 RX ORDER — AMOXICILLIN 250 MG
1-2 CAPSULE ORAL 2 TIMES DAILY
Qty: 15 TABLET | Refills: 0 | Status: SHIPPED | OUTPATIENT
Start: 2024-10-28

## 2024-10-28 RX ORDER — LIDOCAINE 40 MG/G
CREAM TOPICAL
Status: DISCONTINUED | OUTPATIENT
Start: 2024-10-28 | End: 2024-10-28 | Stop reason: HOSPADM

## 2024-10-28 RX ORDER — SODIUM CHLORIDE, SODIUM LACTATE, POTASSIUM CHLORIDE, CALCIUM CHLORIDE 600; 310; 30; 20 MG/100ML; MG/100ML; MG/100ML; MG/100ML
INJECTION, SOLUTION INTRAVENOUS CONTINUOUS
Status: DISCONTINUED | OUTPATIENT
Start: 2024-10-28 | End: 2024-10-28

## 2024-10-28 RX ORDER — NALOXONE HYDROCHLORIDE 0.4 MG/ML
0.1 INJECTION, SOLUTION INTRAMUSCULAR; INTRAVENOUS; SUBCUTANEOUS
Status: DISCONTINUED | OUTPATIENT
Start: 2024-10-28 | End: 2024-10-28 | Stop reason: HOSPADM

## 2024-10-28 RX ORDER — DEXAMETHASONE SODIUM PHOSPHATE 10 MG/ML
4 INJECTION, SOLUTION INTRAMUSCULAR; INTRAVENOUS
Status: DISCONTINUED | OUTPATIENT
Start: 2024-10-28 | End: 2024-10-28 | Stop reason: HOSPADM

## 2024-10-28 RX ORDER — ONDANSETRON 2 MG/ML
4 INJECTION INTRAMUSCULAR; INTRAVENOUS EVERY 30 MIN PRN
Status: DISCONTINUED | OUTPATIENT
Start: 2024-10-28 | End: 2024-10-28 | Stop reason: HOSPADM

## 2024-10-28 RX ORDER — OXYCODONE HYDROCHLORIDE 5 MG/1
10 TABLET ORAL
Status: DISCONTINUED | OUTPATIENT
Start: 2024-10-28 | End: 2024-10-28 | Stop reason: HOSPADM

## 2024-10-28 RX ORDER — OXYCODONE HYDROCHLORIDE 5 MG/1
5 TABLET ORAL
Status: DISCONTINUED | OUTPATIENT
Start: 2024-10-28 | End: 2024-10-28 | Stop reason: HOSPADM

## 2024-10-28 RX ADMIN — LEVOFLOXACIN 500 MG: 500 INJECTION, SOLUTION INTRAVENOUS at 07:25

## 2024-10-28 RX ADMIN — LIDOCAINE HYDROCHLORIDE 60 MG: 20 INJECTION, SOLUTION INFILTRATION; PERINEURAL at 07:36

## 2024-10-28 RX ADMIN — SODIUM CHLORIDE, POTASSIUM CHLORIDE, SODIUM LACTATE AND CALCIUM CHLORIDE 100 ML/HR: 600; 310; 30; 20 INJECTION, SOLUTION INTRAVENOUS at 07:24

## 2024-10-28 RX ADMIN — ONDANSETRON HYDROCHLORIDE 4 MG: 2 SOLUTION INTRAMUSCULAR; INTRAVENOUS at 07:40

## 2024-10-28 RX ADMIN — PROPOFOL 100 MG: 10 INJECTION, EMULSION INTRAVENOUS at 07:36

## 2024-10-28 RX ADMIN — ACETAMINOPHEN 975 MG: 325 TABLET, FILM COATED ORAL at 06:57

## 2024-10-28 RX ADMIN — PROPOFOL 140 MCG/KG/MIN: 10 INJECTION, EMULSION INTRAVENOUS at 07:40

## 2024-10-28 ASSESSMENT — ACTIVITIES OF DAILY LIVING (ADL)
ADLS_ACUITY_SCORE: 0

## 2024-10-28 NOTE — OP NOTE
Operative report    Preoperative diagnosis: Nodular prostate without lower urinary tract symptoms  Elevated PSA    Postoperative diagnosis: Same    Procedure: Transrectal ultrasound-guided biopsy of the prostate    Anesthesia type: Timoteo Gabriel CRNA    Anesthesiologist: MAC with local    Surgeon:  Spike Milton MD    Procedure description: Informed consent was obtained.  The patient was taken to the procedure room.  Timeout was done.  All were in agreement    He had been given Levaquin IV per guidelines for his hip replacement last year.    All blood thinners have been held.    He was placed on his left side in a fetal position.    Ultrasound was inserted into the rectum and the prostate was visualized in a longitudinal and sagittal position.     Findings:  Height: 35  Width: 48.8  Length: 43.2  Volume: 38.6  PSA density: N/A  SV: Normal  Bladder Contour: Normal    Prostate: Heterogeneous transition zone with multiple cystic areas left mid and base, hypoechoic area left base laterally transitioning to the left mid      Complications:  None    Patient tolerated procedure well.     Transrectal Ultrasound Guided Biopsy of the Prostate    Indication: Elevated PSA, nodular SAEID    Antibiotics: Levaquin 750 mg IV    Risks discussed:  Bleeding, infection, UTI, sepsis requiring hospitalization, urinary retention requiring martin, long term risk of erectile dysfunction.    Procedure:  Informed consent was obtained.  Antibiotics had been given prior to procedure.  Patient was positioned in a left lateral fetal position.  The ultrasound was gently inserted into the prostate.  Ultrasound guidance was used to direct biopsies.  Standard 12 core biopsy was done with extra biopsies as needed for any suspicious area.  Once finished the physician's finger was used to hold pressure and control any bleeding for 5 minutes.    Complications:  None    Patient tolerated procedure well.    Patient instructions:  No alcohol for 72 hours.   Patient advised to take it easy with no lifting or aggressive activities for 72 hours.  Drink plenty of fluids.  Do not hold urine. Void often.  Finish all antibiotics.  Avoid any blood thinners for 1 to 2 weeks until bleeding has stopped.  Call with fever, chills, inability to void, excessive bleeding, or concerns.      Thorough discussion with patient about the restrictions after biopsy and the need to avoid alcohol and strenuous activity.  Explicit instructions given to call with any fever or chills or inability to void.  Patient expressed an understanding and consented to procedure.    Patient asked to call for results in 1 week and to not rely on us calling him.  General f/u in 2 weeks to discuss results.        EBL: None    Drains: None    Complications: None    Specimens: prostate biopsy    Disposition: Recovery in good condition with follow-up to discuss results and for 3 weeks

## 2024-10-28 NOTE — ANESTHESIA CARE TRANSFER NOTE
Patient: Selvin An    Procedure: Procedure(s):  BIOPSY, PROSTATE, RECTAL APPROACH       Diagnosis: Elevated prostate specific antigen (PSA) [R97.20]  Diagnosis Additional Information: No value filed.    Anesthesia Type:   MAC     Note:    Oropharynx: oropharynx clear of all foreign objects and spontaneously breathing  Level of Consciousness: drowsy  Oxygen Supplementation: face mask  Level of Supplemental Oxygen (L/min / FiO2): 6  Independent Airway: airway patency satisfactory and stable  Dentition: dentition unchanged  Vital Signs Stable: post-procedure vital signs reviewed and stable  Report to RN Given: handoff report given  Patient transferred to: Phase II    Handoff Report: Identifed the Patient, Identified the Reponsible Provider, Reviewed the pertinent medical history, Discussed the surgical course, Reviewed Intra-OP anesthesia mangement and issues during anesthesia, Set expectations for post-procedure period and Allowed opportunity for questions and acknowledgement of understanding      Vitals:  Vitals Value Taken Time   /85 10/28/24 0758   Temp     Pulse 63 10/28/24 0758   Resp     SpO2 91 % 10/28/24 0800   Vitals shown include unfiled device data.    Electronically Signed By: JOSE SLOAN CRNA  October 28, 2024  8:01 AM

## 2024-10-28 NOTE — BRIEF OP NOTE
[facility]    Brief Operative Note    Pre-operative diagnosis: Elevated prostate specific antigen (PSA) [R97.20]  Post-operative diagnosis Elevated PSA, nodular SAEID    Procedure: BIOPSY, PROSTATE, RECTAL APPROACH, N/A - Rectum    Surgeon: Surgeons and Role:     * Spike Milton MD - Primary  Anesthesia: MAC with Local   Estimated Blood Loss: None    Drains: None  Specimens:   ID Type Source Tests Collected by Time Destination   1 : Right Base Lateral Tissue Prostate SURGICAL PATHOLOGY EXAM Spike Milton MD 10/28/2024  7:04 AM    2 : Right Base Medial Tissue Prostate SURGICAL PATHOLOGY EXAM Spike Milton MD 10/28/2024  7:04 AM    3 : Right Mid Lateral Tissue Prostate SURGICAL PATHOLOGY EXAM Spike Milton MD 10/28/2024  7:04 AM    4 : Right Mid Medial Tissue Prostate SURGICAL PATHOLOGY EXAM Spike Milton MD 10/28/2024  7:04 AM    5 : Right [place] Lateral Tissue Prostate SURGICAL PATHOLOGY EXAM Spike Milton MD 10/28/2024  7:04 AM    6 : Right [place] Medial Tissue Prostate SURGICAL PATHOLOGY EXAM Spike Milton MD 10/28/2024  7:04 AM    7 : Left Base Lateral Tissue Prostate SURGICAL PATHOLOGY EXAM Spike Milton MD 10/28/2024  7:04 AM    8 : Left Base Medial Tissue Prostate SURGICAL PATHOLOGY EXAM Spike Milton MD 10/28/2024  7:04 AM    9 : Left Mid Lateral Tissue Prostate SURGICAL PATHOLOGY EXAM Spike Milton MD 10/28/2024  7:04 AM    10 : Left Mid Medial Tissue Prostate SURGICAL PATHOLOGY EXAM Spike Milton MD 10/28/2024  7:04 AM    11 : Left [place] Lateral Tissue Prostate SURGICAL PATHOLOGY EXAM pSike Milton MD 10/28/2024  7:04 AM    12 : Left [place] Medial Tissue Prostate SURGICAL PATHOLOGY EXAM Spike Milton MD 10/28/2024  7:04 AM      Findings:   Multiple hypoechoic/cystic areas left TZ mid/base, heterogenous TZ, PZ homogenous .  Complications: None.  Implants: * No implants in log *

## 2024-10-28 NOTE — ANESTHESIA POSTPROCEDURE EVALUATION
Patient: Selvin An    Procedure: Procedure(s):  BIOPSY, PROSTATE, RECTAL APPROACH       Anesthesia Type:  MAC    Note:  Disposition: Outpatient   Postop Pain Control: Uneventful            Sign Out: Well controlled pain   PONV: No   Neuro/Psych: Uneventful            Sign Out: Acceptable/Baseline neuro status   Airway/Respiratory: Uneventful            Sign Out: Acceptable/Baseline resp. status   CV/Hemodynamics: Uneventful            Sign Out: Acceptable CV status; No obvious hypovolemia; No obvious fluid overload   Other NRE: NONE   DID A NON-ROUTINE EVENT OCCUR? No           Last vitals:  Vitals Value Taken Time   /105 10/28/24 0900   Temp 98.7  F (37.1  C) 10/28/24 0815   Pulse 65 10/28/24 0855   Resp 12 10/28/24 0900   SpO2 91 % 10/28/24 0900   Vitals shown include unfiled device data.    Electronically Signed By: JOSE HALL CRNA  October 28, 2024  11:35 AM

## 2024-10-28 NOTE — DISCHARGE INSTRUCTIONS
[facility]  Adult Discharge Orders & Instructions    ________________________________________________________________          For 12 hours after surgery  Get plenty of rest.  A responsible adult must stay with you for at least 12 hours after you leave the hospital.   You may feel lightheaded.  IF so, sit for a few minutes before standing.  Have someone help you get up.   You may have a slight fever. Call the doctor if your fever is over 101 F (38.3 C) (taken under the tongue) or lasts longer than 24 hours.  You may have a dry mouth, a sore throat, muscle aches or trouble sleeping.  These should go away after 24 hours.  Do not make important or legal decisions.  6.   Do not drive or use heavy equipment.  If you have weakness or tingling, don't drive or use heavy equipment until this feeling goes away.    To contact a doctor, call   [phone]_______________________

## 2024-10-28 NOTE — OR NURSING
Patient has been discharged to home at 0910 via w/c accompanied by his wife     Written discharge instructions were provided to patient and wife.  Prescriptions were e-script. Patient states pain is 0/10 and denies any nausea or dizziness upon discharge.      Patient and adult caring for them verbalize understanding of discharge instructions including no driving until tomorrow and no longer taking narcotic pain medications - no operating mechanical equipment and no making any important decisions.They understand reason for discharge, and necessary follow-up appointments.       Magaly Cintron RN

## 2024-10-28 NOTE — ANESTHESIA PREPROCEDURE EVALUATION
Anesthesia Pre-Procedure Evaluation    Patient: Selvin An   MRN: [id number] : 1955        Procedure : Procedure(s):  BIOPSY, PROSTATE, RECTAL APPROACH          Past Medical History:   Diagnosis Date     Acute and chronic respiratory failure with hypoxia (H) 2023     Bell's palsy 2017     Bilateral carpal tunnel syndrome     No Comments Provided     Essential (primary) hypertension     No Comments Provided     Gout 2013     Hyperlipidemia     No Comments Provided     Hypothyroidism     No Comments Provided     Malignant neoplasm of thyroid gland (H)          Postoperative hematoma of musculoskeletal structure following musculoskeletal procedure      Sleep apnea     uses CPAP      Past Surgical History:   Procedure Laterality Date     COLONOSCOPY  2014,Normal - follow up 10 years     CV CORONARY ANGIOGRAM N/A 10/31/2022    Procedure: Coronary Angiogram;  Surgeon: Ender Michaels MD;  Location:  [facility]     CYSTOSCOPY      bladder cancer     INCISION AND DRAINAGE LOWER EXTREMITY, COMBINED Left 2023    Procedure: INCISION AND DRAINAGE, LEFT BUTTOCK (Drain Hematoma of Left Buttock);  Surgeon: Ronald Mccall MD;  Location:  OR     OTHER SURGICAL HISTORY      ,SPLENECTOMY     OTHER SURGICAL HISTORY      ,70033,EYE MUSCLE SURGERY,Left,strabismus     OTHER SURGICAL HISTORY      10/06/14,[id number],OTHER,Left,Dr. Lana LEHMAN     OTHER SURGICAL HISTORY      10/14/14,[id number],OTHER,Left,Dr. Lana LEHMAN     OTHER SURGICAL HISTORY      2017,[id number],ENDOSCOPIC RELEASE TRANSVERSE CARPAL LIGAMENT OF HAND,Left     RELEASE CARPAL TUNNEL      2011     RETINAL DETACHMENT SURGERY Right      STRABISMUS SURGERY Left      THYROIDECTOMY      ,thyroid ca     TONSILLECTOMY      ,T & A > 11yo     VASECTOMY      No Comments Provided      Allergies   Allergen Reactions     Losartan Cough     Omeprazole Diarrhea     Pantoprazole Diarrhea      Valsartan Cough     Started with losartan, worsened with high dose valsartan      Social History     Tobacco Use     Smoking status: Never     Passive exposure: Never     Smokeless tobacco: Never   Substance Use Topics     Alcohol use: No     Comment: none      Wt Readings from Last 1 Encounters:   10/28/24 138.8 kg (306 lb)        Anesthesia Evaluation   Pt has had prior anesthetic.     History of anesthetic complications  - PONV.      ROS/MED HX  ENT/Pulmonary:     (+) sleep apnea, uses CPAP,                                      Neurologic: Comment: Nerve injury following gluteal artery injury.  Bell's palsy.      Cardiovascular:     (+)  hypertension- -  CAD -  - -      CHF     CAMPOS.                      Previous cardiac testing   Echo: Date: 12/02/22 Results:    Stress Test:  Date: Results:    ECG Reviewed:  Date: 10/28/24 Results:  Sinus rhythm with 1st degree A-V block   Otherwise normal ECG   When compared with ECG of 12-May-2024 15:59,   No significant change was found   Confirmed by MD GARTH, MARTHA ([id number]) on 10/23/2024 5:07:08 PM     Cath:  Date: Results:      METS/Exercise Tolerance: 3 - Able to walk 1-2 blocks without stopping Comment: Ambulates with a cane   Hematologic:  - neg hematologic  ROS     Musculoskeletal:  - neg musculoskeletal ROS     GI/Hepatic:  - neg GI/hepatic ROS     Renal/Genitourinary:     (+) renal disease, type: CRI,            Endo:     (+)          thyroid problem, hypothyroidism,           Psychiatric/Substance Use:  - neg psychiatric ROS     Infectious Disease:  - neg infectious disease ROS     Malignancy:   (+) Malignancy, History of Other.Other CA Thyroid/bladder Remission status post Surgery.    Other:  - neg other ROS          Physical Exam    Airway        Mallampati: IV   TM distance: > 3 FB   Neck ROM: full   Mouth opening: > 3 cm    Respiratory Devices and Support         Dental       (+) Minor Abnormalities - some fillings, tiny chips      Cardiovascular    "cardiovascular exam normal       Rhythm and rate: regular and normal     Pulmonary   pulmonary exam normal        breath sounds clear to auscultation         OUTSIDE LABS:  CBC:   Lab Results   Component Value Date    WBC 12.6 (H) 05/12/2024    WBC 9.6 11/16/2023    HGB 16.2 05/12/2024    HGB 16.9 11/16/2023    HCT 46.6 05/12/2024    HCT 50.1 11/16/2023     05/12/2024     (H) 11/16/2023     BMP:   Lab Results   Component Value Date     10/18/2024     06/25/2024    POTASSIUM 4.5 10/18/2024    POTASSIUM 4.6 06/25/2024    CHLORIDE 104 10/18/2024    CHLORIDE 99 06/25/2024    CO2 26 10/18/2024    CO2 22 06/25/2024    BUN 27.4 (H) 10/18/2024    BUN 43.6 (H) 06/25/2024    CR 1.54 (H) 10/18/2024    CR 1.79 (H) 06/25/2024    GLC 98 10/18/2024     (H) 06/25/2024     COAGS:   Lab Results   Component Value Date    PTT 31 02/24/2017    INR 1.0 12/12/2017     POC: No results found for: \"BGM\", \"HCG\", \"HCGS\"  HEPATIC:   Lab Results   Component Value Date    ALBUMIN 4.5 06/25/2024    PROTTOTAL 8.4 (H) 06/25/2024    ALT 48 06/25/2024    AST 41 06/25/2024    ALKPHOS 74 06/25/2024    BILITOTAL 0.4 06/25/2024     OTHER:   Lab Results   Component Value Date    PH 7.35 01/08/2023    LACT 1.8 05/12/2024    A1C 6.0 11/11/2022    THAI 10.4 10/18/2024    MAG 1.9 09/10/2017    TSH 0.40 11/08/2022    T4 0.77 09/15/2021    SED 17 08/15/2023       Anesthesia Plan    ASA Status:  3    NPO Status:  NPO Appropriate    Anesthesia Type: MAC.     - Reason for MAC: chronic cardiopulmonary disease, straight local not clinically adequate   Induction: Intravenous.           Consents    Anesthesia Plan(s) and associated risks, benefits, and realistic alternatives discussed. Questions answered and patient/representative(s) expressed understanding.     - Discussed: Risks, Benefits and Alternatives for BOTH SEDATION and the PROCEDURE were discussed     - Discussed with:  Patient            Postoperative Care        "     Comments:               JOSE SLOAN CRNA    I have reviewed the pertinent notes and labs in the chart from the past 30 days and (re)examined the patient.  Any updates or changes from those notes are reflected in this note.               # Hypertension: Noted on problem list         # Severe Obesity: Estimated body mass index is 41.5 kg/m  as calculated from the following:    Height as of this encounter: 1.829 m (6').    Weight as of this encounter: 138.8 kg (306 lb).       # Asthma: noted on problem list

## 2024-10-28 NOTE — H&P
Chief Complaint: No chief complaint on file.  .    HPI: Mr. Selvin An is a 69 year old year old male with a history of CKD stage 2, hypothyroidism, morbid obesity and high-grade bladder cancer and previous BCG induction and therapy here today 10/28/24 for prostate biopsy.     History of nodular prostate for which I recommended an MRI.  There is a positive family history of prostate cancer.     MRI was done and read as normal, PI-RADS 2 with the prostate measuring 53 cm .  Despite this I recommended a prostate biopsy given his PSA velocity change and recent elevated PSA of 4.01 ng/ml.  Unfortunately he had had a recent hip therefore decision was made to postpone biopsy until today.     Patient with a history of hypogonadism on AndroGel.  This is despite his total testosterone being normal as well as his free testosterone.    Off blood thinners.  Rectal swab negative for fluoroquinolone resistance.         Past Medical History:   Diagnosis Date    Acute and chronic respiratory failure with hypoxia (H) 02/06/2023    Bell's palsy 03/30/2017    Bilateral carpal tunnel syndrome     No Comments Provided    Essential (primary) hypertension     No Comments Provided    Gout 06/03/2013    Hyperlipidemia     No Comments Provided    Hypothyroidism     No Comments Provided    Malignant neoplasm of thyroid gland (H)     1994    Postoperative hematoma of musculoskeletal structure following musculoskeletal procedure     Sleep apnea     uses CPAP       Past Surgical History:   Procedure Laterality Date    COLONOSCOPY  09/09/2014 9/2014,Normal - follow up 10 years    CV CORONARY ANGIOGRAM N/A 10/31/2022    Procedure: Coronary Angiogram;  Surgeon: Ender Michaels MD;  Location:  [facility]    CYSTOSCOPY      bladder cancer    INCISION AND DRAINAGE LOWER EXTREMITY, COMBINED Left 1/7/2023    Procedure: INCISION AND DRAINAGE, LEFT BUTTOCK (Drain Hematoma of Left Buttock);  Surgeon: Ronald Mccall MD;   Location: [facility] OR    OTHER SURGICAL HISTORY      ,SPLENECTOMY    OTHER SURGICAL HISTORY      2010,[id number],EYE MUSCLE SURGERY,Left,strabismus    OTHER SURGICAL HISTORY      10/06/14,881877,OTHER,Left,Dr. Lana LEHMAN    OTHER SURGICAL HISTORY      10/14/14,999954,OTHER,Left,Dr. Lana LEHMAN    OTHER SURGICAL HISTORY      11/16/2017,[id number],ENDOSCOPIC RELEASE TRANSVERSE CARPAL LIGAMENT OF HAND,Left    RELEASE CARPAL TUNNEL      12/2011    RETINAL DETACHMENT SURGERY Right     STRABISMUS SURGERY Left     THYROIDECTOMY      1994,thyroid ca    TONSILLECTOMY      2004,T & A > 11yo    VASECTOMY      No Comments Provided       FAMILY HISTORY: Reports a family history of prostate cancer.      SOCIAL HISTORY:    reports that he has never smoked. He has never been exposed to tobacco smoke. He has never used smokeless tobacco.    Current Outpatient Medications   Medication Sig Dispense Refill    allopurinol (ZYLOPRIM) 300 MG tablet TAKE 1 TABLET (300 MG) BY MOUTH DAILY - FOR GOUT PREVENTION 90 tablet 4    B Complex-C (VITAMIN B COMPLEX W/VITAMIN C) TABS tablet Take 1 tablet by mouth daily      carvedilol (COREG) 6.25 MG tablet Take 1.5 tablets (9.375 mg) by mouth 2 times daily (with meals) 360 tablet 4    Cholecalciferol (VITAMIN D-3) 125 MCG (5000 UT) TABS Take 5,000 Units by mouth daily (Patient taking differently: Take 1,000 Units by mouth daily.) 100 tablet 3    Cyanocobalamin 2500 MCG TABS Take 2,500 mcg by mouth daily -- or every other day -- OTC okay 90 tablet 4    estazolam (PROSOM) 1 MG tablet Take 1 tablet (1 mg) by mouth at bedtime 90 tablet 1    famotidine (PEPCID) 20 MG tablet Take 1 tablet (20 mg) by mouth 2 times daily 180 tablet 4    fenofibrate (TRICOR) 145 MG tablet TAKE 1 TABLET (145 MG) BY MOUTH DAILY 90 tablet 4    furosemide (LASIX) 40 MG tablet Take 1-2 tablets (40-80 mg) by mouth daily 180 tablet 4    gabapentin (NEURONTIN) 400 MG capsule Take 3 capsules (1,200 mg) by mouth 3 times daily -- Dose Increase  7/31/2024 820 capsule 1    levothyroxine (SYNTHROID/LEVOTHROID) 200 MCG tablet Take 1 tablet (200 mcg) by mouth daily 90 tablet 4    magnesium 250 MG tablet Take 1 tablet by mouth daily      Melatonin 10 MG TABS tablet Take 20 mg by mouth At Bedtime      Multiple Vitamins-Minerals (CENTRUM ADULTS PO) Take 1 tablet by mouth daily      order for DME CPAP machine for home use at pressure: 10, with supplies:  humidifier, chamber, mask, tubing      pregabalin (LYRICA) 100 MG capsule Take 1 capsule (100 mg) by mouth 3 times daily -- Dose Increase 7/31/2024 270 capsule 1    Probiotic Product (PROBIOTIC-10 PO) Take 1 tablet by mouth daily      sildenafil (VIAGRA) 100 MG tablet Take 1 tablet (100 mg) by mouth daily as needed 30 minutes to 4 hours prior to sexual activity 90 tablet 1    spironolactone (ALDACTONE) 50 MG tablet Take 1 tablet (50 mg) by mouth daily 90 tablet 4       ALLERGIES: Losartan, Omeprazole, Pantoprazole, and Valsartan     GENERAL PHYSICAL EXAM:   Vitals: There were no vitals taken for this visit.  There is no height or weight on file to calculate BMI.    GENERAL: Well groomed, well developed, well nourished male in NAD.  HEENT:  Normal   CV:  RRR  RESPIRATORY: Normal respiratory effort., CTA B   GI: Soft, NT, ND,  MS: Moving all four  NEURO: Alert and oriented x 3.  PSYCH: Normal mood and affect, pleasant and agreeable during interview and exam.    :  Prostate: 30 gm, firm, nodular right > left    RADIOLOGY: The following tests were reviewed: MR PELVIS PROSTATE [facility] 12/7/23     CLINICAL INFORMATION: , Nodular prostate with lower urinary tract  symptoms; Elevated prostate specific antigen (PSA) at 2.67.     COMPARISON: MR pelvis 3/9/2023.     TECHNICAL INFORMATION: 1.5T MR imaging of the prostate including high  resolution 3mm axial and sagittal T2 and axial 3.5mm diffusion  weighted (B0, B100, B800, and ) images through the prostate gland  and seminal vesicles. Dynamic enhanced images of the  prostate gland  were also obtained in the axial plane. Axial T1, axial T2, and axial  T1 postcontrast images were also obtained through the entire pelvis.  Images were analyzed using a separate [employer] workstation.     INTERPRETATION: There is prostate gland enlargement secondary to  central gland hyperplasia. The prostate gland measures 4.9 x 4.6 x 4.7  cm in AP, mediolateral and craniocaudal dimensions, respectively, for  a volume of 53 mL. No significant intrinsic T1 signal is seen within  the gland.     There is a circumscribed fatty lesion at the right base of the pelvis  resulting in right to left midline shift of the anorectal junction,  spanning 4.2 x 4.6 x 5.5 cm.     No pelvic adenopathy is identified. Prior left SI joint fusion is  noted. No suspicious osseous lesion is seen.                                                                      IMPRESSION:  No intermediate or high suspicion focal prostate lesion.     Right pelvic floor lipoma versus fatty herniation.     Assessment: PI-RADS 2     PI-RADS Assessment Categories:  Score 1 = very low; clinically significant disease highly unlikely  Score 2 = low; clinically significant disease is unlikely  Score 3 = intermediate; clinically significant disease is equivocal  Score 4 = high; clinically significant disease is likely  Score 5 = very high; clinically significant disease is highly likely     SIERRA RENNER MD     LABS: The last test results for Ms. Selvin An were reviewed.   No results found for this or any previous visit (from the past 24 hours).    PSA -   Lab Results   Component Value Date    PSA 4.01 09/26/2024    PSA 2.67 11/16/2023     BMP -   Recent Labs   Lab Test 10/18/24  1512 06/25/24  1245 05/12/24  1625 09/14/17  0916 09/10/17  2050    136 138   < > 138   POTASSIUM 4.5 4.6 3.8   < > 4.2   CHLORIDE 104 99 100   < > 101   CO2 26 22 25   < > 26   BUN 27.4* 43.6* 21.3   < > 29*   CR 1.54* 1.79* 1.26*   < > 1.56*   GLC 98 110*  129*   < > 105   THAI 10.4 10.9* 10.4*   < > 10.9*   MAG  --   --   --   --  1.9    < > = values in this interval not displayed.       CBC -   Recent Labs   Lab Test 05/12/24  1625 11/16/23  0749 08/15/23  1213   WBC 12.6* 9.6 8.9   HGB 16.2 16.9 16.3    480* 473*       ASSESSMENT:   Nodular Prostate  Elevated PSA  Family History of prostate cancer  History of HG TCCA Bladder  Hypogonadism    PLAN:   Current PSA Value:  4.01 ng/ml with firm nodular prostate on exam.    Discussion with patient about PSA, normal ranges, and possible etiologies of its elevation including prostate cancer, BPH or enlargement of the prostate, infection, lab error, chronic inflammation, BCG granulomatous prostatitis and obstructive urinary symptoms with retention.    Options discussed including watchful waiting, repeat PSA, prostate biopsy with risks including bleeding, infection, erectile dysfunction, retention of urine, UTI and sepsis, molecular testing with PCA3 or [facility] M[facility] to determine whether a biopsy would be warranted, 4K score and the use of % free and total PSA as a barometer for decision making.       Questions were answered and patient voiced an understanding.      He has agreed to prostate biopsy with Levaquin prophylaxis given his hip prosthesis.       Spike Milton MD   [facility]

## 2024-10-28 NOTE — OR NURSING
The patient was unable or refused to remove jewelry prior to their surgical procedure. The patient has been instructed on the risk of injury and possible infection. In the event jewelry or piercings are obstructing the surgical process or impeding circulation, the jewelry or piercings may need to be cut off. The surgeon and anesthesia provider have been notified that an item cannot be removed, or that the patient refuses to remove the jewelry or piercing. The surgeon and anesthesia provider will determine if it is in the patient's best interest to proceed with the procedure with the jewelry or piercings remaining in contact with the patient's body.      Ring taped left hand 4th finger.

## 2024-10-30 DIAGNOSIS — C61 PROSTATE CANCER (H): Primary | ICD-10-CM

## 2024-10-30 LAB
PATH REPORT.COMMENTS IMP SPEC: NORMAL
PATH REPORT.FINAL DX SPEC: NORMAL
PATH REPORT.RELEVANT HX SPEC: NORMAL
PHOTO IMAGE: NORMAL

## 2024-11-04 DIAGNOSIS — R97.20 ELEVATED PROSTATE SPECIFIC ANTIGEN (PSA): Primary | ICD-10-CM

## 2024-11-16 DIAGNOSIS — E78.1 HYPERTRIGLYCERIDEMIA: ICD-10-CM

## 2024-11-16 DIAGNOSIS — E78.2 MIXED DYSLIPIDEMIA: ICD-10-CM

## 2024-11-18 ENCOUNTER — LAB (OUTPATIENT)
Dept: LAB | Facility: OTHER | Age: 69
End: 2024-11-18
Attending: UROLOGY
Payer: MEDICARE

## 2024-11-18 ENCOUNTER — VIRTUAL VISIT (OUTPATIENT)
Dept: UROLOGY | Facility: OTHER | Age: 69
End: 2024-11-18
Attending: UROLOGY
Payer: MEDICARE

## 2024-11-18 VITALS
RESPIRATION RATE: 22 BRPM | BODY MASS INDEX: 40.36 KG/M2 | WEIGHT: 298 LBS | OXYGEN SATURATION: 94 % | HEART RATE: 77 BPM | HEIGHT: 72 IN

## 2024-11-18 DIAGNOSIS — R97.20 ELEVATED PROSTATE SPECIFIC ANTIGEN (PSA): ICD-10-CM

## 2024-11-18 DIAGNOSIS — C61 PROSTATE CANCER (H): Primary | ICD-10-CM

## 2024-11-18 DIAGNOSIS — Z85.51 PERSONAL HISTORY OF BLADDER CANCER: ICD-10-CM

## 2024-11-18 LAB
ALBUMIN UR-MCNC: NEGATIVE MG/DL
APPEARANCE UR: CLEAR
BILIRUB UR QL STRIP: NEGATIVE
COLOR UR AUTO: NORMAL
GLUCOSE UR STRIP-MCNC: NEGATIVE MG/DL
HGB UR QL STRIP: NEGATIVE
KETONES UR STRIP-MCNC: NEGATIVE MG/DL
LEUKOCYTE ESTERASE UR QL STRIP: NEGATIVE
NITRATE UR QL: NEGATIVE
PH UR STRIP: 5 [PH] (ref 5–9)
SP GR UR STRIP: 1.01 (ref 1–1.03)
UROBILINOGEN UR STRIP-MCNC: NORMAL MG/DL

## 2024-11-18 PROCEDURE — 81003 URINALYSIS AUTO W/O SCOPE: CPT | Mod: ZL

## 2024-11-18 PROCEDURE — G0463 HOSPITAL OUTPT CLINIC VISIT: HCPCS | Mod: GT

## 2024-11-18 RX ORDER — FENOFIBRATE 145 MG/1
145 TABLET, COATED ORAL DAILY
Qty: 90 TABLET | Refills: 2 | Status: SHIPPED | OUTPATIENT
Start: 2024-11-18

## 2024-11-18 NOTE — NURSING NOTE
Chief Complaint   Patient presents with    Path Results     Pathology review        Initial Pulse 77   Resp 22   Ht 1.829 m (6')   Wt 135.2 kg (298 lb)   SpO2 94%   BMI 40.42 kg/m   Estimated body mass index is 40.42 kg/m  as calculated from the following:    Height as of this encounter: 1.829 m (6').    Weight as of this encounter: 135.2 kg (298 lb).  Medication Reconciliation: complete    AUA-  9  Mary Ellen Canales LPN

## 2024-11-18 NOTE — TELEPHONE ENCOUNTER
[id number] sent Rx request for the following:      Requested Prescriptions   Pending Prescriptions Disp Refills    fenofibrate (TRICOR) 145 MG tablet [Pharmacy Med Name: FENOFIBRATE 145MG TABLET] 90 tablet 4     Sig: TAKE 1 TABLET (145 MG) BY MOUTH DAILY       Antihyperlipidemic agents Failed - 11/18/2024 10:46 AM        Failed - LDL on file in the past 12 months        Passed - Medication is active on med list        Passed - Recent (12 mo) or future (90 days) visit within the authorizing provider's specialty     The patient must have completed an in-person or virtual visit within the past 12 months or has a future visit scheduled within the next 90 days with the authorizing provider s specialty.  Urgent care and e-visits do not quality as an office visit for this protocol.          Passed - Patient is age 18 years or older             Last Prescription Date:   11/9/23  Last Fill Qty/Refills:         90, R-4    Last Office Visit:              10/18/24 (preop)   Future Office visit:             Next 5 appointments (look out 90 days)      Dec 10, 2024 11:00 AM  (Arrive by 10:45 AM)  Annual Wellness Visit with Matt Whitmore MD,  WELLNESS NURSE  [facility] ([facility] ) [address]  [place] [id number] [phone]           Unable to complete prescription refill per RN Medication Refill Policy.     Manjinder Bonilla RN on 11/18/2024 at 10:47 AM

## 2024-11-18 NOTE — PROGRESS NOTES
Type of service:  Video Visit   Video Visit Start Time: 2:00  Video Visit End Time: 2:30    Originating Location (pt. Location): [facility]  Distant Location (provider location): [place], [place]  Platform used for Video Visit: Epic Virtual Visit Platform    I have reviewed the note as documented above.  This accurately captures the substance of my virtual visit with the patient. The patient states an understanding and is agreeable with the plan.   Spike Milton MD   Urology     Chief Complaint: No chief complaint on file.  Elevated PSA status post prostate biopsy    HPI: Mr. Selvin An is a 69 year old year old male presenting today November 18, 2024 virtually in follow up for evaluation of an elevated PSA prompting prostate biopsy done 10/28/2024.  This demonstrated intermediate risk, group grade 2, [place] 3+4=7 adenocarcinoma of the prostate in 1 out of 12 biopsy sites.  20% tissue involvement.  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    History of CKD stage 2, hypothyroidism, morbid obesity and high-grade bladder cancer and previous BCG induction and therapy here today 10/28/24 for prostate biopsy.     History of nodular prostate for which I recommended an MRI.  There is a positive family history of prostate cancer.     MRI was done and read as normal, PI-RADS 2 with the prostate measuring 53 cm .  Despite this I recommended a prostate biopsy given his PSA velocity change and recent elevated PSA of 4.01 ng/ml.  Unfortunately he had had a recent hip therefore decision was made to postpone biopsy until today.     Patient with a history of hypogonadism on AndroGel.  This is despite his total testosterone being normal as well as his free testosterone.        Current Outpatient Medications   Medication Sig Dispense Refill    allopurinol (ZYLOPRIM) 300 MG tablet TAKE 1 TABLET (300 MG) BY MOUTH DAILY - FOR GOUT PREVENTION 90 tablet 4    B  Complex-C (VITAMIN B COMPLEX W/VITAMIN C) TABS tablet Take 1 tablet by mouth daily      carvedilol (COREG) 6.25 MG tablet Take 1.5 tablets (9.375 mg) by mouth 2 times daily (with meals) 360 tablet 4    Cyanocobalamin 2500 MCG TABS Take 2,500 mcg by mouth daily -- or every other day -- OTC okay 90 tablet 4    estazolam (PROSOM) 1 MG tablet Take 1 tablet (1 mg) by mouth at bedtime 90 tablet 1    famotidine (PEPCID) 20 MG tablet Take 1 tablet (20 mg) by mouth 2 times daily 180 tablet 4    fenofibrate (TRICOR) 145 MG tablet TAKE 1 TABLET (145 MG) BY MOUTH DAILY 90 tablet 2    furosemide (LASIX) 40 MG tablet Take 1-2 tablets (40-80 mg) by mouth daily 180 tablet 4    gabapentin (NEURONTIN) 400 MG capsule Take 3 capsules (1,200 mg) by mouth 3 times daily -- Dose Increase 7/31/2024 820 capsule 1    levofloxacin (LEVAQUIN) 500 MG tablet Take 1 tablet (500 mg) by mouth daily. Start Tuesday, 10/29/2020 4 in the morning 2 tablet 0    levothyroxine (SYNTHROID/LEVOTHROID) 200 MCG tablet Take 1 tablet (200 mcg) by mouth daily 90 tablet 4    magnesium 250 MG tablet Take 1 tablet by mouth daily      Melatonin 10 MG TABS tablet Take 20 mg by mouth At Bedtime      Multiple Vitamins-Minerals (CENTRUM ADULTS PO) Take 1 tablet by mouth daily      order for DME CPAP machine for home use at pressure: 10, with supplies:  humidifier, chamber, mask, tubing      pregabalin (LYRICA) 100 MG capsule Take 1 capsule (100 mg) by mouth 3 times daily -- Dose Increase 7/31/2024 270 capsule 1    Probiotic Product (PROBIOTIC-10 PO) Take 1 tablet by mouth daily      senna-docusate (SENOKOT-S/PERICOLACE) 8.6-50 MG tablet Take 1-2 tablets by mouth 2 times daily. 15 tablet 0    sildenafil (VIAGRA) 100 MG tablet Take 1 tablet (100 mg) by mouth daily as needed 30 minutes to 4 hours prior to sexual activity 90 tablet 1    spironolactone (ALDACTONE) 50 MG tablet Take 1 tablet (50 mg) by mouth daily 90 tablet 4       ALLERGIES: Losartan, Omeprazole, Pantoprazole,  and Valsartan     GENERAL PHYSICAL EXAM:   Vitals: There were no vitals taken for this visit.  There is no height or weight on file to calculate BMI.    GENERAL: Well groomed, well developed, well nourished male in NAD.  ENT:  ENT exam normal  RESPIRATORY: Normal respiratory effort.   MS: Visibly moving all four   NEURO: Alert and oriented x 3.  PSYCH: Normal mood and affect, pleasant and agreeable during interview and exam.  AUASS- 9  Pathology prostate: 10/28/2024:  Right medial mid: 3+4=7, 20% tissue involvement  Granulomatous prostatitis multiple areas      RADIOLOGY: The following tests were reviewed: None    LABS: The last test results for Mr. Selvin An were reviewed:  Results for orders placed or performed in visit on 11/18/24 (from the past 24 hours)   Urinalysis Macroscopic   Result Value Ref Range    Color Urine Light Yellow Colorless, Straw, Light Yellow, Yellow    Appearance Urine Clear Clear    Glucose Urine Negative Negative mg/dL    Bilirubin Urine Negative Negative    Ketones Urine Negative Negative mg/dL    Specific Gravity Urine 1.009 1.000 - 1.030    Blood Urine Negative Negative    pH Urine 5.0 5.0 - 9.0    Protein Albumin Urine Negative Negative mg/dL    Urobilinogen Urine Normal Normal, 2.0 mg/dL    Nitrite Urine Negative Negative    Leukocyte Esterase Urine Negative Negative       PSA -   Lab Results   Component Value Date    PSA 4.01 09/26/2024    PSA 2.67 11/16/2023     BMP -   Recent Labs   Lab Test 10/18/24  1512 06/25/24  1245 05/12/24  1625 09/14/17  0916 09/10/17  2050    136 138   < > 138   POTASSIUM 4.5 4.6 3.8   < > 4.2   CHLORIDE 104 99 100   < > 101   CO2 26 22 25   < > 26   BUN 27.4* 43.6* 21.3   < > 29*   CR 1.54* 1.79* 1.26*   < > 1.56*   GLC 98 110* 129*   < > 105   THAI 10.4 10.9* 10.4*   < > 10.9*   MAG  --   --   --   --  1.9    < > = values in this interval not displayed.       CBC -   Recent Labs   Lab Test 05/12/24  1625 11/16/23  0749 08/15/23  1213   WBC 12.6*  9.6 8.9   HGB 16.2 16.9 16.3    480* 473*       ASSESSMENT:   Prostate cancer, intermediate risk, group grade 2  Family History of prostate cancer  History of HG TCCA Bladder  Hypogonadism    PLAN:   Recent diagnosis of prostate cancer in this patient.  Group grade 2, [place]'s 3+4=7, 1 out of 12 biopsy cores, 20% tissue involvement.  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    History of pudendal nerve injury and previous high grade bladder cancer s/p BCG    Extensive review of patient's pathology report and appropriate x-rays (Bone Scan) if done.  Prostate cancer nomogram from [facility] reviewed with patient given the patient's pathology.  Favorable numbers with 15-year prostate cancer specific mortality of 1% and 5 and 10-year recurrence numbers very low.  Lymph node involvement is 2% or less.    Discussion on the natural history of prostate cancer, the grading and staging of prostate cancer, levels of risk (Low, Intermediate, High), NCCN guidelines and treatment recommendations for each stage.    Review of Nomograms indicating risk of organ confinement, extra prostatic extension, SV involvement and Lymph Node involvement and risk of metastasis.    Discussion on treatment options including watchful waiting which involves serial PSA/DREs every 3 months in 1st year with repeat biopsy at year 1 and every year thereafter. Discussion on genomic studies which may help validate his current stage and grade especially if desiring watchful waiting.    Discussion on other forms of treatment including radiation therapy (IMRT, brachytherapy both high dose and seeds), cryoablation, radical surgery with open RRP, and robotic assisted RRP.    Risks and benefits of each therapy discussed with patient including typical risks of erectile dysfunction, stress incontinence, rectal injury, bladder injury, bleeding, blood transfusion, infection and risk of death.    All  questions answered. Patient voiced an understanding.     He will meet with U kellie M to discuss options.  My personal opinion is active surveillance if his Prolaris testing supports this as surgery may render him incontinent.  I am not a fan of radiation given his bladder cancer history although we could consider it.    Today's visit was primarily counseling with > 50% of this visit spent face to face with patient discussing the above findings.      30 minutes spent on the date of this encounter doing chart review, history and exam, documentation and further activities as noted above.        Spike Milton MD  [facility]

## 2024-11-20 ENCOUNTER — PRE VISIT (OUTPATIENT)
Dept: UROLOGY | Facility: CLINIC | Age: 69
End: 2024-11-20
Payer: MEDICARE

## 2024-11-20 NOTE — TELEPHONE ENCOUNTER
Reason for visit: consult     Relevant information: prostate cancer    Records/imaging/labs/orders: imaging and PSA lab available    Pt called: No need for a call    At Rooming: romi Valadez  11/20/2024  7:40 AM

## 2024-11-29 DIAGNOSIS — E79.0 HYPERURICEMIA: ICD-10-CM

## 2024-12-02 NOTE — TELEPHONE ENCOUNTER
[id number] sent Rx request for the following:      Requested Prescriptions   Pending Prescriptions Disp Refills    allopurinol (ZYLOPRIM) 300 MG tablet [Pharmacy Med Name: ALLOPURINOL 300MG TABLET] 90 tablet 4     Sig: TAKE 1 TABLET (300 MG) BY MOUTH DAILY - FOR GOUT PREVENTION       Gout Agents Protocol Failed - 12/2/2024  4:36 PM        Failed - Has Uric Acid on file in past 12 months and value is less than 6     No lab results found.  If level is 6mg/dL or greater, ok to refill one time and refer to provider.         Failed - Has GFR on file in past 12 months and most recent value is normal     Last Prescription Date:   11/18/23  Last Fill Qty/Refills:         90, R-4    Hyperuricemia [E79.0]     Last Office Visit:              10/18/24 (Preop)  Future Office visit:           12/10/24    Unable to complete prescription refill per RN Medication Refill Policy. Milagro Baez RN .............. 12/2/2024  4:37 PM

## 2024-12-03 RX ORDER — ALLOPURINOL 300 MG/1
1 TABLET ORAL DAILY
Qty: 90 TABLET | Refills: 0 | Status: SHIPPED | OUTPATIENT
Start: 2024-12-03

## 2024-12-04 ENCOUNTER — OFFICE VISIT (OUTPATIENT)
Dept: UROLOGY | Facility: CLINIC | Age: 69
End: 2024-12-04
Attending: UROLOGY
Payer: MEDICARE

## 2024-12-04 ENCOUNTER — PRE VISIT (OUTPATIENT)
Dept: UROLOGY | Facility: CLINIC | Age: 69
End: 2024-12-04

## 2024-12-04 VITALS
BODY MASS INDEX: 40.36 KG/M2 | OXYGEN SATURATION: 92 % | DIASTOLIC BLOOD PRESSURE: 73 MMHG | HEIGHT: 72 IN | SYSTOLIC BLOOD PRESSURE: 161 MMHG | WEIGHT: 298 LBS | HEART RATE: 75 BPM

## 2024-12-04 DIAGNOSIS — C61 PROSTATE CANCER (H): ICD-10-CM

## 2024-12-04 ASSESSMENT — PAIN SCALES - GENERAL: PAINLEVEL_OUTOF10: NO PAIN (0)

## 2024-12-04 NOTE — NURSING NOTE
Chief Complaint   Patient presents with    Prostate Cancer     Consult       Blood pressure (!) 161/73, pulse 75, height 1.829 m (6'), weight 135.2 kg (298 lb), SpO2 92%. Body mass index is 40.42 kg/m .    Patient Active Problem List   Diagnosis    History of carpal tunnel surgery    Psychosexual dysfunction with inhibited sexual excitement    Mixed hyperlipidemia    Postoperative hypothyroidism    Post-splenectomy    History of kidney stones    KYLAH on CPAP -- Uses nightly, finds helpful and beneficial    Vitamin D deficiency    Steroid-induced hyperglycemia    Bilateral lower extremity edema    Vitamin B12 deficiency    Chronic fatigue syndrome with fibromyalgia    History of Lyme disease    Primary insomnia    Polymyalgia rheumatica (H)    Personal history of malignant neoplasm of bladder -- s/p TURBT x2 in 3/2018 and 4/2018 for high-grade non-muscle invasive bladder cancer, now s/p BCG treatment - last cystoscopy 1/20/2021 was normal    Hypercalcemia    Myofascial muscle pain    Trigger point of right shoulder region    Sacroiliac joint pain - left    Osteoarthritis of lumbar spine, unspecified spinal osteoarthritis complication status    Radicular low back pain    Neuroforaminal stenosis of lumbar spine    Low testosterone in male    CKD (chronic kidney disease) stage 2, GFR 60-89 ml/min    Hyperuricemia    History of thyroid cancer    Lung mass    Meniere disease, bilateral    Rotator cuff syndrome of right shoulder    Bilateral renal cysts    Chronic radicular low back pain - Following with back pain clinic - s/p injections and nerve burning procedures at [facility] - minimally helpful    Coronary artery calcification    Hypertriglyceridemia    Low oxygen saturation - during surgery.    Hematoma of left lower leg    Asplenia    Urinary retention    Generalized muscle weakness    Scrotal pain    Drug-induced constipation    Complication of artery associated with surgical procedure - Left superior gluteal artery  Injury -- subsequently thrombosed / embolized by Interventional Radiology    Tear of left gluteus elif muscle    Injury of muscle of left thigh    Left leg weakness    Chronic heart failure with preserved ejection fraction (HFpEF) (H)    Asthma    Hair loss    Erectile dysfunction, unspecified erectile dysfunction type    Neuralgia of left perineal nerve    Sleep difficulties    Weakness of left hip    Class 3 severe obesity due to excess calories with serious comorbidity and body mass index (BMI) of 40.0 to 44.9 in adult (H)    Thrombocythemia, essential (H)    Benign essential hypertension    Intermittent low back pain    Trigger point with back pain       Allergies   Allergen Reactions    Losartan Cough    Omeprazole Diarrhea    Pantoprazole Diarrhea    Valsartan Cough     Started with losartan, worsened with high dose valsartan       Current Outpatient Medications   Medication Sig Dispense Refill    allopurinol (ZYLOPRIM) 300 MG tablet TAKE 1 TABLET (300 MG) BY MOUTH DAILY - FOR GOUT PREVENTION 90 tablet 0    B Complex-C (VITAMIN B COMPLEX W/VITAMIN C) TABS tablet Take 1 tablet by mouth daily      carvedilol (COREG) 6.25 MG tablet Take 1.5 tablets (9.375 mg) by mouth 2 times daily (with meals) 360 tablet 4    Cyanocobalamin 2500 MCG TABS Take 2,500 mcg by mouth daily -- or every other day -- OTC okay 90 tablet 4    estazolam (PROSOM) 1 MG tablet Take 1 tablet (1 mg) by mouth at bedtime 90 tablet 1    famotidine (PEPCID) 20 MG tablet Take 1 tablet (20 mg) by mouth 2 times daily 180 tablet 4    fenofibrate (TRICOR) 145 MG tablet TAKE 1 TABLET (145 MG) BY MOUTH DAILY 90 tablet 2    furosemide (LASIX) 40 MG tablet Take 1-2 tablets (40-80 mg) by mouth daily 180 tablet 4    gabapentin (NEURONTIN) 400 MG capsule Take 3 capsules (1,200 mg) by mouth 3 times daily -- Dose Increase 7/31/2024 820 capsule 1    levofloxacin (LEVAQUIN) 500 MG tablet Take 1 tablet (500 mg) by mouth daily. Start Tuesday, 10/29/2020 4 in the  morning 2 tablet 0    levothyroxine (SYNTHROID/LEVOTHROID) 200 MCG tablet Take 1 tablet (200 mcg) by mouth daily 90 tablet 4    magnesium 250 MG tablet Take 1 tablet by mouth daily      Melatonin 10 MG TABS tablet Take 20 mg by mouth At Bedtime      Multiple Vitamins-Minerals (CENTRUM ADULTS PO) Take 1 tablet by mouth daily      order for DME CPAP machine for home use at pressure: 10, with supplies:  humidifier, chamber, mask, tubing      pregabalin (LYRICA) 100 MG capsule Take 1 capsule (100 mg) by mouth 3 times daily -- Dose Increase 7/31/2024 270 capsule 1    Probiotic Product (PROBIOTIC-10 PO) Take 1 tablet by mouth daily      senna-docusate (SENOKOT-S/PERICOLACE) 8.6-50 MG tablet Take 1-2 tablets by mouth 2 times daily. 15 tablet 0    sildenafil (VIAGRA) 100 MG tablet Take 1 tablet (100 mg) by mouth daily as needed 30 minutes to 4 hours prior to sexual activity 90 tablet 1    spironolactone (ALDACTONE) 50 MG tablet Take 1 tablet (50 mg) by mouth daily 90 tablet 4       Social History     Tobacco Use    Smoking status: Never     Passive exposure: Never    Smokeless tobacco: Never   Vaping Use    Vaping status: Never Used   Substance Use Topics    Alcohol use: No     Comment: none    Drug use: No       Christian Max CMA  12/4/2024  2:15 PM

## 2024-12-04 NOTE — PROGRESS NOTES
Chief Complaint:   Recently diagnosed prostate cancer         Consult or Referral:     Mr. Selvin An is a 69 year old male seen in consultation from Dr. Milton.         History of Present Illness:    Selvin An is a very pleasant 69 year old male with prior history of high-grade TA urothelial carcinoma the bladder status post BCG treatment and recent complicated SI joint repair with significant postop bleeding issues causing neuropathy in his left lower extremity who was found to have an elevated PSA and underwent prostate biopsy by Dr. Milton on 10/20/2024.  Pathology revealed [place] 3+4 prostate cancer with 40% pattern 4 in only one core.  Prolaris test was sent but has not resulted yet.  He was then referred to me to discuss further management.    He is here with his wife Nati.       Past Medical History:     Past Medical History:   Diagnosis Date    Acute and chronic respiratory failure with hypoxia (H) 02/06/2023    Bell's palsy 03/30/2017    Bilateral carpal tunnel syndrome     No Comments Provided    Essential (primary) hypertension     No Comments Provided    Gout 06/03/2013    Hyperlipidemia     No Comments Provided    Hypothyroidism     No Comments Provided    Malignant neoplasm of thyroid gland (H)     1994    Postoperative hematoma of musculoskeletal structure following musculoskeletal procedure     Sleep apnea     uses CPAP            Past Surgical History:     Past Surgical History:   Procedure Laterality Date    BIOPSY PROSTATE TRANSRECTAL N/A 10/28/2024    Procedure: BIOPSY, PROSTATE, RECTAL APPROACH;  Surgeon: Spike Mliton MD;  Location: [facility]    COLONOSCOPY  09/09/2014 9/2014,Normal - follow up 10 years    CV CORONARY ANGIOGRAM N/A 10/31/2022    Procedure: Coronary Angiogram;  Surgeon: Ender Michaels MD;  [facility]    CYSTOSCOPY      bladder cancer    INCISION AND DRAINAGE LOWER EXTREMITY, COMBINED Left 1/7/2023    Procedure: INCISION AND  DRAINAGE, LEFT BUTTOCK (Drain Hematoma of Left Buttock);  Surgeon: Ronald Mccall MD;  Location: [facility]    IR LUMBAR PUNCTURE  12/12/2017    OTHER SURGICAL HISTORY      ,SPLENECTOMY    OTHER SURGICAL HISTORY      2010,90148,EYE MUSCLE SURGERY,Left,strabismus    OTHER SURGICAL HISTORY      10/06/14,[id number],OTHER,Left,Dr. Lana LEHMAN    OTHER SURGICAL HISTORY      10/14/14,[id number],OTHER,Left,Dr. Lana LEHMAN    OTHER SURGICAL HISTORY      11/16/2017,[id number],ENDOSCOPIC RELEASE TRANSVERSE CARPAL LIGAMENT OF HAND,Left    RELEASE CARPAL TUNNEL      12/2011    RETINAL DETACHMENT SURGERY Right     STRABISMUS SURGERY Left     THYROIDECTOMY      1994,thyroid ca    TONSILLECTOMY      2004,T & A > 11yo    VASECTOMY      No Comments Provided            Medications     Current Outpatient Medications   Medication Sig Dispense Refill    allopurinol (ZYLOPRIM) 300 MG tablet TAKE 1 TABLET (300 MG) BY MOUTH DAILY - FOR GOUT PREVENTION 90 tablet 0    B Complex-C (VITAMIN B COMPLEX W/VITAMIN C) TABS tablet Take 1 tablet by mouth daily      carvedilol (COREG) 6.25 MG tablet Take 1.5 tablets (9.375 mg) by mouth 2 times daily (with meals) 360 tablet 4    Cyanocobalamin 2500 MCG TABS Take 2,500 mcg by mouth daily -- or every other day -- OTC okay 90 tablet 4    estazolam (PROSOM) 1 MG tablet Take 1 tablet (1 mg) by mouth at bedtime 90 tablet 1    famotidine (PEPCID) 20 MG tablet Take 1 tablet (20 mg) by mouth 2 times daily 180 tablet 4    fenofibrate (TRICOR) 145 MG tablet TAKE 1 TABLET (145 MG) BY MOUTH DAILY 90 tablet 2    furosemide (LASIX) 40 MG tablet Take 1-2 tablets (40-80 mg) by mouth daily 180 tablet 4    gabapentin (NEURONTIN) 400 MG capsule Take 3 capsules (1,200 mg) by mouth 3 times daily -- Dose Increase 7/31/2024 820 capsule 1    levofloxacin (LEVAQUIN) 500 MG tablet Take 1 tablet (500 mg) by mouth daily. Start Tuesday, 10/29/2020 4 in the morning 2 tablet 0    levothyroxine (SYNTHROID/LEVOTHROID) 200 MCG tablet Take 1 tablet  (200 mcg) by mouth daily 90 tablet 4    magnesium 250 MG tablet Take 1 tablet by mouth daily      Melatonin 10 MG TABS tablet Take 20 mg by mouth At Bedtime      Multiple Vitamins-Minerals (CENTRUM ADULTS PO) Take 1 tablet by mouth daily      order for DME CPAP machine for home use at pressure: 10, with supplies:  humidifier, chamber, mask, tubing      pregabalin (LYRICA) 100 MG capsule Take 1 capsule (100 mg) by mouth 3 times daily -- Dose Increase 7/31/2024 270 capsule 1    Probiotic Product (PROBIOTIC-10 PO) Take 1 tablet by mouth daily      senna-docusate (SENOKOT-S/PERICOLACE) 8.6-50 MG tablet Take 1-2 tablets by mouth 2 times daily. 15 tablet 0    sildenafil (VIAGRA) 100 MG tablet Take 1 tablet (100 mg) by mouth daily as needed 30 minutes to 4 hours prior to sexual activity 90 tablet 1    spironolactone (ALDACTONE) 50 MG tablet Take 1 tablet (50 mg) by mouth daily 90 tablet 4     No current facility-administered medications for this visit.            Family History:     Family History   Problem Relation Age of Onset    Heart Disease Mother         Heart Disease,MI - SCD    Heart Disease Father         Heart Disease,MI - SCD            Social History:     Social History     Socioeconomic History    Marital status:      Spouse name: Not on file    Number of children: Not on file    Years of education: Not on file    Highest education level: Not on file   Occupational History    Not on file   Tobacco Use    Smoking status: Never     Passive exposure: Never    Smokeless tobacco: Never   Vaping Use    Vaping status: Never Used   Substance and Sexual Activity    Alcohol use: No     Comment: none    Drug use: No    Sexual activity: Yes     Partners: Female     Birth control/protection: None   Other Topics Concern    Parent/sibling w/ CABG, MI or angioplasty before 65F 55M? 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     Social Drivers of Health     Financial Resource Strain: Low Risk  (5/28/2024)    Financial Resource Strain     Within the past 12 months, have you or your family members you live with been unable to get utilities (heat, electricity) when it was really needed?: No   Food Insecurity: Not on File (9/26/2024)    Received from [employer]    Food Insecurity     Food: 0   Transportation Needs: Low Risk  (5/28/2024)    Transportation Needs     Within the past 12 months, has lack of transportation kept you from medical appointments, getting your medicines, non-medical meetings or appointments, work, or from getting things that you need?: No   Physical Activity: Not on File (12/13/2023)    Received from [employer]    Physical Activity     Physical Activity: 0   Stress: Not on File (12/13/2023)    Received from [employer]    Stress     Stress: 0   Social Connections: Not on File (9/15/2024)    Received from [employer]    Social Connections     Connectedness: 0   Interpersonal Safety: High Risk (10/28/2024)    Interpersonal Safety     Do you feel physically and emotionally safe where you currently live?: No     Within the past 12 months, have you been hit, slapped, kicked or otherwise physically hurt by someone?: No     Within the past 12 months, have you been humiliated or emotionally abused in other ways by your partner or ex-partner?: No   Housing Stability: Low Risk  (5/28/2024)    Housing Stability     Do you have housing? : Yes     Are you worried about losing your housing?: No            Allergies:   Losartan, Omeprazole, Pantoprazole, and Valsartan         Review of Systems     10 point ROS of systems including Constitutional, Eyes, Respiratory, Cardiovascular, Gastroenterology, Genitourinary, Integumentary, Muscularskeletal, Psychiatric were all negative except for pertinent positives noted in my HPI.          Physical Exam:     Patient is a 69 year old  male Body mass index is 40.42 kg/m .,  Vitals: Blood pressure  (!) 161/73, pulse 75, height 1.829 m (6'), weight 135.2 kg (298 lb), SpO2 92%.  General Appearance Adult: Alert, no acute distress, oriented  HENT: throat/mouth:normal, good dentition  Neck: No adenopathy,masses or thyromegaly  Lungs: no respiratory distress, or pursed lip breathing  Heart: No obvious jugular venous distension present  Abdomen: soft, nontender, no organomegaly or masses  Lymphatics: No cervical or supraclavicular adenopathy  Musculoskeltal: extremities normal, no peripheral edema  Skin: no suspicious lesions or rashes  Neuro: Alert, oriented, speech and mentation normal  Psych: affect and mood normal  Gait: Normal  : deferred      Labs and Pathology:    I reviewed all applicable laboratory and pathology data and went over findings with patient  Significant for mildly elevated PSA    Lab Results   Component Value Date/Time    PSA 4.01 09/26/2024 01:58 PM    PSA 2.67 11/16/2023 07:49 AM           Imaging:    MR prostate 12/23     CLINICAL INFORMATION: , Nodular prostate with lower urinary tract  symptoms; Elevated prostate specific antigen (PSA) at 2.67.     COMPARISON: MR pelvis 3/9/2023.     TECHNICAL INFORMATION: 1.5T MR imaging of the prostate including high  resolution 3mm axial and sagittal T2 and axial 3.5mm diffusion  weighted (B0, B100, B800, and ) images through the prostate gland  and seminal vesicles. Dynamic enhanced images of the prostate gland  were also obtained in the axial plane. Axial T1, axial T2, and axial  T1 postcontrast images were also obtained through the entire pelvis.  Images were analyzed using a separate [employer] workstation.     INTERPRETATION: There is prostate gland enlargement secondary to  central gland hyperplasia. The prostate gland measures 4.9 x 4.6 x 4.7  cm in AP, mediolateral and craniocaudal dimensions, respectively, for  a volume of 53 mL. No significant intrinsic T1 signal is seen within  the gland.     There is a circumscribed fatty lesion at the  right base of the pelvis  resulting in right to left midline shift of the anorectal junction,  spanning 4.2 x 4.6 x 5.5 cm.     No pelvic adenopathy is identified. Prior left SI joint fusion is  noted. No suspicious osseous lesion is seen.                                                                      IMPRESSION:  No intermediate or high suspicion focal prostate lesion.     Right pelvic floor lipoma versus fatty herniation.      Outside and Past Medical records:    I spent 20 minutes reviewing outside and past medical records including the note from Dr. Milton on 11/18/2024          Assessment and Plan:     Assessment:  69 year old male with low-volume favorable intermediate risk prostate cancer    We had a long conversation regarding different management options including active surveillance, prostatectomy, and radiation.  Given the volume of his cancer, I strongly favor active surveillance.  Patient is adamant to even consider surgery as an option given what happened after his most recent joint surgery and postoperative complications.  I totally agree with him and Dr. Milton that there is no need for any active treatment at this point and he could be safely managed using active surveillance.  I do not believe that Prolaris test would change my decision regarding his management.  All his questions were answered to his satisfaction.      Plan:  Recommend active surveillance  Follow-up with Dr. Milton  Return to clinic as needed    45 total minutes spent on the date of the encounter including direct interaction with the patient, performing chart review, documentation and further activities as noted above.        Paul Garza MD   Department of Urology   [facility]

## 2024-12-04 NOTE — LETTER
12/4/2024       RE: Selvin An  [address]  [place] [address]     Dear Colleague,    Thank you for referring your patient, Selvin An, to the [facility] [place] [facility]. Please see a copy of my visit note below.          Chief Complaint:   Elevated PSA          Consult or Referral:     Mr. Selvin An is a 69 year old male seen in consultation from Dr. Milton.         History of Present Illness:    Selvin An is a very pleasant 69 year old male with prior history of high-grade TA urothelial carcinoma the bladder status post BCG treatment and recent complicated SI joint repair with significant postop bleeding issues causing neuropathy in his left lower extremity who was found to have an elevated PSA and underwent prostate biopsy by Dr. Milton on 10/20/2024.  Pathology revealed [place] 3+4 prostate cancer with 40% pattern 4 in only one core.  Prolaris test was sent but has not resulted yet.  He was then referred to me to discuss further management.    He is here with his wife Nati.       Past Medical History:     Past Medical History:   Diagnosis Date     Acute and chronic respiratory failure with hypoxia (H) 02/06/2023     Bell's palsy 03/30/2017     Bilateral carpal tunnel syndrome     No Comments Provided     Essential (primary) hypertension     No Comments Provided     Gout 06/03/2013     Hyperlipidemia     No Comments Provided     Hypothyroidism     No Comments Provided     Malignant neoplasm of thyroid gland (H)     1994     Postoperative hematoma of musculoskeletal structure following musculoskeletal procedure      Sleep apnea     uses CPAP            Past Surgical History:     Past Surgical History:   Procedure Laterality Date     BIOPSY PROSTATE TRANSRECTAL N/A 10/28/2024    Procedure: BIOPSY, PROSTATE, RECTAL APPROACH;  Surgeon: Spike Milton MD;  Location: [facility] OR     COLONOSCOPY  09/09/2014 9/2014,Normal - follow up  10 years     CV CORONARY ANGIOGRAM N/A 10/31/2022    Procedure: Coronary Angiogram;  Surgeon: Ender Michaels MD;  [facility]     CYSTOSCOPY      bladder cancer     INCISION AND DRAINAGE LOWER EXTREMITY, COMBINED Left 1/7/2023    Procedure: INCISION AND DRAINAGE, LEFT BUTTOCK (Drain Hematoma of Left Buttock);  Surgeon: Ronald Mccall MD;  Location: [facility] OR     IR LUMBAR PUNCTURE  12/12/2017     OTHER SURGICAL HISTORY      ,SPLENECTOMY     OTHER SURGICAL HISTORY      2010,06543,EYE MUSCLE SURGERY,Left,strabismus     OTHER SURGICAL HISTORY      10/06/14,[id number],OTHER,Left,Dr. Lana LEHMAN     OTHER SURGICAL HISTORY      10/14/14,[id number],OTHER,Left,Dr. Lana LEHMAN     OTHER SURGICAL HISTORY      11/16/2017,[id number],ENDOSCOPIC RELEASE TRANSVERSE CARPAL LIGAMENT OF HAND,Left     RELEASE CARPAL TUNNEL      12/2011     RETINAL DETACHMENT SURGERY Right      STRABISMUS SURGERY Left      THYROIDECTOMY      1994,thyroid ca     TONSILLECTOMY      2004,T & A > 13yo     VASECTOMY      No Comments Provided            Medications     Current Outpatient Medications   Medication Sig Dispense Refill     allopurinol (ZYLOPRIM) 300 MG tablet TAKE 1 TABLET (300 MG) BY MOUTH DAILY - FOR GOUT PREVENTION 90 tablet 0     B Complex-C (VITAMIN B COMPLEX W/VITAMIN C) TABS tablet Take 1 tablet by mouth daily       carvedilol (COREG) 6.25 MG tablet Take 1.5 tablets (9.375 mg) by mouth 2 times daily (with meals) 360 tablet 4     Cyanocobalamin 2500 MCG TABS Take 2,500 mcg by mouth daily -- or every other day -- OTC okay 90 tablet 4     estazolam (PROSOM) 1 MG tablet Take 1 tablet (1 mg) by mouth at bedtime 90 tablet 1     famotidine (PEPCID) 20 MG tablet Take 1 tablet (20 mg) by mouth 2 times daily 180 tablet 4     fenofibrate (TRICOR) 145 MG tablet TAKE 1 TABLET (145 MG) BY MOUTH DAILY 90 tablet 2     furosemide (LASIX) 40 MG tablet Take 1-2 tablets (40-80 mg) by mouth daily 180 tablet 4     gabapentin  (NEURONTIN) 400 MG capsule Take 3 capsules (1,200 mg) by mouth 3 times daily -- Dose Increase 7/31/2024 820 capsule 1     levofloxacin (LEVAQUIN) 500 MG tablet Take 1 tablet (500 mg) by mouth daily. Start Tuesday, 10/29/2020 4 in the morning 2 tablet 0     levothyroxine (SYNTHROID/LEVOTHROID) 200 MCG tablet Take 1 tablet (200 mcg) by mouth daily 90 tablet 4     magnesium 250 MG tablet Take 1 tablet by mouth daily       Melatonin 10 MG TABS tablet Take 20 mg by mouth At Bedtime       Multiple Vitamins-Minerals (CENTRUM ADULTS PO) Take 1 tablet by mouth daily       order for DME CPAP machine for home use at pressure: 10, with supplies:  humidifier, chamber, mask, tubing       pregabalin (LYRICA) 100 MG capsule Take 1 capsule (100 mg) by mouth 3 times daily -- Dose Increase 7/31/2024 270 capsule 1     Probiotic Product (PROBIOTIC-10 PO) Take 1 tablet by mouth daily       senna-docusate (SENOKOT-S/PERICOLACE) 8.6-50 MG tablet Take 1-2 tablets by mouth 2 times daily. 15 tablet 0     sildenafil (VIAGRA) 100 MG tablet Take 1 tablet (100 mg) by mouth daily as needed 30 minutes to 4 hours prior to sexual activity 90 tablet 1     spironolactone (ALDACTONE) 50 MG tablet Take 1 tablet (50 mg) by mouth daily 90 tablet 4     No current facility-administered medications for this visit.            Family History:     Family History   Problem Relation Age of Onset     Heart Disease Mother         Heart Disease,MI - SCD     Heart Disease Father         Heart Disease,MI - SCD            Social History:     Social History     Socioeconomic History     Marital status:      Spouse name: Not on file     Number of children: Not on file     Years of education: Not on file     Highest education level: Not on file   Occupational History     Not on file   Tobacco Use     Smoking status: Never     Passive exposure: Never     Smokeless tobacco: Never   Vaping Use     Vaping status: Never Used   Substance and Sexual Activity     Alcohol  use: No     Comment: none     Drug use: No     Sexual activity: Yes     Partners: Female     Birth control/protection: None   Other Topics Concern     Parent/sibling w/ CABG, MI or angioplasty before 65F 55M? 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     Social Drivers of Health     Financial Resource Strain: Low Risk  (5/28/2024)    Financial Resource Strain      Within the past 12 months, have you or your family members you live with been unable to get utilities (heat, electricity) when it was really needed?: No   Food Insecurity: Not on File (9/26/2024)    Received from [employer]    Food Insecurity      Food: 0   Transportation Needs: Low Risk  (5/28/2024)    Transportation Needs      Within the past 12 months, has lack of transportation kept you from medical appointments, getting your medicines, non-medical meetings or appointments, work, or from getting things that you need?: No   Physical Activity: Not on File (12/13/2023)    Received from [employer]    Physical Activity      Physical Activity: 0   Stress: Not on File (12/13/2023)    Received from [employer]    Stress      Stress: 0   Social Connections: Not on File (9/15/2024)    Received from [employer]    Social Connections      Connectedness: 0   Interpersonal Safety: High Risk (10/28/2024)    Interpersonal Safety      Do you feel physically and emotionally safe where you currently live?: No      Within the past 12 months, have you been hit, slapped, kicked or otherwise physically hurt by someone?: No      Within the past 12 months, have you been humiliated or emotionally abused in other ways by your partner or ex-partner?: No   Housing Stability: Low Risk  (5/28/2024)    Housing Stability      Do you have housing? : Yes      Are you worried about losing your housing?: No            Allergies:   Losartan, Omeprazole, Pantoprazole, and Valsartan         Review  of Systems     10 point ROS of systems including Constitutional, Eyes, Respiratory, Cardiovascular, Gastroenterology, Genitourinary, Integumentary, Muscularskeletal, Psychiatric were all negative except for pertinent positives noted in my HPI.          Physical Exam:     Patient is a 69 year old  male Body mass index is 40.42 kg/m .,  Vitals: Blood pressure (!) 161/73, pulse 75, height 1.829 m (6'), weight 135.2 kg (298 lb), SpO2 92%.  General Appearance Adult: Alert, no acute distress, oriented  HENT: throat/mouth:normal, good dentition  Neck: No adenopathy,masses or thyromegaly  Lungs: no respiratory distress, or pursed lip breathing  Heart: No obvious jugular venous distension present  Abdomen: soft, nontender, no organomegaly or masses  Lymphatics: No cervical or supraclavicular adenopathy  Musculoskeltal: extremities normal, no peripheral edema  Skin: no suspicious lesions or rashes  Neuro: Alert, oriented, speech and mentation normal  Psych: affect and mood normal  Gait: Normal  : deferred      Labs and Pathology:    I reviewed all applicable laboratory and pathology data and went over findings with patient  Significant for mildly elevated PSA    Lab Results   Component Value Date/Time    PSA 4.01 09/26/2024 01:58 PM    PSA 2.67 11/16/2023 07:49 AM           Imaging:    MR prostate 12/23     CLINICAL INFORMATION: , Nodular prostate with lower urinary tract  symptoms; Elevated prostate specific antigen (PSA) at 2.67.     COMPARISON: MR pelvis 3/9/2023.     TECHNICAL INFORMATION: 1.5T MR imaging of the prostate including high  resolution 3mm axial and sagittal T2 and axial 3.5mm diffusion  weighted (B0, B100, B800, and ) images through the prostate gland  and seminal vesicles. Dynamic enhanced images of the prostate gland  were also obtained in the axial plane. Axial T1, axial T2, and axial  T1 postcontrast images were also obtained through the entire pelvis.  Images were analyzed using a separate [employer]  workstation.     INTERPRETATION: There is prostate gland enlargement secondary to  central gland hyperplasia. The prostate gland measures 4.9 x 4.6 x 4.7  cm in AP, mediolateral and craniocaudal dimensions, respectively, for  a volume of 53 mL. No significant intrinsic T1 signal is seen within  the gland.     There is a circumscribed fatty lesion at the right base of the pelvis  resulting in right to left midline shift of the anorectal junction,  spanning 4.2 x 4.6 x 5.5 cm.     No pelvic adenopathy is identified. Prior left SI joint fusion is  noted. No suspicious osseous lesion is seen.                                                                      IMPRESSION:  No intermediate or high suspicion focal prostate lesion.     Right pelvic floor lipoma versus fatty herniation.      Outside and Past Medical records:    I spent 20 minutes reviewing outside and past medical records including the note from Dr. Milton on 11/18/2024          Assessment and Plan:     Assessment:  69 year old male with low-volume favorable intermediate risk prostate cancer    We had a long conversation regarding different management options including active surveillance, prostatectomy, and radiation.  Given the volume of his cancer, I strongly favor active surveillance.  Patient is adamant to even consider surgery as an option given what happened after his most recent joint surgery and postoperative complications.  I totally agree with him and Dr. Milton that there is no need for any active treatment at this point and he could be safely managed using active surveillance.  I do not believe that Prolaris test would change my decision regarding his management.  All his questions were answered to his satisfaction.      Plan:  Recommend active surveillance  Follow-up with Dr. Milton  Return to clinic as needed    45 total minutes spent on the date of the encounter including direct interaction with the patient, performing chart review,  documentation and further activities as noted above.        Paul Garza MD   Department of Urology   [facility]       Again, thank you for allowing me to participate in the care of your patient.      Sincerely,    Paul Garza MD

## 2024-12-10 ENCOUNTER — LAB (OUTPATIENT)
Dept: LAB | Facility: OTHER | Age: 69
End: 2024-12-10
Attending: INTERNAL MEDICINE
Payer: MEDICARE

## 2024-12-10 ENCOUNTER — OFFICE VISIT (OUTPATIENT)
Dept: INTERNAL MEDICINE | Facility: OTHER | Age: 69
End: 2024-12-10
Attending: INTERNAL MEDICINE
Payer: MEDICARE

## 2024-12-10 VITALS
OXYGEN SATURATION: 94 % | HEART RATE: 75 BPM | RESPIRATION RATE: 16 BRPM | TEMPERATURE: 97.4 F | HEIGHT: 71 IN | BODY MASS INDEX: 43.82 KG/M2 | DIASTOLIC BLOOD PRESSURE: 60 MMHG | WEIGHT: 313 LBS | SYSTOLIC BLOOD PRESSURE: 132 MMHG

## 2024-12-10 DIAGNOSIS — E79.0 HYPERURICEMIA: ICD-10-CM

## 2024-12-10 DIAGNOSIS — E66.813 CLASS 3 SEVERE OBESITY DUE TO EXCESS CALORIES WITH SERIOUS COMORBIDITY AND BODY MASS INDEX (BMI) OF 40.0 TO 44.9 IN ADULT (H): ICD-10-CM

## 2024-12-10 DIAGNOSIS — N52.9 ERECTILE DYSFUNCTION, UNSPECIFIED ERECTILE DYSFUNCTION TYPE: ICD-10-CM

## 2024-12-10 DIAGNOSIS — N18.2 CKD (CHRONIC KIDNEY DISEASE) STAGE 2, GFR 60-89 ML/MIN: ICD-10-CM

## 2024-12-10 DIAGNOSIS — E89.0 POSTOPERATIVE HYPOTHYROIDISM: ICD-10-CM

## 2024-12-10 DIAGNOSIS — E78.1 HYPERTRIGLYCERIDEMIA: ICD-10-CM

## 2024-12-10 DIAGNOSIS — M54.16 CHRONIC RADICULAR LOW BACK PAIN: ICD-10-CM

## 2024-12-10 DIAGNOSIS — I50.33 ACUTE ON CHRONIC HEART FAILURE WITH PRESERVED EJECTION FRACTION (H): ICD-10-CM

## 2024-12-10 DIAGNOSIS — I10 BENIGN ESSENTIAL HYPERTENSION: ICD-10-CM

## 2024-12-10 DIAGNOSIS — K21.9 GASTROESOPHAGEAL REFLUX DISEASE, UNSPECIFIED WHETHER ESOPHAGITIS PRESENT: ICD-10-CM

## 2024-12-10 DIAGNOSIS — R10.2 NEURALGIA OF LEFT PERINEAL NERVE: ICD-10-CM

## 2024-12-10 DIAGNOSIS — Z71.85 VACCINE COUNSELING: ICD-10-CM

## 2024-12-10 DIAGNOSIS — E55.9 VITAMIN D DEFICIENCY: ICD-10-CM

## 2024-12-10 DIAGNOSIS — R73.9 ELEVATED RANDOM BLOOD GLUCOSE LEVEL: ICD-10-CM

## 2024-12-10 DIAGNOSIS — G47.9 SLEEP DIFFICULTIES: ICD-10-CM

## 2024-12-10 DIAGNOSIS — E78.2 MIXED HYPERLIPIDEMIA: ICD-10-CM

## 2024-12-10 DIAGNOSIS — C61 PROSTATE CANCER (H): ICD-10-CM

## 2024-12-10 DIAGNOSIS — G47.33 OSA ON CPAP: ICD-10-CM

## 2024-12-10 DIAGNOSIS — R60.0 BILATERAL LOWER EXTREMITY EDEMA: ICD-10-CM

## 2024-12-10 DIAGNOSIS — Z12.5 ENCOUNTER FOR SCREENING FOR MALIGNANT NEOPLASM OF PROSTATE: ICD-10-CM

## 2024-12-10 DIAGNOSIS — I97.89 COMPLICATION OF ARTERY ASSOCIATED WITH SURGICAL PROCEDURE: ICD-10-CM

## 2024-12-10 DIAGNOSIS — E53.8 VITAMIN B12 DEFICIENCY: ICD-10-CM

## 2024-12-10 DIAGNOSIS — E66.01 CLASS 3 SEVERE OBESITY DUE TO EXCESS CALORIES WITH SERIOUS COMORBIDITY AND BODY MASS INDEX (BMI) OF 40.0 TO 44.9 IN ADULT (H): ICD-10-CM

## 2024-12-10 DIAGNOSIS — R60.9 FLUID RETENTION: ICD-10-CM

## 2024-12-10 DIAGNOSIS — G89.29 CHRONIC RADICULAR LOW BACK PAIN: ICD-10-CM

## 2024-12-10 DIAGNOSIS — Z00.00 MEDICARE ANNUAL WELLNESS VISIT, SUBSEQUENT: Primary | ICD-10-CM

## 2024-12-10 LAB
ALBUMIN SERPL BCG-MCNC: 4.6 G/DL (ref 3.5–5.2)
ALP SERPL-CCNC: 75 U/L (ref 40–150)
ALT SERPL W P-5'-P-CCNC: 50 U/L (ref 0–70)
ANION GAP SERPL CALCULATED.3IONS-SCNC: 10 MMOL/L (ref 7–15)
AST SERPL W P-5'-P-CCNC: 38 U/L (ref 0–45)
BASOPHILS # BLD AUTO: 0.2 10E3/UL (ref 0–0.2)
BASOPHILS NFR BLD AUTO: 2 %
BILIRUB SERPL-MCNC: 0.5 MG/DL
BUN SERPL-MCNC: 31.1 MG/DL (ref 8–23)
CALCIUM SERPL-MCNC: 10.6 MG/DL (ref 8.8–10.4)
CHLORIDE SERPL-SCNC: 103 MMOL/L (ref 98–107)
CHOLEST SERPL-MCNC: 301 MG/DL
CREAT SERPL-MCNC: 1.65 MG/DL (ref 0.67–1.17)
EGFRCR SERPLBLD CKD-EPI 2021: 44 ML/MIN/1.73M2
EOSINOPHIL # BLD AUTO: 0.8 10E3/UL (ref 0–0.7)
EOSINOPHIL NFR BLD AUTO: 7 %
ERYTHROCYTE [DISTWIDTH] IN BLOOD BY AUTOMATED COUNT: 15.3 % (ref 10–15)
EST. AVERAGE GLUCOSE BLD GHB EST-MCNC: 143 MG/DL
FASTING STATUS PATIENT QL REPORTED: YES
FASTING STATUS PATIENT QL REPORTED: YES
GLUCOSE SERPL-MCNC: 150 MG/DL (ref 70–99)
HBA1C MFR BLD: 6.6 %
HCO3 SERPL-SCNC: 26 MMOL/L (ref 22–29)
HCT VFR BLD AUTO: 51.8 % (ref 40–53)
HDLC SERPL-MCNC: 34 MG/DL
HGB BLD-MCNC: 17.7 G/DL (ref 13.3–17.7)
IMM GRANULOCYTES # BLD: 0.1 10E3/UL
IMM GRANULOCYTES NFR BLD: 1 %
LDLC SERPL CALC-MCNC: ABNORMAL MG/DL
LYMPHOCYTES # BLD AUTO: 4.2 10E3/UL (ref 0.8–5.3)
LYMPHOCYTES NFR BLD AUTO: 39 %
MCH RBC QN AUTO: 32.7 PG (ref 26.5–33)
MCHC RBC AUTO-ENTMCNC: 34.2 G/DL (ref 31.5–36.5)
MCV RBC AUTO: 96 FL (ref 78–100)
MONOCYTES # BLD AUTO: 0.9 10E3/UL (ref 0–1.3)
MONOCYTES NFR BLD AUTO: 8 %
NEUTROPHILS # BLD AUTO: 4.6 10E3/UL (ref 1.6–8.3)
NEUTROPHILS NFR BLD AUTO: 43 %
NONHDLC SERPL-MCNC: 267 MG/DL
NRBC # BLD AUTO: 0 10E3/UL
NRBC BLD AUTO-RTO: 0 /100
PLATELET # BLD AUTO: 387 10E3/UL (ref 150–450)
POTASSIUM SERPL-SCNC: 4.4 MMOL/L (ref 3.4–5.3)
PROT SERPL-MCNC: 8.4 G/DL (ref 6.4–8.3)
PSA SERPL DL<=0.01 NG/ML-MCNC: 4.49 NG/ML (ref 0–6.5)
RBC # BLD AUTO: 5.42 10E6/UL (ref 4.4–5.9)
SODIUM SERPL-SCNC: 139 MMOL/L (ref 135–145)
TRIGL SERPL-MCNC: 447 MG/DL
TSH SERPL DL<=0.005 MIU/L-ACNC: 0.75 UIU/ML (ref 0.3–4.2)
VIT B12 SERPL-MCNC: 802 PG/ML (ref 232–1245)
VIT D+METAB SERPL-MCNC: 53 NG/ML (ref 20–50)
WBC # BLD AUTO: 10.7 10E3/UL (ref 4–11)

## 2024-12-10 PROCEDURE — 83036 HEMOGLOBIN GLYCOSYLATED A1C: CPT | Mod: ZL

## 2024-12-10 PROCEDURE — 82247 BILIRUBIN TOTAL: CPT | Mod: ZL

## 2024-12-10 PROCEDURE — 85025 COMPLETE CBC W/AUTO DIFF WBC: CPT | Mod: ZL

## 2024-12-10 PROCEDURE — 83718 ASSAY OF LIPOPROTEIN: CPT | Mod: ZL

## 2024-12-10 PROCEDURE — 82465 ASSAY BLD/SERUM CHOLESTEROL: CPT | Mod: ZL

## 2024-12-10 PROCEDURE — 84443 ASSAY THYROID STIM HORMONE: CPT | Mod: ZL

## 2024-12-10 PROCEDURE — G0103 PSA SCREENING: HCPCS | Mod: ZL

## 2024-12-10 PROCEDURE — 82306 VITAMIN D 25 HYDROXY: CPT | Mod: ZL

## 2024-12-10 PROCEDURE — 84520 ASSAY OF UREA NITROGEN: CPT | Mod: ZL

## 2024-12-10 PROCEDURE — 36415 COLL VENOUS BLD VENIPUNCTURE: CPT | Mod: ZL

## 2024-12-10 PROCEDURE — G0463 HOSPITAL OUTPT CLINIC VISIT: HCPCS

## 2024-12-10 PROCEDURE — 82607 VITAMIN B-12: CPT | Mod: ZL

## 2024-12-10 RX ORDER — ALLOPURINOL 300 MG/1
1 TABLET ORAL DAILY
Qty: 90 TABLET | Refills: 0 | Status: SHIPPED | OUTPATIENT
Start: 2024-12-10

## 2024-12-10 RX ORDER — FENOFIBRATE 145 MG/1
145 TABLET, COATED ORAL DAILY
Qty: 90 TABLET | Refills: 2 | Status: SHIPPED | OUTPATIENT
Start: 2024-12-10

## 2024-12-10 RX ORDER — LEVOTHYROXINE SODIUM 200 UG/1
200 TABLET ORAL DAILY
Qty: 90 TABLET | Refills: 4 | Status: SHIPPED | OUTPATIENT
Start: 2024-12-10

## 2024-12-10 RX ORDER — ESTAZOLAM 1 MG
1 TABLET ORAL AT BEDTIME
Qty: 90 TABLET | Refills: 1 | Status: SHIPPED | OUTPATIENT
Start: 2024-12-10

## 2024-12-10 RX ORDER — SPIRONOLACTONE 50 MG/1
50 TABLET, FILM COATED ORAL DAILY
Qty: 90 TABLET | Refills: 4 | Status: SHIPPED | OUTPATIENT
Start: 2024-12-10

## 2024-12-10 RX ORDER — CARVEDILOL 6.25 MG/1
9.38 TABLET ORAL 2 TIMES DAILY WITH MEALS
Qty: 360 TABLET | Refills: 4 | Status: SHIPPED | OUTPATIENT
Start: 2024-12-10

## 2024-12-10 RX ORDER — TORSEMIDE 20 MG/1
20-60 TABLET ORAL DAILY
Qty: 270 TABLET | Refills: 4 | Status: SHIPPED | OUTPATIENT
Start: 2024-12-10

## 2024-12-10 RX ORDER — FUROSEMIDE 40 MG/1
40-80 TABLET ORAL DAILY
Qty: 180 TABLET | Refills: 4 | Status: CANCELLED | OUTPATIENT
Start: 2024-12-10

## 2024-12-10 RX ORDER — GABAPENTIN 400 MG/1
1200 CAPSULE ORAL 3 TIMES DAILY
Qty: 820 CAPSULE | Refills: 1 | Status: SHIPPED | OUTPATIENT
Start: 2024-12-10

## 2024-12-10 RX ORDER — SILDENAFIL 100 MG/1
100 TABLET, FILM COATED ORAL DAILY PRN
Qty: 90 TABLET | Refills: 1 | Status: SHIPPED | OUTPATIENT
Start: 2024-12-10

## 2024-12-10 RX ORDER — FAMOTIDINE 20 MG/1
20 TABLET, FILM COATED ORAL 2 TIMES DAILY
Qty: 180 TABLET | Refills: 4 | Status: SHIPPED | OUTPATIENT
Start: 2024-12-10

## 2024-12-10 RX ORDER — PREGABALIN 100 MG/1
100 CAPSULE ORAL 3 TIMES DAILY
Qty: 270 CAPSULE | Refills: 1 | Status: SHIPPED | OUTPATIENT
Start: 2024-12-10

## 2024-12-10 SDOH — HEALTH STABILITY: PHYSICAL HEALTH: ON AVERAGE, HOW MANY DAYS PER WEEK DO YOU ENGAGE IN MODERATE TO STRENUOUS EXERCISE (LIKE A BRISK WALK)?: 0 DAYS

## 2024-12-10 ASSESSMENT — ASTHMA QUESTIONNAIRES
QUESTION_5 LAST FOUR WEEKS HOW WOULD YOU RATE YOUR ASTHMA CONTROL: COMPLETELY CONTROLLED
QUESTION_4 LAST FOUR WEEKS HOW OFTEN HAVE YOU USED YOUR RESCUE INHALER OR NEBULIZER MEDICATION (SUCH AS ALBUTEROL): NOT AT ALL
ACT_TOTALSCORE: 25
QUESTION_1 LAST FOUR WEEKS HOW MUCH OF THE TIME DID YOUR ASTHMA KEEP YOU FROM GETTING AS MUCH DONE AT WORK, SCHOOL OR AT HOME: NONE OF THE TIME
QUESTION_2 LAST FOUR WEEKS HOW OFTEN HAVE YOU HAD SHORTNESS OF BREATH: NOT AT ALL
QUESTION_3 LAST FOUR WEEKS HOW OFTEN DID YOUR ASTHMA SYMPTOMS (WHEEZING, COUGHING, SHORTNESS OF BREATH, CHEST TIGHTNESS OR PAIN) WAKE YOU UP AT NIGHT OR EARLIER THAN USUAL IN THE MORNING: NOT AT ALL
ACT_TOTALSCORE: 25

## 2024-12-10 ASSESSMENT — ENCOUNTER SYMPTOMS
BACK PAIN: 1
FREQUENCY: 0
SORE THROAT: 0
COUGH: 0
HEARTBURN: 0
ENDOCRINE COMMENTS: + HAIR LOSS
FEVER: 0
NERVOUS/ANXIOUS: 0
EYE PAIN: 0
HEADACHES: 0
SHORTNESS OF BREATH: 1
NAUSEA: 0
MYALGIAS: 1
WEAKNESS: 1
HEMATURIA: 0
CHILLS: 0
JOINT SWELLING: 0
DIZZINESS: 0
UNEXPECTED WEIGHT CHANGE: 1
BRUISES/BLEEDS EASILY: 1
DIARRHEA: 0
ARTHRALGIAS: 0
DYSURIA: 0
CONSTIPATION: 0
PALPITATIONS: 0
PARESTHESIAS: 0
ABDOMINAL PAIN: 0
HEMATOCHEZIA: 0

## 2024-12-10 ASSESSMENT — SOCIAL DETERMINANTS OF HEALTH (SDOH): HOW OFTEN DO YOU GET TOGETHER WITH FRIENDS OR RELATIVES?: ONCE A WEEK

## 2024-12-10 ASSESSMENT — PAIN SCALES - GENERAL: PAINLEVEL_OUTOF10: MILD PAIN (3)

## 2024-12-10 NOTE — LETTER
My Heart Failure Action Plan  Name: Selvin An   YOB: 1955  Date: 12/10/2024   My doctor:   Matt Whitmore [facility]   [address]  [place] [address] [phone] My Diagnosis: HF-rEF (EF < 40%)  My Ejection Fraction:   Lab Results   Component Value Date    LVEF 55-60% 12/02/2022     %  My Exercise Goal: Start exercise slowly - to begin, do a few minutes of exercise, several times a day. Increase your time and speed [id number] to build tolerance, with a goal of 30 minutes of exercise daily. Steady, slow, and consistent exercise is both safe and healthy. Stop and rest when you feel tired or become short of breath. Do not push yourself on days when you don t feel well.       My Weight Plan:   Wt Readings from Last 2 Encounters:   12/10/24 142 kg (313 lb)   12/04/24 135.2 kg (298 lb)     Weigh yourself daily using the same scale. If you gain more than 2 pounds in 24 hours or 5 pounds in 7 days.     My Diet Goal: No added salt    Emergency Room Visits:    Our goal is to improve your quality of life and help you avoid a visit to the emergency room or hospital.  If we work together, we can achieve this goal. But, if you feel you need to call 911 or go to the emergency room, please do so.  If you go to the emergency room, please bring your list of medicines and your daily weight chart with you.    Each day ask yourself:  Is my weight up?  Do I have swelling?  Do I have trouble breathing?  How did I sleep?  Other problems?       GREEN ZONE     Weight gained is no more than 2 pounds a day or 5 pounds a in 7 days.  No swelling in feet, ankles, legs or stomach.  No more swelling than usual.  No more trouble breathing than usual.  No change in my sleep.  No other problems. What should I do?  I am doing fine. I will take my medicine, follow my diet, see my doctor, exercise, and watch for symptoms.           YELLOW ZONE         Weight gain of more than 2 pounds  in one day or 5 pounds in 7 days.  New swelling in ankle, leg, knee or thigh.  Bloating in belly, pants feel tighter.  Swelling in hands or face.  Coughing or trouble breathing while walking or talking.  Harder to breathe last night.  Have trouble sleeping, wake up short of breath.  Unusually tired.  Not eating.  Nausea, vomiting, or diarrhea  Pain in my chest or bad  leg cramps.  Feel weak or dizzy. What should I do?  I need to take action and call my doctor or nurse today.                 RED ZONE         Weight gain of 5 pounds overnight.  Chest pain or pressure that does not go away.  Feel less alert.  Wheezing or have trouble breathing when at rest.  Cannot sleep lying down.  Cannot take my medicines.  Pass out or faint. What should I do?  I need to call my doctor or nurse now!  Call 911 if I have chest pain or cannot breathe.

## 2024-12-10 NOTE — PROGRESS NOTES
Assessment & Plan     ICD-10-CM    1. Medicare annual wellness visit, subsequent  Z00.00       2. Vaccine counseling  Z71.85       3. Acute on chronic heart failure with preserved ejection fraction (H)  I50.33 spironolactone (ALDACTONE) 50 MG tablet     torsemide (DEMADEX) 20 MG tablet      4. Benign essential hypertension  I10 carvedilol (COREG) 6.25 MG tablet     spironolactone (ALDACTONE) 50 MG tablet      5. CKD (chronic kidney disease) stage 2, GFR 60-89 ml/min  N18.2       6. Class 3 severe obesity due to excess calories with serious comorbidity and body mass index (BMI) of 40.0 to 44.9 in adult (H)  E66.813     E66.01     Z68.41       7. Complication of artery associated with surgical procedure - Left superior gluteal artery Injury -- subsequently thrombosed / embolized by Interventional Radiology  I97.89 pregabalin (LYRICA) 100 MG capsule      8. Hyperuricemia  E79.0 allopurinol (ZYLOPRIM) 300 MG tablet      9. Mixed hyperlipidemia  E78.2       10. KYLAH on CPAP -- Uses nightly, finds helpful and beneficial  G47.33       11. Sleep difficulties  G47.9 estazolam (PROSOM) 1 MG tablet      12. Gastroesophageal reflux disease, unspecified whether esophagitis present  K21.9 famotidine (PEPCID) 20 MG tablet      13. Hypertriglyceridemia  E78.1 fenofibrate (TRICOR) 145 MG tablet      14. Bilateral lower extremity edema  R60.0 spironolactone (ALDACTONE) 50 MG tablet     torsemide (DEMADEX) 20 MG tablet      15. Chronic radicular low back pain  M54.16 gabapentin (NEURONTIN) 400 MG capsule    G89.29 pregabalin (LYRICA) 100 MG capsule      16. Postoperative hypothyroidism  E89.0 levothyroxine (SYNTHROID/LEVOTHROID) 200 MCG tablet      17. Neuralgia of left perineal nerve  R10.2 pregabalin (LYRICA) 100 MG capsule      18. Erectile dysfunction, unspecified erectile dysfunction type  N52.9 sildenafil (VIAGRA) 100 MG tablet      19. Prostate cancer (H)  C61       20. Fluid retention  R60.9 torsemide (DEMADEX) 20 MG tablet          Patient presents for Medicare annual wellness visit as well as follow-up multiple issues.    Patient has been retaining a bunch of fluid, weight has not been checked at home recently but his paperwork today indicates that he has had significant weight gain in the last week.  Wife states that his face looks puffy his hands are puffy overall things are hurting more.  He has underlying heart failure with preserved ejection fraction with LVH based on his last echo with normal ejection fraction.    Recommend switching from furosemide over to torsemide.  Start 20 to-40 mg torsemide daily.    Recommend limiting sodium intake.    Recommend reducing carbohydrate and starch/sugar intake.  His blood sugars have been high.  Hemoglobin A1c is high.    Encouraged significant lifestyle changes, dietary changes.  States that he does eat quite a bit of bread and cereal.    + Weight is up about 15 lbs in past 7 days.     HYPERTENSION - Ongoing. Blood pressure is currently well controlled.  Medication side effects: None. Denies syncope or presyncope.      -- Stop Lasix - no longer working, + 15 lbs.     -- START Torsemide.   Medication list reviewed/updated. Refills completed as needed.      Left gluteal muscle injury following surgical procedure.  Appears to have  hit a plateau in his recovery phase.    Hyperuricemia, denies any recent gout attacks.  Continues with allopurinol daily.    Sleep apnea, still using CPAP.    Sleep difficulties.  Has been doing reasonably well with estazolam at bedtime.  Tolerating well.  Needs refills.    Heartburn and reflux, has noted improvement as long as he takes his famotidine regularly.    High blood triglyceride levels.  Ongoing.  Continues with fenofibrate.  Labs today show his triglycerides are quite high.    Left peroneal nerve neuralgia, Lyrica has been helpful with dose increase at his last appointment.  Needs refills.    Erectile dysfunction, chronic, stable.  Using Viagra as needed.   Needs refills.    New diagnosis of prostate cancer.  Currently undergoing close monitoring.  No active treatment at this time.      MIXED HYPERLIPIDEMIA.  Ongoing. LDL is at goal: No. Triglycerides are at goal: No.  Hopefully lifestyle modifications will improve cholesterol levels, otherwise will consider additional medication dose adjustments or medication changes.  Medication side effects reported: None.   Continue current medications for now. Medication list reviewed/updated. Refills completed as needed.  Recent Labs   Lab Test 12/10/24  1000 11/11/22  0755 09/15/21  0824   CHOL 301* 163 214*   HDL 34* 41 43   LDL  --  105* 143*   TRIG 447* 87 140        HYPOTHYROIDISM - Patient has a longstanding history of hypothyroidism. Reports doing reasonably well. Reports: denies fatigue, weight changes, heat/cold intolerance, bowel/skin changes or CVS symptoms.  Continue: Synthroid oral thyroid replacement.  Denies adverse medication reactions or side effects.                       TSH   Date Value Ref Range Status   12/10/2024 0.75 0.30 - 4.20 uIU/mL Final   12/22/2021 1.20 0.40 - 4.00 mU/L Final        OBESITY - Ongoing.  (See Encounter Diagnosis list above for obesity class / severity).  - Encourage continued maintenance / improvement in diet and exercise.   - Consider Nutrition / Dietician appointment.  - Weight loss would improve Hypertension, Cholesterol.      Chronic Kidney Disease, Stage 3a (GFR 45-59), chronic, ongoing.  Kidney function had been slowly declining.  Encourage NSAID avoidance.    Recent Labs   Lab Test 12/10/24  1000 10/18/24  1512   CR 1.65* 1.54*   GFRESTIMATED 44* 48*        Sleep Apnea, chronic, ongoing.  Uses CPAP nightly.  Finds helpful and beneficial.  Encouraged continued use.     Vaccine counseling completed.  Encourage routine / annual vaccinations.     Results for orders placed or performed in visit on 12/10/24   Comprehensive metabolic panel     Status: Abnormal   Result Value Ref Range     Sodium 139 135 - 145 mmol/L    Potassium 4.4 3.4 - 5.3 mmol/L    Carbon Dioxide (CO2) 26 22 - 29 mmol/L    Anion Gap 10 7 - 15 mmol/L    Urea Nitrogen 31.1 (H) 8.0 - 23.0 mg/dL    Creatinine 1.65 (H) 0.67 - 1.17 mg/dL    GFR Estimate 44 (L) >60 mL/min/1.73m2    Calcium 10.6 (H) 8.8 - 10.4 mg/dL    Chloride 103 98 - 107 mmol/L    Glucose 150 (H) 70 - 99 mg/dL    Alkaline Phosphatase 75 40 - 150 U/L    AST 38 0 - 45 U/L    ALT 50 0 - 70 U/L    Protein Total 8.4 (H) 6.4 - 8.3 g/dL    Albumin 4.6 3.5 - 5.2 g/dL    Bilirubin Total 0.5 <=1.2 mg/dL    Patient Fasting > 8hrs? Yes    Lipid Profile     Status: Abnormal   Result Value Ref Range    Cholesterol 301 (H) <200 mg/dL    Triglycerides 447 (H) <150 mg/dL    Direct Measure HDL 34 (L) >=40 mg/dL    LDL Cholesterol Calculated      Non HDL Cholesterol 267 (H) <130 mg/dL    Patient Fasting > 8hrs? Yes     Narrative    Cholesterol  Desirable: < 200 mg/dL  Borderline High: 200 - 239 mg/dL  High: >= 240 mg/dL    Triglycerides  Normal: < 150 mg/dL  Borderline High: 150 - 199 mg/dL  High: 200-499 mg/dL  Very High: >= 500 mg/dL    Direct Measure HDL  Female: >= 50 mg/dL   Male: >= 40 mg/dL    LDL Cholesterol  Desirable: < 100 mg/dL  Above Desirable: 100 - 129 mg/dL   Borderline High: 130 - 159 mg/dL   High:  160 - 189 mg/dL   Very High: >= 190 mg/dL    Non HDL Cholesterol  Desirable: < 130 mg/dL  Above Desirable: 130 - 159 mg/dL  Borderline High: 160 - 189 mg/dL  High: 190 - 219 mg/dL  Very High: >= 220 mg/dL   Prostate Specific Antigen Screen     Status: Normal   Result Value Ref Range    Prostate Specific Antigen Screen 4.49 0.00 - 6.50 ng/mL    Narrative    This result is obtained using the Roche Elecsys total PSA method on the charly e601 immunoassay analyzer, which is an ultrasensitive method. Results obtained with different assay methods or kits cannot be used interchangeably.  This test is intended for initial prostate cancer screening. PSA values exceeding the  age-specific limits are suspicious for prostate disease, but additional testing, such as prostate biopsy, is needed to diagnose prostate pathology. The American Cancer Society recommends annual examination with digital rectal examination and serum PSA beginning at age 50 and for men with a life expectancy of at least 10 years after detection of prostate cancer. For men in high-risk groups, such as  Americans or men with a first-degree relative diagnosed at a younger age, testing should begin at a younger age. It is generally recommended that information be provided to patients about the benefits and limitations of testing and treatment so they can make informed decisions.   Vitamin B12     Status: Normal   Result Value Ref Range    Vitamin B12 802 232 - 1,245 pg/mL   TSH with free T4 reflex     Status: Normal   Result Value Ref Range    TSH 0.75 0.30 - 4.20 uIU/mL   Hemoglobin A1c     Status: Abnormal   Result Value Ref Range    Estimated Average Glucose 143 (H) <117 mg/dL    Hemoglobin A1C 6.6 (H) <5.7 %   CBC with platelets and differential     Status: Abnormal   Result Value Ref Range    WBC Count 10.7 4.0 - 11.0 10e3/uL    RBC Count 5.42 4.40 - 5.90 10e6/uL    Hemoglobin 17.7 13.3 - 17.7 g/dL    Hematocrit 51.8 40.0 - 53.0 %    MCV 96 78 - 100 fL    MCH 32.7 26.5 - 33.0 pg    MCHC 34.2 31.5 - 36.5 g/dL    RDW 15.3 (H) 10.0 - 15.0 %    Platelet Count 387 150 - 450 10e3/uL    % Neutrophils 43 %    % Lymphocytes 39 %    % Monocytes 8 %    % Eosinophils 7 %    % Basophils 2 %    % Immature Granulocytes 1 %    NRBCs per 100 WBC 0 <1 /100    Absolute Neutrophils 4.6 1.6 - 8.3 10e3/uL    Absolute Lymphocytes 4.2 0.8 - 5.3 10e3/uL    Absolute Monocytes 0.9 0.0 - 1.3 10e3/uL    Absolute Eosinophils 0.8 (H) 0.0 - 0.7 10e3/uL    Absolute Basophils 0.2 0.0 - 0.2 10e3/uL    Absolute Immature Granulocytes 0.1 <=0.4 10e3/uL    Absolute NRBCs 0.0 10e3/uL   CBC with Platelets & Differential     Status: Abnormal     "Narrative    The following orders were created for panel order CBC with Platelets & Differential.  Procedure                               Abnormality         Status                     ---------                               -----------         ------                     CBC with platelets and d...[[id number]]  Abnormal            Final result                 Please view results for these tests on the individual orders.      Hemoglobin A1c is high.  CBC is normal.  B12 is within normal limits.  TSH normal.  PSA has gone up from previous levels now 4.49.  Cholesterol levels are high and not at goal.  Creatinine 1.65 with GFR 44.  Glucose 150.           BMI  Estimated body mass index is 43.35 kg/m  as calculated from the following:    Height as of this encounter: 1.81 m (5' 11.25\").    Weight as of this encounter: 142 kg (313 lb).       Counseling  Appropriate preventive services were addressed with this patient via screening, questionnaire, or discussion as appropriate for fall prevention, nutrition, physical activity, Tobacco-use cessation, social engagement, weight loss and cognition.  Checklist reviewing preventive services available has been given to the patient.  Reviewed patient's diet, addressing concerns and/or questions.   The patient was instructed to see the dentist every 6 months.   He is at risk for psychosocial distress and has been provided with information to reduce risk.   The patient was provided with written information regarding signs of hearing loss.   Information on urinary incontinence and treatment options given to patient.         Return in about 16 days (around 12/26/2024) for - follow-up CHF / Edema.      Matt Whitmore MD  [facility]    Review of Systems   Constitutional:  Positive for unexpected weight change (Wt up 15 lbs in past week). Negative for chills and fever.        + walking up to 150 feet with small based triangular cane.   HENT:  Positive for hearing " loss. Negative for congestion, ear pain and sore throat.    Eyes:  Positive for visual disturbance. Negative for pain.   Respiratory:  Positive for shortness of breath. Negative for cough.    Cardiovascular:  Positive for peripheral edema. Negative for chest pain and palpitations.   Gastrointestinal:  Negative for abdominal pain, constipation, diarrhea, heartburn, hematochezia and nausea.   Endocrine:        + hair loss   Genitourinary:  Positive for impotence. Negative for dysuria, frequency, genital sores, hematuria, penile discharge and urgency.   Musculoskeletal:  Positive for back pain, gait problem (limited to less than 200 feet, due to low back pain. Walks with cane.) and myalgias. Negative for arthralgias and joint swelling.        Left leg weakness - can't Abduct left leg up against gravity when laying in bed.   More ability to stand, can now extend and abduct left hip slightly when standing.    Skin:  Negative for rash.   Allergic/Immunologic: Positive for immunocompromised state.   Neurological:  Positive for weakness. Negative for dizziness, headaches and paresthesias.   Hematological:  Bruises/bleeds easily.   Psychiatric/Behavioral:  Negative for mood changes. The patient is not nervous/anxious.          Preventive Care Visit  [facility]  Matt Whitmore MD, Internal Medicine  Dec 10, 2024      Igor Figueroa is a 69 year old, presenting for the following:  Medicare Visit        12/10/2024    10:24 AM   Additional Questions   Roomed by Kenia MARCIAL     History of Present Illness       Back Pain:  He presents for follow up of back pain. Patient's back pain is a chronic problem.  Location of back pain:  Right lower back, left lower back, right middle of back and left middle of back  Description of back pain: cramping and sharp  Back pain spreads: nowhere    Since patient first noticed back pain, pain is: unchanged  Does back pain interfere with his job:  No   He exercises with  enough effort to increase his heart rate 10 to 19 minutes per day.  He exercises with enough effort to increase his heart rate 3 or less days per week.   He is taking medications regularly.          Health Care Directive  Patient does not have a Health Care Directive: Patient states has Advance Directive and will bring in a copy to clinic.      12/10/2024   General Health   How would you rate your overall physical health? (!) FAIR   Feel stress (tense, anxious, or unable to sleep) To some extent      (!) STRESS CONCERN      12/10/2024   Nutrition   Diet: Regular (no restrictions)            12/10/2024   Exercise   Days per week of moderate/strenous exercise 0 days      (!) EXERCISE CONCERN      12/10/2024   Social Factors   Frequency of gathering with friends or relatives Once a week   Worry food won't last until get money to buy more No   Food not last or not have enough money for food? No   Do you have housing? (Housing is defined as stable permanent housing and does not include staying ouside in a car, in a tent, in an abandoned building, in an overnight shelter, or couch-surfing.) Yes   Are you worried about losing your housing? No   Lack of transportation? No   Unable to get utilities (heat,electricity)? No            12/10/2024   Fall Risk   Fallen 2 or more times in the past year? No    Trouble with walking or balance? Yes    Gait Speed Test (Document in seconds) 5.9    5.53   Gait Speed Test Interpretation Greater than 5.01 seconds - ABNORMAL    Greater than 5.01 seconds - ABNORMAL       Patient-reported    Multiple values from one day are sorted in reverse-chronological order          12/10/2024   Activities of Daily Living- Home Safety   Needs help with the following daily activites None of the above   Safety concerns in the home None of the above            12/10/2024   Dental   Dentist two times every year? (!) NO            12/10/2024   Hearing Screening   Hearing concerns? (!) I FEEL THAT PEOPLE ARE  MUMBLING OR NOT SPEAKING CLEARLY.    (!) I NEED TO ASK PEOPLE TO SPEAK UP OR REPEAT THEMSELVES.    (!) IT'S HARDER TO UNDERSTAND WOMEN'S VOICES THAN MEN'S VOICES.    (!) IT'S HARD TO FOLLOW A CONVERSATION IN A NOISY RESTAURANT OR CROWDED ROOM.    (!) TROUBLE UNDESTANDING A SPEAKER IN A PUBLIC MEETING OR Syn[employer] SERVICE.    (!) TROUBLE FOLLOWING DIALOGUE IN THE THEATHER.    (!) TROUBLE UNDERSTANDING SOFT OR WHISPERED SPEECH.    (!) TROUBLE UNDERSTANDING SPEECH ON THE TELEPHONE       Multiple values from one day are sorted in reverse-chronological order         12/10/2024   Driving Risk Screening   Patient/family members have concerns about driving No            12/10/2024   General Alertness/Fatigue Screening   Have you been more tired than usual lately? No            12/10/2024   Urinary Incontinence Screening   Bothered by leaking urine in past 6 months Yes               Today's PHQ-2 Score:       12/10/2024    10:13 AM   PHQ-2 ( 1999 [employer])   Q1: Little interest or pleasure in doing things 0    Q2: Feeling down, depressed or hopeless 0    PHQ-2 Score 0    Q1: Little interest or pleasure in doing things Not at all   Q2: Feeling down, depressed or hopeless Not at all   PHQ-2 Score 0       Patient-reported           12/10/2024   Substance Use   Alcohol more than 3/day or more than 7/wk Not Applicable   Do you have a current opioid prescription? No   How severe/bad is pain from 1 to 10? 4/10   Do you use any other substances recreationally? No        Social History     Tobacco Use    Smoking status: Never     Passive exposure: Never    Smokeless tobacco: Never   Vaping Use    Vaping status: Never Used   Substance Use Topics    Alcohol use: No     Comment: none    Drug use: No           12/10/2024   AAA Screening   Family history of Abdominal Aortic Aneurysm (AAA)? No      ASCVD Risk   The 10-year ASCVD risk score (Luke SPEARS, et al., 2019) is: 30.9%    Values used to calculate the score:      Age: 69  years      Sex: Male      Is Non- : No      Diabetic: No      Tobacco smoker: No      Systolic Blood Pressure: 132 mmHg      Is BP treated: Yes      HDL Cholesterol: 34 mg/dL      Total Cholesterol: 301 mg/dL            Reviewed and updated as needed this visit by Provider   Tobacco  Allergies  Meds  Problems  Med Hx  Surg Hx  Fam Hx              Current providers sharing in care for this patient include:  Patient Care Team:  Matt Whitmore MD as PCP - General (Internal Medicine)  Matt Whitmore MD as Assigned PCP  Janet Quiles [facility] as Pharmacist (Pharmacist)  Phill Acosta MD as Assigned Rheumatology Provider  Sebastien Gustafson MD as Hospitalist (Infectious Diseases)  Paul Garza MD as MD (Urology)    The following health maintenance items are reviewed in Epic and correct as of today:  Health Maintenance   Topic Date Due    HF ACTION PLAN  Never done    RSV VACCINE (1 - Risk 60-74 years 1-dose series) Never done    DTAP/TDAP/TD IMMUNIZATION (3 - Td or Tdap) 08/13/2024    COLORECTAL CANCER SCREENING  09/09/2024    ASTHMA ACTION PLAN  05/28/2025    BMP  06/10/2025    ASTHMA CONTROL TEST  06/10/2025    MICROALBUMIN  06/25/2025    MEDICARE ANNUAL WELLNESS VISIT  12/10/2025    ALT  12/10/2025    LIPID  12/10/2025    FALL RISK ASSESSMENT  12/10/2025    CBC  12/10/2025    GLUCOSE  12/10/2027    ADVANCE CARE PLANNING  12/10/2029    TSH W/FREE T4 REFLEX  Completed    HEPATITIS C SCREENING  Completed    PHQ-2 (once per calendar year)  Completed    INFLUENZA VACCINE  Completed    Pneumococcal Vaccine: 65+ Years  Completed    URINALYSIS  Completed    ZOSTER IMMUNIZATION  Completed    HPV IMMUNIZATION  Aged Out    RSV MONOCLONAL ANTIBODY  Aged Out    MENINGITIS IMMUNIZATION  Discontinued    COVID-19 Vaccine  Discontinued            Objective    Exam  /60 (BP Location: Right arm, Patient Position: Sitting, Cuff Size: Adult Large)   Pulse 75   Temp 97.4  F  "(36.3  C) (Temporal)   Resp 16   Ht 1.81 m (5' 11.25\")   Wt 142 kg (313 lb)   SpO2 94%   BMI 43.35 kg/m     Estimated body mass index is 43.35 kg/m  as calculated from the following:    Height as of this encounter: 1.81 m (5' 11.25\").    Weight as of this encounter: 142 kg (313 lb).    Physical Exam  Constitutional:       General: He is not in acute distress.     Appearance: He is well-developed. He is obese. He is not diaphoretic.   Eyes:      General: No scleral icterus.     Conjunctiva/sclera: Conjunctivae normal.   Neck:      Vascular: No carotid bruit.   Cardiovascular:      Rate and Rhythm: Normal rate and regular rhythm.      Pulses: Normal pulses.   Pulmonary:      Effort: Pulmonary effort is normal.      Breath sounds: Normal breath sounds.   Abdominal:      Palpations: Abdomen is soft.      Tenderness: There is no abdominal tenderness.   Musculoskeletal:         General: Swelling and tenderness (low back, soft tissues / SI joint pains) present. No deformity.      Cervical back: Neck supple.      Right lower le+ Pitting Edema present.      Left lower le+ Pitting Edema present.      Comments: + Walking with small base stand-up triangular cane.   Skin:     General: Skin is warm and dry.      Findings: No rash.   Neurological:      Mental Status: He is alert. Mental status is at baseline.      Comments: Left leg weakness   Psychiatric:         Mood and Affect: Mood normal.         Behavior: Behavior normal.                12/10/2024   Mini Cog   Clock Draw Score 2 Normal   3 Item Recall 3 objects recalled   Mini Cog Total Score 5                 Signed Electronically by: Matt Whitmore MD    "

## 2024-12-10 NOTE — NURSING NOTE
Chief Complaint   Patient presents with    Medicare Visit   Patient presents to the clinic today for a medicare visit     Initial There were no vitals taken for this visit. Estimated body mass index is 40.42 kg/m  as calculated from the following:    Height as of 12/4/24: 1.829 m (6').    Weight as of 12/4/24: 135.2 kg (298 lb).  Meds Reconciled: complete      Kenia Tovar LPN,ANIKET on 12/10/2024 at 10:25 AM  Ext. [phone]        Kenia Tovar LPN

## 2024-12-10 NOTE — PATIENT INSTRUCTIONS
Blood pressure is well controlled.       -- Start checking daily weight, write on calendar.       Elevated A1c noted today.      -- Pre-diabetes:  fasting glucose: 100 - 125  hemoglobin A1c: 5.7 - 6.4   -- Diabetes:  fasting glucose greater than 125  random glucose greater than 200  hemoglobin A1c: > or = 6.5     To help with weight loss and improve blood sugar control....    -- Try to avoid Carbohydrates as much as possible -- breads, pasta, baked goods, cakes, oatmeal, cold cereal, potatoes.   -- Eat more lean meats, proteins, eggs, nuts, vegetables.    -- Start or Continue regular daily exercise.     Get out and exercise, bike ride, walk for 10 to 15 minutes after each meal -- this can significantly lowers the spike in blood sugar after eating.       Medications refilled.       Stop Furosemide / Lasix.   Start Torsemide - for leg swelling/ edema.         Return in approximately 2.5  week(s), or sooner as needed for follow-up with Dr. Whitmore.    - follow-up CHF / Edema     Clinic : [phone]  Appointment line: [phone]           Return in approximately 1 year, or sooner as needed for follow-up with Dr. Whitmore.  - Annual Follow-up / Physical - Medicare Annual Wellness Visit     Clinic : [phone]  Appointment line: [phone]           Diastolic Dysfunction and Diastolic Heart Failure   What are diastolic dysfunction and diastolic heart failure?   The heart squeezesand relaxes each time it beats. The squeezing part of this cycle is called systole. The relaxation part is called diastole. When the ventricles (the heart's major pumping chambers) squeeze, they push blood out of the heartand into the blood vessels. After the ventricles have finished squeezing, they relax. This allows them to refill with blood to get ready to squeeze again.   The heart muscle can become stiff, making it hard for theventricles to relax completely. This problem is called diastolic dysfunction. Pressure in the  heart rises, and blood can back up into blood vessels in the lungs. When diastolic dysfunction causes symptoms such asshortness of breath, it is called diastolic heart failure.   What is the cause?   Diastolic dysfunction and diastolic heart failure may be caused by:   High blood pressure   Hypertrophic cardiomyopathy (the walls of the heart become thick and stiff)   Aortic stenosis (narrowing in oneof the heart valves)   Coronary artery disease   Restrictive cardiomyopathy (scars or deposits that make the heart muscle stiff)   Aging  Diastolic dysfunction is very common. Many adults older than 70 have it. In most people with diastolic dysfunction, the problem is not severe enough to cause diastolic heart failure.   What are the symptoms?   Diastolic dysfunction may not cause symptoms. If it does, the most common symptoms are:   Shortness of breath or trouble breathing, at first during exercise and later with any activity or even when you are resting   Waking up at night with trouble breathing or having a hard time lying flat in bed because youare short of breath   Unusual weight gain or swelling in the legs and ankles   Fast or irregular heartbeat  How is it diagnosed?  Your healthcare provider will ask about your symptoms and examine you. You may have some tests, such as:   Chest X-ray   An ECG (also called an EKG), which measures and records your heartbeat   Echocardiogram, which uses sound waves (ultrasound) to see how well your heart muscle is pumping  How is it treated?   The first step is to find the cause of the problem and treat the cause. This may mean treating high blood pressurewith a change in lifestyle or medicine, or having surgery to replace a damaged heart valve.   You will probably need to take medicine. Medicines your healthcare provider may prescribe include:   A diuretic to help you get rid of extra fluid in your body by urinating more   A beta blocker or calcium channelblocker to relax the  heart muscle   A vasodilator to make the blood vessels open up. Increasing the size of the blood vessels allows more blood to flow through them. This lowers blood pressure slightly and lessens theworkload of the heart.   An ACE inhibitor to relax blood vessels and lower blood pressure. This helps the heart to pump more blood out to the body.  How can I take care of myself?   If you have diastolic heart failure, learn to live within the limits of your condition. The following guidelines may help:   Get enough rest, shorten your working hours if possible, and try to reduce the stress in your life. Anxiety and anger can increase yourheart rate and blood pressure. If you need help with this, ask your healthcare provider.   Check your pulse rate daily.   Learn how to take your own blood pressure or have a family member learn how to take it.   Accept the fact that you will need to take medicines for your heart and limit the salt in your diet for the rest of your life.   Find a way to make sure that you take your medicines on time.   Weigh yourself at leastevery other day, at the same time of day if possible. Contact your healthcare provider if you gain more than 3 pounds in 1 week, or if you keep gaining weight over weeks to months. Weight gain may mean your body is havingtrouble getting rid of extra fluid.   Follow your healthcare provider's advice on how much fluid you should drink.   Make sure that your activities do not cause you to become too tired or short of breath.   Keep allof your appointments with your healthcare provider.

## 2024-12-26 ENCOUNTER — OFFICE VISIT (OUTPATIENT)
Dept: INTERNAL MEDICINE | Facility: OTHER | Age: 69
End: 2024-12-26
Attending: INTERNAL MEDICINE
Payer: MEDICARE

## 2024-12-26 VITALS
RESPIRATION RATE: 16 BRPM | HEART RATE: 81 BPM | DIASTOLIC BLOOD PRESSURE: 82 MMHG | SYSTOLIC BLOOD PRESSURE: 132 MMHG | WEIGHT: 311.4 LBS | OXYGEN SATURATION: 92 % | BODY MASS INDEX: 43.6 KG/M2 | HEIGHT: 71 IN | TEMPERATURE: 96.5 F

## 2024-12-26 DIAGNOSIS — E78.2 MIXED HYPERLIPIDEMIA: ICD-10-CM

## 2024-12-26 DIAGNOSIS — I50.32 CHRONIC HEART FAILURE WITH PRESERVED EJECTION FRACTION (HFPEF) (H): ICD-10-CM

## 2024-12-26 DIAGNOSIS — E11.42 TYPE 2 DIABETES MELLITUS WITH DIABETIC POLYNEUROPATHY, WITHOUT LONG-TERM CURRENT USE OF INSULIN (H): ICD-10-CM

## 2024-12-26 DIAGNOSIS — R60.0 BILATERAL LOWER EXTREMITY EDEMA: ICD-10-CM

## 2024-12-26 DIAGNOSIS — N18.32 CHRONIC KIDNEY DISEASE, STAGE 3B (H): ICD-10-CM

## 2024-12-26 DIAGNOSIS — Z85.850 HISTORY OF THYROID CANCER: ICD-10-CM

## 2024-12-26 DIAGNOSIS — I50.33 ACUTE ON CHRONIC HEART FAILURE WITH PRESERVED EJECTION FRACTION (H): Primary | ICD-10-CM

## 2024-12-26 DIAGNOSIS — I10 BENIGN ESSENTIAL HYPERTENSION: ICD-10-CM

## 2024-12-26 DIAGNOSIS — E89.0 POSTOPERATIVE HYPOTHYROIDISM: ICD-10-CM

## 2024-12-26 PROCEDURE — G0463 HOSPITAL OUTPT CLINIC VISIT: HCPCS

## 2024-12-26 RX ORDER — SPIRONOLACTONE 50 MG/1
100 TABLET, FILM COATED ORAL DAILY
Qty: 180 TABLET | Refills: 4 | Status: SHIPPED | OUTPATIENT
Start: 2024-12-26

## 2024-12-26 ASSESSMENT — PAIN SCALES - GENERAL: PAINLEVEL_OUTOF10: MILD PAIN (3)

## 2024-12-26 ASSESSMENT — ENCOUNTER SYMPTOMS
CHILLS: 0
WEAKNESS: 1
HEMATOCHEZIA: 0
SHORTNESS OF BREATH: 1
DIZZINESS: 0
DYSURIA: 0
NERVOUS/ANXIOUS: 0
ENDOCRINE COMMENTS: + HAIR LOSS
MYALGIAS: 1
EYE PAIN: 0
SORE THROAT: 0
ABDOMINAL PAIN: 0
COUGH: 0
JOINT SWELLING: 0
ARTHRALGIAS: 0
DIARRHEA: 0
HEARTBURN: 0
PARESTHESIAS: 0
BRUISES/BLEEDS EASILY: 1
PALPITATIONS: 0
FREQUENCY: 0
FEVER: 0
BACK PAIN: 1
CONSTIPATION: 0
HEMATURIA: 0
UNEXPECTED WEIGHT CHANGE: 1
NAUSEA: 0
HEADACHES: 0

## 2024-12-26 NOTE — PATIENT INSTRUCTIONS
Glad you are feeling a bit better.  Weight is stable/down slightly.    Lets increase spironolactone up to 100 mg daily.    Start reducing torsemide dose.    Start Jardiance 12.5 mg daily for 10 to 14 days then increase up to 25 mg daily.    We may be able to change torsemide to as needed dosing for fluid retention or weight gain rather than a scheduled dose.    --- Consider taking torsemide if 2 pound weight gain in 1 day or 5 pound weight gain in 1 week.        Aspects of Diabetes:   Recent Labs   Lab Test 12/10/24  1000 10/18/24  1512 06/25/24  1245 05/12/24  1625 12/02/22  1015 11/11/22  0755 11/08/22  1204 12/22/21  1035 09/15/21  0824 08/04/21  1523   A1C 6.6*  --   --   --   --  6.0  --   --  5.5 5.6   LDL  --   --   --   --   --  105*  --   --  143*  --    HDL 34*  --   --   --   --  41  --   --  43 35   TRIG 447*  --   --   --   --  87  --   --  140 540*   ALT 50  --  48 48   < > 23 25   < > 18 23   CR 1.65* 1.54* 1.79* 1.26*   < > 1.27*  --    < > 1.34* 1.40*   GFRESTIMATED 44* 48* 40* 62   < > 62  --    < > 54* 52*   POTASSIUM 4.4 4.5 4.6 3.8   < > 4.0  --    < > 3.9 3.9   TSH 0.75  --   --   --   --   --  0.40   < > 11.84* 3.61   T4  --   --   --   --   --   --   --   --  0.77  --    WBC 10.7  --   --  12.6*   < >  --   --    < > 16.1* 13.3*   HGB 17.7  --   --  16.2   < >  --   --    < > 15.2 15.4     --   --  440   < >  --   --    < > 459* 465*   ALBUMIN 4.6  --  4.5 4.5   < > 4.2 4.4   < > 4.2 4.6    < > = values in this interval not displayed.      Hemoglobin A1c  Goal range is under 8%. Best is 6.5 to 7   Blood Pressure 132/82 Goal to keep less than 140/90   Tobacco  reports that he has never smoked. He has never been exposed to tobacco smoke. He has never used smokeless tobacco. Goal to abstain from tobacco   Aspirin or Plavix Anti-platelet therapy Aspirin or Plavix reduces risk of heart disease and stroke  -- sometimes used with other blood thinners, depending on bleeding risk and risk  factors.    ACE/ARB Specific blood pressure meds These medications can reduce risk of kidney disease   Cholesterol Statins (Lipitor, Crestor, vs others) Statins reduce risk of heart disease and stroke   Eye Exam -- Do Yearly -- Annual diabetic eye exam   Healthy weight Wt Readings from Last 4 Encounters:   12/26/24 141.3 kg (311 lb 6.4 oz)   12/10/24 142 kg (313 lb)   12/04/24 135.2 kg (298 lb)   11/18/24 135.2 kg (298 lb)      Body mass index is 43.43 kg/m .  Goal BMI under 30, best is under 25.      -- Trying to exercise daily (goal at least 20 min/day) with moderate aerobic activity   -- Eat healthy (resources from ADA at http://www.diabetes.org/)   -- Taking good care of my feet. Consider seeing the Podiatrist   -- Check blood sugars as directed, record in log book and bring to every appointment    Insurance companies are grading you and I on your blood sugar control -- Goal is to get your A1c down to 7.9% or lower and NO Smoking!  -- Medicare and most insurance companies, will only cover Hemoglobin A1c labs to be rechecked every 91+ days.      Return for Diabetes labs and clinic follow-up appointment every 3 to 4 months.    Schedule lab only appointment --- A few days AFTER: 3/10/25   Schedule clinic appointment with Dr. Whitmore -- Same day as labs, or 1-2 days later.

## 2024-12-26 NOTE — NURSING NOTE
"Chief Complaint   Patient presents with    Follow Up     CHF/Edema       Initial /82 (BP Location: Right arm, Patient Position: Sitting, Cuff Size: Adult Large)   Pulse 81   Temp (!) 96.5  F (35.8  C) (Temporal)   Resp 16   Ht 1.803 m (5' 11\")   Wt 141.3 kg (311 lb 6.4 oz)   SpO2 92%   BMI 43.43 kg/m   Estimated body mass index is 43.43 kg/m  as calculated from the following:    Height as of this encounter: 1.803 m (5' 11\").    Weight as of this encounter: 141.3 kg (311 lb 6.4 oz).  Medication Review: complete    The next two questions are to help us understand your food security.  If you are feeling you need any assistance in this area, we have resources available to support you today.          12/26/2024   SDOH- Food Insecurity   Within the past 12 months, did you worry that your food would run out before you got money to buy more? N   Within the past 12 months, did the food you bought just not last and you didn t have money to get more? N         Health Care Directive:  Patient does not have a Health Care Directive: Discussed advance care planning with patient; however, patient declined at this time.    Ronna Godfrey      "

## 2024-12-26 NOTE — PROGRESS NOTES
Assessment & Plan     ICD-10-CM    1. Acute on chronic heart failure with preserved ejection fraction (H)  I50.33 spironolactone (ALDACTONE) 50 MG tablet      2. Chronic heart failure with preserved ejection fraction (HFpEF) (H)  I50.32 empagliflozin (JARDIANCE) 25 MG TABS tablet      3. Bilateral lower extremity edema  R60.0 spironolactone (ALDACTONE) 50 MG tablet      4. Benign essential hypertension  I10 spironolactone (ALDACTONE) 50 MG tablet      5. Type 2 diabetes mellitus with diabetic polyneuropathy, without long-term current use of insulin (H)  E11.42 empagliflozin (JARDIANCE) 25 MG TABS tablet     Urine Macroscopic with reflex to Microscopic     TSH with free T4 reflex     Hemoglobin A1c     CBC with platelets     Albumin Random Urine Quantitative with Creat Ratio     Comprehensive metabolic panel      6. Chronic kidney disease, stage 3b (H)  N18.32 empagliflozin (JARDIANCE) 25 MG TABS tablet      7. History of thyroid cancer - Papillary Thyroid Cancer -- NO Ozempic or GLP-1 medications --  Z85.850       8. Mixed hyperlipidemia  E78.2 Lipid Profile      9. Postoperative hypothyroidism  E89.0          Patient presents for follow-up multiple issues.     Acute / Chronic CHF - still with fluid overload, but has had slight improvement in weight, but is feeling very dry / dried out, with the dose increase of diuretic.  We switched from furosemide over to torsemide.  Hope will be that he is able to reduce torsemide dose or even possibly skip torsemide dose periodically throughout the week.  At this point, increase spironolactone up to 100 mg daily.  Start Jardiance 25 mg tablet.  Begin with half tablet or 12.5 mg daily increase up to 25 mg in 1 to 2 weeks.  Recheck diabetic labs in about 3 months.    We discussed medications to help with weight loss.    No GLP-1 agonist for patient due to his personal history of papillary thyroid cancer.    New diagnosis of diabetes.  He has diabetic painful polyneuropathy.   Start Jardiance as noted.    HYPERTENSION - Ongoing. Blood pressure is currently well controlled.  Medication side effects: None. Denies syncope or presyncope.  Continue current medications -- Med changes, see below.   Medication list reviewed/updated. Refills completed as needed.     Lets increase spironolactone up to 100 mg daily.    Start reducing torsemide dose.    Start Jardiance 12.5 mg daily for 10 to 14 days then increase up to 25 mg daily.    We may be able to change torsemide to as needed dosing for fluid retention or weight gain rather than a scheduled dose.    --- Consider taking torsemide if 2 pound weight gain in 1 day or 5 pound weight gain in 1 week.       MIXED HYPERLIPIDEMIA.  Ongoing. LDL is at goal: No. Triglycerides are at goal: No.  Hopefully lifestyle modifications will improve cholesterol levels, otherwise will consider additional medication dose adjustments or medication changes.  Medication side effects reported: None.   Continue current medications for now. Medication list reviewed/updated. Refills completed as needed.  Recent Labs   Lab Test 12/10/24  1000 11/11/22  0755 09/15/21  0824   CHOL 301* 163 214*   HDL 34* 41 43   LDL  --  105* 143*   TRIG 447* 87 140        HYPOTHYROIDISM - Patient has a longstanding history of hypothyroidism. Reports doing reasonably well. Reports: denies fatigue, weight changes, heat/cold intolerance, bowel/skin changes or CVS symptoms.  Continue: Synthroid oral thyroid replacement.  Denies adverse medication reactions or side effects.                       TSH   Date Value Ref Range Status   12/10/2024 0.75 0.30 - 4.20 uIU/mL Final   12/22/2021 1.20 0.40 - 4.00 mU/L Final        OBESITY - Ongoing.  (See Encounter Diagnosis list above for obesity class / severity).  - Encourage continued maintenance / improvement in diet and exercise.   - Consider Nutrition / Dietician appointment.  - Weight loss would improve Hypertension, Cholesterol and Diabetes.       Chronic Kidney Disease, Stage 3b (GFR 30-44), chronic, ongoing.  Kidney function had been slowly declining.  Encourage NSAID avoidance.    Recent Labs   Lab Test 12/10/24  1000 10/18/24  1512   CR 1.65* 1.54*   GFRESTIMATED 44* 48*        Vaccine counseling completed.  Encourage routine / annual vaccinations.    Type 2 Diabetes Mellitus, with nephropathy, with neuropathy, Reports ongoing/previous: numbness and burning.  Blood sugar control has been good with minimal hyperglycemia. Doing well with diet + Start oral agents 12/26/2024.  Medication list reviewed/updated. Refills completed as needed.    Complicating factors include but are not limited to: hypertension, hyperlipidemia, neuropathy, chronic kidney disease, and morbid obesity .     Recent Labs   Lab Test 12/10/24  1000 10/18/24  1512 06/25/24  1245 05/12/24  1625 12/02/22  1015 11/11/22  0755 11/08/22  1204 12/22/21  1035 09/15/21  0824 08/04/21  1523   A1C 6.6*  --   --   --   --  6.0  --   --  5.5 5.6   LDL  --   --   --   --   --  105*  --   --  143*  --    HDL 34*  --   --   --   --  41  --   --  43 35   TRIG 447*  --   --   --   --  87  --   --  140 540*   ALT 50  --  48 48   < > 23 25   < > 18 23   CR 1.65* 1.54* 1.79* 1.26*   < > 1.27*  --    < > 1.34* 1.40*   GFRESTIMATED 44* 48* 40* 62   < > 62  --    < > 54* 52*   POTASSIUM 4.4 4.5 4.6 3.8   < > 4.0  --    < > 3.9 3.9   TSH 0.75  --   --   --   --   --  0.40   < > 11.84* 3.61   T4  --   --   --   --   --   --   --   --  0.77  --    WBC 10.7  --   --  12.6*   < >  --   --    < > 16.1* 13.3*   HGB 17.7  --   --  16.2   < >  --   --    < > 15.2 15.4     --   --  440   < >  --   --    < > 459* 465*   ALBUMIN 4.6  --  4.5 4.5   < > 4.2 4.4   < > 4.2 4.6    < > = values in this interval not displayed.        The longitudinal plan of care for the diagnosis(es)/condition(s) as documented were addressed during this visit. Due to the added complexity in care, I will continue to support Henry in the  "subsequent management and with ongoing continuity of care.               BMI  Estimated body mass index is 43.43 kg/m  as calculated from the following:    Height as of this encounter: 1.803 m (5' 11\").    Weight as of this encounter: 141.3 kg (311 lb 6.4 oz).       Wt Readings from Last 5 Encounters:   12/26/24 141.3 kg (311 lb 6.4 oz)   12/10/24 142 kg (313 lb)   12/04/24 135.2 kg (298 lb)   11/18/24 135.2 kg (298 lb)   10/28/24 138.8 kg (306 lb)            Return in about 2 months (around 3/11/2025) for - Labs every 91+ days, with DM Follow-up, Same Day or 1-2 days later with Dr. Whitmore.      Matt Whitmore MD  [facility]    Review of Systems   Constitutional:  Positive for unexpected weight change (Wt is down slightly, still up many pounds). Negative for chills and fever.        + walking up to 150 feet with small based triangular cane.   HENT:  Positive for hearing loss. Negative for congestion, ear pain and sore throat.    Eyes:  Positive for visual disturbance. Negative for pain.   Respiratory:  Positive for shortness of breath. Negative for cough.    Cardiovascular:  Positive for peripheral edema. Negative for chest pain and palpitations.   Gastrointestinal:  Negative for abdominal pain, constipation, diarrhea, heartburn, hematochezia and nausea.   Endocrine:        + hair loss   Genitourinary:  Positive for impotence. Negative for dysuria, frequency, genital sores, hematuria, penile discharge and urgency.   Musculoskeletal:  Positive for back pain, gait problem (limited to less than 200 feet, due to low back pain. Walks with cane.) and myalgias. Negative for arthralgias and joint swelling.        Left leg weakness - can't Abduct left leg up against gravity when laying in bed.   More ability to stand, can now extend and abduct left hip slightly when standing.    Skin:  Negative for rash.   Allergic/Immunologic: Positive for immunocompromised state.   Neurological:  Positive for " "weakness. Negative for dizziness, headaches and paresthesias.   Hematological:  Bruises/bleeds easily.   Psychiatric/Behavioral:  Negative for mood changes. The patient is not nervous/anxious.        Igor Figueroa is a 69 year old, presenting for the following health issues:  Follow Up (CHF/Edema)        12/26/2024     9:13 AM   Additional Questions   Roomed by Ronna YNA   Accompanied by Wife, Nati     History of Present Illness       Back Pain:  He presents for follow up of back pain. Patient's back pain is a chronic problem.  Location of back pain:  Right lower back, left lower back, right middle of back and left middle of back  Description of back pain: cramping and sharp  Back pain spreads: nowhere    Since patient first noticed back pain, pain is: unchanged  Does back pain interfere with his job:  No       Hypertension: He presents for follow up of hypertension.  He does check blood pressure  regularly outside of the clinic. Outside blood pressures have been over 140/90. He follows a low salt diet.     He eats 2-3 servings of fruits and vegetables daily.He exercises with enough effort to increase his heart rate 10 to 19 minutes per day.  He exercises with enough effort to increase his heart rate 3 or less days per week.   He is taking medications regularly.                     Objective    /82 (BP Location: Right arm, Patient Position: Sitting, Cuff Size: Adult Large)   Pulse 81   Temp (!) 96.5  F (35.8  C) (Temporal)   Resp 16   Ht 1.803 m (5' 11\")   Wt 141.3 kg (311 lb 6.4 oz)   SpO2 92%   BMI 43.43 kg/m    Body mass index is 43.43 kg/m .  Physical Exam  Constitutional:       General: He is not in acute distress.     Appearance: He is well-developed. He is obese. He is not diaphoretic.   Eyes:      General: No scleral icterus.     Conjunctiva/sclera: Conjunctivae normal.   Neck:      Vascular: No carotid bruit.   Cardiovascular:      Rate and Rhythm: Normal rate and regular rhythm.      " Pulses: Normal pulses.      Comments: -- Much less leg swelling than from 12/10.  Pulmonary:      Effort: Pulmonary effort is normal.      Breath sounds: Normal breath sounds.   Abdominal:      Palpations: Abdomen is soft.      Tenderness: There is no abdominal tenderness.   Musculoskeletal:         General: Swelling and tenderness (low back, soft tissues / SI joint pains) present. No deformity.      Cervical back: Neck supple.      Right lower le+ Pitting Edema present.      Left lower le+ Pitting Edema present.      Comments: + Walking with small base stand-up triangular cane.   Skin:     General: Skin is warm and dry.      Findings: No rash.   Neurological:      Mental Status: He is alert. Mental status is at baseline.      Comments: Left leg weakness   Psychiatric:         Mood and Affect: Mood normal.         Behavior: Behavior normal.                    Signed Electronically by: Matt Whitmore MD

## 2025-01-13 DIAGNOSIS — Z12.11 ENCOUNTER FOR SCREENING COLONOSCOPY: Primary | ICD-10-CM

## 2025-01-13 RX ORDER — BISACODYL 5 MG/1
TABLET, DELAYED RELEASE ORAL
Qty: 2 TABLET | Refills: 0 | Status: SHIPPED | OUTPATIENT
Start: 2025-02-28

## 2025-01-13 RX ORDER — POLYETHYLENE GLYCOL 3350, SODIUM CHLORIDE, SODIUM BICARBONATE, POTASSIUM CHLORIDE 420; 11.2; 5.72; 1.48 G/4L; G/4L; G/4L; G/4L
4000 POWDER, FOR SOLUTION ORAL ONCE
Qty: 4000 ML | Refills: 0 | Status: SHIPPED | OUTPATIENT
Start: 2025-02-28 | End: 2025-02-28

## 2025-01-13 NOTE — TELEPHONE ENCOUNTER
Screening Questions for the Scheduling of Screening Colonoscopies   (If Colonoscopy is diagnostic, Provider should review the chart before scheduling.)  Are you younger than 45 or older than 80?  NO  Do you take aspirin or fish oil?  NO (if yes, tell patient to stop 1 week prior to Colonoscopy)  Do you take warfarin (Coumadin), clopidogrel (Plavix), apixaban (Eliquis), dabigatram (Pradaxa), rivaroxaban (Xarelto) or any blood thinner? NO  Do you take semaglutide (Ozempic or Wegovy), tirzepatide (Mounjaro or Zepbound), liraglutide (Victoza), or dulaglutide (Trulicity)? NO  Do you use oxygen or a CPAP at home?  CPAP  Do you have kidney disease? NO  Are you on dialysis? NO  Have you had a stroke or heart attack in the last year? NO  Have you had a stent in your heart or any blood vessel in the last year? NO  Have you had a transplant of any organ? NO  Have you had a colonoscopy or upper endoscopy (EGD) before? YES         When?  2014  Date of scheduled Colonoscopy. 03/07/2025  Provider CARLITOS  [facility]

## 2025-01-21 ENCOUNTER — TELEPHONE (OUTPATIENT)
Dept: GENERAL RADIOLOGY | Facility: OTHER | Age: 70
End: 2025-01-21
Payer: MEDICARE

## 2025-01-21 NOTE — TELEPHONE ENCOUNTER
2 week follow up after trigger point inj pt states that he is getting good relief RV1 50%  Rufina Avalos on 1/21/2025 at 1:22 PM

## 2025-02-11 DIAGNOSIS — C61 PROSTATE CANCER (H): Primary | ICD-10-CM

## 2025-02-24 ENCOUNTER — TRANSFERRED RECORDS (OUTPATIENT)
Dept: HEALTH INFORMATION MANAGEMENT | Facility: OTHER | Age: 70
End: 2025-02-24
Payer: MEDICARE

## 2025-02-26 ENCOUNTER — LAB (OUTPATIENT)
Dept: LAB | Facility: OTHER | Age: 70
End: 2025-02-26
Attending: UROLOGY
Payer: MEDICARE

## 2025-02-26 DIAGNOSIS — C61 PROSTATE CANCER (H): ICD-10-CM

## 2025-02-26 DIAGNOSIS — Z85.51 PERSONAL HISTORY OF BLADDER CANCER: ICD-10-CM

## 2025-02-26 PROCEDURE — 36415 COLL VENOUS BLD VENIPUNCTURE: CPT | Mod: ZL

## 2025-02-26 PROCEDURE — 84153 ASSAY OF PSA TOTAL: CPT | Mod: ZL

## 2025-02-27 PROBLEM — Z00.00 HEALTHCARE MAINTENANCE: Status: ACTIVE | Noted: 2025-02-27

## 2025-02-27 PROBLEM — S80.12XA HEMATOMA OF LEFT LOWER LEG: Status: RESOLVED | Noted: 2023-01-07 | Resolved: 2025-02-27

## 2025-02-27 PROBLEM — I50.9 CHF (CONGESTIVE HEART FAILURE) (H): Status: ACTIVE | Noted: 2024-05-09

## 2025-02-27 PROBLEM — M54.10 RADICULAR LOW BACK PAIN: Status: RESOLVED | Noted: 2018-03-09 | Resolved: 2025-02-27

## 2025-02-27 PROBLEM — I25.10 CORONARY ARTERY DISEASE DUE TO LIPID RICH PLAQUE: Status: ACTIVE | Noted: 2024-04-25

## 2025-02-27 PROBLEM — I25.83 CORONARY ARTERY DISEASE DUE TO LIPID RICH PLAQUE: Status: ACTIVE | Noted: 2024-04-25

## 2025-02-27 PROBLEM — C67.9 BLADDER CARCINOMA (H): Status: RESOLVED | Noted: 2018-03-15 | Resolved: 2025-02-27

## 2025-02-27 PROBLEM — M54.51 VERTEBROGENIC LOW BACK PAIN: Status: RESOLVED | Noted: 2024-04-24 | Resolved: 2025-02-27

## 2025-02-27 LAB
PSA FREE MFR SERPL: 10.6 %
PSA FREE SERPL-MCNC: 0.3 NG/ML
PSA SERPL DL<=0.01 NG/ML-MCNC: 2.83 NG/ML (ref 0–6.5)

## 2025-03-05 ENCOUNTER — LAB (OUTPATIENT)
Dept: LAB | Facility: OTHER | Age: 70
End: 2025-03-05
Attending: UROLOGY
Payer: MEDICARE

## 2025-03-05 ENCOUNTER — OFFICE VISIT (OUTPATIENT)
Dept: UROLOGY | Facility: OTHER | Age: 70
End: 2025-03-05
Attending: UROLOGY
Payer: MEDICARE

## 2025-03-05 DIAGNOSIS — Z80.42 FAMILY HISTORY OF PROSTATE CANCER: ICD-10-CM

## 2025-03-05 DIAGNOSIS — C61 PROSTATE CANCER (H): ICD-10-CM

## 2025-03-05 DIAGNOSIS — Z85.51 PERSONAL HISTORY OF BLADDER CANCER: Primary | ICD-10-CM

## 2025-03-05 LAB
ALBUMIN UR-MCNC: NEGATIVE MG/DL
APPEARANCE UR: CLEAR
BILIRUB UR QL STRIP: NEGATIVE
COLOR UR AUTO: ABNORMAL
GLUCOSE UR STRIP-MCNC: 50 MG/DL
HGB UR QL STRIP: NEGATIVE
KETONES UR STRIP-MCNC: NEGATIVE MG/DL
LEUKOCYTE ESTERASE UR QL STRIP: NEGATIVE
NITRATE UR QL: NEGATIVE
PH UR STRIP: 5 [PH] (ref 5–9)
SP GR UR STRIP: 1.02 (ref 1–1.03)
UROBILINOGEN UR STRIP-MCNC: NORMAL MG/DL

## 2025-03-05 PROCEDURE — 250N000009 HC RX 250: Performed by: UROLOGY

## 2025-03-05 PROCEDURE — 250N000013 HC RX MED GY IP 250 OP 250 PS 637: Performed by: UROLOGY

## 2025-03-05 PROCEDURE — 81003 URINALYSIS AUTO W/O SCOPE: CPT | Mod: ZL

## 2025-03-05 PROCEDURE — 52000 CYSTOURETHROSCOPY: CPT | Performed by: UROLOGY

## 2025-03-05 PROCEDURE — G0463 HOSPITAL OUTPT CLINIC VISIT: HCPCS | Mod: 25

## 2025-03-05 RX ORDER — LIDOCAINE HYDROCHLORIDE 20 MG/ML
JELLY TOPICAL ONCE
Status: COMPLETED | OUTPATIENT
Start: 2025-03-05 | End: 2025-03-05

## 2025-03-05 RX ADMIN — CEPHALEXIN 250 MG: 250 CAPSULE ORAL at 14:36

## 2025-03-05 RX ADMIN — LIDOCAINE HYDROCHLORIDE: 20 JELLY TOPICAL at 13:53

## 2025-03-05 NOTE — PROGRESS NOTES
69-year-old male with a history of high-grade TA urothelial carcinoma status post TURBT and post BCG treatment.  This was initially diagnosed with Dr. Schwartz and has been followed without clinical recurrence.    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    Elevated PSA prompting a biopsy demonstrating  Taina 3+4 prostate cancer with 40% pattern 4 in 1 core.  Prolaris was favorable with 2% mortality over 10 years with conservative therapy    It was recommended that he consider active surveillance for his prostate cancer.  Patient was agreeable to that after meeting with Dr. Garza given his complicated SI joint repair and lower risk features of his disease.    Here today for cystoscopy.  Denies any bleeding    Male Cystoscopy Procedure Note     PRE-PROCEDURE DIAGNOSIS: Stage TA low-grade bladder cancer  POST-PROCEDURE DIAGNOSIS: Same  PROCEDURE: cystoscopy    RISKS DISCUSSED:  Bleeding, infection, urethral stricture, irritative voiding symptoms, retention of urine.      DESCRIPTION OF PROCEDURE:  After informed consent was obtained, the patient was brought to the procedure room where he was placed in the supine position with all pressure points well padded.  The penis and scrotum were prepped and draped in a sterile fashion. A flexible cystoscope was introduced through a well-lubricated urethra.      FINDINGS:  SAEID: Small flat smooth prostate without nodule or irregularity.  Meatus: normal  Urethra: Normal no stricture  Prostate: Visually obstructing with kissing lateral lobes  Bladder Neck: Partially obstructed  Bladder: No visible tumor, stones or lesions mild trabeculation  Trigone:  Normal ureters, normal efflux    The flexible cystoscope was removed and the findings were described to the patient.     FINDINGS: Negative cystoscopy    Pathology prostate: 10/28/2024:  Right medial mid: 3+4=7, 20% tissue  involvement  Granulomatous prostatitis multiple areas  History of stage Ta bladder cancer  Family history of prostate cancer       ASSESSMENT:   Prostate cancer, intermediate risk, group grade 2  Family History of prostate cancer  History of HG TCCA Bladder    Plan  Patient without cystoscopic evidence of recurrence.  Will plan for yearly cystoscopy given that.    Regarding his prostate cancer, active surveillance will be continued.    Plan PSA in 3 months with consideration of a repeat confirmatory biopsy in October 2025.    Patient was reassured as well as his wife.

## 2025-03-05 NOTE — NURSING NOTE
Patient positioned in supine position, perineum area prepped with chlorhexidene Gluconate, betadine swabs, patient draped per sterile technique. Per verbal order read back by Spike Milton MD, Urojet 10mL 2% lidocaine jelly to be instilled into urethra.      Universal Protocol    A. Pre-procedure verification complete Yes  1-relevant information / documentation available, reviewed and properly matched to the patient; 2-consent accurate and complete, 3-equipment and supplies available    B. Site marking complete Yes  Site marked if not in continuous attendance with patient    C. TIME OUT completed Yes  Time Out was conducted just prior to starting procedure to verify the eight required elements: 1-patient identity, 2-consent accurate and complete, 3-position, 4-correct side/site marked (if applicable), 5-procedure, 6-relevant images / results properly labeled and displayed (if applicable), 7-antibiotics / irrigation fluids (if applicable), 8-safety precautions.    After procedure perineum area rinsed. Discharge instructions reviewed with patient. Patient verbalized understanding of discharge instructions and discharged ambulatory.  Arabella Matias RN..................3/5/2025  1:53 PM

## 2025-03-07 ENCOUNTER — HOSPITAL ENCOUNTER (OUTPATIENT)
Facility: OTHER | Age: 70
Discharge: HOME | End: 2025-03-07
Attending: SURGERY | Admitting: SURGERY
Payer: MEDICARE

## 2025-03-07 VITALS
DIASTOLIC BLOOD PRESSURE: 74 MMHG | RESPIRATION RATE: 12 BRPM | WEIGHT: 311 LBS | HEART RATE: 69 BPM | TEMPERATURE: 97.7 F | OXYGEN SATURATION: 89 % | SYSTOLIC BLOOD PRESSURE: 134 MMHG | HEIGHT: 71 IN | BODY MASS INDEX: 43.54 KG/M2

## 2025-03-07 PROBLEM — K63.5 COLON POLYP: Status: ACTIVE | Noted: 2025-03-07

## 2025-03-07 PROCEDURE — 45378 DIAGNOSTIC COLONOSCOPY: CPT | Performed by: SURGERY

## 2025-03-07 PROCEDURE — 258N000003 HC RX IP 258 OP 636: Performed by: SURGERY

## 2025-03-07 PROCEDURE — 88305 TISSUE EXAM BY PATHOLOGIST: CPT

## 2025-03-07 PROCEDURE — 45380 COLONOSCOPY AND BIOPSY: CPT | Mod: PT | Performed by: SURGERY

## 2025-03-07 RX ORDER — LIDOCAINE 40 MG/G
CREAM TOPICAL
Status: DISCONTINUED | OUTPATIENT
Start: 2025-03-07 | End: 2025-03-07 | Stop reason: HOSPADM

## 2025-03-07 RX ORDER — NALOXONE HYDROCHLORIDE 0.4 MG/ML
0.2 INJECTION, SOLUTION INTRAMUSCULAR; INTRAVENOUS; SUBCUTANEOUS
Status: DISCONTINUED | OUTPATIENT
Start: 2025-03-07 | End: 2025-03-07 | Stop reason: HOSPADM

## 2025-03-07 RX ORDER — SODIUM CHLORIDE, SODIUM LACTATE, POTASSIUM CHLORIDE, CALCIUM CHLORIDE 600; 310; 30; 20 MG/100ML; MG/100ML; MG/100ML; MG/100ML
INJECTION, SOLUTION INTRAVENOUS CONTINUOUS
Status: DISCONTINUED | OUTPATIENT
Start: 2025-03-07 | End: 2025-03-07 | Stop reason: HOSPADM

## 2025-03-07 RX ORDER — NALOXONE HYDROCHLORIDE 0.4 MG/ML
0.4 INJECTION, SOLUTION INTRAMUSCULAR; INTRAVENOUS; SUBCUTANEOUS
Status: DISCONTINUED | OUTPATIENT
Start: 2025-03-07 | End: 2025-03-07 | Stop reason: HOSPADM

## 2025-03-07 RX ORDER — FLUMAZENIL 0.1 MG/ML
0.2 INJECTION, SOLUTION INTRAVENOUS
Status: DISCONTINUED | OUTPATIENT
Start: 2025-03-07 | End: 2025-03-07 | Stop reason: HOSPADM

## 2025-03-07 RX ORDER — ONDANSETRON 2 MG/ML
4 INJECTION INTRAMUSCULAR; INTRAVENOUS
Status: DISCONTINUED | OUTPATIENT
Start: 2025-03-07 | End: 2025-03-07 | Stop reason: HOSPADM

## 2025-03-07 RX ADMIN — SODIUM CHLORIDE, SODIUM LACTATE, POTASSIUM CHLORIDE, AND CALCIUM CHLORIDE 100 ML/HR: .6; .31; .03; .02 INJECTION, SOLUTION INTRAVENOUS at 09:09

## 2025-03-07 ASSESSMENT — ACTIVITIES OF DAILY LIVING (ADL)
ADLS_ACUITY_SCORE: 51

## 2025-03-07 NOTE — DISCHARGE INSTRUCTIONS
[facility]  Adult Discharge Orders & Instructions    ________________________________________________________________          For 12 hours after surgery  Get plenty of rest.  A responsible adult must stay with you for at least 12 hours after you leave the hospital.   You may feel lightheaded.  IF so, sit for a few minutes before standing.  Have someone help you get up.   You may have a slight fever. Call the doctor if your fever is over 101 F (38.3 C) (taken under the tongue) or lasts longer than 24 hours.  You may have a dry mouth, a sore throat, muscle aches or trouble sleeping.  These should go away after 24 hours.  Do not make important or legal decisions.  6.   Do not drive or use heavy equipment.  If you have weakness or tingling, don't drive or use heavy equipment until this feeling goes away.    To contact a doctor, call   [phone]_______________________

## 2025-03-07 NOTE — OR NURSING
Pt has been continuing to use IS. Sats on RA 88-94%. Pt is not short of breath. Aidee BARFIELD updated. Will continue to use IS and monitor, pt sat up some in bed as well.

## 2025-03-07 NOTE — OR NURSING
Pt's O2 sats 90% on 1L. Placed on RA to trial. Updated Aidee CRNA. IS brought in to patient. Instructions complete, pt goal is 3000ml and pt is consistenly hitting 1904-1080.

## 2025-03-07 NOTE — OP NOTE
PROCEDURE NOTE    DATE OF SERVICE: 3/7/2025    SURGEON: Ronald Mccall MD    PRE-OP DIAGNOSIS:    Healthcare maintenance   Screening      POST-OP DIAGNOSIS:  Same  Polyp at 20 cm    PROCEDURE:     Colonoscopy with cold biopsy    ANESTHESIA:  ACACIA Good CRNA    INDICATION FOR THE PROCEDURE: The patient is a 69 year old male in need of Healthcare maintenance  . The patient has no other complaints  . After explaining the risks to include bleeding, perforation, potential inability toreach the cecum, the patient wished to proceed.    PROCEDURE:After adequate sedation, the patient was in the left lateral decubitus position.  Rectal exam was performed.  There was lax tone and no palpable masses . Patient had desaturation and LMA placed.  The colonoscope was introduced into the rectum and advanced to the cecum with Moderate difficulty.  The patient's prep was good.  The terminal cecum was reached.  The cecum, ascending, transverse, descending and sigmoid colon was with a raised 0.4 cm polyp at 20 cm that was removed with Jumbo forceps .  The scope was retroflexed in the rectum.  The rectum was unremarkable  .  The scope was straightened and removed.  The patient tolerated the procedure well after LMA placed.     ESTIMATED BLOOD LOSS: none    COMPLICATIONS:  None    TISSUE REMOVED:  Yes    RECOMMEND:      Follow-up pending pathology      Ronald Mccall MD FACS

## 2025-03-07 NOTE — H&P
History and Physical    CHIEF COMPLAINT / REASON FOR PROCEDURE:  Healthcare maintenance     PERTINENT HISTORY   Patient is a 69 year old male who presents today for screening colonoscopy for Healthcare maintenance .   Last colonoscopy 2014.  Patient has no complaints.    Past Medical History:   Diagnosis Date    Acute and chronic respiratory failure with hypoxia (H) 02/06/2023    Bell's palsy 03/30/2017    Bilateral carpal tunnel syndrome     No Comments Provided    Bladder carcinoma (H) 03/15/2018    s/after resection with positive margins, s/after second resection with negative margins 4/2018 at [place], [place].  He will be doing 6 weeks of chemotherapy.      Essential (primary) hypertension     No Comments Provided    Gout 06/03/2013    Hematoma of left lower leg 01/07/2023    Hyperlipidemia     No Comments Provided    Hypothyroidism     No Comments Provided    Malignant neoplasm of thyroid gland (H)     1994    Occipital neuralgia 01/30/2015    Postoperative hematoma of musculoskeletal structure following musculoskeletal procedure     Sleep apnea     uses CPAP    Vertebrogenic low back pain 04/24/2024     Past Surgical History:   Procedure Laterality Date    BIOPSY PROSTATE TRANSRECTAL N/A 10/28/2024    Procedure: BIOPSY, PROSTATE, RECTAL APPROACH;  Surgeon: Spike Milton MD;  Location:  OR    COLONOSCOPY  09/09/2014 9/2014,Normal - follow up 10 years    CV CORONARY ANGIOGRAM N/A 10/31/2022    Procedure: Coronary Angiogram;  Surgeon: Ender Michaels MD;  Location:  [facility]    CYSTOSCOPY      bladder cancer    INCISION AND DRAINAGE LOWER EXTREMITY, COMBINED Left 1/7/2023    Procedure: INCISION AND DRAINAGE, LEFT BUTTOCK (Drain Hematoma of Left Buttock);  Surgeon: Ronald Mccall MD;  Location:  OR    IR LUMBAR PUNCTURE  12/12/2017    OTHER SURGICAL HISTORY      ,SPLENECTOMY    OTHER SURGICAL HISTORY      2010,66143,EYE MUSCLE SURGERY,Left,strabismus    OTHER  SURGICAL HISTORY      10/06/14,[id number],OTHER,Left,Dr. Lana LEHMAN    OTHER SURGICAL HISTORY      10/14/14,[id number],OTHER,Left,Dr. Lana LEHMAN    OTHER SURGICAL HISTORY      11/16/2017,[id number],ENDOSCOPIC RELEASE TRANSVERSE CARPAL LIGAMENT OF HAND,Left    RELEASE CARPAL TUNNEL      12/2011    RETINAL DETACHMENT SURGERY Right     STRABISMUS SURGERY Left     THYROIDECTOMY      1994,thyroid ca    TONSILLECTOMY      2004,T & A > 13yo    VASECTOMY      No Comments Provided       Bleeding tendencies:  No    ALLERGIES/SENSITIVITIES:   Allergies   Allergen Reactions    Losartan Cough    Omeprazole Diarrhea    Pantoprazole Diarrhea    Valsartan Cough     Started with losartan, worsened with high dose valsartan        CURRENT MEDICATIONS:    Prior to Admission medications    Medication Sig Start Date End Date Taking? Authorizing Provider   allopurinol (ZYLOPRIM) 300 MG tablet TAKE 1 TABLET (300 MG) BY MOUTH DAILY. - FOR GOUT PREVENTION 2/28/25  Yes Matt Whitmore MD   B Complex-C (VITAMIN B COMPLEX W/VITAMIN C) TABS tablet Take 1 tablet by mouth daily   Yes Reported, Patient   carvedilol (COREG) 6.25 MG tablet Take 1.5 tablets (9.375 mg) by mouth 2 times daily (with meals). 12/10/24  Yes Matt Whitmore MD   Cyanocobalamin 2500 MCG TABS Take 2,500 mcg by mouth daily -- or every other day -- OTC okay 3/17/22  Yes Matt Whitmore MD   estazolam (PROSOM) 1 MG tablet Take 1 tablet (1 mg) by mouth at bedtime. 12/10/24  Yes Matt Whitmore MD   famotidine (PEPCID) 20 MG tablet Take 1 tablet (20 mg) by mouth 2 times daily. 12/10/24  Yes Matt Whitmore MD   fenofibrate (TRICOR) 145 MG tablet Take 1 tablet (145 mg) by mouth daily. 12/10/24  Yes Matt Whitmore MD   gabapentin (NEURONTIN) 400 MG capsule Take 3 capsules (1,200 mg) by mouth 3 times daily. -- Dose Increase 7/31/2024 12/10/24  Yes Matt Whitmore MD   levothyroxine (SYNTHROID/LEVOTHROID) 200 MCG tablet Take 1 tablet (200 mcg) by mouth daily. 12/10/24  Yes Matt Whitmore MD  "  magnesium 250 MG tablet Take 1 tablet by mouth daily   Yes Reported, Patient   Melatonin 10 MG TABS tablet Take 20 mg by mouth At Bedtime   Yes Reported, Patient   Multiple Vitamins-Minerals (CENTRUM ADULTS PO) Take 1 tablet by mouth daily   Yes Reported, Patient   order for DME CPAP machine for home use at pressure: 10, with supplies:  humidifier, chamber, mask, tubing 1/11/18  Yes Reported, Patient   pregabalin (LYRICA) 100 MG capsule Take 1 capsule (100 mg) by mouth 3 times daily. 12/10/24  Yes Matt Whitmore MD   Probiotic Product (PROBIOTIC-10 PO) Take 1 tablet by mouth daily   Yes Reported, Patient   sildenafil (VIAGRA) 100 MG tablet Take 1 tablet (100 mg) by mouth daily as needed. 30 minutes to 4 hours prior to sexual activity 12/10/24  Yes Matt Whitmore MD   spironolactone (ALDACTONE) 50 MG tablet Take 2 tablets (100 mg) by mouth daily. -- Dose Increase 12/26/2024   + Reduce Torsemide dose 12/26/24  Yes Matt Whitmore MD   torsemide (DEMADEX) 20 MG tablet Take 1-3 tablets (20-60 mg) by mouth daily. --- Stop Furosemide --- 12/10/24  Yes Matt Whitmore MD       Physical Exam:  Ht 1.803 m (5' 11\")   Wt (!) 141.1 kg (311 lb)   BMI 43.38 kg/m    EXAM:  Chest/Respiratory Exam: Normal - Clear to auscultation without rales, rhonchi, or wheezing.  Cardiovascular Exam: regular rate and rhythm        PLAN: COLONOSCOPY .  Patient understands risks of bleeding, perforation, potential inability to reach cecum, aspiration and wishes to proceed.MAC needed for age and KYLAH.      "

## 2025-03-07 NOTE — OR NURSING
PACU Transfer Note    Selvin An was transferred to DSU via bed.  Equipment used for transport:  none.  Accompanied by:  Jael SYKES  Prescriptions were: none    PACU Respiratory Event Documentation     1) Episodes of Apnea greater than or equal to 10 seconds: 0    2) Bradypnea - less than 8 breaths per minute: 0    3) Pain score on 0 to 10 scale: 0    4) Pain-sedation mismatch (yes or no): no    5) Repeated 02 desaturation less than 90% (yes or no): no    Anesthesia notified? (yes or no): no    Any of the above events occuring repeatedly in separate 30 minute intervals may be considered recurrent PACU respiratory events.    Patient stable and meets phase 1 discharge criteria for transport from PACU.

## 2025-03-07 NOTE — OR NURSING
Pt sats remaining above 90%. Encouraged to use IS throughout the afternoon, to make a few laps around the house and definitely use CPAP when taking a nap. Pt and wife agreed and verbalized understanding. Pt has an appt made with his PCP next Friday. Taken out by MECCA.

## 2025-03-12 PROBLEM — Z86.0101 H/O ADENOMATOUS POLYP OF COLON: Status: ACTIVE | Noted: 2025-03-07

## 2025-03-12 PROBLEM — Z00.00 HEALTHCARE MAINTENANCE: Status: RESOLVED | Noted: 2025-02-27 | Resolved: 2025-03-12

## 2025-03-14 ENCOUNTER — LAB (OUTPATIENT)
Dept: LAB | Facility: OTHER | Age: 70
End: 2025-03-14
Attending: INTERNAL MEDICINE
Payer: MEDICARE

## 2025-03-14 ENCOUNTER — OFFICE VISIT (OUTPATIENT)
Dept: INTERNAL MEDICINE | Facility: OTHER | Age: 70
End: 2025-03-14
Attending: INTERNAL MEDICINE
Payer: MEDICARE

## 2025-03-14 VITALS
SYSTOLIC BLOOD PRESSURE: 114 MMHG | OXYGEN SATURATION: 96 % | HEART RATE: 78 BPM | DIASTOLIC BLOOD PRESSURE: 70 MMHG | WEIGHT: 302.4 LBS | RESPIRATION RATE: 18 BRPM | BODY MASS INDEX: 42.18 KG/M2

## 2025-03-14 DIAGNOSIS — C61 PROSTATE CANCER (H): ICD-10-CM

## 2025-03-14 DIAGNOSIS — E55.9 VITAMIN D DEFICIENCY: ICD-10-CM

## 2025-03-14 DIAGNOSIS — E78.2 MIXED HYPERLIPIDEMIA: ICD-10-CM

## 2025-03-14 DIAGNOSIS — G89.29 CHRONIC RADICULAR LOW BACK PAIN: ICD-10-CM

## 2025-03-14 DIAGNOSIS — R10.2 NEURALGIA OF LEFT PERINEAL NERVE: ICD-10-CM

## 2025-03-14 DIAGNOSIS — F51.01 PRIMARY INSOMNIA: ICD-10-CM

## 2025-03-14 DIAGNOSIS — E11.42 TYPE 2 DIABETES MELLITUS WITH DIABETIC POLYNEUROPATHY, WITHOUT LONG-TERM CURRENT USE OF INSULIN (H): ICD-10-CM

## 2025-03-14 DIAGNOSIS — I10 BENIGN ESSENTIAL HYPERTENSION: ICD-10-CM

## 2025-03-14 DIAGNOSIS — G47.33 OSA ON CPAP: ICD-10-CM

## 2025-03-14 DIAGNOSIS — Z85.51 PERSONAL HISTORY OF MALIGNANT NEOPLASM OF BLADDER: ICD-10-CM

## 2025-03-14 DIAGNOSIS — N18.32 STAGE 3B CHRONIC KIDNEY DISEASE (H): ICD-10-CM

## 2025-03-14 DIAGNOSIS — I50.32 CHRONIC HEART FAILURE WITH PRESERVED EJECTION FRACTION (HFPEF) (H): Primary | ICD-10-CM

## 2025-03-14 DIAGNOSIS — M54.16 CHRONIC RADICULAR LOW BACK PAIN: ICD-10-CM

## 2025-03-14 DIAGNOSIS — R80.9 PROTEINURIA, UNSPECIFIED TYPE: ICD-10-CM

## 2025-03-14 DIAGNOSIS — I97.89 COMPLICATION OF ARTERY ASSOCIATED WITH SURGICAL PROCEDURE: ICD-10-CM

## 2025-03-14 DIAGNOSIS — E78.1 HYPERTRIGLYCERIDEMIA: ICD-10-CM

## 2025-03-14 LAB
ALBUMIN SERPL BCG-MCNC: 4.3 G/DL (ref 3.5–5.2)
ALBUMIN UR-MCNC: NEGATIVE MG/DL
ALP SERPL-CCNC: 87 U/L (ref 40–150)
ALT SERPL W P-5'-P-CCNC: 67 U/L (ref 0–70)
ANION GAP SERPL CALCULATED.3IONS-SCNC: 17 MMOL/L (ref 7–15)
APPEARANCE UR: CLEAR
AST SERPL W P-5'-P-CCNC: 50 U/L (ref 0–45)
BILIRUB SERPL-MCNC: 0.6 MG/DL
BILIRUB UR QL STRIP: NEGATIVE
BUN SERPL-MCNC: 39.6 MG/DL (ref 8–23)
CALCIUM SERPL-MCNC: 11.1 MG/DL (ref 8.8–10.4)
CHLORIDE SERPL-SCNC: 98 MMOL/L (ref 98–107)
CHOLEST SERPL-MCNC: 325 MG/DL
COLOR UR AUTO: YELLOW
CREAT SERPL-MCNC: 2.29 MG/DL (ref 0.67–1.17)
CREAT UR-MCNC: 88.7 MG/DL
EGFRCR SERPLBLD CKD-EPI 2021: 30 ML/MIN/1.73M2
ERYTHROCYTE [DISTWIDTH] IN BLOOD BY AUTOMATED COUNT: 15.7 % (ref 10–15)
EST. AVERAGE GLUCOSE BLD GHB EST-MCNC: 171 MG/DL
FASTING STATUS PATIENT QL REPORTED: ABNORMAL
FASTING STATUS PATIENT QL REPORTED: ABNORMAL
GLUCOSE SERPL-MCNC: 139 MG/DL (ref 70–99)
GLUCOSE UR STRIP-MCNC: NEGATIVE MG/DL
HBA1C MFR BLD: 7.6 %
HCO3 SERPL-SCNC: 23 MMOL/L (ref 22–29)
HCT VFR BLD AUTO: 49.1 % (ref 40–53)
HDLC SERPL-MCNC: 28 MG/DL
HGB BLD-MCNC: 17.1 G/DL (ref 13.3–17.7)
HGB UR QL STRIP: NEGATIVE
KETONES UR STRIP-MCNC: NEGATIVE MG/DL
LDLC SERPL CALC-MCNC: ABNORMAL MG/DL
LEUKOCYTE ESTERASE UR QL STRIP: NEGATIVE
MCH RBC QN AUTO: 33.5 PG (ref 26.5–33)
MCHC RBC AUTO-ENTMCNC: 34.8 G/DL (ref 31.5–36.5)
MCV RBC AUTO: 96 FL (ref 78–100)
MICROALBUMIN UR-MCNC: 14.8 MG/L
MICROALBUMIN/CREAT UR: 16.69 MG/G CR (ref 0–17)
NITRATE UR QL: NEGATIVE
NONHDLC SERPL-MCNC: 297 MG/DL
PH UR STRIP: 5 [PH] (ref 5–9)
PHOSPHATE SERPL-MCNC: 3.1 MG/DL (ref 2.5–4.5)
PLATELET # BLD AUTO: 409 10E3/UL (ref 150–450)
POTASSIUM SERPL-SCNC: 4.8 MMOL/L (ref 3.4–5.3)
PROT SERPL-MCNC: 8.4 G/DL (ref 6.4–8.3)
RBC # BLD AUTO: 5.1 10E6/UL (ref 4.4–5.9)
SODIUM SERPL-SCNC: 138 MMOL/L (ref 135–145)
SP GR UR STRIP: 1.01 (ref 1–1.03)
TRIGL SERPL-MCNC: 840 MG/DL
TSH SERPL DL<=0.005 MIU/L-ACNC: 0.31 UIU/ML (ref 0.3–4.2)
URATE SERPL-MCNC: 7.8 MG/DL (ref 3.4–7)
UROBILINOGEN UR STRIP-MCNC: NORMAL MG/DL
WBC # BLD AUTO: 11.4 10E3/UL (ref 4–11)

## 2025-03-14 PROCEDURE — 3074F SYST BP LT 130 MM HG: CPT | Performed by: INTERNAL MEDICINE

## 2025-03-14 PROCEDURE — 84550 ASSAY OF BLOOD/URIC ACID: CPT | Mod: ZL

## 2025-03-14 PROCEDURE — 3078F DIAST BP <80 MM HG: CPT | Performed by: INTERNAL MEDICINE

## 2025-03-14 PROCEDURE — 80061 LIPID PANEL: CPT | Mod: ZL

## 2025-03-14 PROCEDURE — 81003 URINALYSIS AUTO W/O SCOPE: CPT | Mod: ZL

## 2025-03-14 PROCEDURE — 82306 VITAMIN D 25 HYDROXY: CPT | Mod: ZL

## 2025-03-14 PROCEDURE — 84100 ASSAY OF PHOSPHORUS: CPT | Mod: ZL

## 2025-03-14 PROCEDURE — 84443 ASSAY THYROID STIM HORMONE: CPT | Mod: ZL

## 2025-03-14 PROCEDURE — 80053 COMPREHEN METABOLIC PANEL: CPT | Mod: ZL

## 2025-03-14 PROCEDURE — G0463 HOSPITAL OUTPT CLINIC VISIT: HCPCS

## 2025-03-14 PROCEDURE — 85014 HEMATOCRIT: CPT | Mod: ZL

## 2025-03-14 PROCEDURE — 1125F AMNT PAIN NOTED PAIN PRSNT: CPT | Performed by: INTERNAL MEDICINE

## 2025-03-14 PROCEDURE — 36415 COLL VENOUS BLD VENIPUNCTURE: CPT | Mod: ZL

## 2025-03-14 PROCEDURE — 83036 HEMOGLOBIN GLYCOSYLATED A1C: CPT | Mod: ZL

## 2025-03-14 PROCEDURE — 99214 OFFICE O/P EST MOD 30 MIN: CPT | Performed by: INTERNAL MEDICINE

## 2025-03-14 PROCEDURE — G2211 COMPLEX E/M VISIT ADD ON: HCPCS | Performed by: INTERNAL MEDICINE

## 2025-03-14 PROCEDURE — 82043 UR ALBUMIN QUANTITATIVE: CPT | Mod: ZL

## 2025-03-14 RX ORDER — ESTAZOLAM 1 MG
1 TABLET ORAL AT BEDTIME
Qty: 90 TABLET | Refills: 1 | Status: CANCELLED | OUTPATIENT
Start: 2025-03-14

## 2025-03-14 RX ORDER — PREGABALIN 100 MG/1
100 CAPSULE ORAL 3 TIMES DAILY
Qty: 270 CAPSULE | Refills: 1 | Status: SHIPPED | OUTPATIENT
Start: 2025-03-14

## 2025-03-14 RX ORDER — FENOFIBRATE 145 MG/1
145 TABLET, COATED ORAL DAILY
Qty: 90 TABLET | Refills: 2 | Status: SHIPPED | OUTPATIENT
Start: 2025-03-14

## 2025-03-14 RX ORDER — ESZOPICLONE 1 MG/1
1 TABLET, FILM COATED ORAL AT BEDTIME
Qty: 30 TABLET | Refills: 5 | Status: SHIPPED | OUTPATIENT
Start: 2025-03-14

## 2025-03-14 RX ORDER — GABAPENTIN 400 MG/1
1200 CAPSULE ORAL 3 TIMES DAILY
Qty: 820 CAPSULE | Refills: 1 | Status: SHIPPED | OUTPATIENT
Start: 2025-03-14

## 2025-03-14 ASSESSMENT — ENCOUNTER SYMPTOMS
HEADACHES: 0
JOINT SWELLING: 0
ABDOMINAL PAIN: 0
COUGH: 0
PARESTHESIAS: 0
EYE PAIN: 0
HEMATOCHEZIA: 0
MYALGIAS: 1
DIARRHEA: 0
DYSURIA: 0
FEVER: 0
FREQUENCY: 0
DIZZINESS: 0
HEARTBURN: 0
CONSTIPATION: 0
BACK PAIN: 1
BRUISES/BLEEDS EASILY: 0
SHORTNESS OF BREATH: 0
HEMATURIA: 0
NERVOUS/ANXIOUS: 0
ARTHRALGIAS: 0
SORE THROAT: 0
PALPITATIONS: 0
ENDOCRINE COMMENTS: + HAIR LOSS
UNEXPECTED WEIGHT CHANGE: 1
NAUSEA: 0
CHILLS: 0
WEAKNESS: 1

## 2025-03-14 ASSESSMENT — PAIN SCALES - GENERAL: PAINLEVEL_OUTOF10: MILD PAIN (3)

## 2025-03-14 NOTE — NURSING NOTE
"Chief Complaint   Patient presents with    Diabetes       Initial /70   Pulse 78   Resp 18   Wt (!) 137.2 kg (302 lb 6.4 oz)   SpO2 96%   BMI 42.18 kg/m   Estimated body mass index is 42.18 kg/m  as calculated from the following:    Height as of 3/7/25: 1.803 m (5' 11\").    Weight as of this encounter: 137.2 kg (302 lb 6.4 oz).  Medication Reconciliation: complete          "

## 2025-03-14 NOTE — PATIENT INSTRUCTIONS
Blood pressure is well controlled.     Diabetes had been well controlled.   But A1c is up considerably from last labs....       Cholesterol levels are much higher than previous labs.       Medications refilled.       To help with weight loss and improve blood sugar control....    -- Try to avoid Carbohydrates as much as possible -- breads, pasta, baked goods, cakes, oatmeal, cold cereal, potatoes.   -- Eat more lean meats, proteins, eggs, nuts, vegetables.    -- Start or Continue regular daily exercise.     Get out and exercise, bike ride, walk for 10 to 15 minutes after each meal -- this can significantly lowers the spike in blood sugar after eating.             Kidney function has worsened.....   - Nephrology consult requested.       -- May need to reduce dose or stop Vitamin D supplement.       -- Stop Estazolam.     -- Start Lunesta 1 mg tablet at bedtime.       -- STOP:  Torsemide for now.... only use if needed for leg swelling and weight gain.           Results for orders placed or performed in visit on 03/14/25   Urine Macroscopic with reflex to Microscopic     Status: Normal   Result Value Ref Range    Color Urine Yellow Colorless, Straw, Light Yellow, Yellow    Appearance Urine Clear Clear    Glucose Urine Negative Negative mg/dL    Bilirubin Urine Negative Negative    Ketones Urine Negative Negative mg/dL    Specific Gravity Urine 1.013 1.000 - 1.030    Blood Urine Negative Negative    pH Urine 5.0 5.0 - 9.0    Protein Albumin Urine Negative Negative mg/dL    Urobilinogen Urine Normal Normal, 2.0 mg/dL    Nitrite Urine Negative Negative    Leukocyte Esterase Urine Negative Negative    Narrative    Microscopic not indicated   TSH with free T4 reflex     Status: Normal   Result Value Ref Range    TSH 0.31 0.30 - 4.20 uIU/mL   Hemoglobin A1c     Status: Abnormal   Result Value Ref Range    Estimated Average Glucose 171 (H) <117 mg/dL    Hemoglobin A1C 7.6 (H) <5.7 %   CBC with platelets     Status: Abnormal    Result Value Ref Range    WBC Count 11.4 (H) 4.0 - 11.0 10e3/uL    RBC Count 5.10 4.40 - 5.90 10e6/uL    Hemoglobin 17.1 13.3 - 17.7 g/dL    Hematocrit 49.1 40.0 - 53.0 %    MCV 96 78 - 100 fL    MCH 33.5 (H) 26.5 - 33.0 pg    MCHC 34.8 31.5 - 36.5 g/dL    RDW 15.7 (H) 10.0 - 15.0 %    Platelet Count 409 150 - 450 10e3/uL   Albumin Random Urine Quantitative with Creat Ratio     Status: None   Result Value Ref Range    Creatinine Urine mg/dL 88.7 mg/dL    Albumin Urine mg/L 14.8 mg/L    Albumin Urine mg/g Cr 16.69 0.00 - 17.00 mg/g Cr   Lipid Profile     Status: Abnormal   Result Value Ref Range    Cholesterol 325 (H) <200 mg/dL    Triglycerides 840 (H) <150 mg/dL    Direct Measure HDL 28 (L) >=40 mg/dL    LDL Cholesterol Calculated      Non HDL Cholesterol 297 (H) <130 mg/dL    Patient Fasting > 8hrs? Unknown     Narrative    Cholesterol  Desirable: < 200 mg/dL  Borderline High: 200 - 239 mg/dL  High: >= 240 mg/dL    Triglycerides  Normal: < 150 mg/dL  Borderline High: 150 - 199 mg/dL  High: 200-499 mg/dL  Very High: >= 500 mg/dL    Direct Measure HDL  Female: >= 50 mg/dL   Male: >= 40 mg/dL    LDL Cholesterol  Desirable: < 100 mg/dL  Above Desirable: 100 - 129 mg/dL   Borderline High: 130 - 159 mg/dL   High:  160 - 189 mg/dL   Very High: >= 190 mg/dL    Non HDL Cholesterol  Desirable: < 130 mg/dL  Above Desirable: 130 - 159 mg/dL  Borderline High: 160 - 189 mg/dL  High: 190 - 219 mg/dL  Very High: >= 220 mg/dL   Comprehensive metabolic panel     Status: Abnormal   Result Value Ref Range    Sodium 138 135 - 145 mmol/L    Potassium 4.8 3.4 - 5.3 mmol/L    Carbon Dioxide (CO2) 23 22 - 29 mmol/L    Anion Gap 17 (H) 7 - 15 mmol/L    Urea Nitrogen 39.6 (H) 8.0 - 23.0 mg/dL    Creatinine 2.29 (H) 0.67 - 1.17 mg/dL    GFR Estimate 30 (L) >60 mL/min/1.73m2    Calcium 11.1 (H) 8.8 - 10.4 mg/dL    Chloride 98 98 - 107 mmol/L    Glucose 139 (H) 70 - 99 mg/dL    Alkaline Phosphatase 87 40 - 150 U/L    AST 50 (H) 0 - 45 U/L     ALT 67 0 - 70 U/L    Protein Total 8.4 (H) 6.4 - 8.3 g/dL    Albumin 4.3 3.5 - 5.2 g/dL    Bilirubin Total 0.6 <=1.2 mg/dL    Patient Fasting > 8hrs? Unknown       Aspects of Diabetes:   Recent Labs   Lab Test 03/14/25  1403 12/10/24  1000 10/18/24  1512 06/25/24  1245 12/02/22  1015 11/11/22  0755 12/22/21  1035 09/15/21  0824 08/04/21  1523   A1C 7.6* 6.6*  --   --   --  6.0  --  5.5 5.6   LDL  --   --   --   --   --  105*  --  143*  --    HDL 28* 34*  --   --   --  41  --  43 35   TRIG 840* 447*  --   --   --  87  --  140 540*   ALT 67 50  --  48   < > 23   < > 18 23   CR 2.29* 1.65*   < > 1.79*   < > 1.27*   < > 1.34* 1.40*   GFRESTIMATED 30* 44*   < > 40*   < > 62   < > 54* 52*   POTASSIUM 4.8 4.4   < > 4.6   < > 4.0   < > 3.9 3.9   TSH 0.31 0.75  --   --   --   --    < > 11.84* 3.61   T4  --   --   --   --   --   --   --  0.77  --    WBC 11.4* 10.7  --   --    < >  --    < > 16.1* 13.3*   HGB 17.1 17.7  --   --    < >  --    < > 15.2 15.4    387  --   --    < >  --    < > 459* 465*   ALBUMIN 4.3 4.6  --  4.5   < > 4.2   < > 4.2 4.6    < > = values in this interval not displayed.      Hemoglobin A1c  Goal range is under 8%. Best is 6.5 to 7   Blood Pressure 114/70 Goal to keep less than 140/90   Tobacco  reports that he has never smoked. He has never been exposed to tobacco smoke. He has never used smokeless tobacco. Goal to abstain from tobacco   Aspirin or Plavix Anti-platelet therapy Aspirin or Plavix reduces risk of heart disease and stroke  -- sometimes used with other blood thinners, depending on bleeding risk and risk factors.    ACE/ARB Specific blood pressure meds These medications can reduce risk of kidney disease   Cholesterol Statins (Lipitor, Crestor, vs others) Statins reduce risk of heart disease and stroke   Eye Exam -- Do Yearly -- Annual diabetic eye exam   Healthy weight Wt Readings from Last 4 Encounters:   03/14/25 (!) 137.2 kg (302 lb 6.4 oz)   03/07/25 (!) 141.1 kg (311 lb)    12/26/24 141.3 kg (311 lb 6.4 oz)   12/10/24 142 kg (313 lb)      Body mass index is 42.18 kg/m .  Goal BMI under 30, best is under 25.      -- Trying to exercise daily (goal at least 20 min/day) with moderate aerobic activity   -- Eat healthy (resources from ADA at http://www.diabetes.org/)   -- Taking good care of my feet. Consider seeing the Podiatrist   -- Check blood sugars as directed, record in log book and bring to every appointment    Insurance companies are grading you and I on your blood sugar control -- Goal is to get your A1c down to 7.9% or lower and NO Smoking!  -- Medicare and most insurance companies, will only cover Hemoglobin A1c labs to be rechecked every 91+ days.      Return for Diabetes labs and clinic follow-up appointment every 3 to 4 months.    Schedule lab only appointment --- A few days AFTER: 6/12/25   Schedule clinic appointment with Dr. Whitmore -- Same day as labs, or 1-2 days later.

## 2025-03-14 NOTE — PROGRESS NOTES
Assessment & Plan     ICD-10-CM    1. Chronic heart failure with preserved ejection fraction (HFpEF) (H)  I50.32 DISCONTINUED: empagliflozin (JARDIANCE) 25 MG TABS tablet      2. Benign essential hypertension  I10       3. Type 2 diabetes mellitus with diabetic polyneuropathy, without long-term current use of insulin (H)  E11.42 DISCONTINUED: empagliflozin (JARDIANCE) 25 MG TABS tablet      4. Mixed hyperlipidemia  E78.2       5. Hypertriglyceridemia  E78.1 fenofibrate (TRICOR) 145 MG tablet      6. Chronic radicular low back pain  M54.16 gabapentin (NEURONTIN) 400 MG capsule    G89.29 pregabalin (LYRICA) 100 MG capsule      7. Complication of artery associated with surgical procedure - Left superior gluteal artery Injury -- subsequently thrombosed / embolized by Interventional Radiology  I97.89 pregabalin (LYRICA) 100 MG capsule      8. Neuralgia of left perineal nerve  R10.2 pregabalin (LYRICA) 100 MG capsule      9. Body mass index (BMI) 40.0-44.9, adult (H)  Z68.41       10. Stage 3b chronic kidney disease (H)  N18.32 Adult Nephrology  Referral     Comprehensive metabolic panel     Phosphorus     Uric acid     DISCONTINUED: empagliflozin (JARDIANCE) 25 MG TABS tablet      11. Primary insomnia  F51.01 eszopiclone (LUNESTA) 1 MG tablet      12. Vitamin D deficiency  E55.9 Vitamin D Total      13. Proteinuria, unspecified type  R80.9 Adult Nephrology  Referral      14. KYLAH on CPAP -- Uses nightly, finds helpful and beneficial  G47.33       15. Personal history of malignant neoplasm of bladder -- s/p TURBT x2 in 3/2018 and 4/2018 for high-grade non-muscle invasive bladder cancer, now s/p BCG treatment - last cystoscopy 1/20/2021 was normal  Z85.51       16. Prostate cancer (H)  C61          Patient presents for follow-up multiple issues.    Feels like he has been doing okay, unfortunately many labs are substantially changed and worsening.    His hemoglobin A1c is up substantially.  Last LDL was  high not at goal.  HDL is low.  Triglycerides are extremely high.  ALT is normal.  Creatinine has worsened substantially now with a creatinine of 2.29.  GFR of 30.  Potassium is normal.  TSH normal.  White count is slightly elevated.  Hemoglobin and platelet count are normal.  Albumin normal.  Urinalysis appears normal.  Urinalysis shows normal urine protein levels.    Uric acid level is high.  Phosphorus is normal.    Recommend Nephrology consult.  Orders placed.    Plan to hold torsemide for now.  May need to reduce spironolactone dose as well.  Close monitoring and follow-up advised.  Plan to recheck labs in about 2 weeks.    BMI is 42.  Hopefully with some weight loss labs also improved.    Patient has persistent peroneal nerve pain, still taking Lyrica.  Finds helpful and beneficial.    Primary insomnia.  Has been having some concerns of memory loss.  Currently using estazolam.  Recommend discontinuation.  Start trial of Lunesta.    Vitamin D deficiency.  Check lab work.    With elevated urine protein levels, nephrology consult requested.    History of prostate cancer, bladder cancer.  Following closely with urology.    Creatinine has worsened. Will hold torsemide for now, may need to also hold Spironolactone or reduce dose.   - Recheck labs in 2 weeks.   - Nephrology referral sent.     HYPERTENSION - Ongoing. Blood pressure is currently well controlled.  Medication side effects: None. Denies syncope or presyncope.  Continue current medications.   Medication list reviewed/updated. Refills completed as needed.      MIXED HYPERLIPIDEMIA.  Ongoing. LDL is at goal: No. Triglycerides are at goal: No.  Hopefully lifestyle modifications will improve cholesterol levels, otherwise will consider additional medication dose adjustments or medication changes.  Medication side effects reported: None.   Continue current medications for now. Medication list reviewed/updated. Refills completed as needed.  Recent Labs   Lab Test  03/14/25  1403 12/10/24  1000 11/11/22  0755 09/15/21  0824   CHOL 325* 301* 163 214*   HDL 28* 34* 41 43   LDL  --   --  105* 143*   TRIG 840* 447* 87 140        OBESITY - Ongoing.  (See Encounter Diagnosis list above for obesity class / severity).  - Encourage continued maintenance / improvement in diet and exercise.   - Consider Nutrition / Dietician appointment.  - Weight loss would improve Hypertension, Cholesterol and Diabetes.      Chronic Kidney Disease, Stage 3b (GFR 30-44), chronic, ongoing.  Kidney function had been slowly declining.  Encourage NSAID avoidance.    Recent Labs   Lab Test 03/14/25  1403 12/10/24  1000   CR 2.29* 1.65*   GFRESTIMATED 30* 44*        Sleep Apnea, chronic, ongoing.  Uses CPAP nightly.  Finds helpful and beneficial.  Encouraged continued use.     Vaccine counseling completed.  Encourage routine / annual vaccinations.    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  Blood sugar control has been fair with moderate hyperglycemia. Doing well with diet and oral agents - xNO Insulin injections per day.  Medication list reviewed/updated. Refills completed as needed.    Complicating factors include but are not limited to: hypertension, hyperlipidemia, neuropathy, chronic kidney disease, and morbid obesity .       Recent Labs   Lab Test 03/14/25  1403 12/10/24  1000 10/18/24  1512 06/25/24  1245 12/02/22  1015 11/11/22  0755 12/22/21  1035 09/15/21  0824 08/04/21  1523   A1C 7.6* 6.6*  --   --   --  6.0  --  5.5 5.6   LDL  --   --   --   --   --  105*  --  143*  --    HDL 28* 34*  --   --   --  41  --  43 35   TRIG 840* 447*  --   --   --  87  --  140 540*   ALT 67 50  --  48   < > 23   < > 18 23   CR 2.29* 1.65*   < > 1.79*   < > 1.27*   < > 1.34* 1.40*   GFRESTIMATED 30* 44*   < > 40*   < > 62   < > 54* 52*   POTASSIUM 4.8 4.4   < > 4.6   < > 4.0   < > 3.9 3.9   TSH 0.31 0.75  --    --   --   --    < > 11.84* 3.61   T4  --   --   --   --   --   --   --  0.77  --    WBC 11.4* 10.7  --   --    < >  --    < > 16.1* 13.3*   HGB 17.1 17.7  --   --    < >  --    < > 15.2 15.4    387  --   --    < >  --    < > 459* 465*   ALBUMIN 4.3 4.6  --  4.5   < > 4.2   < > 4.2 4.6    < > = values in this interval not displayed.     Results for orders placed or performed in visit on 03/14/25   Urine Macroscopic with reflex to Microscopic     Status: Normal   Result Value Ref Range    Color Urine Yellow Colorless, Straw, Light Yellow, Yellow    Appearance Urine Clear Clear    Glucose Urine Negative Negative mg/dL    Bilirubin Urine Negative Negative    Ketones Urine Negative Negative mg/dL    Specific Gravity Urine 1.013 1.000 - 1.030    Blood Urine Negative Negative    pH Urine 5.0 5.0 - 9.0    Protein Albumin Urine Negative Negative mg/dL    Urobilinogen Urine Normal Normal, 2.0 mg/dL    Nitrite Urine Negative Negative    Leukocyte Esterase Urine Negative Negative    Narrative    Microscopic not indicated   TSH with free T4 reflex     Status: Normal   Result Value Ref Range    TSH 0.31 0.30 - 4.20 uIU/mL   Hemoglobin A1c     Status: Abnormal   Result Value Ref Range    Estimated Average Glucose 171 (H) <117 mg/dL    Hemoglobin A1C 7.6 (H) <5.7 %   CBC with platelets     Status: Abnormal   Result Value Ref Range    WBC Count 11.4 (H) 4.0 - 11.0 10e3/uL    RBC Count 5.10 4.40 - 5.90 10e6/uL    Hemoglobin 17.1 13.3 - 17.7 g/dL    Hematocrit 49.1 40.0 - 53.0 %    MCV 96 78 - 100 fL    MCH 33.5 (H) 26.5 - 33.0 pg    MCHC 34.8 31.5 - 36.5 g/dL    RDW 15.7 (H) 10.0 - 15.0 %    Platelet Count 409 150 - 450 10e3/uL   Albumin Random Urine Quantitative with Creat Ratio     Status: None   Result Value Ref Range    Creatinine Urine mg/dL 88.7 mg/dL    Albumin Urine mg/L 14.8 mg/L    Albumin Urine mg/g Cr 16.69 0.00 - 17.00 mg/g Cr   Lipid Profile     Status: Abnormal   Result Value Ref Range    Cholesterol 325 (H)  <200 mg/dL    Triglycerides 840 (H) <150 mg/dL    Direct Measure HDL 28 (L) >=40 mg/dL    LDL Cholesterol Calculated      Non HDL Cholesterol 297 (H) <130 mg/dL    Patient Fasting > 8hrs? Unknown     Narrative    Cholesterol  Desirable: < 200 mg/dL  Borderline High: 200 - 239 mg/dL  High: >= 240 mg/dL    Triglycerides  Normal: < 150 mg/dL  Borderline High: 150 - 199 mg/dL  High: 200-499 mg/dL  Very High: >= 500 mg/dL    Direct Measure HDL  Female: >= 50 mg/dL   Male: >= 40 mg/dL    LDL Cholesterol  Desirable: < 100 mg/dL  Above Desirable: 100 - 129 mg/dL   Borderline High: 130 - 159 mg/dL   High:  160 - 189 mg/dL   Very High: >= 190 mg/dL    Non HDL Cholesterol  Desirable: < 130 mg/dL  Above Desirable: 130 - 159 mg/dL  Borderline High: 160 - 189 mg/dL  High: 190 - 219 mg/dL  Very High: >= 220 mg/dL   Comprehensive metabolic panel     Status: Abnormal   Result Value Ref Range    Sodium 138 135 - 145 mmol/L    Potassium 4.8 3.4 - 5.3 mmol/L    Carbon Dioxide (CO2) 23 22 - 29 mmol/L    Anion Gap 17 (H) 7 - 15 mmol/L    Urea Nitrogen 39.6 (H) 8.0 - 23.0 mg/dL    Creatinine 2.29 (H) 0.67 - 1.17 mg/dL    GFR Estimate 30 (L) >60 mL/min/1.73m2    Calcium 11.1 (H) 8.8 - 10.4 mg/dL    Chloride 98 98 - 107 mmol/L    Glucose 139 (H) 70 - 99 mg/dL    Alkaline Phosphatase 87 40 - 150 U/L    AST 50 (H) 0 - 45 U/L    ALT 67 0 - 70 U/L    Protein Total 8.4 (H) 6.4 - 8.3 g/dL    Albumin 4.3 3.5 - 5.2 g/dL    Bilirubin Total 0.6 <=1.2 mg/dL    Patient Fasting > 8hrs? Unknown    Uric acid     Status: Abnormal   Result Value Ref Range    Uric Acid 7.8 (H) 3.4 - 7.0 mg/dL   Phosphorus     Status: Normal   Result Value Ref Range    Phosphorus 3.1 2.5 - 4.5 mg/dL        The longitudinal plan of care for the diagnosis(es)/condition(s) as documented were addressed during this visit. Due to the added complexity in care, I will continue to support Henry in the subsequent management and with ongoing continuity of care.              "  BMI  Estimated body mass index is 42.18 kg/m  as calculated from the following:    Height as of 3/7/25: 1.803 m (5' 11\").    Weight as of this encounter: 137.2 kg (302 lb 6.4 oz).         Return in about 3 months (around 6/14/2025) for - Labs every 91+ days, with DM Follow-up, Same Day or 1-2 days later with Dr. Whitmore.      Matt Whitmore MD  [facility]    Review of Systems   Constitutional:  Positive for unexpected weight change. Negative for chills and fever.        + walking up to 150 feet with small based triangular cane.  The more he walks, the more back pain is present.   HENT:  Positive for hearing loss. Negative for congestion, ear pain and sore throat.    Eyes:  Positive for visual disturbance. Negative for pain.   Respiratory:  Negative for cough and shortness of breath.    Cardiovascular:  Negative for chest pain, palpitations and peripheral edema.   Gastrointestinal:  Negative for abdominal pain, constipation, diarrhea, heartburn, hematochezia and nausea.   Endocrine:        + hair loss   Genitourinary:  Positive for impotence. Negative for dysuria, frequency, genital sores, hematuria, penile discharge and urgency.   Musculoskeletal:  Positive for back pain (+ cramping across lower back, worse with increased distance walking), gait problem (limited to less than 200 feet, due to low back pain. Walks with cane.) and myalgias. Negative for arthralgias and joint swelling.        Left leg weakness - can't Abduct left leg up against gravity when laying in bed.   More ability to stand, can now extend and abduct left hip slightly when standing.    Skin:  Negative for rash.   Allergic/Immunologic: Positive for immunocompromised state.   Neurological:  Positive for weakness. Negative for dizziness, headaches and paresthesias.   Hematological:  Does not bruise/bleed easily.   Psychiatric/Behavioral:  Negative for mood changes. The patient is not nervous/anxious.          Subjective   Henry " is a 69 year old, presenting for the following health issues:  Diabetes        3/14/2025     2:17 PM   Additional Questions   Roomed by ANIKET Puckett   Accompanied by Self         3/14/2025     2:17 PM   Patient Reported Additional Medications   Patient reports taking the following new medications N/A     HPI        Diabetes Follow-up    How often are you checking your blood sugar? Not at all  What concerns do you have today about your diabetes? None   Do you have any of these symptoms? (Select all that apply)  Numbness in feet and Burning in feet      BP Readings from Last 2 Encounters:   03/14/25 114/70   03/07/25 134/74     Hemoglobin A1C (%)   Date Value   03/14/2025 7.6 (H)   12/10/2024 6.6 (H)   03/12/2021 5.9     LDL Cholesterol Calculated   Date Value   03/14/2025      Comment:     Cannot estimate LDL when triglyceride exceeds 400 mg/dL   12/10/2024      Comment:     Cannot estimate LDL when triglyceride exceeds 400 mg/dL   03/12/2021     Cannot estimate LDL when triglyceride exceeds 400 mg/dL mg/dL   09/29/2015 89 mg/dL         How many servings of fruits and vegetables do you eat daily?  0-1  On average, how many sweetened beverages do you drink each day (Examples: soda, juice, sweet tea, etc.  Do NOT count diet or artificially sweetened beverages)?   2  How many days per week do you exercise enough to make your heart beat faster? 3 or less  How many minutes a day do you exercise enough to make your heart beat faster? 9 or less  How many days per week do you miss taking your medication? 0            Objective    /70   Pulse 78   Resp 18   Wt (!) 137.2 kg (302 lb 6.4 oz)   SpO2 96%   BMI 42.18 kg/m    Body mass index is 42.18 kg/m .  Physical Exam  Constitutional:       General: He is not in acute distress.     Appearance: He is well-developed. He is obese. He is not diaphoretic.   Eyes:      General: No scleral icterus.     Conjunctiva/sclera: Conjunctivae normal.   Neck:      Vascular: No  carotid bruit.   Cardiovascular:      Rate and Rhythm: Normal rate and regular rhythm.      Pulses: Normal pulses.   Pulmonary:      Effort: Pulmonary effort is normal.      Breath sounds: Normal breath sounds.   Abdominal:      Palpations: Abdomen is soft.      Tenderness: There is no abdominal tenderness.   Musculoskeletal:         General: Swelling and tenderness (low back, soft tissues / SI joint pains) present. No deformity.      Cervical back: Neck supple.      Right lower leg: No edema.      Left lower leg: No edema.      Comments: + Walking with small base stand-up triangular cane.   Skin:     General: Skin is warm and dry.      Findings: No rash.   Neurological:      Mental Status: He is alert. Mental status is at baseline.      Comments: Left leg weakness   Psychiatric:         Mood and Affect: Mood normal.         Behavior: Behavior normal.                    Signed Electronically by: Matt Whitmore MD

## 2025-03-16 LAB — VIT D+METAB SERPL-MCNC: 47 NG/ML (ref 20–50)

## 2025-03-27 ENCOUNTER — TELEPHONE (OUTPATIENT)
Dept: FAMILY MEDICINE | Facility: OTHER | Age: 70
End: 2025-03-27

## 2025-03-27 ENCOUNTER — LAB (OUTPATIENT)
Dept: LAB | Facility: OTHER | Age: 70
End: 2025-03-27
Attending: INTERNAL MEDICINE
Payer: MEDICARE

## 2025-03-27 ENCOUNTER — OFFICE VISIT (OUTPATIENT)
Dept: FAMILY MEDICINE | Facility: OTHER | Age: 70
End: 2025-03-27
Attending: FAMILY MEDICINE
Payer: MEDICARE

## 2025-03-27 VITALS
SYSTOLIC BLOOD PRESSURE: 136 MMHG | DIASTOLIC BLOOD PRESSURE: 74 MMHG | TEMPERATURE: 98.2 F | HEIGHT: 71 IN | HEART RATE: 66 BPM | RESPIRATION RATE: 16 BRPM | WEIGHT: 310.8 LBS | BODY MASS INDEX: 43.51 KG/M2 | OXYGEN SATURATION: 94 %

## 2025-03-27 DIAGNOSIS — N18.32 STAGE 3B CHRONIC KIDNEY DISEASE (H): ICD-10-CM

## 2025-03-27 DIAGNOSIS — M54.10 RADICULAR LOW BACK PAIN: ICD-10-CM

## 2025-03-27 DIAGNOSIS — E78.2 MIXED HYPERLIPIDEMIA: ICD-10-CM

## 2025-03-27 DIAGNOSIS — Z01.818 PRE-OP EXAM: Primary | ICD-10-CM

## 2025-03-27 DIAGNOSIS — R10.2 NEURALGIA OF LEFT PERINEAL NERVE: ICD-10-CM

## 2025-03-27 DIAGNOSIS — N18.32 CHRONIC KIDNEY DISEASE, STAGE 3B (H): ICD-10-CM

## 2025-03-27 DIAGNOSIS — C61 PROSTATE CANCER (H): ICD-10-CM

## 2025-03-27 DIAGNOSIS — I50.32 CHRONIC HEART FAILURE WITH PRESERVED EJECTION FRACTION (HFPEF) (H): ICD-10-CM

## 2025-03-27 DIAGNOSIS — Z86.0101 H/O ADENOMATOUS POLYP OF COLON: ICD-10-CM

## 2025-03-27 LAB
ALBUMIN SERPL BCG-MCNC: 4.3 G/DL (ref 3.5–5.2)
ALP SERPL-CCNC: 74 U/L (ref 40–150)
ALT SERPL W P-5'-P-CCNC: 41 U/L (ref 0–70)
ANION GAP SERPL CALCULATED.3IONS-SCNC: 16 MMOL/L (ref 7–15)
AST SERPL W P-5'-P-CCNC: 32 U/L (ref 0–45)
BILIRUB SERPL-MCNC: 0.5 MG/DL
BUN SERPL-MCNC: 28.6 MG/DL (ref 8–23)
CALCIUM SERPL-MCNC: 10.6 MG/DL (ref 8.8–10.4)
CHLORIDE SERPL-SCNC: 99 MMOL/L (ref 98–107)
CREAT SERPL-MCNC: 1.51 MG/DL (ref 0.67–1.17)
EGFRCR SERPLBLD CKD-EPI 2021: 49 ML/MIN/1.73M2
GLUCOSE SERPL-MCNC: 128 MG/DL (ref 70–99)
HCO3 SERPL-SCNC: 22 MMOL/L (ref 22–29)
POTASSIUM SERPL-SCNC: 4.8 MMOL/L (ref 3.4–5.3)
PROT SERPL-MCNC: 8 G/DL (ref 6.4–8.3)
SODIUM SERPL-SCNC: 137 MMOL/L (ref 135–145)

## 2025-03-27 PROCEDURE — 80053 COMPREHEN METABOLIC PANEL: CPT | Mod: ZL

## 2025-03-27 PROCEDURE — 82040 ASSAY OF SERUM ALBUMIN: CPT | Mod: ZL

## 2025-03-27 PROCEDURE — 36415 COLL VENOUS BLD VENIPUNCTURE: CPT | Mod: ZL

## 2025-03-27 PROCEDURE — G0463 HOSPITAL OUTPT CLINIC VISIT: HCPCS

## 2025-03-27 ASSESSMENT — PAIN SCALES - PAIN ENJOYMENT GENERAL ACTIVITY SCALE (PEG)
INTERFERED_ENJOYMENT_LIFE: 8
PEG_TOTALSCORE: 4.67
INTERFERED_ENJOYMENT_LIFE: 8
AVG_PAIN_PASTWEEK: 3
AVG_PAIN_PASTWEEK: 3
PEG_TOTALSCORE: 4.67
INTERFERED_GENERAL_ACTIVITY: 3
INTERFERED_GENERAL_ACTIVITY: 3

## 2025-03-27 ASSESSMENT — ENCOUNTER SYMPTOMS
NECK PAIN: 1
NEUROLOGICAL NEGATIVE: 1
BACK PAIN: 1
PSYCHIATRIC NEGATIVE: 1
ALLERGIC/IMMUNOLOGIC NEGATIVE: 1
GASTROINTESTINAL NEGATIVE: 1
CONSTITUTIONAL NEGATIVE: 1
HEMATOLOGIC/LYMPHATIC NEGATIVE: 1
RESPIRATORY NEGATIVE: 1
CARDIOVASCULAR NEGATIVE: 1

## 2025-03-27 ASSESSMENT — PAIN SCALES - GENERAL: PAINLEVEL_OUTOF10: MILD PAIN (3)

## 2025-03-27 NOTE — PROGRESS NOTES
Preoperative Evaluation  [facility]  [address]  [place] [address]  Phone: [phone]  Fax: [phone]  Primary Provider: Matt Whitmore MD  Pre-op Performing Provider: Dustin Becker DO  Mar 27, 2025             3/27/2025   Surgical Information   What procedure is being done? eye surgery   [facility] or [facility]   Who is doing the procedure / surgery? dr gutierrez   Date of surgery / procedure: 4 11 25   Time of surgery / procedure: tbd   Where do you plan to recover after surgery? at home with family     Fax number for surgical facility: [phone]    Assessment & Plan     The proposed surgical procedure is considered LOW risk.    There are no diagnoses linked to this encounter.             - No identified additional risk factors other than previously addressed         Recommendation  Approval given to proceed with proposed procedure, without further diagnostic evaluation.    Igor Figueroa is a 69 year old, presenting for the following:  Pre-Op Exam ([facility]/11/25/[facility]Dr. Gutierrez /Eye Surgery )          3/27/2025    12:53 PM   Additional Questions   Roomed by Alecia Quinones LPN   Accompanied by Wife - Nati         3/27/2025   Forms   Any forms needing to be completed Yes     HPI: Patient is going down to have strabismus surgery down in [place].  He recently came off his torsemide for declining renal function which has improved.  He has no other concerns          3/27/2025   Pre-Op Questionnaire   Have you ever had a heart attack or stroke? No   Have you ever had surgery on your heart or blood vessels, such as a stent placement, a coronary artery bypass, or surgery on an artery in your head, neck, heart, or legs? No   Do you have chest pain with activity? No   Do you have a history of heart failure? No   Do you currently have a cold, bronchitis or symptoms of other infection? No   Do you have a cough,  shortness of breath, or wheezing? No   Do you or anyone in your family have previous history of blood clots? No   Do you or does anyone in your family have a serious bleeding problem such as prolonged bleeding following surgeries or cuts? No   Have you ever had problems with anemia or been told to take iron pills? No   Have you had any abnormal blood loss such as black, tarry or bloody stools? (!) YES      Have you ever had a blood transfusion? (!) YES   Have you ever had a transfusion reaction? No   Are you willing to have a blood transfusion if it is medically needed before, during, or after your surgery? Yes   Have you or any of your relatives ever had problems with anesthesia? No   Do you have sleep apnea, excessive snoring or daytime drowsiness? No   Do you have any artifical heart valves or other implanted medical devices like a pacemaker, defibrillator, or continuous glucose monitor? No   Do you have artificial joints? No   Are you allergic to latex? No     Health Care Directive  Patient does not have a Health Care Directive: Patient states has Advance Directive and will bring in a copy to clinic.    Preoperative Review of    reviewed - controlled substances reflected in medication list.          Patient Active Problem List    Diagnosis Date Noted    H/O adenomatous polyp of colon 03/07/2025     Priority: Medium    Type 2 diabetes mellitus with diabetic polyneuropathy, without long-term current use of insulin (H) 12/26/2024     Priority: Medium    Chronic kidney disease, stage 3b (H) 12/26/2024     Priority: Medium    Prostate cancer (H) 12/10/2024     Priority: Medium    Benign essential hypertension 06/25/2024     Priority: Medium    Intermittent low back pain 06/25/2024     Priority: Medium    Trigger point with back pain 06/25/2024     Priority: Medium    Thrombocythemia, essential (H) 05/28/2024     Priority: Medium    Coronary artery disease due to lipid rich plaque 04/25/2024     Priority: Medium     Class 3 severe obesity due to excess calories with serious comorbidity and body mass index (BMI) of 40.0 to 44.9 in adult (H) 12/01/2023     Priority: Medium    Weakness of left hip 10/09/2023     Priority: Medium    Hair loss 05/03/2023     Priority: Medium    Erectile dysfunction, unspecified erectile dysfunction type 05/03/2023     Priority: Medium    Neuralgia of left perineal nerve 05/03/2023     Priority: Medium    Sleep difficulties 05/03/2023     Priority: Medium    Asthma 04/05/2023     Priority: Medium    Chronic heart failure with preserved ejection fraction (HFpEF) (H) 02/24/2023     Priority: Medium    Complication of artery associated with surgical procedure - Left superior gluteal artery Injury -- subsequently thrombosed / embolized by Interventional Radiology 02/15/2023     Priority: Medium    Tear of left gluteus elif muscle 02/15/2023     Priority: Medium    Injury of muscle of left thigh 02/15/2023     Priority: Medium    Left leg weakness 02/15/2023     Priority: Medium    Generalized muscle weakness 02/06/2023     Priority: Medium    Drug-induced constipation 02/06/2023     Priority: Medium    Urinary retention 01/08/2023     Priority: Medium    Asplenia 01/07/2023     Priority: Medium    Low oxygen saturation - during surgery. 01/04/2023     Priority: Medium    Hypertriglyceridemia 11/11/2022     Priority: Medium    Coronary artery calcification 10/18/2022     Priority: Medium    Bilateral renal cysts 08/30/2022     Priority: Medium    Chronic radicular low back pain - Following with back pain clinic - s/p injections and nerve burning procedures at [place] - minimally helpful 08/30/2022     Priority: Medium    Hyperuricemia 12/23/2021     Priority: Medium    Low testosterone in male 08/18/2021     Priority: Medium    Radicular low back pain 04/27/2021     Priority: Medium    Neuroforaminal stenosis of lumbar spine 04/27/2021     Priority: Medium    Myofascial muscle pain 04/14/2021      Priority: Medium    Trigger point of right shoulder region 04/14/2021     Priority: Medium    Sacroiliac joint pain - left 04/14/2021     Priority: Medium    Osteoarthritis of lumbar spine, unspecified spinal osteoarthritis complication status 04/14/2021     Priority: Medium    Hypercalcemia 03/12/2021     Priority: Medium    Primary insomnia 01/31/2021     Priority: Medium    Polymyalgia rheumatica 01/31/2021     Priority: Medium    Personal history of malignant neoplasm of bladder -- s/p TURBT x2 in 3/2018 and 4/2018 for high-grade non-muscle invasive bladder cancer, now s/p BCG treatment - last cystoscopy 1/20/2021 was normal 01/31/2021     Priority: Medium    Steroid-induced hyperglycemia 01/29/2021     Priority: Medium    Bilateral lower extremity edema 01/29/2021     Priority: Medium    Vitamin B12 deficiency 01/29/2021     Priority: Medium    Chronic fatigue syndrome with fibromyalgia 01/29/2021     Priority: Medium    History of Lyme disease 01/29/2021     Priority: Medium    Meniere disease, bilateral 06/01/2018     Priority: Medium    Rotator cuff syndrome of right shoulder 04/23/2018     Priority: Medium    Lung mass 03/15/2018     Priority: Medium    History of carpal tunnel surgery 11/02/2017     Priority: Medium    Vitamin D deficiency 10/10/2016     Priority: Medium    Postoperative hypothyroidism 01/11/2011     Priority: Medium    Hypothyroidism 01/11/2011     Priority: Medium     s/after thyroidectomy for carcinoma.      Mixed hyperlipidemia 07/12/2010     Priority: Medium    Psychosexual dysfunction with inhibited sexual excitement 02/17/2010     Priority: Medium    History of kidney stones 02/17/2010     Priority: Medium    KYLAH on CPAP -- Uses nightly, finds helpful and beneficial 02/17/2010     Priority: Medium    History of thyroid cancer - Papillary Thyroid Cancer -- NO Ozempic or GLP-1 medications -- 02/17/1994     Priority: Medium     Formatting of this note might be different from the  original.  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     Priority: Medium      Past Medical History:   Diagnosis Date    Acute and chronic respiratory failure with hypoxia (H) 02/06/2023    Bell's palsy 03/30/2017    Bilateral carpal tunnel syndrome     No Comments Provided    Bladder carcinoma (H) 03/15/2018    s/after resection with positive margins, s/after second resection with negative margins 4/2018 at [place], [place].  He will be doing 6 weeks of chemotherapy.      Essential (primary) hypertension     No Comments Provided    Gout 06/03/2013    Hematoma of left lower leg 01/07/2023    Hyperlipidemia     No Comments Provided    Hypothyroidism     No Comments Provided    Malignant neoplasm of thyroid gland (H)     1994    Occipital neuralgia 01/30/2015    Postoperative hematoma of musculoskeletal structure following musculoskeletal procedure     Sleep apnea     uses CPAP    Vertebrogenic low back pain 04/24/2024     Past Surgical History:   Procedure Laterality Date    BIOPSY PROSTATE TRANSRECTAL N/A 10/28/2024    Procedure: BIOPSY, PROSTATE, RECTAL APPROACH;  Surgeon: Spike Milton MD;  Location:  OR    COLONOSCOPY  09/09/2014 9/2014,Normal - follow up 10 years    COLONOSCOPY N/A 03/07/2025    F/U 2030 serrated adenoma    CV CORONARY ANGIOGRAM N/A 10/31/2022    Procedure: Coronary Angiogram;  Surgeon: Ender Michaels MD;  Location:  [facility]    CYSTOSCOPY      bladder cancer    INCISION AND DRAINAGE LOWER EXTREMITY, COMBINED Left 01/07/2023    Procedure: INCISION AND DRAINAGE, LEFT BUTTOCK (Drain Hematoma of Left Buttock);  Surgeon: Ronald Mccall MD;  Location:  OR    IR LUMBAR PUNCTURE  12/12/2017    OTHER SURGICAL HISTORY      ,SPLENECTOMY    OTHER SURGICAL HISTORY      2010,[id number],EYE MUSCLE SURGERY,Left,strabismus    OTHER SURGICAL HISTORY      10/06/14,[id number],OTHER,Left,Dr. Lana LEHMAN    OTHER SURGICAL HISTORY       10/14/14,[id number],OTHER,Left,Dr. Lana LEHMAN    OTHER SURGICAL HISTORY      11/16/2017,[id number],ENDOSCOPIC RELEASE TRANSVERSE CARPAL LIGAMENT OF HAND,Left    RELEASE CARPAL TUNNEL      12/2011    RETINAL DETACHMENT SURGERY Right     STRABISMUS SURGERY Left     THYROIDECTOMY      1994,thyroid ca    TONSILLECTOMY      2004,T & A > 11yo    VASECTOMY      No Comments Provided     Current Outpatient Medications   Medication Sig Dispense Refill    allopurinol (ZYLOPRIM) 300 MG tablet TAKE 1 TABLET (300 MG) BY MOUTH DAILY. - FOR GOUT PREVENTION 90 tablet 3    B Complex-C (VITAMIN B COMPLEX W/VITAMIN C) TABS tablet Take 1 tablet by mouth daily      carvedilol (COREG) 6.25 MG tablet Take 1.5 tablets (9.375 mg) by mouth 2 times daily (with meals). 360 tablet 4    Cyanocobalamin 2500 MCG TABS Take 2,500 mcg by mouth daily -- or every other day -- OTC okay 90 tablet 4    eszopiclone (LUNESTA) 1 MG tablet Take 1 tablet (1 mg) by mouth at bedtime. 30 tablet 5    famotidine (PEPCID) 20 MG tablet Take 1 tablet (20 mg) by mouth 2 times daily. 180 tablet 4    fenofibrate (TRICOR) 145 MG tablet Take 1 tablet (145 mg) by mouth daily. 90 tablet 2    gabapentin (NEURONTIN) 400 MG capsule Take 3 capsules (1,200 mg) by mouth 3 times daily. 820 capsule 1    levothyroxine (SYNTHROID/LEVOTHROID) 200 MCG tablet Take 1 tablet (200 mcg) by mouth daily. 90 tablet 4    magnesium 250 MG tablet Take 1 tablet by mouth daily      Multiple Vitamins-Minerals (CENTRUM ADULTS PO) Take 1 tablet by mouth daily      order for DME CPAP machine for home use at pressure: 10, with supplies:  humidifier, chamber, mask, tubing      pregabalin (LYRICA) 100 MG capsule Take 1 capsule (100 mg) by mouth 3 times daily. 270 capsule 1    Probiotic Product (PROBIOTIC-10 PO) Take 1 tablet by mouth daily      sildenafil (VIAGRA) 100 MG tablet Take 1 tablet (100 mg) by mouth daily as needed. 30 minutes to 4 hours prior to sexual activity 90 tablet 1    spironolactone (ALDACTONE)  "50 MG tablet Take 2 tablets (100 mg) by mouth daily. -- Dose Increase 12/26/2024   + Reduce Torsemide dose (Patient not taking: Reported on 3/27/2025) 180 tablet 4    torsemide (DEMADEX) 20 MG tablet Take 1-3 tablets (20-60 mg) by mouth daily. --- Stop Furosemide --- (Patient not taking: Reported on 3/27/2025) 270 tablet 4       Allergies   Allergen Reactions    Losartan Cough    Omeprazole Diarrhea    Pantoprazole Diarrhea    Valsartan Cough     Started with losartan, worsened with high dose valsartan        Social History     Tobacco Use    Smoking status: Never     Passive exposure: Never    Smokeless tobacco: Never   Substance Use Topics    Alcohol use: No     Comment: none     Family History   Problem Relation Age of Onset    Heart Disease Mother         Heart Disease,MI - SCD    Heart Disease Father         Heart Disease,MI - SCD     History   Drug Use No           Review of Systems   Constitutional: Negative.    HENT: Negative.     Eyes:  Positive for visual disturbance.   Respiratory: Negative.     Cardiovascular: Negative.    Gastrointestinal: Negative.    Genitourinary: Negative.    Musculoskeletal:  Positive for back pain and neck pain.   Skin: Negative.    Allergic/Immunologic: Negative.    Neurological: Negative.    Hematological: Negative.    Psychiatric/Behavioral: Negative.          Objective    /74 (BP Location: Right arm, Patient Position: Sitting, Cuff Size: Adult Large)   Pulse 66   Temp 98.2  F (36.8  C) (Temporal)   Resp 16   Ht 1.803 m (5' 11\")   Wt (!) 141 kg (310 lb 12.8 oz)   SpO2 94%   BMI 43.35 kg/m     Estimated body mass index is 43.35 kg/m  as calculated from the following:    Height as of this encounter: 1.803 m (5' 11\").    Weight as of this encounter: 141 kg (310 lb 12.8 oz).  Physical Exam  Constitutional:       Appearance: Normal appearance.   HENT:      Head: Normocephalic.      Right Ear: Tympanic membrane and ear canal normal.      Left Ear: Tympanic membrane and " ear canal normal.      Nose: Nose normal.      Mouth/Throat:      Mouth: Mucous membranes are moist.      Pharynx: Oropharynx is clear.   Eyes:      Conjunctiva/sclera: Conjunctivae normal.   Cardiovascular:      Rate and Rhythm: Normal rate and regular rhythm.      Heart sounds: Normal heart sounds. No murmur heard.  Pulmonary:      Effort: Pulmonary effort is normal.      Breath sounds: Normal breath sounds.   Abdominal:      General: Abdomen is flat.      Palpations: Abdomen is soft.   Musculoskeletal:         General: Normal range of motion.      Cervical back: Neck supple.   Skin:     General: Skin is warm and dry.      Capillary Refill: Capillary refill takes 2 to 3 seconds.   Neurological:      General: No focal deficit present.      Mental Status: He is alert and oriented to person, place, and time. Mental status is at baseline.      Cranial Nerves: Cranial nerve deficit (left cranial nerve 7 weakness) present.   Psychiatric:         Mood and Affect: Mood normal.         Behavior: Behavior normal.         Recent Labs   Lab Test 03/27/25  1120 03/14/25  1403 12/10/24  1000   HGB  --  17.1 17.7   PLT  --  409 387    138 139   POTASSIUM 4.8 4.8 4.4   CR 1.51* 2.29* 1.65*   A1C  --  7.6* 6.6*        Diagnostics  No labs were ordered during this visit.   No EKG required for low risk surgery (cataract, skin procedure, breast biopsy, etc).    Revised Cardiac Risk Index (RCRI)  The patient has the following serious cardiovascular risks for perioperative complications:   - No serious cardiac risks = 0 points     RCRI Interpretation: 0 points: Class I (very low risk - 0.4% complication rate)         Signed Electronically by: Dustin Becker DO  A copy of this evaluation report is provided to the requesting physician.        Answers submitted by the patient for this visit:  Combined Diabetes / Lipid/ Hypertension Visit (Submitted on 3/27/2025)  Frequency of checking blood sugars:: not at all  Hypoglycemia  symptoms:: none  Dietary sodium intake:: No  Hypothyroid  (Submitted on 3/27/2025)  Chief Complaint: Chronic problems general questions HPI Form  Since last visit, patient describes the following symptoms:: None  Questionnaire about: Chronic problems general questions HPI Form (Submitted on 3/27/2025)  Chief Complaint: Chronic problems general questions HPI Form

## 2025-03-27 NOTE — TELEPHONE ENCOUNTER
RADHA completed for [facility] for release of Pre op.      Placed in fax file, routed to HIM     Alecia Quinones LPN Ext. [phone] on 3/27/2025 at 3:15 PM

## 2025-03-27 NOTE — NURSING NOTE
"Chief Complaint   Patient presents with    Pre-Op Exam     04/11/25  [facility]   Eye Surgery      Patient presents for pre op appointment for Eye surgery on 04/11/2025.  Reports pain 3/10 in low back at this time.      Initial /74 (BP Location: Right arm, Patient Position: Sitting, Cuff Size: Adult Large)   Pulse 66   Temp 98.2  F (36.8  C) (Temporal)   Resp 16   Ht 1.803 m (5' 11\")   Wt (!) 141 kg (310 lb 12.8 oz)   SpO2 94%   BMI 43.35 kg/m   Estimated body mass index is 43.35 kg/m  as calculated from the following:    Height as of this encounter: 1.803 m (5' 11\").    Weight as of this encounter: 141 kg (310 lb 12.8 oz).  Medication Review: complete    The next two questions are to help us understand your food security.  If you are feeling you need any assistance in this area, we have resources available to support you today.          12/26/2024   SDOH- Food Insecurity   Within the past 12 months, did you worry that your food would run out before you got money to buy more? N   Within the past 12 months, did the food you bought just not last and you didn t have money to get more? N         Health Care Directive:  Patient does not have a Health Care Directive: Discussed advance care planning with patient; however, patient declined at this time.    Alecia Quinones LPN on 3/27/2025 at 12:58 PM        "

## 2025-04-16 ENCOUNTER — HOSPITAL ENCOUNTER (OUTPATIENT)
Dept: GENERAL RADIOLOGY | Facility: OTHER | Age: 70
Discharge: HOME | End: 2025-04-16
Attending: INTERNAL MEDICINE
Payer: MEDICARE

## 2025-04-16 PROCEDURE — 250N000011 HC RX IP 250 OP 636: Mod: JW | Performed by: RADIOLOGY

## 2025-04-16 PROCEDURE — 77002 NEEDLE LOCALIZATION BY XRAY: CPT

## 2025-04-16 PROCEDURE — 20553 NJX 1/MLT TRIGGER POINTS 3/>: CPT

## 2025-04-16 RX ORDER — TRIAMCINOLONE ACETONIDE 40 MG/ML
40 INJECTION, SUSPENSION INTRA-ARTICULAR; INTRAMUSCULAR ONCE
Status: COMPLETED | OUTPATIENT
Start: 2025-04-16 | End: 2025-04-16

## 2025-04-16 RX ORDER — BUPIVACAINE HYDROCHLORIDE 5 MG/ML
9 INJECTION, SOLUTION EPIDURAL; INTRACAUDAL; PERINEURAL ONCE
Status: COMPLETED | OUTPATIENT
Start: 2025-04-16 | End: 2025-04-16

## 2025-04-16 RX ADMIN — TRIAMCINOLONE ACETONIDE 40 MG: 40 INJECTION, SUSPENSION INTRA-ARTICULAR; INTRAMUSCULAR at 11:37

## 2025-04-16 RX ADMIN — BUPIVACAINE HYDROCHLORIDE 45 MG: 5 INJECTION, SOLUTION EPIDURAL; INTRACAUDAL; PERINEURAL at 11:37

## 2025-05-14 ENCOUNTER — TELEPHONE (OUTPATIENT)
Dept: INTERNAL MEDICINE | Facility: OTHER | Age: 70
End: 2025-05-14
Payer: MEDICARE

## 2025-05-14 NOTE — TELEPHONE ENCOUNTER
I told the patient that [facility] has a PA department that does all the PA'S for medications. I told him he could call his insurance to see if they have received the PA and how much longer before they approve the medication as he will run out of it by Friday.  Patricia Romero LPN..................5/14/2025   3:41 PM

## 2025-05-14 NOTE — TELEPHONE ENCOUNTER
Patient's wife calling stating [facility] needs a pre-authorization form for lyrica before they can obtain a refill. Patient has 2 days worth left. Needing it filled by Friday. Wife states the pharmacy has not heard back from [facility].    Vita Hall on 5/14/2025 at 1:59 PM

## 2025-05-20 NOTE — PROGRESS NOTES
Assessment & Plan     ICD-10-CM    1. Scrotal pain  N50.82 IR Referral      2. Neuralgia of left perineal nerve  R10.2 IR Referral     pregabalin (LYRICA) 75 MG capsule      3. CKD (chronic kidney disease) stage 2, GFR 60-89 ml/min  N18.2       4. Postoperative hypothyroidism  E89.0       5. Morbid obesity (H)  E66.01       6. Erectile dysfunction, unspecified erectile dysfunction type  N52.9 Adult Urology  Referral      7. Psychosexual dysfunction with inhibited sexual excitement  F52.8 Adult Urology  Referral      8. Low testosterone in male  R79.89 Adult Urology  Referral      9. Sleep difficulties  G47.9 estazolam (PROSOM) 1 MG tablet      10. Chronic radicular low back pain  M54.16 gabapentin (NEURONTIN) 400 MG capsule    G89.29 pregabalin (LYRICA) 75 MG capsule      11. Complication of artery associated with surgical procedure - Left superior gluteal artery Injury -- subsequently thrombosed / embolized by Interventional Radiology  I97.89 pregabalin (LYRICA) 75 MG capsule      12. Weakness of left hip  R29.898         Patient presents for follow-up multiple issues.    Scrotal pain, having neuralgia of the left peroneal nerve.  Lyrica has been helpful and effective.  Also takes gabapentin which helps with his radicular low back pain.  Interventional radiology referral placed, he would like to consider a dental block -versus other nerve block to help with his left-sided scrotal/penile pain.    Erectile dysfunction, has history of low testosterone and was previously on testosterone replacement.  Urology referral placed.    Sleep difficulties, chronic, ongoing.  Ambien was not helpful at all.  Would like to try something else.  Looked at a few different options.  Start trial of estazolam/ProSom at bedtime.    Radicular low back pain with nerve pain.  See above.  Has found gabapentin and Lyrica combination very effective and well-tolerated.  Refills sent to pharmacy.    Postoperative left  PT VERBALIZES UNDERSTANDING OF DX, AND IMPORTANCE OF FOLLOW UP. ALL PT QUESTIONS ANSWERED. IV REMOVED.     Nadja Barrientos, MCKAYLA  05/20/25 7190     gluteal pain and weakness with weakness of the left leg/buttocks.  His hip drops.  He had no improvement from his last EMG until his recent EMG and has worked with physical therapy.  Discussed consultation with [facility] orthotics about possible development of hip or leg brace to help with stability and mobility.    Postoperative hypothyroidism - Patient has a longstanding history of chronic hypothyroidism. Patient has been doing reasonably well. Reports: denies fatigue, weight changes, heat/cold intolerance, bowel/skin changes or CVS symptoms.  Continue: Synthroid 200 mcg daily oral thyroid replacement, denies adverse medication reactions or side effects.                       TSH   Date Value Ref Range Status   11/08/2022 0.40 0.30 - 4.20 uIU/mL Final   12/22/2021 1.20 0.40 - 4.00 mU/L Final        Chronic Kidney Disease, Stage 2 (GFR 60-89), chronic, ongoing.   Kidney function has been slowly declining.  Encouraged NSAID avoidance.        Has hip drop from previous gluteal muscle injury / now with left leg weakness.   -- Reports recent EMG with about 20% of the normal nerve impulses from gluteus minimus and medius and was unchanged from his prior EMG, consistent with permanent injury and debility.   -- Recommend consult with [facility] for possible hip and leg brace / support.     Recommend orthotics consult.  Please call Paul Campos at [facility] ([facility] in [place]) -- [phone]    Patient was: Referred for custom hip brace / support.     Encouraged regular exercise.     Return in about 8 weeks (around 12/1/2023) for 11:20 AM on Friday 12/1 , Annual Medicare Wellness Visit, + Update Controlled Substance Agreement.    Matt Whitmore MD  [facility]     Igor Figueroa is a 68 year old, presenting for the following health issues:  Recheck Medication (Lyrica)        10/6/2023     4:10 PM   Additional Questions   Roomed by Kevin ESCOBAR / ANIKET   Accompanied by  Wife - Cami       History of Present Illness       Reason for visit:  Follow up  Symptom onset:  More than a month  Symptoms include:  Weakness, pain  Symptom intensity:  Mild  Symptom progression:  Staying the same  Had these symptoms before:  Yes  Has tried/received treatment for these symptoms:  Yes    He eats 2-3 servings of fruits and vegetables daily.He consumes 0 sweetened beverage(s) daily.He exercises with enough effort to increase his heart rate 9 or less minutes per day.  He exercises with enough effort to increase his heart rate 3 or less days per week.   He is taking medications regularly.     Review of Systems   Constitutional:  Negative for chills and fever.        + walking up to 100 feet with small based Quad-cane   HENT:  Positive for hearing loss. Negative for congestion, ear pain and sore throat.    Eyes:  Negative for pain and visual disturbance.   Respiratory:  Negative for cough and shortness of breath.    Cardiovascular:  Negative for chest pain, palpitations and peripheral edema.   Gastrointestinal:  Negative for abdominal pain, constipation, diarrhea, heartburn, hematochezia and nausea.   Endocrine:        + hair loss   Genitourinary:  Positive for impotence. Negative for dysuria, frequency, genital sores, hematuria, penile discharge and urgency.   Musculoskeletal:  Positive for back pain (left buttocks/thigh pain) and myalgias. Negative for arthralgias and joint swelling.        Left leg weakness - can't Abduct left leg up against gravity when laying in bed.     More ability to stand, can now extend and abduct left hip slightly when standing.    Skin:  Negative for rash.   Allergic/Immunologic: Positive for immunocompromised state.   Neurological:  Positive for weakness. Negative for dizziness, headaches and paresthesias.   Hematological:  Bruises/bleeds easily.   Psychiatric/Behavioral:  Negative for mood changes. The patient is not nervous/anxious.             Objective    /68 (BP  "Location: Right arm, Patient Position: Sitting, Cuff Size: Adult Large)   Pulse 61   Temp 98  F (36.7  C) (Temporal)   Resp 20   Ht 1.816 m (5' 11.5\")   Wt 137.4 kg (303 lb)   BMI 41.67 kg/m    Body mass index is 41.67 kg/m .  Physical Exam  Constitutional:       General: He is not in acute distress.     Appearance: He is well-developed. He is obese. He is not diaphoretic.   HENT:      Head: Normocephalic and atraumatic.   Eyes:      General: No scleral icterus.     Conjunctiva/sclera: Conjunctivae normal.   Cardiovascular:      Rate and Rhythm: Normal rate and regular rhythm.      Pulses: Normal pulses.   Pulmonary:      Effort: Pulmonary effort is normal.      Breath sounds: Normal breath sounds.   Abdominal:      Palpations: Abdomen is soft.      Tenderness: There is no abdominal tenderness.   Musculoskeletal:         General: Swelling present. No tenderness or deformity.      Cervical back: Neck supple.      Right lower leg: Edema present.      Left lower leg: Edema present.      Comments: + Walking with 4 prong cane   Lymphadenopathy:      Cervical: No cervical adenopathy.   Skin:     General: Skin is warm and dry.      Findings: No rash.   Neurological:      Mental Status: He is alert.      Comments: Left leg weakness   Psychiatric:         Mood and Affect: Mood normal.         Behavior: Behavior normal.                            "

## 2025-05-28 ENCOUNTER — OFFICE VISIT (OUTPATIENT)
Dept: INTERNAL MEDICINE | Facility: OTHER | Age: 70
End: 2025-05-28
Attending: INTERNAL MEDICINE
Payer: MEDICARE

## 2025-05-28 VITALS
DIASTOLIC BLOOD PRESSURE: 88 MMHG | BODY MASS INDEX: 42.54 KG/M2 | SYSTOLIC BLOOD PRESSURE: 138 MMHG | WEIGHT: 305 LBS | RESPIRATION RATE: 16 BRPM | HEART RATE: 81 BPM | OXYGEN SATURATION: 95 % | TEMPERATURE: 97.8 F

## 2025-05-28 DIAGNOSIS — E79.0 HYPERURICEMIA: ICD-10-CM

## 2025-05-28 DIAGNOSIS — R10.2 NEURALGIA OF LEFT PERINEAL NERVE: ICD-10-CM

## 2025-05-28 DIAGNOSIS — I10 BENIGN ESSENTIAL HYPERTENSION: ICD-10-CM

## 2025-05-28 DIAGNOSIS — M54.16 CHRONIC RADICULAR LOW BACK PAIN: ICD-10-CM

## 2025-05-28 DIAGNOSIS — G89.29 CHRONIC RADICULAR LOW BACK PAIN: ICD-10-CM

## 2025-05-28 DIAGNOSIS — I97.89 COMPLICATION OF ARTERY ASSOCIATED WITH SURGICAL PROCEDURE: ICD-10-CM

## 2025-05-28 DIAGNOSIS — R60.0 BILATERAL LOWER EXTREMITY EDEMA: ICD-10-CM

## 2025-05-28 DIAGNOSIS — I50.32 CHRONIC HEART FAILURE WITH PRESERVED EJECTION FRACTION (HFPEF) (H): Primary | ICD-10-CM

## 2025-05-28 PROCEDURE — G0463 HOSPITAL OUTPT CLINIC VISIT: HCPCS

## 2025-05-28 RX ORDER — PREGABALIN 100 MG/1
100 CAPSULE ORAL 3 TIMES DAILY
Qty: 270 CAPSULE | Refills: 1 | Status: SHIPPED | OUTPATIENT
Start: 2025-05-28

## 2025-05-28 RX ORDER — ALLOPURINOL 300 MG/1
1 TABLET ORAL DAILY
Qty: 90 TABLET | Refills: 4 | Status: SHIPPED | OUTPATIENT
Start: 2025-05-28

## 2025-05-28 RX ORDER — GABAPENTIN 400 MG/1
1200 CAPSULE ORAL 3 TIMES DAILY
Qty: 820 CAPSULE | Refills: 1 | Status: SHIPPED | OUTPATIENT
Start: 2025-05-28

## 2025-05-28 RX ORDER — TORSEMIDE 20 MG/1
60 TABLET ORAL EVERY OTHER DAY
Qty: 135 TABLET | Refills: 4 | Status: SHIPPED | OUTPATIENT
Start: 2025-05-28

## 2025-05-28 ASSESSMENT — ANXIETY QUESTIONNAIRES
IF YOU CHECKED OFF ANY PROBLEMS ON THIS QUESTIONNAIRE, HOW DIFFICULT HAVE THESE PROBLEMS MADE IT FOR YOU TO DO YOUR WORK, TAKE CARE OF THINGS AT HOME, OR GET ALONG WITH OTHER PEOPLE: NOT DIFFICULT AT ALL
GAD7 TOTAL SCORE: 0
5. BEING SO RESTLESS THAT IT IS HARD TO SIT STILL: NOT AT ALL
GAD7 TOTAL SCORE: 0
1. FEELING NERVOUS, ANXIOUS, OR ON EDGE: NOT AT ALL
7. FEELING AFRAID AS IF SOMETHING AWFUL MIGHT HAPPEN: NOT AT ALL
8. IF YOU CHECKED OFF ANY PROBLEMS, HOW DIFFICULT HAVE THESE MADE IT FOR YOU TO DO YOUR WORK, TAKE CARE OF THINGS AT HOME, OR GET ALONG WITH OTHER PEOPLE?: NOT DIFFICULT AT ALL
4. TROUBLE RELAXING: NOT AT ALL
7. FEELING AFRAID AS IF SOMETHING AWFUL MIGHT HAPPEN: NOT AT ALL
2. NOT BEING ABLE TO STOP OR CONTROL WORRYING: NOT AT ALL
GAD7 TOTAL SCORE: 0
6. BECOMING EASILY ANNOYED OR IRRITABLE: NOT AT ALL
3. WORRYING TOO MUCH ABOUT DIFFERENT THINGS: NOT AT ALL

## 2025-05-28 ASSESSMENT — ENCOUNTER SYMPTOMS
BRUISES/BLEEDS EASILY: 0
PARESTHESIAS: 1
WEAKNESS: 1
JOINT SWELLING: 0
ENDOCRINE COMMENTS: + HAIR LOSS
FREQUENCY: 0
HEADACHES: 0
CONSTIPATION: 0
EYE PAIN: 0
PALPITATIONS: 0
BACK PAIN: 1
HEMATURIA: 0
FEVER: 0
HEMATOCHEZIA: 0
NUMBNESS: 1
UNEXPECTED WEIGHT CHANGE: 1
SHORTNESS OF BREATH: 0
DIARRHEA: 0
NAUSEA: 0
NERVOUS/ANXIOUS: 0
HEARTBURN: 0
SORE THROAT: 0
MYALGIAS: 1
COUGH: 0
CHILLS: 0
DIZZINESS: 0
DYSURIA: 0
ARTHRALGIAS: 0
ABDOMINAL PAIN: 0

## 2025-05-28 ASSESSMENT — PAIN SCALES - GENERAL: PAINLEVEL_OUTOF10: MILD PAIN (3)

## 2025-05-28 ASSESSMENT — PAIN SCALES - PAIN ENJOYMENT GENERAL ACTIVITY SCALE (PEG)
INTERFERED_GENERAL_ACTIVITY: 8
INTERFERED_GENERAL_ACTIVITY: 8
PEG_TOTALSCORE: 6.67
INTERFERED_ENJOYMENT_LIFE: 8
AVG_PAIN_PASTWEEK: 4
AVG_PAIN_PASTWEEK: 4
INTERFERED_ENJOYMENT_LIFE: 8
PEG_TOTALSCORE: 6.67

## 2025-05-28 ASSESSMENT — PATIENT HEALTH QUESTIONNAIRE - PHQ9
SUM OF ALL RESPONSES TO PHQ QUESTIONS 1-9: 0
10. IF YOU CHECKED OFF ANY PROBLEMS, HOW DIFFICULT HAVE THESE PROBLEMS MADE IT FOR YOU TO DO YOUR WORK, TAKE CARE OF THINGS AT HOME, OR GET ALONG WITH OTHER PEOPLE: NOT DIFFICULT AT ALL
SUM OF ALL RESPONSES TO PHQ QUESTIONS 1-9: 0

## 2025-05-28 NOTE — PATIENT INSTRUCTIONS
Blood pressure is well controlled.     Medications refilled.       Take Torsemide 60 mg --- about every other day.           Diabetes follow-up on 7/2 with labs prior to Dr. Whitmore appointment.       Return in December, or sooner as needed for follow-up with Dr. Whitmore.  - Annual Follow-up / Physical - Medicare Annual Wellness Visit     Clinic : [phone]  Appointment line: [phone]

## 2025-05-28 NOTE — PROGRESS NOTES
Assessment & Plan     ICD-10-CM    1. Chronic heart failure with preserved ejection fraction (HFpEF) (H)  I50.32 torsemide (DEMADEX) 20 MG tablet      2. Chronic radicular low back pain  M54.16 gabapentin (NEURONTIN) 400 MG capsule    G89.29 pregabalin (LYRICA) 100 MG capsule      3. Complication of artery associated with surgical procedure - Left superior gluteal artery Injury -- subsequently thrombosed / embolized by Interventional Radiology  I97.89 pregabalin (LYRICA) 100 MG capsule      4. Neuralgia of left perineal nerve  R10.2 pregabalin (LYRICA) 100 MG capsule      5. Bilateral lower extremity edema  R60.0 torsemide (DEMADEX) 20 MG tablet      6. Hyperuricemia  E79.0 allopurinol (ZYLOPRIM) 300 MG tablet      7. Benign essential hypertension  I10          Patient presents for follow-up multiple issues.    States that he will gain about 5 pounds in 5 days and then takes a dose of his torsemide.  Typically takes 60 mg daily when he does take the torsemide.  Recommend trying to take torsemide 60 mg about every other day or every third day going forward on a scheduled basis.  Adjust based on clinical response and edema issues.    Has chronic low back pain with radiculopathy.  Has neuralgia of the left peroneal nerve.  Continues with commendation of gabapentin and Lyrica which have been helpful for his nerve pain.  Tolerating current medications without side effects.  Needs refills.    Hyperuricemia, seem to be doing well.  No recent gout attacks.  Continue allopurinol.  Needs refills.    HYPERTENSION - Ongoing. Blood pressure is currently well controlled.  Medication side effects: None. Denies syncope or presyncope.  Continue current medications.   Medication list reviewed/updated. Refills completed as needed.      OBESITY - Ongoing.  (See Encounter Diagnosis list above for obesity class / severity).  - Encourage continued maintenance / improvement in diet and exercise.   - Consider Nutrition / Dietician  "appointment.  - Weight loss would improve Hypertension.      Chronic Kidney Disease, Stage 3a (GFR 45-59), chronic, ongoing.  Kidney function had been slowly declining.  Encourage NSAID avoidance.    Recent Labs   Lab Test 03/27/25  1120 03/14/25  1403   CR 1.51* 2.29*   GFRESTIMATED 49* 30*        Chronic pain syndrome.  Chronic continuous opiate use.  Currently utilizing Lyrica and Gabapentinfor chronic pain management.  Seems to be doing well with current medication regimen.   website reviewed, No abnormal findings noted.  No benzodiazepine use  Proper medication use and misuse reviewed, patient has been using medications appropriately.  Urine drug screen is up-to-date.  Controlled substance agreement is up-to-date.  Prescriptions refilled as noted.  Patient has Radicular left leg / perineal nerve pain.     The longitudinal plan of care for the diagnosis(es)/condition(s) as documented were addressed during this visit. Due to the added complexity in care, I will continue to support Henry in the subsequent management and with ongoing continuity of care.               BMI  Estimated body mass index is 42.54 kg/m  as calculated from the following:    Height as of 3/27/25: 1.803 m (5' 11\").    Weight as of this encounter: 138.3 kg (305 lb).         Return in about 28 weeks (around 12/10/2025) for Annual Medicare Wellness Visit + Refills of Neurontin/Lyrica.      Matt Whitmore MD  [facility]    Review of Systems   Constitutional:  Positive for unexpected weight change. Negative for chills and fever.        + walking up to 150 feet with small based triangular cane.  The more he walks, the more back pain is present.   HENT:  Positive for hearing loss. Negative for congestion, ear pain and sore throat.    Eyes:  Negative for pain and visual disturbance (Left eye strabismus surgery - helped).   Respiratory:  Negative for cough and shortness of breath.    Cardiovascular:  Negative for chest pain, " palpitations and peripheral edema.   Gastrointestinal:  Negative for abdominal pain, constipation, diarrhea, heartburn, hematochezia and nausea.   Endocrine:        + hair loss   Genitourinary:  Positive for impotence. Negative for dysuria, frequency, genital sores, hematuria, penile discharge and urgency.   Musculoskeletal:  Positive for back pain (+ cramping across lower back, worse with increased distance walking), gait problem (limited to less than 200 feet, due to low back pain. Walks with cane.) and myalgias. Negative for arthralgias and joint swelling.        Left leg weakness - can't Abduct left leg up against gravity when laying in bed.   More ability to stand, can now extend and abduct left hip slightly when standing.    Skin:  Negative for rash.   Allergic/Immunologic: Positive for immunocompromised state.   Neurological:  Positive for weakness, numbness and paresthesias (Burning in the feet / legs). Negative for dizziness and headaches.   Hematological:  Does not bruise/bleed easily.   Psychiatric/Behavioral:  Negative for mood changes. The patient is not nervous/anxious.        Igor Figueroa is a 69 year old, presenting for the following health issues:  Recheck Medication        5/28/2025    10:54 AM   Additional Questions   Roomed by Kenia MARCIAL     History of Present Illness       Reason for visit:  Med follow up    He eats 0-1 servings of fruits and vegetables daily.He consumes 2 sweetened beverage(s) daily.He exercises with enough effort to increase his heart rate 9 or less minutes per day.  He exercises with enough effort to increase his heart rate 3 or less days per week.   He is taking medications regularly.          Pain History:  When did you first notice your pain? 2 years   Have you seen this provider for your pain in the past? Yes   Where in your body do you have pain? Low back  Are you seeing anyone else for your pain? No        11/3/2023     3:06 PM 7/31/2024     1:02 PM 5/28/2025     11:07 AM   PHQ-9 SCORE   PHQ-9 Total Score MyChart  3 (Minimal depression) 0   PHQ-9 Total Score 4 3 0        Proxy-reported           10/6/2023     3:59 PM 7/31/2024    12:56 PM 5/28/2025    11:08 AM   AJITH-7 SCORE   Total Score 0 (minimal anxiety) 0 (minimal anxiety) 0 (minimal anxiety)   Total Score 0 0 0        Proxy-reported               11/3/2023     3:06 PM 7/31/2024     1:02 PM 5/28/2025    11:07 AM   PHQ-9 SCORE   PHQ-9 Total Score MyChart  3 (Minimal depression) 0   PHQ-9 Total Score 4 3 0        Proxy-reported           10/6/2023     3:59 PM 7/31/2024    12:56 PM 5/28/2025    11:08 AM   AJITH-7 SCORE   Total Score 0 (minimal anxiety) 0 (minimal anxiety) 0 (minimal anxiety)   Total Score 0 0 0        Proxy-reported           3/17/2022    10:40 AM   PEG Score   PEG Total Score 1       Chronic Pain Follow Up:    Location of pain: Low back   Analgesia/pain control:    - Recent changes:  no    - Overall control: Tolerable with discomfort    - Current treatments: Medications back injections   Adherence:     - Do you ever take more pain medicine than prescribed? No    - When did you take your last dose of pain medicine?  AM    Adverse effects: No   PDMP Review         Value Time User    State PDMP site checked  Yes 10/18/2024  2:53 PM Amber Sierra APRN CNP          Last CSA Agreement:   CSA -- Patient Level:     [Media Unavailable] Controlled Substance Agreement - Opioid - Scan on 3/17/2022 11:08 AM       Last UDS: 3/22/2022                Objective    /88 (BP Location: Right arm, Patient Position: Sitting, Cuff Size: Adult Large)   Pulse 81   Temp 97.8  F (36.6  C) (Temporal)   Resp 16   Wt (!) 138.3 kg (305 lb)   SpO2 95%   BMI 42.54 kg/m    Body mass index is 42.54 kg/m .  Physical Exam  Constitutional:       General: He is not in acute distress.     Appearance: Normal appearance. He is well-developed. He is obese. He is not diaphoretic.   Eyes:      General: No scleral icterus.      Conjunctiva/sclera: Conjunctivae normal.   Neck:      Vascular: No carotid bruit.   Cardiovascular:      Rate and Rhythm: Normal rate and regular rhythm.      Pulses: Normal pulses.   Pulmonary:      Effort: Pulmonary effort is normal.      Breath sounds: Normal breath sounds.   Abdominal:      Palpations: Abdomen is soft.      Tenderness: There is no abdominal tenderness.   Musculoskeletal:         General: Swelling and tenderness (low back, soft tissues / SI joint pains) present. No deformity.      Cervical back: Neck supple.      Right lower le+ Pitting Edema present.      Left lower le+ Pitting Edema present.      Comments: + Walking with small base stand-up triangular cane.   Skin:     General: Skin is warm and dry.      Findings: No rash.   Neurological:      Mental Status: He is alert. Mental status is at baseline.      Comments: Left leg weakness   Psychiatric:         Mood and Affect: Mood normal.         Behavior: Behavior normal.                    Signed Electronically by: Matt Whitmore MD

## 2025-05-28 NOTE — NURSING NOTE
"Chief Complaint   Patient presents with    Recheck Medication     Patient presents to the clinic today medication recheck  Initial There were no vitals taken for this visit. Estimated body mass index is 43.35 kg/m  as calculated from the following:    Height as of 3/27/25: 1.803 m (5' 11\").    Weight as of 3/27/25: 141 kg (310 lb 12.8 oz).  Meds Reconciled: complete      Kenia Tovar LPN,LPN on 5/28/2025 at 10:54 AM  Ext. [phone]        Kenia Tovar LPN  "

## 2025-06-04 ENCOUNTER — DOCUMENTATION ONLY (OUTPATIENT)
Dept: UROLOGY | Facility: OTHER | Age: 70
End: 2025-06-04
Payer: MEDICARE

## 2025-06-04 DIAGNOSIS — R97.20 ELEVATED PROSTATE SPECIFIC ANTIGEN (PSA): ICD-10-CM

## 2025-06-04 DIAGNOSIS — Z85.51 PERSONAL HISTORY OF BLADDER CANCER: Primary | ICD-10-CM

## 2025-06-04 NOTE — PROGRESS NOTES
Selvin An has an upcoming lab appointment and there are no orders available. Please review and place future orders, as appropriate.    Thanks  Lab

## 2025-06-12 ENCOUNTER — RESULTS FOLLOW-UP (OUTPATIENT)
Dept: UROLOGY | Facility: OTHER | Age: 70
End: 2025-06-12

## 2025-06-12 ENCOUNTER — LAB (OUTPATIENT)
Dept: LAB | Facility: OTHER | Age: 70
End: 2025-06-12
Attending: UROLOGY
Payer: MEDICARE

## 2025-06-12 DIAGNOSIS — C61 PROSTATE CANCER (H): ICD-10-CM

## 2025-06-12 DIAGNOSIS — Z85.51 PERSONAL HISTORY OF BLADDER CANCER: ICD-10-CM

## 2025-06-12 DIAGNOSIS — R97.20 ELEVATED PROSTATE SPECIFIC ANTIGEN (PSA): ICD-10-CM

## 2025-06-12 LAB — PSA SERPL DL<=0.01 NG/ML-MCNC: 3.03 NG/ML (ref 0–6.5)

## 2025-06-12 PROCEDURE — 84153 ASSAY OF PSA TOTAL: CPT | Mod: ZL

## 2025-06-12 PROCEDURE — 36415 COLL VENOUS BLD VENIPUNCTURE: CPT | Mod: ZL

## 2025-06-16 ENCOUNTER — VIRTUAL VISIT (OUTPATIENT)
Dept: UROLOGY | Facility: OTHER | Age: 70
End: 2025-06-16
Attending: UROLOGY
Payer: MEDICARE

## 2025-06-16 ENCOUNTER — APPOINTMENT (OUTPATIENT)
Dept: LAB | Facility: OTHER | Age: 70
End: 2025-06-16
Attending: UROLOGY
Payer: MEDICARE

## 2025-06-16 VITALS
OXYGEN SATURATION: 94 % | HEART RATE: 82 BPM | RESPIRATION RATE: 16 BRPM | SYSTOLIC BLOOD PRESSURE: 136 MMHG | WEIGHT: 305 LBS | DIASTOLIC BLOOD PRESSURE: 88 MMHG | TEMPERATURE: 97.8 F | BODY MASS INDEX: 42.54 KG/M2

## 2025-06-16 DIAGNOSIS — Z85.51 PERSONAL HISTORY OF MALIGNANT NEOPLASM OF BLADDER: ICD-10-CM

## 2025-06-16 DIAGNOSIS — C61 PROSTATE CANCER (H): Primary | ICD-10-CM

## 2025-06-16 DIAGNOSIS — Z80.42 FAMILY HISTORY OF PROSTATE CANCER: ICD-10-CM

## 2025-06-16 LAB
ALBUMIN UR-MCNC: NEGATIVE MG/DL
APPEARANCE UR: CLEAR
BILIRUB UR QL STRIP: NEGATIVE
COLOR UR AUTO: ABNORMAL
GLUCOSE UR STRIP-MCNC: 1000 MG/DL
HGB UR QL STRIP: NEGATIVE
KETONES UR STRIP-MCNC: NEGATIVE MG/DL
LEUKOCYTE ESTERASE UR QL STRIP: NEGATIVE
NITRATE UR QL: NEGATIVE
PH UR STRIP: 5.5 [PH] (ref 5–9)
SP GR UR STRIP: 1.02 (ref 1–1.03)
UROBILINOGEN UR STRIP-MCNC: NORMAL MG/DL

## 2025-06-16 PROCEDURE — 81003 URINALYSIS AUTO W/O SCOPE: CPT | Mod: ZL

## 2025-06-16 PROCEDURE — G0463 HOSPITAL OUTPT CLINIC VISIT: HCPCS

## 2025-06-16 ASSESSMENT — PAIN SCALES - GENERAL: PAINLEVEL_OUTOF10: MODERATE PAIN (4)

## 2025-06-16 NOTE — PROGRESS NOTES
Type of service:  Video Visit   Video Visit Start Time: 13:00  Video Visit End Time: 13:30    Originating Location (pt. Location): [facility]  Distant Location (provider location): [place], [place]  Platform used for Video Visit: Epic Virtual Visit Platform    I have reviewed the note as documented above.  This accurately captures the substance of my virtual visit with the patient. The patient states an understanding and is agreeable with the plan.   Spike Milton MD   Urology     Chief Complaint: Follow Up (3 month follow up prostate cancer)  .    HPI: Mr. Selvin An is a 70 year old year old male presenting today June 16, 2025 virtually in follow up for evaluation of high risk, stage TA bladder cancer as well as intermediate risk prostate cancer on active surveillance discovered after being found to have a nodular feeling prostate and an elevated PSA velocity..    History of high-grade TA urothelial carcinoma status post TURBT and status post BCG induction.  This was initially diagnosed with Dr. Schwartz and has been followed without clinical recurrence.  Last cystoscopy was March of this year, 2025.     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  This was viewed as a relative contraindication to prostate surgery by Dr. Garza at the [facility].     History of elevated PSA prompting a biopsy October 2024 demonstrating  [place] 3+4 prostate cancer with 40% pattern 4 in 1 core.  Prolaris was favorable with 2% mortality over 10 years with conservative therapy     It was recommended that he consider active surveillance for his prostate cancer.  Patient was agreeable to that after meeting with Dr. Garza given his complicated SI joint repair and lower risk features of his disease.    Here today for follow-up.  Recent PSA 3.03 ng/mL 4 days ago.    Denies any complaints but is on diuretics now  which is causing increased frequency.  He is now borderline diabetic and spilling sugar into his urine.    His pain is also worsened with regards to his SI joint.    Current Outpatient Medications   Medication Sig Dispense Refill    allopurinol (ZYLOPRIM) 300 MG tablet Take 1 tablet (300 mg) by mouth daily. - for gout prevention 90 tablet 4    B Complex-C (VITAMIN B COMPLEX W/VITAMIN C) TABS tablet Take 1 tablet by mouth daily      carvedilol (COREG) 6.25 MG tablet Take 1.5 tablets (9.375 mg) by mouth 2 times daily (with meals). 360 tablet 4    Cyanocobalamin 2500 MCG TABS Take 2,500 mcg by mouth daily -- or every other day -- OTC okay 90 tablet 4    eszopiclone (LUNESTA) 1 MG tablet Take 1 tablet (1 mg) by mouth at bedtime. 30 tablet 5    famotidine (PEPCID) 20 MG tablet Take 1 tablet (20 mg) by mouth 2 times daily. 180 tablet 4    fenofibrate (TRICOR) 145 MG tablet Take 1 tablet (145 mg) by mouth daily. 90 tablet 2    gabapentin (NEURONTIN) 400 MG capsule Take 3 capsules (1,200 mg) by mouth 3 times daily. 820 capsule 1    levothyroxine (SYNTHROID/LEVOTHROID) 200 MCG tablet Take 1 tablet (200 mcg) by mouth daily. 90 tablet 4    magnesium 250 MG tablet Take 1 tablet by mouth daily      Multiple Vitamins-Minerals (CENTRUM ADULTS PO) Take 1 tablet by mouth daily      order for DME CPAP machine for home use at pressure: 10, with supplies:  humidifier, chamber, mask, tubing      pregabalin (LYRICA) 100 MG capsule Take 1 capsule (100 mg) by mouth 3 times daily. 270 capsule 1    Probiotic Product (PROBIOTIC-10 PO) Take 1 tablet by mouth daily      sildenafil (VIAGRA) 100 MG tablet Take 1 tablet (100 mg) by mouth daily as needed. 30 minutes to 4 hours prior to sexual activity 90 tablet 1    torsemide (DEMADEX) 20 MG tablet Take 3 tablets (60 mg) by mouth every other day. 135 tablet 4       ALLERGIES: Losartan, Omeprazole, Pantoprazole, and Valsartan     GENERAL PHYSICAL EXAM:   Vitals: There were no vitals taken for this  visit.  There is no height or weight on file to calculate BMI.    GENERAL: Well groomed, well developed, well nourished male in NAD.  ENT:  ENT exam normal  RESPIRATORY: Normal respiratory effort.   MS: Visibly moving all four   NEURO: Alert and oriented x 3.  PSYCH: Normal mood and affect, pleasant and agreeable during interview and exam.    LABS: The last test results for Mr. Selvin An were reviewed:  Results for orders placed or performed in visit on 06/12/25 (from the past 24 hours)   Urinalysis Macroscopic   Result Value Ref Range    Color Urine Light Yellow Colorless, Straw, Light Yellow, Yellow    Appearance Urine Clear Clear    Glucose Urine 1000 (A) Negative mg/dL    Bilirubin Urine Negative Negative    Ketones Urine Negative Negative mg/dL    Specific Gravity Urine 1.019 1.000 - 1.030    Blood Urine Negative Negative    pH Urine 5.5 5.0 - 9.0    Protein Albumin Urine Negative Negative mg/dL    Urobilinogen Urine Normal Normal mg/dL    Nitrite Urine Negative Negative    Leukocyte Esterase Urine Negative Negative       PSA -   Lab Results   Component Value Date    PSA 3.03 06/12/2025    PSA 2.83 02/26/2025    PSA 4.49 12/10/2024    PSA 4.01 09/26/2024    PSA 2.67 11/16/2023     BMP -   Recent Labs   Lab Test 03/27/25  1120 03/14/25  1403 12/10/24  1000 09/14/17  0916 09/10/17  2050    138 139   < > 138   POTASSIUM 4.8 4.8 4.4   < > 4.2   CHLORIDE 99 98 103   < > 101   CO2 22 23 26   < > 26   BUN 28.6* 39.6* 31.1*   < > 29*   CR 1.51* 2.29* 1.65*   < > 1.56*   * 139* 150*   < > 105   THAI 10.6* 11.1* 10.6*   < > 10.9*   MAG  --   --   --   --  1.9   PHOS  --  3.1  --   --   --     < > = values in this interval not displayed.       CBC -   Recent Labs   Lab Test 03/14/25  1403 12/10/24  1000 05/12/24  1625   WBC 11.4* 10.7 12.6*   HGB 17.1 17.7 16.2    387 440     Pathology prostate: 10/28/2024:  Right medial mid: 3+4=7, 20% tissue involvement  Granulomatous prostatitis multiple  areas    ASSESSMENT:   Prostate cancer, intermediate risk, group grade 2 on active surveillance  Family History of prostate cancer  History of HG TCCA Bladder without recurrence    PLAN:   Overall doing well from a prostate cancer perspective with regards to active surveillance..  PSA is improved and very stable.    Active surveillance would require follow-up at least every 6 months with PSA and SAEID.  I plan to see him again in 5 months with an in person examination of his prostate and PSA.    We are anticipating cystoscopy yearly next being in March 2026.    I agree with nephrology referral and they are going to meet with her PMD regarding his blood sugars.    Patient voiced an understanding.  All questions were addressed    39 minutes spent on the date of this encounter doing chart review, history and exam, documentation and further activities as noted above.        Spike Milton MD  [facility]

## 2025-06-16 NOTE — NURSING NOTE
"Chief Complaint   Patient presents with    Follow Up     3 month follow up prostate cancer   Patient presents to the clinic today for a 3 month follow up for prostate cancer  AUA  9   Post-Void Residual  A post-void residual was measured by ultrasonic bladder scanner.  91 mL  Kenia Tovar LPN  6/16/2025 12:59 PM      Initial There were no vitals taken for this visit. Estimated body mass index is 42.54 kg/m  as calculated from the following:    Height as of 3/27/25: 1.803 m (5' 11\").    Weight as of 5/28/25: 138.3 kg (305 lb).  Meds Reconciled: complete      Kenia Tovar LPN, LPN on 6/16/2025 at 12:49 PM  Ext. [phone]        Kenia Tovar LPN  "

## 2025-06-21 ENCOUNTER — HEALTH MAINTENANCE LETTER (OUTPATIENT)
Age: 70
End: 2025-06-21

## 2025-06-25 ENCOUNTER — TELEPHONE (OUTPATIENT)
Dept: INTERNAL MEDICINE | Facility: OTHER | Age: 70
End: 2025-06-25
Payer: MEDICARE

## 2025-06-25 DIAGNOSIS — M54.50 INTERMITTENT LOW BACK PAIN: ICD-10-CM

## 2025-06-25 DIAGNOSIS — M79.18 MYOFASCIAL MUSCLE PAIN: Primary | ICD-10-CM

## 2025-06-25 DIAGNOSIS — M54.9 TRIGGER POINT WITH BACK PAIN: ICD-10-CM

## 2025-06-25 NOTE — TELEPHONE ENCOUNTER
Patient's wife called to set up his next XR Inj Piriformis/Trigger Pt >3 Muscles- last one was done 2025. Per [facility] nursing, they are requesting provider place a new order for next injection in July, as we are not scheduling from standing orders going forward and the current one is set to  in July. Patient's wife is aware Radiology will follow up pending provider response.     Connie Carlton on 2025 at 11:12 AM

## 2025-07-02 ENCOUNTER — LAB (OUTPATIENT)
Dept: LAB | Facility: OTHER | Age: 70
End: 2025-07-02
Attending: INTERNAL MEDICINE
Payer: MEDICARE

## 2025-07-02 ENCOUNTER — OFFICE VISIT (OUTPATIENT)
Dept: INTERNAL MEDICINE | Facility: OTHER | Age: 70
End: 2025-07-02
Attending: INTERNAL MEDICINE
Payer: MEDICARE

## 2025-07-02 VITALS
TEMPERATURE: 98.8 F | HEART RATE: 86 BPM | BODY MASS INDEX: 42.9 KG/M2 | OXYGEN SATURATION: 93 % | SYSTOLIC BLOOD PRESSURE: 128 MMHG | DIASTOLIC BLOOD PRESSURE: 86 MMHG | WEIGHT: 307.6 LBS | RESPIRATION RATE: 20 BRPM

## 2025-07-02 DIAGNOSIS — Z85.850 HISTORY OF THYROID CANCER: ICD-10-CM

## 2025-07-02 DIAGNOSIS — E78.2 MIXED HYPERLIPIDEMIA: ICD-10-CM

## 2025-07-02 DIAGNOSIS — M54.16 CHRONIC RADICULAR LOW BACK PAIN: ICD-10-CM

## 2025-07-02 DIAGNOSIS — E11.42 TYPE 2 DIABETES MELLITUS WITH DIABETIC POLYNEUROPATHY, WITHOUT LONG-TERM CURRENT USE OF INSULIN (H): Primary | ICD-10-CM

## 2025-07-02 DIAGNOSIS — E66.813 CLASS 3 SEVERE OBESITY DUE TO EXCESS CALORIES WITH SERIOUS COMORBIDITY AND BODY MASS INDEX (BMI) OF 40.0 TO 44.9 IN ADULT (H): ICD-10-CM

## 2025-07-02 DIAGNOSIS — G89.29 CHRONIC RADICULAR LOW BACK PAIN: ICD-10-CM

## 2025-07-02 DIAGNOSIS — M54.9 TRIGGER POINT WITH BACK PAIN: ICD-10-CM

## 2025-07-02 DIAGNOSIS — N18.32 CHRONIC KIDNEY DISEASE, STAGE 3B (H): ICD-10-CM

## 2025-07-02 DIAGNOSIS — M79.18 MYOFASCIAL MUSCLE PAIN: ICD-10-CM

## 2025-07-02 DIAGNOSIS — R60.0 BILATERAL LOWER EXTREMITY EDEMA: ICD-10-CM

## 2025-07-02 DIAGNOSIS — E78.1 HIGH BLOOD TRIGLYCERIDES: ICD-10-CM

## 2025-07-02 DIAGNOSIS — E11.42 TYPE 2 DIABETES MELLITUS WITH DIABETIC POLYNEUROPATHY, WITHOUT LONG-TERM CURRENT USE OF INSULIN (H): ICD-10-CM

## 2025-07-02 LAB
ALBUMIN SERPL BCG-MCNC: 4.4 G/DL (ref 3.5–5.2)
ALP SERPL-CCNC: 86 U/L (ref 40–150)
ALT SERPL W P-5'-P-CCNC: 62 U/L (ref 0–70)
ANION GAP SERPL CALCULATED.3IONS-SCNC: 17 MMOL/L (ref 7–15)
AST SERPL W P-5'-P-CCNC: 44 U/L (ref 0–45)
BILIRUB SERPL-MCNC: 0.6 MG/DL
BUN SERPL-MCNC: 36.7 MG/DL (ref 8–23)
CALCIUM SERPL-MCNC: 11.1 MG/DL (ref 8.8–10.4)
CHLORIDE SERPL-SCNC: 96 MMOL/L (ref 98–107)
CHOLEST SERPL-MCNC: 320 MG/DL
CREAT SERPL-MCNC: 1.81 MG/DL (ref 0.67–1.17)
EGFRCR SERPLBLD CKD-EPI 2021: 39 ML/MIN/1.73M2
ERYTHROCYTE [DISTWIDTH] IN BLOOD BY AUTOMATED COUNT: 14.9 % (ref 10–15)
EST. AVERAGE GLUCOSE BLD GHB EST-MCNC: 174 MG/DL
FASTING STATUS PATIENT QL REPORTED: YES
FASTING STATUS PATIENT QL REPORTED: YES
GLUCOSE SERPL-MCNC: 150 MG/DL (ref 70–99)
HBA1C MFR BLD: 7.7 %
HCO3 SERPL-SCNC: 21 MMOL/L (ref 22–29)
HCT VFR BLD AUTO: 49.9 % (ref 40–53)
HDLC SERPL-MCNC: 29 MG/DL
HGB BLD-MCNC: 17.1 G/DL (ref 13.3–17.7)
LDLC SERPL CALC-MCNC: ABNORMAL MG/DL
MCH RBC QN AUTO: 33.1 PG (ref 26.5–33)
MCHC RBC AUTO-ENTMCNC: 34.3 G/DL (ref 31.5–36.5)
MCV RBC AUTO: 97 FL (ref 78–100)
NONHDLC SERPL-MCNC: 291 MG/DL
PLATELET # BLD AUTO: 396 10E3/UL (ref 150–450)
POTASSIUM SERPL-SCNC: 4.9 MMOL/L (ref 3.4–5.3)
PROT SERPL-MCNC: 8.5 G/DL (ref 6.4–8.3)
RBC # BLD AUTO: 5.17 10E6/UL (ref 4.4–5.9)
SODIUM SERPL-SCNC: 134 MMOL/L (ref 135–145)
TRIGL SERPL-MCNC: 960 MG/DL
TSH SERPL DL<=0.005 MIU/L-ACNC: 0.96 UIU/ML (ref 0.3–4.2)
WBC # BLD AUTO: 12 10E3/UL (ref 4–11)

## 2025-07-02 PROCEDURE — 82040 ASSAY OF SERUM ALBUMIN: CPT | Mod: ZL

## 2025-07-02 PROCEDURE — 36415 COLL VENOUS BLD VENIPUNCTURE: CPT | Mod: ZL

## 2025-07-02 PROCEDURE — G0463 HOSPITAL OUTPT CLINIC VISIT: HCPCS

## 2025-07-02 PROCEDURE — 83036 HEMOGLOBIN GLYCOSYLATED A1C: CPT | Mod: ZL

## 2025-07-02 PROCEDURE — 84155 ASSAY OF PROTEIN SERUM: CPT | Mod: ZL

## 2025-07-02 PROCEDURE — 84443 ASSAY THYROID STIM HORMONE: CPT | Mod: ZL

## 2025-07-02 PROCEDURE — 80061 LIPID PANEL: CPT | Mod: ZL

## 2025-07-02 PROCEDURE — 85014 HEMATOCRIT: CPT | Mod: ZL

## 2025-07-02 RX ORDER — METFORMIN HYDROCHLORIDE 500 MG/1
500 TABLET, EXTENDED RELEASE ORAL 2 TIMES DAILY WITH MEALS
Qty: 180 TABLET | Refills: 4 | Status: SHIPPED | OUTPATIENT
Start: 2025-07-02

## 2025-07-02 RX ORDER — LANCETS
EACH MISCELLANEOUS
Qty: 100 EACH | Refills: 11 | Status: SHIPPED | OUTPATIENT
Start: 2025-07-02

## 2025-07-02 RX ORDER — DIMENHYDRINATE 50 MG
4-6 TABLET ORAL DAILY
Qty: 400 CAPSULE | Refills: 4 | Status: SHIPPED | OUTPATIENT
Start: 2025-07-02

## 2025-07-02 RX ORDER — METFORMIN HYDROCHLORIDE 500 MG/1
500-1000 TABLET, EXTENDED RELEASE ORAL 2 TIMES DAILY WITH MEALS
Qty: 360 TABLET | Refills: 4 | Status: SHIPPED | OUTPATIENT
Start: 2025-07-02 | End: 2025-07-02

## 2025-07-02 ASSESSMENT — ENCOUNTER SYMPTOMS
BACK PAIN: 1
PARESTHESIAS: 1
NUMBNESS: 1
CONSTIPATION: 0
HEADACHES: 0
HEARTBURN: 0
JOINT SWELLING: 0
COUGH: 0
DIARRHEA: 0
PALPITATIONS: 0
SHORTNESS OF BREATH: 0
NAUSEA: 0
HEMATURIA: 0
MYALGIAS: 1
NERVOUS/ANXIOUS: 0
ARTHRALGIAS: 0
ENDOCRINE COMMENTS: + HAIR LOSS
EYE PAIN: 0
FREQUENCY: 0
WEAKNESS: 1
DIZZINESS: 0
ABDOMINAL PAIN: 0
DYSURIA: 0
UNEXPECTED WEIGHT CHANGE: 1
SORE THROAT: 0
CHILLS: 0
HEMATOCHEZIA: 0
FEVER: 0
BRUISES/BLEEDS EASILY: 0

## 2025-07-02 ASSESSMENT — ASTHMA QUESTIONNAIRES
QUESTION_5 LAST FOUR WEEKS HOW WOULD YOU RATE YOUR ASTHMA CONTROL: COMPLETELY CONTROLLED
ACT_TOTALSCORE: 25
QUESTION_4 LAST FOUR WEEKS HOW OFTEN HAVE YOU USED YOUR RESCUE INHALER OR NEBULIZER MEDICATION (SUCH AS ALBUTEROL): NOT AT ALL
QUESTION_2 LAST FOUR WEEKS HOW OFTEN HAVE YOU HAD SHORTNESS OF BREATH: NOT AT ALL
QUESTION_3 LAST FOUR WEEKS HOW OFTEN DID YOUR ASTHMA SYMPTOMS (WHEEZING, COUGHING, SHORTNESS OF BREATH, CHEST TIGHTNESS OR PAIN) WAKE YOU UP AT NIGHT OR EARLIER THAN USUAL IN THE MORNING: NOT AT ALL
QUESTION_1 LAST FOUR WEEKS HOW MUCH OF THE TIME DID YOUR ASTHMA KEEP YOU FROM GETTING AS MUCH DONE AT WORK, SCHOOL OR AT HOME: NONE OF THE TIME

## 2025-07-02 ASSESSMENT — PAIN SCALES - GENERAL: PAINLEVEL_OUTOF10: MILD PAIN (3)

## 2025-07-02 NOTE — PROGRESS NOTES
Assessment & Plan     ICD-10-CM    1. Type 2 diabetes mellitus with diabetic polyneuropathy, without long-term current use of insulin (H)  E11.42 empagliflozin (JARDIANCE) 25 MG TABS tablet     blood glucose monitoring (NO BRAND SPECIFIED) meter device kit     blood glucose (NO BRAND SPECIFIED) test strip     thin (NO BRAND SPECIFIED) lancets     metFORMIN (GLUCOPHAGE XR) 500 MG 24 hr tablet     DISCONTINUED: metFORMIN (GLUCOPHAGE XR) 500 MG 24 hr tablet      2. Bilateral lower extremity edema  R60.0       3. Chronic kidney disease, stage 3b (H)  N18.32       4. Class 3 severe obesity due to excess calories with serious comorbidity and body mass index (BMI) of 40.0 to 44.9 in adult (H)  E66.813 empagliflozin (JARDIANCE) 25 MG TABS tablet    Z68.41 metFORMIN (GLUCOPHAGE XR) 500 MG 24 hr tablet     DISCONTINUED: metFORMIN (GLUCOPHAGE XR) 500 MG 24 hr tablet      5. History of thyroid cancer - Papillary Thyroid Cancer -- NO Ozempic or GLP-1 medications --  Z85.850       6. Mixed hyperlipidemia  E78.2       7. Chronic radicular low back pain - Previously managed by back pain clinic with injections and nerve burning procedures at [place] - (Nerve ablation was minimally helpful) Injections are Very helpful  M54.16 XR Inj Piriformis/Trigger Pt >3 Muscles    G89.29       8. Myofascial muscle pain  M79.18 XR Inj Piriformis/Trigger Pt >3 Muscles      9. Trigger point with back pain  M54.9 XR Inj Piriformis/Trigger Pt >3 Muscles      10. High blood triglycerides  E78.1 Flaxseed, Linseed, (FLAX SEED OIL) 1000 MG capsule         Patient presents for ***    ***    HYPERTENSION - Ongoing. Blood pressure is currently well controlled.  Medication side effects: None. Denies syncope or presyncope.  Continue current medications.   Medication list reviewed/updated. Refills completed as needed.      MIXED HYPERLIPIDEMIA.  Ongoing. LDL is at goal: No. Triglycerides are at goal: No.  Hopefully lifestyle modifications will improve  cholesterol levels, otherwise will consider additional medication dose adjustments or medication changes.  Medication side effects reported: None.   Continue current medications for now. Medication list reviewed/updated. Refills completed as needed.  Recent Labs   Lab Test 07/02/25  1230 03/14/25  1403 12/10/24  1000 11/11/22  0755 09/15/21  0824   CHOL 320* 325*   < > 163 214*   HDL 29* 28*   < > 41 43   LDL  --   --   --  105* 143*   TRIG 960* 840*   < > 87 140    < > = values in this interval not displayed.        OBESITY - Ongoing.  (See Encounter Diagnosis list above for obesity class / severity).  - Encourage continued maintenance / improvement in diet and exercise.   - Consider Nutrition / Dietician appointment.  - Weight loss would improve Hypertension, Cholesterol and Diabetes.      Chronic Kidney Disease, Stage 3b (GFR 30-44), chronic, ongoing.  Kidney function had been slowly declining.  Encourage NSAID avoidance.    Recent Labs   Lab Test 07/02/25  1230 03/27/25  1120   CR 1.81* 1.51*   GFRESTIMATED 39* 49*        Vaccine counseling completed.  Encourage routine / annual vaccinations.    Type 2 Diabetes Mellitus, with nephropathy, with neuropathy, Reports ongoing/previous: numbness and burning.  Blood sugar control has been fair with moderate hyperglycemia. Doing well with diet - xNO Insulin injections per day.  Medication list reviewed/updated. Refills completed as needed.    Complicating factors include but are not limited to: hypertension, hyperlipidemia, neuropathy, chronic kidney disease, CAD/PVD, CVA, and morbid obesity .     Recent Labs   Lab Test 07/02/25  1230 03/27/25  1120 03/14/25  1403 12/10/24  1000 12/02/22  1015 11/11/22  0755 12/22/21  1035 09/15/21  0824 08/04/21  1523   A1C 7.7*  --  7.6* 6.6*  --  6.0  --  5.5 5.6   LDL  --   --   --   --   --  105*  --  143*  --    HDL 29*  --  28* 34*  --  41  --  43 35   TRIG 960*  --  840* 447*  --  87  --  140 540*   ALT 62 41 67 50   < > 23   <  "> 18 23   CR 1.81* 1.51* 2.29* 1.65*   < > 1.27*   < > 1.34* 1.40*   GFRESTIMATED 39* 49* 30* 44*   < > 62   < > 54* 52*   POTASSIUM 4.9 4.8 4.8 4.4   < > 4.0   < > 3.9 3.9   TSH 0.96  --  0.31 0.75  --   --    < > 11.84* 3.61   T4  --   --   --   --   --   --   --  0.77  --    WBC 12.0*  --  11.4* 10.7   < >  --    < > 16.1* 13.3*   HGB 17.1  --  17.1 17.7   < >  --    < > 15.2 15.4     --  409 387   < >  --    < > 459* 465*   ALBUMIN 4.4 4.3 4.3 4.6   < > 4.2   < > 4.2 4.6    < > = values in this interval not displayed.     ***     The longitudinal plan of care for the diagnosis(es)/condition(s) as documented were addressed during this visit. Due to the added complexity in care, I will continue to support Henry in the subsequent management and with ongoing continuity of care.        ***    {[facility] 2021 Documentation (Optional):814692}  {2021 E&M time (Optional):982409}     BMI  Estimated body mass index is 42.9 kg/m  as calculated from the following:    Height as of 3/27/25: 1.803 m (5' 11\").    Weight as of this encounter: 139.5 kg (307 lb 9.6 oz).     {FOLLOW UP PLANS (Optional) Includes COVID19 Treatment Plan:569754}    Return in about 3 months (around 10/2/2025) for - Labs every 91+ days, with DM Follow-up, Same Day or 1-2 days later with Dr. Whitmore.      Matt Whitmore MD  [facility]    Review of Systems   Constitutional:  Positive for unexpected weight change. Negative for chills and fever.        + walking up to 150 feet with small based triangular cane.  The more he walks, the more back pain is present.   HENT:  Positive for hearing loss. Negative for congestion, ear pain and sore throat.    Eyes:  Negative for pain and visual disturbance (Left eye strabismus surgery - helped).   Respiratory:  Negative for cough and shortness of breath.    Cardiovascular:  Negative for chest pain, palpitations and peripheral edema.   Gastrointestinal:  Negative for abdominal pain, constipation, " diarrhea, heartburn, hematochezia and nausea.   Endocrine:        + hair loss   Genitourinary:  Positive for impotence. Negative for dysuria, frequency, genital sores, hematuria, penile discharge and urgency.   Musculoskeletal:  Positive for back pain (+ cramping across lower back, worse with increased distance walking), gait problem (limited to less than 200 feet, due to low back pain. Walks with cane.) and myalgias. Negative for arthralgias and joint swelling.        Left leg weakness - can't Abduct left leg up against gravity when laying in bed.   More ability to stand, can now extend and abduct left hip slightly when standing.    Skin:  Negative for rash.   Allergic/Immunologic: Positive for immunocompromised state.   Neurological:  Positive for weakness, numbness and paresthesias (Burning in the feet / legs). Negative for dizziness and headaches.   Hematological:  Does not bruise/bleed easily.   Psychiatric/Behavioral:  Negative for mood changes. The patient is not nervous/anxious.          Igor Figueroa is a 70 year old, presenting for the following health issues:  Follow Up (3 mo DM check)        7/2/2025    12:55 PM   Additional Questions   Roomed by Aissatou GRIDER LPN     History of Present Illness       Reason for visit:  Med follow up    He eats 0-1 servings of fruits and vegetables daily.He consumes 1 sweetened beverage(s) daily.He exercises with enough effort to increase his heart rate 9 or less minutes per day.  He exercises with enough effort to increase his heart rate 3 or less days per week.   He is taking medications regularly.        {MA/LPN/RN Pre-Provider Visit Orders- hCG/UA/Strep (Optional):307810}  {SUPERLIST (Optional):911725}  {additonal problems for provider to add (Optional):717661}    {ROS Picklists (Optional):787014}      Objective    /86   Pulse 86   Temp 98.8  F (37.1  C) (Tympanic)   Resp 20   Wt (!) 139.5 kg (307 lb 9.6 oz)   SpO2 93%   BMI 42.90 kg/m    Body mass index is  42.9 kg/m .  Physical Exam  Constitutional:       General: He is not in acute distress.     Appearance: Normal appearance. He is well-developed. He is obese. He is not diaphoretic.   Eyes:      General: No scleral icterus.     Conjunctiva/sclera: Conjunctivae normal.   Neck:      Vascular: No carotid bruit.   Cardiovascular:      Rate and Rhythm: Normal rate and regular rhythm.      Pulses: Normal pulses.   Pulmonary:      Effort: Pulmonary effort is normal.      Breath sounds: Normal breath sounds.   Abdominal:      Palpations: Abdomen is soft.      Tenderness: There is no abdominal tenderness.   Musculoskeletal:         General: Swelling and tenderness (low back, soft tissues / SI joint pains) present. No deformity.      Cervical back: Neck supple.      Right lower leg: No edema.      Left lower leg: No edema.      Comments: + Walking with small base stand-up triangular cane.   Skin:     General: Skin is warm and dry.      Findings: No rash.   Neurological:      Mental Status: He is alert. Mental status is at baseline.      Comments: Left leg weakness   Psychiatric:         Mood and Affect: Mood normal.         Behavior: Behavior normal.        {Exam List (Optional):082167}    {Diagnostic Test Results (Optional):358347}        Signed Electronically by: Matt Whitmore MD  {Email feedback regarding this note to [email or web address]   :925090}   warm and dry.      Findings: No rash.   Neurological:      Mental Status: He is alert. Mental status is at baseline.      Comments: Left leg weakness   Psychiatric:         Mood and Affect: Mood normal.         Behavior: Behavior normal.                    Signed Electronically by: Matt Whitmore MD

## 2025-07-02 NOTE — PATIENT INSTRUCTIONS
Blood pressure is well controlled.   Diabetes is controlled.     Medications refilled.   Labs are stable.       Start Jardiance and Metformin - for weight / diabetes.       Aspects of Diabetes:   Recent Labs   Lab Test 07/02/25  1230 03/27/25  1120 03/14/25  1403 12/10/24  1000 12/02/22  1015 11/11/22  0755 12/22/21  1035 09/15/21  0824 08/04/21  1523   A1C 7.7*  --  7.6* 6.6*  --  6.0  --  5.5 5.6   LDL  --   --   --   --   --  105*  --  143*  --    HDL 29*  --  28* 34*  --  41  --  43 35   TRIG 960*  --  840* 447*  --  87  --  140 540*   ALT 62 41 67 50   < > 23   < > 18 23   CR 1.81* 1.51* 2.29* 1.65*   < > 1.27*   < > 1.34* 1.40*   GFRESTIMATED 39* 49* 30* 44*   < > 62   < > 54* 52*   POTASSIUM 4.9 4.8 4.8 4.4   < > 4.0   < > 3.9 3.9   TSH 0.96  --  0.31 0.75  --   --    < > 11.84* 3.61   T4  --   --   --   --   --   --   --  0.77  --    WBC 12.0*  --  11.4* 10.7   < >  --    < > 16.1* 13.3*   HGB 17.1  --  17.1 17.7   < >  --    < > 15.2 15.4     --  409 387   < >  --    < > 459* 465*   ALBUMIN 4.4 4.3 4.3 4.6   < > 4.2   < > 4.2 4.6    < > = values in this interval not displayed.      Hemoglobin A1c  Goal range is under 8%. Best is 6.5 to 7   Blood Pressure 128/86 Goal to keep less than 140/90   Tobacco  reports that he has never smoked. He has never been exposed to tobacco smoke. He has never used smokeless tobacco. Goal to abstain from tobacco   Aspirin or Plavix Anti-platelet therapy Aspirin or Plavix reduces risk of heart disease and stroke  -- sometimes used with other blood thinners, depending on bleeding risk and risk factors.    ACE/ARB Specific blood pressure meds These medications can reduce risk of kidney disease   Cholesterol Statins (Lipitor, Crestor, vs others) Statins reduce risk of heart disease and stroke   Eye Exam -- Do Yearly -- Annual diabetic eye exam   Healthy weight Wt Readings from Last 4 Encounters:   07/02/25 (!) 139.5 kg (307 lb 9.6 oz)   06/16/25 (!) 138.3 kg (305 lb)    05/28/25 (!) 138.3 kg (305 lb)   03/27/25 (!) 141 kg (310 lb 12.8 oz)      Body mass index is 42.9 kg/m .  Goal BMI under 30, best is under 25.      -- Trying to exercise daily (goal at least 20 min/day) with moderate aerobic activity   -- Eat healthy (resources from ADA at http://www.diabetes.org/)   -- Taking good care of my feet. Consider seeing the Podiatrist   -- Check blood sugars as directed, record in log book and bring to every appointment    Insurance companies are grading you and I on your blood sugar control -- Goal is to get your A1c down to 7.9% or lower and NO Smoking!  -- Medicare and most insurance companies, will only cover Hemoglobin A1c labs to be rechecked every 91+ days.      Return for Diabetes labs and clinic follow-up appointment every 3 to 4 months.    Schedule lab only appointment --- A few days AFTER: 9/30/25   Schedule clinic appointment with Dr. Whitmore -- Same day as labs, or 1-2 days later.

## 2025-07-02 NOTE — NURSING NOTE
"Chief Complaint   Patient presents with    Follow Up     3 mo DM check       Initial /86   Pulse 86   Temp 98.8  F (37.1  C) (Tympanic)   Resp 20   Wt (!) 139.5 kg (307 lb 9.6 oz)   SpO2 93%   BMI 42.90 kg/m   Estimated body mass index is 42.9 kg/m  as calculated from the following:    Height as of 3/27/25: 1.803 m (5' 11\").    Weight as of this encounter: 139.5 kg (307 lb 9.6 oz).  Medication Review: complete    The next two questions are to help us understand your food security.  If you are feeling you need any assistance in this area, we have resources available to support you today.          12/26/2024   SDOH- Food Insecurity   Within the past 12 months, did you worry that your food would run out before you got money to buy more? N   Within the past 12 months, did the food you bought just not last and you didn t have money to get more? N        Data saved with a previous flowsheet row definition         Health Care Directive:  Patient does not have a Health Care Directive: Discussed advance care planning with patient; however, patient declined at this time.    Aissatou Badillo, ANIKET      "

## 2025-07-17 DIAGNOSIS — E83.52 HYPERCALCEMIA: Primary | ICD-10-CM

## 2025-07-17 RX ORDER — DIPHENHYDRAMINE HYDROCHLORIDE 50 MG/ML
25 INJECTION, SOLUTION INTRAMUSCULAR; INTRAVENOUS
Start: 2025-07-24

## 2025-07-17 RX ORDER — ALBUTEROL SULFATE 0.83 MG/ML
2.5 SOLUTION RESPIRATORY (INHALATION)
OUTPATIENT
Start: 2025-07-24

## 2025-07-17 RX ORDER — DIPHENHYDRAMINE HYDROCHLORIDE 50 MG/ML
50 INJECTION, SOLUTION INTRAMUSCULAR; INTRAVENOUS
Start: 2025-07-24

## 2025-07-17 RX ORDER — METHYLPREDNISOLONE SODIUM SUCCINATE 40 MG/ML
40 INJECTION INTRAMUSCULAR; INTRAVENOUS
Start: 2025-07-24

## 2025-07-17 RX ORDER — EPINEPHRINE 1 MG/ML
0.3 INJECTION, SOLUTION, CONCENTRATE INTRAVENOUS EVERY 5 MIN PRN
OUTPATIENT
Start: 2025-07-24

## 2025-07-17 RX ORDER — ALBUTEROL SULFATE 90 UG/1
1-2 INHALANT RESPIRATORY (INHALATION)
Start: 2025-07-24

## 2025-07-21 ENCOUNTER — HOSPITAL ENCOUNTER (OUTPATIENT)
Dept: GENERAL RADIOLOGY | Facility: OTHER | Age: 70
Discharge: HOME | End: 2025-07-21
Payer: MEDICARE

## 2025-07-21 DIAGNOSIS — M54.9 TRIGGER POINT WITH BACK PAIN: ICD-10-CM

## 2025-07-21 DIAGNOSIS — M79.18 MYOFASCIAL MUSCLE PAIN: ICD-10-CM

## 2025-07-21 DIAGNOSIS — M54.50 INTERMITTENT LOW BACK PAIN: ICD-10-CM

## 2025-07-21 PROCEDURE — 250N000011 HC RX IP 250 OP 636: Performed by: RADIOLOGY

## 2025-07-21 PROCEDURE — 77002 NEEDLE LOCALIZATION BY XRAY: CPT

## 2025-07-21 RX ORDER — BUPIVACAINE HYDROCHLORIDE 5 MG/ML
3 INJECTION, SOLUTION EPIDURAL; INTRACAUDAL; PERINEURAL ONCE
Status: COMPLETED | OUTPATIENT
Start: 2025-07-21 | End: 2025-07-21

## 2025-07-21 RX ORDER — TRIAMCINOLONE ACETONIDE 40 MG/ML
40 INJECTION, SUSPENSION INTRA-ARTICULAR; INTRAMUSCULAR ONCE
Status: COMPLETED | OUTPATIENT
Start: 2025-07-21 | End: 2025-07-21

## 2025-07-21 RX ADMIN — BUPIVACAINE HYDROCHLORIDE 15 MG: 5 INJECTION, SOLUTION EPIDURAL; INTRACAUDAL; PERINEURAL at 09:55

## 2025-07-21 RX ADMIN — TRIAMCINOLONE ACETONIDE 40 MG: 40 INJECTION, SUSPENSION INTRA-ARTICULAR; INTRAMUSCULAR at 09:55

## 2025-07-31 PROBLEM — D12.6 ADENOMATOUS POLYP OF COLON: Status: ACTIVE | Noted: 2025-07-31

## 2025-08-04 ENCOUNTER — TELEPHONE (OUTPATIENT)
Dept: GENERAL RADIOLOGY | Facility: OTHER | Age: 70
End: 2025-08-04
Payer: MEDICARE

## 2025-08-14 ENCOUNTER — MEDICAL CORRESPONDENCE (OUTPATIENT)
Dept: HEALTH INFORMATION MANAGEMENT | Facility: OTHER | Age: 70
End: 2025-08-14
Payer: MEDICARE

## 2025-08-22 ENCOUNTER — APPOINTMENT (OUTPATIENT)
Dept: LAB | Facility: OTHER | Age: 70
End: 2025-08-22
Payer: MEDICARE

## (undated) DEVICE — RX SURGIFLO HEMOSTATIC MATRIX W/THROMBIN 8ML 2994

## (undated) DEVICE — CATH TRAY FOLEY SURESTEP 16FR W/URINE MTR STATLK LF A303416A

## (undated) DEVICE — SYR 10ML LL W/O NDL 302995

## (undated) DEVICE — NDL ASPIRATION BX  22G 8" CHIBA 408269

## (undated) DEVICE — Device

## (undated) DEVICE — INTRO GLIDESHEATH SLENDER 6FR 10X45CM 60-1060

## (undated) DEVICE — MANIFOLD KIT ANGIO AUTOMATED 014613

## (undated) DEVICE — GUIDE NDL BIOPSY BIPLANE TRANSDUCER 1.6MM UA1322-S

## (undated) DEVICE — PAD ABD 5X9" STERILE 9190A

## (undated) DEVICE — SUTURE 5-0 MAXON SMM5042

## (undated) DEVICE — SU VICRYL 0 CT-1 CR 8X27" JJ31G

## (undated) DEVICE — SLEEVE TR BAND RADIAL COMPRESSION DEVICE 29CM XX-RF06L

## (undated) DEVICE — DRSG STERI STRIP 1/2X4" R1547

## (undated) DEVICE — RADIAL JAW JUMBO

## (undated) DEVICE — ENDO BRUSH CHANNEL MASTER CLEANING 2-4.2MM BW-412T

## (undated) DEVICE — TUBING SUCTION 10'X3/16" N510

## (undated) DEVICE — BNDG ABDOMINAL BINDER 9X62-84" 79-89210

## (undated) DEVICE — SU VICRYL 3-0 CT 36" J356H

## (undated) DEVICE — DRSG GAUZE 4X4" 3033

## (undated) DEVICE — COVER PROBE ENDOCAVITY ROLL 7.9X1.4" LATEX 610-214

## (undated) DEVICE — PAD CHUX UNDERPAD 30X36" P3036C

## (undated) DEVICE — PACK HEART LEFT CUSTOM

## (undated) DEVICE — DRAIN JACKSON PRATT RESERVOIR 100ML SU130-1305

## (undated) DEVICE — SUCTION MANIFOLD NEPTUNE 2 SYS 4 PORT 0702-020-000

## (undated) DEVICE — TUBING PRESSURE 30"

## (undated) DEVICE — SURGICEL HEMOSTAT 3X4" NUKNIT 1943

## (undated) DEVICE — SPONGE LAP 18X18" X8435

## (undated) DEVICE — SU MONOCRYL 4-0 PS-2 27" UND Y426H

## (undated) DEVICE — SOL WATER 1500ML

## (undated) DEVICE — BNDG ABDOMINAL BINDER 12X26-50" 0814-0146

## (undated) DEVICE — PACK MAJOR LAPAROTOMY LF SBA15MLFCA

## (undated) DEVICE — ENDO KIT COMPLIANCE DYKENDOCMPLY

## (undated) DEVICE — KIT HAND CONTROL ACIST 016795

## (undated) DEVICE — DRSG DRAIN 4X4" 7086

## (undated) DEVICE — LUBRICATING JELLY 2.7GM T00137

## (undated) DEVICE — FASTENER CATH BALLOON CLAMPX2 STATLOCK 0684-00-493

## (undated) RX ORDER — HYDROMORPHONE HYDROCHLORIDE 2 MG/1
TABLET ORAL
Status: DISPENSED
Start: 2023-01-07

## (undated) RX ORDER — KETOROLAC TROMETHAMINE 15 MG/ML
INJECTION, SOLUTION INTRAMUSCULAR; INTRAVENOUS
Status: DISPENSED
Start: 2023-01-06

## (undated) RX ORDER — BUPIVACAINE HYDROCHLORIDE 5 MG/ML
INJECTION, SOLUTION EPIDURAL; INTRACAUDAL; PERINEURAL
Status: DISPENSED
Start: 2025-04-16

## (undated) RX ORDER — ACETAMINOPHEN 10 MG/ML
INJECTION, SOLUTION INTRAVENOUS
Status: DISPENSED
Start: 2023-01-06

## (undated) RX ORDER — FENTANYL CITRATE 50 UG/ML
INJECTION, SOLUTION INTRAMUSCULAR; INTRAVENOUS
Status: DISPENSED
Start: 2022-10-31

## (undated) RX ORDER — PROPOFOL 10 MG/ML
INJECTION, EMULSION INTRAVENOUS
Status: DISPENSED
Start: 2025-03-07

## (undated) RX ORDER — FENTANYL CITRATE-0.9 % NACL/PF 10 MCG/ML
PLASTIC BAG, INJECTION (ML) INTRAVENOUS
Status: DISPENSED
Start: 2023-01-07

## (undated) RX ORDER — CEFAZOLIN SODIUM/WATER 3 G/30 ML
SYRINGE (ML) INTRAVENOUS
Status: DISPENSED
Start: 2023-01-07

## (undated) RX ORDER — HYDROMORPHONE HYDROCHLORIDE 2 MG/ML
INJECTION, SOLUTION INTRAMUSCULAR; INTRAVENOUS; SUBCUTANEOUS
Status: DISPENSED
Start: 2023-01-06

## (undated) RX ORDER — PROPOFOL 10 MG/ML
INJECTION, EMULSION INTRAVENOUS
Status: DISPENSED
Start: 2024-10-28

## (undated) RX ORDER — PROPOFOL 10 MG/ML
INJECTION, EMULSION INTRAVENOUS
Status: DISPENSED
Start: 2023-01-07

## (undated) RX ORDER — ASPIRIN 325 MG
TABLET ORAL
Status: DISPENSED
Start: 2022-10-31

## (undated) RX ORDER — ONDANSETRON 2 MG/ML
INJECTION INTRAMUSCULAR; INTRAVENOUS
Status: DISPENSED
Start: 2023-01-06

## (undated) RX ORDER — DIPHENHYDRAMINE HYDROCHLORIDE 50 MG/ML
INJECTION INTRAMUSCULAR; INTRAVENOUS
Status: DISPENSED
Start: 2023-01-07

## (undated) RX ORDER — BUPIVACAINE HYDROCHLORIDE 2.5 MG/ML
INJECTION, SOLUTION EPIDURAL; INFILTRATION; INTRACAUDAL
Status: DISPENSED
Start: 2023-01-07

## (undated) RX ORDER — TRIAMCINOLONE ACETONIDE 40 MG/ML
INJECTION, SUSPENSION INTRA-ARTICULAR; INTRAMUSCULAR
Status: DISPENSED
Start: 2025-04-16

## (undated) RX ORDER — SODIUM CHLORIDE, SODIUM LACTATE, POTASSIUM CHLORIDE, CALCIUM CHLORIDE 600; 310; 30; 20 MG/100ML; MG/100ML; MG/100ML; MG/100ML
INJECTION, SOLUTION INTRAVENOUS
Status: DISPENSED
Start: 2023-01-07

## (undated) RX ORDER — LIDOCAINE HYDROCHLORIDE AND EPINEPHRINE 10; 10 MG/ML; UG/ML
INJECTION, SOLUTION INFILTRATION; PERINEURAL
Status: DISPENSED
Start: 2023-01-06

## (undated) RX ORDER — CYANOCOBALAMIN 1000 UG/ML
INJECTION, SOLUTION INTRAMUSCULAR; SUBCUTANEOUS
Status: DISPENSED
Start: 2021-01-29

## (undated) RX ORDER — HYDROMORPHONE HYDROCHLORIDE 1 MG/ML
INJECTION, SOLUTION INTRAMUSCULAR; INTRAVENOUS; SUBCUTANEOUS
Status: DISPENSED
Start: 2023-01-07

## (undated) RX ORDER — ONDANSETRON 2 MG/ML
INJECTION INTRAMUSCULAR; INTRAVENOUS
Status: DISPENSED
Start: 2024-10-28

## (undated) RX ORDER — ONDANSETRON 2 MG/ML
INJECTION INTRAMUSCULAR; INTRAVENOUS
Status: DISPENSED
Start: 2022-10-31

## (undated) RX ORDER — ONDANSETRON 4 MG/1
TABLET, ORALLY DISINTEGRATING ORAL
Status: DISPENSED
Start: 2023-01-07

## (undated) RX ORDER — TRIAMCINOLONE ACETONIDE 40 MG/ML
INJECTION, SUSPENSION INTRA-ARTICULAR; INTRAMUSCULAR
Status: DISPENSED
Start: 2025-07-21

## (undated) RX ORDER — BUPIVACAINE HYDROCHLORIDE 5 MG/ML
INJECTION, SOLUTION EPIDURAL; INTRACAUDAL; PERINEURAL
Status: DISPENSED
Start: 2025-07-21

## (undated) RX ORDER — TRANEXAMIC ACID 10 MG/ML
INJECTION, SOLUTION INTRAVENOUS
Status: DISPENSED
Start: 2023-01-07

## (undated) RX ORDER — POTASSIUM CHLORIDE 750 MG/1
TABLET, EXTENDED RELEASE ORAL
Status: DISPENSED
Start: 2022-10-31

## (undated) RX ORDER — TRIAMCINOLONE ACETONIDE 40 MG/ML
INJECTION, SUSPENSION INTRA-ARTICULAR; INTRAMUSCULAR
Status: DISPENSED
Start: 2024-10-02

## (undated) RX ORDER — TRIAMCINOLONE ACETONIDE 40 MG/ML
INJECTION, SUSPENSION INTRA-ARTICULAR; INTRAMUSCULAR
Status: DISPENSED
Start: 2024-07-02

## (undated) RX ORDER — AZITHROMYCIN 250 MG/1
TABLET, FILM COATED ORAL
Status: DISPENSED
Start: 2023-02-05

## (undated) RX ORDER — LIDOCAINE HYDROCHLORIDE 10 MG/ML
INJECTION, SOLUTION EPIDURAL; INFILTRATION; INTRACAUDAL; PERINEURAL
Status: DISPENSED
Start: 2022-10-31

## (undated) RX ORDER — LIDOCAINE HYDROCHLORIDE 10 MG/ML
INJECTION, SOLUTION INFILTRATION; PERINEURAL
Status: DISPENSED
Start: 2024-10-28

## (undated) RX ORDER — BUPIVACAINE HYDROCHLORIDE 5 MG/ML
INJECTION, SOLUTION EPIDURAL; INTRACAUDAL
Status: DISPENSED
Start: 2024-07-02

## (undated) RX ORDER — BUPIVACAINE HYDROCHLORIDE 5 MG/ML
INJECTION, SOLUTION EPIDURAL; INTRACAUDAL
Status: DISPENSED
Start: 2024-10-02

## (undated) RX ORDER — CYANOCOBALAMIN 1000 UG/ML
INJECTION, SOLUTION INTRAMUSCULAR; SUBCUTANEOUS
Status: DISPENSED
Start: 2021-02-12

## (undated) RX ORDER — NITROGLYCERIN 5 MG/ML
VIAL (ML) INTRAVENOUS
Status: DISPENSED
Start: 2022-10-31

## (undated) RX ORDER — LEVOFLOXACIN 5 MG/ML
INJECTION, SOLUTION INTRAVENOUS
Status: DISPENSED
Start: 2024-10-28

## (undated) RX ORDER — HEPARIN SODIUM 1000 [USP'U]/ML
INJECTION, SOLUTION INTRAVENOUS; SUBCUTANEOUS
Status: DISPENSED
Start: 2022-10-31

## (undated) RX ORDER — ACETAMINOPHEN 325 MG/1
TABLET ORAL
Status: DISPENSED
Start: 2024-10-28

## (undated) RX ORDER — SODIUM CHLORIDE 9 MG/ML
INJECTION, SOLUTION INTRAVENOUS
Status: DISPENSED
Start: 2022-10-31